# Patient Record
Sex: FEMALE | Race: WHITE | NOT HISPANIC OR LATINO | Employment: OTHER | URBAN - METROPOLITAN AREA
[De-identification: names, ages, dates, MRNs, and addresses within clinical notes are randomized per-mention and may not be internally consistent; named-entity substitution may affect disease eponyms.]

---

## 2017-01-03 ENCOUNTER — GENERIC CONVERSION - ENCOUNTER (OUTPATIENT)
Dept: OTHER | Facility: OTHER | Age: 77
End: 2017-01-03

## 2017-01-30 ENCOUNTER — ALLSCRIPTS OFFICE VISIT (OUTPATIENT)
Dept: OTHER | Facility: OTHER | Age: 77
End: 2017-01-30

## 2017-03-20 ENCOUNTER — GENERIC CONVERSION - ENCOUNTER (OUTPATIENT)
Dept: OTHER | Facility: OTHER | Age: 77
End: 2017-03-20

## 2017-03-21 ENCOUNTER — GENERIC CONVERSION - ENCOUNTER (OUTPATIENT)
Dept: OTHER | Facility: OTHER | Age: 77
End: 2017-03-21

## 2018-01-11 NOTE — RESULT NOTES
Verified Results  MAMMO DIAGNOSTIC RIGHT W CAD [id number] 12:00AM Kae Floyd     Test Name Result Flag Reference   [facility] DIAGNOSTIC RIGHT W CAD BENIGN        Summary / No summary entered :      No summary entered   Documents attached :      Ivon Chaparro [place]: [id number] - Chart Update - -      (Unassigned) (Result Document)    Plan  Encounter for screening mammogram for malignant neoplasm of breast    · MAMMO SCREENING RIGHT W CAD ; every 1 year;  Next [id number]; Status:Active

## 2018-01-12 NOTE — RESULT NOTES
Verified Results  (1) COMPREHENSIVE METABOLIC PANEL [id number] 24:95TW Truong Blind   TW Order Number: [id number]     Test Name Result Flag Reference   GLUCOSE,RANDM 104 mg/dL     If the patient is fasting, the ADA then defines impaired fasting glucose as > 100 mg/dL and diabetes as > or equal to 123 mg/dL  SODIUM 136 mmol/L  136-145   POTASSIUM 3 5 mmol/L  3 5-5 3   CHLORIDE 97 mmol/L L 100-108   CARBON DIOXIDE 32 mmol/L  21-32   ANION GAP (CALC) 7 mmol/L  4-13   BLOOD UREA NITROGEN 17 mg/dL  5-25   CREATININE 0 74 mg/dL  0 60-1 30   Standardized to IDMS reference method   CALCIUM 9 8 mg/dL  8 3-10 1   BILI, TOTAL 0 37 mg/dL  0 20-1 00   ALK PHOSPHATAS 89 U/L     ALT (SGPT) 21 U/L  12-78   AST(SGOT) 17 U/L  5-45   ALBUMIN 4 0 g/dL  3 5-5 0   TOTAL PROTEIN 8 5 g/dL H 6 4-8 2   eGFR Non-African American      >60 0 ml/min/1 73sq m   - Patient Instructions: This is a fasting blood test  Water,black tea or black  coffee only after 9:00pm the night before test Drink 2 glasses of water the morning of test - Patient Instructions: This bloodwork is non-fasting  Please drink two glasses of   water morning of bloodwork  National Kidney Disease Education Program recommendations are as follows:  GFR calculation is accurate only with a steady state creatinine  Chronic Kidney disease less than 60 ml/min/1 73 sq  meters  Kidney failure less than 15 ml/min/1 73 sq  meters  (1) CBC/PLT/DIFF [id number] 08:31AM Truong Blind   TW Order Number: [id number]     Test Name Result Flag Reference   WBC COUNT 3 96 Thousand/uL L 4 31-10 16   RBC COUNT 3 96 Million/uL  3 81-5 12   HEMOGLOBIN 12 7 g/dL  11 5-15 4   HEMATOCRIT 38 8 %  34 8-46  1   MCV 98 fL  82-98   MCH 32 1 pg  26 8-34 3   MCHC 32 7 g/dL  31 4-37 4   RDW 13 1 %  11 6-15 1   MPV 9 9 fL  8 9-12 7   PLATELET COUNT 629 Thousands/uL  149-390   nRBC AUTOMATED 0 /100 WBCs     NEUTROPHILS RELATIVE PERCENT 37 % L 43-75   LYMPHOCYTES RELATIVE PERCENT 40 %  14-44   MONOCYTES RELATIVE PERCENT 15 % H 4-12   EOSINOPHILS RELATIVE PERCENT 7 % H 0-6   BASOPHILS RELATIVE PERCENT 1 %  0-1   NEUTROPHILS ABSOLUTE COUNT 1 48 Thousands/?L L 1 85-7 62   LYMPHOCYTES ABSOLUTE COUNT 1 57 Thousands/?L  0 60-4 47   MONOCYTES ABSOLUTE COUNT 0 61 Thousand/?L  0 17-1 22   EOSINOPHILS ABSOLUTE COUNT 0 27 Thousand/?L  0 00-0 61   BASOPHILS ABSOLUTE COUNT 0 03 Thousands/?L  0 00-0 10   - Patient Instructions: This bloodwork is non-fasting  Please drink two glasses of water morning of bloodwork  - Patient Instructions: This bloodwork is non-fasting  Please drink two glasses of water morning of bloodwork  (1) LIPID PANEL, FASTING [id number] 08:31AM Juan Russ    Order Number: [id number]     Test Name Result Flag Reference   CHOLESTEROL 210 mg/dL H    HDL,DIRECT 81 mg/dL H 40-60   Specimen collection should occur prior to Metamizole administration due to the potential for falsely depressed results  LDL CHOLESTEROL CALCULATED 115 mg/dL H 0-100   - Patient Instructions: This is a fasting blood test  Water,black tea or black  coffee only after 9:00pm the night before test   Drink 2 glasses of water the morning of test     - Patient Instructions: This is a fasting blood test  Water,black tea or black  coffee only after 9:00pm the night before test Drink 2 glasses of water the morning of test - Patient Instructions: This bloodwork is non-fasting  Please drink two glasses of   water morning of bloodwork  Triglyceride:         Normal              <150 mg/dl       Borderline High    150-199 mg/dl       High               200-499 mg/dl       Very High          >499 mg/dl  Cholesterol:         Desirable        <200 mg/dl      Borderline High  200-239 mg/dl      High             >239 mg/dl  HDL Cholesterol:        High    >59 mg/dL      Low     <41 mg/dL  LDL CALCULATED:    This screening LDL is a calculated result    It does not have the accuracy of the Direct Measured LDL in the monitoring of patients with hyperlipidemia and/or statin therapy  Direct Measure LDL (EKU655) must be ordered separately in these patients  TRIGLYCERIDES 68 mg/dL  <=150   Specimen collection should occur prior to N-Acetylcysteine or Metamizole administration due to the potential for falsely depressed results  (1) TSH [id number] 08:31AM Tatyana Lara    Order Number: [id number]     Test Name Result Flag Reference   TSH 3 710 uIU/mL  0 358-3 740   - Patient Instructions: This bloodwork is non-fasting  Please drink two glasses of water morning of bloodwork  - Patient Instructions: This is a fasting blood test  Water,black tea or black  coffee only after 9:00pm the night before test Drink 2 glasses of water the morning of test - Patient Instructions: This bloodwork is non-fasting  Please drink two glasses of   water morning of bloodwork  Patients undergoing fluorescein dye angiography may retain small amounts of fluorescein in the body for 48-72 hours post procedure  Samples containing fluorescein can produce falsely depressed TSH values  If the patient had this procedure,a specimen should be resubmitted post fluorescein clearance            The recommended reference ranges for TSH during pregnancy are as follows:  First trimester 0 1 to 2 5 uIU/mL  Second trimester  0 2 to 3 0 uIU/mL  Third trimester 0 3 to 3 0 uIU/m

## 2018-01-13 VITALS
BODY MASS INDEX: 26.31 KG/M2 | RESPIRATION RATE: 16 BRPM | WEIGHT: 143 LBS | DIASTOLIC BLOOD PRESSURE: 70 MMHG | HEART RATE: 70 BPM | SYSTOLIC BLOOD PRESSURE: 150 MMHG | OXYGEN SATURATION: 94 % | HEIGHT: 62 IN | TEMPERATURE: 98 F

## 2018-03-13 ENCOUNTER — TELEPHONE (OUTPATIENT)
Dept: FAMILY MEDICINE CLINIC | Facility: CLINIC | Age: 78
End: 2018-03-13

## 2018-03-13 DIAGNOSIS — Z12.39 ENCOUNTER FOR SCREENING FOR MALIGNANT NEOPLASM OF BREAST: Primary | ICD-10-CM

## 2018-03-13 NOTE — TELEPHONE ENCOUNTER
Clovia Elder Dr Cheryle Harper is requesting order for routine mammo    Please fax to [phone] and mail to pt  home

## 2018-06-08 DIAGNOSIS — I10 ESSENTIAL HYPERTENSION: Primary | ICD-10-CM

## 2018-06-09 RX ORDER — METOPROLOL TARTRATE 50 MG/1
TABLET, FILM COATED ORAL
Qty: 180 TABLET | Refills: 3 | Status: SHIPPED | OUTPATIENT
Start: 2018-06-09 | End: 2019-05-30 | Stop reason: SDUPTHER

## 2018-06-09 RX ORDER — HYDROCHLOROTHIAZIDE 50 MG/1
TABLET ORAL
Qty: 90 TABLET | Refills: 3 | Status: SHIPPED | OUTPATIENT
Start: 2018-06-09 | End: 2019-05-30 | Stop reason: SDUPTHER

## 2018-06-09 RX ORDER — NIFEDIPINE 30 MG/1
TABLET, EXTENDED RELEASE ORAL
Qty: 90 TABLET | Refills: 3 | Status: SHIPPED | OUTPATIENT
Start: 2018-06-09 | End: 2019-05-30 | Stop reason: SDUPTHER

## 2018-06-09 RX ORDER — POTASSIUM CHLORIDE 20 MEQ/1
TABLET, EXTENDED RELEASE ORAL
Qty: 90 TABLET | Refills: 3 | Status: SHIPPED | OUTPATIENT
Start: 2018-06-09 | End: 2019-05-30 | Stop reason: SDUPTHER

## 2018-09-24 ENCOUNTER — TELEPHONE (OUTPATIENT)
Dept: FAMILY MEDICINE CLINIC | Facility: CLINIC | Age: 78
End: 2018-09-24

## 2018-09-24 NOTE — TELEPHONE ENCOUNTER
----- Message from Kody Spencer MD sent at 9/22/2018  9:27 AM EDT -----  Please inform mammogram showed stable findings--rpt in one yr

## 2018-12-13 ENCOUNTER — CLINICAL SUPPORT (OUTPATIENT)
Dept: FAMILY MEDICINE CLINIC | Facility: CLINIC | Age: 78
End: 2018-12-13
Payer: COMMERCIAL

## 2018-12-13 DIAGNOSIS — Z23 NEED FOR INFLUENZA VACCINATION: Primary | ICD-10-CM

## 2018-12-13 PROCEDURE — G0008 ADMIN INFLUENZA VIRUS VAC: HCPCS

## 2018-12-13 PROCEDURE — 90662 IIV NO PRSV INCREASED AG IM: CPT

## 2019-05-30 DIAGNOSIS — I10 ESSENTIAL HYPERTENSION: ICD-10-CM

## 2019-05-30 RX ORDER — POTASSIUM CHLORIDE 20 MEQ/1
TABLET, EXTENDED RELEASE ORAL
Qty: 30 TABLET | Refills: 0 | Status: SHIPPED | OUTPATIENT
Start: 2019-05-30 | End: 2019-09-19 | Stop reason: SDUPTHER

## 2019-05-30 RX ORDER — METOPROLOL TARTRATE 50 MG/1
TABLET, FILM COATED ORAL
Qty: 60 TABLET | Refills: 0 | Status: SHIPPED | OUTPATIENT
Start: 2019-05-30 | End: 2019-09-19 | Stop reason: SDUPTHER

## 2019-05-30 RX ORDER — NIFEDIPINE 30 MG/1
TABLET, EXTENDED RELEASE ORAL
Qty: 30 TABLET | Refills: 0 | Status: SHIPPED | OUTPATIENT
Start: 2019-05-30 | End: 2019-08-18 | Stop reason: SDUPTHER

## 2019-05-30 RX ORDER — HYDROCHLOROTHIAZIDE 50 MG/1
TABLET ORAL
Qty: 30 TABLET | Refills: 0 | Status: SHIPPED | OUTPATIENT
Start: 2019-05-30 | End: 2019-09-19 | Stop reason: SDUPTHER

## 2019-08-18 DIAGNOSIS — I10 ESSENTIAL HYPERTENSION: ICD-10-CM

## 2019-08-18 RX ORDER — NIFEDIPINE 30 MG/1
TABLET, EXTENDED RELEASE ORAL
Qty: 90 TABLET | Refills: 3 | Status: SHIPPED | OUTPATIENT
Start: 2019-08-18 | End: 2020-05-04

## 2019-08-22 DIAGNOSIS — I10 ESSENTIAL HYPERTENSION: ICD-10-CM

## 2019-08-22 RX ORDER — POTASSIUM CHLORIDE 20 MEQ/1
TABLET, EXTENDED RELEASE ORAL
Qty: 30 TABLET | Refills: 0 | OUTPATIENT
Start: 2019-08-22

## 2019-08-22 RX ORDER — HYDROCHLOROTHIAZIDE 50 MG/1
TABLET ORAL
Qty: 30 TABLET | Refills: 0 | OUTPATIENT
Start: 2019-08-22

## 2019-08-22 RX ORDER — METOPROLOL TARTRATE 50 MG/1
TABLET, FILM COATED ORAL
Qty: 60 TABLET | Refills: 0 | OUTPATIENT
Start: 2019-08-22

## 2019-09-18 DIAGNOSIS — I10 ESSENTIAL HYPERTENSION: ICD-10-CM

## 2019-09-18 RX ORDER — HYDROCHLOROTHIAZIDE 50 MG/1
TABLET ORAL
Qty: 30 TABLET | Refills: 0 | OUTPATIENT
Start: 2019-09-18

## 2019-09-18 RX ORDER — POTASSIUM CHLORIDE 20 MEQ/1
TABLET, EXTENDED RELEASE ORAL
Qty: 30 TABLET | Refills: 0 | OUTPATIENT
Start: 2019-09-18

## 2019-09-18 RX ORDER — METOPROLOL TARTRATE 50 MG/1
TABLET, FILM COATED ORAL
Qty: 60 TABLET | Refills: 0 | OUTPATIENT
Start: 2019-09-18

## 2019-09-19 DIAGNOSIS — I10 ESSENTIAL HYPERTENSION: ICD-10-CM

## 2019-09-19 NOTE — TELEPHONE ENCOUNTER
Dr Prasanna Lubin:    Patient needs a refill of potassium chloride, nifedipine, metoprolol tartrate and hydrochlorothiazide sent to [facility] in [place]  She is scheduled with you on 10/1 but will be out of her medication before that appointment

## 2019-09-20 RX ORDER — POTASSIUM CHLORIDE 20 MEQ/1
20 TABLET, EXTENDED RELEASE ORAL DAILY
Qty: 30 TABLET | Refills: 0 | Status: SHIPPED | OUTPATIENT
Start: 2019-09-20 | End: 2020-01-09

## 2019-09-20 RX ORDER — HYDROCHLOROTHIAZIDE 50 MG/1
50 TABLET ORAL DAILY
Qty: 30 TABLET | Refills: 0 | Status: SHIPPED | OUTPATIENT
Start: 2019-09-20 | End: 2019-11-16 | Stop reason: SDUPTHER

## 2019-09-20 RX ORDER — METOPROLOL TARTRATE 50 MG/1
50 TABLET, FILM COATED ORAL 2 TIMES DAILY
Qty: 60 TABLET | Refills: 0 | Status: SHIPPED | OUTPATIENT
Start: 2019-09-20 | End: 2019-11-09 | Stop reason: SDUPTHER

## 2019-10-01 ENCOUNTER — OFFICE VISIT (OUTPATIENT)
Dept: FAMILY MEDICINE CLINIC | Facility: CLINIC | Age: 79
End: 2019-10-01
Payer: COMMERCIAL

## 2019-10-01 ENCOUNTER — TELEPHONE (OUTPATIENT)
Dept: OTHER | Facility: OTHER | Age: 79
End: 2019-10-01

## 2019-10-01 VITALS
OXYGEN SATURATION: 97 % | HEART RATE: 66 BPM | HEIGHT: 60 IN | BODY MASS INDEX: 22.97 KG/M2 | RESPIRATION RATE: 16 BRPM | TEMPERATURE: 98 F | SYSTOLIC BLOOD PRESSURE: 130 MMHG | DIASTOLIC BLOOD PRESSURE: 74 MMHG | WEIGHT: 117 LBS

## 2019-10-01 DIAGNOSIS — Z23 IMMUNIZATION DUE: ICD-10-CM

## 2019-10-01 DIAGNOSIS — I10 ESSENTIAL HYPERTENSION: Primary | ICD-10-CM

## 2019-10-01 DIAGNOSIS — E78.01 FAMILIAL HYPERCHOLESTEROLEMIA: ICD-10-CM

## 2019-10-01 DIAGNOSIS — Z12.31 BREAST CANCER SCREENING BY MAMMOGRAM: ICD-10-CM

## 2019-10-01 PROCEDURE — 3075F SYST BP GE 130 - 139MM HG: CPT | Performed by: FAMILY MEDICINE

## 2019-10-01 PROCEDURE — 3078F DIAST BP <80 MM HG: CPT | Performed by: FAMILY MEDICINE

## 2019-10-01 PROCEDURE — G0008 ADMIN INFLUENZA VIRUS VAC: HCPCS | Performed by: FAMILY MEDICINE

## 2019-10-01 PROCEDURE — 1101F PT FALLS ASSESS-DOCD LE1/YR: CPT | Performed by: FAMILY MEDICINE

## 2019-10-01 PROCEDURE — 99214 OFFICE O/P EST MOD 30 MIN: CPT | Performed by: FAMILY MEDICINE

## 2019-10-01 PROCEDURE — 90662 IIV NO PRSV INCREASED AG IM: CPT | Performed by: FAMILY MEDICINE

## 2019-10-26 NOTE — PROGRESS NOTES
Chief Complaint   Patient presents with    Follow-up    Hypertension        Patient ID: Amairani García is a [de-identified] y o  female  [de-identified] yo pt in for BP check and to req flu shot and ref for mammogram  BP wnl  Overall feels well  Also due for labs  Eye care UTD  Past Medical History:   Diagnosis Date    Allergic     Hypertension        History reviewed  No pertinent surgical history  There is no problem list on file for this patient  History reviewed  No pertinent family history  Immunization History   Administered Date(s) Administered    Influenza Split High Dose Preservative Free IM 10/23/2014, 10/21/2015, 11/02/2016, 10/31/2017    Influenza TIV (IM) 10/25/2011    Influenza, high dose seasonal 0 5 mL 12/13/2018, 10/01/2019    Pneumococcal Polysaccharide PPV23 10/01/2010       No Known Allergies    Current Outpatient Medications   Medication Sig Dispense Refill    hydrochlorothiazide (HYDRODIURIL) 50 mg tablet Take 1 tablet (50 mg total) by mouth daily 30 tablet 0    metoprolol tartrate (LOPRESSOR) 50 mg tablet Take 1 tablet (50 mg total) by mouth 2 (two) times a day 60 tablet 0    NIFEdipine (PROCARDIA XL) 30 mg 24 hr tablet take 1 tablet by mouth once daily 90 tablet 3    potassium chloride (K-DUR,KLOR-CON) 20 mEq tablet Take 1 tablet (20 mEq total) by mouth daily 30 tablet 0     No current facility-administered medications for this visit  Social History     Socioeconomic History    Marital status:       Spouse name: None    Number of children: None    Years of education: None    Highest education level: None   Occupational History    None   Social Needs    Financial resource strain: None    Food insecurity:     Worry: None     Inability: None    Transportation needs:     Medical: None     Non-medical: None   Tobacco Use    Smoking status: Never Smoker    Smokeless tobacco: Never Used   Substance and Sexual Activity    Alcohol use: Never     Frequency: Never    Drug use: None    Sexual activity: None   Lifestyle    Physical activity:     Days per week: None     Minutes per session: None    Stress: None   Relationships    Social connections:     Talks on phone: None     Gets together: None     Attends Hinduism service: None     Active member of club or organization: None     Attends meetings of clubs or organizations: None     Relationship status: None    Intimate partner violence:     Fear of current or ex partner: None     Emotionally abused: None     Physically abused: None     Forced sexual activity: None   Other Topics Concern    None   Social History Narrative    None       Review of Systems   Constitutional: Positive for fatigue  Eyes: Positive for visual disturbance  Respiratory: Negative  Cardiovascular: Negative  Gastrointestinal: Negative  Musculoskeletal: Positive for arthralgias  Neurological: Negative  Psychiatric/Behavioral: Positive for sleep disturbance  The patient is nervous/anxious  Objective:    /74 (BP Location: Left arm, Patient Position: Sitting, Cuff Size: Standard)   Pulse 66   Temp 98 °F (36 7 °C) (Tympanic)   Resp 16   Ht 5' (1 524 m)   Wt 53 1 kg (117 lb)   SpO2 97%   BMI 22 85 kg/m²        Physical Exam   Constitutional: She is oriented to person, place, and time  She appears well-developed and well-nourished  No distress  Cardiovascular: Normal rate and regular rhythm  Pulmonary/Chest: Effort normal and breath sounds normal  No respiratory distress  Abdominal: Soft  Bowel sounds are normal  There is no tenderness  Musculoskeletal: Normal range of motion  Neurological: She is alert and oriented to person, place, and time  No cranial nerve deficit  Skin: Skin is warm and dry  Psychiatric: She has a normal mood and affect  Nursing note and vitals reviewed              Assessment/Plan:     Diagnoses and all orders for this visit:    Essential hypertension  Comments:  BP wnl cont current mgt  maintain yearly eye exams  Orders:  -     CBC; Future  -     Comprehensive metabolic panel; Future    Familial hypercholesterolemia  Comments:  check lipids and thyroid fxn cont low chol diet  Orders:  -     Lipid Panel with Direct LDL reflex; Future  -     TSH, 3rd generation; Future    Immunization due  Comments:  flu vacc given  Orders:  -     influenza vaccine, [id number], high-dose, PF 0 5 mL (FLUZONE HIGH-DOSE)    Breast cancer screening by mammogram  Comments:  ref for mammogram given    Orders:  -     Mammo screening bilateral w 3d & cad; Future        Deloris Valdivia MD

## 2019-11-09 DIAGNOSIS — I10 ESSENTIAL HYPERTENSION: ICD-10-CM

## 2019-11-09 RX ORDER — METOPROLOL TARTRATE 50 MG/1
TABLET, FILM COATED ORAL
Qty: 60 TABLET | Refills: 3 | Status: SHIPPED | OUTPATIENT
Start: 2019-11-09 | End: 2020-02-14

## 2019-11-16 DIAGNOSIS — I10 ESSENTIAL HYPERTENSION: ICD-10-CM

## 2019-11-16 RX ORDER — HYDROCHLOROTHIAZIDE 50 MG/1
TABLET ORAL
Qty: 30 TABLET | Refills: 0 | Status: SHIPPED | OUTPATIENT
Start: 2019-11-16 | End: 2020-01-04

## 2020-01-04 DIAGNOSIS — I10 ESSENTIAL HYPERTENSION: ICD-10-CM

## 2020-01-04 RX ORDER — HYDROCHLOROTHIAZIDE 50 MG/1
TABLET ORAL
Qty: 30 TABLET | Refills: 0 | Status: SHIPPED | OUTPATIENT
Start: 2020-01-04 | End: 2020-02-08

## 2020-01-06 RX ORDER — NIFEDIPINE 30 MG/1
TABLET, FILM COATED, EXTENDED RELEASE ORAL
Qty: 90 TABLET | Refills: 1 | Status: SHIPPED | OUTPATIENT
Start: 2020-01-06 | End: 2020-07-11

## 2020-01-09 DIAGNOSIS — I10 ESSENTIAL HYPERTENSION: ICD-10-CM

## 2020-01-09 RX ORDER — POTASSIUM CHLORIDE 20 MEQ/1
TABLET, EXTENDED RELEASE ORAL
Qty: 30 TABLET | Refills: 1 | Status: SHIPPED | OUTPATIENT
Start: 2020-01-09 | End: 2020-03-14

## 2020-01-24 DIAGNOSIS — Z12.31 BREAST CANCER SCREENING BY MAMMOGRAM: ICD-10-CM

## 2020-01-27 ENCOUNTER — TELEPHONE (OUTPATIENT)
Dept: FAMILY MEDICINE CLINIC | Facility: CLINIC | Age: 80
End: 2020-01-27

## 2020-01-27 DIAGNOSIS — E78.01 FAMILIAL HYPERCHOLESTEROLEMIA: Primary | ICD-10-CM

## 2020-01-27 DIAGNOSIS — I10 ESSENTIAL HYPERTENSION: ICD-10-CM

## 2020-01-27 DIAGNOSIS — I99.8 VASCULAR CALCIFICATION: ICD-10-CM

## 2020-01-27 NOTE — TELEPHONE ENCOUNTER
Pt daughter Justus Lang called back  And would like some answers because they told her her mammo results were fine , she would like to talk to you , please advise tc/john----- Message from Tatianna Benites MD sent at 1/25/2020 11:39 AM EST -----  Please schedule follow-up to discuss mammogram results

## 2020-01-27 NOTE — TELEPHONE ENCOUNTER
Pt refused appointment she said she was told that her mammo was fine at the place she had it done   I tried to explain that a radiologist looks at it but it is the doctor whom has the final say about the results   She still refused the appointment  Matti/john----- Message from Krystina Newman MD sent at 1/25/2020 11:39 AM EST -----  Please schedule follow-up to discuss mammogram results

## 2020-01-28 NOTE — TELEPHONE ENCOUNTER
Patient daughter called as follow up from yesterday's phone call  Wants to know if you could add a lab order that will check patient's mental status as she is aging and getting forgetful

## 2020-01-28 NOTE — TELEPHONE ENCOUNTER
There's no specific test but I ordered several tests that could indicate problems that could be contributing

## 2020-02-08 DIAGNOSIS — I10 ESSENTIAL HYPERTENSION: ICD-10-CM

## 2020-02-08 RX ORDER — HYDROCHLOROTHIAZIDE 50 MG/1
TABLET ORAL
Qty: 30 TABLET | Refills: 3 | Status: SHIPPED | OUTPATIENT
Start: 2020-02-08 | End: 2020-05-23

## 2020-02-14 DIAGNOSIS — I10 ESSENTIAL HYPERTENSION: ICD-10-CM

## 2020-02-14 RX ORDER — METOPROLOL TARTRATE 50 MG/1
TABLET, FILM COATED ORAL
Qty: 60 TABLET | Refills: 3 | Status: SHIPPED | OUTPATIENT
Start: 2020-02-14 | End: 2020-07-11

## 2020-03-13 DIAGNOSIS — I10 ESSENTIAL HYPERTENSION: ICD-10-CM

## 2020-03-14 RX ORDER — POTASSIUM CHLORIDE 20 MEQ/1
TABLET, EXTENDED RELEASE ORAL
Qty: 30 TABLET | Refills: 1 | Status: SHIPPED | OUTPATIENT
Start: 2020-03-14 | End: 2020-05-23

## 2020-05-04 ENCOUNTER — TELEMEDICINE (OUTPATIENT)
Dept: FAMILY MEDICINE CLINIC | Facility: CLINIC | Age: 80
End: 2020-05-04
Payer: COMMERCIAL

## 2020-05-04 DIAGNOSIS — I10 BENIGN ESSENTIAL HYPERTENSION: Primary | ICD-10-CM

## 2020-05-04 DIAGNOSIS — H26.9 CATARACT, UNSPECIFIED CATARACT TYPE, UNSPECIFIED LATERALITY: ICD-10-CM

## 2020-05-04 DIAGNOSIS — C50.911 MALIGNANT NEOPLASM OF RIGHT FEMALE BREAST, UNSPECIFIED ESTROGEN RECEPTOR STATUS, UNSPECIFIED SITE OF BREAST (HCC): ICD-10-CM

## 2020-05-04 PROCEDURE — 99442 PR PHYS/QHP TELEPHONE EVALUATION 11-20 MIN: CPT | Performed by: FAMILY MEDICINE

## 2020-05-23 DIAGNOSIS — I10 ESSENTIAL HYPERTENSION: ICD-10-CM

## 2020-05-23 RX ORDER — HYDROCHLOROTHIAZIDE 50 MG/1
TABLET ORAL
Qty: 30 TABLET | Refills: 3 | Status: SHIPPED | OUTPATIENT
Start: 2020-05-23 | End: 2020-10-10

## 2020-05-23 RX ORDER — POTASSIUM CHLORIDE 20 MEQ/1
TABLET, EXTENDED RELEASE ORAL
Qty: 30 TABLET | Refills: 1 | Status: SHIPPED | OUTPATIENT
Start: 2020-05-23 | End: 2020-08-13

## 2020-05-31 PROBLEM — H93.8X2 EAR FULLNESS, LEFT: Status: ACTIVE | Noted: 2017-01-30

## 2020-05-31 PROBLEM — J32.9 SINUSITIS: Status: ACTIVE | Noted: 2017-01-30

## 2020-06-02 RX ORDER — LORATADINE 10 MG/1
10 TABLET ORAL DAILY
COMMUNITY

## 2020-06-02 RX ORDER — ASCORBIC ACID 1000 MG
TABLET ORAL
COMMUNITY
End: 2022-02-02 | Stop reason: HOSPADM

## 2020-06-02 RX ORDER — UBIDECARENONE 75 MG
CAPSULE ORAL DAILY
COMMUNITY

## 2020-06-02 RX ORDER — MELATONIN
1000 DAILY
COMMUNITY

## 2020-06-04 DIAGNOSIS — Z20.822 COVID-19 RULED OUT BY LABORATORY TESTING: ICD-10-CM

## 2020-06-04 PROCEDURE — U0003 INFECTIOUS AGENT DETECTION BY NUCLEIC ACID (DNA OR RNA); SEVERE ACUTE RESPIRATORY SYNDROME CORONAVIRUS 2 (SARS-COV-2) (CORONAVIRUS DISEASE [COVID-19]), AMPLIFIED PROBE TECHNIQUE, MAKING USE OF HIGH THROUGHPUT TECHNOLOGIES AS DESCRIBED BY CMS-2020-01-R: HCPCS | Performed by: PHYSICIAN ASSISTANT

## 2020-06-07 LAB — SARS-COV-2 RNA SPEC QL NAA+PROBE: NOT DETECTED

## 2020-06-08 ENCOUNTER — HOSPITAL ENCOUNTER (OUTPATIENT)
Facility: AMBULARY SURGERY CENTER | Age: 80
Setting detail: OUTPATIENT SURGERY
Discharge: HOME | End: 2020-06-08
Attending: OPHTHALMOLOGY | Admitting: OPHTHALMOLOGY
Payer: COMMERCIAL

## 2020-06-08 ENCOUNTER — ANESTHESIA (OUTPATIENT)
Dept: PERIOP | Facility: AMBULARY SURGERY CENTER | Age: 80
End: 2020-06-08
Payer: COMMERCIAL

## 2020-06-08 ENCOUNTER — ANESTHESIA EVENT (OUTPATIENT)
Dept: PERIOP | Facility: AMBULARY SURGERY CENTER | Age: 80
End: 2020-06-08
Payer: COMMERCIAL

## 2020-06-08 VITALS
OXYGEN SATURATION: 98 % | BODY MASS INDEX: 22.97 KG/M2 | SYSTOLIC BLOOD PRESSURE: 148 MMHG | HEART RATE: 64 BPM | WEIGHT: 117 LBS | TEMPERATURE: 96.9 F | RESPIRATION RATE: 16 BRPM | HEIGHT: 60 IN | DIASTOLIC BLOOD PRESSURE: 71 MMHG

## 2020-06-08 DIAGNOSIS — Z20.822 COVID-19 RULED OUT BY LABORATORY TESTING: Primary | ICD-10-CM

## 2020-06-08 DIAGNOSIS — H25.012 CORTICAL AGE-RELATED CATARACT OF LEFT EYE: ICD-10-CM

## 2020-06-08 PROCEDURE — V2632 POST CHMBR INTRAOCULAR LENS: HCPCS | Performed by: OPHTHALMOLOGY

## 2020-06-08 DEVICE — IOL SN60WF 22.5: Type: IMPLANTABLE DEVICE | Site: EYE | Status: FUNCTIONAL

## 2020-06-08 RX ORDER — MIDAZOLAM HYDROCHLORIDE 2 MG/2ML
INJECTION, SOLUTION INTRAMUSCULAR; INTRAVENOUS AS NEEDED
Status: DISCONTINUED | OUTPATIENT
Start: 2020-06-08 | End: 2020-06-08 | Stop reason: SURG

## 2020-06-08 RX ORDER — TETRACAINE HYDROCHLORIDE 5 MG/ML
1 SOLUTION OPHTHALMIC ONCE
Status: COMPLETED | OUTPATIENT
Start: 2020-06-08 | End: 2020-06-08

## 2020-06-08 RX ORDER — LIDOCAINE HYDROCHLORIDE 20 MG/ML
1 JELLY TOPICAL
Status: ACTIVE | OUTPATIENT
Start: 2020-06-08 | End: 2020-06-08

## 2020-06-08 RX ORDER — KETOROLAC TROMETHAMINE 5 MG/ML
1 SOLUTION OPHTHALMIC
Status: ACTIVE | OUTPATIENT
Start: 2020-06-08 | End: 2020-06-08

## 2020-06-08 RX ORDER — PHENYLEPHRINE HCL 2.5 %
1 DROPS OPHTHALMIC (EYE)
Status: ACTIVE | OUTPATIENT
Start: 2020-06-08 | End: 2020-06-08

## 2020-06-08 RX ORDER — GATIFLOXACIN 5 MG/ML
1 SOLUTION/ DROPS OPHTHALMIC 2 TIMES DAILY
Qty: 3 ML | Refills: 0
Start: 2020-06-08 | End: 2022-02-02 | Stop reason: HOSPADM

## 2020-06-08 RX ORDER — CYCLOPENTOLATE HYDROCHLORIDE 10 MG/ML
1 SOLUTION/ DROPS OPHTHALMIC
Status: ACTIVE | OUTPATIENT
Start: 2020-06-08 | End: 2020-06-08

## 2020-06-08 RX ORDER — GATIFLOXACIN 5 MG/ML
SOLUTION/ DROPS OPHTHALMIC AS NEEDED
Status: DISCONTINUED | OUTPATIENT
Start: 2020-06-08 | End: 2020-06-08 | Stop reason: HOSPADM

## 2020-06-08 RX ORDER — LIDOCAINE HYDROCHLORIDE 10 MG/ML
INJECTION, SOLUTION EPIDURAL; INFILTRATION; INTRACAUDAL; PERINEURAL AS NEEDED
Status: DISCONTINUED | OUTPATIENT
Start: 2020-06-08 | End: 2020-06-08 | Stop reason: HOSPADM

## 2020-06-08 RX ORDER — TETRACAINE HYDROCHLORIDE 5 MG/ML
SOLUTION OPHTHALMIC AS NEEDED
Status: DISCONTINUED | OUTPATIENT
Start: 2020-06-08 | End: 2020-06-08 | Stop reason: HOSPADM

## 2020-06-08 RX ADMIN — KETOROLAC TROMETHAMINE 1 DROP: 5 SOLUTION OPHTHALMIC at 12:00

## 2020-06-08 RX ADMIN — CYCLOPENTOLATE HYDROCHLORIDE 1 DROP: 10 SOLUTION/ DROPS OPHTHALMIC at 12:00

## 2020-06-08 RX ADMIN — CYCLOPENTOLATE HYDROCHLORIDE 1 DROP: 10 SOLUTION/ DROPS OPHTHALMIC at 12:15

## 2020-06-08 RX ADMIN — LIDOCAINE HYDROCHLORIDE 1 APPLICATION: 20 JELLY TOPICAL at 12:14

## 2020-06-08 RX ADMIN — KETOROLAC TROMETHAMINE 1 DROP: 5 SOLUTION OPHTHALMIC at 11:44

## 2020-06-08 RX ADMIN — PHENYLEPHRINE HYDROCHLORIDE 1 DROP: 25 SOLUTION/ DROPS OPHTHALMIC at 12:00

## 2020-06-08 RX ADMIN — LIDOCAINE HYDROCHLORIDE 1 APPLICATION: 20 JELLY TOPICAL at 11:44

## 2020-06-08 RX ADMIN — KETOROLAC TROMETHAMINE 1 DROP: 5 SOLUTION OPHTHALMIC at 12:15

## 2020-06-08 RX ADMIN — PHENYLEPHRINE HYDROCHLORIDE 1 DROP: 25 SOLUTION/ DROPS OPHTHALMIC at 12:14

## 2020-06-08 RX ADMIN — CYCLOPENTOLATE HYDROCHLORIDE 1 DROP: 10 SOLUTION/ DROPS OPHTHALMIC at 11:44

## 2020-06-08 RX ADMIN — MIDAZOLAM HYDROCHLORIDE 2 MG: 1 INJECTION, SOLUTION INTRAMUSCULAR; INTRAVENOUS at 12:17

## 2020-06-08 RX ADMIN — PHENYLEPHRINE HYDROCHLORIDE 1 DROP: 25 SOLUTION/ DROPS OPHTHALMIC at 11:45

## 2020-06-08 RX ADMIN — LIDOCAINE HYDROCHLORIDE 1 APPLICATION: 20 JELLY TOPICAL at 12:00

## 2020-06-08 RX ADMIN — TETRACAINE HYDROCHLORIDE 1 DROP: 5 SOLUTION OPHTHALMIC at 11:45

## 2020-06-27 LAB
AMYLASE SERPL-CCNC: 85 U/L (ref 31–110)
BASOPHILS # BLD AUTO: 0.1 X10E3/UL (ref 0–0.2)
BASOPHILS NFR BLD AUTO: 2 %
CHOLEST SERPL-MCNC: 202 MG/DL (ref 100–199)
EOSINOPHIL # BLD AUTO: 0.2 X10E3/UL (ref 0–0.4)
EOSINOPHIL NFR BLD AUTO: 6 %
ERYTHROCYTE [DISTWIDTH] IN BLOOD BY AUTOMATED COUNT: 12.1 % (ref 11.7–15.4)
HCT VFR BLD AUTO: 37.3 % (ref 34–46.6)
HDLC SERPL-MCNC: 89 MG/DL
HGB BLD-MCNC: 12.1 G/DL (ref 11.1–15.9)
IMM GRANULOCYTES # BLD: 0 X10E3/UL (ref 0–0.1)
IMM GRANULOCYTES NFR BLD: 0 %
LDLC SERPL CALC-MCNC: 98 MG/DL (ref 0–99)
LIPASE SERPL-CCNC: 24 U/L (ref 14–85)
LYMPHOCYTES # BLD AUTO: 1.3 X10E3/UL (ref 0.7–3.1)
LYMPHOCYTES NFR BLD AUTO: 40 %
MAGNESIUM SERPL-MCNC: 2.1 MG/DL (ref 1.6–2.3)
MCH RBC QN AUTO: 32.3 PG (ref 26.6–33)
MCHC RBC AUTO-ENTMCNC: 32.4 G/DL (ref 31.5–35.7)
MCV RBC AUTO: 100 FL (ref 79–97)
MICRODELETION SYND BLD/T FISH: NORMAL
MONOCYTES # BLD AUTO: 0.5 X10E3/UL (ref 0.1–0.9)
MONOCYTES NFR BLD AUTO: 16 %
NEUTROPHILS # BLD AUTO: 1.2 X10E3/UL (ref 1.4–7)
NEUTROPHILS NFR BLD AUTO: 36 %
PLATELET # BLD AUTO: 247 X10E3/UL (ref 150–450)
RBC # BLD AUTO: 3.75 X10E6/UL (ref 3.77–5.28)
TRIGL SERPL-MCNC: 76 MG/DL (ref 0–149)
TSH SERPL DL<=0.005 MIU/L-ACNC: 2.59 UIU/ML (ref 0.45–4.5)
WBC # BLD AUTO: 3.3 X10E3/UL (ref 3.4–10.8)

## 2020-07-01 ENCOUNTER — TELEMEDICINE (OUTPATIENT)
Dept: FAMILY MEDICINE CLINIC | Facility: CLINIC | Age: 80
End: 2020-07-01
Payer: COMMERCIAL

## 2020-07-01 DIAGNOSIS — I10 BENIGN ESSENTIAL HYPERTENSION: Primary | ICD-10-CM

## 2020-07-01 DIAGNOSIS — Z85.3 HISTORY OF BREAST CANCER: ICD-10-CM

## 2020-07-01 DIAGNOSIS — E78.01 FAMILIAL HYPERCHOLESTEROLEMIA: ICD-10-CM

## 2020-07-01 DIAGNOSIS — R41.3 MEMORY CHANGE: ICD-10-CM

## 2020-07-01 PROCEDURE — 1160F RVW MEDS BY RX/DR IN RCRD: CPT | Performed by: FAMILY MEDICINE

## 2020-07-01 PROCEDURE — 99213 OFFICE O/P EST LOW 20 MIN: CPT | Performed by: FAMILY MEDICINE

## 2020-07-01 PROCEDURE — 3078F DIAST BP <80 MM HG: CPT | Performed by: FAMILY MEDICINE

## 2020-07-01 PROCEDURE — 1036F TOBACCO NON-USER: CPT | Performed by: FAMILY MEDICINE

## 2020-07-01 PROCEDURE — 3077F SYST BP >= 140 MM HG: CPT | Performed by: FAMILY MEDICINE

## 2020-07-01 PROCEDURE — 4040F PNEUMOC VAC/ADMIN/RCVD: CPT | Performed by: FAMILY MEDICINE

## 2020-07-06 NOTE — PROGRESS NOTES
Virtual Brief Visit    Assessment/Plan:    Problem List Items Addressed This Visit     Benign essential hypertension - Primary    Relevant Orders    Comprehensive metabolic panel    Hyperlipidemia    Relevant Orders    Comprehensive metabolic panel      Other Visit Diagnoses     Memory change        Relevant Orders    Ambulatory referral to Neurology    History of breast cancer                    Reason for visit is follow-up labs  Chief Complaint   Patient presents with    Results     labs    Virtual Brief Visit        Encounter provider Shana Melo MD    Provider located at [address] [place] [id number]    Recent Visits  Date Type Provider Dept   07/01/20 [address],[facility]  [place] [facility] recent visits within past 7 days and meeting all other requirements     Future Appointments  No visits were found meeting these conditions  Showing future appointments within next 150 days and meeting all other requirements        After connecting through telephone, the patient was identified by name and date of birth  Rachid Martell was informed that this is a telemedicine visit and that the visit is being conducted through telephone  My office door was closed  No one else was in the room  She acknowledged consent and understanding of privacy and security of the platform  The patient has agreed to participate and understands she can discontinue the visit at any time  dtr -Denies Ze--on call w pt    Patient is aware this is a billable service       Jorge Mulligan is a [de-identified] y o  female   HPI   dtr-Anitha-on call w pt  Recent labs wnl except for borderline high total chol, good HDL  Remainder labs wnl  Dtr c/o pt w noted memory changes---mainly short term  No change in speech or gait  Recent eye surgery w Dr Kenya Huynh in [facility]  Denies h/c, neck stiffness, cp or abd pain  Discussed neurology eval prior to consideration of possible med optiions    Past Medical History:   Diagnosis Date    Allergic     Cancer ([address] ) 2005    left breast mastectomy    Hypertension     Memory change        Past Surgical History:   Procedure Laterality Date    CATARACT EXTRACTION Left 05/2020    MASTECTOMY      ID XCAPSL CTRC RMVL INSJ IO LENS PROSTH W/O ECP Left 6/8/2020    Procedure: EXTRACTION EXTRACAPSULAR CATARACT PHACO INTRAOCULAR LENS (IOL); Surgeon: Rashmi Fonseca MD;  Location: [facility] MAIN OR;  Service: Ophthalmology       Current Outpatient Medications   Medication Sig Dispense Refill    Bromfenac Sodium (BromSite) 0 075 % SOLN Administer 1 drop into the left eye 2 (two) times a day  0    cholecalciferol (VITAMIN D3) 1,000 units tablet Take 1,000 Units by mouth daily      cyanocobalamin (VITAMIN B-12) 100 mcg tablet Take by mouth daily      gatifloxacin (ZYMAXID) 0 5 % Administer 1 drop into the left eye 2 (two) times a day 3 mL 0    Ginkgo Biloba 40 MG TABS Take by mouth      hydrochlorothiazide (HYDRODIURIL) 50 mg tablet take 1 tablet by mouth once daily 30 tablet 3    loratadine (CLARITIN) 10 mg tablet Take 10 mg by mouth daily      metoprolol tartrate (LOPRESSOR) 50 mg tablet take 1 tablet by mouth twice a day 60 tablet 3    Multiple Vitamins-Minerals (ICAPS AREDS 2 PO) Take by mouth      NIFEdipine ER (ADALAT CC) 30 MG 24 hr tablet take 1 tablet by mouth once daily 90 tablet 1    potassium chloride (K-DUR,KLOR-CON) 20 mEq tablet take 1 tablet by mouth once daily 30 tablet 1    Prenatal Vit-Fe Fumarate-FA (M-VIT PO) Take by mouth       No current facility-administered medications for this visit  Allergies   Allergen Reactions    Ibuprofen      Reaction Date: 10Aug2005; Review of Systems  Per hpi  There were no vitals filed for this visit  Exam limited to phone visit  No audible distress  As a result of this visit, I have not referred the patient for further respiratory evaluation  I spent 15 minutes directly with the patient during this visit    VIRTUAL VISIT Nikolai Bowen acknowledges that she has consented to an online visit or consultation  She understands that the online visit is based solely on information provided by her, and that, in the absence of a face-to-face physical evaluation by the physician, the diagnosis she receives is both limited and provisional in terms of accuracy and completeness  This is not intended to replace a full medical face-to-face evaluation by the physician  Lizbeth Good understands and accepts these terms

## 2020-07-11 DIAGNOSIS — I10 ESSENTIAL HYPERTENSION: ICD-10-CM

## 2020-07-11 RX ORDER — NIFEDIPINE 30 MG/1
TABLET, FILM COATED, EXTENDED RELEASE ORAL
Qty: 90 TABLET | Refills: 1 | Status: SHIPPED | OUTPATIENT
Start: 2020-07-11 | End: 2020-12-12

## 2020-07-11 RX ORDER — METOPROLOL TARTRATE 50 MG/1
TABLET, FILM COATED ORAL
Qty: 60 TABLET | Refills: 3 | Status: SHIPPED | OUTPATIENT
Start: 2020-07-11 | End: 2020-11-10

## 2020-07-24 LAB
ALBUMIN SERPL-MCNC: 4.7 G/DL (ref 3.7–4.7)
ALBUMIN/GLOB SERPL: 1.7 {RATIO} (ref 1.2–2.2)
ALP SERPL-CCNC: 78 IU/L (ref 39–117)
ALT SERPL-CCNC: 17 IU/L (ref 0–32)
AST SERPL-CCNC: 21 IU/L (ref 0–40)
BILIRUB SERPL-MCNC: 0.3 MG/DL (ref 0–1.2)
BUN SERPL-MCNC: 16 MG/DL (ref 8–27)
BUN/CREAT SERPL: 22 (ref 12–28)
CALCIUM SERPL-MCNC: 9.8 MG/DL (ref 8.7–10.3)
CHLORIDE SERPL-SCNC: 89 MMOL/L (ref 96–106)
CO2 SERPL-SCNC: 26 MMOL/L (ref 20–29)
CREAT SERPL-MCNC: 0.72 MG/DL (ref 0.57–1)
GLOBULIN SER-MCNC: 2.7 G/DL (ref 1.5–4.5)
GLUCOSE SERPL-MCNC: ABNORMAL MG/DL
POTASSIUM SERPL-SCNC: ABNORMAL MMOL/L
PROT SERPL-MCNC: 7.4 G/DL (ref 6–8.5)
SL AMB EGFR AFRICAN AMERICAN: 91 ML/MIN/1.73
SL AMB EGFR NON AFRICAN AMERICAN: 79 ML/MIN/1.73
SODIUM SERPL-SCNC: 133 MMOL/L (ref 134–144)

## 2020-08-12 DIAGNOSIS — I10 ESSENTIAL HYPERTENSION: ICD-10-CM

## 2020-08-13 RX ORDER — POTASSIUM CHLORIDE 20 MEQ/1
TABLET, EXTENDED RELEASE ORAL
Qty: 30 TABLET | Refills: 1 | Status: SHIPPED | OUTPATIENT
Start: 2020-08-13 | End: 2020-10-10

## 2020-09-09 ENCOUNTER — OFFICE VISIT (OUTPATIENT)
Dept: NEUROLOGY | Facility: CLINIC | Age: 80
End: 2020-09-09
Payer: COMMERCIAL

## 2020-09-09 VITALS
WEIGHT: 115 LBS | TEMPERATURE: 97.3 F | BODY MASS INDEX: 22.58 KG/M2 | DIASTOLIC BLOOD PRESSURE: 80 MMHG | SYSTOLIC BLOOD PRESSURE: 171 MMHG | HEART RATE: 69 BPM | HEIGHT: 60 IN

## 2020-09-09 DIAGNOSIS — G30.1 SENILE DEMENTIA OF ALZHEIMER'S TYPE (HCC): ICD-10-CM

## 2020-09-09 DIAGNOSIS — R41.3 MEMORY IMPAIRMENT: Primary | ICD-10-CM

## 2020-09-09 DIAGNOSIS — F02.80 SENILE DEMENTIA OF ALZHEIMER'S TYPE (HCC): ICD-10-CM

## 2020-09-09 DIAGNOSIS — F40.240 CLAUSTROPHOBIA: ICD-10-CM

## 2020-09-09 PROCEDURE — 3079F DIAST BP 80-89 MM HG: CPT | Performed by: PSYCHIATRY & NEUROLOGY

## 2020-09-09 PROCEDURE — 1160F RVW MEDS BY RX/DR IN RCRD: CPT | Performed by: PSYCHIATRY & NEUROLOGY

## 2020-09-09 PROCEDURE — 1036F TOBACCO NON-USER: CPT | Performed by: PSYCHIATRY & NEUROLOGY

## 2020-09-09 PROCEDURE — 99205 OFFICE O/P NEW HI 60 MIN: CPT | Performed by: PSYCHIATRY & NEUROLOGY

## 2020-09-09 PROCEDURE — 3077F SYST BP >= 140 MM HG: CPT | Performed by: PSYCHIATRY & NEUROLOGY

## 2020-09-09 NOTE — PROGRESS NOTES
Outpatient Neurology History and Physical  Chris Ramos  [id number]  89 y o   1940          Consult: Yes    Corinna Madden MD      CC: dementia         History Obtained from: daughter and patient    HPI:    Chris Ramos is an 80 yo F that is brought to us for memory disturbance  She states that since patient has turned 79, they have noticed decline with her memory  She can easily forget her way around  Asking her to do basic chores can be a struggle  She can still wake up and brush, shower, dress herself  She may get confused on how to lock door  She hasn't been driving because car wasn't working  She has never gotten lost walking home  Family denies any aggression, agitation, sun downing phenomena  Denies any associated headache, focal weakness, paresthesia  They can't recall any significant events in her life in past 2 years  She watches news and various shows on TV  She goes out for walks  She is started on supplement Prevagen by family       Past Medical History:   Diagnosis Date    Allergic     Cancer ([address] ) 2005    left breast mastectomy    Hypertension     Memory change                Current Outpatient Medications on File Prior to Visit   Medication Sig Dispense Refill    Apoaequorin (PREVAGEN EXTRA STRENGTH PO) Take by mouth daily at bedtime      cholecalciferol (VITAMIN D3) 1,000 units tablet Take 1,000 Units by mouth daily      cyanocobalamin (VITAMIN B-12) 100 mcg tablet Take by mouth daily      Fexofenadine HCl (MUCINEX ALLERGY PO) Take by mouth as needed      hydrochlorothiazide (HYDRODIURIL) 50 mg tablet take 1 tablet by mouth once daily 30 tablet 3    loratadine (CLARITIN) 10 mg tablet Take 10 mg by mouth daily      metoprolol tartrate (LOPRESSOR) 50 mg tablet take 1 tablet by mouth twice a day 60 tablet 3    Multiple Vitamins-Minerals (ICAPS AREDS 2 PO) Take by mouth      NIFEdipine ER (ADALAT CC) 30 MG 24 hr tablet take 1 tablet by mouth once daily 90 tablet 1    potassium chloride (K-DUR,KLOR-CON) 20 mEq tablet take 1 tablet by mouth once daily 30 tablet 1    Prenatal Vit-Fe Fumarate-FA (M-VIT PO) Take by mouth      Bromfenac Sodium (BromSite) 0 075 % SOLN Administer 1 drop into the left eye 2 (two) times a day (Patient not taking: Reported on 9/9/2020)  0    gatifloxacin (ZYMAXID) 0 5 % Administer 1 drop into the left eye 2 (two) times a day (Patient not taking: Reported on 9/9/2020) 3 mL 0    Ginkgo Biloba 40 MG TABS Take by mouth       No current facility-administered medications on file prior to visit  Allergies   Allergen Reactions    Ibuprofen      Reaction Date: 10Aug2005; History reviewed  No pertinent family history  Past Surgical History:   Procedure Laterality Date    CATARACT EXTRACTION Left 05/2020    MASTECTOMY      SD XCAPSL CTRC RMVL INSJ IO LENS PROSTH W/O ECP Left 6/8/2020    Procedure: EXTRACTION EXTRACAPSULAR CATARACT PHACO INTRAOCULAR LENS (IOL); Surgeon: Luana Jo MD;  Location: [facility] MAIN OR;  Service: Ophthalmology           Social History     Socioeconomic History    Marital status:       Spouse name: Not on file    Number of children: Not on file    Years of education: Not on file    Highest education level: Not on file   Occupational History    Not on file   Social Needs    Financial resource strain: Not on file    Food insecurity     Worry: Not on file     Inability: Not on file    Transportation needs     Medical: Not on file     Non-medical: Not on file   Tobacco Use    Smoking status: Never Smoker    Smokeless tobacco: Never Used   Substance and Sexual Activity    Alcohol use: Never     Frequency: Never    Drug use: Never    Sexual activity: Not on file   Lifestyle    Physical activity     Days per week: Not on file     Minutes per session: Not on file    Stress: Not on file   Relationships    Social connections     Talks on phone: Not on file     Gets together: Not on file Attends [employer] service: Not on file     Active member of club or organization: Not on file     Attends meetings of clubs or organizations: Not on file     Relationship status: Not on file    Intimate partner violence     Fear of current or ex partner: Not on file     Emotionally abused: Not on file     Physically abused: Not on file     Forced sexual activity: Not on file   Other Topics Concern    Not on file   Social History Narrative    Not on file       Review of Systems  Refer to positive review of systems in HPI  Constitutional- No fever  Eyes- No visual change  ENT- Hearing normal  CV- No chest pain  Resp- No Shortness of breath  GI- No diarrhea  - Bladder normal  MS- No Arthritis   Skin- No rash  Psych- No depression  Endo- No DM  Heme- No nodes    PHYSICAL EXAM:    Vitals:    09/09/20 0806   BP: (!) 171/80   BP Location: Right arm   Patient Position: Sitting   Cuff Size: Adult   Pulse: 69   Temp: (!) 97 3 °F (36 3 °C)   Weight: 52 2 kg (115 lb)   Height: 5' (1 524 m)         Appearance: No Acute Distress  Ophthalmoscopic: Disc Flat, Normal fundus  Carotid/Heart/Peripheral Vascular: No Bruits, RRR  Orientation: Awake, Alert, and Oriented x 3  Mental status:  Memory:  MOCA: 10/30   Attention: Normal  Knowledge: fair   Language: No aphasia  Speech: No dysarthria  Cranial Nerves:  2 No Visual Defect on Confrontation; Pupils round, equal, reactive to light  3,4,6 Extraocular Movements Intact; no nystagmus  5 Facial Sensation Intact  7 No facial asymmetry  8 Intact hearing  9,10 Palate symmetric, normal gag  11 Good shoulder shrug  12 Tongue Midline  Gait: scoliosis, stooped posture, Stable, No ataxia, can perform tandem walking  Coordination: No ataxia with finger to nose testing and heel to shin testing  Sensory: Intact, Symmetric to Pinprick, Light Touch, Vibration, and Joint Position  Muscle Tone: Normal  Muscle exam  Arm Right Left Leg Right Left   Deltoid 5/5 5/5 Iliopsoas 5/5 5/5   Biceps 5/5 5/5 Quads 5/5 5/5   Triceps 5/5 5/5 Hamstrings 5/5 5/5   Wrist Extension 5/5 5/5 Ankle Dorsi Flexion 5/5 5/5   Wrist Flexion 5/5 5/5 Ankle Plantar Flexion 5/5 5/5   Interossei 5/5 5/5 Ankle Eversion 5/5 5/5   APB 5/5 5/5 Ankle Inversion 5/5 5/5       Reflexes   RJ BJ TJ KJ AJ Plantars Coppola's   Right 2+ 2+ 2+ 2+ 2+ Downgoing Not present   Left 2+ 2+ 2+ 2+ 2+ Downgoing Not present       Personal review of         Cmp, mag, lipid panel    Assessment/Plan:     1  Memory impairment  Vitamin B12    RPR    Folate    Vitamin D 25 hydroxy    MRI brain NeuroQuant wo and w contrast    EEG Awake and asleep    Ambulatory referral to Neuropsychology   2  Senile dementia of Alzheimer's type [facility])  Ambulatory referral to Neuropsychology   3  Claustrophobia       Patient's clinical history is suggestive of most likely age associated dementia however need to rule out structural and reversible causes  Will obtain MRI brain neuroquant for further evaluation  Will get EEG to r/o epileptiform discharges  Discussed neuropsych testing however there is a good possibility that patient may not be able to complete it  She may resume prevagen at this time  With her degree of memory loss, switching to another agent is not likely to yield better results nor reverse her condition  Counseling Documentation:  The patient and/or patient's family were  counseled regarding diagnostic results  Instructions for management,risk factor reductions,prognosis of disease were discussed  Patient and family were educated regarding impressions,risks and benefits of treatment options,importance of compliance with treatment  Total time of encounter: 60 min  More than 50% of time was spent in counseling and coordination of care of patient  VERONICA Eugene    [facility]  [address]  [address]

## 2020-09-09 NOTE — LETTER
September 9, 2020     Sabrina Ghotra, [address]    Patient: Angela Eastman   YOB: 1940   Date of Visit: 9/9/2020       Dear Dr Whiting Offer: Thank you for referring Live Moreira to me for evaluation  Below are my notes for this consultation  If you have questions, please do not hesitate to call me  I look forward to following your patient along with you  Sincerely,        Mila Briggs MD        CC: No Recipients  Mila Briggs MD  9/9/2020 10:40 AM  Sign when Signing Visit  Outpatient Neurology History and Physical  Angela Eastman  [id number]  79 y o   1940          Consult: Yes    Sabrina Ghotra MD      CC: dementia         History Obtained from: daughter and patient    HPI:    Angela Eastman is an 78 yo F that is brought to us for memory disturbance  She states that since patient has turned 79, they have noticed decline with her memory  She can easily forget her way around  Asking her to do basic chores can be a struggle  She can still wake up and brush, shower, dress herself  She may get confused on how to lock door  She hasn't been driving because car wasn't working  She has never gotten lost walking home  Family denies any aggression, agitation, sun downing phenomena  Denies any associated headache, focal weakness, paresthesia  They can't recall any significant events in her life in past 2 years  She watches news and various shows on TV  She goes out for walks  She is started on supplement Prevagen by family       Past Medical History:   Diagnosis Date    Allergic     Cancer ([facility] [address] ) 2005    left breast mastectomy    Hypertension     Memory change                Current Outpatient Medications on File Prior to Visit   Medication Sig Dispense Refill    Apoaequorin (PREVAGEN EXTRA STRENGTH PO) Take by mouth daily at bedtime      cholecalciferol (VITAMIN D3) 1,000 units tablet Take 1,000 Units by mouth daily      cyanocobalamin (VITAMIN B-12) 100 mcg tablet Take by mouth daily      Fexofenadine HCl (MUCINEX ALLERGY PO) Take by mouth as needed      hydrochlorothiazide (HYDRODIURIL) 50 mg tablet take 1 tablet by mouth once daily 30 tablet 3    loratadine (CLARITIN) 10 mg tablet Take 10 mg by mouth daily      metoprolol tartrate (LOPRESSOR) 50 mg tablet take 1 tablet by mouth twice a day 60 tablet 3    Multiple Vitamins-Minerals (ICAPS AREDS 2 PO) Take by mouth      NIFEdipine ER (ADALAT CC) 30 MG 24 hr tablet take 1 tablet by mouth once daily 90 tablet 1    potassium chloride (K-DUR,KLOR-CON) 20 mEq tablet take 1 tablet by mouth once daily 30 tablet 1    Prenatal Vit-Fe Fumarate-FA (M-VIT PO) Take by mouth      Bromfenac Sodium (BromSite) 0 075 % SOLN Administer 1 drop into the left eye 2 (two) times a day (Patient not taking: Reported on 9/9/2020)  0    gatifloxacin (ZYMAXID) 0 5 % Administer 1 drop into the left eye 2 (two) times a day (Patient not taking: Reported on 9/9/2020) 3 mL 0    Ginkgo Biloba 40 MG TABS Take by mouth       No current facility-administered medications on file prior to visit  Allergies   Allergen Reactions    Ibuprofen      Reaction Date: 10Aug2005; History reviewed  No pertinent family history  Past Surgical History:   Procedure Laterality Date    CATARACT EXTRACTION Left 05/2020    MASTECTOMY      GA XCAPSL CTRC RMVL INSJ IO LENS PROSTH W/O ECP Left 6/8/2020    Procedure: EXTRACTION EXTRACAPSULAR CATARACT PHACO INTRAOCULAR LENS (IOL); Surgeon: Nohemy Kulkarni MD;  Location: [facility] MAIN OR;  Service: Ophthalmology           Social History     Socioeconomic History    Marital status:       Spouse name: Not on file    Number of children: Not on file    Years of education: Not on file    Highest education level: Not on file   Occupational History    Not on file   Social Needs    Financial resource strain: Not on file    Food insecurity     Worry: Not on file Inability: Not on file    Transportation needs     Medical: Not on file     Non-medical: Not on file   Tobacco Use    Smoking status: Never Smoker    Smokeless tobacco: Never Used   Substance and Sexual Activity    Alcohol use: Never     Frequency: Never    Drug use: Never    Sexual activity: Not on file   Lifestyle    Physical activity     Days per week: Not on file     Minutes per session: Not on file    Stress: Not on file   Relationships    Social connections     Talks on phone: Not on file     Gets together: Not on file     Attends [employer] service: Not on file     Active member of club or organization: Not on file     Attends meetings of clubs or organizations: Not on file     Relationship status: Not on file    Intimate partner violence     Fear of current or ex partner: Not on file     Emotionally abused: Not on file     Physically abused: Not on file     Forced sexual activity: Not on file   Other Topics Concern    Not on file   Social History Narrative    Not on file       Review of Systems  Refer to positive review of systems in HPI  Constitutional- No fever  Eyes- No visual change  ENT- Hearing normal  CV- No chest pain  Resp- No Shortness of breath  GI- No diarrhea  - Bladder normal  MS- No Arthritis   Skin- No rash  Psych- No depression  Endo- No DM  Heme- No nodes    PHYSICAL EXAM:    Vitals:    09/09/20 0806   BP: (!) 171/80   BP Location: Right arm   Patient Position: Sitting   Cuff Size: Adult   Pulse: 69   Temp: (!) 97 3 °F (36 3 °C)   Weight: 52 2 kg (115 lb)   Height: 5' (1 524 m)         Appearance: No Acute Distress  Ophthalmoscopic: Disc Flat, Normal fundus  Carotid/Heart/Peripheral Vascular: No Bruits, RRR  Orientation: Awake, Alert, and Oriented x 3  Mental status:  Memory:  MOCA: 10/30   Attention: Normal  Knowledge: fair   Language: No aphasia  Speech: No dysarthria  Cranial Nerves:  2 No Visual Defect on Confrontation; Pupils round, equal, reactive to light  3,4,6 Extraocular Movements Intact; no nystagmus  5 Facial Sensation Intact  7 No facial asymmetry  8 Intact hearing  9,10 Palate symmetric, normal gag  11 Good shoulder shrug  12 Tongue Midline  Gait: scoliosis, stooped posture, Stable, No ataxia, can perform tandem walking  Coordination: No ataxia with finger to nose testing and heel to shin testing  Sensory: Intact, Symmetric to Pinprick, Light Touch, Vibration, and Joint Position  Muscle Tone: Normal  Muscle exam  Arm Right Left Leg Right Left   Deltoid 5/5 5/5 Iliopsoas 5/5 5/5   Biceps 5/5 5/5 Quads 5/5 5/5   Triceps 5/5 5/5 Hamstrings 5/5 5/5   Wrist Extension 5/5 5/5 Ankle Dorsi Flexion 5/5 5/5   Wrist Flexion 5/5 5/5 Ankle Plantar Flexion 5/5 5/5   Interossei 5/5 5/5 Ankle Eversion 5/5 5/5   APB 5/5 5/5 Ankle Inversion 5/5 5/5       Reflexes   RJ BJ TJ KJ AJ Plantars Coppola's   Right 2+ 2+ 2+ 2+ 2+ Downgoing Not present   Left 2+ 2+ 2+ 2+ 2+ Downgoing Not present       Personal review of         Cmp, mag, lipid panel    Assessment/Plan:     1  Memory impairment  Vitamin B12    RPR    Folate    Vitamin D 25 hydroxy    MRI brain NeuroQuant wo and w contrast    EEG Awake and asleep    Ambulatory referral to Neuropsychology   2  Senile dementia of Alzheimer's type [facility])  Ambulatory referral to Neuropsychology   3  Claustrophobia       Patient's clinical history is suggestive of most likely age associated dementia however need to rule out structural and reversible causes  Will obtain MRI brain neuroquant for further evaluation  Will get EEG to r/o epileptiform discharges  Discussed neuropsych testing however there is a good possibility that patient may not be able to complete it  She may resume prevagen at this time  With her degree of memory loss, switching to another agent is not likely to yield better results nor reverse her condition                   Counseling Documentation:  The patient and/or patient's family were  counseled regarding diagnostic results  Instructions for management,risk factor reductions,prognosis of disease were discussed  Patient and family were educated regarding impressions,risks and benefits of treatment options,importance of compliance with treatment  Total time of encounter: 60 min  More than 50% of time was spent in counseling and coordination of care of patient  VERONICA Barron    [facility]  [address]  [address]

## 2020-09-14 ENCOUNTER — TELEPHONE (OUTPATIENT)
Dept: FAMILY MEDICINE CLINIC | Facility: CLINIC | Age: 80
End: 2020-09-14

## 2020-09-14 NOTE — TELEPHONE ENCOUNTER
Dr Mahesh Mariscal,  Pt's daughter would like to discuss the appt she had with Dr Kesha Dickerson before she proceeds forward

## 2020-09-14 NOTE — TELEPHONE ENCOUNTER
Please find out if there are any particular questions pt has--I reviewed neuro consult--appears Dr Aaron Baez is recommending further testing to help clarify cause for the memory changes-I agree the testing may help to determine best medication/tx

## 2020-09-15 DIAGNOSIS — R41.3 MEMORY CHANGE: Primary | ICD-10-CM

## 2020-09-15 NOTE — TELEPHONE ENCOUNTER
Patients daughter was not impressed with Dr Leon Morton  She had some issues with her being negative about her moms progress,no bed side manner, she  doesn't think she wants to put her thru these tests until she gets a second opinion   Could you refer another doctor? Dr Leon Morton  wants an eeg and an MRI   please advise    I told her I would call her back in the morning tc/cma

## 2020-09-15 NOTE — TELEPHONE ENCOUNTER
I put new referral in chat for [address]z give dtr information and ask her to call me back once assessment completed -thanks

## 2020-09-16 DIAGNOSIS — R41.3 MEMORY CHANGE: Primary | ICD-10-CM

## 2020-09-16 RX ORDER — DONEPEZIL HYDROCHLORIDE 5 MG/1
5 TABLET, FILM COATED ORAL
Qty: 30 TABLET | Refills: 2 | Status: SHIPPED | OUTPATIENT
Start: 2020-09-16 | End: 2020-10-26 | Stop reason: DRUGHIGH

## 2020-09-16 NOTE — TELEPHONE ENCOUNTER
Sent over rx -please schedule follow-up in 3-4 weeks to see how tolerating medication and to give flu shot-thanks

## 2020-09-16 NOTE — TELEPHONE ENCOUNTER
Shaina Birch called back and she asked if you could prescribe something for her moms memory that would be covered by insurance because the prevagin is costing her $50 each time she has to purchase it   I will mail the referral for geriatrics   Please advise tc/cma

## 2020-09-16 NOTE — TELEPHONE ENCOUNTER
I called daughter and left message about the referral asked her to call me back and let me know if she wants it mailed or picked up  tc/cma

## 2020-09-24 ENCOUNTER — TELEPHONE (OUTPATIENT)
Dept: FAMILY MEDICINE CLINIC | Facility: CLINIC | Age: 80
End: 2020-09-24

## 2020-09-24 NOTE — TELEPHONE ENCOUNTER
Please ask her to call to check if the geriatric doctors come to [place] [address] or [place] for any of their office hours-if not they may have someone they refer to-otherwise she can try Dr Sandra Dupont or Dr Mary Khan in [place]

## 2020-09-24 NOTE — TELEPHONE ENCOUNTER
Dr Vannessa Martínez,    Patient's daughter received a referal in the mail to a geriatric doctor out in [facility]  Daughter was wondering if there is another doctor that you could recommend that is closer to them, D[facility] Armenta or [facility]

## 2020-09-25 NOTE — TELEPHONE ENCOUNTER
Phone line rang and disconnected - please advise patient of this message if she should return call prior to us reaching out to her again

## 2020-10-07 ENCOUNTER — TELEPHONE (OUTPATIENT)
Dept: FAMILY MEDICINE CLINIC | Facility: CLINIC | Age: 80
End: 2020-10-07

## 2020-10-10 DIAGNOSIS — I10 ESSENTIAL HYPERTENSION: ICD-10-CM

## 2020-10-10 RX ORDER — HYDROCHLOROTHIAZIDE 50 MG/1
TABLET ORAL
Qty: 30 TABLET | Refills: 3 | Status: SHIPPED | OUTPATIENT
Start: 2020-10-10 | End: 2021-02-08

## 2020-10-10 RX ORDER — POTASSIUM CHLORIDE 20 MEQ/1
TABLET, EXTENDED RELEASE ORAL
Qty: 30 TABLET | Refills: 1 | Status: SHIPPED | OUTPATIENT
Start: 2020-10-10 | End: 2020-11-21

## 2020-10-26 ENCOUNTER — OFFICE VISIT (OUTPATIENT)
Dept: FAMILY MEDICINE CLINIC | Facility: CLINIC | Age: 80
End: 2020-10-26
Payer: COMMERCIAL

## 2020-10-26 VITALS
SYSTOLIC BLOOD PRESSURE: 130 MMHG | BODY MASS INDEX: 21.23 KG/M2 | WEIGHT: 115.4 LBS | TEMPERATURE: 98.1 F | DIASTOLIC BLOOD PRESSURE: 70 MMHG | HEIGHT: 62 IN | RESPIRATION RATE: 16 BRPM | HEART RATE: 88 BPM

## 2020-10-26 DIAGNOSIS — Z00.00 MEDICARE ANNUAL WELLNESS VISIT, SUBSEQUENT: ICD-10-CM

## 2020-10-26 DIAGNOSIS — F03.90 DEMENTIA WITHOUT BEHAVIORAL DISTURBANCE, UNSPECIFIED DEMENTIA TYPE (HCC): ICD-10-CM

## 2020-10-26 DIAGNOSIS — R26.89 BALANCE PROBLEMS: ICD-10-CM

## 2020-10-26 DIAGNOSIS — Z23 NEED FOR INFLUENZA VACCINATION: ICD-10-CM

## 2020-10-26 DIAGNOSIS — R41.3 MEMORY CHANGE: Primary | ICD-10-CM

## 2020-10-26 DIAGNOSIS — C50.911 MALIGNANT NEOPLASM OF RIGHT FEMALE BREAST, UNSPECIFIED ESTROGEN RECEPTOR STATUS, UNSPECIFIED SITE OF BREAST (HCC): ICD-10-CM

## 2020-10-26 PROCEDURE — G0008 ADMIN INFLUENZA VIRUS VAC: HCPCS | Performed by: FAMILY MEDICINE

## 2020-10-26 PROCEDURE — 1170F FXNL STATUS ASSESSED: CPT | Performed by: FAMILY MEDICINE

## 2020-10-26 PROCEDURE — 3078F DIAST BP <80 MM HG: CPT | Performed by: FAMILY MEDICINE

## 2020-10-26 PROCEDURE — 1125F AMNT PAIN NOTED PAIN PRSNT: CPT | Performed by: FAMILY MEDICINE

## 2020-10-26 PROCEDURE — 99213 OFFICE O/P EST LOW 20 MIN: CPT | Performed by: FAMILY MEDICINE

## 2020-10-26 PROCEDURE — 3725F SCREEN DEPRESSION PERFORMED: CPT | Performed by: FAMILY MEDICINE

## 2020-10-26 PROCEDURE — 90662 IIV NO PRSV INCREASED AG IM: CPT | Performed by: FAMILY MEDICINE

## 2020-10-26 PROCEDURE — 3075F SYST BP GE 130 - 139MM HG: CPT | Performed by: FAMILY MEDICINE

## 2020-10-26 PROCEDURE — G0439 PPPS, SUBSEQ VISIT: HCPCS | Performed by: FAMILY MEDICINE

## 2020-10-26 RX ORDER — DONEPEZIL HYDROCHLORIDE 10 MG/1
10 TABLET, FILM COATED ORAL
Qty: 30 TABLET | Refills: 6 | Status: SHIPPED | OUTPATIENT
Start: 2020-10-26 | End: 2021-03-15 | Stop reason: DRUGHIGH

## 2020-11-10 DIAGNOSIS — I10 ESSENTIAL HYPERTENSION: ICD-10-CM

## 2020-11-10 RX ORDER — METOPROLOL TARTRATE 50 MG/1
TABLET, FILM COATED ORAL
Qty: 60 TABLET | Refills: 3 | Status: SHIPPED | OUTPATIENT
Start: 2020-11-10 | End: 2021-02-14

## 2020-11-11 ENCOUNTER — TELEPHONE (OUTPATIENT)
Dept: FAMILY MEDICINE CLINIC | Facility: CLINIC | Age: 80
End: 2020-11-11

## 2020-11-13 ENCOUNTER — TELEMEDICINE (OUTPATIENT)
Dept: FAMILY MEDICINE CLINIC | Facility: CLINIC | Age: 80
End: 2020-11-13
Payer: COMMERCIAL

## 2020-11-13 DIAGNOSIS — R41.82 ALTERED MENTAL STATUS, UNSPECIFIED ALTERED MENTAL STATUS TYPE: Primary | ICD-10-CM

## 2020-11-13 DIAGNOSIS — F03.90 DEMENTIA WITHOUT BEHAVIORAL DISTURBANCE, UNSPECIFIED DEMENTIA TYPE (HCC): ICD-10-CM

## 2020-11-13 PROCEDURE — 99213 OFFICE O/P EST LOW 20 MIN: CPT | Performed by: FAMILY MEDICINE

## 2020-11-13 PROCEDURE — 1160F RVW MEDS BY RX/DR IN RCRD: CPT | Performed by: FAMILY MEDICINE

## 2020-11-13 PROCEDURE — 1036F TOBACCO NON-USER: CPT | Performed by: FAMILY MEDICINE

## 2020-11-17 PROBLEM — R26.89 BALANCE PROBLEMS: Status: ACTIVE | Noted: 2020-11-17

## 2020-11-17 PROBLEM — Z00.00 MEDICARE ANNUAL WELLNESS VISIT, SUBSEQUENT: Status: ACTIVE | Noted: 2020-11-17

## 2020-11-17 PROBLEM — F03.90 DEMENTIA WITHOUT BEHAVIORAL DISTURBANCE (HCC): Status: ACTIVE | Noted: 2020-11-17

## 2020-11-21 DIAGNOSIS — I10 ESSENTIAL HYPERTENSION: ICD-10-CM

## 2020-11-21 RX ORDER — POTASSIUM CHLORIDE 20 MEQ/1
TABLET, EXTENDED RELEASE ORAL
Qty: 30 TABLET | Refills: 1 | Status: SHIPPED | OUTPATIENT
Start: 2020-11-21 | End: 2020-12-12

## 2020-11-25 ENCOUNTER — TELEPHONE (OUTPATIENT)
Dept: NEUROLOGY | Facility: CLINIC | Age: 80
End: 2020-11-25

## 2020-11-29 PROBLEM — R41.82 ALTERED MENTAL STATUS: Status: ACTIVE | Noted: 2020-11-29

## 2020-12-12 DIAGNOSIS — I10 ESSENTIAL HYPERTENSION: ICD-10-CM

## 2020-12-12 RX ORDER — POTASSIUM CHLORIDE 20 MEQ/1
TABLET, EXTENDED RELEASE ORAL
Qty: 30 TABLET | Refills: 1 | Status: SHIPPED | OUTPATIENT
Start: 2020-12-12 | End: 2021-02-11

## 2020-12-12 RX ORDER — NIFEDIPINE 30 MG/1
TABLET, FILM COATED, EXTENDED RELEASE ORAL
Qty: 90 TABLET | Refills: 1 | Status: SHIPPED | OUTPATIENT
Start: 2020-12-12 | End: 2021-07-02

## 2021-02-08 DIAGNOSIS — I10 ESSENTIAL HYPERTENSION: ICD-10-CM

## 2021-02-08 RX ORDER — HYDROCHLOROTHIAZIDE 50 MG/1
TABLET ORAL
Qty: 30 TABLET | Refills: 3 | Status: SHIPPED | OUTPATIENT
Start: 2021-02-08 | End: 2021-06-16

## 2021-02-11 DIAGNOSIS — I10 ESSENTIAL HYPERTENSION: ICD-10-CM

## 2021-02-11 RX ORDER — POTASSIUM CHLORIDE 20 MEQ/1
TABLET, EXTENDED RELEASE ORAL
Qty: 30 TABLET | Refills: 1 | Status: SHIPPED | OUTPATIENT
Start: 2021-02-11 | End: 2021-05-01

## 2021-02-13 DIAGNOSIS — I10 ESSENTIAL HYPERTENSION: ICD-10-CM

## 2021-02-14 RX ORDER — METOPROLOL TARTRATE 50 MG/1
TABLET, FILM COATED ORAL
Qty: 60 TABLET | Refills: 3 | Status: SHIPPED | OUTPATIENT
Start: 2021-02-14 | End: 2021-07-14

## 2021-02-26 ENCOUNTER — IMMUNIZATIONS (OUTPATIENT)
Dept: FAMILY MEDICINE CLINIC | Facility: HOSPITAL | Age: 81
End: 2021-02-26

## 2021-02-26 DIAGNOSIS — Z23 ENCOUNTER FOR IMMUNIZATION: Primary | ICD-10-CM

## 2021-02-26 PROCEDURE — 91301 SARS-COV-2 / COVID-19 MRNA VACCINE (MODERNA) 100 MCG: CPT

## 2021-02-26 PROCEDURE — 0011A SARS-COV-2 / COVID-19 MRNA VACCINE (MODERNA) 100 MCG: CPT

## 2021-03-15 ENCOUNTER — TELEPHONE (OUTPATIENT)
Dept: FAMILY MEDICINE CLINIC | Facility: CLINIC | Age: 81
End: 2021-03-15

## 2021-03-15 DIAGNOSIS — F03.90 DEMENTIA WITHOUT BEHAVIORAL DISTURBANCE, UNSPECIFIED DEMENTIA TYPE (HCC): Primary | ICD-10-CM

## 2021-03-15 RX ORDER — DONEPEZIL HYDROCHLORIDE 5 MG/1
5 TABLET, FILM COATED ORAL
Qty: 90 TABLET | Refills: 1 | Status: SHIPPED | OUTPATIENT
Start: 2021-03-15 | End: 2021-10-04

## 2021-03-15 NOTE — TELEPHONE ENCOUNTER
Dr Diamante Rosen:    Patient needs a refill of donepezil 5mg sent to [facility] in [place]  Patient's daughter would also like to know if you could suggest something patient could take for her allergies? Please advise

## 2021-03-26 ENCOUNTER — IMMUNIZATIONS (OUTPATIENT)
Dept: FAMILY MEDICINE CLINIC | Facility: HOSPITAL | Age: 81
End: 2021-03-26

## 2021-03-26 DIAGNOSIS — Z23 ENCOUNTER FOR IMMUNIZATION: Primary | ICD-10-CM

## 2021-03-26 PROCEDURE — 91301 SARS-COV-2 / COVID-19 MRNA VACCINE (MODERNA) 100 MCG: CPT

## 2021-03-26 PROCEDURE — 0012A SARS-COV-2 / COVID-19 MRNA VACCINE (MODERNA) 100 MCG: CPT

## 2021-05-01 DIAGNOSIS — I10 ESSENTIAL HYPERTENSION: ICD-10-CM

## 2021-05-01 RX ORDER — POTASSIUM CHLORIDE 20 MEQ/1
TABLET, EXTENDED RELEASE ORAL
Qty: 30 TABLET | Refills: 1 | Status: SHIPPED | OUTPATIENT
Start: 2021-05-01 | End: 2021-05-29

## 2021-05-25 ENCOUNTER — TELEPHONE (OUTPATIENT)
Dept: FAMILY MEDICINE CLINIC | Facility: CLINIC | Age: 81
End: 2021-05-25

## 2021-05-26 ENCOUNTER — APPOINTMENT (OUTPATIENT)
Dept: RADIOLOGY | Facility: CLINIC | Age: 81
End: 2021-05-26
Payer: COMMERCIAL

## 2021-05-26 ENCOUNTER — TELEMEDICINE (OUTPATIENT)
Dept: FAMILY MEDICINE CLINIC | Facility: CLINIC | Age: 81
End: 2021-05-26
Payer: COMMERCIAL

## 2021-05-26 VITALS
BODY MASS INDEX: 21.16 KG/M2 | HEART RATE: 65 BPM | HEIGHT: 62 IN | DIASTOLIC BLOOD PRESSURE: 58 MMHG | WEIGHT: 115 LBS | SYSTOLIC BLOOD PRESSURE: 120 MMHG | OXYGEN SATURATION: 95 %

## 2021-05-26 DIAGNOSIS — R05.9 COUGH: ICD-10-CM

## 2021-05-26 DIAGNOSIS — R91.1 NODULE OF LEFT LUNG: Primary | ICD-10-CM

## 2021-05-26 DIAGNOSIS — R41.3 MEMORY CHANGE: Primary | ICD-10-CM

## 2021-05-26 DIAGNOSIS — Z85.3 HISTORY OF BREAST CANCER: ICD-10-CM

## 2021-05-26 DIAGNOSIS — I10 BENIGN ESSENTIAL HYPERTENSION: ICD-10-CM

## 2021-05-26 DIAGNOSIS — J92.9 PLEURAL THICKENING: ICD-10-CM

## 2021-05-26 LAB
SL AMB  POCT GLUCOSE, UA: NORMAL
SL AMB LEUKOCYTE ESTERASE,UA: NORMAL
SL AMB POCT BILIRUBIN,UA: NORMAL
SL AMB POCT BLOOD,UA: NORMAL
SL AMB POCT CLARITY,UA: CLEAR
SL AMB POCT COLOR,UA: NORMAL
SL AMB POCT KETONES,UA: NORMAL
SL AMB POCT NITRITE,UA: NORMAL
SL AMB POCT PH,UA: 7
SL AMB POCT SPECIFIC GRAVITY,UA: 1
SL AMB POCT URINE PROTEIN: NORMAL
SL AMB POCT UROBILINOGEN: NORMAL

## 2021-05-26 PROCEDURE — 81003 URINALYSIS AUTO W/O SCOPE: CPT | Performed by: FAMILY MEDICINE

## 2021-05-26 PROCEDURE — 71046 X-RAY EXAM CHEST 2 VIEWS: CPT

## 2021-05-26 PROCEDURE — 1036F TOBACCO NON-USER: CPT | Performed by: FAMILY MEDICINE

## 2021-05-26 PROCEDURE — 99214 OFFICE O/P EST MOD 30 MIN: CPT | Performed by: FAMILY MEDICINE

## 2021-05-26 PROCEDURE — 1160F RVW MEDS BY RX/DR IN RCRD: CPT | Performed by: FAMILY MEDICINE

## 2021-05-26 PROCEDURE — 3725F SCREEN DEPRESSION PERFORMED: CPT | Performed by: FAMILY MEDICINE

## 2021-05-26 PROCEDURE — 3078F DIAST BP <80 MM HG: CPT | Performed by: FAMILY MEDICINE

## 2021-05-26 PROCEDURE — 3074F SYST BP LT 130 MM HG: CPT | Performed by: FAMILY MEDICINE

## 2021-05-26 RX ORDER — BENZONATATE 100 MG/1
100 CAPSULE ORAL 3 TIMES DAILY PRN
Qty: 20 CAPSULE | Refills: 0 | Status: SHIPPED | OUTPATIENT
Start: 2021-05-26 | End: 2022-02-02 | Stop reason: HOSPADM

## 2021-05-26 NOTE — PROGRESS NOTES
Assessment/Plan:    1  Memory change  Assessment & Plan:  Cont Aricept--change timing to AM dosing  Noted hx breast ca--?mets--cont to decline brain imaging/neuro eval      Orders:  -     POCT urine dip auto non-scope    2  Benign essential hypertension  Assessment & Plan:  BP in acceptable range  Cont to monitor    Orders:  -     POCT urine dip auto non-scope    3  Cough  -     XR chest pa & lateral; Future; Expected date: 05/26/2021  -     benzonatate (TESSALON PERLES) 100 mg capsule; Take 1 capsule (100 mg total) by mouth 3 (three) times a day as needed for cough    4  History of breast cancer    5  Body mass index (BMI) 21 0-21 9, adult       Subjective:      Patient ID: Harjeet Davila is a 79 y o  female  Chief Complaint   Patient presents with    Follow-up     having vivid dreams and daughter thinks the medication is the reason (donezapril ) can she take that med in the morning (visualized bp reading     Cough     with phlem        HPI  [id number] in w dtr-Anitha Ze  C/o vivid dreams/memory changes  Noted hx breast ca--has declined brain imaging in past to r/o ?mets or other  Also c/o cough-nonproductive--no fever or inc shortness of breath    The following portions of the patient's history were reviewed and updated as appropriate: allergies, current medications, past family history, past medical history, past social history, past surgical history and problem list     Review of Systems   Constitutional: Positive for fatigue  Negative for fever  Respiratory: Positive for cough  Negative for shortness of breath  Cardiovascular: Negative  Musculoskeletal: Positive for arthralgias  Neurological:        Memory changes   Psychiatric/Behavioral: Positive for confusion, decreased concentration and sleep disturbance  The patient is nervous/anxious            Current Outpatient Medications   Medication Sig Dispense Refill    cholecalciferol (VITAMIN D3) 1,000 units tablet Take 1,000 Units by mouth daily      cyanocobalamin (VITAMIN B-12) 100 mcg tablet Take by mouth daily      donepezil (ARICEPT) 5 mg tablet Take 1 tablet (5 mg total) by mouth daily at bedtime 90 tablet 1    Ginkgo Biloba 40 MG TABS Take by mouth      hydrochlorothiazide (HYDRODIURIL) 50 mg tablet take 1 tablet by mouth once daily 30 tablet 3    loratadine (CLARITIN) 10 mg tablet Take 10 mg by mouth daily      metoprolol tartrate (LOPRESSOR) 50 mg tablet take 1 tablet by mouth twice a day 60 tablet 3    Multiple Vitamins-Minerals (ICAPS AREDS 2 PO) Take by mouth      Multiple Vitamins-Minerals (PRESERVISION AREDS 2 PO) Take by mouth      NIFEdipine ER (ADALAT CC) 30 MG 24 hr tablet take 1 tablet by mouth once daily 90 tablet 1    Apoaequorin (PREVAGEN EXTRA STRENGTH PO) Take by mouth daily at bedtime      benzonatate (TESSALON PERLES) 100 mg capsule Take 1 capsule (100 mg total) by mouth 3 (three) times a day as needed for cough 20 capsule 0    Bromfenac Sodium (BromSite) 0 075 % SOLN Administer 1 drop into the left eye 2 (two) times a day (Patient not taking: Reported on 5/26/2021)  0    Fexofenadine HCl (MUCINEX ALLERGY PO) Take by mouth as needed      gatifloxacin (ZYMAXID) 0 5 % Administer 1 drop into the left eye 2 (two) times a day (Patient not taking: Reported on 5/26/2021) 3 mL 0    potassium chloride (K-DUR,KLOR-CON) 20 mEq tablet take 1 tablet by mouth once daily 30 tablet 1    Prenatal Vit-Fe Fumarate-FA (M-VIT PO) Take by mouth       No current facility-administered medications for this visit  Objective:    /58 (BP Location: Left arm, Patient Position: Sitting, Cuff Size: Standard)   Pulse 65   Ht 5' 1 5" (1 562 m)   Wt 52 2 kg (115 lb)   SpO2 95%   BMI 21 38 kg/m²        Physical Exam  Vitals and nursing note reviewed  Constitutional:       General: She is not in acute distress  Appearance: Normal appearance  Eyes:      General: No scleral icterus       Conjunctiva/sclera: Conjunctivae normal    Cardiovascular:      Rate and Rhythm: Normal rate and regular rhythm  Pulmonary:      Effort: Pulmonary effort is normal  No respiratory distress  Breath sounds: Normal breath sounds  Abdominal:      General: Bowel sounds are normal       Palpations: Abdomen is soft  Tenderness: There is no abdominal tenderness  Musculoskeletal:         General: Normal range of motion  Cervical back: Neck supple  Skin:     General: Skin is warm and dry  Coloration: Skin is not jaundiced  Neurological:      Mental Status: She is alert  Cranial Nerves: No cranial nerve deficit        Comments: Oriented to self and place  No acute focal deficit   Psychiatric:      Comments: Pleasant, cooperative           Fran Jackson MD

## 2021-05-29 DIAGNOSIS — I10 ESSENTIAL HYPERTENSION: ICD-10-CM

## 2021-05-29 RX ORDER — POTASSIUM CHLORIDE 20 MEQ/1
TABLET, EXTENDED RELEASE ORAL
Qty: 30 TABLET | Refills: 1 | Status: SHIPPED | OUTPATIENT
Start: 2021-05-29 | End: 2021-08-23

## 2021-06-01 ENCOUNTER — TELEPHONE (OUTPATIENT)
Dept: FAMILY MEDICINE CLINIC | Facility: CLINIC | Age: 81
End: 2021-06-01

## 2021-06-01 NOTE — TELEPHONE ENCOUNTER
Spoke to daughter and she said when her mom was going thru breast cancer ,they found some lung nodules then , she just doesn't want her mom to be put thru any more radiation if she doesn't need it    Please advise tc/cma----- Message from Haily Cervantes MD sent at 5/30/2021 12:07 PM EDT -----  LM re: cxr results and recommendation for addtl imaging with CT chest without contrast--order in chart--please confirm message received and ask dtr-Denies and ot to follow-up w me once completed-thanks

## 2021-06-02 NOTE — TELEPHONE ENCOUNTER
Spoke to [facility] and they will fax the cat scan and pet scan tc/cma

## 2021-06-02 NOTE — TELEPHONE ENCOUNTER
Deborah guevara/[facility] called back stating that the last exam was 2014  Please c/b to discuss further [phone]

## 2021-06-04 NOTE — TELEPHONE ENCOUNTER
Spoke w dtr--do not appear to be changed but cannot sy definitively--knowing this---dtr/pt would like to hold on CT at this time--cont surveillance  Dtr will call back if any change or addtl concern

## 2021-06-14 PROBLEM — Z85.3 HISTORY OF BREAST CANCER: Status: ACTIVE | Noted: 2021-06-14

## 2021-06-14 PROBLEM — R05.9 COUGH: Status: ACTIVE | Noted: 2021-06-14

## 2021-06-14 NOTE — ASSESSMENT & PLAN NOTE
Cont Aricept--change timing to AM dosing  Noted hx breast ca--?mets--cont to decline brain imaging/neuro eval

## 2021-06-16 DIAGNOSIS — I10 ESSENTIAL HYPERTENSION: ICD-10-CM

## 2021-06-16 RX ORDER — HYDROCHLOROTHIAZIDE 50 MG/1
TABLET ORAL
Qty: 30 TABLET | Refills: 3 | Status: SHIPPED | OUTPATIENT
Start: 2021-06-16 | End: 2021-10-11

## 2021-07-02 DIAGNOSIS — I10 ESSENTIAL HYPERTENSION: ICD-10-CM

## 2021-07-02 RX ORDER — NIFEDIPINE 30 MG/1
TABLET, FILM COATED, EXTENDED RELEASE ORAL
Qty: 90 TABLET | Refills: 1 | Status: SHIPPED | OUTPATIENT
Start: 2021-07-02 | End: 2021-12-22

## 2021-07-14 DIAGNOSIS — I10 ESSENTIAL HYPERTENSION: ICD-10-CM

## 2021-07-14 RX ORDER — METOPROLOL TARTRATE 50 MG/1
TABLET, FILM COATED ORAL
Qty: 60 TABLET | Refills: 3 | Status: SHIPPED | OUTPATIENT
Start: 2021-07-14 | End: 2021-11-08

## 2021-08-22 DIAGNOSIS — I10 ESSENTIAL HYPERTENSION: ICD-10-CM

## 2021-08-23 RX ORDER — POTASSIUM CHLORIDE 20 MEQ/1
TABLET, EXTENDED RELEASE ORAL
Qty: 30 TABLET | Refills: 1 | Status: SHIPPED | OUTPATIENT
Start: 2021-08-23 | End: 2021-10-26

## 2021-10-04 DIAGNOSIS — F03.90 DEMENTIA WITHOUT BEHAVIORAL DISTURBANCE, UNSPECIFIED DEMENTIA TYPE (HCC): ICD-10-CM

## 2021-10-04 RX ORDER — DONEPEZIL HYDROCHLORIDE 5 MG/1
TABLET, FILM COATED ORAL
Qty: 90 TABLET | Refills: 1 | Status: SHIPPED | OUTPATIENT
Start: 2021-10-04 | End: 2022-04-04

## 2021-10-11 DIAGNOSIS — I10 ESSENTIAL HYPERTENSION: ICD-10-CM

## 2021-10-11 RX ORDER — HYDROCHLOROTHIAZIDE 50 MG/1
TABLET ORAL
Qty: 30 TABLET | Refills: 3 | Status: SHIPPED | OUTPATIENT
Start: 2021-10-11 | End: 2022-02-02 | Stop reason: ALTCHOICE

## 2021-10-26 DIAGNOSIS — I10 ESSENTIAL HYPERTENSION: ICD-10-CM

## 2021-10-26 RX ORDER — POTASSIUM CHLORIDE 20 MEQ/1
TABLET, EXTENDED RELEASE ORAL
Qty: 30 TABLET | Refills: 1 | Status: SHIPPED | OUTPATIENT
Start: 2021-10-26 | End: 2021-12-26

## 2021-11-07 DIAGNOSIS — I10 ESSENTIAL HYPERTENSION: ICD-10-CM

## 2021-11-08 RX ORDER — METOPROLOL TARTRATE 50 MG/1
TABLET, FILM COATED ORAL
Qty: 60 TABLET | Refills: 3 | Status: SHIPPED | OUTPATIENT
Start: 2021-11-08 | End: 2022-03-03

## 2021-12-19 DIAGNOSIS — I10 ESSENTIAL HYPERTENSION: ICD-10-CM

## 2021-12-22 RX ORDER — NIFEDIPINE 30 MG/1
TABLET, FILM COATED, EXTENDED RELEASE ORAL
Qty: 90 TABLET | Refills: 1 | Status: SHIPPED | OUTPATIENT
Start: 2021-12-22 | End: 2022-06-10

## 2021-12-26 DIAGNOSIS — I10 ESSENTIAL HYPERTENSION: ICD-10-CM

## 2021-12-26 RX ORDER — POTASSIUM CHLORIDE 20 MEQ/1
TABLET, EXTENDED RELEASE ORAL
Qty: 30 TABLET | Refills: 1 | Status: SHIPPED | OUTPATIENT
Start: 2021-12-26 | End: 2022-02-23

## 2022-01-06 ENCOUNTER — RA CDI HCC (OUTPATIENT)
Dept: OTHER | Facility: HOSPITAL | Age: 82
End: 2022-01-06

## 2022-01-06 NOTE — PROGRESS NOTES
[address]  coding opportunities          Number of diagnosis code(s) already on the problem list added to FYI fla                     Patients insurance company: [address]  (Medicare Advantage and [email or web address])     Visit status: Patient canceled the appointment        [facility]          Number of diagnosis code(s) already on the problem list added to FYI fla      Please review/recertify the BPA diagnoses for  on 22  F03 90 Dementia without behavioral disturbance  Breast cancer - history of             Patients insurance company: [address]  (Medicare Advantage and [email or web address])

## 2022-02-02 ENCOUNTER — OFFICE VISIT (OUTPATIENT)
Dept: FAMILY MEDICINE CLINIC | Facility: CLINIC | Age: 82
End: 2022-02-02
Payer: COMMERCIAL

## 2022-02-02 VITALS
WEIGHT: 118 LBS | SYSTOLIC BLOOD PRESSURE: 126 MMHG | BODY MASS INDEX: 21.71 KG/M2 | HEIGHT: 62 IN | TEMPERATURE: 98 F | DIASTOLIC BLOOD PRESSURE: 80 MMHG | RESPIRATION RATE: 16 BRPM | HEART RATE: 62 BPM

## 2022-02-02 DIAGNOSIS — E78.2 MIXED HYPERLIPIDEMIA: ICD-10-CM

## 2022-02-02 DIAGNOSIS — Z23 NEED FOR INFLUENZA VACCINATION: ICD-10-CM

## 2022-02-02 DIAGNOSIS — Z00.00 MEDICARE ANNUAL WELLNESS VISIT, SUBSEQUENT: ICD-10-CM

## 2022-02-02 DIAGNOSIS — R35.0 URINARY FREQUENCY: ICD-10-CM

## 2022-02-02 DIAGNOSIS — R41.3 MEMORY CHANGE: ICD-10-CM

## 2022-02-02 DIAGNOSIS — R53.82 CHRONIC FATIGUE: ICD-10-CM

## 2022-02-02 DIAGNOSIS — I10 ESSENTIAL HYPERTENSION: Primary | ICD-10-CM

## 2022-02-02 DIAGNOSIS — Z85.3 HISTORY OF BREAST CANCER: ICD-10-CM

## 2022-02-02 PROBLEM — F03.90 DEMENTIA WITHOUT BEHAVIORAL DISTURBANCE (HCC): Status: RESOLVED | Noted: 2020-11-17 | Resolved: 2022-02-02

## 2022-02-02 PROCEDURE — 3288F FALL RISK ASSESSMENT DOCD: CPT | Performed by: FAMILY MEDICINE

## 2022-02-02 PROCEDURE — 90662 IIV NO PRSV INCREASED AG IM: CPT | Performed by: FAMILY MEDICINE

## 2022-02-02 PROCEDURE — 3079F DIAST BP 80-89 MM HG: CPT | Performed by: FAMILY MEDICINE

## 2022-02-02 PROCEDURE — 1090F PRES/ABSN URINE INCON ASSESS: CPT | Performed by: FAMILY MEDICINE

## 2022-02-02 PROCEDURE — G0008 ADMIN INFLUENZA VIRUS VAC: HCPCS | Performed by: FAMILY MEDICINE

## 2022-02-02 PROCEDURE — 1160F RVW MEDS BY RX/DR IN RCRD: CPT | Performed by: FAMILY MEDICINE

## 2022-02-02 PROCEDURE — 1170F FXNL STATUS ASSESSED: CPT | Performed by: FAMILY MEDICINE

## 2022-02-02 PROCEDURE — 99214 OFFICE O/P EST MOD 30 MIN: CPT | Performed by: FAMILY MEDICINE

## 2022-02-02 PROCEDURE — G0439 PPPS, SUBSEQ VISIT: HCPCS | Performed by: FAMILY MEDICINE

## 2022-02-02 PROCEDURE — 3074F SYST BP LT 130 MM HG: CPT | Performed by: FAMILY MEDICINE

## 2022-02-02 PROCEDURE — 1036F TOBACCO NON-USER: CPT | Performed by: FAMILY MEDICINE

## 2022-02-02 PROCEDURE — 1125F AMNT PAIN NOTED PAIN PRSNT: CPT | Performed by: FAMILY MEDICINE

## 2022-02-02 RX ORDER — DIPHENOXYLATE HYDROCHLORIDE AND ATROPINE SULFATE 2.5; .025 MG/1; MG/1
1 TABLET ORAL DAILY
COMMUNITY

## 2022-02-02 RX ORDER — AMOXICILLIN 875 MG/1
TABLET, COATED ORAL
COMMUNITY
Start: 2022-01-31

## 2022-02-02 RX ORDER — ASCORBIC ACID 250 MG
TABLET,CHEWABLE ORAL
COMMUNITY
Start: 2020-03-01

## 2022-02-02 NOTE — PROGRESS NOTES
Assessment and Plan:     Problem List Items Addressed This Visit     None           Preventive health issues were discussed with patient, and age appropriate screening tests were ordered as noted in patient's After Visit Summary  Personalized health advice and appropriate referrals for health education or preventive services given if needed, as noted in patient's After Visit Summary  History of Present Illness:     Patient presents for Medicare Annual Wellness visit    Patient Care Team:  Saira Sawyer MD as PCP - General     Problem List:     Patient Active Problem List   Diagnosis    Benign essential hypertension    [facility] cancer ([facility] )    Ear fullness, left    Hyperlipidemia    Sinusitis    Need for influenza vaccination    Memory change    Dementia without behavioral disturbance ([facility] )    Medicare annual wellness visit, subsequent    Balance problems    Altered mental status    Cough    Body mass index (BMI) 21 0-21 9, adult    History of breast cancer      Past Medical and Surgical History:     Past Medical History:   Diagnosis Date    Allergic     [facility] 2005    left breast mastectomy    Hypertension     Memory change     Nodule of left lung     Pleural thickening      Past Surgical History:   Procedure Laterality Date    CATARACT EXTRACTION Left 05/2020    CATARACT EXTRACTION Left 06/2020    MASTECTOMY      MT XCAPSL CTRC RMVL INSJ IO LENS PROSTH W/O ECP Left 6/8/2020    Procedure: EXTRACTION EXTRACAPSULAR CATARACT PHACO INTRAOCULAR LENS (IOL); Surgeon: Kareen Santiago MD;  Location: [facility] MAIN OR;  Service: Ophthalmology      Family History:     History reviewed  No pertinent family history  Social History:     Social History     Socioeconomic History    Marital status:       Spouse name: None    Number of children: None    Years of education: None    Highest education level: None   Occupational History    None   Tobacco Use    Smoking status: Never Smoker  Smokeless tobacco: Never Used   Vaping Use    Vaping Use: Never used   Substance and Sexual Activity    Alcohol use: Never    Drug use: Never    Sexual activity: None   Other Topics Concern    None   Social History Narrative    None     Social Determinants of Health     Financial Resource Strain: Not on file   Food Insecurity: Not on file   Transportation Needs: Not on file   Physical Activity: Not on file   Stress: Not on file   Social Connections: Not on file   Intimate Partner Violence: Not on file   Housing Stability: Not on file      Medications and Allergies:     Current Outpatient Medications   Medication Sig Dispense Refill    amoxicillin (AMOXIL) 875 mg tablet take 1 tablet by mouth every 12 hours until finished      Ascorbic Acid (Vitamin C) 250 MG CHEW       cholecalciferol (VITAMIN D3) 1,000 units tablet Take 1,000 Units by mouth daily      cyanocobalamin (VITAMIN B-12) 100 mcg tablet Take by mouth daily      donepezil (ARICEPT) 5 mg tablet take 1 tablet by mouth at bedtime 90 tablet 1    Fexofenadine HCl (MUCINEX ALLERGY PO) Take by mouth as needed      hydrochlorothiazide (HYDRODIURIL) 50 mg tablet take 1 tablet by mouth once daily 30 tablet 3    loratadine (CLARITIN) 10 mg tablet Take 10 mg by mouth daily      metoprolol tartrate (LOPRESSOR) 50 mg tablet take 1 tablet by mouth twice a day 60 tablet 3    Multiple Vitamins-Minerals (ONE-A-DAY 50 PLUS PO)       Multiple Vitamins-Minerals (PRESERVISION AREDS 2 PO) Take by mouth      multivitamin (THERAGRAN) TABS Take 1 tablet by mouth daily      NIFEdipine ER (ADALAT CC) 30 MG 24 hr tablet take 1 tablet by mouth once daily 90 tablet 1    potassium chloride (K-DUR,KLOR-CON) 20 mEq tablet take 1 tablet by mouth once daily 30 tablet 1     No current facility-administered medications for this visit       Allergies   Allergen Reactions    Ibuprofen      Reaction Date: 10Aug2005;       Immunizations:     Immunization History Administered Date(s) Administered    COVID-19 MODERNA VACC 0 5 ML IM 02/26/2021, 03/26/2021    Influenza Split High Dose Preservative Free IM 10/23/2014, 10/21/2015, 11/02/2016, 10/31/2017    Influenza, high dose seasonal 0 7 mL 12/13/2018, 10/01/2019, 10/26/2020    Influenza, seasonal, injectable 10/25/2011    Pneumococcal Polysaccharide PPV23 10/01/2010      Health Maintenance:         Topic Date Due    Breast Cancer Screening: Mammogram  01/23/2021         Topic Date Due    DTaP,Tdap,and Td Vaccines (1 - Tdap) Never done    COVID-19 Vaccine (3 - Booster for Moderna series) 08/26/2021    Influenza Vaccine (1) 09/01/2021      Medicare Health Risk Assessment:     BP 92/70 (BP Location: Left arm, Patient Position: Sitting, Cuff Size: Large)   Pulse 62   Temp 98 °F (36 7 °C)   Resp 16   Ht 5' 1 5" (1 562 m)   Wt 53 5 kg (118 lb)   BMI 21 93 kg/m²      Renzo Mera is here for her Subsequent Wellness visit  Last Medicare Wellness visit information reviewed, patient interviewed and updates made to the record today  Health Risk Assessment:   Patient rates overall health as good  Patient feels that their physical health rating is same  Patient is satisfied with their life  Eyesight was rated as slightly worse  Hearing was rated as same  Patient feels that their emotional and mental health rating is same  Patients states they are sometimes angry  Patient states they are sometimes unusually tired/fatigued  Pain experienced in the last 7 days has been none  Patient states that she has experienced no weight loss or gain in last 6 months  Fall Risk Screening: In the past year, patient has experienced: no history of falling in past year      Urinary Incontinence Screening:   Patient has leaked urine accidently in the last six months  Home Safety:  Patient does not have trouble with stairs inside or outside of their home  Patient has working smoke alarms and has working carbon monoxide detector   Home safety hazards include: none  Nutrition:   Current diet is Regular and Limited junk food  Medications:   Patient is currently taking over-the-counter supplements  OTC medications include: See list inclised  Patient is not able to manage medications  Activities of Daily Living (ADLs)/Instrumental Activities of Daily Living (IADLs):   Walk and transfer into and out of bed and chair?: Yes  Dress and groom yourself?: Yes    Bathe or shower yourself?: Yes    Feed yourself? Yes  Do your laundry/housekeeping?: Yes  Manage your money, pay your bills and track your expenses?: No  Make your own meals?: Yes    Do your own shopping?: No    ADL comments: Family does grocery shopping since pandemic    Durable Medical Equipment Suppliers  N/a    Previous Hospitalizations:   Any hospitalizations or ED visits within the last 12 months?: No      Advance Care Planning:   Living will: Yes    Durable POA for healthcare:  Yes    Advanced directive: Yes    Advanced directive counseling given: Yes    Five wishes given: Yes      Cognitive Screening:   Provider or family/friend/caregiver concerned regarding cognition?: Yes    Cognition Comments: Hx dementia    PREVENTIVE SCREENINGS      Cardiovascular Screening:    General: History Lipid Disorder      Diabetes Screening:     General: Risks and Benefits Discussed      Colorectal Cancer Screening:     General: Risks and Benefits Discussed      Breast Cancer Screening:     General: History Breast Cancer      Cervical Cancer Screening:    General: Screening Not Indicated      Osteoporosis Screening:    General: Risks and Benefits Discussed      Abdominal Aortic Aneurysm (AAA) Screening:        General: Screening Not Indicated      Lung Cancer Screening:     General: Screening Not Indicated      Hepatitis C Screening:    General: Screening Not Indicated    Screening, Brief Intervention, and Referral to Treatment (SBIRT)    Screening  Typical number of drinks in a day: 0  Typical number of drinks in a week: 0  Interpretation: Low risk drinking behavior  AUDIT-C Screenin) How often did you have a drink containing alcohol in the past year? never  2) How many drinks did you have on a typical day when you were drinking in the past year? 0  3) How often did you have 6 or more drinks on one occasion in the past year? never    AUDIT-C Score: 0  Interpretation: Score 0-2 (female): Negative screen for alcohol misuse    Single Item Drug Screening:  How often have you used an illegal drug (including marijuana) or a prescription medication for non-medical reasons in the past year? never    Single Item Drug Screen Score: 0  Interpretation: Negative screen for possible drug use disorder    Other Counseling Topics:   Car/seat belt/driving safety, skin self-exam, sunscreen and regular weightbearing exercise and calcium and vitamin D intake         Sabrina Ghotra MD

## 2022-02-02 NOTE — PATIENT INSTRUCTIONS
Medicare Preventive Visit Patient Instructions  Thank you for completing your Welcome to Medicare Visit or Medicare Annual Wellness Visit today  Your next wellness visit will be due in one year (2/3/2023)  The screening/preventive services that you may require over the next 5-10 years are detailed below  Some tests may not apply to you based off risk factors and/or age  Screening tests ordered at today's visit but not completed yet may show as past due  Also, please note that scanned in results may not display below  Preventive Screenings:  Service Recommendations Previous Testing/Comments   Colorectal Cancer Screening  * Colonoscopy    * Fecal Occult Blood Test (FOBT)/Fecal Immunochemical Test (FIT)  * Fecal DNA/Cologuard Test  * Flexible Sigmoidoscopy Age: 54-65 years old   Colonoscopy: every 10 years (may be performed more frequently if at higher risk)  OR  FOBT/FIT: every 1 year  OR  Cologuard: every 3 years  OR  Sigmoidoscopy: every 5 years  Screening may be recommended earlier than age 48 if at higher risk for colorectal cancer  Also, an individualized decision between you and your healthcare provider will decide whether screening between the ages of 74-80 would be appropriate  Colonoscopy: Not on file  FOBT/FIT: Not on file  Cologuard: Not on file  Sigmoidoscopy: Not on file          Breast Cancer Screening Age: 36 years old  Frequency: every 1-2 years  Not required if history of left and right mastectomy Mammogram: 01/23/2020    History Breast Cancer   Cervical Cancer Screening Between the ages of 21-29, pap smear recommended once every 3 years  Between the ages of 33-67, can perform pap smear with HPV co-testing every 5 years     Recommendations may differ for women with a history of total hysterectomy, cervical cancer, or abnormal pap smears in past  Pap Smear: Not on file    Screening Not Indicated   Hepatitis C Screening Once for adults born between 1945 and 1965  More frequently in patients at high risk for Hepatitis C Hep C Antibody: Not on file        Diabetes Screening 1-2 times per year if you're at risk for diabetes or have pre-diabetes Fasting glucose: No results in last 5 years   A1C: No results in last 5 years        Cholesterol Screening Once every 5 years if you don't have a lipid disorder  May order more often based on risk factors  Lipid panel: 06/26/2020    Screening Not Indicated  History Lipid Disorder     Other Preventive Screenings Covered by Medicare:  1  Abdominal Aortic Aneurysm (AAA) Screening: covered once if your at risk  You're considered to be at risk if you have a family history of AAA  2  Lung Cancer Screening: covers low dose CT scan once per year if you meet all of the following conditions: (1) Age 50-69; (2) No signs or symptoms of lung cancer; (3) Current smoker or have quit smoking within the last 15 years; (4) You have a tobacco smoking history of at least 30 pack years (packs per day multiplied by number of years you smoked); (5) You get a written order from a healthcare provider  3  Glaucoma Screening: covered annually if you're considered high risk: (1) You have diabetes OR (2) Family history of glaucoma OR (3)  aged 48 and older OR (3)  American aged 72 and older  3  Osteoporosis Screening: covered every 2 years if you meet one of the following conditions: (1) You're estrogen deficient and at risk for osteoporosis based off medical history and other findings; (2) Have a vertebral abnormality; (3) On glucocorticoid therapy for more than 3 months; (4) Have primary hyperparathyroidism; (5) On osteoporosis medications and need to assess response to drug therapy  · Last bone density test (DXA Scan): Not on file  5  HIV Screening: covered annually if you're between the age of 12-76  Also covered annually if you are younger than 13 and older than 72 with risk factors for HIV infection   For pregnant patients, it is covered up to 3 times per pregnancy  Immunizations:  Immunization Recommendations   Influenza Vaccine Annual influenza vaccination during flu season is recommended for all persons aged >= 6 months who do not have contraindications   Pneumococcal Vaccine (Prevnar and Pneumovax)  * Prevnar = PCV13  * Pneumovax = PPSV23   Adults 25-60 years old: 1-3 doses may be recommended based on certain risk factors  Adults 72 years old: Prevnar (PCV13) vaccine recommended followed by Pneumovax (PPSV23) vaccine  If already received PPSV23 since turning 65, then PCV13 recommended at least one year after PPSV23 dose  Hepatitis B Vaccine 3 dose series if at intermediate or high risk (ex: diabetes, end stage renal disease, liver disease)   Tetanus (Td) Vaccine - COST NOT COVERED BY MEDICARE PART B Following completion of primary series, a booster dose should be given every 10 years to maintain immunity against tetanus  Td may also be given as tetanus wound prophylaxis  Tdap Vaccine - COST NOT COVERED BY MEDICARE PART B Recommended at least once for all adults  For pregnant patients, recommended with each pregnancy  Shingles Vaccine (Shingrix) - COST NOT COVERED BY MEDICARE PART B  2 shot series recommended in those aged 48 and above     Health Maintenance Due:      Topic Date Due    Breast Cancer Screening: Mammogram  01/23/2021     Immunizations Due:      Topic Date Due    DTaP,Tdap,and Td Vaccines (1 - Tdap) Never done    COVID-19 Vaccine (3 - Booster for Moderna series) 08/26/2021    Influenza Vaccine (1) 09/01/2021     Advance Directives   What are advance directives? Advance directives are legal documents that state your wishes and plans for medical care  These plans are made ahead of time in case you lose your ability to make decisions for yourself  Advance directives can apply to any medical decision, such as the treatments you want, and if you want to donate organs  What are the types of advance directives?   There are many types of advance directives, and each state has rules about how to use them  You may choose a combination of any of the following:  · Living will: This is a written record of the treatment you want  You can also choose which treatments you do not want, which to limit, and which to stop at a certain time  This includes surgery, medicine, IV fluid, and tube feedings  · Durable power of  for healthcare [facility]): This is a written record that states who you want to make healthcare choices for you when you are unable to make them for yourself  This person, called a proxy, is usually a family member or a friend  You may choose more than 1 proxy  · Do not resuscitate (DNR) order:  A DNR order is used in case your heart stops beating or you stop breathing  It is a request not to have certain forms of treatment, such as CPR  A DNR order may be included in other types of advance directives  · Medical directive: This covers the care that you want if you are in a coma, near death, or unable to make decisions for yourself  You can list the treatments you want for each condition  Treatment may include pain medicine, surgery, blood transfusions, dialysis, IV or tube feedings, and a ventilator (breathing machine)  · Values history: This document has questions about your views, beliefs, and how you feel and think about life  This information can help others choose the care that you would choose  Why are advance directives important? An advance directive helps you control your care  Although spoken wishes may be used, it is better to have your wishes written down  Spoken wishes can be misunderstood, or not followed  Treatments may be given even if you do not want them  An advance directive may make it easier for your family to make difficult choices about your care  Urinary Incontinence   Urinary incontinence (UI)  is when you lose control of your bladder   UI develops because your bladder cannot store or empty urine properly  The 3 most common types of UI are stress incontinence, urge incontinence, or both  Medicines:   · May be given to help strengthen your bladder control  Report any side effects of medication to your healthcare provider  Do pelvic muscle exercises often:  Your pelvic muscles help you stop urinating  Squeeze these muscles tight for 5 seconds, then relax for 5 seconds  Gradually work up to squeezing for 10 seconds  Do 3 sets of 15 repetitions a day, or as directed  This will help strengthen your pelvic muscles and improve bladder control  Train your bladder:  Go to the bathroom at set times, such as every 2 hours, even if you do not feel the urge to go  You can also try to hold your urine when you feel the urge to go  For example, hold your urine for 5 minutes when you feel the urge to go  As that becomes easier, hold your urine for 10 minutes  Self-care:   · Keep a UI record  Write down how often you leak urine and how much you leak  Make a note of what you were doing when you leaked urine  · Drink liquids as directed  You may need to limit the amount of liquid you drink to help control your urine leakage  Do not drink any liquid right before you go to bed  Limit or do not have drinks that contain caffeine or alcohol  · Prevent constipation  Eat a variety of high-fiber foods  Good examples are high-fiber cereals, beans, vegetables, and whole-grain breads  Walking is the best way to trigger your intestines to have a bowel movement  · Exercise regularly and maintain a healthy weight  Weight loss and exercise will decrease pressure on your bladder and help you control your leakage  · Use a catheter as directed  to help empty your bladder  A catheter is a tiny, plastic tube that is put into your bladder to drain your urine  · Go to behavior therapy as directed  Behavior therapy may be used to help you learn to control your urge to urinate         © Copyright [employer] 2018 Information is for End User's use only and may not be sold, redistributed or otherwise used for commercial purposes   All illustrations and images included in CareNotes® are the copyrighted property of A [employer]  or [employer]sa Angel

## 2022-02-03 LAB
APPEARANCE UR: CLEAR
BILIRUB UR QL STRIP: NEGATIVE
COLOR UR: YELLOW
GLUCOSE UR QL: NEGATIVE
HGB UR QL STRIP: NEGATIVE
KETONES UR QL STRIP: NEGATIVE
LEUKOCYTE ESTERASE UR QL STRIP: NEGATIVE
MICRO URNS: NORMAL
NITRITE UR QL STRIP: NEGATIVE
PH UR STRIP: 7 [PH] (ref 5–7.5)
PROT UR QL STRIP: NEGATIVE
SP GR UR: 1.01 (ref 1–1.03)
UROBILINOGEN UR STRIP-ACNC: 0.2 MG/DL (ref 0.2–1)

## 2022-02-06 PROBLEM — R35.0 URINARY FREQUENCY: Status: ACTIVE | Noted: 2022-02-06

## 2022-02-06 PROBLEM — R53.82 CHRONIC FATIGUE: Status: ACTIVE | Noted: 2022-02-06

## 2022-02-06 NOTE — PROGRESS NOTES
Assessment/Plan:    1  Essential hypertension  -     Lipid Panel with Direct LDL reflex; Future  -     Comprehensive metabolic panel; Future  -     UA (URINE) with reflex to Scope    2  Memory change  -     CBC; Future  -     Comprehensive metabolic panel; Future  -     TSH, 3rd generation; Future    3  Mixed hyperlipidemia  -     Lipid Panel with Direct LDL reflex; Future  -     Comprehensive metabolic panel; Future    4  Chronic fatigue  -     CBC; Future  -     TSH, 3rd generation; Future    5  Urinary frequency  -     UA (URINE) with reflex to Scope    6  History of breast cancer  -     US BREAST BILATERAL LIMITED (DIAGNOSTIC); Future; Expected date: 02/02/2022    7  Need for influenza vaccination  -     influenza vaccine, high-dose, PF 0 7 mL (FLUZONE HIGH-DOSE)    8  Medicare annual wellness visit, subsequent            Patient Instructions       Medicare Preventive Visit Patient Instructions  Thank you for completing your Welcome to Medicare Visit or Medicare Annual Wellness Visit today  Your next wellness visit will be due in one year (2/3/2023)  The screening/preventive services that you may require over the next 5-10 years are detailed below  Some tests may not apply to you based off risk factors and/or age  Screening tests ordered at today's visit but not completed yet may show as past due  Also, please note that scanned in results may not display below  Preventive Screenings:  Service Recommendations Previous Testing/Comments   Colorectal Cancer Screening  * Colonoscopy    * Fecal Occult Blood Test (FOBT)/Fecal Immunochemical Test (FIT)  * Fecal DNA/Cologuard Test  * Flexible Sigmoidoscopy Age: 54-65 years old   Colonoscopy: every 10 years (may be performed more frequently if at higher risk)  OR  FOBT/FIT: every 1 year  OR  Cologuard: every 3 years  OR  Sigmoidoscopy: every 5 years  Screening may be recommended earlier than age 48 if at higher risk for colorectal cancer   Also, an individualized decision between you and your healthcare provider will decide whether screening between the ages of 74-80 would be appropriate  Colonoscopy: Not on file  FOBT/FIT: Not on file  Cologuard: Not on file  Sigmoidoscopy: Not on file          Breast Cancer Screening Age: 36 years old  Frequency: every 1-2 years  Not required if history of left and right mastectomy Mammogram: 01/23/2020    History Breast Cancer   Cervical Cancer Screening Between the ages of 21-29, pap smear recommended once every 3 years  Between the ages of 33-67, can perform pap smear with HPV co-testing every 5 years  Recommendations may differ for women with a history of total hysterectomy, cervical cancer, or abnormal pap smears in past  Pap Smear: Not on file    Screening Not Indicated   Hepatitis C Screening Once for adults born between 1945 and 1965  More frequently in patients at high risk for Hepatitis C Hep C Antibody: Not on file        Diabetes Screening 1-2 times per year if you're at risk for diabetes or have pre-diabetes Fasting glucose: No results in last 5 years   A1C: No results in last 5 years        Cholesterol Screening Once every 5 years if you don't have a lipid disorder  May order more often based on risk factors  Lipid panel: 06/26/2020    Screening Not Indicated  History Lipid Disorder     Other Preventive Screenings Covered by Medicare:  1  Abdominal Aortic Aneurysm (AAA) Screening: covered once if your at risk  You're considered to be at risk if you have a family history of AAA  2  Lung Cancer Screening: covers low dose CT scan once per year if you meet all of the following conditions: (1) Age 50-69; (2) No signs or symptoms of lung cancer; (3) Current smoker or have quit smoking within the last 15 years; (4) You have a tobacco smoking history of at least 30 pack years (packs per day multiplied by number of years you smoked); (5) You get a written order from a healthcare provider    3  Glaucoma Screening: covered annually if you're considered high risk: (1) You have diabetes OR (2) Family history of glaucoma OR (3)  aged 48 and older OR (4)  American aged 72 and older  3  Osteoporosis Screening: covered every 2 years if you meet one of the following conditions: (1) You're estrogen deficient and at risk for osteoporosis based off medical history and other findings; (2) Have a vertebral abnormality; (3) On glucocorticoid therapy for more than 3 months; (4) Have primary hyperparathyroidism; (5) On osteoporosis medications and need to assess response to drug therapy  · Last bone density test (DXA Scan): Not on file  5  HIV Screening: covered annually if you're between the age of 12-76  Also covered annually if you are younger than 13 and older than 72 with risk factors for HIV infection  For pregnant patients, it is covered up to 3 times per pregnancy  Immunizations:  Immunization Recommendations   Influenza Vaccine Annual influenza vaccination during flu season is recommended for all persons aged >= 6 months who do not have contraindications   Pneumococcal Vaccine (Prevnar and Pneumovax)  * Prevnar = PCV13  * Pneumovax = PPSV23   Adults 25-60 years old: 1-3 doses may be recommended based on certain risk factors  Adults 72 years old: Prevnar (PCV13) vaccine recommended followed by Pneumovax (PPSV23) vaccine  If already received PPSV23 since turning 65, then PCV13 recommended at least one year after PPSV23 dose  Hepatitis B Vaccine 3 dose series if at intermediate or high risk (ex: diabetes, end stage renal disease, liver disease)   Tetanus (Td) Vaccine - COST NOT COVERED BY MEDICARE PART B Following completion of primary series, a booster dose should be given every 10 years to maintain immunity against tetanus  Td may also be given as tetanus wound prophylaxis  Tdap Vaccine - COST NOT COVERED BY MEDICARE PART B Recommended at least once for all adults   For pregnant patients, recommended with each pregnancy  Shingles Vaccine (Shingrix) - COST NOT COVERED BY MEDICARE PART B  2 shot series recommended in those aged 48 and above     Health Maintenance Due:      Topic Date Due    Breast Cancer Screening: Mammogram  01/23/2021     Immunizations Due:      Topic Date Due    DTaP,Tdap,and Td Vaccines (1 - Tdap) Never done    COVID-19 Vaccine (3 - Booster for Moderna series) 08/26/2021    Influenza Vaccine (1) 09/01/2021     Advance Directives   What are advance directives? Advance directives are legal documents that state your wishes and plans for medical care  These plans are made ahead of time in case you lose your ability to make decisions for yourself  Advance directives can apply to any medical decision, such as the treatments you want, and if you want to donate organs  What are the types of advance directives? There are many types of advance directives, and each state has rules about how to use them  You may choose a combination of any of the following:  · Living will: This is a written record of the treatment you want  You can also choose which treatments you do not want, which to limit, and which to stop at a certain time  This includes surgery, medicine, IV fluid, and tube feedings  · Durable power of  for healthcare [facility]): This is a written record that states who you want to make healthcare choices for you when you are unable to make them for yourself  This person, called a proxy, is usually a family member or a friend  You may choose more than 1 proxy  · Do not resuscitate (DNR) order:  A DNR order is used in case your heart stops beating or you stop breathing  It is a request not to have certain forms of treatment, such as CPR  A DNR order may be included in other types of advance directives  · Medical directive: This covers the care that you want if you are in a coma, near death, or unable to make decisions for yourself  You can list the treatments you want for each condition  Treatment may include pain medicine, surgery, blood transfusions, dialysis, IV or tube feedings, and a ventilator (breathing machine)  · Values history: This document has questions about your views, beliefs, and how you feel and think about life  This information can help others choose the care that you would choose  Why are advance directives important? An advance directive helps you control your care  Although spoken wishes may be used, it is better to have your wishes written down  Spoken wishes can be misunderstood, or not followed  Treatments may be given even if you do not want them  An advance directive may make it easier for your family to make difficult choices about your care  Urinary Incontinence   Urinary incontinence (UI)  is when you lose control of your bladder  UI develops because your bladder cannot store or empty urine properly  The 3 most common types of UI are stress incontinence, urge incontinence, or both  Medicines:   · May be given to help strengthen your bladder control  Report any side effects of medication to your healthcare provider  Do pelvic muscle exercises often:  Your pelvic muscles help you stop urinating  Squeeze these muscles tight for 5 seconds, then relax for 5 seconds  Gradually work up to squeezing for 10 seconds  Do 3 sets of 15 repetitions a day, or as directed  This will help strengthen your pelvic muscles and improve bladder control  Train your bladder:  Go to the bathroom at set times, such as every 2 hours, even if you do not feel the urge to go  You can also try to hold your urine when you feel the urge to go  For example, hold your urine for 5 minutes when you feel the urge to go  As that becomes easier, hold your urine for 10 minutes  Self-care:   · Keep a UI record  Write down how often you leak urine and how much you leak  Make a note of what you were doing when you leaked urine  · Drink liquids as directed   You may need to limit the amount of liquid you drink to help control your urine leakage  Do not drink any liquid right before you go to bed  Limit or do not have drinks that contain caffeine or alcohol  · Prevent constipation  Eat a variety of high-fiber foods  Good examples are high-fiber cereals, beans, vegetables, and whole-grain breads  Walking is the best way to trigger your intestines to have a bowel movement  · Exercise regularly and maintain a healthy weight  Weight loss and exercise will decrease pressure on your bladder and help you control your leakage  · Use a catheter as directed  to help empty your bladder  A catheter is a tiny, plastic tube that is put into your bladder to drain your urine  · Go to behavior therapy as directed  Behavior therapy may be used to help you learn to control your urge to urinate  © Copyright [employer] 2018 Information is for End User's use only and may not be sold, redistributed or otherwise used for commercial purposes  All illustrations and images included in CareNotes® are the copyrighted property of [employer]  or [employer]        Subjective:      Patient ID: Adam Diop is a 79 y o  female      Chief Complaint   Patient presents with    Medicare Wellness Visit    Follow-up    Hypertension    Hyperlipidemia    Memory Loss       HPI  In w dtr Maximino Avendano) for follow-up  Interval hx reviewed  Due for labs    Received Moderna primary series-due for booster-to be sched  Also needs flu vaccine--to be given today  BP wnl  No sig changes in condition    The following portions of the patient's history were reviewed and updated as appropriate: allergies, current medications, past family history, past medical history, past social history, past surgical history and problem list   Past Medical History:   Diagnosis Date    Allergic     Cancer [facility] [address] ) 2005    left breast mastectomy    Hypertension     Memory change     Nodule of left lung     Pleural thickening      Past Surgical History:   Procedure Laterality Date    CATARACT EXTRACTION Left 05/2020    CATARACT EXTRACTION Left 06/2020    MASTECTOMY      KS XCAPSL CTRC RMVL INSJ IO LENS PROSTH W/O ECP Left 6/8/2020    Procedure: EXTRACTION EXTRACAPSULAR CATARACT PHACO INTRAOCULAR LENS (IOL); Surgeon: Kristin Christensen MD;  Location: [facility] MAIN OR;  Service: Ophthalmology       Review of Systems   Constitutional: Negative  Eyes:        Wears glasses   Respiratory: Negative  Cardiovascular: Negative  Gastrointestinal: Negative  Musculoskeletal: Positive for arthralgias  Neurological: Negative for seizures and syncope  Memory change   Psychiatric/Behavioral: Positive for sleep disturbance  The patient is nervous/anxious  Current Outpatient Medications   Medication Sig Dispense Refill    amoxicillin (AMOXIL) 875 mg tablet take 1 tablet by mouth every 12 hours until finished      Ascorbic Acid (Vitamin C) 250 MG CHEW       cholecalciferol (VITAMIN D3) 1,000 units tablet Take 1,000 Units by mouth daily      cyanocobalamin (VITAMIN B-12) 100 mcg tablet Take by mouth daily      donepezil (ARICEPT) 5 mg tablet take 1 tablet by mouth at bedtime 90 tablet 1    Fexofenadine HCl (MUCINEX ALLERGY PO) Take by mouth as needed      loratadine (CLARITIN) 10 mg tablet Take 10 mg by mouth daily      metoprolol tartrate (LOPRESSOR) 50 mg tablet take 1 tablet by mouth twice a day 60 tablet 3    Multiple Vitamins-Minerals (ONE-A-DAY 50 PLUS PO)       Multiple Vitamins-Minerals (PRESERVISION AREDS 2 PO) Take by mouth      multivitamin (THERAGRAN) TABS Take 1 tablet by mouth daily      NIFEdipine ER (ADALAT CC) 30 MG 24 hr tablet take 1 tablet by mouth once daily 90 tablet 1    potassium chloride (K-DUR,KLOR-CON) 20 mEq tablet take 1 tablet by mouth once daily 30 tablet 1     No current facility-administered medications for this visit         Objective:    /80 (BP Location: Left arm, Patient Position: Sitting, Cuff Size: Large)   Pulse 62   Temp 98 °F (36 7 °C)   Resp 16   Ht 5' 1 5" (1 562 m)   Wt 53 5 kg (118 lb)   BMI 21 93 kg/m²        Physical Exam  Vitals and nursing note reviewed  Constitutional:       General: She is not in acute distress  Appearance: Normal appearance  Eyes:      General: No scleral icterus  Conjunctiva/sclera: Conjunctivae normal    Cardiovascular:      Rate and Rhythm: Normal rate and regular rhythm  Pulmonary:      Effort: Pulmonary effort is normal  No respiratory distress  Breath sounds: Normal breath sounds  Abdominal:      General: Bowel sounds are normal       Palpations: Abdomen is soft  Tenderness: There is no abdominal tenderness  Musculoskeletal:         General: Normal range of motion  Cervical back: Neck supple  Skin:     General: Skin is warm and dry  Coloration: Skin is not jaundiced  Neurological:      Mental Status: She is alert  Cranial Nerves: No cranial nerve deficit        Comments: Oriented to self and place  No acute focal deficit   Psychiatric:      Comments: Pleasant, cooperative           Saintclair Halter, MD

## 2022-02-11 LAB
ALBUMIN SERPL-MCNC: 4.4 G/DL (ref 3.6–4.6)
ALBUMIN/GLOB SERPL: 1.6 {RATIO} (ref 1.2–2.2)
ALP SERPL-CCNC: 71 IU/L (ref 44–121)
ALT SERPL-CCNC: 16 IU/L (ref 0–32)
AST SERPL-CCNC: 23 IU/L (ref 0–40)
BASOPHILS # BLD AUTO: 0.1 X10E3/UL (ref 0–0.2)
BASOPHILS NFR BLD AUTO: 2 %
BILIRUB SERPL-MCNC: 0.3 MG/DL (ref 0–1.2)
BUN SERPL-MCNC: 17 MG/DL (ref 8–27)
BUN/CREAT SERPL: 24 (ref 12–28)
CALCIUM SERPL-MCNC: 9.6 MG/DL (ref 8.7–10.3)
CHLORIDE SERPL-SCNC: 100 MMOL/L (ref 96–106)
CHOLEST SERPL-MCNC: 216 MG/DL (ref 100–199)
CO2 SERPL-SCNC: 26 MMOL/L (ref 20–29)
CREAT SERPL-MCNC: 0.7 MG/DL (ref 0.57–1)
EOSINOPHIL # BLD AUTO: 0.2 X10E3/UL (ref 0–0.4)
EOSINOPHIL NFR BLD AUTO: 6 %
ERYTHROCYTE [DISTWIDTH] IN BLOOD BY AUTOMATED COUNT: 11.9 % (ref 11.7–15.4)
GLOBULIN SER-MCNC: 2.8 G/DL (ref 1.5–4.5)
GLUCOSE SERPL-MCNC: 98 MG/DL (ref 65–99)
HCT VFR BLD AUTO: 35 % (ref 34–46.6)
HDLC SERPL-MCNC: 91 MG/DL
HGB BLD-MCNC: 11.9 G/DL (ref 11.1–15.9)
IMM GRANULOCYTES # BLD: 0 X10E3/UL (ref 0–0.1)
IMM GRANULOCYTES NFR BLD: 0 %
LDLC SERPL CALC-MCNC: 111 MG/DL (ref 0–99)
LYMPHOCYTES # BLD AUTO: 1.5 X10E3/UL (ref 0.7–3.1)
LYMPHOCYTES NFR BLD AUTO: 40 %
MCH RBC QN AUTO: 32.6 PG (ref 26.6–33)
MCHC RBC AUTO-ENTMCNC: 34 G/DL (ref 31.5–35.7)
MCV RBC AUTO: 96 FL (ref 79–97)
MICRODELETION SYND BLD/T FISH: NORMAL
MONOCYTES # BLD AUTO: 0.6 X10E3/UL (ref 0.1–0.9)
MONOCYTES NFR BLD AUTO: 15 %
NEUTROPHILS # BLD AUTO: 1.4 X10E3/UL (ref 1.4–7)
NEUTROPHILS NFR BLD AUTO: 37 %
PLATELET # BLD AUTO: 241 X10E3/UL (ref 150–450)
POTASSIUM SERPL-SCNC: 3.9 MMOL/L (ref 3.5–5.2)
PROT SERPL-MCNC: 7.2 G/DL (ref 6–8.5)
RBC # BLD AUTO: 3.65 X10E6/UL (ref 3.77–5.28)
SL AMB EGFR AFRICAN AMERICAN: 93 ML/MIN/1.73
SL AMB EGFR NON AFRICAN AMERICAN: 81 ML/MIN/1.73
SODIUM SERPL-SCNC: 140 MMOL/L (ref 134–144)
TRIGL SERPL-MCNC: 79 MG/DL (ref 0–149)
TSH SERPL DL<=0.005 MIU/L-ACNC: 2.28 UIU/ML (ref 0.45–4.5)
WBC # BLD AUTO: 3.7 X10E3/UL (ref 3.4–10.8)

## 2022-02-18 ENCOUNTER — IMMUNIZATIONS (OUTPATIENT)
Dept: FAMILY MEDICINE CLINIC | Facility: HOSPITAL | Age: 82
End: 2022-02-18

## 2022-02-18 DIAGNOSIS — Z23 ENCOUNTER FOR IMMUNIZATION: Primary | ICD-10-CM

## 2022-02-18 PROCEDURE — 91306 COVID-19 MODERNA VACC 0.25 ML BOOSTER: CPT

## 2022-02-18 PROCEDURE — 0064A COVID-19 MODERNA VACC 0.25 ML BOOSTER: CPT

## 2022-02-23 DIAGNOSIS — I10 ESSENTIAL HYPERTENSION: ICD-10-CM

## 2022-02-23 RX ORDER — POTASSIUM CHLORIDE 20 MEQ/1
TABLET, EXTENDED RELEASE ORAL
Qty: 30 TABLET | Refills: 1 | Status: SHIPPED | OUTPATIENT
Start: 2022-02-23 | End: 2022-04-27

## 2022-02-28 ENCOUNTER — TELEPHONE (OUTPATIENT)
Dept: FAMILY MEDICINE CLINIC | Facility: CLINIC | Age: 82
End: 2022-02-28

## 2022-02-28 NOTE — TELEPHONE ENCOUNTER
Rx sent in for the hctz 25mg (instead of 50mg) to see if lower dose helps BP enough without affecting urination and potassium levels as much

## 2022-02-28 NOTE — TELEPHONE ENCOUNTER
Dr Barbara Benito    Patient says hydrochlorothiazide was stopped at last visit as patient had been having issues with urine frequency  She is still having frequent urination, but her blood pressure has been elevated around 150/90 something ,so she restarted patient on hydrochlorothiazide but needs you to send rx to [facility] for refill

## 2022-03-03 DIAGNOSIS — I10 ESSENTIAL HYPERTENSION: ICD-10-CM

## 2022-03-03 RX ORDER — METOPROLOL TARTRATE 50 MG/1
TABLET, FILM COATED ORAL
Qty: 60 TABLET | Refills: 3 | Status: SHIPPED | OUTPATIENT
Start: 2022-03-03 | End: 2022-07-08

## 2022-04-03 DIAGNOSIS — F03.90 DEMENTIA WITHOUT BEHAVIORAL DISTURBANCE, UNSPECIFIED DEMENTIA TYPE (HCC): ICD-10-CM

## 2022-04-04 RX ORDER — DONEPEZIL HYDROCHLORIDE 5 MG/1
TABLET, FILM COATED ORAL
Qty: 90 TABLET | Refills: 1 | Status: SHIPPED | OUTPATIENT
Start: 2022-04-04

## 2022-04-08 ENCOUNTER — TELEPHONE (OUTPATIENT)
Dept: DERMATOLOGY | Facility: CLINIC | Age: 82
End: 2022-04-08

## 2022-04-12 NOTE — TELEPHONE ENCOUNTER
Patient left voicemail wanting a call back in regards to making an appointment  I called patient back but no answer and left voicemail with our call back number  Advised patient to call back at earliest convenience to schedule the appointment        I mentioned that there are appts available at [facility] 4/14 with Dr Ivette Landis

## 2022-04-14 ENCOUNTER — TELEPHONE (OUTPATIENT)
Dept: DERMATOLOGY | Age: 82
End: 2022-04-14

## 2022-04-14 ENCOUNTER — OFFICE VISIT (OUTPATIENT)
Dept: DERMATOLOGY | Age: 82
End: 2022-04-14
Payer: COMMERCIAL

## 2022-04-14 VITALS — TEMPERATURE: 98.1 F | WEIGHT: 119 LBS | BODY MASS INDEX: 21.9 KG/M2 | HEIGHT: 62 IN

## 2022-04-14 DIAGNOSIS — L57.0 ACTINIC KERATOSIS: Primary | ICD-10-CM

## 2022-04-14 PROCEDURE — 11900 INJECT SKIN LESIONS </W 7: CPT | Performed by: DERMATOLOGY

## 2022-04-14 PROCEDURE — 1036F TOBACCO NON-USER: CPT | Performed by: DERMATOLOGY

## 2022-04-14 PROCEDURE — 99203 OFFICE O/P NEW LOW 30 MIN: CPT | Performed by: DERMATOLOGY

## 2022-04-14 PROCEDURE — 1160F RVW MEDS BY RX/DR IN RCRD: CPT | Performed by: DERMATOLOGY

## 2022-04-14 NOTE — PATIENT INSTRUCTIONS
ACTINIC KERATOSIS    Assessment and Plan:  Based on a thorough discussion of this condition and the management approach to it (including a comprehensive discussion of the known risks, side effects and potential benefits of treatment), the patient (family) agrees to implement the following specific plan:     5 Fluorouracil injections  done in office today to thin the areas   Once the areas are thinner cryotherapy discussed    If headache occurs can take tylenol or advil    Follow up in 1 month   May need to repeat injections if there are no changes     Actinic keratoses are very common on sites repeatedly exposed to the sun, especially the backs of the hands and the face, most often affecting the ears, nose, cheeks, upper lip, vermilion of the lower lip, temples, forehead and balding scalp  In severely chronically sun-damaged individuals, they may also be found on the upper trunk, upper and lower limbs, and dorsum of feet  We discussed the theoretical premalignant (pre-cancerous) nature and etiology of these growths  We discussed the prevailing notion that actinic keratoses are a reflection of abnormal skin cell development due to DNA damage by short wavelength UVB  They are more likely to appear if the immune function is poor, due to aging, recent sun exposure, predisposing disease or certain drugs

## 2022-04-14 NOTE — PROGRESS NOTES
[facility] Note     Patient Name: Ryan Cabral  Encounter Date: 04/14/2022     Have you been cared for by a [facility]ologist in the last 3 years and, if so, which one? No    · Have you traveled outside of the [address] [place] [address] in the past 3 months or outside of the [facility] area in the last 2 weeks? No     May we call your Preferred Phone number to discuss your specific medical information? Yes     May we leave a detailed message that includes your specific medical information? Yes      Today's Chief Concerns:   Concern #1:  Growths on scalp    Concern #2:      Past Medical History:  Have you personally ever had or currently have any of the following? · Skin cancer (such as Melanoma, Basal Cell Carcinoma, Squamous Cell Carcinoma? (If Yes, please provide more detail)- No  · Eczema: No  · Psoriasis: No  · HIV/AIDS: No  · Hepatitis B or C: No  · Tuberculosis: No  · Systemic Immunosuppression such as Diabetes, Biologic or Immunotherapy, Chemotherapy, Organ Transplantation, Bone Marrow Transplantation (If YES, please provide more detail): No  · Radiation Treatment (If YES, please provide more detail): yes radiation and chemotherapy for breast cancer in 2009   · Any other major medical conditions/concerns? (If Yes, which types)- No    Social History:     What is/was your primary occupation? Retired      What are your hobbies/past-times? Not at this time     Family History:  Have any of your "first degree relatives" (parent, brother, sister, or child) had any of the following       · Skin cancer such as Melanoma or Merkel Cell Carcinoma or Pancreatic Cancer? No  · Eczema, Asthma, Hay Fever or Seasonal Allergies: No  · Psoriasis or Psoriatic Arthritis: No  · Do any other medical conditions seem to run in your family? If Yes, what condition and which relatives?   YES, daughter with basal cell     Current Medications:   (please update all dermatological medications before printing patient's AVS!)      Current Outpatient Medications:     amoxicillin (AMOXIL) 875 mg tablet, take 1 tablet by mouth every 12 hours until finished, Disp: , Rfl:     Ascorbic Acid (Vitamin C) 250 MG CHEW, , Disp: , Rfl:     cholecalciferol (VITAMIN D3) 1,000 units tablet, Take 1,000 Units by mouth daily, Disp: , Rfl:     cyanocobalamin (VITAMIN B-12) 100 mcg tablet, Take by mouth daily, Disp: , Rfl:     donepezil (ARICEPT) 5 mg tablet, take 1 tablet by mouth at bedtime, Disp: 90 tablet, Rfl: 1    Fexofenadine HCl (MUCINEX ALLERGY PO), Take by mouth as needed, Disp: , Rfl:     hydrochlorothiazide (HYDRODIURIL) 25 mg tablet, Take 1 tablet (25 mg total) by mouth daily, Disp: 90 tablet, Rfl: 1    loratadine (CLARITIN) 10 mg tablet, Take 10 mg by mouth daily, Disp: , Rfl:     metoprolol tartrate (LOPRESSOR) 50 mg tablet, take 1 tablet by mouth twice a day, Disp: 60 tablet, Rfl: 3    Multiple Vitamins-Minerals (ONE-A-DAY 50 PLUS PO), , Disp: , Rfl:     Multiple Vitamins-Minerals (PRESERVISION AREDS 2 PO), Take by mouth, Disp: , Rfl:     multivitamin (THERAGRAN) TABS, Take 1 tablet by mouth daily, Disp: , Rfl:     NIFEdipine ER (ADALAT CC) 30 MG 24 hr tablet, take 1 tablet by mouth once daily, Disp: 90 tablet, Rfl: 1    potassium chloride (K-DUR,KLOR-CON) 20 mEq tablet, take 1 tablet by mouth once daily, Disp: 30 tablet, Rfl: 1      Review of Systems:  Have you recently had or currently have any of the following? If YES, what are you doing for the problem? · Fever, chills or unintended weight loss: No  · Sudden loss or change in your vision: No  · Nausea, vomiting or blood in your stool: No  · Painful or swollen joints: No  · Wheezing or cough: No  · Changing mole or non-healing wound: No  · Nosebleeds: No  · Excessive sweating: No  · Easy or prolonged bleeding? No  · Over the last 2 weeks, how often have you been bothered by the following problems?   · Taking little interest or pleasure in doing things: 1 - Not at All  · Feeling down, depressed, or hopeless: 1 - Not at All  · Rapid heartbeat with epinephrine:  No    · FEMALES ONLY:    · Are you pregnant or planning to become pregnant? No  · Are you currently or planning to be nursing or breast feeding? No    · Any known allergies? Allergies   Allergen Reactions    Ibuprofen      Reaction Date: 10Aug2005;          Physical Exam:     Was a chaperone (Derm Clinical Assistant) present throughout the entire Physical Exam? Yes    o     CONSTITUTIONAL:   Vitals:    04/14/22 1529   Temp: 98 1 °F (36 7 °C)   TempSrc: Temporal   Weight: 54 kg (119 lb)   Height: 5' 1 5" (1 562 m)       PSYCH: Normal mood and affect  EYES: Normal conjunctiva  ENT: Normal lips and oral mucosa  CARDIOVASCULAR: No edema  RESPIRATORY: Normal respirations  HEME/LYMPH/IMMUNO:  No regional lymphadenopathy except as noted below in "ASSESSMENT AND PLAN BY DIAGNOSIS"    SKIN:  FULL ORGAN SYSTEM EXAM  Hair, Scalp, Ears, Face Normal except as noted below in Assessment   Neck, Cervical Chain Nodes Normal except as noted below in Assessment   Right Arm/Hand/Fingers Normal except as noted below in Assessment   Left Arm/Hand/Fingers Normal except as noted below in Assessment   Chest/Breasts/Axillae    Abdomen, Umbilicus    Back/Spine    Groin/Genitalia/Buttocks    Right Leg, Foot, Toes    Left Leg, Foot, Toes         Assessment and Plan by Diagnosis:    History of Present Condition:     Duration:  How long has this been an issue for you?    o  more than 1 year    Location Affected:  Where on the body is this affecting you? o  scalp    Quality:  Is there any bleeding, pain, itch, burning/irritation, or redness associated with the skin lesion? o  denies    Severity:  Describe any bleeding, pain, itch, burning/irritation, or redness on a scale of 1 to 10 (with 10 being the worst)    o  denies    Timing:  Does this condition seem to be there pretty constantly or do you notice it more at specific times throughout the day?    o  denies    Context:  Have you ever noticed that this condition seems to be associated with specific activities you do?    o  denies    Modifying Factors:    o Anything that seems to make the condition worse?    -  denies   o What have you tried to do to make the condition better?    -  denies    Associated Signs and Symptoms:  Does this skin lesion seem to be associated with any of the following:  o  SL AMB DERM SIGNS AND SYMPTOMS: Skin color changes     ACTINIC KERATOSIS    Physical Exam:   Anatomic Location Affected:  Scalp    Morphological Description: Thick keratotic plaques     Additional History of Present Condition:  Present of over 1 year    Assessment and Plan:  Based on a thorough discussion of this condition and the management approach to it (including a comprehensive discussion of the known risks, side effects and potential benefits of treatment), the patient (family) agrees to implement the following specific plan:                                       5 Fluorouracil injections 0 5 cc done in office today to thin the areas   Once the areas are thinner cryotherapy and topical chemotherapy can be tried   Follow up in 1 month   May need to repeat injections if there are no changes    If still no improvement will consider biopsy     Actinic keratoses are very common on sites repeatedly exposed to the sun, especially the backs of the hands and the face, most often affecting the ears, nose, cheeks, upper lip, vermilion of the lower lip, temples, forehead and balding scalp  In severely chronically sun-damaged individuals, they may also be found on the upper trunk, upper and lower limbs, and dorsum of feet  We discussed the theoretical premalignant (pre-cancerous) nature and etiology of these growths    We discussed the prevailing notion that actinic keratoses are a reflection of abnormal skin cell development due to DNA damage by short wavelength UVB  They are more likely to appear if the immune function is poor, due to aging, recent sun exposure, predisposing disease or certain drugs  We discussed that the main concern is that actinic keratoses may predispose to squamous cell carcinoma  It is rare for a solitary actinic keratosis to evolve to squamous cell carcinoma (SCC), but the risk of SCC occurring at some stage in a patient with more than 10 actinic keratoses is thought to be about 10 to 15%  A tender, thickened, ulcerated or enlarging actinic keratosis is suspicious of SCC  Actinic keratoses may be prevented by strict sun protection  If already present, keratoses may improve with a very high sun protection factor (50+) broad-spectrum sunscreen applied at least daily to affected areas, year-round  We recommend that UPF-rated clothing and hats and sunglasses be worn whenever possible and that a sunscreen-moisturizer combination product such as Neutrogena Daily Defense be applied at least three times a day  We performed a thorough discussion of treatment options and specific risk/benefits/alternatives including but not limited to medical field treatment with medications such as the following:     Topical field area medications such as 5-fluorouracil or Aldara (specifically, the trouble with long-term compliance, blistering and local skin reaction versus the convenience of at-home therapy and that field therapy gets what is not yet seen)   Cryotherapy (specifically, local pain, scarring, dyspigmentation, blistering, possible superinfection, and treats only what we see versus directed treatment today)   Photodynamic therapy (specifically, local pain, scarring, dyspigmentation, blistering, possible superinfection, need to schedule for a later date, and time spent in the office versus field therapy that gets what is not yet seen)    PROCEDURE:  DESTRUCTION OF PRE-MALIGNANT LESIONS  After a thorough discussion of treatment options and risk/benefits/alternatives (including but not limited to local pain, scarring, dyspigmentation, blistering, and possible superinfection), verbal and written consent were obtained and the aforementioned lesions were treated on with fluorouracil 500 mg injection  0 5 cc used      TOTAL NUMBER of cluster  pre-malignant lesions were treated today on the ANATOMIC LOCATION: scalp   The patient tolerated the procedure well, and after-care instructions were provided        Scribe Attestation    I,:  Didier Gage am acting as a scribe while in the presence of the attending physician :       I,:  Janet Roberts MD personally performed the services described in this documentation    as scribed in my presence :

## 2022-04-14 NOTE — TELEPHONE ENCOUNTER
Left voicemail on ricarda Schofield phone [phone] as requested to schedule follow up appointment in 1 month with dr rachel Aguirre office)

## 2022-04-27 DIAGNOSIS — I10 ESSENTIAL HYPERTENSION: ICD-10-CM

## 2022-04-27 RX ORDER — POTASSIUM CHLORIDE 20 MEQ/1
TABLET, EXTENDED RELEASE ORAL
Qty: 30 TABLET | Refills: 1 | Status: SHIPPED | OUTPATIENT
Start: 2022-04-27 | End: 2022-06-23

## 2022-05-10 ENCOUNTER — OFFICE VISIT (OUTPATIENT)
Dept: DERMATOLOGY | Age: 82
End: 2022-05-10
Payer: COMMERCIAL

## 2022-05-10 VITALS — TEMPERATURE: 98.6 F | WEIGHT: 121 LBS | BODY MASS INDEX: 22.26 KG/M2 | HEIGHT: 62 IN

## 2022-05-10 DIAGNOSIS — L57.0 ACTINIC KERATOSIS: Primary | ICD-10-CM

## 2022-05-10 PROCEDURE — 17003 DESTRUCT PREMALG LES 2-14: CPT | Performed by: DERMATOLOGY

## 2022-05-10 PROCEDURE — 17000 DESTRUCT PREMALG LESION: CPT | Performed by: DERMATOLOGY

## 2022-05-10 NOTE — PATIENT INSTRUCTIONS
ACTINIC KERATOSIS    Assessment and Plan:  Based on a thorough discussion of this condition and the management approach to it (including a comprehensive discussion of the known risks, side effects and potential benefits of treatment), the patient (family) agrees to implement the following specific plan:     5 Fluorouracil injections 0 5 cc done in office today to thin the areas  · Cryotherapy done in office today   · Follow up in 1-2 month   · If no improvement will consider biopsy
Detail Level: Detailed

## 2022-05-10 NOTE — PROGRESS NOTES
[facility] Follow Up Note    Patient Name: Fior Patel  Encounter Date: 05/10/2022    Today's Chief Concerns:  Marlys Snell Concern #1:  Follow up on ak        Current Medications:    Current Outpatient Medications:     Ascorbic Acid (Vitamin C) 250 MG CHEW, , Disp: , Rfl:     cholecalciferol (VITAMIN D3) 1,000 units tablet, Take 1,000 Units by mouth daily, Disp: , Rfl:     cyanocobalamin (VITAMIN B-12) 100 mcg tablet, Take by mouth daily, Disp: , Rfl:     donepezil (ARICEPT) 5 mg tablet, take 1 tablet by mouth at bedtime, Disp: 90 tablet, Rfl: 1    Fexofenadine HCl (MUCINEX ALLERGY PO), Take by mouth as needed, Disp: , Rfl:     hydrochlorothiazide (HYDRODIURIL) 25 mg tablet, Take 1 tablet (25 mg total) by mouth daily, Disp: 90 tablet, Rfl: 1    loratadine (CLARITIN) 10 mg tablet, Take 10 mg by mouth daily, Disp: , Rfl:     metoprolol tartrate (LOPRESSOR) 50 mg tablet, take 1 tablet by mouth twice a day, Disp: 60 tablet, Rfl: 3    Multiple Vitamins-Minerals (ONE-A-DAY 50 PLUS PO), , Disp: , Rfl:     Multiple Vitamins-Minerals (PRESERVISION AREDS 2 PO), Take by mouth, Disp: , Rfl:     multivitamin (THERAGRAN) TABS, Take 1 tablet by mouth daily, Disp: , Rfl:     NIFEdipine ER (ADALAT CC) 30 MG 24 hr tablet, take 1 tablet by mouth once daily, Disp: 90 tablet, Rfl: 1    potassium chloride (K-DUR,KLOR-CON) 20 mEq tablet, take 1 tablet by mouth once daily, Disp: 30 tablet, Rfl: 1    amoxicillin (AMOXIL) 875 mg tablet, take 1 tablet by mouth every 12 hours until finished (Patient not taking: Reported on 5/10/2022), Disp: , Rfl:     CONSTITUTIONAL:   Vitals:    05/10/22 1254   Temp: 98 6 °F (37 °C)   TempSrc: Temporal   Weight: 54 9 kg (121 lb)   Height: 5' 1 5" (1 562 m)             Specific Alerts:    Have you been seen by a [facility]ologist in the last 3 years? YES    Are you pregnant or planning to become pregnant? No    Are you currently or planning to be nursing or breast feeding? No    Allergies   Allergen Reactions    Ibuprofen      Reaction Date: 10Aug2005; May we call your Preferred Phone number to discuss your specific medical information? YES    May we leave a detailed message that includes your specific medical information? YES    Have you traveled outside of the [facility] in the past 3 months? No    Do you currently have a pacemaker or defibrillator? No    Do you have any artificial heart valves, joints, plates, screws, rods, stents, pins, etc? No   - If Yes, were any placed within the last 2 years? Do you require any medications prior to a surgical procedure? No  *    Are you taking any medications that cause you to bleed more easily ("blood thinners") No    Have you ever experienced a rapid heartbeat with epinephrine? No    Have you ever been treated with "gold" (gold sodium thiomalate) therapy? No    [facility] can help with wrinkles, "laugh lines," facial volume loss, "double chin," "love handles," age spots, and more  Are you interested in learning today about some of the skin enhancement procedures that we offer? (If Yes, please provide more detail) No    Review of Systems:  Have you recently had or currently have any of the following?     · Fever or chills: No  · Night Sweats: No  · Headaches: No  · Weight Gain: No  · Weight Loss: No  · Blurry Vision: No  · Nausea: No  · Vomiting: No  · Diarrhea: No  · Blood in Stool: No  · Abdominal Pain: No  · Itchy Skin: No  · Painful Joints: No  · Swollen Joints: No  · Muscle Pain: No  · Irregular Mole: No  · Sun Burn: No  · Dry Skin: No  · Skin Color Changes: No  · Scar or Keloid: No  · Cold Sores/Fever Blisters: No  · Bacterial Infections/MRSA: No  · Anxiety: No  · Depression: No  · Suicidal or Homicidal Thoughts: No      PSYCH: Normal mood and affect  EYES: Normal conjunctiva  ENT: Normal lips and oral mucosa  CARDIOVASCULAR: No edema  RESPIRATORY: Normal respirations  HEME/LYMPH/IMMUNO:  No regional lymphadenopathy except as noted below in ASSESSMENT AND PLAN BY DIAGNOSIS    FULL ORGAN SYSTEM SKIN EXAM (SKIN)   Hair, Scalp, Ears, Face Normal except as noted below in Assessment   Neck, Cervical Chain Nodes Normal except as noted below in Assessment   Right Arm/Hand/Fingers    Left Arm/Hand/Fingers    Chest/Breasts/Axillae    Abdomen, Umbilicus    Back/Spine    Groin/Genitalia/Buttocks    Right Leg, Foot, Toes    Left Leg, Foot, Toes      ACTINIC KERATOSIS    Physical Exam:   Anatomic Location Affected:  scalp   Morphological Description: Thick keratotic plaque on frontal scalp  Several scaly patches on scalp     Additional History of Present Condition:  Much improved with the fluorouracil injection done previous visit     Assessment and Plan:  Based on a thorough discussion of this condition and the management approach to it (including a comprehensive discussion of the known risks, side effects and potential benefits of treatment), the patient (family) agrees to implement the following specific plan:     5 Fluorouracil injections 0 5 cc done in office today to thin the areas  · Cryotherapy done in office today   · Follow up in 2 month   · If still no improvement will consider biopsy     Actinic keratoses are very common on sites repeatedly exposed to the sun, especially the backs of the hands and the face, most often affecting the ears, nose, cheeks, upper lip, vermilion of the lower lip, temples, forehead and balding scalp  In severely chronically sun-damaged individuals, they may also be found on the upper trunk, upper and lower limbs, and dorsum of feet  We discussed the theoretical premalignant (pre-cancerous) nature and etiology of these growths  We discussed the prevailing notion that actinic keratoses are a reflection of abnormal skin cell development due to DNA damage by short wavelength UVB    They are more likely to appear if the immune function is poor, due to aging, recent sun exposure, predisposing disease or certain drugs  We discussed that the main concern is that actinic keratoses may predispose to squamous cell carcinoma  It is rare for a solitary actinic keratosis to evolve to squamous cell carcinoma (SCC), but the risk of SCC occurring at some stage in a patient with more than 10 actinic keratoses is thought to be about 10 to 15%  A tender, thickened, ulcerated or enlarging actinic keratosis is suspicious of SCC  Actinic keratoses may be prevented by strict sun protection  If already present, keratoses may improve with a very high sun protection factor (50+) broad-spectrum sunscreen applied at least daily to affected areas, year-round  We recommend that UPF-rated clothing and hats and sunglasses be worn whenever possible and that a sunscreen-moisturizer combination product such as Neutrogena Daily Defense be applied at least three times a day  We performed a thorough discussion of treatment options and specific risk/benefits/alternatives including but not limited to medical field treatment with medications such as the following:     Topical field area medications such as 5-fluorouracil or Aldara (specifically, the trouble with long-term compliance, blistering and local skin reaction versus the convenience of at-home therapy and that field therapy gets what is not yet seen)   Cryotherapy (specifically, local pain, scarring, dyspigmentation, blistering, possible superinfection, and treats only what we see versus directed treatment today)   Photodynamic therapy (specifically, local pain, scarring, dyspigmentation, blistering, possible superinfection, need to schedule for a later date, and time spent in the office versus field therapy that gets what is not yet seen)      PROCEDURE:  DESTRUCTION OF PRE-MALIGNANT LESIONS  After a thorough discussion of treatment options and risk/benefits/alternatives (including but not limited to local pain, scarring, dyspigmentation, blistering, and possible superinfection), verbal and written consent were obtained and the aforementioned lesions were treated on with cryotherapy using liquid nitrogen x 1 cycle for 5-10 seconds   TOTAL NUMBER of 5 pre-malignant lesions were treated today on the ANATOMIC LOCATION: scalp   The patient tolerated the procedure well, and after-care instructions were provided  PROCEDURE:  INTRALESIONAL STEROID INJECTION (KENALOG INJECTION)    Purpose: Triamcinolone is a synthetic glucocorticoid corticosteroid that has marked anti-inflammatory action  It is prepared in sterile aqueous suspension suitable for injecting directly into a lesion on or immediately below the skin to treat a dermal inflammatory process  Indications: It is indicated for alopecia areata; inflammatory acne cysts; discoid lupus erythematosus; keloids and hypertrophic scars; inflammatory lesions of granuloma annulare, lichen planus, lichen simplex chronicus (neurodermatitis), psoriatic plaques, and other localized inflammatory skin conditions  Potential Side Effects: I understand that triamcinolone injection can potentially cause early and/or delayed adverse effects such as:    Pain    Impaired wound healing    Increased hair growth    Bleeding    White or brown marks    Steroid acne    Infection    Telangiectasia    Skin thinning    Cutaneous and subcutaneous lipoatrophy (most common) appearing as skin indentations or dimples around the injection sites a few weeks after treatment     PROCEDURE:  DESTRUCTION OF PRE-MALIGNANT LESIONS  After a thorough discussion of treatment options and risk/benefits/alternatives (including but not limited to local pain, scarring, dyspigmentation, blistering, and possible superinfection), verbal and written consent were obtained and the aforementioned lesions were treated on with fluorouracil 500 mg injection   0 5 cc used    TOTAL NUMBER of 1 pre-malignant lesions were treated today on the ANATOMIC LOCATION: frontal scalp   The patient tolerated the procedure well, and after-care instructions were provided        Scribe Attestation    I,:  Zac Navarro am acting as a scribe while in the presence of the attending physician :       I,:  Kaitlin Dubose MD personally performed the services described in this documentation    as scribed in my presence :

## 2022-06-10 DIAGNOSIS — I10 ESSENTIAL HYPERTENSION: ICD-10-CM

## 2022-06-10 RX ORDER — NIFEDIPINE 30 MG/1
TABLET, FILM COATED, EXTENDED RELEASE ORAL
Qty: 90 TABLET | Refills: 1 | Status: SHIPPED | OUTPATIENT
Start: 2022-06-10

## 2022-06-22 ENCOUNTER — OFFICE VISIT (OUTPATIENT)
Dept: DERMATOLOGY | Age: 82
End: 2022-06-22
Payer: COMMERCIAL

## 2022-06-22 VITALS — TEMPERATURE: 97.3 F | WEIGHT: 121 LBS | HEIGHT: 62 IN | BODY MASS INDEX: 22.26 KG/M2

## 2022-06-22 DIAGNOSIS — L57.0 ACTINIC KERATOSIS: Primary | ICD-10-CM

## 2022-06-22 PROCEDURE — 17000 DESTRUCT PREMALG LESION: CPT | Performed by: DERMATOLOGY

## 2022-06-22 PROCEDURE — 17003 DESTRUCT PREMALG LES 2-14: CPT | Performed by: DERMATOLOGY

## 2022-06-22 NOTE — PROGRESS NOTES
[facility] Follow Up Note    Patient Name: Shruthi Clark  Encounter Date: 06/22/2022    Today's Chief Concerns:  Emaline Folds Concern #1:  Follow up on ak's       Current Medications:    Current Outpatient Medications:     Ascorbic Acid (Vitamin C) 250 MG CHEW, , Disp: , Rfl:     cholecalciferol (VITAMIN D3) 1,000 units tablet, Take 1,000 Units by mouth daily, Disp: , Rfl:     cyanocobalamin (VITAMIN B-12) 100 mcg tablet, Take by mouth daily, Disp: , Rfl:     donepezil (ARICEPT) 5 mg tablet, take 1 tablet by mouth at bedtime, Disp: 90 tablet, Rfl: 1    Fexofenadine HCl (MUCINEX ALLERGY PO), Take by mouth as needed, Disp: , Rfl:     hydrochlorothiazide (HYDRODIURIL) 25 mg tablet, Take 1 tablet (25 mg total) by mouth daily, Disp: 90 tablet, Rfl: 1    loratadine (CLARITIN) 10 mg tablet, Take 10 mg by mouth daily, Disp: , Rfl:     metoprolol tartrate (LOPRESSOR) 50 mg tablet, take 1 tablet by mouth twice a day, Disp: 60 tablet, Rfl: 3    Multiple Vitamins-Minerals (ONE-A-DAY 50 PLUS PO), , Disp: , Rfl:     Multiple Vitamins-Minerals (PRESERVISION AREDS 2 PO), Take by mouth, Disp: , Rfl:     multivitamin (THERAGRAN) TABS, Take 1 tablet by mouth daily, Disp: , Rfl:     NIFEdipine ER (ADALAT CC) 30 MG 24 hr tablet, take 1 tablet by mouth once daily, Disp: 90 tablet, Rfl: 1    potassium chloride (K-DUR,KLOR-CON) 20 mEq tablet, take 1 tablet by mouth once daily, Disp: 30 tablet, Rfl: 1    amoxicillin (AMOXIL) 875 mg tablet, take 1 tablet by mouth every 12 hours until finished (Patient not taking: Reported on 5/10/2022), Disp: , Rfl:     CONSTITUTIONAL:   Vitals:    06/22/22 1506   Temp: (!) 97 3 °F (36 3 °C)   TempSrc: Temporal   Weight: 54 9 kg (121 lb)   Height: 5' 1 5" (1 562 m)             Specific Alerts:    Have you been seen by a [facility]ologist in the last 3 years? YES    Are you pregnant or planning to become pregnant?  No    Are you currently or planning to be nursing or breast feeding? No    Allergies   Allergen Reactions    Ibuprofen      Reaction Date: 10Aug2005; May we call your Preferred Phone number to discuss your specific medical information? YES    May we leave a detailed message that includes your specific medical information? YES    Have you traveled outside of the [facility] in the past 3 months? No    Do you currently have a pacemaker or defibrillator? No    Do you have any artificial heart valves, joints, plates, screws, rods, stents, pins, etc? No   - If Yes, were any placed within the last 2 years? Do you require any medications prior to a surgical procedure? No    Are you taking any medications that cause you to bleed more easily ("blood thinners") No    Have you ever experienced a rapid heartbeat with epinephrine? No    Have you ever been treated with "gold" (gold sodium thiomalate) therapy? No    [facility] can help with wrinkles, "laugh lines," facial volume loss, "double chin," "love handles," age spots, and more  Are you interested in learning today about some of the skin enhancement procedures that we offer? (If Yes, please provide more detail) No    Review of Systems:  Have you recently had or currently have any of the following?     · Fever or chills: No  · Night Sweats: No  · Headaches: No  · Weight Gain: No  · Weight Loss: No  · Blurry Vision: No  · Nausea: No  · Vomiting: No  · Diarrhea: No  · Blood in Stool: No  · Abdominal Pain: No  · Itchy Skin: No  · Painful Joints: No  · Swollen Joints: No  · Muscle Pain: No  · Irregular Mole: No  · Sun Burn: No  · Dry Skin: No  · Skin Color Changes: No  · Scar or Keloid: No  · Cold Sores/Fever Blisters: No  · Bacterial Infections/MRSA: No  · Anxiety: No  · Depression: No  · Suicidal or Homicidal Thoughts: No      PSYCH: Normal mood and affect  EYES: Normal conjunctiva  ENT: Normal lips and oral mucosa  CARDIOVASCULAR: No edema  RESPIRATORY: Normal respirations  HEME/LYMPH/IMMUNO:  No regional lymphadenopathy except as noted below in ASSESSMENT AND PLAN BY DIAGNOSIS    FULL ORGAN SYSTEM SKIN EXAM (SKIN)   Hair, Scalp, Ears, Face Normal except as noted below in Assessment   Neck, Cervical Chain Nodes Normal except as noted below in Assessment   Right Arm/Hand/Fingers Normal except as noted below in Assessment   Left Arm/Hand/Fingers Normal except as noted below in Assessment   Chest/Breasts/Axillae Viewed areas Normal except as noted below in Assessment   Abdomen, Umbilicus Normal except as noted below in Assessment   Back/Spine Normal except as noted below in Assessment   Groin/Genitalia/Buttocks Viewed areas Normal except as noted below in Assessment   Right Leg, Foot, Toes Normal except as noted below in Assessment   Left Leg, Foot, Toes Normal except as noted below in Assessment       ACTINIC KERATOSIS    Physical Exam:   Anatomic Location Affected:  Scalp    Morphological Description:  1 hyper Keratotic plaque and x 4 scaly patches     Additional History of Present Condition:  Previously injected with 5 Fluorouracil and cryotherapy  Daughter is present today with her who states the plaque on the scalp is much smaller  Would like to consider another injection prior to a biopsy     Assessment and Plan:  Based on a thorough discussion of this condition and the management approach to it (including a comprehensive discussion of the known risks, side effects and potential benefits of treatment), the patient (family) agrees to implement the following specific plan:     Cryotherapy done in office today x 5 lesions on scalp    5 Fluorouracil injection done in office today x 4 lesions  Follow up in 6 weeks     Actinic keratoses are very common on sites repeatedly exposed to the sun, especially the backs of the hands and the face, most often affecting the ears, nose, cheeks, upper lip, vermilion of the lower lip, temples, forehead and balding scalp   In severely chronically sun-damaged individuals, they may also be found on the upper trunk, upper and lower limbs, and dorsum of feet  We discussed the theoretical premalignant (pre-cancerous) nature and etiology of these growths  We discussed the prevailing notion that actinic keratoses are a reflection of abnormal skin cell development due to DNA damage by short wavelength UVB  They are more likely to appear if the immune function is poor, due to aging, recent sun exposure, predisposing disease or certain drugs  We discussed that the main concern is that actinic keratoses may predispose to squamous cell carcinoma  It is rare for a solitary actinic keratosis to evolve to squamous cell carcinoma (SCC), but the risk of SCC occurring at some stage in a patient with more than 10 actinic keratoses is thought to be about 10 to 15%  A tender, thickened, ulcerated or enlarging actinic keratosis is suspicious of SCC  Actinic keratoses may be prevented by strict sun protection  If already present, keratoses may improve with a very high sun protection factor (50+) broad-spectrum sunscreen applied at least daily to affected areas, year-round  We recommend that UPF-rated clothing and hats and sunglasses be worn whenever possible and that a sunscreen-moisturizer combination product such as Neutrogena Daily Defense be applied at least three times a day  We performed a thorough discussion of treatment options and specific risk/benefits/alternatives including but not limited to medical field treatment with medications such as the following:     Topical field area medications such as 5-fluorouracil or Aldara (specifically, the trouble with long-term compliance, blistering and local skin reaction versus the convenience of at-home therapy and that field therapy gets what is not yet seen)       Cryotherapy (specifically, local pain, scarring, dyspigmentation, blistering, possible superinfection, and treats only what we see versus directed treatment today)   Photodynamic therapy (specifically, local pain, scarring, dyspigmentation, blistering, possible superinfection, need to schedule for a later date, and time spent in the office versus field therapy that gets what is not yet seen)  PROCEDURE:  DESTRUCTION OF PRE-MALIGNANT LESIONS  After a thorough discussion of treatment options and risk/benefits/alternatives (including but not limited to local pain, scarring, dyspigmentation, blistering, and possible superinfection), verbal and written consent were obtained and the aforementioned lesions were treated on with cryotherapy using liquid nitrogen x 5 lesion x 1 cycle for 5-10 seconds and Fluorouracil 500 mg injection x 4 lesions  1 cc used today  Lot# [id number] Exp: 06/22     TOTAL NUMBER of 5 pre-malignant lesions were treated today on the ANATOMIC LOCATION: scalp  The patient tolerated the procedure well, and after-care instructions were provided        Scribe Attestation    I,:   am acting as a scribe while in the presence of the attending physician :       I,:   personally performed the services described in this documentation    as scribed in my presence :

## 2022-06-23 DIAGNOSIS — I10 ESSENTIAL HYPERTENSION: ICD-10-CM

## 2022-06-23 RX ORDER — POTASSIUM CHLORIDE 20 MEQ/1
TABLET, EXTENDED RELEASE ORAL
Qty: 30 TABLET | Refills: 1 | Status: SHIPPED | OUTPATIENT
Start: 2022-06-23

## 2022-07-08 DIAGNOSIS — I10 ESSENTIAL HYPERTENSION: ICD-10-CM

## 2022-07-08 RX ORDER — METOPROLOL TARTRATE 50 MG/1
TABLET, FILM COATED ORAL
Qty: 60 TABLET | Refills: 3 | Status: SHIPPED | OUTPATIENT
Start: 2022-07-08 | End: 2022-10-26

## 2022-08-17 ENCOUNTER — TELEPHONE (OUTPATIENT)
Dept: FAMILY MEDICINE CLINIC | Facility: CLINIC | Age: 82
End: 2022-08-17

## 2022-08-17 NOTE — TELEPHONE ENCOUNTER
Just called to follow up from talking to daughter this morning about her moms mental state , she seemed confused and is sleeping a lot , was referred to er for further testing tc/cma

## 2022-08-18 DIAGNOSIS — I10 ESSENTIAL HYPERTENSION: ICD-10-CM

## 2022-08-18 RX ORDER — HYDROCHLOROTHIAZIDE 25 MG/1
25 TABLET ORAL DAILY
Qty: 90 TABLET | Refills: 1 | OUTPATIENT
Start: 2022-08-18

## 2022-08-19 ENCOUNTER — TELEMEDICINE (OUTPATIENT)
Dept: FAMILY MEDICINE CLINIC | Facility: CLINIC | Age: 82
End: 2022-08-19
Payer: COMMERCIAL

## 2022-08-19 ENCOUNTER — APPOINTMENT (OUTPATIENT)
Dept: LAB | Facility: CLINIC | Age: 82
End: 2022-08-19
Payer: COMMERCIAL

## 2022-08-19 VITALS
HEART RATE: 62 BPM | TEMPERATURE: 97.2 F | SYSTOLIC BLOOD PRESSURE: 140 MMHG | BODY MASS INDEX: 22.49 KG/M2 | WEIGHT: 121 LBS | RESPIRATION RATE: 16 BRPM | DIASTOLIC BLOOD PRESSURE: 62 MMHG

## 2022-08-19 DIAGNOSIS — R35.0 URINARY FREQUENCY: ICD-10-CM

## 2022-08-19 DIAGNOSIS — Z78.0 POSTMENOPAUSAL: Primary | ICD-10-CM

## 2022-08-19 DIAGNOSIS — F03.90 DEMENTIA WITHOUT BEHAVIORAL DISTURBANCE, UNSPECIFIED DEMENTIA TYPE: ICD-10-CM

## 2022-08-19 DIAGNOSIS — Z12.31 ENCOUNTER FOR SCREENING MAMMOGRAM FOR MALIGNANT NEOPLASM OF BREAST: ICD-10-CM

## 2022-08-19 DIAGNOSIS — I10 ESSENTIAL HYPERTENSION: ICD-10-CM

## 2022-08-19 DIAGNOSIS — R53.82 CHRONIC FATIGUE: ICD-10-CM

## 2022-08-19 LAB
ALBUMIN SERPL BCP-MCNC: 3.8 G/DL (ref 3.5–5)
ALP SERPL-CCNC: 76 U/L (ref 46–116)
ALT SERPL W P-5'-P-CCNC: 21 U/L (ref 12–78)
ANION GAP SERPL CALCULATED.3IONS-SCNC: 5 MMOL/L (ref 4–13)
AST SERPL W P-5'-P-CCNC: 23 U/L (ref 5–45)
BILIRUB SERPL-MCNC: 0.3 MG/DL (ref 0.2–1)
BUN SERPL-MCNC: 13 MG/DL (ref 5–25)
CALCIUM SERPL-MCNC: 9.6 MG/DL (ref 8.3–10.1)
CHLORIDE SERPL-SCNC: 95 MMOL/L (ref 96–108)
CO2 SERPL-SCNC: 31 MMOL/L (ref 21–32)
CREAT SERPL-MCNC: 0.63 MG/DL (ref 0.6–1.3)
ERYTHROCYTE [DISTWIDTH] IN BLOOD BY AUTOMATED COUNT: 13.6 % (ref 11.6–15.1)
EST. AVERAGE GLUCOSE BLD GHB EST-MCNC: 120 MG/DL
GFR SERPL CREATININE-BSD FRML MDRD: 83 ML/MIN/1.73SQ M
GLUCOSE P FAST SERPL-MCNC: 104 MG/DL (ref 65–99)
HBA1C MFR BLD: 5.8 %
HCT VFR BLD AUTO: 38.1 % (ref 34.8–46.1)
HGB BLD-MCNC: 12.3 G/DL (ref 11.5–15.4)
MAGNESIUM SERPL-MCNC: 2.4 MG/DL (ref 1.6–2.6)
MCH RBC QN AUTO: 32.4 PG (ref 26.8–34.3)
MCHC RBC AUTO-ENTMCNC: 32.3 G/DL (ref 31.4–37.4)
MCV RBC AUTO: 100 FL (ref 82–98)
PLATELET # BLD AUTO: 256 THOUSANDS/UL (ref 149–390)
PMV BLD AUTO: 10.8 FL (ref 8.9–12.7)
POTASSIUM SERPL-SCNC: 4.2 MMOL/L (ref 3.5–5.3)
PROT SERPL-MCNC: 7.8 G/DL (ref 6.4–8.4)
RBC # BLD AUTO: 3.8 MILLION/UL (ref 3.81–5.12)
SL AMB  POCT GLUCOSE, UA: ABNORMAL
SL AMB LEUKOCYTE ESTERASE,UA: 25
SL AMB POCT BILIRUBIN,UA: ABNORMAL
SL AMB POCT BLOOD,UA: ABNORMAL
SL AMB POCT CLARITY,UA: CLEAR
SL AMB POCT COLOR,UA: YELLOW
SL AMB POCT KETONES,UA: ABNORMAL
SL AMB POCT NITRITE,UA: ABNORMAL
SL AMB POCT PH,UA: 7
SL AMB POCT SPECIFIC GRAVITY,UA: 1.01
SL AMB POCT URINE PROTEIN: ABNORMAL
SL AMB POCT UROBILINOGEN: ABNORMAL
SODIUM SERPL-SCNC: 131 MMOL/L (ref 135–147)
WBC # BLD AUTO: 4.52 THOUSAND/UL (ref 4.31–10.16)

## 2022-08-19 PROCEDURE — 83036 HEMOGLOBIN GLYCOSYLATED A1C: CPT

## 2022-08-19 PROCEDURE — 83735 ASSAY OF MAGNESIUM: CPT

## 2022-08-19 PROCEDURE — 99442 PR PHYS/QHP TELEPHONE EVALUATION 11-20 MIN: CPT | Performed by: FAMILY MEDICINE

## 2022-08-19 PROCEDURE — 80053 COMPREHEN METABOLIC PANEL: CPT

## 2022-08-19 PROCEDURE — 81003 URINALYSIS AUTO W/O SCOPE: CPT | Performed by: FAMILY MEDICINE

## 2022-08-19 PROCEDURE — 85027 COMPLETE CBC AUTOMATED: CPT

## 2022-08-19 PROCEDURE — 36415 COLL VENOUS BLD VENIPUNCTURE: CPT

## 2022-08-19 NOTE — PROGRESS NOTES
Assessment/Plan:    1  Postmenopausal  -     DXA bone density spine hip and pelvis; Future; Expected date: 08/19/2022    2  Encounter for screening mammogram for malignant neoplasm of breast  -     Mammo screening bilateral w 3d & cad; Future; Expected date: 08/19/2022    3  Essential hypertension  -     Comprehensive metabolic panel; Future  -     Magnesium; Future    4  Dementia without behavioral disturbance, unspecified dementia type ([facility] [address] )  -     CBC; Future  -     Comprehensive metabolic panel; Future  -     Hemoglobin A1C; Future  -     Magnesium; Future    5  Urinary frequency  -     CBC; Future  -     Comprehensive metabolic panel; Future  -     Hemoglobin A1C; Future  -     POCT urine dip auto non-scope  -     Urine culture    6  Chronic fatigue  -     CBC; Future        Depression Screening and Follow-up Plan: Patient was screened for depression during today's encounter  They screened negative with a PHQ-2 score of 0  Subjective:      Patient ID: Lizbeth Good is a 79 y o  female      Chief Complaint   Patient presents with    Medication Refill     Pt having a lot of confusion and very tired     Virtual Regular Visit       HPI    The following portions of the patient's history were reviewed and updated as appropriate: allergies, current medications, past family history, past medical history, past social history, past surgical history and problem list     Review of Systems      Current Outpatient Medications   Medication Sig Dispense Refill    Ascorbic Acid (Vitamin C) 250 MG CHEW       cholecalciferol (VITAMIN D3) 1,000 units tablet Take 1,000 Units by mouth daily      cyanocobalamin (VITAMIN B-12) 100 mcg tablet Take by mouth daily      donepezil (ARICEPT) 5 mg tablet take 1 tablet by mouth at bedtime 90 tablet 1    Fexofenadine HCl (MUCINEX ALLERGY PO) Take by mouth as needed      loratadine (CLARITIN) 10 mg tablet Take 10 mg by mouth daily      metoprolol tartrate (LOPRESSOR) 50 mg tablet take 1 tablet by mouth twice a day 60 tablet 3    Multiple Vitamins-Minerals (ONE-A-DAY 50 PLUS PO)       NIFEdipine ER (ADALAT CC) 30 MG 24 hr tablet take 1 tablet by mouth once daily 90 tablet 1    Multiple Vitamins-Minerals (PRESERVISION AREDS 2 PO) Take by mouth (Patient not taking: Reported on 8/19/2022)       No current facility-administered medications for this visit         Objective:    /62 (BP Location: Left arm, Patient Position: Sitting, Cuff Size: Standard)   Pulse 62   Temp (!) 97 2 °F (36 2 °C)   Resp 16   Wt 54 9 kg (121 lb)   BMI 22 49 kg/m²        Physical Exam           Trena Ojeda MD

## 2022-08-19 NOTE — TELEPHONE ENCOUNTER
Please advise UA from today ahd some leukos but didn't show any other sig abnl --will check cx to confirm no infection

## 2022-08-22 LAB
BACTERIA UR CULT: NORMAL
Lab: NO GROWTH

## 2022-08-24 ENCOUNTER — TELEPHONE (OUTPATIENT)
Dept: FAMILY MEDICINE CLINIC | Facility: CLINIC | Age: 82
End: 2022-08-24

## 2022-08-24 NOTE — TELEPHONE ENCOUNTER
LMTRC   Urine cx negative  Labs --low sodium--advised d/c hctz and potassium tabs  Remainder labs w/o sig abnl  If pt still not feeling well after changes above  please schedule follow-up appt to further address

## 2022-08-25 NOTE — TELEPHONE ENCOUNTER
Called and spoke with Katie Bruner and she wanted to know if the Donezapil could be cause vivid dreams and confusion   Is there another medication or should she stay on current medication

## 2022-09-09 NOTE — PROGRESS NOTES
Virtual Regular Visit    Verification of patient location:    Patient is located in the following state in which I hold an active license [place]      Assessment/Plan:    Problem List Items Addressed This Visit     Essential hypertension    Relevant Orders    Comprehensive metabolic panel (Completed)    Magnesium (Completed)    Chronic fatigue    Relevant Orders    CBC (Completed)    Urinary frequency    Relevant Orders    CBC (Completed)    Comprehensive metabolic panel (Completed)    Hemoglobin A1C (Completed)    POCT urine dip auto non-scope (Completed)    Urine culture (Completed)      Other Visit Diagnoses     Postmenopausal    -  Primary    Relevant Orders    DXA bone density spine hip and pelvis    Encounter for screening mammogram for malignant neoplasm of breast        Relevant Orders    Mammo screening bilateral w 3d & cad    Dementia without behavioral disturbance, unspecified dementia type ([facility] )        Relevant Orders    CBC (Completed)    Comprehensive metabolic panel (Completed)    Hemoglobin A1C (Completed)    Magnesium (Completed)               Reason for visit is   Chief Complaint   Patient presents with    Medication Refill     Pt having a lot of confusion and very tired     Virtual Regular Visit        Encounter provider Corinna Madden MD    Provider located at [address] [place] [id number]      Recent Visits  No visits were found meeting these conditions  Showing recent visits within past 7 days and meeting all other requirements  Future Appointments  No visits were found meeting these conditions  Showing future appointments within next 150 days and meeting all other requirements       The patient was identified by name and date of birth  Perez Richard was informed that this is a telemedicine visit and that the visit is being conducted through Telephone  My office door was closed  No one else was in the room    She acknowledged consent and understanding of privacy and security of the video platform  The patient has agreed to participate and understands they can discontinue the visit at any time  It was my intent to perform this visit via video technology but the patient was not able to do a video connection so the visit was completed via audio telephone only  Patient is aware this is a billable service  Thalia Dick is a 79 y o  female   HPI   dtr-Anitha, assisting pt on call  Reports pt w inc fatigue/confusion  No fever/rash/resp or gi sx  Concerned about possible urinary infection--will drop off urine sample   Labs done this AM  UA -+leukos-otherwise negative  Past Medical History:   Diagnosis Date    Allergic     Cancer ([address] ) 2005    left breast mastectomy    Hypertension     Memory change     Nodule of left lung     Pleural thickening        Past Surgical History:   Procedure Laterality Date    CATARACT EXTRACTION Left 05/2020    CATARACT EXTRACTION Left 06/2020    MASTECTOMY      TX XCAPSL CTRC RMVL INSJ IO LENS PROSTH W/O ECP Left 6/8/2020    Procedure: EXTRACTION EXTRACAPSULAR CATARACT PHACO INTRAOCULAR LENS (IOL);   Surgeon: Mary Ann Maldonado MD;  Location: [facility] MAIN OR;  Service: Ophthalmology       Current Outpatient Medications   Medication Sig Dispense Refill    Ascorbic Acid (Vitamin C) 250 MG CHEW       cholecalciferol (VITAMIN D3) 1,000 units tablet Take 1,000 Units by mouth daily      cyanocobalamin (VITAMIN B-12) 100 mcg tablet Take by mouth daily      donepezil (ARICEPT) 5 mg tablet take 1 tablet by mouth at bedtime 90 tablet 1    Fexofenadine HCl (MUCINEX ALLERGY PO) Take by mouth as needed      loratadine (CLARITIN) 10 mg tablet Take 10 mg by mouth daily      metoprolol tartrate (LOPRESSOR) 50 mg tablet take 1 tablet by mouth twice a day 60 tablet 3    Multiple Vitamins-Minerals (ONE-A-DAY 50 PLUS PO)       NIFEdipine ER (ADALAT CC) 30 MG 24 hr tablet take 1 tablet by mouth once daily 90 tablet 1    Multiple Vitamins-Minerals (PRESERVISION AREDS 2 PO) Take by mouth (Patient not taking: Reported on 8/19/2022)       No current facility-administered medications for this visit  Allergies   Allergen Reactions    Ibuprofen      Reaction Date: 10Aug2005; Review of Systems   Constitutional: Positive for fatigue  Negative for fever  Respiratory: Negative  Cardiovascular: Negative  Gastrointestinal: Negative  Genitourinary: Positive for dysuria  Musculoskeletal: Positive for arthralgias  Skin: Negative for rash  Neurological: Positive for weakness  Psychiatric/Behavioral: Positive for confusion and sleep disturbance  The patient is nervous/anxious  Video Exam    Vitals:    08/19/22 0948 08/19/22 1127   BP: 156/88 140/62   BP Location: Left arm Left arm   Patient Position: Sitting Sitting   Cuff Size: Standard Standard   Pulse: 58 62   Resp:  16   Temp:  (!) 97 2 °F (36 2 °C)   Weight: 54 9 kg (121 lb)        Physical Exam   Alert  Dtr answering questions  Pt w/o audible resp distress      I spent 15 minutes directly with the patient during this visit

## 2022-09-21 DIAGNOSIS — F03.90 DEMENTIA WITHOUT BEHAVIORAL DISTURBANCE, UNSPECIFIED DEMENTIA TYPE: ICD-10-CM

## 2022-09-21 RX ORDER — DONEPEZIL HYDROCHLORIDE 5 MG/1
5 TABLET, FILM COATED ORAL
Qty: 90 TABLET | Refills: 1 | Status: SHIPPED | OUTPATIENT
Start: 2022-09-21 | End: 2022-09-28 | Stop reason: ALTCHOICE

## 2022-09-28 ENCOUNTER — OFFICE VISIT (OUTPATIENT)
Dept: FAMILY MEDICINE CLINIC | Facility: CLINIC | Age: 82
End: 2022-09-28
Payer: COMMERCIAL

## 2022-09-28 VITALS
DIASTOLIC BLOOD PRESSURE: 74 MMHG | RESPIRATION RATE: 16 BRPM | WEIGHT: 119.4 LBS | HEART RATE: 60 BPM | HEIGHT: 62 IN | SYSTOLIC BLOOD PRESSURE: 168 MMHG | BODY MASS INDEX: 21.97 KG/M2 | TEMPERATURE: 98.1 F

## 2022-09-28 DIAGNOSIS — Z85.3 HISTORY OF BREAST CANCER: ICD-10-CM

## 2022-09-28 DIAGNOSIS — E87.8 ELECTROLYTE ABNORMALITY: ICD-10-CM

## 2022-09-28 DIAGNOSIS — Z23 NEED FOR PNEUMOCOCCAL VACCINATION: ICD-10-CM

## 2022-09-28 DIAGNOSIS — F03.90 DEMENTIA WITHOUT BEHAVIORAL DISTURBANCE, UNSPECIFIED DEMENTIA TYPE: Primary | ICD-10-CM

## 2022-09-28 DIAGNOSIS — H61.23 CERUMINOSIS, BILATERAL: ICD-10-CM

## 2022-09-28 PROCEDURE — G0009 ADMIN PNEUMOCOCCAL VACCINE: HCPCS | Performed by: FAMILY MEDICINE

## 2022-09-28 PROCEDURE — 90677 PCV20 VACCINE IM: CPT | Performed by: FAMILY MEDICINE

## 2022-09-28 PROCEDURE — 99214 OFFICE O/P EST MOD 30 MIN: CPT | Performed by: FAMILY MEDICINE

## 2022-09-28 RX ORDER — MEMANTINE HYDROCHLORIDE 5 MG-10 MG
KIT ORAL SEE ADMIN INSTRUCTIONS
Qty: 60 TABLET | Refills: 0 | Status: SHIPPED | OUTPATIENT
Start: 2022-09-28

## 2022-09-30 ENCOUNTER — APPOINTMENT (OUTPATIENT)
Dept: LAB | Facility: CLINIC | Age: 82
End: 2022-09-30
Payer: COMMERCIAL

## 2022-09-30 DIAGNOSIS — E87.8 ELECTROLYTE ABNORMALITY: ICD-10-CM

## 2022-09-30 LAB
ANION GAP SERPL CALCULATED.3IONS-SCNC: 4 MMOL/L (ref 4–13)
BUN SERPL-MCNC: 18 MG/DL (ref 5–25)
CALCIUM SERPL-MCNC: 9.6 MG/DL (ref 8.3–10.1)
CHLORIDE SERPL-SCNC: 100 MMOL/L (ref 96–108)
CO2 SERPL-SCNC: 33 MMOL/L (ref 21–32)
CREAT SERPL-MCNC: 0.72 MG/DL (ref 0.6–1.3)
GFR SERPL CREATININE-BSD FRML MDRD: 77 ML/MIN/1.73SQ M
GLUCOSE SERPL-MCNC: 93 MG/DL (ref 65–140)
POTASSIUM SERPL-SCNC: 3.4 MMOL/L (ref 3.5–5.3)
SODIUM SERPL-SCNC: 137 MMOL/L (ref 135–147)

## 2022-09-30 PROCEDURE — 80048 BASIC METABOLIC PNL TOTAL CA: CPT

## 2022-09-30 PROCEDURE — 36415 COLL VENOUS BLD VENIPUNCTURE: CPT

## 2022-10-02 PROBLEM — E87.8 ELECTROLYTE ABNORMALITY: Status: ACTIVE | Noted: 2022-10-02

## 2022-10-02 PROBLEM — H61.23 CERUMINOSIS, BILATERAL: Status: ACTIVE | Noted: 2022-10-02

## 2022-10-03 NOTE — PROGRESS NOTES
Assessment/Plan:  Diagnoses and all orders for this visit:    Dementia without behavioral disturbance, unspecified dementia type ([facility] [address] )  Comments:  d/c Aricept  Orders:  -     memantine (NAMENDA TITRATION PACK); Take by mouth see administration instructions Follow package directions  Electrolyte abnormality  -     Basic metabolic panel; Future    History of breast cancer    Ceruminosis, bilateral  Comments:  earwax flushed w significant improvement  Need for pneumococcal vaccination  -     Pneumococcal Conjugate Vaccine 20-valent (Pcv20)    BMI 22 0-22 9, adult          Subjective:      Patient ID: Terri Mercado is a 79 y o  female      Chief Complaint   Patient presents with    Ear Fullness     Pt said she feels like she has water in her ears ,hearing loss has been worse daughter has been using swimmers ear drops    Hypertension     The last couple of days daughter has noticed her bp has been     Follow-up     Pt is having vivid dreams and daughter thinks its donepezil , and would like pneumonia injection       HPI  In w dtr, Dariela Pennington, for follow-up  Interval hx reviewed  Sys BP above range  C/o ear fullness and recent hallucinations  dtr ?change in medication    The following portions of the patient's history were reviewed and updated as appropriate: allergies, current medications, past family history, past medical history, past social history, past surgical history and problem list     Review of Systems   Unable to perform ROS: Dementia         Current Outpatient Medications   Medication Sig Dispense Refill    Ascorbic Acid (Vitamin C) 250 MG CHEW       cholecalciferol (VITAMIN D3) 1,000 units tablet Take 1,000 Units by mouth daily      cyanocobalamin (VITAMIN B-12) 100 mcg tablet Take by mouth daily      Fexofenadine HCl (MUCINEX ALLERGY PO) Take by mouth as needed      loratadine (CLARITIN) 10 mg tablet Take 10 mg by mouth daily      memantine (NAMENDA TITRATION PACK) Take by mouth see administration instructions Follow package directions  60 tablet 0    metoprolol tartrate (LOPRESSOR) 50 mg tablet take 1 tablet by mouth twice a day 60 tablet 3    Multiple Vitamins-Minerals (ONE-A-DAY 50 PLUS PO)       Multiple Vitamins-Minerals (PRESERVISION AREDS 2 PO) Take by mouth      NIFEdipine ER (ADALAT CC) 30 MG 24 hr tablet take 1 tablet by mouth once daily 90 tablet 1     No current facility-administered medications for this visit  Objective:    /74 (BP Location: Left arm, Patient Position: Sitting, Cuff Size: Large)   Pulse 60   Temp 98 1 °F (36 7 °C)   Resp 16   Ht 5' 1 5" (1 562 m)   Wt 54 2 kg (119 lb 6 4 oz)   BMI 22 20 kg/m²        Physical Exam  Vitals and nursing note reviewed  Constitutional:       General: She is not in acute distress  Appearance: Normal appearance  HENT:      Right Ear: There is impacted cerumen  Left Ear: There is impacted cerumen  Eyes:      General: No scleral icterus  Cardiovascular:      Rate and Rhythm: Normal rate and regular rhythm  Pulmonary:      Effort: Pulmonary effort is normal  No respiratory distress  Breath sounds: Normal breath sounds  Musculoskeletal:         General: Normal range of motion  Skin:     General: Skin is warm and dry  Coloration: Skin is not jaundiced  Neurological:      Mental Status: She is alert        Comments: No new acute focal deficit noted   Psychiatric:      Comments: Cooperative w exam         Ricki Andersen

## 2022-10-05 ENCOUNTER — TELEPHONE (OUTPATIENT)
Dept: FAMILY MEDICINE CLINIC | Facility: CLINIC | Age: 82
End: 2022-10-05

## 2022-10-05 NOTE — TELEPHONE ENCOUNTER
Please inform sodium level back to normal but potassium very slightly below normal=3 4 (range starts at 3 5)  Pleas confirm pt no longer taking the water pill (hydrochlorothiazide)  If still taking will need to restart the potassium supplement

## 2022-10-05 NOTE — TELEPHONE ENCOUNTER
Dr Latrice Miranda:    Patient's daughter called asking for her results from her labwork? Please advise

## 2022-10-12 PROBLEM — R05.9 COUGH: Status: RESOLVED | Noted: 2021-06-14 | Resolved: 2022-10-12

## 2022-10-21 ENCOUNTER — CLINICAL SUPPORT (OUTPATIENT)
Dept: FAMILY MEDICINE CLINIC | Facility: CLINIC | Age: 82
End: 2022-10-21
Payer: COMMERCIAL

## 2022-10-21 VITALS — TEMPERATURE: 98.1 F

## 2022-10-21 DIAGNOSIS — Z23 NEED FOR INFLUENZA VACCINATION: Primary | ICD-10-CM

## 2022-10-21 PROCEDURE — 90662 IIV NO PRSV INCREASED AG IM: CPT

## 2022-10-21 PROCEDURE — G0008 ADMIN INFLUENZA VIRUS VAC: HCPCS

## 2022-10-26 DIAGNOSIS — I10 ESSENTIAL HYPERTENSION: ICD-10-CM

## 2022-10-26 RX ORDER — METOPROLOL TARTRATE 50 MG/1
TABLET, FILM COATED ORAL
Qty: 60 TABLET | Refills: 3 | Status: SHIPPED | OUTPATIENT
Start: 2022-10-26

## 2022-10-27 ENCOUNTER — TELEPHONE (OUTPATIENT)
Dept: DERMATOLOGY | Age: 82
End: 2022-10-27

## 2022-10-27 ENCOUNTER — TELEPHONE (OUTPATIENT)
Dept: DERMATOLOGY | Facility: CLINIC | Age: 82
End: 2022-10-27

## 2022-10-27 NOTE — TELEPHONE ENCOUNTER
Daughter left a message in regards to duarte appointment she did have one scheduled today that she missed left message to give us a call back to reschedule

## 2022-10-31 ENCOUNTER — OFFICE VISIT (OUTPATIENT)
Dept: DERMATOLOGY | Age: 82
End: 2022-10-31

## 2022-10-31 VITALS — WEIGHT: 115.4 LBS | BODY MASS INDEX: 21.23 KG/M2 | TEMPERATURE: 97.6 F | HEIGHT: 62 IN

## 2022-10-31 DIAGNOSIS — L57.0 ACTINIC KERATOSIS: Primary | ICD-10-CM

## 2022-10-31 RX ORDER — FLUOROURACIL 50 MG/ML
INJECTION, SOLUTION INTRAVENOUS ONCE
Qty: 1 ML | Refills: 0 | Status: CANCELLED | OUTPATIENT
Start: 2022-10-31 | End: 2022-10-31

## 2022-10-31 NOTE — PROGRESS NOTES
[facility] Follow Up Note    Patient Name: Sophie Frye  Encounter Date: [id number]    Today's Chief Concerns:  • Concern #1:  Actinic keratosis follow up    Current Medications:    Current Outpatient Medications:   •  Ascorbic Acid (Vitamin C) 250 MG CHEW, , Disp: , Rfl:   •  cholecalciferol (VITAMIN D3) 1,000 units tablet, Take 1,000 Units by mouth daily, Disp: , Rfl:   •  cyanocobalamin (VITAMIN B-12) 100 mcg tablet, Take by mouth daily, Disp: , Rfl:   •  Fexofenadine HCl (MUCINEX ALLERGY PO), Take by mouth as needed, Disp: , Rfl:   •  loratadine (CLARITIN) 10 mg tablet, Take 10 mg by mouth daily, Disp: , Rfl:   •  metoprolol tartrate (LOPRESSOR) 50 mg tablet, take 1 tablet by mouth twice a day, Disp: 60 tablet, Rfl: 3  •  Multiple Vitamins-Minerals (ONE-A-DAY 50 PLUS PO), , Disp: , Rfl:   •  Multiple Vitamins-Minerals (PRESERVISION AREDS 2 PO), Take by mouth, Disp: , Rfl:   •  NIFEdipine ER (ADALAT CC) 30 MG 24 hr tablet, take 1 tablet by mouth once daily, Disp: 90 tablet, Rfl: 1  •  memantine (NAMENDA TITRATION PACK), Take by mouth see administration instructions Follow package directions  , Disp: 60 tablet, Rfl: 0    CONSTITUTIONAL:   There were no vitals filed for this visit  Specific Alerts:    Have you been seen by a [facility]atologist in the last 3 years? YES    Are you pregnant or planning to become pregnant? N/A    Are you currently or planning to be nursing or breast feeding? N/A    Allergies   Allergen Reactions   • Ibuprofen      Reaction Date: 10Aug2005; May we call your Preferred Phone number to discuss your specific medical information? YES    May we leave a detailed message that includes your specific medical information? YES    Have you traveled outside of the [facility] in the past 3 months? No    Do you currently have a pacemaker or defibrillator?  No    Do you have any artificial heart valves, joints, plates, screws, rods, stents, pins, etc? No   - If Yes, were any placed within the last 2 years? Do you require any medications prior to a surgical procedure? No   - If Yes, for which procedure? - If Yes, what medications to you require? Are you taking any medications that cause you to bleed more easily ("blood thinners") No    Have you ever experienced a rapid heartbeat with epinephrine? No    Have you ever been treated with "gold" (gold sodium thiomalate) therapy? No    [facility] can help with wrinkles, "laugh lines," facial volume loss, "double chin," "love handles," age spots, and more  Are you interested in learning today about some of the skin enhancement procedures that we offer? (If Yes, please provide more detail) No    Review of Systems:  Have you recently had or currently have any of the following? · Fever or chills: No  · Night Sweats: No  · Headaches: No  · Weight Gain: No  · Weight Loss: No  · Blurry Vision: No  · Nausea: No  · Vomiting: No  · Diarrhea: No  · Blood in Stool: No  · Abdominal Pain: No  · Itchy Skin: No  · Painful Joints: No  · Swollen Joints: No  · Muscle Pain: No  · Irregular Mole: No  · Sun Burn: No  · Dry Skin: No  · Skin Color Changes: No  · Scar or Keloid: No  · Cold Sores/Fever Blisters: No  · Bacterial Infections/MRSA: No  · Anxiety: No  · Depression: No  · Suicidal or Homicidal Thoughts: No      PSYCH: Normal mood and affect  EYES: Normal conjunctiva  ENT: Normal lips and oral mucosa  CARDIOVASCULAR: No edema  RESPIRATORY: Normal respirations  HEME/LYMPH/IMMUNO:  No regional lymphadenopathy except as noted below in ASSESSMENT AND PLAN BY DIAGNOSIS    FULL ORGAN SYSTEM SKIN EXAM (SKIN)   Scalp Normal except as noted below in Assessment     ACTINIC KERATOSIS     Physical Exam:  · Anatomic Location Affected:  Scalp   Morphological Description:   hyperkeratotic red plaques   Additional History of Present Condition:  Previously injected with 5 Fluorouracil and cryotherapy   Patient states 1 large spot went away and no new spots      Assessment and Plan:  Based on a thorough discussion of this condition and the management approach to it (including a comprehensive discussion of the known risks, side effects and potential benefits of treatment), the patient (family) agrees to implement the following specific plan:     · Cryotherapy done in office today x 6 lesions on scalp   · 5 Fluorouracil injection done in office today x 4 lesions             Follow up in 2 months      Actinic keratoses are very common on sites repeatedly exposed to the sun, especially the backs of the hands and the face, most often affecting the ears, nose, cheeks, upper lip, vermilion of the lower lip, temples, forehead and balding scalp  In severely chronically sun-damaged individuals, they may also be found on the upper trunk, upper and lower limbs, and dorsum of feet      We discussed the theoretical premalignant (“pre-cancerous”) nature and etiology of these growths  We discussed the prevailing notion that actinic keratoses are a reflection of abnormal skin cell development due to DNA damage by short wavelength UVB  They are more likely to appear if the immune function is poor, due to aging, recent sun exposure, predisposing disease or certain drugs      We discussed that the main concern is that actinic keratoses may predispose to squamous cell carcinoma  It is rare for a solitary actinic keratosis to evolve to squamous cell carcinoma (SCC), but the risk of SCC occurring at some stage in a patient with more than 10 actinic keratoses is thought to be about 10 to 15%  A tender, thickened, ulcerated or enlarging actinic keratosis is suspicious of SCC      Actinic keratoses may be prevented by strict sun protection  If already present, keratoses may improve with a very high sun protection factor (50+) broad-spectrum sunscreen applied at least daily to affected areas, year-round    We recommend that UPF-rated clothing and hats and sunglasses be worn whenever possible and that a sunscreen-moisturizer combination product such as Neutrogena Daily Defense be applied at least three times a day      We performed a thorough discussion of treatment options and specific risk/benefits/alternatives including but not limited to medical “field” treatment with medications such as the following:     · Topical “field area” medications such as 5-fluorouracil or Aldara (specifically, the trouble with long-term compliance, blistering and local skin reaction versus the convenience of at-home therapy and that field therapy “gets what is not yet seen”)     · Cryotherapy (specifically, local pain, scarring, dyspigmentation, blistering, possible superinfection, and treats “only what we see” versus directed treatment today)      · Photodynamic therapy (specifically, local pain, scarring, dyspigmentation, blistering, possible superinfection, need to schedule for a later date, and time spent in the office versus field therapy that “gets what is not yet seen”)      PROCEDURE:  DESTRUCTION OF PRE-MALIGNANT LESIONS  After a thorough discussion of treatment options and risk/benefits/alternatives (including but not limited to local pain, scarring, dyspigmentation, blistering, and possible superinfection), verbal and written consent were obtained and the aforementioned lesions were treated on with cryotherapy using liquid nitrogen x 6 lesion x 1 cycle for 5-10 seconds and Fluorouracil 500 mg injection x 4 lesions  2 cc used today  Lot#  [id number]: 09/23     · TOTAL NUMBER of 6 pre-malignant lesions were treated today on the ANATOMIC LOCATION: scalp    · Injection of 4 pre-malignant lesions in scalp with 5 fluorouracil         Scribe Attestation    I,:  Eugenia Sanon am acting as a scribe while in the presence of the attending physician :       I,:  Jeremiah Chapman MD personally performed the services described in this documentation    as scribed in my presence :

## 2022-10-31 NOTE — PATIENT INSTRUCTIONS
ACTINIC KERATOSIS    Assessment and Plan:  Based on a thorough discussion of this condition and the management approach to it (including a comprehensive discussion of the known risks, side effects and potential benefits of treatment), the patient (family) agrees to implement the following specific plan:     Cryotherapy done in office today x 6 lesions on scalp   5 Fluorouracil injection done in office today x 4 lesions               Follow up in 2 months      Actinic keratoses are very common on sites repeatedly exposed to the sun, especially the backs of the hands and the face, most often affecting the ears, nose, cheeks, upper lip, vermilion of the lower lip, temples, forehead and balding scalp  In severely chronically sun-damaged individuals, they may also be found on the upper trunk, upper and lower limbs, and dorsum of feet  We discussed the theoretical premalignant (“pre-cancerous”) nature and etiology of these growths  We discussed the prevailing notion that actinic keratoses are a reflection of abnormal skin cell development due to DNA damage by short wavelength UVB  They are more likely to appear if the immune function is poor, due to aging, recent sun exposure, predisposing disease or certain drugs  We discussed that the main concern is that actinic keratoses may predispose to squamous cell carcinoma  It is rare for a solitary actinic keratosis to evolve to squamous cell carcinoma (SCC), but the risk of SCC occurring at some stage in a patient with more than 10 actinic keratoses is thought to be about 10 to 15%  A tender, thickened, ulcerated or enlarging actinic keratosis is suspicious of SCC  Actinic keratoses may be prevented by strict sun protection  If already present, keratoses may improve with a very high sun protection factor (50+) broad-spectrum sunscreen applied at least daily to affected areas, year-round    We recommend that UPF-rated clothing and hats and sunglasses be worn whenever possible and that a sunscreen-moisturizer combination product such as Neutrogena Daily Defense be applied at least three times a day  We performed a thorough discussion of treatment options and specific risk/benefits/alternatives including but not limited to medical “field” treatment with medications such as the following:     Topical “field area” medications such as 5-fluorouracil or Aldara (specifically, the trouble with long-term compliance, blistering and local skin reaction versus the convenience of at-home therapy and that field therapy “gets what is not yet seen”)  Cryotherapy (specifically, local pain, scarring, dyspigmentation, blistering, possible superinfection, and treats “only what we see” versus directed treatment today)  Photodynamic therapy (specifically, local pain, scarring, dyspigmentation, blistering, possible superinfection, need to schedule for a later date, and time spent in the office versus field therapy that “gets what is not yet seen”)

## 2022-11-22 DIAGNOSIS — I10 ESSENTIAL HYPERTENSION: ICD-10-CM

## 2022-11-22 RX ORDER — NIFEDIPINE 30 MG/1
TABLET, FILM COATED, EXTENDED RELEASE ORAL
Qty: 90 TABLET | Refills: 1 | Status: SHIPPED | OUTPATIENT
Start: 2022-11-22

## 2022-12-01 PROBLEM — H61.23 CERUMINOSIS, BILATERAL: Status: RESOLVED | Noted: 2022-10-02 | Resolved: 2022-12-01

## 2023-01-05 ENCOUNTER — OFFICE VISIT (OUTPATIENT)
Dept: OBGYN CLINIC | Facility: CLINIC | Age: 83
End: 2023-01-05

## 2023-01-05 ENCOUNTER — OFFICE VISIT (OUTPATIENT)
Dept: FAMILY MEDICINE CLINIC | Facility: CLINIC | Age: 83
End: 2023-01-05

## 2023-01-05 ENCOUNTER — APPOINTMENT (OUTPATIENT)
Dept: RADIOLOGY | Facility: CLINIC | Age: 83
End: 2023-01-05

## 2023-01-05 ENCOUNTER — TELEPHONE (OUTPATIENT)
Dept: OTHER | Facility: OTHER | Age: 83
End: 2023-01-05

## 2023-01-05 VITALS
WEIGHT: 122 LBS | SYSTOLIC BLOOD PRESSURE: 188 MMHG | DIASTOLIC BLOOD PRESSURE: 78 MMHG | TEMPERATURE: 98.2 F | HEIGHT: 62 IN | RESPIRATION RATE: 19 BRPM | BODY MASS INDEX: 22.45 KG/M2 | HEART RATE: 78 BPM

## 2023-01-05 VITALS
WEIGHT: 122.6 LBS | RESPIRATION RATE: 18 BRPM | TEMPERATURE: 97.5 F | SYSTOLIC BLOOD PRESSURE: 152 MMHG | HEIGHT: 62 IN | BODY MASS INDEX: 22.56 KG/M2 | DIASTOLIC BLOOD PRESSURE: 90 MMHG | HEART RATE: 66 BPM

## 2023-01-05 DIAGNOSIS — M89.8X1 CLAVICLE PAIN: ICD-10-CM

## 2023-01-05 DIAGNOSIS — S42.018A CLOSED NONDISPLACED FRACTURE OF STERNAL END OF LEFT CLAVICLE, INITIAL ENCOUNTER: Primary | ICD-10-CM

## 2023-01-05 DIAGNOSIS — M89.8X1 CLAVICLE PAIN: Primary | ICD-10-CM

## 2023-01-05 DIAGNOSIS — M89.8X1 COLLAR BONE PAIN: ICD-10-CM

## 2023-01-05 NOTE — PROGRESS NOTES
Assessment/Plan:  1  Closed nondisplaced fracture of sternal end of left clavicle, initial encounter  Sling      2  Clavicle pain  Ambulatory Referral to Orthopedic Surgery          Wai Adame appears to have a nondisplaced small clavicle fracture  This should remain stable and heal with conservative measures  I recommended use of a sling and follow-up in the office in 3 weeks for repeat x-ray and evaluation to ensure there is no further displacement  She will likely heal in the next 4 to 6 weeks and not require any extensive treatment  Subjective:   Simón Sharp is a 79 y o  female who presents to the office for evaluation for left shoulder injury  She had a fall this morning that was unwitnessed  Her daughter went to check on her who lives next door and she states she fell to her left side  She was in her family doctor's office earlier today and there was concern for possible injury due to ecchymosis over the front of her chest   She had an x-ray which showed a clavicle fracture and she was referred to see us today  She has minimal discomfort and no pain at rest   She only has some pain if her clavicle is pressed  She has some slight discomfort in the shoulder with elevation of her arm  She denies any numbness or tingling rating pain down her left arm  She denies any chest pain or any shortness of breath or pain with deep inspiration  Review of Systems   Constitutional: Negative for chills, fever and unexpected weight change  HENT: Negative for hearing loss, nosebleeds and sore throat  Eyes: Negative for pain, redness and visual disturbance  Respiratory: Negative for cough, shortness of breath and wheezing  Cardiovascular: Negative for chest pain, palpitations and leg swelling  Gastrointestinal: Negative for abdominal pain, nausea and vomiting  Endocrine: Negative for polydipsia and polyuria  Genitourinary: Negative for dysuria and hematuria     Musculoskeletal:        See HPI Skin: Negative for rash and wound  Neurological: Negative for dizziness, numbness and headaches  Psychiatric/Behavioral: Negative for decreased concentration and suicidal ideas  The patient is not nervous/anxious  Past Medical History:   Diagnosis Date   • Allergic    • [facility]) 2005    left breast mastectomy   • Hypertension    • Memory change    • Nodule of left lung    • Pleural thickening        Past Surgical History:   Procedure Laterality Date   • CATARACT EXTRACTION Left 05/2020   • CATARACT EXTRACTION Left 06/2020   • MASTECTOMY     • CA XCAPSL CTRC RMVL INSJ IO LENS PROSTH W/O ECP Left 6/8/2020    Procedure: EXTRACTION EXTRACAPSULAR CATARACT PHACO INTRAOCULAR LENS (IOL); Surgeon: Radha Land MD;  Location: [facility] MAIN OR;  Service: Ophthalmology       No family history on file      Social History     Occupational History   • Not on file   Tobacco Use   • Smoking status: Never   • Smokeless tobacco: Never   Vaping Use   • Vaping Use: Never used   Substance and Sexual Activity   • Alcohol use: Never   • Drug use: Never   • Sexual activity: Not Currently         Current Outpatient Medications:   •  Ascorbic Acid (Vitamin C) 250 MG CHEW, , Disp: , Rfl:   •  cholecalciferol (VITAMIN D3) 1,000 units tablet, Take 1,000 Units by mouth daily, Disp: , Rfl:   •  cyanocobalamin (VITAMIN B-12) 100 mcg tablet, Take by mouth daily, Disp: , Rfl:   •  Donepezil HCl 10 MG/DAY PTWK, , Disp: , Rfl:   •  Fexofenadine HCl (MUCINEX ALLERGY PO), Take by mouth as needed, Disp: , Rfl:   •  loratadine (CLARITIN) 10 mg tablet, Take 10 mg by mouth daily, Disp: , Rfl:   •  metoprolol tartrate (LOPRESSOR) 50 mg tablet, take 1 tablet by mouth twice a day, Disp: 60 tablet, Rfl: 3  •  Multiple Vitamins-Minerals (ONE-A-DAY 50 PLUS PO), , Disp: , Rfl:   •  Multiple Vitamins-Minerals (PRESERVISION AREDS 2 PO), Take by mouth, Disp: , Rfl:   •  NIFEdipine ER (ADALAT CC) 30 MG 24 hr tablet, take 1 tablet by mouth once daily, Disp: 90 tablet, Rfl: 1    Allergies   Allergen Reactions   • Ibuprofen      Reaction Date: 10Aug2005; Objective:  Vitals:    01/05/23 1427   BP: (!) 188/78   Pulse: 78   Resp: 19   Temp: 98 2 °F (36 8 °C)       Left Shoulder Exam     Tenderness   Left shoulder tenderness location: Tenderness palpation over proximal left clavicle  Range of Motion   Active abduction:  90 abnormal   Forward flexion:  90 abnormal     Other   Erythema: absent  Sensation: normal  Pulse: present     Comments:  Mild ecchymosis over anterior left chest just underneath clavicle swelling            Physical Exam  Vitals and nursing note reviewed  Constitutional:       Appearance: She is well-developed  HENT:      Head: Normocephalic and atraumatic  Eyes:      General: No scleral icterus  Extraocular Movements: Extraocular movements intact  Conjunctiva/sclera: Conjunctivae normal    Cardiovascular:      Rate and Rhythm: Normal rate  Pulmonary:      Effort: Pulmonary effort is normal  No respiratory distress  Musculoskeletal:      Cervical back: Normal range of motion and neck supple  Comments: As noted in HPI   Skin:     General: Skin is warm and dry  Neurological:      Mental Status: She is alert and oriented to person, place, and time  Psychiatric:         Behavior: Behavior normal          I have personally reviewed pertinent films in PACS and my interpretation is as follows:  X-rays of the left clavicle demonstrate a vertical lucency in the proximal clavicle consistent with nondisplaced fracture      This document was created using speech voice recognition software  Grammatical errors, random word insertions, pronoun errors, and incomplete sentences are an occasional consequence of this system due to software limitations, ambient noise, and hardware issues     Any formal questions or concerns about content, text, or information contained within the body of this dictation should be directly addressed to the provider for clarification

## 2023-01-05 NOTE — PROGRESS NOTES
Chief Complaint   Patient presents with   • collar bone     Pt fell during the night and the left side of her collar bone is swollen and some pain         Patient ID: Ivonne Diaz is a 79 y o  female  HPI  Pt was brought in by her daughter for L collar bone deformity after the fall last night  -  No SOB  -  No CP -  No therapy tried     The following portions of the patient's history were reviewed and updated as appropriate: allergies, current medications, past family history, past medical history, past social history, past surgical history and problem list     Review of Systems   Constitutional: Negative  HENT: Negative  Respiratory: Negative  Cardiovascular: Negative  Gastrointestinal: Negative  Current Outpatient Medications   Medication Sig Dispense Refill   • Ascorbic Acid (Vitamin C) 250 MG CHEW      • cholecalciferol (VITAMIN D3) 1,000 units tablet Take 1,000 Units by mouth daily     • cyanocobalamin (VITAMIN B-12) 100 mcg tablet Take by mouth daily     • Donepezil HCl 10 MG/DAY PTWK 5 mg     • Fexofenadine HCl (MUCINEX ALLERGY PO) Take by mouth as needed     • loratadine (CLARITIN) 10 mg tablet Take 10 mg by mouth daily     • metoprolol tartrate (LOPRESSOR) 50 mg tablet take 1 tablet by mouth twice a day 60 tablet 3   • Multiple Vitamins-Minerals (ONE-A-DAY 50 PLUS PO)      • NIFEdipine ER (ADALAT CC) 30 MG 24 hr tablet take 1 tablet by mouth once daily 90 tablet 1     No current facility-administered medications for this visit  Objective:    /90   Pulse 66   Temp 97 5 °F (36 4 °C)   Resp 18   Ht 5' 1 5" (1 562 m)   Wt 55 6 kg (122 lb 9 6 oz)   BMI 22 79 kg/m²        Physical Exam  Constitutional:       Appearance: She is not ill-appearing  Pulmonary:      Effort: Pulmonary effort is normal  No respiratory distress  Breath sounds: No wheezing or rales  Chest:      Chest wall: Deformity present  Neurological:      General: No focal deficit present  Mental Status: She is alert  Psychiatric:         Mood and Affect: Mood normal                  Assessment/Plan:         Diagnoses and all orders for this visit:    Clavicle pain  -     XR clavicle left; Future  -     Ambulatory Referral to Orthopedic Surgery;  Future    Collar bone pain    Other orders  -     Discontinue: Donepezil HCl 10 MG/DAY PTWK; 5 mg          rto prn                 Kyler Simmons MD

## 2023-01-05 NOTE — TELEPHONE ENCOUNTER
Patient fell in her house this morning and injured her shoulder and collar bone  Daughter would like to either set up a appointment or maybe get orders to have it x-rayed

## 2023-01-06 ENCOUNTER — TELEPHONE (OUTPATIENT)
Dept: FAMILY MEDICINE CLINIC | Facility: CLINIC | Age: 83
End: 2023-01-06

## 2023-01-06 NOTE — TELEPHONE ENCOUNTER
Dr Kaley Lee    When patient was seen by Dr Magy Madison and orthopedist yesterday, her bp was elevated at both visits- 185/90 range  Daughter wants to know if patient should resume hydrochlorothiazide med, she still has a rx for this med    Please advise  I advised that Dr Kaley Lee is out of the office at this time

## 2023-01-09 ENCOUNTER — OFFICE VISIT (OUTPATIENT)
Dept: FAMILY MEDICINE CLINIC | Facility: CLINIC | Age: 83
End: 2023-01-09

## 2023-01-09 ENCOUNTER — OFFICE VISIT (OUTPATIENT)
Dept: DERMATOLOGY | Age: 83
End: 2023-01-09

## 2023-01-09 VITALS — TEMPERATURE: 98.3 F | BODY MASS INDEX: 22.92 KG/M2 | HEIGHT: 61 IN | WEIGHT: 121.4 LBS

## 2023-01-09 VITALS
DIASTOLIC BLOOD PRESSURE: 78 MMHG | BODY MASS INDEX: 22.63 KG/M2 | WEIGHT: 123 LBS | RESPIRATION RATE: 16 BRPM | SYSTOLIC BLOOD PRESSURE: 138 MMHG | TEMPERATURE: 97.9 F | HEART RATE: 62 BPM | HEIGHT: 62 IN

## 2023-01-09 DIAGNOSIS — R82.90 BAD ODOR OF URINE: ICD-10-CM

## 2023-01-09 DIAGNOSIS — F03.918 DEMENTIA WITH BEHAVIORAL DISTURBANCE: Primary | ICD-10-CM

## 2023-01-09 DIAGNOSIS — C50.911 MALIGNANT NEOPLASM OF RIGHT FEMALE BREAST, UNSPECIFIED ESTROGEN RECEPTOR STATUS, UNSPECIFIED SITE OF BREAST (HCC): ICD-10-CM

## 2023-01-09 DIAGNOSIS — R73.03 PREDIABETES: ICD-10-CM

## 2023-01-09 DIAGNOSIS — L57.0 ACTINIC KERATOSIS: Primary | ICD-10-CM

## 2023-01-09 DIAGNOSIS — I10 BENIGN ESSENTIAL HYPERTENSION: ICD-10-CM

## 2023-01-09 DIAGNOSIS — Z78.0 POSTMENOPAUSAL: ICD-10-CM

## 2023-01-09 DIAGNOSIS — K21.00 GASTROESOPHAGEAL REFLUX DISEASE WITH ESOPHAGITIS WITHOUT HEMORRHAGE: ICD-10-CM

## 2023-01-09 PROBLEM — Z23 NEED FOR PNEUMOCOCCAL VACCINATION: Status: RESOLVED | Noted: 2020-10-26 | Resolved: 2023-01-09

## 2023-01-09 LAB
SL AMB  POCT GLUCOSE, UA: NORMAL
SL AMB LEUKOCYTE ESTERASE,UA: NORMAL
SL AMB POCT BILIRUBIN,UA: NORMAL
SL AMB POCT BLOOD,UA: NORMAL
SL AMB POCT CLARITY,UA: CLEAR
SL AMB POCT COLOR,UA: YELLOW
SL AMB POCT KETONES,UA: NORMAL
SL AMB POCT NITRITE,UA: NORMAL
SL AMB POCT PH,UA: 7
SL AMB POCT SPECIFIC GRAVITY,UA: 1.01
SL AMB POCT URINE PROTEIN: NORMAL
SL AMB POCT UROBILINOGEN: NORMAL

## 2023-01-09 RX ORDER — RISPERIDONE 0.5 MG/1
0.5 TABLET ORAL 2 TIMES DAILY
Qty: 60 TABLET | Refills: 1 | Status: SHIPPED | OUTPATIENT
Start: 2023-01-09 | End: 2023-01-26

## 2023-01-09 RX ORDER — FAMOTIDINE 40 MG/1
40 TABLET, FILM COATED ORAL
Qty: 30 TABLET | Refills: 1 | Status: SHIPPED | OUTPATIENT
Start: 2023-01-09

## 2023-01-09 RX ORDER — DONEPEZIL HYDROCHLORIDE 10 MG/1
10 TABLET, FILM COATED ORAL
Qty: 30 TABLET | Refills: 1 | Status: SHIPPED | OUTPATIENT
Start: 2023-01-09

## 2023-01-09 NOTE — PROGRESS NOTES
[facility] [address] [facility] Note     Patient Name: Ludwin Ledezma  Encounter Date: 1/9/23     Have you been cared for by a [place] [facility] [address] D[facility]atologist in the last 3 years and, if so, which description applies to you? Yes  I have been here within the last 3 years, and my medical history has NOT changed since that time  I am FEMALE/of child-bearing potential     REVIEW OF SYSTEMS:  Have you recently had or currently have any of the following? · No changes in my recent health  PAST MEDICAL HISTORY:  Have you personally ever had or currently have any of the following? If "YES," then please provide more detail  · No changes in my medical history  FAMILY HISTORY:  Any "first degree relatives" (parent, brother, sister, or child) with the following? • No changes in my family's known health  PATIENT EXPERIENCE:    • Do you want the Dermatologist to perform a COMPLETE skin exam today including a clinical examination under the "bra and underwear" areas? NO  • If necessary, do we have your permission to call and leave a detailed message on your Preferred Phone number that includes your specific medical information?   Yes      Allergies   Allergen Reactions   • Ibuprofen      Reaction Date: 10Aug2005;       Current Outpatient Medications:   •  Ascorbic Acid (Vitamin C) 250 MG CHEW, , Disp: , Rfl:   •  cholecalciferol (VITAMIN D3) 1,000 units tablet, Take 1,000 Units by mouth daily, Disp: , Rfl:   •  cyanocobalamin (VITAMIN B-12) 100 mcg tablet, Take by mouth daily, Disp: , Rfl:   •  donepezil (ARICEPT) 10 mg tablet, Take 1 tablet (10 mg total) by mouth daily at bedtime, Disp: 30 tablet, Rfl: 1  •  famotidine (PEPCID) 40 MG tablet, Take 1 tablet (40 mg total) by mouth daily at bedtime as needed for indigestion or heartburn, Disp: 30 tablet, Rfl: 1  •  Fexofenadine HCl (MUCINEX ALLERGY PO), Take by mouth as needed, Disp: , Rfl:   •  loratadine (CLARITIN) 10 mg tablet, Take 10 mg by mouth daily, Disp: , Rfl:   • metoprolol tartrate (LOPRESSOR) 50 mg tablet, take 1 tablet by mouth twice a day, Disp: 60 tablet, Rfl: 3  •  NIFEdipine ER (ADALAT CC) 30 MG 24 hr tablet, take 1 tablet by mouth once daily, Disp: 90 tablet, Rfl: 1  •  risperiDONE (RisperDAL) 0 5 mg tablet, Take 1 tablet (0 5 mg total) by mouth 2 (two) times a day, Disp: 60 tablet, Rfl: 1  •  Multiple Vitamins-Minerals (ONE-A-DAY 50 PLUS PO), , Disp: , Rfl:           • Whom besides the patient is providing clinical information about today's encounter?   o NO ADDITIONAL HISTORIAN (patient alone provided history)   o Patient here for Actinic Keratosis follow up treated with cryotherapy in the past     Physical Exam and Assessment/Plan by Diagnosis:    ACTINIC KERATOSIS FOLLOW UP    Physical Exam:  • Anatomic Location Affected:  scalp  • Morphological Description:  Scaly pink macules, hyperkeratotic plaques    Additional History of Present Condition:  Patient here for Actinic Keratosis follow up treated with cryotherapy in the past     Assessment and Plan:lesions are isolating into individual more manageable spots  thinner ones on R frontal scalp and vertex frozen today; return in 1 month to inject thicker ones on L with 5fluorouracil  Based on a thorough discussion of this condition and the management approach to it (including a comprehensive discussion of the known risks, side effects and potential benefits of treatment), the patient (family) agrees to implement the following specific plan:    • A total of 3 lesions Treated of liquid nitrogen applied to the frontal scalp  • Follow up in 1 month to treat the 4 lesions on the scalp with the 5-FU injection  Actinic keratoses are very common on sites repeatedly exposed to the sun, especially the backs of the hands and the face, most often affecting the ears, nose, cheeks, upper lip, vermilion of the lower lip, temples, forehead and balding scalp   In severely chronically sun-damaged individuals, they may also be found on the upper trunk, upper and lower limbs, and dorsum of feet  We discussed the theoretical premalignant (“pre-cancerous”) nature and etiology of these growths  We discussed the prevailing notion that actinic keratoses are a reflection of abnormal skin cell development due to DNA damage by short wavelength UVB  They are more likely to appear if the immune function is poor, due to aging, recent sun exposure, predisposing disease or certain drugs  We discussed that the main concern is that actinic keratoses may predispose to squamous cell carcinoma  It is rare for a solitary actinic keratosis to evolve to squamous cell carcinoma (SCC), but the risk of SCC occurring at some stage in a patient with more than 10 actinic keratoses is thought to be about 10 to 15%  A tender, thickened, ulcerated or enlarging actinic keratosis is suspicious of SCC  Actinic keratoses may be prevented by strict sun protection  If already present, keratoses may improve with a very high sun protection factor (50+) broad-spectrum sunscreen applied at least daily to affected areas, year-round  We recommend that UPF-rated clothing and hats and sunglasses be worn whenever possible and that a sunscreen-moisturizer combination product such as Neutrogena Daily Defense be applied at least three times a day  We performed a thorough discussion of treatment options and specific risk/benefits/alternatives including but not limited to medical “field” treatment with medications such as the following:    • Topical “field area” medications such as 5-fluorouracil or Aldara (specifically, the trouble with long-term compliance, blistering and local skin reaction versus the convenience of at-home therapy and that field therapy “gets what is not yet seen”)  • Cryotherapy (specifically, local pain, scarring, dyspigmentation, blistering, possible superinfection, and treats “only what we see” versus directed treatment today)      • Photodynamic therapy (specifically, local pain, scarring, dyspigmentation, blistering, possible superinfection, need to schedule for a later date, and time spent in the office versus field therapy that “gets what is not yet seen”)  PROCEDURE:  DESTRUCTION OF PRE-MALIGNANT LESIONS  After a thorough discussion of treatment options and risk/benefits/alternatives (including but not limited to local pain, scarring, dyspigmentation, blistering, and possible superinfection), verbal and written consent were obtained and the aforementioned lesions were treated on with cryotherapy using liquid nitrogen x 1 cycle for 5-10 seconds  • TOTAL NUMBER of 5 pre-malignant lesions were treated today on the ANATOMIC LOCATION: frontal scalp and vertex        The patient tolerated the procedure well, and after-care instructions were provided      Scribe Attestation    I,:  Pieter Burch am acting as a scribe while in the presence of the attending physician :       I,:  Nish Chahal MD personally performed the services described in this documentation    as scribed in my presence :

## 2023-01-09 NOTE — PATIENT INSTRUCTIONS
ACTINIC KERATOSIS FOLLOW UP      Assessment and Plan:  Based on a thorough discussion of this condition and the management approach to it (including a comprehensive discussion of the known risks, side effects and potential benefits of treatment), the patient (family) agrees to implement the following specific plan:    A total of 3 lesions Treated of liquid nitrogen applied to the frontal scalp  Follow up in 1 month to treat the 4 lesions on the scalp with the 5-FU injection  Actinic keratoses are very common on sites repeatedly exposed to the sun, especially the backs of the hands and the face, most often affecting the ears, nose, cheeks, upper lip, vermilion of the lower lip, temples, forehead and balding scalp  In severely chronically sun-damaged individuals, they may also be found on the upper trunk, upper and lower limbs, and dorsum of feet  We discussed the theoretical premalignant (“pre-cancerous”) nature and etiology of these growths  We discussed the prevailing notion that actinic keratoses are a reflection of abnormal skin cell development due to DNA damage by short wavelength UVB  They are more likely to appear if the immune function is poor, due to aging, recent sun exposure, predisposing disease or certain drugs  We discussed that the main concern is that actinic keratoses may predispose to squamous cell carcinoma  It is rare for a solitary actinic keratosis to evolve to squamous cell carcinoma (SCC), but the risk of SCC occurring at some stage in a patient with more than 10 actinic keratoses is thought to be about 10 to 15%  A tender, thickened, ulcerated or enlarging actinic keratosis is suspicious of SCC  Actinic keratoses may be prevented by strict sun protection  If already present, keratoses may improve with a very high sun protection factor (50+) broad-spectrum sunscreen applied at least daily to affected areas, year-round    We recommend that UPF-rated clothing and hats and sunglasses be worn whenever possible and that a sunscreen-moisturizer combination product such as Neutrogena Daily Defense be applied at least three times a day  We performed a thorough discussion of treatment options and specific risk/benefits/alternatives including but not limited to medical “field” treatment with medications such as the following:    Topical “field area” medications such as 5-fluorouracil or Aldara (specifically, the trouble with long-term compliance, blistering and local skin reaction versus the convenience of at-home therapy and that field therapy “gets what is not yet seen”)  Cryotherapy (specifically, local pain, scarring, dyspigmentation, blistering, possible superinfection, and treats “only what we see” versus directed treatment today)  Photodynamic therapy (specifically, local pain, scarring, dyspigmentation, blistering, possible superinfection, need to schedule for a later date, and time spent in the office versus field therapy that “gets what is not yet seen”)

## 2023-01-11 ENCOUNTER — TELEPHONE (OUTPATIENT)
Dept: FAMILY MEDICINE CLINIC | Facility: CLINIC | Age: 83
End: 2023-01-11

## 2023-01-11 ENCOUNTER — APPOINTMENT (EMERGENCY)
Dept: RADIOLOGY | Facility: HOSPITAL | Age: 83
End: 2023-01-11

## 2023-01-11 ENCOUNTER — HOSPITAL ENCOUNTER (EMERGENCY)
Facility: HOSPITAL | Age: 83
Discharge: HOME | End: 2023-01-12
Attending: EMERGENCY MEDICINE

## 2023-01-11 DIAGNOSIS — Z91.81 HISTORY OF FALL: Primary | ICD-10-CM

## 2023-01-11 DIAGNOSIS — R41.0 CONFUSION: ICD-10-CM

## 2023-01-11 DIAGNOSIS — D32.9 MENINGIOMA (HCC): ICD-10-CM

## 2023-01-11 LAB
ALBUMIN SERPL BCP-MCNC: 3.4 G/DL (ref 3.5–5)
ALP SERPL-CCNC: 90 U/L (ref 46–116)
ALT SERPL W P-5'-P-CCNC: 21 U/L (ref 12–78)
ANION GAP SERPL CALCULATED.3IONS-SCNC: 7 MMOL/L (ref 4–13)
AST SERPL W P-5'-P-CCNC: 21 U/L (ref 5–45)
BACTERIA UR QL AUTO: ABNORMAL /HPF
BASOPHILS # BLD AUTO: 0.07 THOUSANDS/ÂΜL (ref 0–0.1)
BASOPHILS NFR BLD AUTO: 1 % (ref 0–1)
BILIRUB SERPL-MCNC: 0.22 MG/DL (ref 0.2–1)
BILIRUB UR QL STRIP: NEGATIVE
BUN SERPL-MCNC: 27 MG/DL (ref 5–25)
CALCIUM ALBUM COR SERPL-MCNC: 9.7 MG/DL (ref 8.3–10.1)
CALCIUM SERPL-MCNC: 9.2 MG/DL (ref 8.3–10.1)
CHLORIDE SERPL-SCNC: 101 MMOL/L (ref 96–108)
CLARITY UR: CLEAR
CO2 SERPL-SCNC: 34 MMOL/L (ref 21–32)
COLOR UR: ABNORMAL
CREAT SERPL-MCNC: 0.73 MG/DL (ref 0.6–1.3)
EOSINOPHIL # BLD AUTO: 0.23 THOUSAND/ÂΜL (ref 0–0.61)
EOSINOPHIL NFR BLD AUTO: 4 % (ref 0–6)
ERYTHROCYTE [DISTWIDTH] IN BLOOD BY AUTOMATED COUNT: 12.9 % (ref 11.6–15.1)
FLUAV RNA RESP QL NAA+PROBE: NEGATIVE
FLUBV RNA RESP QL NAA+PROBE: NEGATIVE
GFR SERPL CREATININE-BSD FRML MDRD: 76 ML/MIN/1.73SQ M
GLUCOSE SERPL-MCNC: 90 MG/DL (ref 65–140)
GLUCOSE UR STRIP-MCNC: NEGATIVE MG/DL
HCT VFR BLD AUTO: 32.6 % (ref 34.8–46.1)
HGB BLD-MCNC: 10.6 G/DL (ref 11.5–15.4)
HGB UR QL STRIP.AUTO: NEGATIVE
IMM GRANULOCYTES # BLD AUTO: 0.01 THOUSAND/UL (ref 0–0.2)
IMM GRANULOCYTES NFR BLD AUTO: 0 % (ref 0–2)
KETONES UR STRIP-MCNC: NEGATIVE MG/DL
LEUKOCYTE ESTERASE UR QL STRIP: ABNORMAL
LYMPHOCYTES # BLD AUTO: 1.77 THOUSANDS/ÂΜL (ref 0.6–4.47)
LYMPHOCYTES NFR BLD AUTO: 34 % (ref 14–44)
MAGNESIUM SERPL-MCNC: 1.9 MG/DL (ref 1.6–2.6)
MCH RBC QN AUTO: 32.8 PG (ref 26.8–34.3)
MCHC RBC AUTO-ENTMCNC: 32.5 G/DL (ref 31.4–37.4)
MCV RBC AUTO: 101 FL (ref 82–98)
MONOCYTES # BLD AUTO: 0.82 THOUSAND/ÂΜL (ref 0.17–1.22)
MONOCYTES NFR BLD AUTO: 16 % (ref 4–12)
NEUTROPHILS # BLD AUTO: 2.29 THOUSANDS/ÂΜL (ref 1.85–7.62)
NEUTS SEG NFR BLD AUTO: 45 % (ref 43–75)
NITRITE UR QL STRIP: NEGATIVE
NON-SQ EPI CELLS URNS QL MICRO: ABNORMAL /HPF
NRBC BLD AUTO-RTO: 0 /100 WBCS
PH UR STRIP.AUTO: 7 [PH]
PLATELET # BLD AUTO: 237 THOUSANDS/UL (ref 149–390)
PMV BLD AUTO: 9.8 FL (ref 8.9–12.7)
POTASSIUM SERPL-SCNC: 3.7 MMOL/L (ref 3.5–5.3)
PROT SERPL-MCNC: 7.1 G/DL (ref 6.4–8.4)
PROT UR STRIP-MCNC: NEGATIVE MG/DL
RBC # BLD AUTO: 3.23 MILLION/UL (ref 3.81–5.12)
RBC #/AREA URNS AUTO: ABNORMAL /HPF
RSV RNA RESP QL NAA+PROBE: NEGATIVE
SARS-COV-2 RNA RESP QL NAA+PROBE: NEGATIVE
SODIUM SERPL-SCNC: 142 MMOL/L (ref 135–147)
SP GR UR STRIP.AUTO: 1.01 (ref 1–1.03)
TSH SERPL DL<=0.05 MIU/L-ACNC: 3.24 UIU/ML (ref 0.45–4.5)
UROBILINOGEN UR QL STRIP.AUTO: 0.2 E.U./DL
WBC # BLD AUTO: 5.19 THOUSAND/UL (ref 4.31–10.16)
WBC #/AREA URNS AUTO: ABNORMAL /HPF

## 2023-01-11 NOTE — TELEPHONE ENCOUNTER
DR Christopher Doe    Patient started mood medication 24 hours ago   Daughter would like you to call her to discuss patient's behavior, things she is saying, waking bent over etc

## 2023-01-12 VITALS
DIASTOLIC BLOOD PRESSURE: 96 MMHG | OXYGEN SATURATION: 95 % | SYSTOLIC BLOOD PRESSURE: 180 MMHG | TEMPERATURE: 97.4 F | HEART RATE: 66 BPM | RESPIRATION RATE: 16 BRPM

## 2023-01-12 LAB
ATRIAL RATE: 68 BPM
P AXIS: 72 DEGREES
PR INTERVAL: 168 MS
QRS AXIS: -31 DEGREES
QRSD INTERVAL: 100 MS
QT INTERVAL: 462 MS
QTC INTERVAL: 491 MS
T WAVE AXIS: 65 DEGREES
VENTRICULAR RATE: 68 BPM

## 2023-01-12 NOTE — TELEPHONE ENCOUNTER
Patient seen in ER last night  Wants to know if you saw test results   Please advise  I advised that Dr Kody Adams is out of office today

## 2023-01-12 NOTE — TELEPHONE ENCOUNTER
Spoke w dtr/family at length--?progression of dementia vs other cause w recent fall for change in mental status  Advised ED for eval--labs , ct head imaging to r/o other potential contributing factor(s)  Dtr verbalized understanding

## 2023-01-12 NOTE — ED PROVIDER NOTES
History  Chief Complaint   Patient presents with   • Elena Orta a week ago, she called family after she got up  Seen at Dr Codey Ayers office  Xray done fx clavicle  Family states decline in mental awareness since then no thinners     Patient is an 18-year-old female seen in the emergency department with daughter with concern for some confusion after a fall approximately 1 week ago  Patient and daughter explained that the patient had a fall at home approximately 1 week ago, during which time she fractured her left clavicle  Patient's daughter explains that the patient was evaluated by orthopedics, and has been using a left shoulder sling at home  Patient notes no current pain, but states that she is not sure exactly what caused her to fall at home  Patient's daughter states that the patient has been more confused over the past several days, and has also been talking about a dog that the patient's daughter had approximately 30 years ago  Patient notes no fever, chest pain, shortness of breath, abdominal pain, nausea, vomiting, weakness, numbness, tingling  Patient's daughter explains that the patient does have history of dementia, but notes worsening confusion over the past several days  Prior to Admission Medications   Prescriptions Last Dose Informant Patient Reported? Taking?    Ascorbic Acid (Vitamin C) 250 MG CHEW   Yes No   Fexofenadine HCl (MUCINEX ALLERGY PO)   Yes No   Sig: Take by mouth as needed   Multiple Vitamins-Minerals (ONE-A-DAY 50 PLUS PO)   Yes No   NIFEdipine ER (ADALAT CC) 30 MG 24 hr tablet   No No   Sig: take 1 tablet by mouth once daily   cholecalciferol (VITAMIN D3) 1,000 units tablet   Yes No   Sig: Take 1,000 Units by mouth daily   cyanocobalamin (VITAMIN B-12) 100 mcg tablet   Yes No   Sig: Take by mouth daily   donepezil (ARICEPT) 10 mg tablet   No No   Sig: Take 1 tablet (10 mg total) by mouth daily at bedtime   famotidine (PEPCID) 40 MG tablet   No No   Sig: Take 1 tablet (40 mg total) by mouth daily at bedtime as needed for indigestion or heartburn   loratadine (CLARITIN) 10 mg tablet   Yes No   Sig: Take 10 mg by mouth daily   metoprolol tartrate (LOPRESSOR) 50 mg tablet   No No   Sig: take 1 tablet by mouth twice a day   risperiDONE (RisperDAL) 0 5 mg tablet   No No   Sig: Take 1 tablet (0 5 mg total) by mouth 2 (two) times a day      Facility-Administered Medications: None       Past Medical History:   Diagnosis Date   • Allergic    • Cancer ([address] ) 2005    left breast mastectomy   • Hypertension    • Memory change    • Nodule of left lung    • Pleural thickening        Past Surgical History:   Procedure Laterality Date   • CATARACT EXTRACTION Left 05/2020   • CATARACT EXTRACTION Left 06/2020   • MASTECTOMY     • ME XCAPSL CTRC RMVL INSJ IO LENS PROSTH W/O ECP Left 6/8/2020    Procedure: EXTRACTION EXTRACAPSULAR CATARACT PHACO INTRAOCULAR LENS (IOL); Surgeon: Mariusz Hamm MD;  Location: [facility] MAIN OR;  Service: Ophthalmology       History reviewed  No pertinent family history  I have reviewed and agree with the history as documented  E-Cigarette/Vaping   • E-Cigarette Use Never User      E-Cigarette/Vaping Substances   • Nicotine No    • THC No    • CBD No      Social History     Tobacco Use   • Smoking status: Never   • Smokeless tobacco: Never   Vaping Use   • Vaping Use: Never used   Substance Use Topics   • Alcohol use: Never   • Drug use: Never       Review of Systems   Constitutional: Negative for chills and fever  History of fall   HENT: Negative for ear pain and sore throat  Eyes: Negative for pain and visual disturbance  Respiratory: Negative for cough and shortness of breath  Cardiovascular: Negative for chest pain and palpitations  Gastrointestinal: Negative for abdominal pain and vomiting  Genitourinary: Negative for decreased urine volume and difficulty urinating  Musculoskeletal: Negative for joint swelling and neck stiffness     Skin: Negative for color change and rash  Neurological: Negative for seizures and syncope  Psychiatric/Behavioral: Positive for confusion  Negative for agitation  All other systems reviewed and are negative  Physical Exam  Physical Exam  Vitals and nursing note reviewed  Constitutional:       General: She is not in acute distress  Appearance: She is well-developed  HENT:      Head: Normocephalic and atraumatic  Right Ear: External ear normal       Left Ear: External ear normal       Nose: Nose normal       Mouth/Throat:      Pharynx: Oropharynx is clear  Eyes:      General: No scleral icterus  Conjunctiva/sclera: Conjunctivae normal    Neck:      Comments: No midline C-spine tenderness or step-offs noted  Cardiovascular:      Rate and Rhythm: Normal rate and regular rhythm  Heart sounds: No murmur heard  Pulmonary:      Effort: Pulmonary effort is normal  No respiratory distress  Breath sounds: Normal breath sounds  Abdominal:      General: There is no distension  Palpations: Abdomen is soft  Tenderness: There is no abdominal tenderness  Musculoskeletal:         General: Tenderness present  No swelling  Cervical back: Neck supple  No rigidity or tenderness  Right lower leg: No edema  Left lower leg: No edema  Comments: very mild tenderness to palpation over left mid clavicle   Skin:     General: Skin is warm and dry  Capillary Refill: Capillary refill takes less than 2 seconds  Neurological:      Mental Status: She is alert  Cranial Nerves: No cranial nerve deficit  Sensory: No sensory deficit  Comments: oriented to person and place, not to time   Psychiatric:         Mood and Affect: Mood normal          Thought Content:  Thought content normal          Vital Signs  ED Triage Vitals [01/11/23 2024]   Temperature Pulse Respirations Blood Pressure SpO2   (!) 97 4 °F (36 3 °C) 70 16 138/75 94 %      Temp Source Heart Rate Source Patient Position - Orthostatic VS BP Location FiO2 (%)   Tympanic Monitor Sitting Right arm --      Pain Score       No Pain           Vitals:    01/11/23 2024 01/11/23 2207 01/11/23 2329   BP: 138/75 (!) 179/86 168/86   Pulse: 70 64 62   Patient Position - Orthostatic VS: Sitting           Visual Acuity  Visual Acuity    Flowsheet Row Most Recent Value   L Pupil Size (mm) 3   R Pupil Size (mm) 3          ED Medications  Medications - No data to display    Diagnostic Studies  Results Reviewed     Procedure Component Value Units Date/Time    Urine culture [[id number]] Collected: 01/11/23 2234    Lab Status: In process Specimen: Urine, Clean Catch Updated: 01/11/23 2328    COVID/FLU/RSV [[id number]]  (Normal) Collected: 01/11/23 2214    Lab Status: Final result Specimen: Nares from Nose Updated: 01/11/23 2302     SARS-CoV-2 Negative     INFLUENZA A PCR Negative     INFLUENZA B PCR Negative     RSV PCR Negative    Narrative:      FOR PEDIATRIC PATIENTS - copy/paste COVID Guidelines URL to browser: [email or web address]  UUSEEx    SARS-CoV-2 assay is a Nucleic Acid Amplification assay intended for the  qualitative detection of nucleic acid from SARS-CoV-2 in nasopharyngeal  swabs  Results are for the presumptive identification of SARS-CoV-2 RNA  Positive results are indicative of infection with SARS-CoV-2, the virus  causing COVID-19, but do not rule out bacterial infection or co-infection  with other viruses  Laboratories within the [place] and its  territories are required to report all positive results to the appropriate  public health authorities  Negative results do not preclude SARS-CoV-2  infection and should not be used as the sole basis for treatment or other  patient management decisions  Negative results must be combined with  clinical observations, patient history, and epidemiological information  This test has not been FDA cleared or approved      This test has been authorized by FDA under an Emergency Use Authorization  (EUA)  This test is only authorized for the duration of time the  declaration that circumstances exist justifying the authorization of the  emergency use of an in vitro diagnostic tests for detection of SARS-CoV-2  virus and/or diagnosis of COVID-19 infection under section 564(b)(1) of  the Act, 21 U  S C  536MPJ-4(J)(2), unless the authorization is terminated  or revoked sooner  The test has been validated but independent review by FDA  and CLIA is pending  Test performed using [employer] GeneXpert: This RT-PCR assay targets N2,  a region unique to SARS-CoV-2  A conserved region in the E-gene was chosen  for pan-Sarbecovirus detection which includes SARS-CoV-2  According to CMS-2020-01-R, this platform meets the definition of high-throughput technology  Magnesium [[id number]]  (Normal) Collected: 01/11/23 2214    Lab Status: Final result Specimen: Blood from Arm, Right Updated: 01/11/23 2259     Magnesium 1 9 mg/dL     TSH [[id number]]  (Normal) Collected: 01/11/23 2214    Lab Status: Final result Specimen: Blood from Arm, Right Updated: 01/11/23 2259     TSH 3RD GENERATON 3 237 uIU/mL     Narrative:      Patients undergoing fluorescein dye angiography may retain small amounts of fluorescein in the body for 48-72 hours post procedure  Samples containing fluorescein can produce falsely depressed TSH values  If the patient had this procedure,a specimen should be resubmitted post fluorescein clearance        Comprehensive metabolic panel [[id number]]  (Abnormal) Collected: 01/11/23 2214    Lab Status: Final result Specimen: Blood from Arm, Right Updated: 01/11/23 2251     Sodium 142 mmol/L      Potassium 3 7 mmol/L      Chloride 101 mmol/L      CO2 34 mmol/L      ANION GAP 7 mmol/L      BUN 27 mg/dL      Creatinine 0 73 mg/dL      Glucose 90 mg/dL      Calcium 9 2 mg/dL      Corrected Calcium 9 7 mg/dL      AST 21 U/L      ALT 21 U/L      Alkaline Phosphatase 90 U/L      Total Protein 7 1 g/dL      Albumin 3 4 g/dL      Total Bilirubin 0 22 mg/dL      eGFR 76 ml/min/1 73sq m     Narrative:      [place] guidelines for Chronic Kidney Disease (CKD):   •  Stage 1 with normal or high GFR (GFR > 90 mL/min/1 73 square meters)  •  Stage 2 Mild CKD (GFR = 60-89 mL/min/1 73 square meters)  •  Stage 3A Moderate CKD (GFR = 45-59 mL/min/1 73 square meters)  •  Stage 3B Moderate CKD (GFR = 30-44 mL/min/1 73 square meters)  •  Stage 4 Severe CKD (GFR = 15-29 mL/min/1 73 square meters)  •  Stage 5 End Stage CKD (GFR <15 mL/min/1 73 square meters)  Note: GFR calculation is accurate only with a steady state creatinine    Urine Microscopic [[id number]]  (Abnormal) Collected: 01/11/23 2234    Lab Status: Final result Specimen: Urine, Clean Catch Updated: 01/11/23 2249     RBC, UA None Seen /hpf      WBC, UA 4-10 /hpf      Epithelial Cells None Seen /hpf      Bacteria, UA Occasional /hpf     UA w Reflex to Microscopic w Reflex to Culture [[id number]]  (Abnormal) Collected: 01/11/23 2234    Lab Status: Final result Specimen: Urine, Clean Catch Updated: 01/11/23 2241     Color, UA Light Yellow     Clarity, UA Clear     Specific Gravity, UA 1 015     pH, UA 7 0     Leukocytes, UA Small     Nitrite, UA Negative     Protein, UA Negative mg/dl      Glucose, UA Negative mg/dl      Ketones, UA Negative mg/dl      Urobilinogen, UA 0 2 E U /dl      Bilirubin, UA Negative     Occult Blood, UA Negative    CBC and differential [[id number]]  (Abnormal) Collected: 01/11/23 2214    Lab Status: Final result Specimen: Blood from Arm, Right Updated: 01/11/23 2223     WBC 5 19 Thousand/uL      RBC 3 23 Million/uL      Hemoglobin 10 6 g/dL      Hematocrit 32 6 %       fL      MCH 32 8 pg      MCHC 32 5 g/dL      RDW 12 9 %      MPV 9 8 fL      Platelets 393 Thousands/uL      nRBC 0 /100 WBCs      Neutrophils Relative 45 %      Immat GRANS % 0 %      Lymphocytes Relative 34 %      Monocytes Relative 16 %      Eosinophils Relative 4 %      Basophils Relative 1 %      Neutrophils Absolute 2 29 Thousands/µL      Immature Grans Absolute 0 01 Thousand/uL      Lymphocytes Absolute 1 77 Thousands/µL      Monocytes Absolute 0 82 Thousand/µL      Eosinophils Absolute 0 23 Thousand/µL      Basophils Absolute 0 07 Thousands/µL                  XR chest 1 view   ED Interpretation by Rashida Diaz MD (01/11 1145)   No infiltrate or pneumothorax      CT head without contrast    (Results Pending)   CT spine cervical without contrast    (Results Pending)              Procedures  ECG 12 Lead Documentation Only    Date/Time: 1/11/2023 10:29 PM  Performed by: Rashida Diaz MD  Authorized by: Rashida Diaz MD     Indications / Diagnosis:  Fall  ECG reviewed by me, the ED Provider: yes    Patient location:  ED  Rate:     ECG rate:  68    ECG rate assessment: normal    Rhythm:     Rhythm: sinus rhythm    QRS:     QRS axis:  Left  ST segments:     ST segments:  Non-specific  T waves:     T waves: non-specific    Comments:      Normal sinus rhythm at 68, left axis deviation, , , QTc 491, nonspecific ST-T wave abnormality, no definite evidence of acute ischemia             ED Course                                             Medical Decision Making  Patient is an 58-year-old female seen in the emergency department with concern for increasing confusion following a fall approximately 1 week ago  EKG was obtained and noted  Chest x-ray showed no infiltrate or pneumothorax  CT head and cervical spine showed no acute intracranial abnormality or fracture, but showed an 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  COVID-19 test was ordered in the emergency department   Laboratory evaluation remarkable for elevated CO2 of 34, elevated BUN of 27, low albumin of 3 4, urinalysis positive for small leukocytes, 4-10 white blood cells, occasional bacteria, low red blood cell count of 3 23, low hemoglobin of 10 6, low hematocrit of 32 6, elevated MCV of 101  Patient denies urinary symptoms  There is no evidence of acute intracranial hemorrhage, acute electrolyte abnormality, acute infection, or other definite etiology for the patient's increasing confusion  Patient's symptoms might be due to worsening dementia  Patient's daughter states that she has been staying with the patient at home, and is comfortable with being discharged home for outpatient follow-up  Plan to have patient follow up with PCP/outpatient providers  Patient stable for discharge home  Discharge instructions were reviewed with patient/family  Confusion: acute illness or injury  History of fall: acute illness or injury  Meningioma [facility]): acute illness or injury  Amount and/or Complexity of Data Reviewed  Labs: ordered  Radiology: ordered and independent interpretation performed  ECG/medicine tests: ordered and independent interpretation performed  Disposition  Final diagnoses:   History of fall   Confusion   Meningioma ([facility] [address] )     Time reflects when diagnosis was documented in both MDM as applicable and the Disposition within this note     Time User Action Codes Description Comment    1/11/2023 10:11 PM Kashif Bolton Add [Z91 81] History of fall     1/11/2023 10:11 PM Kashif Bolton Add [R41 0] Confusion     1/12/2023  1:01 AM Kashif Bolton Add [D32 9] Meningioma [facility])       ED Disposition     ED Disposition   Discharge    Condition   Stable    Date/Time   Thu Jan 12, 2023  1:01 AM    Comment   Chris Ramos discharge to home/self care                 Follow-up Information     Follow up With Specialties Details Why Contact Info Additional Information    Corinna Madden MD Family Medicine Call in 1 day  [address] [facility] [address]  [address] Neuro[facility] Call in 1 day  [address] [address]  [address] [place] [address] [facility] Alanis Mack [address] [place] [address], [id number] [phone]          Patient's Medications   Discharge Prescriptions    No medications on file           PDMP Review     None          ED Provider  Electronically Signed by           Ed Blair MD  01/12/23 0113

## 2023-01-12 NOTE — DISCHARGE INSTRUCTIONS
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

## 2023-01-13 ENCOUNTER — TELEMEDICINE (OUTPATIENT)
Dept: FAMILY MEDICINE CLINIC | Facility: CLINIC | Age: 83
End: 2023-01-13

## 2023-01-13 VITALS — HEART RATE: 68 BPM | TEMPERATURE: 96.8 F | DIASTOLIC BLOOD PRESSURE: 80 MMHG | SYSTOLIC BLOOD PRESSURE: 138 MMHG

## 2023-01-13 DIAGNOSIS — Z87.81 H/O CLAVICLE FRACTURE: ICD-10-CM

## 2023-01-13 DIAGNOSIS — F03.918 DEMENTIA WITH BEHAVIORAL DISTURBANCE: Primary | ICD-10-CM

## 2023-01-13 DIAGNOSIS — R91.1 PULMONARY NODULE: ICD-10-CM

## 2023-01-13 DIAGNOSIS — D32.9 MENINGIOMA (HCC): ICD-10-CM

## 2023-01-13 DIAGNOSIS — Z85.3 HISTORY OF BREAST CANCER: ICD-10-CM

## 2023-01-14 LAB — BACTERIA UR CULT: NORMAL

## 2023-01-24 DIAGNOSIS — I10 BENIGN ESSENTIAL HYPERTENSION: Primary | ICD-10-CM

## 2023-01-24 RX ORDER — HYDROCHLOROTHIAZIDE 25 MG/1
TABLET ORAL
Qty: 30 TABLET | Refills: 1 | Status: SHIPPED | OUTPATIENT
Start: 2023-01-24 | End: 2023-05-09

## 2023-01-25 ENCOUNTER — TELEPHONE (OUTPATIENT)
Dept: FAMILY MEDICINE CLINIC | Facility: CLINIC | Age: 83
End: 2023-01-25

## 2023-01-25 NOTE — TELEPHONE ENCOUNTER
Dr Roxana Sousa    Patient's daughter says patient was very  agitated and confused last night barely slept  Temperature was 99 earlier, now 98 7  Please advise

## 2023-01-26 DIAGNOSIS — F41.9 ANXIETY: ICD-10-CM

## 2023-01-26 DIAGNOSIS — F03.918 DEMENTIA WITH BEHAVIORAL DISTURBANCE (HCC): Primary | ICD-10-CM

## 2023-01-26 DIAGNOSIS — G47.00 INSOMNIA, UNSPECIFIED TYPE: ICD-10-CM

## 2023-01-26 RX ORDER — LORAZEPAM 1 MG/1
0.5 TABLET ORAL 2 TIMES DAILY PRN
Qty: 30 TABLET | Refills: 0 | Status: SHIPPED | OUTPATIENT
Start: 2023-01-26 | End: 2023-02-24 | Stop reason: SDUPTHER

## 2023-01-27 ENCOUNTER — OFFICE VISIT (OUTPATIENT)
Dept: OBGYN CLINIC | Facility: CLINIC | Age: 83
End: 2023-01-27

## 2023-01-27 ENCOUNTER — APPOINTMENT (OUTPATIENT)
Dept: RADIOLOGY | Facility: CLINIC | Age: 83
End: 2023-01-27

## 2023-01-27 VITALS
BODY MASS INDEX: 22.84 KG/M2 | DIASTOLIC BLOOD PRESSURE: 73 MMHG | HEART RATE: 86 BPM | HEIGHT: 61 IN | WEIGHT: 121 LBS | SYSTOLIC BLOOD PRESSURE: 138 MMHG

## 2023-01-27 DIAGNOSIS — S42.018A CLOSED NONDISPLACED FRACTURE OF STERNAL END OF LEFT CLAVICLE, INITIAL ENCOUNTER: ICD-10-CM

## 2023-01-27 DIAGNOSIS — S42.018D CLOSED NONDISPLACED FRACTURE OF STERNAL END OF LEFT CLAVICLE WITH ROUTINE HEALING, SUBSEQUENT ENCOUNTER: Primary | ICD-10-CM

## 2023-01-27 NOTE — PROGRESS NOTES
Assessment/Plan:  1  Closed nondisplaced fracture of sternal end of left clavicle with routine healing, subsequent encounter  XR clavicle left        Mignon Garland is doing very well today and has no significant pain on examination today  X-rays appear stable with no sign of movement of the fracture site  She has less pain over the fracture site as well  I have allowed her to come out of the sling and begin gentle range of motion on her own  She will follow-up with me in 1 month for repeat x-ray and evaluation  Subjective:   Keila Villarreal is a 79 y o  female who presents to the office for follow-up for left s medial clavicle fracture  She fell 3 weeks ago and had pain and discomfort and bruising over the left clavicle region  X-ray showed a subtle lucency concerning for clavicle fracture  She was placed in a sling for the last 3 weeks  She reports significant improvement in her pain today and no numbness or tingling down her arm  She would like to stop wearing the sling as she states she feels much better  She had some bruising that was extensive but that has all resolved at this point  Review of Systems   Constitutional: Negative for chills, fever and unexpected weight change  HENT: Negative for hearing loss, nosebleeds and sore throat  Eyes: Negative for pain, redness and visual disturbance  Respiratory: Negative for cough, shortness of breath and wheezing  Cardiovascular: Negative for chest pain, palpitations and leg swelling  Gastrointestinal: Negative for abdominal pain, nausea and vomiting  Endocrine: Negative for polydipsia and polyuria  Genitourinary: Negative for dysuria and hematuria  Musculoskeletal:        See HPI   Skin: Negative for rash and wound  Neurological: Negative for dizziness, numbness and headaches  Psychiatric/Behavioral: Negative for decreased concentration and suicidal ideas  The patient is not nervous/anxious            Past Medical History:   Diagnosis Date   • Allergic    • Cancer [facility]) 2005    left breast mastectomy   • Hypertension    • Memory change    • Nodule of left lung    • Pleural thickening        Past Surgical History:   Procedure Laterality Date   • CATARACT EXTRACTION Left 05/2020   • CATARACT EXTRACTION Left 06/2020   • MASTECTOMY     • [facility] XCAPSL CTRC RMVL INSJ IO LENS PROSTH W/O ECP Left 6/8/2020    Procedure: EXTRACTION EXTRACAPSULAR CATARACT PHACO INTRAOCULAR LENS (IOL); Surgeon: Elfego Ascencio MD;  Location: [facility] MAIN OR;  Service: Ophthalmology       History reviewed  No pertinent family history      Social History     Occupational History   • Not on file   Tobacco Use   • Smoking status: Never   • Smokeless tobacco: Never   Vaping Use   • Vaping Use: Never used   Substance and Sexual Activity   • Alcohol use: Never   • Drug use: Never   • Sexual activity: Not Currently         Current Outpatient Medications:   •  Ascorbic Acid (Vitamin C) 250 MG CHEW, , Disp: , Rfl:   •  cholecalciferol (VITAMIN D3) 1,000 units tablet, Take 1,000 Units by mouth daily, Disp: , Rfl:   •  cyanocobalamin (VITAMIN B-12) 100 mcg tablet, Take by mouth daily, Disp: , Rfl:   •  donepezil (ARICEPT) 10 mg tablet, Take 1 tablet (10 mg total) by mouth daily at bedtime, Disp: 30 tablet, Rfl: 1  •  famotidine (PEPCID) 40 MG tablet, Take 1 tablet (40 mg total) by mouth daily at bedtime as needed for indigestion or heartburn, Disp: 30 tablet, Rfl: 1  •  Fexofenadine HCl (MUCINEX ALLERGY PO), Take by mouth as needed, Disp: , Rfl:   •  hydrochlorothiazide (HYDRODIURIL) 25 mg tablet, One tab every other day, Disp: 30 tablet, Rfl: 1  •  loratadine (CLARITIN) 10 mg tablet, Take 10 mg by mouth daily, Disp: , Rfl:   •  LORazepam (ATIVAN) 1 mg tablet, Take 0 5 tablets (0 5 mg total) by mouth 2 (two) times a day as needed for anxiety or sleep, Disp: 30 tablet, Rfl: 0  •  metoprolol tartrate (LOPRESSOR) 50 mg tablet, take 1 tablet by mouth twice a day, Disp: 60 tablet, Rfl: 3  • Multiple Vitamins-Minerals (ONE-A-DAY 50 PLUS PO), , Disp: , Rfl:   •  NIFEdipine ER (ADALAT CC) 30 MG 24 hr tablet, take 1 tablet by mouth once daily, Disp: 90 tablet, Rfl: 1    Allergies   Allergen Reactions   • Ibuprofen      Reaction Date: 10Aug2005; Objective:  Vitals:    01/27/23 1028   BP: 138/73   Pulse: 86       Left Shoulder Exam     Tenderness   Left shoulder tenderness location: Minimal tenderness palpation over mid clavicle  Range of Motion   Active abduction:  110 abnormal   Passive abduction:  140 normal   Forward flexion:  110 abnormal     Other   Erythema: absent  Sensation: normal  Pulse: present             Physical Exam  Vitals and nursing note reviewed  Constitutional:       Appearance: She is well-developed  HENT:      Head: Normocephalic and atraumatic  Eyes:      General: No scleral icterus  Extraocular Movements: Extraocular movements intact  Conjunctiva/sclera: Conjunctivae normal    Cardiovascular:      Rate and Rhythm: Normal rate  Pulmonary:      Effort: Pulmonary effort is normal  No respiratory distress  Musculoskeletal:      Cervical back: Normal range of motion and neck supple  Comments: See Ortho exam   Skin:     General: Skin is warm and dry  Neurological:      Mental Status: She is alert and oriented to person, place, and time  Psychiatric:         Behavior: Behavior normal          I have personally reviewed pertinent films in PACS and my interpretation is as follows:  X-rays of the left clavicle today demonstrate stable alignment with less obvious vertically oriented fracture line  This document was created using speech voice recognition software  Grammatical errors, random word insertions, pronoun errors, and incomplete sentences are an occasional consequence of this system due to software limitations, ambient noise, and hardware issues     Any formal questions or concerns about content, text, or information contained within the body of this dictation should be directly addressed to the provider for clarification

## 2023-01-30 NOTE — PROGRESS NOTES
Assessment/Plan:    1  Dementia with behavioral disturbance  -     donepezil (ARICEPT) 10 mg tablet; Take 1 tablet (10 mg total) by mouth daily at bedtime  -     Comprehensive metabolic panel; Future  -     TSH, 3rd generation; Future  -     CBC and Platelet; Future  -     Lipid panel; Future  -     Lipase; Future  -     Hemoglobin A1C; Future    2  Benign essential hypertension  -     Comprehensive metabolic panel; Future  -     TSH, 3rd generation; Future  -     Lipid panel; Future    3  Bad odor of urine  -     POCT urine dip auto non-scope    4  Postmenopausal  -     DXA bone density spine hip and pelvis; Future; Expected date: 01/09/2023    5  Prediabetes  -     Comprehensive metabolic panel; Future  -     Lipid panel; Future  -     Lipase; Future  -     Hemoglobin A1C; Future    6  Malignant neoplasm of right female breast, unspecified estrogen receptor status, unspecified site of breast ([facility] [address] )    7  Gastroesophageal reflux disease with esophagitis without hemorrhage  -     famotidine (PEPCID) 40 MG tablet; Take 1 tablet (40 mg total) by mouth daily at bedtime as needed for indigestion or heartburn    8  BMI 22 0-22 9, adult          Subjective:      Patient ID: Flakita Fermin is a 79 y o  female  Chief Complaint   Patient presents with   • Follow-up     Confusion seeing people that have passed then she gets a clear mind , sometimes she doesn't remember her daughter , urine smells stronger   Patient fell last Thursday and broke collar bone ,bruising also complains of tail bone but she says it feels ok today   Daughter is asking about overnight help    Pt seems to be belching a lot        HPI  In w dtr, Ritu Delgado, for follow-up  Interval hx reviewed  dtr reports pt w increased confusion  Urine malodorous, no fever  UA-+leukos, otherwise negative  addtl labs ordered to further evaluate    The following portions of the patient's history were reviewed and updated as appropriate: allergies, current medications, past family history, past medical history, past social history, past surgical history and problem list     Review of Systems   Constitutional: Positive for activity change, appetite change, fatigue and fever  Respiratory: Negative  Cardiovascular: Negative  Musculoskeletal: Positive for arthralgias  Psychiatric/Behavioral: Positive for confusion, hallucinations and sleep disturbance  Current Outpatient Medications   Medication Sig Dispense Refill   • Ascorbic Acid (Vitamin C) 250 MG CHEW      • cholecalciferol (VITAMIN D3) 1,000 units tablet Take 1,000 Units by mouth daily     • cyanocobalamin (VITAMIN B-12) 100 mcg tablet Take by mouth daily     • donepezil (ARICEPT) 10 mg tablet Take 1 tablet (10 mg total) by mouth daily at bedtime 30 tablet 1   • famotidine (PEPCID) 40 MG tablet Take 1 tablet (40 mg total) by mouth daily at bedtime as needed for indigestion or heartburn 30 tablet 1   • Fexofenadine HCl (MUCINEX ALLERGY PO) Take by mouth as needed     • loratadine (CLARITIN) 10 mg tablet Take 10 mg by mouth daily     • metoprolol tartrate (LOPRESSOR) 50 mg tablet take 1 tablet by mouth twice a day 60 tablet 3   • Multiple Vitamins-Minerals (ONE-A-DAY 50 PLUS PO)      • NIFEdipine ER (ADALAT CC) 30 MG 24 hr tablet take 1 tablet by mouth once daily 90 tablet 1   • hydrochlorothiazide (HYDRODIURIL) 25 mg tablet One tab every other day 30 tablet 1   • LORazepam (ATIVAN) 1 mg tablet Take 0 5 tablets (0 5 mg total) by mouth 2 (two) times a day as needed for anxiety or sleep 30 tablet 0     No current facility-administered medications for this visit  Objective:    /78   Pulse 62   Temp 97 9 °F (36 6 °C)   Resp 16   Ht 5' 1 5" (1 562 m)   Wt 55 8 kg (123 lb)   BMI 22 86 kg/m²        Physical Exam  Vitals and nursing note reviewed  Constitutional:       General: She is not in acute distress  Appearance: Normal appearance  Eyes:      General: No scleral icterus    Cardiovascular: Rate and Rhythm: Normal rate and regular rhythm  Pulmonary:      Effort: Pulmonary effort is normal  No respiratory distress  Breath sounds: Normal breath sounds  Musculoskeletal:         General: Normal range of motion  Skin:     General: Skin is warm and dry  Coloration: Skin is not jaundiced  Neurological:      Mental Status: She is alert        Comments: No new acute focal deficit noted   Psychiatric:      Comments: Cooperative w exam           Chel Galan MD

## 2023-02-05 PROBLEM — Z87.81 H/O CLAVICLE FRACTURE: Status: ACTIVE | Noted: 2023-02-05

## 2023-02-05 PROBLEM — D32.9 MENINGIOMA (HCC): Status: ACTIVE | Noted: 2023-02-05

## 2023-02-05 PROBLEM — R91.1 PULMONARY NODULE: Status: ACTIVE | Noted: 2023-02-05

## 2023-02-06 NOTE — PROGRESS NOTES
Virtual Regular Visit    Verification of patient location:    Patient is located in the following state in which I hold an active license [place]    PT SEEN BY VIRTUAL VISIT NOT IN-OFFICE  ERRONEOUS NOTATION THAT PT LEFT W/O BEING SEEN  Assessment/Plan:    Problem List Items Addressed This Visit     Dementia with behavioral disturbance - Primary     Cont Aricept  D/c Risperdal   Ref for further geriatric/neuro assessment  H/O clavicle fracture     Cont  Ortho follow-up         History of breast cancer    Meningioma (HCC)     10mm left lateral temporal  Ref for neurosurg eval         Pulmonary nodule     Cont  surveillance-Rpt imaging 3-6 mths                 Reason for visit is   Chief Complaint   Patient presents with   • Results   • Virtual Regular Visit        Encounter provider Evan Arnold MD    Provider located at [address] [place] [id number]      Recent Visits  No visits were found meeting these conditions  Showing recent visits within past 7 days and meeting all other requirements  Future Appointments  No visits were found meeting these conditions  Showing future appointments within next 150 days and meeting all other requirements       The patient was identified by name and date of birth  Daneen Galeazzi was informed that this is a telemedicine visit and that the visit is being conducted through the telephone  Daughter w pt--agrees to proceed     My office door was closed  No one else was in the room  She acknowledged consent and understanding of privacy and security of the video platform  The patient has agreed to participate and understands they can discontinue the visit at any time  It was my intent to perform this visit via video technology but the patient was not able to do a video connection so the visit was completed via audio telephone only  Patient is aware this is a billable service       Subjective  Rambo Pollock Michael Stark is a 79 y o  female    HPI   dtr-Anitha Ze -on call w pt  Pt w hx dementia w recent increase in behavorial disturbances  Radha Charbel about a week ago--fractured left clavicle-following w ortho  dtr noting increase confusion since then-spoke on 1/11 and advised ED eval   Sent to r/o SDH, electrolyte imbalance, infection, and so on as possible contributing factors   Pt was seen and eval--records reviewed  CT head-neg for acute intracranial abnl, + chronic microangiopathic changes &incidental finding 10mm left lateral temporal meningioma  CT spine-no fx-+RUL mm lung nodule and left apical opacity  Labs--mil anemia, urien cx-mixed contaminants    Past Medical History:   Diagnosis Date   • Allergic    • Cancer ([address] ) 2005    left breast mastectomy   • Hypertension    • Memory change    • Nodule of left lung    • Pleural thickening        Past Surgical History:   Procedure Laterality Date   • CATARACT EXTRACTION Left 05/2020   • CATARACT EXTRACTION Left 06/2020   • MASTECTOMY     • NY XCAPSL CTRC RMVL INSJ IO LENS PROSTH W/O ECP Left 6/8/2020    Procedure: EXTRACTION EXTRACAPSULAR CATARACT PHACO INTRAOCULAR LENS (IOL);   Surgeon: Jason Gray MD;  [facility] MAIN OR;  Service: Ophthalmology       Current Outpatient Medications   Medication Sig Dispense Refill   • Ascorbic Acid (Vitamin C) 250 MG CHEW      • cholecalciferol (VITAMIN D3) 1,000 units tablet Take 1,000 Units by mouth daily     • cyanocobalamin (VITAMIN B-12) 100 mcg tablet Take by mouth daily     • donepezil (ARICEPT) 10 mg tablet Take 1 tablet (10 mg total) by mouth daily at bedtime 30 tablet 1   • famotidine (PEPCID) 40 MG tablet Take 1 tablet (40 mg total) by mouth daily at bedtime as needed for indigestion or heartburn 30 tablet 1   • Fexofenadine HCl (MUCINEX ALLERGY PO) Take by mouth as needed     • hydrochlorothiazide (HYDRODIURIL) 25 mg tablet One tab every other day 30 tablet 1   • loratadine (CLARITIN) 10 mg tablet Take 10 mg by mouth daily     • LORazepam (ATIVAN) 1 mg tablet Take 0 5 tablets (0 5 mg total) by mouth 2 (two) times a day as needed for anxiety or sleep 30 tablet 0   • metoprolol tartrate (LOPRESSOR) 50 mg tablet take 1 tablet by mouth twice a day 60 tablet 3   • Multiple Vitamins-Minerals (ONE-A-DAY 50 PLUS PO)      • NIFEdipine ER (ADALAT CC) 30 MG 24 hr tablet take 1 tablet by mouth once daily 90 tablet 1     No current facility-administered medications for this visit  Allergies   Allergen Reactions   • Ibuprofen      Reaction Date: 10Aug2005; Review of Systems  per hpi  Video Exam    Vitals:    01/13/23 1529   BP: 138/80   BP Location: Left arm   Patient Position: Sitting   Cuff Size: Standard   Pulse: 68   Temp: (!) 96 8 °F (36 °C)       Physical Exam   Pt resting  Joya MANN  Dtr answering questions      I spent 15 minutes directly with the patient during this visit

## 2023-02-09 ENCOUNTER — OFFICE VISIT (OUTPATIENT)
Dept: DERMATOLOGY | Age: 83
End: 2023-02-09

## 2023-02-09 VITALS — WEIGHT: 121 LBS | HEIGHT: 61 IN | BODY MASS INDEX: 22.84 KG/M2

## 2023-02-09 DIAGNOSIS — L57.0 ACTINIC KERATOSIS: Primary | ICD-10-CM

## 2023-02-09 NOTE — PATIENT INSTRUCTIONS
ACTINIC KERATOSIS FOLLOW UP     Assessment and Plan:  Based on a thorough discussion of this condition and the management approach to it (including a comprehensive discussion of the known risks, side effects and potential benefits of treatment), the patient (family) agrees to implement the following specific plan:    Cryotherapy done in office today   Fluorouracil injections done in office today   Follow up in 6 weeks     Actinic keratoses are very common on sites repeatedly exposed to the sun, especially the backs of the hands and the face, most often affecting the ears, nose, cheeks, upper lip, vermilion of the lower lip, temples, forehead and balding scalp  In severely chronically sun-damaged individuals, they may also be found on the upper trunk, upper and lower limbs, and dorsum of feet  We discussed the theoretical premalignant (“pre-cancerous”) nature and etiology of these growths  We discussed the prevailing notion that actinic keratoses are a reflection of abnormal skin cell development due to DNA damage by short wavelength UVB  They are more likely to appear if the immune function is poor, due to aging, recent sun exposure, predisposing disease or certain drugs

## 2023-02-09 NOTE — PROGRESS NOTES
[facility] Note     Patient Name: Franklyn Keene  Encounter Date: 02/09/2023     Have you been cared for by a [place] [address] Dermatologist in the last 3 years and, if so, which description applies to you? Yes  I have been here within the last 3 years, and my medical history has NOT changed since that time  I am FEMALE/of child-bearing potential     REVIEW OF SYSTEMS:  Have you recently had or currently have any of the following? · No changes in my recent health  PAST MEDICAL HISTORY:  Have you personally ever had or currently have any of the following? If "YES," then please provide more detail  · No changes in my medical history  FAMILY HISTORY:  Any "first degree relatives" (parent, brother, sister, or child) with the following? • No changes in my family's known health  PATIENT EXPERIENCE:    • Do you want the Dermatologist to perform a COMPLETE skin exam today including a clinical examination under the "bra and underwear" areas? NO  • If necessary, do we have your permission to call and leave a detailed message on your Preferred Phone number that includes your specific medical information?   Yes      Allergies   Allergen Reactions   • Ibuprofen      Reaction Date: 10Aug2005;       Current Outpatient Medications:   •  Ascorbic Acid (Vitamin C) 250 MG CHEW, , Disp: , Rfl:   •  cholecalciferol (VITAMIN D3) 1,000 units tablet, Take 1,000 Units by mouth daily, Disp: , Rfl:   •  cyanocobalamin (VITAMIN B-12) 100 mcg tablet, Take by mouth daily, Disp: , Rfl:   •  donepezil (ARICEPT) 10 mg tablet, Take 1 tablet (10 mg total) by mouth daily at bedtime, Disp: 30 tablet, Rfl: 1  •  famotidine (PEPCID) 40 MG tablet, Take 1 tablet (40 mg total) by mouth daily at bedtime as needed for indigestion or heartburn, Disp: 30 tablet, Rfl: 1  •  Fexofenadine HCl (MUCINEX ALLERGY PO), Take by mouth as needed, Disp: , Rfl:   •  hydrochlorothiazide (HYDRODIURIL) 25 mg tablet, One tab every other day, Disp: 30 tablet, Rfl: 1  •  loratadine (CLARITIN) 10 mg tablet, Take 10 mg by mouth daily, Disp: , Rfl:   •  LORazepam (ATIVAN) 1 mg tablet, Take 0 5 tablets (0 5 mg total) by mouth 2 (two) times a day as needed for anxiety or sleep, Disp: 30 tablet, Rfl: 0  •  metoprolol tartrate (LOPRESSOR) 50 mg tablet, take 1 tablet by mouth twice a day, Disp: 60 tablet, Rfl: 3  •  Multiple Vitamins-Minerals (ONE-A-DAY 50 PLUS PO), , Disp: , Rfl:   •  NIFEdipine ER (ADALAT CC) 30 MG 24 hr tablet, take 1 tablet by mouth once daily, Disp: 90 tablet, Rfl: 1          • Whom besides the patient is providing clinical information about today's encounter? o Other:  son in law     Physical Exam and Assessment/Plan by Diagnosis:    ACTINIC KERATOSIS FOLLOW UP     Physical Exam:  • Anatomic Location Affected:  Scalp   • Morphological Description:  Hyperkeratotic plaques     Additional History of Present Condition:  Previously treated with cryotherapy followed by 5-FU injections  She is present today with son in law  Both states they have gotten smaller     Assessment and Plan:  Based on a thorough discussion of this condition and the management approach to it (including a comprehensive discussion of the known risks, side effects and potential benefits of treatment), the patient (family) agrees to implement the following specific plan:    • Cryotherapy done in office today   • Fluorouracil injections done in office today   • Follow up in 6 weeks     Actinic keratoses are very common on sites repeatedly exposed to the sun, especially the backs of the hands and the face, most often affecting the ears, nose, cheeks, upper lip, vermilion of the lower lip, temples, forehead and balding scalp  In severely chronically sun-damaged individuals, they may also be found on the upper trunk, upper and lower limbs, and dorsum of feet  We discussed the theoretical premalignant (“pre-cancerous”) nature and etiology of these growths    We discussed the prevailing notion that actinic keratoses are a reflection of abnormal skin cell development due to DNA damage by short wavelength UVB  They are more likely to appear if the immune function is poor, due to aging, recent sun exposure, predisposing disease or certain drugs  We discussed that the main concern is that actinic keratoses may predispose to squamous cell carcinoma  It is rare for a solitary actinic keratosis to evolve to squamous cell carcinoma (SCC), but the risk of SCC occurring at some stage in a patient with more than 10 actinic keratoses is thought to be about 10 to 15%  A tender, thickened, ulcerated or enlarging actinic keratosis is suspicious of SCC  Actinic keratoses may be prevented by strict sun protection  If already present, keratoses may improve with a very high sun protection factor (50+) broad-spectrum sunscreen applied at least daily to affected areas, year-round  We recommend that UPF-rated clothing and hats and sunglasses be worn whenever possible and that a sunscreen-moisturizer combination product such as Neutrogena Daily Defense be applied at least three times a day  We performed a thorough discussion of treatment options and specific risk/benefits/alternatives including but not limited to medical “field” treatment with medications such as the following:    • Topical “field area” medications such as 5-fluorouracil or Aldara (specifically, the trouble with long-term compliance, blistering and local skin reaction versus the convenience of at-home therapy and that field therapy “gets what is not yet seen”)  • Cryotherapy (specifically, local pain, scarring, dyspigmentation, blistering, possible superinfection, and treats “only what we see” versus directed treatment today)      • Photodynamic therapy (specifically, local pain, scarring, dyspigmentation, blistering, possible superinfection, need to schedule for a later date, and time spent in the office versus field therapy that “gets what is not yet seen”)  PROCEDURE:  DESTRUCTION OF PRE-MALIGNANT LESIONS  After a thorough discussion of treatment options and risk/benefits/alternatives (including but not limited to local pain, scarring, dyspigmentation, blistering, and possible superinfection), verbal and written consent were obtained and the aforementioned lesions were treated on with cryotherapy using liquid nitrogen x 1 cycle for 5-10 seconds  • TOTAL NUMBER of 6 pre-malignant lesions were treated today on the ANATOMIC LOCATION: scalp   The patient tolerated the procedure well, and after-care instructions were provided  Fluorouracil 500 mg injection x 4 lesions   1 cc used today  Lot# [id number], Exp: 09/23, NDC: 06272-279-62    Scribe Attestation    I,:  Ewelina Mackay am acting as a scribe while in the presence of the attending physician :       I,:  Daryl Sexton MD personally performed the services described in this documentation    as scribed in my presence :

## 2023-02-17 DIAGNOSIS — I10 ESSENTIAL HYPERTENSION: ICD-10-CM

## 2023-02-17 RX ORDER — METOPROLOL TARTRATE 50 MG/1
TABLET, FILM COATED ORAL
Qty: 60 TABLET | Refills: 3 | Status: SHIPPED | OUTPATIENT
Start: 2023-02-17

## 2023-02-24 ENCOUNTER — TELEPHONE (OUTPATIENT)
Age: 83
End: 2023-02-24

## 2023-02-24 ENCOUNTER — OFFICE VISIT (OUTPATIENT)
Dept: FAMILY MEDICINE CLINIC | Facility: CLINIC | Age: 83
End: 2023-02-24

## 2023-02-24 ENCOUNTER — APPOINTMENT (OUTPATIENT)
Dept: LAB | Facility: CLINIC | Age: 83
End: 2023-02-24

## 2023-02-24 VITALS
BODY MASS INDEX: 23.75 KG/M2 | DIASTOLIC BLOOD PRESSURE: 64 MMHG | HEIGHT: 61 IN | TEMPERATURE: 97.6 F | WEIGHT: 125.8 LBS | SYSTOLIC BLOOD PRESSURE: 132 MMHG | RESPIRATION RATE: 16 BRPM | HEART RATE: 66 BPM

## 2023-02-24 DIAGNOSIS — F41.9 ANXIETY: ICD-10-CM

## 2023-02-24 DIAGNOSIS — F03.918 DEMENTIA WITH BEHAVIORAL DISTURBANCE: ICD-10-CM

## 2023-02-24 DIAGNOSIS — R73.03 PREDIABETES: ICD-10-CM

## 2023-02-24 DIAGNOSIS — E78.2 MIXED HYPERLIPIDEMIA: ICD-10-CM

## 2023-02-24 DIAGNOSIS — G47.00 INSOMNIA, UNSPECIFIED TYPE: ICD-10-CM

## 2023-02-24 DIAGNOSIS — F03.918 DEMENTIA WITH BEHAVIORAL DISTURBANCE: Primary | ICD-10-CM

## 2023-02-24 DIAGNOSIS — D50.8 OTHER IRON DEFICIENCY ANEMIA: ICD-10-CM

## 2023-02-24 DIAGNOSIS — I10 BENIGN ESSENTIAL HYPERTENSION: ICD-10-CM

## 2023-02-24 DIAGNOSIS — Z00.00 MEDICARE ANNUAL WELLNESS VISIT, SUBSEQUENT: ICD-10-CM

## 2023-02-24 PROBLEM — D64.9 ABSOLUTE ANEMIA: Status: ACTIVE | Noted: 2023-02-24

## 2023-02-24 LAB
ALBUMIN SERPL BCP-MCNC: 3.9 G/DL (ref 3.5–5)
ALP SERPL-CCNC: 89 U/L (ref 46–116)
ALT SERPL W P-5'-P-CCNC: 21 U/L (ref 12–78)
ANION GAP SERPL CALCULATED.3IONS-SCNC: 6 MMOL/L (ref 4–13)
AST SERPL W P-5'-P-CCNC: 17 U/L (ref 5–45)
BILIRUB SERPL-MCNC: 0.32 MG/DL (ref 0.2–1)
BUN SERPL-MCNC: 16 MG/DL (ref 5–25)
CALCIUM SERPL-MCNC: 10.1 MG/DL (ref 8.3–10.1)
CHLORIDE SERPL-SCNC: 99 MMOL/L (ref 96–108)
CHOLEST SERPL-MCNC: 214 MG/DL
CO2 SERPL-SCNC: 30 MMOL/L (ref 21–32)
CREAT SERPL-MCNC: 0.65 MG/DL (ref 0.6–1.3)
ERYTHROCYTE [DISTWIDTH] IN BLOOD BY AUTOMATED COUNT: 13.3 % (ref 11.6–15.1)
GFR SERPL CREATININE-BSD FRML MDRD: 82 ML/MIN/1.73SQ M
GLUCOSE SERPL-MCNC: 99 MG/DL (ref 65–140)
HCT VFR BLD AUTO: 40.7 % (ref 34.8–46.1)
HDLC SERPL-MCNC: 100 MG/DL
HGB BLD-MCNC: 13 G/DL (ref 11.5–15.4)
LDLC SERPL CALC-MCNC: 103 MG/DL (ref 0–100)
LIPASE SERPL-CCNC: 99 U/L (ref 73–393)
MCH RBC QN AUTO: 32.6 PG (ref 26.8–34.3)
MCHC RBC AUTO-ENTMCNC: 31.9 G/DL (ref 31.4–37.4)
MCV RBC AUTO: 102 FL (ref 82–98)
PLATELET # BLD AUTO: 264 THOUSANDS/UL (ref 149–390)
PMV BLD AUTO: 10.7 FL (ref 8.9–12.7)
POTASSIUM SERPL-SCNC: 3.7 MMOL/L (ref 3.5–5.3)
PROT SERPL-MCNC: 7.8 G/DL (ref 6.4–8.4)
RBC # BLD AUTO: 3.99 MILLION/UL (ref 3.81–5.12)
SL AMB  POCT GLUCOSE, UA: ABNORMAL
SL AMB LEUKOCYTE ESTERASE,UA: 75
SL AMB POCT BILIRUBIN,UA: ABNORMAL
SL AMB POCT BLOOD,UA: ABNORMAL
SL AMB POCT CLARITY,UA: CLEAR
SL AMB POCT COLOR,UA: YELLOW
SL AMB POCT HEMOGLOBIN AIC: 5.5 (ref ?–6.5)
SL AMB POCT KETONES,UA: ABNORMAL
SL AMB POCT NITRITE,UA: ABNORMAL
SL AMB POCT PH,UA: 7
SL AMB POCT SPECIFIC GRAVITY,UA: 1.01
SL AMB POCT URINE PROTEIN: ABNORMAL
SL AMB POCT UROBILINOGEN: ABNORMAL
SODIUM SERPL-SCNC: 135 MMOL/L (ref 135–147)
TRIGL SERPL-MCNC: 55 MG/DL
TSH SERPL DL<=0.05 MIU/L-ACNC: 2.01 UIU/ML (ref 0.45–4.5)
WBC # BLD AUTO: 4.96 THOUSAND/UL (ref 4.31–10.16)

## 2023-02-24 RX ORDER — PHENOL 1.4 %
5 AEROSOL, SPRAY (ML) MUCOUS MEMBRANE
COMMUNITY
End: 2023-02-24 | Stop reason: SDUPTHER

## 2023-02-24 RX ORDER — GUAIFENESIN 600 MG/1
1200 TABLET, EXTENDED RELEASE ORAL EVERY 12 HOURS SCHEDULED
Qty: 30 TABLET | Refills: 1 | Status: SHIPPED | OUTPATIENT
Start: 2023-02-24

## 2023-02-24 RX ORDER — PHENOL 1.4 %
10 AEROSOL, SPRAY (ML) MUCOUS MEMBRANE
Qty: 90 TABLET | Refills: 3 | Status: SHIPPED | OUTPATIENT
Start: 2023-02-24

## 2023-02-24 RX ORDER — DONEPEZIL HYDROCHLORIDE 10 MG/1
10 TABLET, FILM COATED ORAL
Qty: 90 TABLET | Refills: 3 | Status: SHIPPED | OUTPATIENT
Start: 2023-02-24

## 2023-02-24 RX ORDER — LORAZEPAM 1 MG/1
TABLET ORAL
Qty: 45 TABLET | Refills: 1 | Status: SHIPPED | OUTPATIENT
Start: 2023-02-24

## 2023-02-24 NOTE — TELEPHONE ENCOUNTER
Alyson Sol [facility]  [address] [facility], [address] [place] [address], [address] [place] [address]    [phone]    Telephone Intake: Geriatric Assessment     Referral source: Shaina Francis MD    Caller who is scheduling/relationship to pt: Oralia Wilhelm phone number: [phone]    Reason for referral: Patient concerns , Family member concerns and Provider concerns regarding memory concerns, behavior changes/concerns and home safety concerns  If there are behavioral concerns, is the pt prescribed medications to manage these? no   If so, how many? none   Has the patient ever had an inpatient psychiatric hospitalization? No,   What is the goal of the visit? initial assessment and diagnosis; Has the patient been seen by a Neurologist or Geriatrician? No, but meeting with Neurosurgery consult 3/10 due to findings on CT Scan  If yes, is this appointment for a second opinion? No  Has the patient ever been diagnosed with dementia? No      Preferred language? English  Highest education level? High School Graduate  Does the patient wear glasses? Yes   Does the patient use hearing aids? No     Is there a living will/healthcare POA in place/If so, who? Yes , Proxy, daughter's Sabine Amado and Jigna Minor    Does the pt/caregiver have access for a virtual visit (computer/smart phone with audio/video)? Yes     Caller was informed:   Please make sure the pt is accompanied by someone who knows them well / caregiver / family member to participate in this appointment  Who will accompany the pt (name and relationship)? Donavonyousif Amado, Daughter  Phone number of person accompanying pt: [phone]  Please make sure the pt attends all appointments, including the assessment, care conference, follow-up, whether in-person or virtual     Office packet mailed out to: [address] [place] [address] [id number] [facility], [address] [facility]  Added to wait list for sooner appointments?  Yes     NOTE FOR : Please route to provider for chart review prior to scheduling and let the caller know that this phone intake will be reviewed IF -  • Pt was recently hospitalized  • Pt is prescribed medications for behavior management or has a history of psychiatric hospitalization  • Pt plans to attend alone

## 2023-02-24 NOTE — PATIENT INSTRUCTIONS
Medicare Preventive Visit Patient Instructions  Thank you for completing your Welcome to Medicare Visit or Medicare Annual Wellness Visit today  Your next wellness visit will be due in one year (2/25/2024)  The screening/preventive services that you may require over the next 5-10 years are detailed below  Some tests may not apply to you based off risk factors and/or age  Screening tests ordered at today's visit but not completed yet may show as past due  Also, please note that scanned in results may not display below  Preventive Screenings:  Service Recommendations Previous Testing/Comments   Colorectal Cancer Screening  * Colonoscopy    * Fecal Occult Blood Test (FOBT)/Fecal Immunochemical Test (FIT)  * Fecal DNA/Cologuard Test  * Flexible Sigmoidoscopy Age: 39-70 years old   Colonoscopy: every 10 years (may be performed more frequently if at higher risk)  OR  FOBT/FIT: every 1 year  OR  Cologuard: every 3 years  OR  Sigmoidoscopy: every 5 years  Screening may be recommended earlier than age 39 if at higher risk for colorectal cancer  Also, an individualized decision between you and your healthcare provider will decide whether screening between the ages of 74-80 would be appropriate  Colonoscopy: Not on file  FOBT/FIT: Not on file  Cologuard: Not on file  Sigmoidoscopy: Not on file          Breast Cancer Screening Age: 36 years old  Frequency: every 1-2 years  Not required if history of left and right mastectomy Mammogram: 01/23/2020    History Breast Cancer   Cervical Cancer Screening Between the ages of 21-29, pap smear recommended once every 3 years  Between the ages of 33-67, can perform pap smear with HPV co-testing every 5 years     Recommendations may differ for women with a history of total hysterectomy, cervical cancer, or abnormal pap smears in past  Pap Smear: Not on file    Screening Not Indicated   Hepatitis C Screening Once for adults born between 1945 and 1965  More frequently in patients at high risk for Hepatitis C Hep C Antibody: Not on file        Diabetes Screening 1-2 times per year if you're at risk for diabetes or have pre-diabetes Fasting glucose: 104 mg/dL (8/19/2022)  A1C: 5 8 % (8/19/2022)  Screening Current   Cholesterol Screening Once every 5 years if you don't have a lipid disorder  May order more often based on risk factors  Lipid panel: 02/11/2022    Screening Not Indicated  History Lipid Disorder     Other Preventive Screenings Covered by Medicare:  1  Abdominal Aortic Aneurysm (AAA) Screening: covered once if your at risk  You're considered to be at risk if you have a family history of AAA  2  Lung Cancer Screening: covers low dose CT scan once per year if you meet all of the following conditions: (1) Age 50-69; (2) No signs or symptoms of lung cancer; (3) Current smoker or have quit smoking within the last 15 years; (4) You have a tobacco smoking history of at least 20 pack years (packs per day multiplied by number of years you smoked); (5) You get a written order from a healthcare provider  3  Glaucoma Screening: covered annually if you're considered high risk: (1) You have diabetes OR (2) Family history of glaucoma OR (3)  aged 48 and older OR (3)  American aged 72 and older  3  Osteoporosis Screening: covered every 2 years if you meet one of the following conditions: (1) You're estrogen deficient and at risk for osteoporosis based off medical history and other findings; (2) Have a vertebral abnormality; (3) On glucocorticoid therapy for more than 3 months; (4) Have primary hyperparathyroidism; (5) On osteoporosis medications and need to assess response to drug therapy  · Last bone density test (DXA Scan): Not on file  5  HIV Screening: covered annually if you're between the age of 12-76  Also covered annually if you are younger than 13 and older than 72 with risk factors for HIV infection   For pregnant patients, it is covered up to 3 times per pregnancy  Immunizations:  Immunization Recommendations   Influenza Vaccine Annual influenza vaccination during flu season is recommended for all persons aged >= 6 months who do not have contraindications   Pneumococcal Vaccine   * Pneumococcal conjugate vaccine = PCV13 (Prevnar 13), PCV15 (Vaxneuvance), PCV20 (Prevnar 20)  * Pneumococcal polysaccharide vaccine = PPSV23 (Pneumovax) Adults 25-60 years old: 1-3 doses may be recommended based on certain risk factors  Adults 72 years old: 1-2 doses may be recommended based off what pneumonia vaccine you previously received   Hepatitis B Vaccine 3 dose series if at intermediate or high risk (ex: diabetes, end stage renal disease, liver disease)   Tetanus (Td) Vaccine - COST NOT COVERED BY MEDICARE PART B Following completion of primary series, a booster dose should be given every 10 years to maintain immunity against tetanus  Td may also be given as tetanus wound prophylaxis  Tdap Vaccine - COST NOT COVERED BY MEDICARE PART B Recommended at least once for all adults  For pregnant patients, recommended with each pregnancy  Shingles Vaccine (Shingrix) - COST NOT COVERED BY MEDICARE PART B  2 shot series recommended in those aged 48 and above     Health Maintenance Due:      Topic Date Due   • Breast Cancer Screening: Mammogram  01/23/2021     Immunizations Due:      Topic Date Due   • COVID-19 Vaccine (4 - Booster for Rolf Newton series) 04/15/2022     Advance Directives   What are advance directives? Advance directives are legal documents that state your wishes and plans for medical care  These plans are made ahead of time in case you lose your ability to make decisions for yourself  Advance directives can apply to any medical decision, such as the treatments you want, and if you want to donate organs  What are the types of advance directives? There are many types of advance directives, and each state has rules about how to use them   You may choose a combination of any of the following:  · Living will: This is a written record of the treatment you want  You can also choose which treatments you do not want, which to limit, and which to stop at a certain time  This includes surgery, medicine, IV fluid, and tube feedings  · Durable power of  for healthcare [facility]): This is a written record that states who you want to make healthcare choices for you when you are unable to make them for yourself  This person, called a proxy, is usually a family member or a friend  You may choose more than 1 proxy  · Do not resuscitate (DNR) order:  A DNR order is used in case your heart stops beating or you stop breathing  It is a request not to have certain forms of treatment, such as CPR  A DNR order may be included in other types of advance directives  · Medical directive: This covers the care that you want if you are in a coma, near death, or unable to make decisions for yourself  You can list the treatments you want for each condition  Treatment may include pain medicine, surgery, blood transfusions, dialysis, IV or tube feedings, and a ventilator (breathing machine)  · Values history: This document has questions about your views, beliefs, and how you feel and think about life  This information can help others choose the care that you would choose  Why are advance directives important? An advance directive helps you control your care  Although spoken wishes may be used, it is better to have your wishes written down  Spoken wishes can be misunderstood, or not followed  Treatments may be given even if you do not want them  An advance directive may make it easier for your family to make difficult choices about your care  Fall Prevention    Fall prevention  includes ways to make your home and other areas safer  It also includes ways you can move more carefully to prevent a fall   Health conditions that cause changes in your blood pressure, vision, or muscle strength and coordination may increase your risk for falls  Medicines may also increase your risk for falls if they make you dizzy, weak, or sleepy  Fall prevention tips:   · Stand or sit up slowly  · Use assistive devices as directed  · Wear shoes that fit well and have soles that   · Wear a personal alarm  · Stay active  · Manage your medical conditions  Home Safety Tips:  · Add items to prevent falls in the bathroom  · Keep paths clear  · Install bright lights in your home  · Keep items you use often on shelves within reach  · Paint or place reflective tape on the edges of your stairs  Urinary Incontinence   Urinary incontinence (UI)  is when you lose control of your bladder  UI develops because your bladder cannot store or empty urine properly  The 3 most common types of UI are stress incontinence, urge incontinence, or both  Medicines:   · May be given to help strengthen your bladder control  Report any side effects of medication to your healthcare provider  Do pelvic muscle exercises often:  Your pelvic muscles help you stop urinating  Squeeze these muscles tight for 5 seconds, then relax for 5 seconds  Gradually work up to squeezing for 10 seconds  Do 3 sets of 15 repetitions a day, or as directed  This will help strengthen your pelvic muscles and improve bladder control  Train your bladder:  Go to the bathroom at set times, such as every 2 hours, even if you do not feel the urge to go  You can also try to hold your urine when you feel the urge to go  For example, hold your urine for 5 minutes when you feel the urge to go  As that becomes easier, hold your urine for 10 minutes  Self-care:   · Keep a UI record  Write down how often you leak urine and how much you leak  Make a note of what you were doing when you leaked urine  · Drink liquids as directed  You may need to limit the amount of liquid you drink to help control your urine leakage   Do not drink any liquid right before you go to bed  Limit or do not have drinks that contain caffeine or alcohol  · Prevent constipation  Eat a variety of high-fiber foods  Good examples are high-fiber cereals, beans, vegetables, and whole-grain breads  Walking is the best way to trigger your intestines to have a bowel movement  · Exercise regularly and maintain a healthy weight  Weight loss and exercise will decrease pressure on your bladder and help you control your leakage  · Use a catheter as directed  to help empty your bladder  A catheter is a tiny, plastic tube that is put into your bladder to drain your urine  · Go to behavior therapy as directed  Behavior therapy may be used to help you learn to control your urge to urinate  © Copyright [employer] 2018 Information is for End User's use only and may not be sold, redistributed or otherwise used for commercial purposes   All illustrations and images included in CareNotes® are the copyrighted property of A D A M , [employer]  or [address] [place] [address] [employer]

## 2023-02-24 NOTE — PROGRESS NOTES
Assessment and Plan:     Problem List Items Addressed This Visit     Absolute anemia    Relevant Orders    CBC    Ambulatory Referral to [facility]    Anxiety    Relevant Medications    LORazepam (ATIVAN) 1 mg tablet    Other Relevant Orders    Ambulatory Referral to [facility]    Benign essential hypertension    Relevant Orders    POCT urine dip auto non-scope (Completed)    Comprehensive metabolic panel    Ambulatory Referral to [facility]    Dementia without behavioral disturbance (HCC) - Primary    Relevant Medications    donepezil (ARICEPT) 10 mg tablet    LORazepam (ATIVAN) 1 mg tablet    Hyperlipidemia    Relevant Orders    Comprehensive metabolic panel    Lipid Panel with Direct LDL reflex (Completed)    Lipase    Ambulatory Referral to [facility]    Insomnia    Relevant Medications    LORazepam (ATIVAN) 1 mg tablet    Melatonin 10 MG TABS    guaiFENesin (MUCINEX) 600 mg 12 hr tablet    Other Relevant Orders    Ambulatory Referral to [address] Medicare annual wellness visit, subsequent    Prediabetes    Relevant Orders    POCT hemoglobin A1c (Completed)    Comprehensive metabolic panel    Lipid Panel with Direct LDL reflex (Completed)    Lipase    Ambulatory Referral to [facility]       Depression Screening and Follow-up Plan: Patient was screened for depression during today's encounter  They screened negative with a PHQ-2 score of 2  Preventive health issues were discussed with patient, and age appropriate screening tests were ordered as noted in patient's After Visit Summary  Personalized health advice and appropriate referrals for health education or preventive services given if needed, as noted in patient's After Visit Summary       History of Present Illness:     Patient presents for a Medicare Wellness Visit    HPI   Patient Care Team:  Chel Galan MD as PCP - General    In w cristina-Anitha Kunz, for follow-up  BP good  Some increased sxs dementia following recent fall/clavicular fx  More confused when not in own home, inc sleep disturbances (see MyChart messages)  Dtr  req forms for FMLA to be completed to allow for time needed for care of her mother     Review of Systems:     Review of Systems   Constitutional: Positive for fatigue  Negative for fever  Eyes:        Wears glasses   Respiratory: Negative  Cardiovascular: Negative  Gastrointestinal: Negative  Musculoskeletal: Positive for arthralgias  Psychiatric/Behavioral: Positive for confusion, decreased concentration and sleep disturbance  The patient is nervous/anxious  Problem List:     Patient Active Problem List   Diagnosis   • Benign essential hypertension   • [facility]   • Ear fullness, left   • Hyperlipidemia   • Sinusitis   • Memory change   • Dementia without behavioral disturbance [facility] )   • Medicare annual wellness visit, subsequent   • Balance problems   • Altered mental status   • BMI 22 0-22 9, adult   • History of breast cancer   • Chronic fatigue   • Urinary frequency   • Electrolyte abnormality   • Bad odor of urine   • Postmenopausal   • Prediabetes   • Gastroesophageal reflux disease with esophagitis without hemorrhage   • H/O clavicle fracture   • Meningioma (HCC)   • Pulmonary nodule   • Insomnia   • Anxiety   • Absolute anemia      Past Medical and Surgical History:     Past Medical History:   Diagnosis Date   • Allergic    • Cancer ([facility] ) 2005    left breast mastectomy   • Hypertension    • Memory change    • Nodule of left lung    • Pleural thickening      Past Surgical History:   Procedure Laterality Date   • CATARACT EXTRACTION Left 05/2020   • CATARACT EXTRACTION Left 06/2020   • MASTECTOMY     • MD XCAPSL CTRC RMVL INSJ IO LENS PROSTH W/O ECP Left 6/8/2020    Procedure: EXTRACTION EXTRACAPSULAR CATARACT PHACO INTRAOCULAR LENS (IOL); Surgeon: Claudio Park MD;  [facility] MA[facility] OR;  Service: Ophthalmology      Family History:     History reviewed   No pertinent family history  Social History:     Social History     Socioeconomic History   • Marital status:      Spouse name: None   • Number of children: None   • Years of education: None   • Highest education level: None   Occupational History   • None   Tobacco Use   • Smoking status: Never   • Smokeless tobacco: Never   Vaping Use   • Vaping Use: Never used   Substance and Sexual Activity   • Alcohol use: Never   • Drug use: Never   • Sexual activity: Not Currently   Other Topics Concern   • None   Social History Narrative   • None     Social Determinants of Health     Financial Resource Strain: Low Risk    • Difficulty of Paying Living Expenses: Not hard at all   Food Insecurity: Not on file   Transportation Needs: No Transportation Needs   • Lack of Transportation (Medical): No   • Lack of Transportation (Non-Medical):  No   Physical Activity: Not on file   Stress: Not on file   Social Connections: Not on file   Intimate Partner Violence: Not on file   Housing Stability: Not on file      Medications and Allergies:     Current Outpatient Medications   Medication Sig Dispense Refill   • Ascorbic Acid (Vitamin C) 250 MG CHEW      • cholecalciferol (VITAMIN D3) 1,000 units tablet Take 1,000 Units by mouth daily     • cyanocobalamin (VITAMIN B-12) 100 mcg tablet Take by mouth daily     • donepezil (ARICEPT) 10 mg tablet Take 1 tablet (10 mg total) by mouth daily at bedtime 90 tablet 3   • famotidine (PEPCID) 40 MG tablet Take 1 tablet (40 mg total) by mouth daily at bedtime as needed for indigestion or heartburn 30 tablet 1   • guaiFENesin (MUCINEX) 600 mg 12 hr tablet Take 2 tablets (1,200 mg total) by mouth every 12 (twelve) hours 30 tablet 1   • hydrochlorothiazide (HYDRODIURIL) 25 mg tablet One tab every other day 30 tablet 1   • loratadine (CLARITIN) 10 mg tablet Take 10 mg by mouth daily     • LORazepam (ATIVAN) 1 mg tablet O 5 mg in AM and one tab po PM prn 45 tablet 1   • Melatonin 10 MG TABS Take 1 tablet (10 mg total) by mouth daily at bedtime 90 tablet 3   • metoprolol tartrate (LOPRESSOR) 50 mg tablet take 1 tablet by mouth twice a day 60 tablet 3   • Multiple Vitamins-Minerals (ONE-A-DAY 50 PLUS PO)      • NIFEdipine ER (ADALAT CC) 30 MG 24 hr tablet take 1 tablet by mouth once daily 90 tablet 1     No current facility-administered medications for this visit  Allergies   Allergen Reactions   • Ibuprofen      Reaction Date: 10Aug2005;       Immunizations:     Immunization History   Administered Date(s) Administered   • COVID-19 MODERNA VACC 0 25 ML IM BOOSTER 02/18/2022   • COVID-19 MODERNA VACC 0 5 ML IM 02/26/2021, 03/26/2021, 02/18/2022, 02/18/2022   • Influenza Split High Dose Preservative Free IM 10/23/2014, 10/21/2015, 11/02/2016, 10/31/2017   • Influenza, high dose seasonal 0 7 mL 12/13/2018, 10/01/2019, 10/26/2020, 02/02/2022, 10/21/2022   • Influenza, seasonal, injectable 10/25/2011   • Pneumococcal Conjugate Vaccine 20-valent (Pcv20), Polysace 09/28/2022   • Pneumococcal Polysaccharide PPV23 10/01/2010      Health Maintenance:         Topic Date Due   • Breast Cancer Screening: Mammogram  01/23/2021         Topic Date Due   • COVID-19 Vaccine (4 - Booster for Moderna series) 04/15/2022      Medicare Screening Tests and Risk Assessments:     Cedrick Méndez is here for her Subsequent Wellness visit  Last Medicare Wellness visit information reviewed, patient interviewed and updates made to the record today  Health Risk Assessment:   Patient rates overall health as fair  Patient feels that their physical health rating is slightly worse  Patient is satisfied with their life  Eyesight was rated as much worse  Hearing was rated as same  Patient feels that their emotional and mental health rating is slightly worse  Patients states they are sometimes angry  Patient states they are often unusually tired/fatigued  Pain experienced in the last 7 days has been some  Patient's pain rating has been 3/10   Patient states that she has experienced no weight loss or gain in last 6 months  Depression Screening:   PHQ-2 Score: 2      Fall Risk Screening: In the past year, patient has experienced: history of falling in past year      Urinary Incontinence Screening:   Patient has leaked urine accidently in the last six months  Home Safety:  Patient does not have trouble with stairs inside or outside of their home  Patient has working smoke alarms and has working carbon monoxide detector  Home safety hazards include: uneven floors  Nutrition:   Current diet is Regular and Limited junk food  Medications:   Patient is currently taking over-the-counter supplements  OTC medications include: See chart  Patient is not able to manage medications  Activities of Daily Living (ADLs)/Instrumental Activities of Daily Living (IADLs):   Walk and transfer into and out of bed and chair?: Yes  Dress and groom yourself?: Yes    Bathe or shower yourself?: Yes    Feed yourself? Yes  Do your laundry/housekeeping?: No  Manage your money, pay your bills and track your expenses?: No  Make your own meals?: No    Do your own shopping?: No    ADL comments: Does not drive due to eyesight, needs assistance  in kitchen needs hekp sterting washing machine to do laundry    Previous Hospitalizations:   Any hospitalizations or ED visits within the last 12 months?: Yes    How many hospitalizations have you had in the last year?: 1-2    Hospitalization Comments: None visited er for tests    Advance Care Planning:   Living will: Yes    Durable POA for healthcare:  Yes    Advanced directive: Yes    Advanced directive counseling given: Yes    Five wishes given: Yes      Cognitive Screening:   Provider or family/friend/caregiver concerned regarding cognition?: Yes    Cognition Comments: Dx dementia--recent behavorial disturbances following fall/clavicular fracture    PREVENTIVE SCREENINGS      Cardiovascular Screening:    General: History Lipid Disorder and Screening Current      Diabetes Screening:     General: Screening Current      Colorectal Cancer Screening:     General: Screening Not Indicated      Breast Cancer Screening:     General: History Breast Cancer      Cervical Cancer Screening:    General: Screening Not Indicated      Osteoporosis Screening:    General: Risks and Benefits Discussed      Abdominal Aortic Aneurysm (AAA) Screening:        General: Screening Not Indicated      Lung Cancer Screening:     General: Screening Not Indicated      Hepatitis C Screening:    General: Screening Not Indicated    Screening, Brief Intervention, and Referral to Treatment (SBIRT)    Screening  Typical number of drinks in a day: 0  Typical number of drinks in a week: 0  Interpretation: Low risk drinking behavior  AUDIT-C Screenin) How often did you have a drink containing alcohol in the past year? never  2) How many drinks did you have on a typical day when you were drinking in the past year? 0  3) How often did you have 6 or more drinks on one occasion in the past year? never    AUDIT-C Score: 0  Interpretation: Score 0-2 (female): Negative screen for alcohol misuse    Single Item Drug Screening:  How often have you used an illegal drug (including marijuana) or a prescription medication for non-medical reasons in the past year? never    Single Item Drug Screen Score: 0  Interpretation: Negative screen for possible drug use disorder    Brief Intervention  Alcohol & drug use screenings were reviewed  No concerns regarding substance use disorder identified  Other Counseling Topics:   Regular weightbearing exercise and calcium and vitamin D intake  No results found  Physical Exam:     /64 (BP Location: Left arm, Patient Position: Sitting, Cuff Size: Large)   Pulse 66   Temp 97 6 °F (36 4 °C)   Resp 16   Ht 5' 1" (1 549 m)   Wt 57 1 kg (125 lb 12 8 oz)   BMI 23 77 kg/m²     Physical Exam  Vitals and nursing note reviewed     Constitutional: General: She is not in acute distress  Cardiovascular:      Rate and Rhythm: Normal rate and regular rhythm  Pulmonary:      Effort: Pulmonary effort is normal  No respiratory distress  Breath sounds: Normal breath sounds  Abdominal:      General: Bowel sounds are normal       Palpations: Abdomen is soft  Tenderness: There is no abdominal tenderness  Musculoskeletal:      Cervical back: Neck supple  Skin:     General: Skin is warm and dry  Coloration: Skin is not jaundiced  Neurological:      Mental Status: She is alert        Comments: Oriented to self and place          Cierra Faye MD

## 2023-02-25 LAB
EST. AVERAGE GLUCOSE BLD GHB EST-MCNC: 111 MG/DL
HBA1C MFR BLD: 5.5 %

## 2023-02-28 DIAGNOSIS — B36.9 FUNGAL DERMATITIS: Primary | ICD-10-CM

## 2023-02-28 RX ORDER — NYSTATIN 100000 [USP'U]/G
POWDER TOPICAL 2 TIMES DAILY
Qty: 60 G | Refills: 3 | Status: SHIPPED | OUTPATIENT
Start: 2023-02-28 | End: 2023-06-14

## 2023-03-10 ENCOUNTER — CONSULT (OUTPATIENT)
Dept: NEUROSURGERY | Facility: CLINIC | Age: 83
End: 2023-03-10

## 2023-03-10 VITALS
TEMPERATURE: 98.1 F | DIASTOLIC BLOOD PRESSURE: 78 MMHG | HEIGHT: 61 IN | BODY MASS INDEX: 23.6 KG/M2 | WEIGHT: 125 LBS | RESPIRATION RATE: 14 BRPM | SYSTOLIC BLOOD PRESSURE: 122 MMHG | HEART RATE: 61 BPM

## 2023-03-10 DIAGNOSIS — D32.9 MENINGIOMA (HCC): ICD-10-CM

## 2023-03-10 NOTE — PROGRESS NOTES
Office Note - Neurosurgery   Saige 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  Patient is here for Brain meningioma eval and follow up  She has baseline cognitive dysfunction and gait instability, but denies any headache, vision seizures, , or weakness in the extremities  Exam- Alert but Confused and disoriented to X4, Communicates well  PERRL, EOMI 2 mm conj bilaterally  Finger to nose dysmetria bilaterally, other wise no drift in both arms  Strength 5/5 bilaterally and sensation to LT intact throughout  Unstable gait  Hx, PEx and images reviewed with the patient  Mx plan discussed  Images shows 1cm extradural lesion in the left lateral temporal meningioma with out vasogenic edema or remarkable mass effect  Patient without symptoms related to the tumor  Therefore, no surgical intervention is indicated  I would recommend surveillance MRI brain w/wo In 3 months  Order placed  Advised family to call office or go to ER with development of new neurological symptoms  All questions and concerns were answered to patient satisfaction  Patient and family expressed their understandings and agreed with the plan  Plan:  1  MRI of brain with and without contrast in 3 months  2  BUN and creatinine  3  Advised to call office or go to emergency room with development of new neurological symptoms  4  Call with question or concern  5  Follow-up in 3 months      Subjective/Objective     C/C" referred for evaluation of brain lesion"          HPI  Patient is 79years old presents woman with past medical history of dementia, hypertension, breast cancer, hyperlipidemia, anxiety,  Insomnia, recent fall with left clavicle # had surveillance CT head on 1/11/2023  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  Patient is here for Brain meningioma eval and follow up  She has baseline cognitive dysfunction and gait instability, but denies any headache, vision seizures, , or weakness in the extremities  Patient denies history of fever, chills, rigors, cough or chest pain  She denies history of  diabetes mellitus, congestive heart failure, stroke, seizures, bleeding disorder or pain anticoagulant medications  No history of smoking or drinking alcohol  Image findings as described in section above  ROS  Review of system personally reviewed and updated  Review of Systems   HENT:        H/o Allergies     Eyes: Positive for visual disturbance (blurry- pt has cataracts-H/o left eye cataract sx)  Genitourinary: Positive for enuresis, frequency and urgency  Musculoskeletal: Positive for gait problem (wobbling- pt broke Collar bone 1/2023)  Neurological: Positive for speech difficulty (when tired)  Np solo ap CT head 1/11/2023    no previous sx     cc slow (Gait)-wobbling- pt broke Collar bone 1/2023 blurry vision ( Pt has Cataracts) also c/o Difficult speech ( when tired)  and urinary incontinence @ night     [facility]ed @ [facility] 1/11/23    h/o Breast cancer and radiation/Chemo x years ago    Psychiatric/Behavioral: Positive for confusion (noted by family, Memory loss ) and decreased concentration  All other systems reviewed and are negative  Family History    No family history on file  Social History    Social History     Socioeconomic History   • Marital status:       Spouse name: Not on file   • Number of children: Not on file   • Years of education: Not on file   • Highest education level: Not on file   Occupational History   • Not on file   Tobacco Use   • Smoking status: Never   • Smokeless tobacco: Never   Vaping Use   • Vaping Use: Never used   Substance and Sexual Activity   • Alcohol use: Never   • Drug use: Never   • Sexual activity: Not Currently   Other Topics Concern   • Not on file   Social History Narrative   • Not on file     Social Determinants of Health     Financial Resource Strain: Low Risk    • Difficulty of Paying Living Expenses: Not hard at all   Food Insecurity: Not on file   Transportation Needs: No Transportation Needs   • Lack of Transportation (Medical): No   • Lack of Transportation (Non-Medical): No   Physical Activity: Not on file   Stress: Not on file   Social Connections: Not on file   Intimate Partner Violence: Not on file   Housing Stability: Not on file       Past Medical History    Past Medical History:   Diagnosis Date   • Allergic    • Cancer [facility]) 2005    left breast mastectomy   • Hypertension    • Memory change    • Nodule of left lung    • Pleural thickening        Surgical History    Past Surgical History:   Procedure Laterality Date   • CATARACT EXTRACTION Left 05/2020   • CATARACT EXTRACTION Left 06/2020   • MASTECTOMY     • [place] XCAPSL CTRC RMVL INSJ IO LENS PROSTH W/O ECP Left 6/8/2020    Procedure: EXTRACTION EXTRACAPSULAR CATARACT PHACO INTRAOCULAR LENS (IOL);   Surgeon: Elfego Ascencio MD;  Location: [facility] MAIN OR;  Service: Ophthalmology       Medications      Current Outpatient Medications:   •  Ascorbic Acid (Vitamin C) 250 MG CHEW, , Disp: , Rfl:   •  cholecalciferol (VITAMIN D3) 1,000 units tablet, Take 1,000 Units by mouth daily, Disp: , Rfl:   •  cyanocobalamin (VITAMIN B-12) 100 mcg tablet, Take by mouth daily, Disp: , Rfl:   •  donepezil (ARICEPT) 10 mg tablet, Take 1 tablet (10 mg total) by mouth daily at bedtime, Disp: 90 tablet, Rfl: 3  •  famotidine (PEPCID) 40 MG tablet, Take 1 tablet (40 mg total) by mouth daily at bedtime as needed for indigestion or heartburn, Disp: 30 tablet, Rfl: 1  •  guaiFENesin (MUCINEX) 600 mg 12 hr tablet, Take 2 tablets (1,200 mg total) by mouth every 12 (twelve) hours, Disp: 30 tablet, Rfl: 1  •  hydrochlorothiazide (HYDRODIURIL) 25 mg tablet, One tab every other day, Disp: 30 tablet, Rfl: 1  •  loratadine (CLARITIN) 10 mg tablet, Take 10 mg by mouth daily, Disp: , Rfl:   •  LORazepam (ATIVAN) 1 mg tablet, O 5 mg in AM and one tab po PM prn, Disp: 45 tablet, Rfl: 1  •  Melatonin 10 MG TABS, Take 1 tablet (10 mg total) by mouth daily at bedtime, Disp: 90 tablet, Rfl: 3  •  metoprolol tartrate (LOPRESSOR) 50 mg tablet, take 1 tablet by mouth twice a day, Disp: 60 tablet, Rfl: 3  •  Multiple Vitamins-Minerals (ONE-A-DAY 50 PLUS PO), , Disp: , Rfl:   •  NIFEdipine ER (ADALAT CC) 30 MG 24 hr tablet, take 1 tablet by mouth once daily, Disp: 90 tablet, Rfl: 1  •  nystatin (MYCOSTATIN) powder, Apply topically 2 (two) times a day, Disp: 60 g, Rfl: 3    Allergies    Allergies   Allergen Reactions   • Ibuprofen      Reaction Date: 10Aug2005; The following portions of the patient's history were reviewed and updated as appropriate: allergies, current medications, past family history, past medical history, past social history, past surgical history and problem list     Investigations    I personally reviewed the CT results with the patient:        Physical Exam    There were no vitals filed for this visit  Physical Exam  Constitutional:       Comments: disorientedx4   HENT:      Head: Normocephalic and atraumatic  Eyes:      Extraocular Movements: Extraocular movements intact  Pupils: Pupils are equal, round, and reactive to light  Cardiovascular:      Rate and Rhythm: Normal rate  Pulses: Normal pulses  Pulmonary:      Effort: Pulmonary effort is normal    Musculoskeletal:         General: Normal range of motion  Cervical back: Normal range of motion  Neurological:      General: No focal deficit present  Mental Status: She is alert  She is disoriented  GCS: GCS eye subscore is 4  GCS verbal subscore is 5  GCS motor subscore is 6  Cranial Nerves: Cranial nerves 2-12 are intact        Sensory: Sensation is intact  Motor: Motor function is intact  Coordination: Finger-Nose-Finger Test normal       Deep Tendon Reflexes: Reflexes are normal and symmetric  Reflex Scores:       Tricep reflexes are 2+ on the right side and 2+ on the left side  Bicep reflexes are 2+ on the right side and 2+ on the left side  Brachioradialis reflexes are 2+ on the right side and 2+ on the left side  Patellar reflexes are 2+ on the right side and 2+ on the left side  Achilles reflexes are 2+ on the right side and 2+ on the left side  Psychiatric:         Speech: Speech normal        Neurologic Exam     Mental Status   Speech: speech is normal   Level of consciousness: alert    Cranial Nerves   Cranial nerves II through XII intact  CN III, IV, VI   Pupils are equal, round, and reactive to light  Right pupil: Size: 2 mm  Shape: regular  Reactivity: brisk  Left pupil: Size: 2 mm  Shape: regular  Reactivity: brisk     Nystagmus: none     CN XI   CN XI normal      Motor Exam   Muscle bulk: normal  Overall muscle tone: normal  Right arm tone: normal  Left arm tone: normal  Right arm pronator drift: absent  Left arm pronator drift: absent  Right leg tone: normal  Left leg tone: normal    Sensory Exam   Light touch normal      Gait, Coordination, and Reflexes     Coordination   Finger to nose coordination: normal    Reflexes   Right brachioradialis: 2+  Left brachioradialis: 2+  Right biceps: 2+  Left biceps: 2+  Right triceps: 2+  Left triceps: 2+  Right patellar: 2+  Left patellar: 2+  Right achilles: 2+  Left achilles: 2+  Right : 2+  Left : 2+  Right Coppola: absent  Left Coppola: absent  Right ankle clonus: absent  Left pendular knee jerk: absent

## 2023-03-16 ENCOUNTER — APPOINTMENT (OUTPATIENT)
Dept: RADIOLOGY | Facility: CLINIC | Age: 83
End: 2023-03-16

## 2023-03-16 ENCOUNTER — OFFICE VISIT (OUTPATIENT)
Dept: OBGYN CLINIC | Facility: CLINIC | Age: 83
End: 2023-03-16

## 2023-03-16 VITALS
DIASTOLIC BLOOD PRESSURE: 75 MMHG | BODY MASS INDEX: 23.6 KG/M2 | SYSTOLIC BLOOD PRESSURE: 179 MMHG | WEIGHT: 125 LBS | HEIGHT: 61 IN | HEART RATE: 58 BPM

## 2023-03-16 DIAGNOSIS — S42.018D CLOSED NONDISPLACED FRACTURE OF STERNAL END OF LEFT CLAVICLE WITH ROUTINE HEALING, SUBSEQUENT ENCOUNTER: ICD-10-CM

## 2023-03-16 DIAGNOSIS — S42.018D CLOSED NONDISPLACED FRACTURE OF STERNAL END OF LEFT CLAVICLE WITH ROUTINE HEALING, SUBSEQUENT ENCOUNTER: Primary | ICD-10-CM

## 2023-03-16 NOTE — PROGRESS NOTES
Assessment/Plan:  1  Closed nondisplaced fracture of sternal end of left clavicle with routine healing, subsequent encounter  XR clavicle left          Osiris Booker has no pain in her clavicle and stable alignment and healing on x-ray  I recommended continuing all activity as tolerated and follow-up only if needed in the future  She verbalized understanding of this plan  Subjective:   Shelley Britt is a 79 y o  female who presents to the office for follow-up for a nondisplaced left clavicle fracture  This occurred almost 2 months ago and she was initially in a sling followed by significant improvement and stable alignment on x-ray at 6 weeks ago  She returns today stating she has not had any pain since last visit and can undergo all activities without any discomfort  She denies any new injury  Review of Systems   Constitutional: Negative for chills, fever and unexpected weight change  HENT: Negative for hearing loss, nosebleeds and sore throat  Eyes: Negative for pain, redness and visual disturbance  Respiratory: Negative for cough, shortness of breath and wheezing  Cardiovascular: Negative for chest pain, palpitations and leg swelling  Gastrointestinal: Negative for abdominal pain, nausea and vomiting  Endocrine: Negative for polydipsia and polyuria  Genitourinary: Negative for dysuria and hematuria  Musculoskeletal:        See HPI   Skin: Negative for rash and wound  Neurological: Negative for dizziness, numbness and headaches  Psychiatric/Behavioral: Negative for decreased concentration and suicidal ideas  The patient is not nervous/anxious            Past Medical History:   Diagnosis Date   • Allergic    • Cancer [facility]) 2005    left breast mastectomy   • Hypertension    • Memory change    • Nodule of left lung    • Pleural thickening        Past Surgical History:   Procedure Laterality Date   • CATARACT EXTRACTION Left 05/2020   • CATARACT EXTRACTION Left 06/2020   • MASTECTOMY     • [place] XCAPSL CTRC RMVL INSJ IO LENS PROSTH W/O ECP Left 6/8/2020    Procedure: EXTRACTION EXTRACAPSULAR CATARACT PHACO INTRAOCULAR LENS (IOL); Surgeon: Lora Pop MD;  Location: [facility] MAIN OR;  Service: Ophthalmology       History reviewed  No pertinent family history      Social History     Occupational History   • Not on file   Tobacco Use   • Smoking status: Never   • Smokeless tobacco: Never   Vaping Use   • Vaping Use: Never used   Substance and Sexual Activity   • Alcohol use: Never   • Drug use: Never   • Sexual activity: Not Currently         Current Outpatient Medications:   •  Ascorbic Acid (Vitamin C) 250 MG CHEW, , Disp: , Rfl:   •  cholecalciferol (VITAMIN D3) 1,000 units tablet, Take 1,000 Units by mouth daily, Disp: , Rfl:   •  cyanocobalamin (VITAMIN B-12) 100 mcg tablet, Take by mouth daily, Disp: , Rfl:   •  donepezil (ARICEPT) 10 mg tablet, Take 1 tablet (10 mg total) by mouth daily at bedtime, Disp: 90 tablet, Rfl: 3  •  famotidine (PEPCID) 40 MG tablet, Take 1 tablet (40 mg total) by mouth daily at bedtime as needed for indigestion or heartburn, Disp: 30 tablet, Rfl: 1  •  guaiFENesin (MUCINEX) 600 mg 12 hr tablet, Take 2 tablets (1,200 mg total) by mouth every 12 (twelve) hours (Patient taking differently: Take 1,200 mg by mouth every 12 (twelve) hours Prn), Disp: 30 tablet, Rfl: 1  •  hydrochlorothiazide (HYDRODIURIL) 25 mg tablet, One tab every other day, Disp: 30 tablet, Rfl: 1  •  loratadine (CLARITIN) 10 mg tablet, Take 10 mg by mouth daily, Disp: , Rfl:   •  LORazepam (ATIVAN) 1 mg tablet, O 5 mg in AM and one tab po PM prn (Patient taking differently: O 5 mg in AM and one tab po PM), Disp: 45 tablet, Rfl: 1  •  Melatonin 10 MG TABS, Take 1 tablet (10 mg total) by mouth daily at bedtime, Disp: 90 tablet, Rfl: 3  •  metoprolol tartrate (LOPRESSOR) 50 mg tablet, take 1 tablet by mouth twice a day, Disp: 60 tablet, Rfl: 3  •  Multiple Vitamins-Minerals (ONE-A-DAY 50 PLUS PO), , Disp: , Rfl:   •  NIFEdipine ER (ADALAT CC) 30 MG 24 hr tablet, take 1 tablet by mouth once daily, Disp: 90 tablet, Rfl: 1  •  nystatin (MYCOSTATIN) powder, Apply topically 2 (two) times a day (Patient taking differently: Apply topically 2 (two) times a day Prn), Disp: 60 g, Rfl: 3    Allergies   Allergen Reactions   • Ibuprofen      Reaction Date: 10Aug2005; Objective:  Vitals:    03/16/23 1507   BP: (!) 179/75   Pulse: 58       Left Shoulder Exam     Tenderness   Left shoulder tenderness location: No tenderness over proximal clavicle at site of fracture  Range of Motion   Active abduction: normal   Passive abduction: normal   Extension: normal   External rotation: normal   Forward flexion: normal     Other   Sensation: normal             Physical Exam  Vitals and nursing note reviewed  Constitutional:       Appearance: Normal appearance  She is well-developed  HENT:      Head: Normocephalic and atraumatic  Right Ear: External ear normal       Left Ear: External ear normal    Eyes:      General: No scleral icterus  Extraocular Movements: Extraocular movements intact  Conjunctiva/sclera: Conjunctivae normal    Cardiovascular:      Rate and Rhythm: Normal rate  Pulmonary:      Effort: Pulmonary effort is normal  No respiratory distress  Musculoskeletal:      Cervical back: Normal range of motion and neck supple  Comments: See Ortho exam   Skin:     General: Skin is warm and dry  Neurological:      Mental Status: She is alert and oriented to person, place, and time  Psychiatric:         Behavior: Behavior normal          I have personally reviewed pertinent films in PACS and my interpretation is as follows:  X-rays of the left clavicle demonstrate a stable, healing proximal clavicle fracture  This document was created using speech voice recognition software     Grammatical errors, random word insertions, pronoun errors, and incomplete sentences are an occasional consequence of this system due to software limitations, ambient noise, and hardware issues  Any formal questions or concerns about content, text, or information contained within the body of this dictation should be directly addressed to the provider for clarification

## 2023-03-30 ENCOUNTER — NURSE TRIAGE (OUTPATIENT)
Dept: OTHER | Facility: OTHER | Age: 83
End: 2023-03-30

## 2023-03-30 NOTE — TELEPHONE ENCOUNTER
"Patient had 5 Cryotherapy and Fluorouracil injections on scalp today and patient's daughter wanted to now if it was alright to wash patient's hair tonight  Reached out to on call provider who advised to do gentle washing due to injections  Patient's son-in law answered phone and verbalized understanding  He had taken patient in for OV  Reason for Disposition  • Caller has NON-URGENT question and triager unable to answer question    Answer Assessment - Initial Assessment Questions  1  SYMPTOM: \"What's the main symptom you're concerned about? \" (e g , pain, fever, vomiting)     Patient had   2  ONSET: \"When did symptoms start? \"      No symptoms  3  SURGERY: \"What surgery was performed? \"       Cryotherapy done in office today    Fluorouracil injections done in office today   4  DATE of SURGERY: \"When was surgery performed? \"       3/30/31  5  ANESTHESIA: \" What type of anesthesia did you have? \" (e g , general, spinal, epidural, local)      None  6  PAIN: \"Is there any pain? \" If Yes, ask: \"How bad is it? \"  (Scale 1-10; or mild, moderate, severe)      Denies  7  FEVER: \"Do you have a fever? \" If Yes, ask: \"What is your temperature, how was it measured, and when did it start? \"     Denies  8  VOMITING: \"Is there any vomiting? \" If yes, ask: \"How many times? \"      Denies  9  BLEEDING: \"Is there any bleeding? \" If Yes, ask: \"How much? \" and \"Where? \"      Denies  10  OTHER SYMPTOMS: \"Do you have any other symptoms? \" (e g , drainage from wound, painful urination, constipation)       Denies    Protocols used: POST-OP SYMPTOMS AND QUESTIONS-ADULT-[place]    "

## 2023-03-30 NOTE — TELEPHONE ENCOUNTER
"Regarding: received treatment today/ wants to wash hair  ----- Message from Alina Chandler sent at 3/30/2023  5:03 PM EDT -----  \" My mom was seen in office and received a treatment and I just want to know if it's okay for her to wash her hair? \"    "

## 2023-03-31 NOTE — TELEPHONE ENCOUNTER
Spoke with patient daughter   She did state Mauri Cason reached out and advised her it was okay to wash her hair and to just be gentle

## 2023-05-09 DIAGNOSIS — I10 BENIGN ESSENTIAL HYPERTENSION: ICD-10-CM

## 2023-05-09 RX ORDER — HYDROCHLOROTHIAZIDE 25 MG/1
TABLET ORAL
Qty: 30 TABLET | Refills: 1 | Status: SHIPPED | OUTPATIENT
Start: 2023-05-09

## 2023-05-14 DIAGNOSIS — I10 ESSENTIAL HYPERTENSION: ICD-10-CM

## 2023-05-15 RX ORDER — NIFEDIPINE 30 MG/1
TABLET, FILM COATED, EXTENDED RELEASE ORAL
Qty: 90 TABLET | Refills: 1 | Status: SHIPPED | OUTPATIENT
Start: 2023-05-15

## 2023-05-17 ENCOUNTER — TELEPHONE (OUTPATIENT)
Dept: FAMILY MEDICINE CLINIC | Facility: CLINIC | Age: 83
End: 2023-05-17

## 2023-05-17 DIAGNOSIS — Z13.820 SCREENING FOR OSTEOPOROSIS: Primary | ICD-10-CM

## 2023-05-24 DIAGNOSIS — F03.918 DEMENTIA WITH BEHAVIORAL DISTURBANCE (HCC): Primary | ICD-10-CM

## 2023-05-24 RX ORDER — MEMANTINE HYDROCHLORIDE 5 MG-10 MG
KIT ORAL
Qty: 49 TABLET | Refills: 0 | Status: SHIPPED | OUTPATIENT
Start: 2023-05-24 | End: 2023-09-11

## 2023-05-27 ENCOUNTER — TELEMEDICINE (OUTPATIENT)
Dept: FAMILY MEDICINE CLINIC | Facility: CLINIC | Age: 83
End: 2023-05-27

## 2023-05-27 DIAGNOSIS — R19.7 DIARRHEA, UNSPECIFIED TYPE: Primary | ICD-10-CM

## 2023-05-27 DIAGNOSIS — I10 BENIGN ESSENTIAL HYPERTENSION: ICD-10-CM

## 2023-05-27 DIAGNOSIS — F03.90 DEMENTIA WITHOUT BEHAVIORAL DISTURBANCE (HCC): ICD-10-CM

## 2023-05-27 NOTE — PROGRESS NOTES
Virtual Brief Visit    This Visit is being completed by telephone  The Patient is located at Home and in the following state in which I hold an active license [place]    The patient was identified by name and date of birth  Herson Alejandra was informed that this is a telemedicine visit and that the visit is being conducted through the [employer]  She agrees to proceed     My office door was closed  No one else was in the room  She acknowledged consent and understanding of privacy and security of the video platform  The patient has agreed to participate and understands they can discontinue the visit at any time  Patient is aware this is a billable service  Assessment/Plan:    Problem List Items Addressed This Visit        Cardiovascular and Mediastinum    Benign essential hypertension       Nervous and Auditory    Dementia without behavioral disturbance ([facility] [address] )   Other Visit Diagnoses     Diarrhea, unspecified type    -  Primary        Patient is a pleasant 79-year-old female accompanied by daughter over the phone with 3 days of isolated diarrhea, no other symptoms at this time  Etiology most likely gastroenteritis  Recommend at this time conservative therapy with Pepto-Bismol, increased oral hydration, BRAT diet  If symptoms do worsen or not responding recommend reevaluation  Patient/family in agreement of plan  Recent Visits  No visits were found meeting these conditions  Showing recent visits within past 7 days and meeting all other requirements  Today's Visits  Date Type Provider Dept   05/27/23 Telemedicine 215 Located within [facility] today's visits and meeting all other requirements  Future Appointments  No visits were found meeting these conditions    Showing future appointments within next 150 days and meeting all other requirements         Visit Time  Total Visit Duration: 12 minutes

## 2023-05-31 ENCOUNTER — TELEPHONE (OUTPATIENT)
Dept: FAMILY MEDICINE CLINIC | Facility: CLINIC | Age: 83
End: 2023-05-31

## 2023-05-31 NOTE — TELEPHONE ENCOUNTER
I spoke with patients daughter and the diarrhea stopped then started again , lemuel gave her pepto,I told her if it did  not stop by tomorrow to call our office for an appt- no other sxs  Hi, good morning  This is Nicholas Sanchez, the daughter of Pop Britt, patient of Doctor Carolyne Monets De Oca  Her YOB: 1940  I spoke to Doctor Gus Salguero over the weekend, I believe it was on Friday and he gave mom instructions because she's had diarrhea to take some Pepto Bismol and it was viral and things should clear up if not to give them a call back or to notify Doctor Carolyne Montes De Oca  So it seems like the diarrhea subsided yesterday, but now she's got it again  So we gave her two more Pepto, and I know he mentioned something we should check on a couple of things  I don't know if he mentioned blood work or not  So if someone could call me back, it's Wednesday, May 31st  It's approximately  Actually, it's 11:00 AM You can reach me at [phone]  Again, this is Elena Butler's daughter  Thank you    You received a voice mail from [facility]

## 2023-06-02 ENCOUNTER — APPOINTMENT (OUTPATIENT)
Dept: LAB | Facility: CLINIC | Age: 83
End: 2023-06-02
Payer: COMMERCIAL

## 2023-06-02 DIAGNOSIS — D32.9 MENINGIOMA (HCC): ICD-10-CM

## 2023-06-02 LAB
BUN SERPL-MCNC: 16 MG/DL (ref 5–25)
CREAT SERPL-MCNC: 0.72 MG/DL (ref 0.6–1.3)
GFR SERPL CREATININE-BSD FRML MDRD: 77 ML/MIN/1.73SQ M

## 2023-06-02 PROCEDURE — 36415 COLL VENOUS BLD VENIPUNCTURE: CPT

## 2023-06-02 PROCEDURE — 84520 ASSAY OF UREA NITROGEN: CPT

## 2023-06-02 PROCEDURE — 82565 ASSAY OF CREATININE: CPT

## 2023-06-03 ENCOUNTER — APPOINTMENT (OUTPATIENT)
Dept: RADIOLOGY | Facility: CLINIC | Age: 83
End: 2023-06-03
Payer: COMMERCIAL

## 2023-06-03 ENCOUNTER — OFFICE VISIT (OUTPATIENT)
Dept: URGENT CARE | Facility: CLINIC | Age: 83
End: 2023-06-03
Payer: COMMERCIAL

## 2023-06-03 VITALS
HEART RATE: 67 BPM | BODY MASS INDEX: 23.98 KG/M2 | TEMPERATURE: 97 F | RESPIRATION RATE: 18 BRPM | OXYGEN SATURATION: 95 % | WEIGHT: 127 LBS | HEIGHT: 61 IN

## 2023-06-03 DIAGNOSIS — S69.92XA INJURY OF LEFT WRIST, INITIAL ENCOUNTER: ICD-10-CM

## 2023-06-03 DIAGNOSIS — S69.92XA INJURY OF LEFT WRIST, INITIAL ENCOUNTER: Primary | ICD-10-CM

## 2023-06-03 DIAGNOSIS — S62.115A NONDISPLACED FRACTURE OF TRIQUETRUM (CUNEIFORM) BONE, LEFT WRIST, INITIAL ENCOUNTER FOR CLOSED FRACTURE: ICD-10-CM

## 2023-06-03 DIAGNOSIS — S00.212A ABRASION OF LEFT EYEBROW, INITIAL ENCOUNTER: ICD-10-CM

## 2023-06-03 PROCEDURE — 73110 X-RAY EXAM OF WRIST: CPT

## 2023-06-03 PROCEDURE — 99213 OFFICE O/P EST LOW 20 MIN: CPT | Performed by: PHYSICIAN ASSISTANT

## 2023-06-03 PROCEDURE — 73130 X-RAY EXAM OF HAND: CPT

## 2023-06-03 NOTE — PATIENT INSTRUCTIONS
Left hand sprain/contusion:   -There is possible fracture seen over the triquetrum as per Radiologist report  -Will place in a splint today   -Ice the area for 20 minutes 3-4 times a day  -Elevate the area and rest   -You can take Advil or Tylenol for the pain  -Avoid strenuous activity/sports or gym until your symptoms improve  -Follow up with Dr Miguel For further evaluation and management  within 72 hours  Left eyebrow abrasion:   -Red flag signs discussed and ED precautions discussed in depth with the patient and her daughter  No sign of head injury or focal neurological deficits today  Wound care discussed for abrasion  Monitor closely with follow up in 2-3 days

## 2023-06-03 NOTE — PROGRESS NOTES
[facility]        NAME: Haylie Martin is a 79 y o  female  : 1940    MRN: [id number]  DATE: Julieta 3, 2023  TIME: 10:04AM    Assessment and Plan   Injury of left wrist, initial encounter [S69 [id number]]  1  Injury of left wrist, initial encounter  XR wrist 3+ vw left      2  Abrasion of left eyebrow, initial encounter        3  Nondisplaced fracture of triquetrum (cuneiform) bone, left wrist, initial encounter for closed fracture              Patient Instructions   Left hand sprain/contusion:   -There is possible fracture seen over the triquetrum as per Radiologist report  -Will place in a splint today   -Ice the area for 20 minutes 3-4 times a day  -Elevate the area and rest   -You can take Advil or Tylenol for the pain  -Avoid strenuous activity/sports or gym until your symptoms improve  -Follow up with Dr Miguel For further evaluation and management  within 72 hours  Left eyebrow abrasion:   -Red flag signs discussed and ED precautions discussed in depth with the patient and her daughter  No sign of head injury or focal neurological deficits today  Wound care discussed for abrasion  Monitor closely with follow up in 2-3 days  Follow up with PCP in 3-5 days  Proceed to  ER if symptoms worsen  Chief Complaint     Chief Complaint   Patient presents with   • Wrist Injury     Wrist injury left wrist swelling and pain  Fall injured lt eye bruised   Having MRI for Meningioma Friday  History of Present Illness       The patient is an 26-year-old female who presents today for L wrist injury and pain x 1 day  The patient states that yesterday she was walking outside and stumbled and fell onto concrete  There was no LOC  No headache, dizziness, visual changes, nausea, vomiting  She did impact her head as she has a small abrasion over the L eyebrow  No facial drooping or change in mentation or gait   Her chief complaint today is L wrist pain and hand pain over the dorsal aspect of the hand, specifically over the first and second digits and thenar eminence  No snuffbox tenderness  No weakness, numbness or tingling of the upper extremities  She has full ROM of the wrist and hand  She has an MRI scheduled for Friday for a meningeoma  No OTC measures  She denies use of blood thinners  Review of Systems   Review of Systems   Constitutional: Negative for activity change, appetite change, chills, fatigue and fever  Eyes: Negative for visual disturbance  Respiratory: Negative for shortness of breath  Cardiovascular: Negative for chest pain  Musculoskeletal: Positive for arthralgias and joint swelling  Negative for back pain, gait problem, myalgias, neck pain and neck stiffness  Skin: Positive for wound  Negative for color change, pallor and rash  Allergic/Immunologic: Negative for immunocompromised state  Neurological: Negative for dizziness, tremors, syncope, facial asymmetry, weakness, light-headedness, numbness and headaches  Hematological: Does not bruise/bleed easily  Psychiatric/Behavioral: Negative for agitation, confusion and decreased concentration           Current Medications       Current Outpatient Medications:   •  Ascorbic Acid (Vitamin C) 250 MG CHEW, , Disp: , Rfl:   •  cholecalciferol (VITAMIN D3) 1,000 units tablet, Take 1,000 Units by mouth daily, Disp: , Rfl:   •  cyanocobalamin (VITAMIN B-12) 100 mcg tablet, Take by mouth daily, Disp: , Rfl:   •  donepezil (ARICEPT) 10 mg tablet, Take 1 tablet (10 mg total) by mouth daily at bedtime, Disp: 90 tablet, Rfl: 3  •  hydrochlorothiazide (HYDRODIURIL) 25 mg tablet, take 1 tablet by mouth every other day, Disp: 30 tablet, Rfl: 1  •  loratadine (CLARITIN) 10 mg tablet, Take 10 mg by mouth daily, Disp: , Rfl:   •  LORazepam (ATIVAN) 1 mg tablet, O 5 mg in AM and one tab po PM prn (Patient taking differently: O 5 mg in AM and one tab po PM), Disp: 45 tablet, Rfl: 1  •  Melatonin 10 MG TABS, Take 1 tablet (10 mg total) by mouth daily at bedtime, Disp: 90 tablet, Rfl: 3  •  memantine (NAMENDA TITRATION PACK), Follow package directions  , Disp: 49 tablet, Rfl: 0  •  metoprolol tartrate (LOPRESSOR) 50 mg tablet, take 1 tablet by mouth twice a day, Disp: 60 tablet, Rfl: 3  •  Multiple Vitamins-Minerals (ONE-A-DAY 50 PLUS PO), , Disp: , Rfl:   •  NIFEdipine ER (ADALAT CC) 30 MG 24 hr tablet, take 1 tablet by mouth once daily, Disp: 90 tablet, Rfl: 1  •  famotidine (PEPCID) 40 MG tablet, Take 1 tablet (40 mg total) by mouth daily at bedtime as needed for indigestion or heartburn (Patient not taking: Reported on 6/3/2023), Disp: 30 tablet, Rfl: 1  •  guaiFENesin (MUCINEX) 600 mg 12 hr tablet, Take 2 tablets (1,200 mg total) by mouth every 12 (twelve) hours (Patient not taking: Reported on 6/3/2023), Disp: 30 tablet, Rfl: 1  •  nystatin (MYCOSTATIN) powder, Apply topically 2 (two) times a day (Patient not taking: Reported on 6/3/2023), Disp: 60 g, Rfl: 3    Current Allergies     Allergies as of 06/03/2023 - Reviewed 05/27/2023   Allergen Reaction Noted   • Ibuprofen  08/10/2005            The following portions of the patient's history were reviewed and updated as appropriate: allergies, current medications, past family history, past medical history, past social history, past surgical history and problem list      Past Medical History:   Diagnosis Date   • Allergic    • Cancer ([facility] [address] ) 2005    left breast mastectomy   • Hypertension    • Memory change    • Nodule of left lung    • Pleural thickening        Past Surgical History:   Procedure Laterality Date   • CATARACT EXTRACTION Left 05/2020   • CATARACT EXTRACTION Left 06/2020   • MASTECTOMY     • CO XCAPSL CTRC RMVL INSJ IO LENS PROSTH W/O ECP Left 6/8/2020    Procedure: EXTRACTION EXTRACAPSULAR CATARACT PHACO INTRAOCULAR LENS (IOL); Surgeon: Hal Carlton MD;  Location: [facility] MAIN OR;  Service: Ophthalmology       No family history on file        Medications have been "verified  Objective   Pulse 67   Temp (!) 97 °F (36 1 °C)   Resp 18   Ht 5' 1\" (1 549 m)   Wt 57 6 kg (127 lb)   SpO2 95%   BMI 24 00 kg/m²   No LMP recorded  Patient is postmenopausal        Physical Exam     Physical Exam  Vitals and nursing note reviewed  Constitutional:       General: She is not in acute distress  Appearance: She is well-developed  She is not ill-appearing, toxic-appearing or diaphoretic  Cardiovascular:      Rate and Rhythm: Normal rate and regular rhythm  Heart sounds: Normal heart sounds  Pulmonary:      Effort: Pulmonary effort is normal       Breath sounds: Normal breath sounds  Musculoskeletal:      Right wrist: Normal       Left wrist: Swelling and tenderness present  No deformity, effusion, lacerations, bony tenderness, snuff box tenderness or crepitus  Normal range of motion  Normal pulse  Right hand: Normal       Left hand: Swelling and tenderness present  No deformity, lacerations or bony tenderness  Normal range of motion  Normal strength  Normal sensation  There is no disruption of two-point discrimination  Normal capillary refill  Normal pulse  Comments: LUE: There is edema over the dorsal aspect of the distal radius and 1st and 2nd digits with mild tenderness  She has Full ROM of the digits and wrist  Sensation and strength is intact  No ecchymosis  Capillary refill is <2s  Skin:     General: Skin is warm and dry  Capillary Refill: Capillary refill takes less than 2 seconds  Findings: Bruising and ecchymosis present  No erythema  Comments: Abrasion ~0 5cm over the L eyebrow  No oozing or drainage  There is mild ecchymosis that is dependent around the berny-orbital area  No edema or tenderness  No crepitus  Neurological:      General: No focal deficit present  Mental Status: She is alert  Mental status is at baseline  Cranial Nerves: Cranial nerves 2-12 are intact   No cranial nerve deficit, dysarthria or facial " asymmetry  Sensory: Sensation is intact  No sensory deficit  Motor: Motor function is intact  No weakness, abnormal muscle tone or pronator drift  Coordination: Coordination is intact  Romberg sign negative  Gait: Gait is intact  Gait normal       Deep Tendon Reflexes: Reflexes are normal and symmetric     Psychiatric:         Behavior: Behavior normal

## 2023-06-04 PROBLEM — S62.115A NONDISPLACED FRACTURE OF TRIQUETRUM (CUNEIFORM) BONE, LEFT WRIST, INITIAL ENCOUNTER FOR CLOSED FRACTURE: Status: ACTIVE | Noted: 2023-06-04

## 2023-06-06 DIAGNOSIS — I10 ESSENTIAL HYPERTENSION: ICD-10-CM

## 2023-06-06 RX ORDER — METOPROLOL TARTRATE 50 MG/1
TABLET, FILM COATED ORAL
Qty: 60 TABLET | Refills: 3 | Status: SHIPPED | OUTPATIENT
Start: 2023-06-06

## 2023-06-09 ENCOUNTER — HOSPITAL ENCOUNTER (OUTPATIENT)
Dept: RADIOLOGY | Facility: HOSPITAL | Age: 83
Discharge: HOME | End: 2023-06-09
Payer: COMMERCIAL

## 2023-06-09 DIAGNOSIS — D32.9 MENINGIOMA (HCC): ICD-10-CM

## 2023-06-09 PROCEDURE — A9585 GADOBUTROL INJECTION: HCPCS | Performed by: PHYSICIAN ASSISTANT

## 2023-06-09 PROCEDURE — 70553 MRI BRAIN STEM W/O & W/DYE: CPT

## 2023-06-09 PROCEDURE — G1004 CDSM NDSC: HCPCS

## 2023-06-09 RX ADMIN — GADOBUTROL 6 ML: 604.72 INJECTION INTRAVENOUS at 11:50

## 2023-06-12 ENCOUNTER — TELEPHONE (OUTPATIENT)
Dept: FAMILY MEDICINE CLINIC | Facility: CLINIC | Age: 83
End: 2023-06-12

## 2023-06-12 NOTE — TELEPHONE ENCOUNTER
Patient's daughter says patient has been taking titration pill to help with confusion, for 3 weeks, but patient seems more confused  Patient did not recognize daughter this morning  Please give daughter a call to discuss

## 2023-06-13 ENCOUNTER — TELEMEDICINE (OUTPATIENT)
Dept: FAMILY MEDICINE CLINIC | Facility: CLINIC | Age: 83
End: 2023-06-13
Payer: COMMERCIAL

## 2023-06-13 VITALS — TEMPERATURE: 98.2 F | HEART RATE: 66 BPM | DIASTOLIC BLOOD PRESSURE: 94 MMHG | SYSTOLIC BLOOD PRESSURE: 140 MMHG

## 2023-06-13 DIAGNOSIS — R82.90 MALODOROUS URINE: Primary | ICD-10-CM

## 2023-06-13 DIAGNOSIS — F03.918 DEMENTIA, SENILE, WITH BEHAVIORAL DISTURBANCE (HCC): ICD-10-CM

## 2023-06-13 LAB
SL AMB  POCT GLUCOSE, UA: ABNORMAL
SL AMB LEUKOCYTE ESTERASE,UA: 75
SL AMB POCT BILIRUBIN,UA: ABNORMAL
SL AMB POCT BLOOD,UA: ABNORMAL
SL AMB POCT CLARITY,UA: CLEAR
SL AMB POCT COLOR,UA: YELLOW
SL AMB POCT KETONES,UA: ABNORMAL
SL AMB POCT NITRITE,UA: ABNORMAL
SL AMB POCT PH,UA: 6.5
SL AMB POCT SPECIFIC GRAVITY,UA: 1.01
SL AMB POCT URINE PROTEIN: ABNORMAL
SL AMB POCT UROBILINOGEN: ABNORMAL

## 2023-06-13 PROCEDURE — NC001 PR NO CHARGE: Performed by: FAMILY MEDICINE

## 2023-06-13 PROCEDURE — 99442 PR PHYS/QHP TELEPHONE EVALUATION 11-20 MIN: CPT | Performed by: FAMILY MEDICINE

## 2023-06-15 LAB
BACTERIA UR CULT: ABNORMAL
Lab: ABNORMAL

## 2023-06-16 ENCOUNTER — OFFICE VISIT (OUTPATIENT)
Dept: NEUROSURGERY | Facility: CLINIC | Age: 83
End: 2023-06-16
Payer: COMMERCIAL

## 2023-06-16 VITALS
HEIGHT: 61 IN | BODY MASS INDEX: 23.98 KG/M2 | TEMPERATURE: 97.3 F | SYSTOLIC BLOOD PRESSURE: 100 MMHG | HEART RATE: 96 BPM | OXYGEN SATURATION: 99 % | DIASTOLIC BLOOD PRESSURE: 64 MMHG | WEIGHT: 127 LBS

## 2023-06-16 DIAGNOSIS — D32.9 MENINGIOMA (HCC): Primary | ICD-10-CM

## 2023-06-16 PROCEDURE — 99213 OFFICE O/P EST LOW 20 MIN: CPT | Performed by: PHYSICIAN ASSISTANT

## 2023-06-16 RX ORDER — GUAIFENESIN 600 MG/1
1200 TABLET, EXTENDED RELEASE ORAL EVERY 12 HOURS SCHEDULED
COMMUNITY

## 2023-06-16 NOTE — PROGRESS NOTES
"Office Note - Neurosurgery   Lyndsay Mireles 79 y o  female MRN: [id number]      Assessment:    Patient is 79years old pleasant woman  with past medical history of dementia, hypertension, breast cancer, hyperlipidemia, anxiety,  Insomnia, and left lateral temporal meningioma  Patient is here today with her family for 3 months follow-up with MRI of brain  Image shows stable 1 4 x 1 0 x 1 2 cm in the left lateral temporal region without surrounding edema  She has baseline cognitive dysfunction and gait instability, but denies any headache,vision seizures,  weakness in the extremities  No nausea, vomiting, speech or swallowing issues      Exam- Alert but Confused and disoriented to X4, Communicates well  PERRL, EOMI 2 mm conj bilaterally  Finger to nose dysmetria bilaterally, other wise no drift in both arms  Strength 5/5 bilaterally and sensation to LT intact throughout  Gait unstable  Hx, PEx  & images reviewed with the patient  Mx plan discussed  Patient stable and without new neurological development, but progressed cognitive decline per family  Left temporal lobe meningioma appears stable  I would recommend follow up surveillance MRI of brain w/wo contrast in 6-9 months  Advised family to call office or go to ER with development of new neurological symptoms  All questions and concerns were answered  Family understands the instructions and agreed with the plan  Plan:  1  MRI of brain with and without contrast/6-9 months  2  BUN and creatinine  3  Advised to call office or go to emergency room with development of new neurological symptoms  4  Follow-up in 6 months  5  Call with question or concern      Subjective/Objective     C/C: \" Meningioma follow up-3 months\"        HPI  All patient's medical histories were reviewed and updated as appropriate:     Allergies, current medication disease, past medical history, past surgical history, family history, social history, and current medical lists    Patient " is a 79years old pleasant woman here today for 3 months follow-up of left temporal meningioma  Follow-up MRI of brain with and without contrast which demonstrates stable lesion in the left lateral temporal region  Patient with cognitive issues in the setting of dementia, denies any headache, nausea, vomiting, new vision issues, weakness in the extremities, seizures, or swallowing or speech issues  Baseline gait instability  Has some insomnia, taking melatonin  Patient also taking memantine, reports diarrhea after she starts the medications  Her daughter reports this patient has chronic postnasal drip, with coughing  Otherwise denies any fever, chills, rigors, or chest pain  ROS  Review of system personally reviewed and updated  Review of Systems   HENT:        H/o Allergies     Eyes: Negative for visual disturbance (blurry- pt has cataracts-H/o left eye cataract sx)  Genitourinary: Positive for enuresis, frequency and urgency  Musculoskeletal: Positive for gait problem (wobbling- pt broke Collar bone 1/2023)  Neurological: Positive for speech difficulty (when tired)  Np solo ap CT head 1/11/2023    no previous sx     cc slow (Gait)-wobbling- pt broke Collar bone 1/2023 blurry vision ( Pt has Cataracts) also c/o Difficult speech ( when tired)  and urinary incontinence @ night     Presented @ [facility] ED 1/11/23    h/o Breast cancer and radiation/Chemo x years ago    Psychiatric/Behavioral: Positive for confusion (noted by family, Memory loss , per family a little worse than before) and decreased concentration  Family History    No family history on file  Social History    Social History     Socioeconomic History   • Marital status:       Spouse name: Not on file   • Number of children: Not on file   • Years of education: Not on file   • Highest education level: Not on file   Occupational History   • Not on file   Tobacco Use   • Smoking status: Never   • Smokeless tobacco: Never Vaping Use   • Vaping Use: Never used   Substance and Sexual Activity   • Alcohol use: Never   • Drug use: Never   • Sexual activity: Not Currently   Other Topics Concern   • Not on file   Social History Narrative   • Not on file     Social Determinants of Health     Financial Resource Strain: Low Risk  (2/17/2023)    Overall Financial Resource Strain (CARDIA)    • Difficulty of Paying Living Expenses: Not hard at all   Food Insecurity: Not on file   Transportation Needs: No Transportation Needs (2/17/2023)    PRAPARE - Transportation    • Lack of Transportation (Medical): No    • Lack of Transportation (Non-Medical): No   Physical Activity: Not on file   Stress: Not on file   Social Connections: Not on file   Intimate Partner Violence: Not on file   Housing Stability: Not on file       Past Medical History    Past Medical History:   Diagnosis Date   • Allergic    • Cancer [facility]) 2005    left breast mastectomy   • Hypertension    • Memory change    • Nodule of left lung    • Pleural thickening        Surgical History    Past Surgical History:   Procedure Laterality Date   • CATARACT EXTRACTION Left 05/2020   • CATARACT EXTRACTION Left 06/2020   • MASTECTOMY     • VA XCAPSL CTRC RMVL INSJ IO LENS PROSTH W/O ECP Left 6/8/2020    Procedure: EXTRACTION EXTRACAPSULAR CATARACT PHACO INTRAOCULAR LENS (IOL);   Surgeon: Lynnette Banerjee MD;  Location: [facility] MAIN OR;  Service: Ophthalmology       Medications      Current Outpatient Medications:   •  Ascorbic Acid (Vitamin C) 250 MG CHEW, , Disp: , Rfl:   •  cholecalciferol (VITAMIN D3) 1,000 units tablet, Take 1,000 Units by mouth daily, Disp: , Rfl:   •  cyanocobalamin (VITAMIN B-12) 100 mcg tablet, Take by mouth daily, Disp: , Rfl:   •  donepezil (ARICEPT) 10 mg tablet, Take 1 tablet (10 mg total) by mouth daily at bedtime, Disp: 90 tablet, Rfl: 3  •  guaiFENesin (MUCINEX) 600 mg 12 hr tablet, Take 1,200 mg by mouth every 12 (twelve) hours prn, Disp: , Rfl:   • hydrochlorothiazide (HYDRODIURIL) 25 mg tablet, take 1 tablet by mouth every other day, Disp: 30 tablet, Rfl: 1  •  loratadine (CLARITIN) 10 mg tablet, Take 10 mg by mouth daily, Disp: , Rfl:   •  LORazepam (ATIVAN) 1 mg tablet, O 5 mg in AM and one tab po PM prn (Patient taking differently: O 5 mg in AM and one tab po PM), Disp: 45 tablet, Rfl: 1  •  Melatonin 10 MG TABS, Take 1 tablet (10 mg total) by mouth daily at bedtime, Disp: 90 tablet, Rfl: 3  •  memantine (NAMENDA TITRATION PACK), Follow package directions  , Disp: 49 tablet, Rfl: 0  •  metoprolol tartrate (LOPRESSOR) 50 mg tablet, take 1 tablet by mouth twice a day, Disp: 60 tablet, Rfl: 3  •  Multiple Vitamins-Minerals (ONE-A-DAY 50 PLUS PO), , Disp: , Rfl:   •  NIFEdipine ER (ADALAT CC) 30 MG 24 hr tablet, take 1 tablet by mouth once daily, Disp: 90 tablet, Rfl: 1    Allergies    Allergies   Allergen Reactions   • Ibuprofen      Reaction Date: 10Aug2005; The following portions of the patient's history were reviewed and updated as appropriate: allergies, current medications, past family history, past medical history, past social history, past surgical history and problem list     Investigations    I personally reviewed the MRI results with the patient:        Physical Exam    Vitals:    06/16/23 1328   BP: 100/64   Pulse: 96   Temp: (!) 97 3 °F (36 3 °C)   SpO2: 99%       Physical Exam  Constitutional:       Comments: Patient confused and disoriented but communicates well and follows commands   HENT:      Head: Normocephalic and atraumatic  Eyes:      Extraocular Movements: Extraocular movements intact  Pupils: Pupils are equal, round, and reactive to light  Cardiovascular:      Pulses: Normal pulses  Pulmonary:      Effort: Pulmonary effort is normal    Musculoskeletal:      Cervical back: Normal range of motion  Neurological:      General: No focal deficit present  Mental Status: She is alert  She is disoriented        GCS: GCS eye subscore is 4  GCS verbal subscore is 4  GCS motor subscore is 6  Cranial Nerves: Cranial nerves 2-12 are intact  Sensory: Sensation is intact  Motor: Motor function is intact  Deep Tendon Reflexes: Reflexes are normal and symmetric  Reflex Scores:       Tricep reflexes are 2+ on the right side and 2+ on the left side  Bicep reflexes are 2+ on the right side and 2+ on the left side  Brachioradialis reflexes are 2+ on the right side and 2+ on the left side  Patellar reflexes are 2+ on the right side and 2+ on the left side  Achilles reflexes are 2+ on the right side and 2+ on the left side  Psychiatric:         Speech: Speech normal        Neurologic Exam     Mental Status   Speech: speech is normal   Level of consciousness: alert    Cranial Nerves   Cranial nerves II through XII intact  CN III, IV, VI   Pupils are equal, round, and reactive to light  Right pupil: Size: 2 mm  Shape: regular  Left pupil: Size: 2 mm  Shape: regular     Nystagmus: none     CN XI   CN XI normal      Motor Exam   Muscle bulk: normal  Overall muscle tone: normal  Right arm tone: normal  Left arm tone: normal  Right leg tone: normal  Left leg tone: normal    Sensory Exam   Light touch normal      Gait, Coordination, and Reflexes     Reflexes   Right brachioradialis: 2+  Left brachioradialis: 2+  Right biceps: 2+  Left biceps: 2+  Right triceps: 2+  Left triceps: 2+  Right patellar: 2+  Left patellar: 2+  Right achilles: 2+  Left achilles: 2+  Right : 2+  Left : 2+  Right Coppola: absent  Left Coppola: absent  Right ankle clonus: absent  Left pendular knee jerk: absent

## 2023-06-17 ENCOUNTER — TELEPHONE (OUTPATIENT)
Dept: FAMILY MEDICINE CLINIC | Facility: CLINIC | Age: 83
End: 2023-06-17

## 2023-06-17 NOTE — TELEPHONE ENCOUNTER
Hi, today is Saturday and it's June. I'm sorry, I'm not even your calendar. It's Saturday, June 17th. This is for Bhupinder Werner. Date of birth August 23rd, 1940. I'm trying to get a message to a doctor that can just assist us in how to wean my mother off of the newly prescribed medication Memantine. It's a titration packet. She has been experiencing loose stools and diarrhea for the last couple of weeks on and off. And that is one of the side effects. So I'm trying to understand how to best wean her off so we can try to rectify the diarrhea. If you can call me back. My name is Tatiana Santiago, her daughter, [phone]. Thank you. You received a voice mail from [employer].

## 2023-06-17 NOTE — TELEPHONE ENCOUNTER
Spoke w dtr--pt to go to 5mg for remainder of pack and then off. Also advised to schedule neurologist follow-up for addtl eval/tx recommendations. Dtr verbalized understanding and will call to schedule next week.

## 2023-06-22 ENCOUNTER — OFFICE VISIT (OUTPATIENT)
Age: 83
End: 2023-06-22

## 2023-06-22 VITALS — HEIGHT: 61 IN | TEMPERATURE: 96.5 F | WEIGHT: 127 LBS | BODY MASS INDEX: 23.98 KG/M2

## 2023-06-22 DIAGNOSIS — L57.0 ACTINIC KERATOSIS: Primary | ICD-10-CM

## 2023-06-22 NOTE — PROGRESS NOTES
"[facility]     Patient Name: Cathlyn Kehr  Encounter Date: 6/22/23     Have you been cared for by a [place] [address] Dermatologist in the last 3 years and, if so, which description applies to you? Yes  I have been here within the last 3 years, and my medical history has NOT changed since that time  I am FEMALE/of child-bearing potential     REVIEW OF SYSTEMS:  Have you recently had or currently have any of the following? · No changes in my recent health  PAST MEDICAL HISTORY:  Have you personally ever had or currently have any of the following? If \"YES,\" then please provide more detail  · No changes in my medical history  FAMILY HISTORY:  Any \"first degree relatives\" (parent, brother, sister, or child) with the following? • No changes in my family's known health  PATIENT EXPERIENCE:    • Do you want the Dermatologist to perform a COMPLETE skin exam today including a clinical examination under the \"bra and underwear\" areas? NO  • If necessary, do we have your permission to call and leave a detailed message on your Preferred Phone number that includes your specific medical information?   Yes      Allergies   Allergen Reactions   • Ibuprofen      Reaction Date: 10Aug2005;       Current Outpatient Medications:   •  Ascorbic Acid (Vitamin C) 250 MG CHEW, , Disp: , Rfl:   •  cholecalciferol (VITAMIN D3) 1,000 units tablet, Take 1,000 Units by mouth daily, Disp: , Rfl:   •  cyanocobalamin (VITAMIN B-12) 100 mcg tablet, Take by mouth daily, Disp: , Rfl:   •  donepezil (ARICEPT) 10 mg tablet, Take 1 tablet (10 mg total) by mouth daily at bedtime, Disp: 90 tablet, Rfl: 3  •  guaiFENesin (MUCINEX) 600 mg 12 hr tablet, Take 1,200 mg by mouth every 12 (twelve) hours prn, Disp: , Rfl:   •  hydrochlorothiazide (HYDRODIURIL) 25 mg tablet, take 1 tablet by mouth every other day, Disp: 30 tablet, Rfl: 1  •  loratadine (CLARITIN) 10 mg tablet, Take 10 mg by mouth daily, Disp: , Rfl:   •  LORazepam " (ATIVAN) 1 mg tablet, O 5 mg in AM and one tab po PM prn (Patient taking differently: O 5 mg in AM and one tab po PM), Disp: 45 tablet, Rfl: 1  •  Melatonin 10 MG TABS, Take 1 tablet (10 mg total) by mouth daily at bedtime, Disp: 90 tablet, Rfl: 3  •  memantine (NAMENDA TITRATION PACK), Follow package directions  , Disp: 49 tablet, Rfl: 0  •  metoprolol tartrate (LOPRESSOR) 50 mg tablet, take 1 tablet by mouth twice a day, Disp: 60 tablet, Rfl: 3  •  Multiple Vitamins-Minerals (ONE-A-DAY 50 PLUS PO), , Disp: , Rfl:   •  NIFEdipine ER (ADALAT CC) 30 MG 24 hr tablet, take 1 tablet by mouth once daily, Disp: 90 tablet, Rfl: 1          • Whom besides the patient is providing clinical information about today's encounter?   o NO ADDITIONAL HISTORIAN (patient alone provided history)    Physical Exam and Assessment/Plan by Diagnosis:    ACTINIC KERATOSIS FOLLOW UP      Physical Exam:  • Anatomic Location Affected:  vertex and left frontal scalp  • Morphological Description:  Hyperkeratotic plaques      Additional History of Present Condition:  Previously treated with cryotherapy and Fluorouracil injections, patient returns for follow up       Assessment and Plan:  Based on a thorough discussion of this condition and the management approach to it (including a comprehensive discussion of the known risks, side effects and potential benefits of treatment), the patient (family) agrees to implement the following specific plan:     • Cryotherapy done in office today   • Fluorouracil injections done in office today   • Follow up in 8 weeks      Actinic keratoses are very common on sites repeatedly exposed to the sun, especially the backs of the hands and the face, most often affecting the ears, nose, cheeks, upper lip, vermilion of the lower lip, temples, forehead and balding scalp   In severely chronically sun-damaged individuals, they may also be found on the upper trunk, upper and lower limbs, and dorsum of feet      We discussed the theoretical premalignant (“pre-cancerous”) nature and etiology of these growths   We discussed the prevailing notion that actinic keratoses are a reflection of abnormal skin cell development due to DNA damage by short wavelength UVB   They are more likely to appear if the immune function is poor, due to aging, recent sun exposure, predisposing disease or certain drugs      We discussed that the main concern is that actinic keratoses may predispose to squamous cell carcinoma  It is rare for a solitary actinic keratosis to evolve to squamous cell carcinoma (SCC), but the risk of SCC occurring at some stage in a patient with more than 10 actinic keratoses is thought to be about 10 to 15%  A tender, thickened, ulcerated or enlarging actinic keratosis is suspicious of SCC      Actinic keratoses may be prevented by strict sun protection  If already present, keratoses may improve with a very high sun protection factor (50+) broad-spectrum sunscreen applied at least daily to affected areas, year-round   We recommend that UPF-rated clothing and hats and sunglasses be worn whenever possible and that a sunscreen-moisturizer combination product such as Neutrogena Daily Defense be applied at least three times a day      We performed a thorough discussion of treatment options and specific risk/benefits/alternatives including but not limited to medical “field” treatment with medications such as the following:     • Topical “field area” medications such as 5-fluorouracil or Aldara (specifically, the trouble with long-term compliance, blistering and local skin reaction versus the convenience of at-home therapy and that field therapy “gets what is not yet seen”)       • Cryotherapy (specifically, local pain, scarring, dyspigmentation, blistering, possible superinfection, and treats “only what we see” versus directed treatment today)      • Photodynamic therapy (specifically, local pain, scarring, dyspigmentation, blistering, possible superinfection, need to schedule for a later date, and time spent in the office versus field therapy that “gets what is not yet seen”)      PROCEDURE:  DESTRUCTION OF PRE-MALIGNANT LESIONS  After a thorough discussion of treatment options and risk/benefits/alternatives (including but not limited to local pain, scarring, dyspigmentation, blistering, and possible superinfection), verbal and written consent were obtained and the aforementioned lesions were treated on with cryotherapy using liquid nitrogen x 1 cycle for 5-10 seconds      • TOTAL NUMBER of 6 pre-malignant lesions were treated today on the ANATOMIC LOCATION: scalp            The patient tolerated the procedure well, and after-care instructions were provided      Fluorouracil 500 mg injection x 2 lesions   1 5 cc used today  Lot# [id number], DLY: 09/23, NDC: 16563-461-08    Scribe Attestation    I,:  Rahat Rolle am acting as a scribe while in the presence of the attending physician :       I,:  Kt Ricketts MD personally performed the services described in this documentation    as scribed in my presence :

## 2023-06-22 NOTE — PATIENT INSTRUCTIONS
ACTINIC KERATOSIS FOLLOW UP         Assessment and Plan:  Based on a thorough discussion of this condition and the management approach to it (including a comprehensive discussion of the known risks, side effects and potential benefits of treatment), the patient (family) agrees to implement the following specific plan:     Cryotherapy done in office today   Fluorouracil injections done in office today   Follow up in 8 weeks      Actinic keratoses are very common on sites repeatedly exposed to the sun, especially the backs of the hands and the face, most often affecting the ears, nose, cheeks, upper lip, vermilion of the lower lip, temples, forehead and balding scalp  In severely chronically sun-damaged individuals, they may also be found on the upper trunk, upper and lower limbs, and dorsum of feet  We discussed the theoretical premalignant (“pre-cancerous”) nature and etiology of these growths  We discussed the prevailing notion that actinic keratoses are a reflection of abnormal skin cell development due to DNA damage by short wavelength UVB  They are more likely to appear if the immune function is poor, due to aging, recent sun exposure, predisposing disease or certain drugs  We discussed that the main concern is that actinic keratoses may predispose to squamous cell carcinoma  It is rare for a solitary actinic keratosis to evolve to squamous cell carcinoma (SCC), but the risk of SCC occurring at some stage in a patient with more than 10 actinic keratoses is thought to be about 10 to 15%  A tender, thickened, ulcerated or enlarging actinic keratosis is suspicious of SCC  Actinic keratoses may be prevented by strict sun protection  If already present, keratoses may improve with a very high sun protection factor (50+) broad-spectrum sunscreen applied at least daily to affected areas, year-round    We recommend that UPF-rated clothing and hats and sunglasses be worn whenever possible and that a sunscreen-moisturizer combination product such as Neutrogena Daily Defense be applied at least three times a day  We performed a thorough discussion of treatment options and specific risk/benefits/alternatives including but not limited to medical “field” treatment with medications such as the following:     Topical “field area” medications such as 5-fluorouracil or Aldara (specifically, the trouble with long-term compliance, blistering and local skin reaction versus the convenience of at-home therapy and that field therapy “gets what is not yet seen”)  Cryotherapy (specifically, local pain, scarring, dyspigmentation, blistering, possible superinfection, and treats “only what we see” versus directed treatment today)  Photodynamic therapy (specifically, local pain, scarring, dyspigmentation, blistering, possible superinfection, need to schedule for a later date, and time spent in the office versus field therapy that “gets what is not yet seen”)

## 2023-08-04 DIAGNOSIS — I10 BENIGN ESSENTIAL HYPERTENSION: ICD-10-CM

## 2023-08-04 RX ORDER — HYDROCHLOROTHIAZIDE 25 MG/1
TABLET ORAL
Qty: 30 TABLET | Refills: 1 | Status: SHIPPED | OUTPATIENT
Start: 2023-08-04

## 2023-08-31 ENCOUNTER — APPOINTMENT (OUTPATIENT)
Dept: LAB | Facility: CLINIC | Age: 83
End: 2023-08-31
Payer: COMMERCIAL

## 2023-08-31 DIAGNOSIS — R41.3 MEMORY LOSS: ICD-10-CM

## 2023-08-31 LAB
B BURGDOR IGG+IGM SER QL IA: NEGATIVE
FOLATE SERPL-MCNC: >22.3 NG/ML
TREPONEMA PALLIDUM IGG+IGM AB [PRESENCE] IN SERUM OR PLASMA BY IMMUNOASSAY: NORMAL
VIT B12 SERPL-MCNC: 1046 PG/ML (ref 180–914)

## 2023-08-31 PROCEDURE — 86780 TREPONEMA PALLIDUM: CPT

## 2023-08-31 PROCEDURE — 82607 VITAMIN B-12: CPT

## 2023-08-31 PROCEDURE — 36415 COLL VENOUS BLD VENIPUNCTURE: CPT

## 2023-08-31 PROCEDURE — 82746 ASSAY OF FOLIC ACID SERUM: CPT

## 2023-08-31 PROCEDURE — 86618 LYME DISEASE ANTIBODY: CPT

## 2023-09-07 ENCOUNTER — OFFICE VISIT (OUTPATIENT)
Age: 83
End: 2023-09-07
Payer: COMMERCIAL

## 2023-09-07 VITALS — WEIGHT: 127 LBS | BODY MASS INDEX: 23.98 KG/M2 | HEIGHT: 61 IN | TEMPERATURE: 98.6 F

## 2023-09-07 DIAGNOSIS — L57.0 ACTINIC KERATOSIS: Primary | ICD-10-CM

## 2023-09-07 PROCEDURE — 17003 DESTRUCT PREMALG LES 2-14: CPT | Performed by: DERMATOLOGY

## 2023-09-07 PROCEDURE — 17000 DESTRUCT PREMALG LESION: CPT | Performed by: DERMATOLOGY

## 2023-09-07 RX ORDER — QUETIAPINE FUMARATE 25 MG/1
TABLET, FILM COATED ORAL
COMMUNITY
Start: 2023-08-21

## 2023-09-07 NOTE — PATIENT INSTRUCTIONS
Actinic Keratosis Follow up      Assessment and Plan:  Based on a thorough discussion of this condition and the management approach to it (including a comprehensive discussion of the known risks, side effects and potential benefits of treatment), the patient (family) agrees to implement the following specific plan:  Cryotherapy done in the office  Fluorouracil done in the office today  3 month follow up      PROCEDURE:  INTRALESIONAL STEROID (KENALOG INJECTION) AND EFUDEX INJECTION     Purpose: Triamcinolone is a synthetic glucocorticoid corticosteroid that has marked anti-inflammatory action. It is prepared in sterile aqueous suspension suitable for injecting directly into a lesion on or immediately below the skin to treat a dermal inflammatory process. Indications: It is indicated for alopecia areata; inflammatory acne cysts; discoid lupus erythematosus; keloids and hypertrophic scars; inflammatory lesions of granuloma annulare, lichen planus, lichen simplex chronicus (neurodermatitis), psoriatic plaques, and other localized inflammatory skin conditions. Potential Side Effects: I understand that triamcinolone injection can potentially cause early and/or delayed adverse effects such as:    Pain    Impaired wound healing    Increased hair growth    Bleeding    White or brown marks    Steroid acne    Infection    Telangiectasia    Skin thinning    Cutaneous and subcutaneous lipoatrophy (most common) appearing as skin indentations or dimples around the injection sites a few weeks after treatment      PROCEDURE NOTE:  After verbal and written consent were obtained, the to-be-treated area was wiped and cleaned with rubbing alcohol 70%. There was less than 1 mL of blood loss and little to no discomfort. The area was bandaged with a Band-aid. The patient tolerated the procedure well and remained in the office for observation.   With no signs of an adverse reaction, the patient was eventually discharged from clinic. PROCEDURE:  DESTRUCTION OF PRE-MALIGNANT LESIONS  After a thorough discussion of treatment options and risk/benefits/alternatives (including but not limited to local pain, scarring, dyspigmentation, blistering, and possible superinfection), verbal and written consent were obtained and the aforementioned lesions were treated on with cryotherapy using liquid nitrogen x 1 cycle for 5-10 seconds. TOTAL NUMBER of 3 pre-malignant lesions were treated today on the ANATOMIC LOCATION: Scalp. The patient tolerated the procedure well, and after-care instructions were provided.

## 2023-09-07 NOTE — PROGRESS NOTES
[facility]     Patient Name: Jose Gillette  Encounter Date: 9/7/23     Have you been cared for by a [facility] in the last 3 years and, if so, which description applies to you? Yes. I have been here within the last 3 years, and my medical history has NOT changed since that time. I am FEMALE/of child-bearing potential.    REVIEW OF SYSTEMS:  Have you recently had or currently have any of the following? No changes in my recent health. PAST MEDICAL HISTORY:  Have you personally ever had or currently have any of the following? If "YES," then please provide more detail. No changes in my medical history. FAMILY HISTORY:  Any "first degree relatives" (parent, brother, sister, or child) with the following? No changes in my family's known health. PATIENT EXPERIENCE:    Do you want the Dermatologist to perform a COMPLETE skin exam today including a clinical examination under the "bra and underwear" areas? NO  If necessary, do we have your permission to call and leave a detailed message on your Preferred Phone number that includes your specific medical information?   Yes      Allergies   Allergen Reactions   • Ibuprofen      Reaction Date: 10Aug2005;       Current Outpatient Medications:   •  Ascorbic Acid (Vitamin C) 250 MG CHEW, , Disp: , Rfl:   •  cholecalciferol (VITAMIN D3) 1,000 units tablet, Take 1,000 Units by mouth daily, Disp: , Rfl:   •  cyanocobalamin (VITAMIN B-12) 100 mcg tablet, Take by mouth daily, Disp: , Rfl:   •  donepezil (ARICEPT) 10 mg tablet, Take 1 tablet (10 mg total) by mouth daily at bedtime, Disp: 90 tablet, Rfl: 3  •  guaiFENesin (MUCINEX) 600 mg 12 hr tablet, Take 1,200 mg by mouth every 12 (twelve) hours prn, Disp: , Rfl:   •  hydrochlorothiazide (HYDRODIURIL) 25 mg tablet, take 1 tablet by mouth every other day, Disp: 30 tablet, Rfl: 1  •  loratadine (CLARITIN) 10 mg tablet, Take 10 mg by mouth daily, Disp: , Rfl:   •  LORazepam (ATIVAN) 1 mg tablet, take 1/2 tablet by mouth IN THE MORNING and 1 tablet IN THE EVENING if needed, Disp: 45 tablet, Rfl: 1  •  Melatonin 10 MG TABS, Take 1 tablet (10 mg total) by mouth daily at bedtime, Disp: 90 tablet, Rfl: 3  •  metoprolol tartrate (LOPRESSOR) 50 mg tablet, take 1 tablet by mouth twice a day, Disp: 60 tablet, Rfl: 3  •  Multiple Vitamins-Minerals (ONE-A-DAY 50 PLUS PO), , Disp: , Rfl:   •  NIFEdipine ER (ADALAT CC) 30 MG 24 hr tablet, take 1 tablet by mouth once daily, Disp: 90 tablet, Rfl: 1  •  QUEtiapine (SEROquel) 25 mg tablet, , Disp: , Rfl:   •  memantine (NAMENDA TITRATION PACK), Follow package directions. , Disp: 49 tablet, Rfl: 0          Whom besides the patient is providing clinical information about today's encounter? NO ADDITIONAL HISTORIAN (patient alone provided history)    Physical Exam and Assessment/Plan by Diagnosis:      Actinic Keratosis Follow up  Physical Exam:  Anatomic Location Affected:  scalp  Morphological Description:  Hyperkeratotic Plaques  Pertinent Positives:  Pertinent Negatives: Additional History of Present Condition:  Previously treated with cryotherapy and Fluorouracil injections. Presents with daughter who states some lesions are still there. Assessment and Plan:  Based on a thorough discussion of this condition and the management approach to it (including a comprehensive discussion of the known risks, side effects and potential benefits of treatment), the patient (family) agrees to implement the following specific plan:  Cryotherapy done in the office  Fluorouracil done in the office today  3 month follow up      PROCEDURE:  INTRALESIONAL STEROID (KENALOG INJECTION) AND EFUDEX INJECTION     Purpose: Triamcinolone is a synthetic glucocorticoid corticosteroid that has marked anti-inflammatory action. It is prepared in sterile aqueous suspension suitable for injecting directly into a lesion on or immediately below the skin to treat a dermal inflammatory process. Indications: It is indicated for alopecia areata; inflammatory acne cysts; discoid lupus erythematosus; keloids and hypertrophic scars; inflammatory lesions of granuloma annulare, lichen planus, lichen simplex chronicus (neurodermatitis), psoriatic plaques, and other localized inflammatory skin conditions. Potential Side Effects: I understand that triamcinolone injection can potentially cause early and/or delayed adverse effects such as:   • Pain   • Impaired wound healing   • Increased hair growth   • Bleeding   • White or brown marks   • Steroid acne   • Infection   • Telangiectasia   • Skin thinning   • Cutaneous and subcutaneous lipoatrophy (most common) appearing as skin indentations or dimples around the injection sites a few weeks after treatment      PROCEDURE NOTE:  After verbal and written consent were obtained, the to-be-treated area was wiped and cleaned with rubbing alcohol 70%. Then, a total of  Fluorouracil : 0.2 mg/mL   (Lot# ; Expiration 9/23, NDC#: 84913-332-77)  was injected intralesionally into a total of 2 lesion/s on the following anatomic areas:  scalp using a 1-mL syringe and a 30-gauge needle. There was less than 1 mL of blood loss and little to no discomfort. The area was bandaged with a Band-aid. The patient tolerated the procedure well and remained in the office for observation. With no signs of an adverse reaction, the patient was eventually discharged from clinic. PROCEDURE:  DESTRUCTION OF PRE-MALIGNANT LESIONS  After a thorough discussion of treatment options and risk/benefits/alternatives (including but not limited to local pain, scarring, dyspigmentation, blistering, and possible superinfection), verbal and written consent were obtained and the aforementioned lesions were treated on with cryotherapy using liquid nitrogen x 1 cycle for 5-10 seconds. TOTAL NUMBER of 5 pre-malignant lesions were treated today on the ANATOMIC LOCATION: Scalp.      The patient tolerated the procedure well, and after-care instructions were provided.     Scribe Attestation    I,:  Belcher Castleton, MA am acting as a scribe while in the presence of the attending physician.:       I,:  Roosevelt Leal MD personally performed the services described in this documentation    as scribed in my presence.:

## 2023-09-11 DIAGNOSIS — H57.89 EYE DISCHARGE: ICD-10-CM

## 2023-09-11 DIAGNOSIS — H01.00B BLEPHARITIS OF UPPER AND LOWER EYELIDS OF BOTH EYES, UNSPECIFIED TYPE: Primary | ICD-10-CM

## 2023-09-11 DIAGNOSIS — H01.00A BLEPHARITIS OF UPPER AND LOWER EYELIDS OF BOTH EYES, UNSPECIFIED TYPE: Primary | ICD-10-CM

## 2023-09-11 DIAGNOSIS — G47.00 INSOMNIA, UNSPECIFIED TYPE: ICD-10-CM

## 2023-09-11 DIAGNOSIS — F03.918 DEMENTIA WITH BEHAVIORAL DISTURBANCE (HCC): ICD-10-CM

## 2023-09-11 DIAGNOSIS — F41.9 ANXIETY: ICD-10-CM

## 2023-09-11 RX ORDER — TOBRAMYCIN AND DEXAMETHASONE 3; 1 MG/ML; MG/ML
1 SUSPENSION/ DROPS OPHTHALMIC
Qty: 5 ML | Refills: 0 | Status: SHIPPED | OUTPATIENT
Start: 2023-09-11

## 2023-09-11 RX ORDER — LORAZEPAM 1 MG/1
TABLET ORAL
Qty: 45 TABLET | Refills: 0 | Status: SHIPPED | OUTPATIENT
Start: 2023-09-11

## 2023-09-11 NOTE — TELEPHONE ENCOUNTER
Patient's daughter says patient's eyes are  Red and swollen, feel gritty. Not sure if she is having allergic reaction. I scheduled virtual appointment tomorrow morning with Dr Lizbeth Demarco. Daughter wants to know if she should give benadryl in meantime. Please advise.

## 2023-09-12 ENCOUNTER — TELEMEDICINE (OUTPATIENT)
Dept: FAMILY MEDICINE CLINIC | Facility: CLINIC | Age: 83
End: 2023-09-12
Payer: COMMERCIAL

## 2023-09-12 DIAGNOSIS — F03.90 DEMENTIA WITHOUT BEHAVIORAL DISTURBANCE (HCC): ICD-10-CM

## 2023-09-12 DIAGNOSIS — H10.33 ACUTE CONJUNCTIVITIS OF BOTH EYES, UNSPECIFIED ACUTE CONJUNCTIVITIS TYPE: Primary | ICD-10-CM

## 2023-09-12 PROCEDURE — 99213 OFFICE O/P EST LOW 20 MIN: CPT | Performed by: STUDENT IN AN ORGANIZED HEALTH CARE EDUCATION/TRAINING PROGRAM

## 2023-09-12 NOTE — PROGRESS NOTES
Virtual Regular Visit    Verification of patient location:    Patient is located at Home in the following state in which I hold an active license [place]      Assessment/Plan:    Problem List Items Addressed This Visit        Nervous and Auditory    Dementia without behavioral disturbance ([address])   Other Visit Diagnoses     Acute conjunctivitis of both eyes, unspecified acute conjunctivitis type    -  Primary        Patient is a pleasant 54-year-old female accompanied by family member for virtual visit secondary to discharge bilateral eyes that is been going on for few days now. Appears to be allergy related with heavy bags below the eyes, no signs of orbital cellulitis, no pain, no vision changes. Recommend continuing Tobradex with OTC Claritin, if symptoms worsen or not responding to medical therapy reevaluation in office recommended. Reason for visit is   Chief Complaint   Patient presents with   • Virtual Regular Visit        Encounter provider Neo Gaines DO    Provider located at [address] [place] [id number]      Recent Visits  No visits were found meeting these conditions. Showing recent visits within past 7 days and meeting all other requirements  Today's Visits  Date Type Provider Dept   09/12/23 Telemedicine [id number] [facility] today's visits and meeting all other requirements  Future Appointments  No visits were found meeting these conditions. Showing future appointments within next 150 days and meeting all other requirements       The patient was identified by name and date of birth. Lalo Sears was informed that this is a telemedicine visit and that the visit is being conducted through the [employer]. She agrees to proceed. .  My office door was closed. No one else was in the room. She acknowledged consent and understanding of privacy and security of the video platform. The patient has agreed to participate and understands they can discontinue the visit at any time. Patient is aware this is a billable service. Catrina Vázquez is a 79 y.o. female same-day virtual visit secondary to allergy swelling below the eyes, currently on Claritin, was given tobramycin with dexamethasone eyedrops yesterday. Past Medical History:   Diagnosis Date   • Allergic    • Cancer [facility]) 2005    left breast mastectomy   • Hypertension    • Memory change    • Nodule of left lung    • Pleural thickening        Past Surgical History:   Procedure Laterality Date   • CATARACT EXTRACTION Left 05/2020   • CATARACT EXTRACTION Left 06/2020   • MASTECTOMY     • OH XCAPSL CTRC RMVL INSJ IO LENS PROSTH W/O ECP Left 6/8/2020    Procedure: EXTRACTION EXTRACAPSULAR CATARACT PHACO INTRAOCULAR LENS (IOL);   Surgeon: Karlene Simmons MD;  Location: [facility] MAIN OR;  Service: Ophthalmology       Current Outpatient Medications   Medication Sig Dispense Refill   • Ascorbic Acid (Vitamin C) 250 MG CHEW      • cholecalciferol (VITAMIN D3) 1,000 units tablet Take 1,000 Units by mouth daily     • cyanocobalamin (VITAMIN B-12) 100 mcg tablet Take by mouth daily     • donepezil (ARICEPT) 10 mg tablet Take 1 tablet (10 mg total) by mouth daily at bedtime 90 tablet 3   • guaiFENesin (MUCINEX) 600 mg 12 hr tablet Take 1,200 mg by mouth every 12 (twelve) hours prn     • hydrochlorothiazide (HYDRODIURIL) 25 mg tablet take 1 tablet by mouth every other day 30 tablet 1   • loratadine (CLARITIN) 10 mg tablet Take 10 mg by mouth daily     • LORazepam (ATIVAN) 1 mg tablet take 1/2 tablet by mouth IN THE MORNING and 1 tablet IN THE EVENING if needed 45 tablet 0   • Melatonin 10 MG TABS Take 1 tablet (10 mg total) by mouth daily at bedtime 90 tablet 3   • metoprolol tartrate (LOPRESSOR) 50 mg tablet take 1 tablet by mouth twice a day 60 tablet 3   • Multiple Vitamins-Minerals (ONE-A-DAY 50 PLUS PO)      • NIFEdipine ER (ADALAT CC) 30 MG 24 hr tablet take 1 tablet by mouth once daily 90 tablet 1   • QUEtiapine (SEROquel) 25 mg tablet      • tobramycin-dexamethasone (TOBRADEX) ophthalmic suspension Administer 1 drop to both eyes every 4 (four) hours while awake May also apply addtl drop to lid area q 4 hrs while awake--both to eye and lid  for 3-5 days 5 mL 0     No current facility-administered medications for this visit. Allergies   Allergen Reactions   • Ibuprofen      Reaction Date: 10Aug2005; Review of Systems   Constitutional: Negative for chills, fatigue and fever. Eyes: Positive for discharge, redness and itching. Negative for photophobia, pain and visual disturbance. Respiratory: Negative for cough, shortness of breath and wheezing. Cardiovascular: Negative for chest pain, palpitations and leg swelling. Neurological: Negative for dizziness and headaches. Video Exam    There were no vitals filed for this visit. Physical Exam  Constitutional:       General: She is not in acute distress. HENT:      Head: Normocephalic and atraumatic. Eyes:      General: No scleral icterus. Right eye: Discharge present. Left eye: Discharge present. Extraocular Movements: Extraocular movements intact. Pupils: Pupils are equal, round, and reactive to light. Comments: No signs of periorbital cellulitis, no pain   Pulmonary:      Effort: Pulmonary effort is normal. No respiratory distress. Neurological:      General: No focal deficit present. Mental Status: She is alert. Mental status is at baseline.           Visit Time  Total Visit Duration: 12 minutes

## 2023-09-25 DIAGNOSIS — I10 ESSENTIAL HYPERTENSION: ICD-10-CM

## 2023-09-25 RX ORDER — METOPROLOL TARTRATE 50 MG/1
TABLET, FILM COATED ORAL
Qty: 60 TABLET | Refills: 3 | Status: SHIPPED | OUTPATIENT
Start: 2023-09-25

## 2023-10-02 ENCOUNTER — HOSPITAL ENCOUNTER (EMERGENCY)
Facility: HOSPITAL | Age: 83
Discharge: HOME | End: 2023-10-02
Attending: EMERGENCY MEDICINE
Payer: COMMERCIAL

## 2023-10-02 ENCOUNTER — APPOINTMENT (EMERGENCY)
Dept: RADIOLOGY | Facility: HOSPITAL | Age: 83
End: 2023-10-02
Payer: COMMERCIAL

## 2023-10-02 ENCOUNTER — TELEPHONE (OUTPATIENT)
Age: 83
End: 2023-10-02

## 2023-10-02 VITALS
TEMPERATURE: 97.9 F | RESPIRATION RATE: 18 BRPM | HEART RATE: 50 BPM | SYSTOLIC BLOOD PRESSURE: 185 MMHG | DIASTOLIC BLOOD PRESSURE: 90 MMHG | OXYGEN SATURATION: 93 %

## 2023-10-02 DIAGNOSIS — F03.90 DEMENTIA (HCC): Primary | ICD-10-CM

## 2023-10-02 DIAGNOSIS — F05 SUNDOWNING: ICD-10-CM

## 2023-10-02 DIAGNOSIS — N39.0 UTI (URINARY TRACT INFECTION): ICD-10-CM

## 2023-10-02 LAB
ANION GAP SERPL CALCULATED.3IONS-SCNC: 7 MMOL/L
BACTERIA UR QL AUTO: ABNORMAL /HPF
BASOPHILS # BLD AUTO: 0.07 THOUSANDS/ÂΜL (ref 0–0.1)
BASOPHILS NFR BLD AUTO: 1 % (ref 0–1)
BILIRUB UR QL STRIP: NEGATIVE
BUN SERPL-MCNC: 15 MG/DL (ref 5–25)
CALCIUM SERPL-MCNC: 9.5 MG/DL (ref 8.4–10.2)
CHLORIDE SERPL-SCNC: 97 MMOL/L (ref 96–108)
CLARITY UR: CLEAR
CO2 SERPL-SCNC: 32 MMOL/L (ref 21–32)
COLOR UR: YELLOW
CREAT SERPL-MCNC: 0.67 MG/DL (ref 0.6–1.3)
EOSINOPHIL # BLD AUTO: 0.29 THOUSAND/ÂΜL (ref 0–0.61)
EOSINOPHIL NFR BLD AUTO: 5 % (ref 0–6)
ERYTHROCYTE [DISTWIDTH] IN BLOOD BY AUTOMATED COUNT: 13.2 % (ref 11.6–15.1)
GFR SERPL CREATININE-BSD FRML MDRD: 80 ML/MIN/1.73SQ M
GLUCOSE SERPL-MCNC: 93 MG/DL (ref 65–140)
GLUCOSE UR STRIP-MCNC: NEGATIVE MG/DL
HCT VFR BLD AUTO: 36.2 % (ref 34.8–46.1)
HGB BLD-MCNC: 11.9 G/DL (ref 11.5–15.4)
HGB UR QL STRIP.AUTO: NEGATIVE
IMM GRANULOCYTES # BLD AUTO: 0.03 THOUSAND/UL (ref 0–0.2)
IMM GRANULOCYTES NFR BLD AUTO: 1 % (ref 0–2)
KETONES UR STRIP-MCNC: NEGATIVE MG/DL
LEUKOCYTE ESTERASE UR QL STRIP: ABNORMAL
LYMPHOCYTES # BLD AUTO: 2.14 THOUSANDS/ÂΜL (ref 0.6–4.47)
LYMPHOCYTES NFR BLD AUTO: 39 % (ref 14–44)
MCH RBC QN AUTO: 33.1 PG (ref 26.8–34.3)
MCHC RBC AUTO-ENTMCNC: 32.9 G/DL (ref 31.4–37.4)
MCV RBC AUTO: 101 FL (ref 82–98)
MONOCYTES # BLD AUTO: 0.79 THOUSAND/ÂΜL (ref 0.17–1.22)
MONOCYTES NFR BLD AUTO: 14 % (ref 4–12)
NEUTROPHILS # BLD AUTO: 2.2 THOUSANDS/ÂΜL (ref 1.85–7.62)
NEUTS SEG NFR BLD AUTO: 40 % (ref 43–75)
NITRITE UR QL STRIP: NEGATIVE
NON-SQ EPI CELLS URNS QL MICRO: ABNORMAL /HPF
NRBC BLD AUTO-RTO: 0 /100 WBCS
PH UR STRIP.AUTO: 7 [PH]
PLATELET # BLD AUTO: 240 THOUSANDS/UL (ref 149–390)
PMV BLD AUTO: 9.4 FL (ref 8.9–12.7)
POTASSIUM SERPL-SCNC: 3.8 MMOL/L (ref 3.5–5.3)
PROT UR STRIP-MCNC: NEGATIVE MG/DL
RBC # BLD AUTO: 3.6 MILLION/UL (ref 3.81–5.12)
RBC #/AREA URNS AUTO: ABNORMAL /HPF
SODIUM SERPL-SCNC: 136 MMOL/L (ref 135–147)
SP GR UR STRIP.AUTO: 1.01 (ref 1–1.03)
UROBILINOGEN UR QL STRIP.AUTO: 0.2 E.U./DL
WBC # BLD AUTO: 5.52 THOUSAND/UL (ref 4.31–10.16)
WBC #/AREA URNS AUTO: ABNORMAL /HPF

## 2023-10-02 PROCEDURE — 99284 EMERGENCY DEPT VISIT MOD MDM: CPT | Performed by: EMERGENCY MEDICINE

## 2023-10-02 PROCEDURE — G1004 CDSM NDSC: HCPCS

## 2023-10-02 PROCEDURE — 81001 URINALYSIS AUTO W/SCOPE: CPT | Performed by: EMERGENCY MEDICINE

## 2023-10-02 PROCEDURE — 80048 BASIC METABOLIC PNL TOTAL CA: CPT | Performed by: EMERGENCY MEDICINE

## 2023-10-02 PROCEDURE — 99285 EMERGENCY DEPT VISIT HI MDM: CPT

## 2023-10-02 PROCEDURE — 87086 URINE CULTURE/COLONY COUNT: CPT | Performed by: EMERGENCY MEDICINE

## 2023-10-02 PROCEDURE — 70450 CT HEAD/BRAIN W/O DYE: CPT

## 2023-10-02 PROCEDURE — 36415 COLL VENOUS BLD VENIPUNCTURE: CPT | Performed by: EMERGENCY MEDICINE

## 2023-10-02 PROCEDURE — 85025 COMPLETE CBC W/AUTO DIFF WBC: CPT | Performed by: EMERGENCY MEDICINE

## 2023-10-02 RX ORDER — OLANZAPINE 5 MG/1
5 TABLET, ORALLY DISINTEGRATING ORAL ONCE
Status: COMPLETED | OUTPATIENT
Start: 2023-10-02 | End: 2023-10-02

## 2023-10-02 RX ORDER — CEPHALEXIN 500 MG/1
500 CAPSULE ORAL EVERY 8 HOURS SCHEDULED
Qty: 15 CAPSULE | Refills: 0 | Status: SHIPPED | OUTPATIENT
Start: 2023-10-02 | End: 2023-10-04

## 2023-10-02 RX ORDER — OLANZAPINE 5 MG/1
5 TABLET, ORALLY DISINTEGRATING ORAL
Qty: 5 TABLET | Refills: 0 | Status: SHIPPED | OUTPATIENT
Start: 2023-10-02 | End: 2023-10-06 | Stop reason: SDUPTHER

## 2023-10-02 RX ORDER — CEPHALEXIN 500 MG/1
500 CAPSULE ORAL ONCE
Status: COMPLETED | OUTPATIENT
Start: 2023-10-02 | End: 2023-10-02

## 2023-10-02 RX ADMIN — OLANZAPINE 5 MG: 5 TABLET, ORALLY DISINTEGRATING ORAL at 01:51

## 2023-10-02 RX ADMIN — CEPHALEXIN 500 MG: 250 CAPSULE ORAL at 02:30

## 2023-10-02 NOTE — TELEPHONE ENCOUNTER
Granddaughter called, had to take patient to ER last night with some sxs of confusion. ER doctor made some suggestions in regards to her medications and gave her a temporary supply of zyrexa and suggested trying to wean her off ativan. Patient granddaughter wanted to do either a virtual appt or phone call in regards to medication changes. I made her an appt in office 10/04/23. If this can be changed, please reach out to her. ER doctor only gave her 5 days worth of medications. Agusto Copeland

## 2023-10-02 NOTE — ED PROVIDER NOTES
Final Diagnosis:  1. Dementia ([address] )    2. Sundowning    3. UTI (urinary tract infection)        Chief Complaint   Patient presents with   • Dementia     Pt's granddaughter states long hx of dementia. Tonight pt was more aggressive than usual with family members. Pt only oriented to self upon triage. HPI  Refusing to go to bed, woke up aggressive and violent. Shanice Schmidt was going to walk out into street. Has been escalating this behavior in the nighttimes. No other complaints. She cannot give entirely coherent answers. Presents w/ granddaughter. Patient denies urinary symptoms  No recent falls  Has a hx of meningioma w/ stable appearance. EMS reports if applicable: N/A     - Previous charting underwent limited review with attention to last ED visits, labs, ekgs, and prior imaging. Chart review reveals :     Appointment on 08/31/2023   Component Date Value Ref Range Status   • Vitamin B-12 08/31/2023 1,046 (H)  180 - 914 pg/mL Final   • Folate 08/31/2023 >22.3  >5.9 ng/mL Final    The World Health Organization has determined deficient folate concentrations are considered to be <4.0 ng/mL. • Syphilis Total Antibody 08/31/2023 Non-reactive  Non-Reactive Final    No serological evidence of infection with T. pallidum. Early or incubating syphilis infection cannot be excluded. Consider repeat testing based on clinical suspicion. • Lyme Total Antibodies 08/31/2023 Negative  Negative Final       - No language barrier.   - History obtained from patient's granddaughter .   - There are limitations to the history obtained. - Discuss patient's care, with patient permission or by chart review, with granddaughter and daughter     PMH:   has a past medical history of Allergic, Cancer ([address] ) (2005), Hypertension, Memory change, Nodule of left lung, and Pleural thickening. PSH:   has a past surgical history that includes Mastectomy; pr xcapsl ctrc rmvl insj io lens prosth w/o ecp (Left, 6/8/2020);  Cataract extraction (Left, 05/2020); and Cataract extraction (Left, 06/2020). Social History:  Tobacco Use: Low Risk  (10/2/2023)    Patient History    • Smoking Tobacco Use: Never    • Smokeless Tobacco Use: Never    • Passive Exposure: Not on file     Alcohol Use: Not At Risk (5/26/2021)    AUDIT-C    • Frequency of Alcohol Consumption: Never    • Average Number of Drinks: Not on file    • Frequency of Binge Drinking: Not on file     No illicit use       ROS:  Pertinent positives/negatives: Hilda Chu Some ROS may be present in the HPI and would take precedent over these standard questions asked below. Review of Systems   Unable to perform ROS: Dementia      PE:     Physical exam highlights:   Physical Exam       Vitals:    10/02/23 0113 10/02/23 0155 10/02/23 0310   BP: (!) 185/88 (!) 171/82 (!) 185/90   BP Location: Left arm Left arm Left arm   Pulse: 60 (!) 53 (!) 50   Resp: 20 18 18   Temp: 97.9 °F (36.6 °C)     TempSrc: Oral     SpO2: 92% 95% 93%     Vitals reviewed by me. Nursing note reviewed  Chaperone present for all sensitive exam.  Const: No acute distress. Alert. Nontoxic. Not diaphoretic. HEENT: External ears normal. No protrusion drainage swelling. Nose normal. No drainage/traumatic deformity. MMM. Mouth with baseline/symmetric movement. No trismus. Eyes: No squinting. No icterus. No tearing/swelling/drainage. Tracks through the room with normal EOM. Neck: ROM normal. No rigidity. No meningismus. Cards: Rate as per vitals Compared to monitor sinus unless documented. Regular Well perfused. Pulm: able to verbalize without additional effort. Effort and excursion normal. No distress. No audible wheezing/ stridor. Normal resp rate without retraction or change in work of breathing. Abd: No distension beyond baseline. No fluctuant wave. Patient without peritoneal pain with shifting/bumping the bed. MSK: ROM normal baseline. No deformity. No contractures from baseline. Skin: No new rashes visible. Well perfused. No wounds visualized on exposed skin  Neuro: Nonfocal. Baseline. CN grossly intact. Moving all four with coordination. Psych: Normal behavior and affect. A:  - Nursing note reviewed. Ddx and MDM  Considered diagnoses  Dementia  Likely sundowning  Would benefit from > dosing of antipsychotic  Trial 5 zyprexa here  Calm comfy and appearing to want to sleep    R/o electorlyte derangemnet  R/o change in meningioma, other ICH or structural cause  R/o UTI - bacteriuria    delerium precautions, sleep night awake day, come off benzos slowly. Replace w/ SSRI per primary          My conversation with consultant reveals: NA       Decision rules:                           My read of the XR/CT scan reveals:  NA   CT head without contrast   Final Result      No evidence of acute intracranial hemorrhage or mass effect. Extensive small vessel ischemic disease. Left temporal region meningioma without significant mass effect or change.                   Workstation performed: [id number]             Orders Placed This Encounter   Procedures   • Urine culture   • CT head without contrast   • CBC and differential   • Basic metabolic panel   • UA (URINE) with reflex to Scope   • Urine Microscopic     Labs Reviewed   CBC AND DIFFERENTIAL - Abnormal       Result Value Ref Range Status    WBC 5.52  4.31 - 10.16 Thousand/uL Final    RBC 3.60 (*) 3.81 - 5.12 Million/uL Final    Hemoglobin 11.9  11.5 - 15.4 g/dL Final    Hematocrit 36.2  34.8 - 46.1 % Final     (*) 82 - 98 fL Final    MCH 33.1  26.8 - 34.3 pg Final    MCHC 32.9  31.4 - 37.4 g/dL Final    RDW 13.2  11.6 - 15.1 % Final    MPV 9.4  8.9 - 12.7 fL Final    Platelets 610  091 - 390 Thousands/uL Final    nRBC 0  /100 WBCs Final    Neutrophils Relative 40 (*) 43 - 75 % Final    Immat GRANS % 1  0 - 2 % Final    Lymphocytes Relative 39  14 - 44 % Final    Monocytes Relative 14 (*) 4 - 12 % Final    Eosinophils Relative 5  0 - 6 % Final    Basophils Relative 1  0 - 1 % Final    Neutrophils Absolute 2.20  1.85 - 7.62 Thousands/µL Final    Immature Grans Absolute 0.03  0.00 - 0.20 Thousand/uL Final    Lymphocytes Absolute 2.14  0.60 - 4.47 Thousands/µL Final    Monocytes Absolute 0.79  0.17 - 1.22 Thousand/µL Final    Eosinophils Absolute 0.29  0.00 - 0.61 Thousand/µL Final    Basophils Absolute 0.07  0.00 - 0.10 Thousands/µL Final   URINALYSIS WITH REFLEX TO SCOPE - Abnormal    Color, UA Yellow   Final    Clarity, UA Clear   Final    Specific Gravity, UA 1.010  1.000 - 1.030 Final    pH, UA 7.0  5.0, 5.5, 6.0, 6.5, 7.0, 7.5, 8.0, 8.5, 9.0 Final    Leukocytes, UA Moderate (*) Negative Final    Nitrite, UA Negative  Negative Final    Protein, UA Negative  Negative mg/dl Final    Glucose, UA Negative  Negative mg/dl Final    Ketones, UA Negative  Negative mg/dl Final    Urobilinogen, UA 0.2  0.2, 1.0 E.U./dl E.U./dl Final    Bilirubin, UA Negative  Negative Final    Occult Blood, UA Negative  Negative Final   URINE MICROSCOPIC - Abnormal    RBC, UA 0-1  None Seen, 0-1, 1-2, 2-4, 0-5 /hpf Final    WBC, UA 4-10 (*) None Seen, 0-1, 1-2, 0-5, 2-4 /hpf Final    Epithelial Cells Occasional  None Seen, Occasional /hpf Final    Bacteria, UA Occasional  None Seen, Occasional /hpf Final   URINE CULTURE   BASIC METABOLIC PANEL    Sodium 247  135 - 147 mmol/L Final    Potassium 3.8  3.5 - 5.3 mmol/L Final    Chloride 97  96 - 108 mmol/L Final    CO2 32  21 - 32 mmol/L Final    ANION GAP 7  mmol/L Final    BUN 15  5 - 25 mg/dL Final    Creatinine 0.67  0.60 - 1.30 mg/dL Final    Comment: Standardized to IDMS reference method    Glucose 93  65 - 140 mg/dL Final    Comment: If the patient is fasting, the ADA then defines impaired fasting glucose as > 100 mg/dL and diabetes as > or equal to 123 mg/dL.     Calcium 9.5  8.4 - 10.2 mg/dL Final    eGFR 80  ml/min/1.73sq m Final    Narrative:     [place] guidelines for Chronic Kidney Disease (CKD):   •  Stage 1 with normal or high GFR (GFR > 90 mL/min/1.73 square meters)  •  Stage 2 Mild CKD (GFR = 60-89 mL/min/1.73 square meters)  •  Stage 3A Moderate CKD (GFR = 45-59 mL/min/1.73 square meters)  •  Stage 3B Moderate CKD (GFR = 30-44 mL/min/1.73 square meters)  •  Stage 4 Severe CKD (GFR = 15-29 mL/min/1.73 square meters)  •  Stage 5 End Stage CKD (GFR <15 mL/min/1.73 square meters)  Note: GFR calculation is accurate only with a steady state creatinine       *Each of these labs was reviewed. Particular standout labs will be noted in the ED Course above     Final Diagnosis:  1. Dementia ([address])    2. Sundowning    3. UTI (urinary tract infection)          P:  - hospital tx includes   Medications   OLANZapine (ZyPREXA ZYDIS) dispersible tablet 5 mg (5 mg Oral Given 10/2/23 0151)   cephalexin (KEFLEX) capsule 500 mg (500 mg Oral Given 10/2/23 0230)         - disposition  Time reflects when diagnosis was documented in both MDM as applicable and the Disposition within this note     Time User Action Codes Description Comment    10/2/2023  2:54 AM Genora Pop Add [F03.90] Dementia ([address])     10/2/2023  2:55 AM Genora Pop Add [F05] Sundowning     10/2/2023  2:55 AM Genora Pop Add [N39.0] UTI (urinary tract infection)       ED Disposition     ED Disposition   Discharge    Condition   Stable    Date/Time   Mon Oct 2, 2023  2:54 AM    Comment   Haider Cifuentes discharge to home/self care. Follow-up Information    None         - patient will call their PCP to let them know they were in the emergency department. We discuss return precautions and patient is agreeable with plan and aformentioned disposition.        - additional treatment intended, if consistent with primary provider:  - patient to follow with :      Discharge Medication List as of 10/2/2023  3:04 AM      START taking these medications    Details   cephalexin (KEFLEX) 500 mg capsule Take 1 capsule (500 mg total) by mouth every 8 (eight) hours for 5 days, Starting Mon 10/2/2023, Until Sat 10/7/2023, Normal      OLANZapine (ZyPREXA ZYDIS) 5 mg dispersible tablet Take 1 tablet (5 mg total) by mouth daily at bedtime as needed (if anxious agitated), Starting Mon 10/2/2023, Normal         CONTINUE these medications which have NOT CHANGED    Details   Ascorbic Acid (Vitamin C) 250 MG CHEW Starting Sun 3/1/2020, Historical Med      cholecalciferol (VITAMIN D3) 1,000 units tablet Take 1,000 Units by mouth daily, Historical Med      cyanocobalamin (VITAMIN B-12) 100 mcg tablet Take by mouth daily, Historical Med      donepezil (ARICEPT) 10 mg tablet Take 1 tablet (10 mg total) by mouth daily at bedtime, Starting Fri 2/24/2023, Normal      guaiFENesin (MUCINEX) 600 mg 12 hr tablet Take 1,200 mg by mouth every 12 (twelve) hours prn, Historical Med      hydrochlorothiazide (HYDRODIURIL) 25 mg tablet take 1 tablet by mouth every other day, Normal      loratadine (CLARITIN) 10 mg tablet Take 10 mg by mouth daily, Historical Med      LORazepam (ATIVAN) 1 mg tablet take 1/2 tablet by mouth IN THE MORNING and 1 tablet IN THE EVENING if needed, Normal      Melatonin 10 MG TABS Take 1 tablet (10 mg total) by mouth daily at bedtime, Starting Fri 2/24/2023, Normal      metoprolol tartrate (LOPRESSOR) 50 mg tablet take 1 tablet by mouth twice a day, Normal      Multiple Vitamins-Minerals (ONE-A-DAY 50 PLUS PO) Starting Sat 2/1/2020, Historical Med      NIFEdipine ER (ADALAT CC) 30 MG 24 hr tablet take 1 tablet by mouth once daily, Normal      QUEtiapine (SEROquel) 25 mg tablet Starting Mon 8/21/2023, Historical Med      tobramycin-dexamethasone (TOBRADEX) ophthalmic suspension Administer 1 drop to both eyes every 4 (four) hours while awake May also apply addtl drop to lid area q 4 hrs while awake--both to eye and lid  for 3-5 days, Starting Mon 9/11/2023, Normal           No discharge procedures on file.   Prior to Admission Medications   Prescriptions Last Dose Informant Patient Reported? Taking? Ascorbic Acid (Vitamin C) 250 MG CHEW   Yes No   LORazepam (ATIVAN) 1 mg tablet   No No   Sig: take 1/2 tablet by mouth IN THE MORNING and 1 tablet IN THE EVENING if needed   Melatonin 10 MG TABS   No No   Sig: Take 1 tablet (10 mg total) by mouth daily at bedtime   Multiple Vitamins-Minerals (ONE-A-DAY 50 PLUS PO)   Yes No   NIFEdipine ER (ADALAT CC) 30 MG 24 hr tablet   No No   Sig: take 1 tablet by mouth once daily   QUEtiapine (SEROquel) 25 mg tablet   Yes No   cholecalciferol (VITAMIN D3) 1,000 units tablet   Yes No   Sig: Take 1,000 Units by mouth daily   cyanocobalamin (VITAMIN B-12) 100 mcg tablet   Yes No   Sig: Take by mouth daily   donepezil (ARICEPT) 10 mg tablet   No No   Sig: Take 1 tablet (10 mg total) by mouth daily at bedtime   guaiFENesin (MUCINEX) 600 mg 12 hr tablet   Yes No   Sig: Take 1,200 mg by mouth every 12 (twelve) hours prn   hydrochlorothiazide (HYDRODIURIL) 25 mg tablet   No No   Sig: take 1 tablet by mouth every other day   loratadine (CLARITIN) 10 mg tablet   Yes No   Sig: Take 10 mg by mouth daily   metoprolol tartrate (LOPRESSOR) 50 mg tablet   No No   Sig: take 1 tablet by mouth twice a day   tobramycin-dexamethasone (TOBRADEX) ophthalmic suspension   No No   Sig: Administer 1 drop to both eyes every 4 (four) hours while awake May also apply addtl drop to lid area q 4 hrs while awake--both to eye and lid  for 3-5 days      Facility-Administered Medications: None       Portions of the record may have been created with voice recognition software. Occasional wrong word or "sound a like" substitutions may have occurred due to the inherent limitations of voice recognition software. Read the chart carefully and recognize, using context, where substitutions have occurred.     Electronically signed by:  MD Rayna Beth MD  10/02/23 0520

## 2023-10-03 ENCOUNTER — TELEPHONE (OUTPATIENT)
Age: 83
End: 2023-10-03

## 2023-10-03 LAB — BACTERIA UR CULT: NORMAL

## 2023-10-04 ENCOUNTER — TELEMEDICINE (OUTPATIENT)
Dept: FAMILY MEDICINE CLINIC | Facility: CLINIC | Age: 83
End: 2023-10-04

## 2023-10-04 DIAGNOSIS — D32.9 MENINGIOMA (HCC): ICD-10-CM

## 2023-10-04 DIAGNOSIS — Z85.3 HISTORY OF BREAST CANCER: ICD-10-CM

## 2023-10-04 DIAGNOSIS — I10 ESSENTIAL HYPERTENSION: Primary | ICD-10-CM

## 2023-10-04 DIAGNOSIS — F03.90 DEMENTIA WITHOUT BEHAVIORAL DISTURBANCE (HCC): ICD-10-CM

## 2023-10-04 PROCEDURE — NA001 NO CHARGE AUDIO ONLY: Performed by: FAMILY MEDICINE

## 2023-10-04 RX ORDER — LOSARTAN POTASSIUM 50 MG/1
50 TABLET ORAL DAILY
Qty: 30 TABLET | Refills: 1 | Status: SHIPPED | OUTPATIENT
Start: 2023-10-04

## 2023-10-05 DIAGNOSIS — B36.9 FUNGAL DERMATITIS: Primary | ICD-10-CM

## 2023-10-05 RX ORDER — NYSTATIN 100000 [USP'U]/G
POWDER TOPICAL 2 TIMES DAILY PRN
Qty: 60 G | Refills: 3 | Status: ON HOLD | OUTPATIENT
Start: 2023-10-05

## 2023-10-06 DIAGNOSIS — F03.90 DEMENTIA (HCC): ICD-10-CM

## 2023-10-06 RX ORDER — OLANZAPINE 5 MG/1
5 TABLET, ORALLY DISINTEGRATING ORAL
Qty: 30 TABLET | Refills: 0 | Status: SHIPPED | OUTPATIENT
Start: 2023-10-06 | End: 2023-12-06 | Stop reason: SDUPTHER

## 2023-10-10 RX ORDER — GUAIFENESIN 600 MG/1
1200 TABLET, EXTENDED RELEASE ORAL EVERY 12 HOURS SCHEDULED
Refills: 0 | Status: CANCELLED | OUTPATIENT
Start: 2023-10-10

## 2023-10-22 DIAGNOSIS — I10 ESSENTIAL HYPERTENSION: ICD-10-CM

## 2023-10-23 RX ORDER — METOPROLOL TARTRATE 50 MG/1
TABLET, FILM COATED ORAL
Qty: 180 TABLET | Refills: 1 | Status: SHIPPED | OUTPATIENT
Start: 2023-10-23

## 2023-10-30 NOTE — PROGRESS NOTES
Virtual Regular Visit    Verification of patient location:    Patient is located at Home in the following state in which I hold an active license [place]      Assessment/Plan:    Problem List Items Addressed This Visit     Dementia without behavioral disturbance ([address])     Med adjustments as per med list  Follow w neurologist re: further recommendations         Essential hypertension - Primary    Relevant Medications    losartan (COZAAR) 50 mg tablet    History of breast cancer    Meningioma [facility])            Reason for visit is   Chief Complaint   Patient presents with   • Virtual Regular Visit          Encounter provider Nick Mckeon MD    Provider located at [address] [place] [phone]      Recent Visits  No visits were found meeting these conditions. Showing recent visits within past 7 days and meeting all other requirements  Future Appointments  No visits were found meeting these conditions. Showing future appointments within next 150 days and meeting all other requirements       The patient was identified by name and date of birth. Maninder Mancini was informed that this is a telemedicine visit and that the visit is being conducted through Telephone. My office door was closed. No one else was in the room. She acknowledged consent and understanding of privacy and security of the video platform. The patient has agreed to participate and understands they can discontinue the visit at any time. Patient is aware this is a billable service. Duane Benedict is a 79 y.o. female  .       HPI     Family concerned w inc sxs dementia  Past labs/imaging reviewed  No recent stress triggers outside norm  No fever, rash  Urine cx w/o sig growth    Past Medical History:   Diagnosis Date   • Allergic    • Cancer ([address]) 2005    left breast mastectomy   • Hypertension    • Memory change    • Nodule of left lung    • Pleural thickening        Past Surgical History:   Procedure Laterality Date   • CATARACT EXTRACTION Left 05/2020   • CATARACT EXTRACTION Left 06/2020   • MASTECTOMY     • [place] XCAPSL CTRC RMVL INSJ IO LENS PROSTH W/O ECP Left 6/8/2020    Procedure: EXTRACTION EXTRACAPSULAR CATARACT PHACO INTRAOCULAR LENS (IOL);   Surgeon: Rebel Winslow MD;  Location: [facility] MAIN OR;  Service: Ophthalmology       Current Outpatient Medications   Medication Sig Dispense Refill   • losartan (COZAAR) 50 mg tablet Take 1 tablet (50 mg total) by mouth daily 30 tablet 1   • OLANZapine (ZyPREXA ZYDIS) 5 mg dispersible tablet Take 1 tablet (5 mg total) by mouth daily at bedtime as needed (if anxious agitated) 30 tablet 0   • Ascorbic Acid (Vitamin C) 250 MG CHEW      • cholecalciferol (VITAMIN D3) 1,000 units tablet Take 1,000 Units by mouth daily     • cyanocobalamin (VITAMIN B-12) 100 mcg tablet Take by mouth daily     • donepezil (ARICEPT) 10 mg tablet Take 1 tablet (10 mg total) by mouth daily at bedtime 90 tablet 3   • guaiFENesin (MUCINEX) 600 mg 12 hr tablet Take 1,200 mg by mouth every 12 (twelve) hours prn     • loratadine (CLARITIN) 10 mg tablet Take 10 mg by mouth daily     • LORazepam (ATIVAN) 1 mg tablet take 1/2 tablet by mouth IN THE MORNING and 1 tablet IN THE EVENING if needed 45 tablet 0   • Melatonin 10 MG TABS Take 1 tablet (10 mg total) by mouth daily at bedtime 90 tablet 3   • metoprolol tartrate (LOPRESSOR) 50 mg tablet take 1 tablet by mouth twice a day 180 tablet 1   • Multiple Vitamins-Minerals (ONE-A-DAY 50 PLUS PO)      • nystatin (MYCOSTATIN) powder Apply topically 2 (two) times a day as needed (rash/skin moisture) 60 g 3   • QUEtiapine (SEROquel) 25 mg tablet      • tobramycin-dexamethasone (TOBRADEX) ophthalmic suspension Administer 1 drop to both eyes every 4 (four) hours while awake May also apply addtl drop to lid area q 4 hrs while awake--both to eye and lid  for 3-5 days 5 mL 0     No current facility-administered medications for this visit. Allergies   Allergen Reactions   • Ibuprofen      Reaction Date: 10Aug2005;         Review of Systems  Per hpi      Physical Exam  Unable to perform    Visit Time  Total Visit Duration: 15 min

## 2023-11-19 ENCOUNTER — PATIENT MESSAGE (OUTPATIENT)
Dept: FAMILY MEDICINE CLINIC | Facility: CLINIC | Age: 83
End: 2023-11-19

## 2023-11-21 ENCOUNTER — PATIENT MESSAGE (OUTPATIENT)
Dept: FAMILY MEDICINE CLINIC | Facility: CLINIC | Age: 83
End: 2023-11-21

## 2023-11-21 ENCOUNTER — TELEPHONE (OUTPATIENT)
Age: 83
End: 2023-11-21

## 2023-11-21 DIAGNOSIS — I10 ESSENTIAL HYPERTENSION: Primary | ICD-10-CM

## 2023-11-21 DIAGNOSIS — I10 ESSENTIAL HYPERTENSION: ICD-10-CM

## 2023-11-21 RX ORDER — LOSARTAN POTASSIUM 50 MG/1
50 TABLET ORAL DAILY
Qty: 30 TABLET | Refills: 5 | Status: SHIPPED | OUTPATIENT
Start: 2023-11-21 | End: 2023-11-21 | Stop reason: DRUGHIGH

## 2023-11-21 RX ORDER — LOSARTAN POTASSIUM 100 MG/1
100 TABLET ORAL DAILY
Qty: 90 TABLET | Refills: 0 | Status: SHIPPED | OUTPATIENT
Start: 2023-11-21

## 2023-11-21 NOTE — PATIENT COMMUNICATION
Patient daughter called again to follow up regards to MyChart message regarding refilling BP medication as the dosage has been increased and to also request for anxiety medication.

## 2023-11-21 NOTE — TELEPHONE ENCOUNTER
Pt's daughter called in stating she sent in a [employer] message regarding the pt's blood pressure and her mental state. She's quite upset about her mom's condition and she's requesting a call back at [phone].

## 2023-12-04 ENCOUNTER — HOSPITAL ENCOUNTER (EMERGENCY)
Facility: HOSPITAL | Age: 83
Discharge: HOME | End: 2023-12-04
Attending: EMERGENCY MEDICINE
Payer: COMMERCIAL

## 2023-12-04 ENCOUNTER — APPOINTMENT (EMERGENCY)
Dept: RADIOLOGY | Facility: HOSPITAL | Age: 83
End: 2023-12-04
Payer: COMMERCIAL

## 2023-12-04 ENCOUNTER — NURSE TRIAGE (OUTPATIENT)
Age: 83
End: 2023-12-04

## 2023-12-04 VITALS
OXYGEN SATURATION: 95 % | BODY MASS INDEX: 22.3 KG/M2 | HEART RATE: 56 BPM | WEIGHT: 118 LBS | DIASTOLIC BLOOD PRESSURE: 97 MMHG | SYSTOLIC BLOOD PRESSURE: 205 MMHG | RESPIRATION RATE: 19 BRPM | TEMPERATURE: 98.7 F

## 2023-12-04 DIAGNOSIS — R41.82 ALTERED MENTAL STATUS: Primary | ICD-10-CM

## 2023-12-04 DIAGNOSIS — F03.90 DEMENTIA (HCC): ICD-10-CM

## 2023-12-04 LAB
2HR DELTA HS TROPONIN: 0 NG/L
ALBUMIN SERPL BCP-MCNC: 3.9 G/DL (ref 3.5–5)
ALP SERPL-CCNC: 67 U/L (ref 34–104)
ALT SERPL W P-5'-P-CCNC: 13 U/L (ref 7–52)
AMORPH URATE CRY URNS QL MICRO: ABNORMAL /HPF
ANION GAP SERPL CALCULATED.3IONS-SCNC: 7 MMOL/L
APTT PPP: 29 SECONDS (ref 23–37)
AST SERPL W P-5'-P-CCNC: 17 U/L (ref 13–39)
BACTERIA UR QL AUTO: ABNORMAL /HPF
BASOPHILS # BLD AUTO: 0.08 THOUSANDS/ÂΜL (ref 0–0.1)
BASOPHILS NFR BLD AUTO: 1 % (ref 0–1)
BILIRUB SERPL-MCNC: 0.36 MG/DL (ref 0.2–1)
BILIRUB UR QL STRIP: NEGATIVE
BUN SERPL-MCNC: 17 MG/DL (ref 5–25)
CALCIUM SERPL-MCNC: 9.3 MG/DL (ref 8.4–10.2)
CARDIAC TROPONIN I PNL SERPL HS: 7 NG/L
CARDIAC TROPONIN I PNL SERPL HS: 7 NG/L
CHLORIDE SERPL-SCNC: 101 MMOL/L (ref 96–108)
CLARITY UR: ABNORMAL
CO2 SERPL-SCNC: 33 MMOL/L (ref 21–32)
COLOR UR: YELLOW
CREAT SERPL-MCNC: 0.75 MG/DL (ref 0.6–1.3)
EOSINOPHIL # BLD AUTO: 0.29 THOUSAND/ÂΜL (ref 0–0.61)
EOSINOPHIL NFR BLD AUTO: 5 % (ref 0–6)
ERYTHROCYTE [DISTWIDTH] IN BLOOD BY AUTOMATED COUNT: 12.9 % (ref 11.6–15.1)
GFR SERPL CREATININE-BSD FRML MDRD: 73 ML/MIN/1.73SQ M
GLUCOSE SERPL-MCNC: 110 MG/DL (ref 65–140)
GLUCOSE UR STRIP-MCNC: NEGATIVE MG/DL
HCT VFR BLD AUTO: 38.5 % (ref 34.8–46.1)
HGB BLD-MCNC: 12.9 G/DL (ref 11.5–15.4)
HGB UR QL STRIP.AUTO: NEGATIVE
IMM GRANULOCYTES # BLD AUTO: 0.02 THOUSAND/UL (ref 0–0.2)
IMM GRANULOCYTES NFR BLD AUTO: 0 % (ref 0–2)
INR PPP: 1 (ref 0.84–1.19)
KETONES UR STRIP-MCNC: NEGATIVE MG/DL
LEUKOCYTE ESTERASE UR QL STRIP: ABNORMAL
LYMPHOCYTES # BLD AUTO: 2.37 THOUSANDS/ÂΜL (ref 0.6–4.47)
LYMPHOCYTES NFR BLD AUTO: 40 % (ref 14–44)
MCH RBC QN AUTO: 33.9 PG (ref 26.8–34.3)
MCHC RBC AUTO-ENTMCNC: 33.5 G/DL (ref 31.4–37.4)
MCV RBC AUTO: 101 FL (ref 82–98)
MONOCYTES # BLD AUTO: 0.63 THOUSAND/ÂΜL (ref 0.17–1.22)
MONOCYTES NFR BLD AUTO: 11 % (ref 4–12)
NEUTROPHILS # BLD AUTO: 2.49 THOUSANDS/ÂΜL (ref 1.85–7.62)
NEUTS SEG NFR BLD AUTO: 43 % (ref 43–75)
NITRITE UR QL STRIP: NEGATIVE
NON-SQ EPI CELLS URNS QL MICRO: ABNORMAL /HPF
NRBC BLD AUTO-RTO: 0 /100 WBCS
PH UR STRIP.AUTO: 8 [PH]
PLATELET # BLD AUTO: 248 THOUSANDS/UL (ref 149–390)
PMV BLD AUTO: 10.2 FL (ref 8.9–12.7)
POTASSIUM SERPL-SCNC: 3.5 MMOL/L (ref 3.5–5.3)
PROCALCITONIN SERPL-MCNC: 0.12 NG/ML
PROT SERPL-MCNC: 7 G/DL (ref 6.4–8.4)
PROT UR STRIP-MCNC: ABNORMAL MG/DL
PROTHROMBIN TIME: 13.4 SECONDS (ref 11.6–14.5)
RBC # BLD AUTO: 3.81 MILLION/UL (ref 3.81–5.12)
RBC #/AREA URNS AUTO: ABNORMAL /HPF
SODIUM SERPL-SCNC: 141 MMOL/L (ref 135–147)
SP GR UR STRIP.AUTO: 1.01 (ref 1–1.03)
T4 FREE SERPL-MCNC: 1.03 NG/DL (ref 0.61–1.12)
TSH SERPL DL<=0.05 MIU/L-ACNC: 4.81 UIU/ML (ref 0.45–4.5)
UROBILINOGEN UR QL STRIP.AUTO: 0.2 E.U./DL
WBC # BLD AUTO: 5.88 THOUSAND/UL (ref 4.31–10.16)
WBC #/AREA URNS AUTO: ABNORMAL /HPF

## 2023-12-04 PROCEDURE — 85025 COMPLETE CBC W/AUTO DIFF WBC: CPT | Performed by: EMERGENCY MEDICINE

## 2023-12-04 PROCEDURE — 85610 PROTHROMBIN TIME: CPT | Performed by: EMERGENCY MEDICINE

## 2023-12-04 PROCEDURE — 99285 EMERGENCY DEPT VISIT HI MDM: CPT | Performed by: EMERGENCY MEDICINE

## 2023-12-04 PROCEDURE — 80053 COMPREHEN METABOLIC PANEL: CPT | Performed by: EMERGENCY MEDICINE

## 2023-12-04 PROCEDURE — 84439 ASSAY OF FREE THYROXINE: CPT | Performed by: EMERGENCY MEDICINE

## 2023-12-04 PROCEDURE — 84484 ASSAY OF TROPONIN QUANT: CPT | Performed by: EMERGENCY MEDICINE

## 2023-12-04 PROCEDURE — G1004 CDSM NDSC: HCPCS

## 2023-12-04 PROCEDURE — 99285 EMERGENCY DEPT VISIT HI MDM: CPT

## 2023-12-04 PROCEDURE — 96360 HYDRATION IV INFUSION INIT: CPT

## 2023-12-04 PROCEDURE — 93005 ELECTROCARDIOGRAM TRACING: CPT

## 2023-12-04 PROCEDURE — 84443 ASSAY THYROID STIM HORMONE: CPT | Performed by: EMERGENCY MEDICINE

## 2023-12-04 PROCEDURE — 84145 PROCALCITONIN (PCT): CPT | Performed by: EMERGENCY MEDICINE

## 2023-12-04 PROCEDURE — 81001 URINALYSIS AUTO W/SCOPE: CPT | Performed by: EMERGENCY MEDICINE

## 2023-12-04 PROCEDURE — 85730 THROMBOPLASTIN TIME PARTIAL: CPT | Performed by: EMERGENCY MEDICINE

## 2023-12-04 PROCEDURE — 70450 CT HEAD/BRAIN W/O DYE: CPT

## 2023-12-04 PROCEDURE — 36415 COLL VENOUS BLD VENIPUNCTURE: CPT | Performed by: EMERGENCY MEDICINE

## 2023-12-04 PROCEDURE — 71045 X-RAY EXAM CHEST 1 VIEW: CPT

## 2023-12-04 RX ADMIN — SODIUM CHLORIDE 1000 ML: 0.9 INJECTION, SOLUTION INTRAVENOUS at 01:17

## 2023-12-04 NOTE — DISCHARGE INSTRUCTIONS
Continue Seroquel dosage. Resume prior Aricept dosage.   May use Zyprexa 5 or 10 mg for acute agitation as needed

## 2023-12-04 NOTE — TELEPHONE ENCOUNTER
Patient daughter called in, communication consent verified. Daughter concerned patient BP remains high. She was DC'd from ER last night with BP of 205/97, 210/101 per daughter, increased agitation and chest pains . Since they have been home the agitation continues, BP remains high at 170/100. I encouraged daughter to bring the patient back to the ER for the BP  and chest pains. Daughter states patient BP has been over 200 all weekend , increased weakness. Again I reiterated to revisit the ER. No appointments available in office and pts situation calls for immediate evaluation. Daughter agreed. She asked to make Dr Josef Huffman aware of th e ongoing situation with her mother. Reason for Disposition  • Systolic BP >= 769 OR Diastolic >= 954, and any cardiac or neurologic symptoms (e.g., chest pain, difficulty breathing, unsteady gait, blurred vision)    Answer Assessment - Initial Assessment Questions  1. BLOOD PRESSURE: "What is the blood pressure?" "Did you take at least two measurements 5 minutes apart?"      170 /100    210/ 101      2. ONSET: "When did you take your blood pressure?"          Over the weekend      3. HOW: "How did you obtain the blood pressure?" (e.g., visiting nurse, automatic home BP monitor)          Automatic cuff    4. HISTORY: "Do you have a history of high blood pressure?"        Yes     5. MEDICATIONS: "Are you taking any medications for blood pressure?" "Have you missed any doses recently?"          Losartan metatoprol       6.  OTHER SYMPTOMS: "Do you have any symptoms?" (e.g., headache, chest pain, blurred vision, difficulty breathing, weakness)          Chest pain increased agitation    Protocols used: High Blood Pressure-ADULT-[place]
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
## 2023-12-04 NOTE — TELEPHONE ENCOUNTER
Please advise patient that Dr. Safia West isn't in the office. She should be evaluated. Did they go back to the ED? Does she have a cardiologist

## 2023-12-05 LAB
ATRIAL RATE: 60 BPM
P AXIS: 69 DEGREES
PR INTERVAL: 176 MS
QRS AXIS: 12 DEGREES
QRSD INTERVAL: 92 MS
QT INTERVAL: 488 MS
QTC INTERVAL: 488 MS
T WAVE AXIS: 86 DEGREES
VENTRICULAR RATE: 60 BPM

## 2023-12-06 DIAGNOSIS — I10 ESSENTIAL HYPERTENSION: ICD-10-CM

## 2023-12-06 DIAGNOSIS — F03.90 DEMENTIA (HCC): ICD-10-CM

## 2023-12-06 RX ORDER — LOSARTAN POTASSIUM 100 MG/1
100 TABLET ORAL DAILY
Qty: 90 TABLET | Refills: 0 | Status: ON HOLD | OUTPATIENT
Start: 2023-12-06

## 2023-12-07 ENCOUNTER — HOSPITAL ENCOUNTER (EMERGENCY)
Facility: HOSPITAL | Age: 83
Discharge: HOME | DRG: 884 | End: 2023-12-07
Attending: STUDENT IN AN ORGANIZED HEALTH CARE EDUCATION/TRAINING PROGRAM
Payer: COMMERCIAL

## 2023-12-07 VITALS
DIASTOLIC BLOOD PRESSURE: 84 MMHG | SYSTOLIC BLOOD PRESSURE: 158 MMHG | BODY MASS INDEX: 22.3 KG/M2 | TEMPERATURE: 96.6 F | HEART RATE: 64 BPM | WEIGHT: 118 LBS | OXYGEN SATURATION: 96 % | RESPIRATION RATE: 18 BRPM

## 2023-12-07 DIAGNOSIS — I10 HYPERTENSION: Primary | ICD-10-CM

## 2023-12-07 PROCEDURE — 99284 EMERGENCY DEPT VISIT MOD MDM: CPT

## 2023-12-07 PROCEDURE — 93005 ELECTROCARDIOGRAM TRACING: CPT

## 2023-12-07 PROCEDURE — 99284 EMERGENCY DEPT VISIT MOD MDM: CPT | Performed by: STUDENT IN AN ORGANIZED HEALTH CARE EDUCATION/TRAINING PROGRAM

## 2023-12-07 RX ORDER — OLANZAPINE 5 MG/1
5 TABLET, ORALLY DISINTEGRATING ORAL
Qty: 30 TABLET | Refills: 0 | Status: ON HOLD | OUTPATIENT
Start: 2023-12-07

## 2023-12-07 NOTE — TELEPHONE ENCOUNTER
Per Dr Rolo Carvajal, called patient's daughter and advised her to take patient to ER now. She will let us know in morning if need to cancel tomorrow's appointment.

## 2023-12-07 NOTE — TELEPHONE ENCOUNTER
Pt's daughter called in stating her mom has has an appt tomorrow with Dr. Rose Mendoza for her ER f/u visit. She says they have certified caregivers with her during the week and they've been monitoring her BP which is steady, but they don't want her to wait until tomorrow to be seen. They would like to know if Dr. Rose Mendoza can contact the hospital for a direct admit.

## 2023-12-08 ENCOUNTER — OFFICE VISIT (OUTPATIENT)
Dept: FAMILY MEDICINE CLINIC | Facility: CLINIC | Age: 83
End: 2023-12-08
Payer: COMMERCIAL

## 2023-12-08 VITALS
TEMPERATURE: 98 F | OXYGEN SATURATION: 96 % | RESPIRATION RATE: 18 BRPM | SYSTOLIC BLOOD PRESSURE: 150 MMHG | HEART RATE: 60 BPM | DIASTOLIC BLOOD PRESSURE: 90 MMHG

## 2023-12-08 DIAGNOSIS — F03.918 DEMENTIA WITH BEHAVIORAL DISTURBANCE (HCC): ICD-10-CM

## 2023-12-08 DIAGNOSIS — R09.81 SINUS CONGESTION: Primary | ICD-10-CM

## 2023-12-08 LAB
ATRIAL RATE: 59 BPM
P AXIS: 29 DEGREES
PR INTERVAL: 184 MS
QRS AXIS: 5 DEGREES
QRSD INTERVAL: 88 MS
QT INTERVAL: 462 MS
QTC INTERVAL: 457 MS
T WAVE AXIS: 90 DEGREES
VENTRICULAR RATE: 59 BPM

## 2023-12-08 PROCEDURE — 99214 OFFICE O/P EST MOD 30 MIN: CPT | Performed by: FAMILY MEDICINE

## 2023-12-08 RX ORDER — DONEPEZIL HYDROCHLORIDE 10 MG/1
10 TABLET, FILM COATED ORAL
Qty: 90 TABLET | Refills: 3
Start: 2023-12-08

## 2023-12-08 RX ORDER — AMOXICILLIN 875 MG/1
875 TABLET, COATED ORAL 2 TIMES DAILY
Qty: 14 TABLET | Refills: 0 | Status: SHIPPED | OUTPATIENT
Start: 2023-12-08 | End: 2023-12-14

## 2023-12-08 RX ORDER — QUETIAPINE FUMARATE 25 MG/1
50 TABLET, FILM COATED ORAL
Start: 2023-12-08 | End: 2023-12-14

## 2023-12-08 NOTE — ED PROVIDER NOTES
History  Chief Complaint   Patient presents with    Hypertension     Patient has dementia and angers easily. Brought by family. They have been taking her BP three times a day and were concerned about some readings. Losartan increased 2 weeks ago      Patient is an 65-year-old female, past medical history including hypertension, and dementia, who presents to the emergency department for elevated blood pressure. Per family, who is at bedside, the home health aide has been checking her BP throughout the day and it has been consistently high, sometimes reaching 049 systolic. They discussed with patient's PCP who recommended pt be brought to the ED for further evaluation. Pt has no complaints. Denies any chest pain, SOB, back pain, abdominal pain, or any other new or concerning symptoms. Prior to Admission Medications   Prescriptions Last Dose Informant Patient Reported? Taking?    Ascorbic Acid (Vitamin C) 250 MG CHEW   Yes No   LORazepam (ATIVAN) 1 mg tablet   No No   Sig: take 1/2 tablet by mouth IN THE MORNING and 1 tablet IN THE EVENING if needed   Melatonin 10 MG TABS   No No   Sig: Take 1 tablet (10 mg total) by mouth daily at bedtime   Multiple Vitamins-Minerals (ONE-A-DAY 50 PLUS PO)   Yes No   OLANZapine (ZyPREXA ZYDIS) 5 mg dispersible tablet   No No   Sig: Take 1 tablet (5 mg total) by mouth daily at bedtime as needed (if anxious agitated)   QUEtiapine (SEROquel) 25 mg tablet   Yes No   cholecalciferol (VITAMIN D3) 1,000 units tablet   Yes No   Sig: Take 1,000 Units by mouth daily   cyanocobalamin (VITAMIN B-12) 100 mcg tablet   Yes No   Sig: Take by mouth daily   donepezil (ARICEPT) 10 mg tablet   No No   Sig: Take 1 tablet (10 mg total) by mouth daily at bedtime   guaiFENesin (MUCINEX) 600 mg 12 hr tablet   Yes No   Sig: Take 1,200 mg by mouth every 12 (twelve) hours prn   loratadine (CLARITIN) 10 mg tablet   Yes No   Sig: Take 10 mg by mouth daily   losartan (Cozaar) 100 MG tablet   No No   Sig: Take 1 tablet (100 mg total) by mouth daily   metoprolol tartrate (LOPRESSOR) 50 mg tablet   No No   Sig: take 1 tablet by mouth twice a day   nystatin (MYCOSTATIN) powder   No No   Sig: Apply topically 2 (two) times a day as needed (rash/skin moisture)   tobramycin-dexamethasone (TOBRADEX) ophthalmic suspension   No No   Sig: Administer 1 drop to both eyes every 4 (four) hours while awake May also apply addtl drop to lid area q 4 hrs while awake--both to eye and lid  for 3-5 days      Facility-Administered Medications: None       Past Medical History:   Diagnosis Date    Allergic     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 No pertinent family history. I have reviewed and agree with the history as documented. E-Cigarette/Vaping    E-Cigarette Use Never User      E-Cigarette/Vaping Substances    Nicotine No     THC No     CBD No      Social History     Tobacco Use    Smoking status: Never    Smokeless tobacco: Never   Vaping Use    Vaping Use: Never used   Substance Use Topics    Alcohol use: Never    Drug use: Never       Review of Systems   Eyes:  Negative for visual disturbance. Respiratory:  Negative for shortness of breath. Cardiovascular:  Negative for chest pain. Gastrointestinal:  Negative for abdominal pain. Musculoskeletal:  Negative for back pain. Neurological:  Negative for dizziness and headaches. All other systems reviewed and are negative. Physical Exam  Physical Exam  Vitals and nursing note reviewed. Constitutional:       General: She is not in acute distress. Appearance: She is well-developed. She is not ill-appearing, toxic-appearing or diaphoretic. HENT:      Head: Normocephalic and atraumatic. Right Ear: External ear normal.      Left Ear: External ear normal.      Nose: Nose normal.   Eyes:      General: Lids are normal. No scleral icterus. Cardiovascular:      Rate and Rhythm: Normal rate and regular rhythm. Heart sounds: Normal heart sounds. No murmur heard. No friction rub. No gallop. Pulmonary:      Effort: Pulmonary effort is normal. No respiratory distress. Breath sounds: Normal breath sounds. No wheezing or rales. Musculoskeletal:         General: No deformity. Normal range of motion. Cervical back: Normal range of motion and neck supple. Skin:     General: Skin is warm and dry. Neurological:      General: No focal deficit present. Mental Status: She is alert.    Psychiatric:         Mood and Affect: Mood normal.         Behavior: Behavior normal.         Vital Signs  ED Triage Vitals [12/07/23 1916]   Temperature Pulse Respirations Blood Pressure SpO2   (!) 96.6 °F (35.9 °C) 64 18 158/84 96 %      Temp Source Heart Rate Source Patient Position - Orthostatic VS BP Location FiO2 (%)   Tympanic Monitor Sitting Right arm --      Pain Score       --           Vitals:    12/07/23 1916   BP: 158/84   Pulse: 64   Patient Position - Orthostatic VS: Sitting         Visual Acuity      ED Medications  Medications - No data to display    Diagnostic Studies  Results Reviewed       None                   No orders to display              Procedures  ECG 12 Lead Documentation Only    Date/Time: 12/9/2023 8:33 AM    Performed by: Toño Gambino DO  Authorized by: Toño Gambino DO    ECG reviewed by me, the ED Provider: yes    Patient location:  ED  Interpretation:     Interpretation: abnormal    Rate:     ECG rate:  59    ECG rate assessment: normal    Rhythm: Rhythm: sinus rhythm    Ectopy:     Ectopy: none    QRS:     QRS axis:  Normal  Conduction:     Conduction: normal    ST segments:     ST segments:  Normal  T waves:     T waves: normal    Other findings:     Other findings: LVH             ED Course                               SBIRT 20yo+      Flowsheet Row Most Recent Value   Initial Alcohol Screen: US AUDIT-C     1. How often do you have a drink containing alcohol? 0 Filed at: 12/07/2023 2031   2. How many drinks containing alcohol do you have on a typical day you are drinking? 0 Filed at: 12/07/2023 2031   3a. Male UNDER 65: How often do you have five or more drinks on one occasion? 0 Filed at: 12/07/2023 2031   3b. FEMALE Any Age, or MALE 65+: How often do you have 4 or more drinks on one occassion? 0 Filed at: 12/07/2023 2031   Audit-C Score 0 Filed at: 12/07/2023 2031   RUPERTO: How many times in the past year have you. .. Used an illegal drug or used a prescription medication for non-medical reasons? Never Filed at: 12/07/2023 2031                      Medical Decision Making  Patient is a 79 y.o. female who presents to the ED for HTN. No symptoms. Pt is non-toxic, well appearing. She is hypertensive but otherwise vitals are stable. Physical exam is unremarkable. .    Differential includes but is not limited to: asymptomatic HTN. Presentation not consistent with end organ damage. The patient has a PCP and appropriate follow-up. As patient has asymptomatic hypertension, we will discharge this patient home for close follow-up with their PCP. This is supported by [address] [place] [address] 9424: Suyapa Tirado MD, Minerva GARCÍA, . Clinical policy: critical issues in the evaluation and management of adult patients in the emergency department with asymptomatic elevated blood pressure. Annals of emergency medicine. 2013; 62(1):59-68.  [pubmed]    As well as ACC/AHA Guidelines for HTN in adults 2017:    Tito PK, Meryle Greathouse, Nahun WS, et al. 2017 ACC/AHA/AAPA/ABC/ACPM/AGS/APhA/WINTER/ASPC/NMA/PCNA Guideline for the Prevention, Detection, Evaluation, and Management of High Blood Pressure in Adults: A Report of the [employer] of Cardiology/American Heart Association Task Force on Clinical Practice Guidelines. Circulation. 2018; 138(17):m922-d355. Plan: Reassurance, d/c with PCP f/u                 Portions of the record may have been created with voice recognition software. Occasional wrong word or "sound a like" substitutions may have occurred due to the inherent limitations of voice recognition software. Read the chart carefully and recognize, using context, where substitutions have occurred. Problems Addressed:  Hypertension: acute illness or injury    Amount and/or Complexity of Data Reviewed  Independent Historian:      Details: Majority of hx provided by family at bedside  ECG/medicine tests: ordered and independent interpretation performed. Details: LVH    Risk  OTC drugs. Prescription drug management. Disposition  Final diagnoses:   Hypertension     Time reflects when diagnosis was documented in both MDM as applicable and the Disposition within this note       Time User Action Codes Description Comment    12/7/2023  8:32 PM Lavinia Avilez Hypertension           ED Disposition       ED Disposition   Discharge    Condition   Stable    Date/Time   Thu Dec 7, 2023 2021    [facility] discharge to home/self care.                    Follow-up Information       Follow up With Specialties Details Why Contact Info Additional Information    Niru Way MD Family Medicine   [address] [address]  [address] [facility] Emergency Medicine   [address] [address]  [address] [facility] Emergency Department, [address] [place] [address] Route , Melody Prakash [facility], [id number]            Discharge Medication List as of 12/7/2023  8:32 PM        START taking these medications    Details   OLANZapine (ZyPREXA ZYDIS) 5 mg dispersible tablet Take 1 tablet (5 mg total) by mouth daily at bedtime as needed (if anxious agitated), Starting Thu 12/7/2023, Normal           CONTINUE these medications which have NOT CHANGED    Details   Ascorbic Acid (Vitamin C) 250 MG CHEW Starting Sun 3/1/2020, Historical Med      cholecalciferol (VITAMIN D3) 1,000 units tablet Take 1,000 Units by mouth daily, Historical Med      cyanocobalamin (VITAMIN B-12) 100 mcg tablet Take by mouth daily, Historical Med      donepezil (ARICEPT) 10 mg tablet Take 1 tablet (10 mg total) by mouth daily at bedtime, Starting Fri 2/24/2023, Normal      guaiFENesin (MUCINEX) 600 mg 12 hr tablet Take 1,200 mg by mouth every 12 (twelve) hours prn, Historical Med      loratadine (CLARITIN) 10 mg tablet Take 10 mg by mouth daily, Historical Med      LORazepam (ATIVAN) 1 mg tablet take 1/2 tablet by mouth IN THE MORNING and 1 tablet IN THE EVENING if needed, Normal      losartan (Cozaar) 100 MG tablet Take 1 tablet (100 mg total) by mouth daily, Starting Wed 12/6/2023, Normal      Melatonin 10 MG TABS Take 1 tablet (10 mg total) by mouth daily at bedtime, Starting Fri 2/24/2023, Normal      metoprolol tartrate (LOPRESSOR) 50 mg tablet take 1 tablet by mouth twice a day, Normal      Multiple Vitamins-Minerals (ONE-A-DAY 50 PLUS PO) Starting Sat 2/1/2020, Historical Med      nystatin (MYCOSTATIN) powder Apply topically 2 (two) times a day as needed (rash/skin moisture), Starting Thu 10/5/2023, Normal      QUEtiapine (SEROquel) 25 mg tablet Starting Mon 8/21/2023, Historical Med      tobramycin-dexamethasone (TOBRADEX) ophthalmic suspension Administer 1 drop to both eyes every 4 (four) hours while awake May also apply addtl drop to lid area q 4 hrs while awake--both to eye and lid  for 3-5 days, Starting Mon 9/11/2023, Normal             No discharge procedures on file.     PDMP Review       None            ED Provider  Electronically Signed by             Noa Jimenez DO  12/09/23 9904

## 2023-12-08 NOTE — DISCHARGE INSTRUCTIONS
You were seen in the emergency department for hypertension. As discussed please follow-up with your family doctor. Return to the emergency room for any new or concerning symptoms.

## 2023-12-08 NOTE — PROGRESS NOTES
Leslie Barahona 1940 female MRN: [id number]    [facility] PRACTICE OFFICE VISIT  [facility] - [id number] [facility]      ASSESSMENT/PLAN  Leslie Barahona is a 79 y.o. female presents to the office for  Diagnoses and all orders for this visit:    Sinus congestion  -     amoxicillin (AMOXIL) 875 mg tablet; Take 1 tablet (875 mg total) by mouth 2 (two) times a day for 7 days    Dementia with behavioral disturbance (HCC)  -     QUEtiapine (SEROquel) 25 mg tablet; Take 2 tablets (50 mg total) by mouth daily at bedtime  -     donepezil (ARICEPT) 10 mg tablet; Take 1 tablet (10 mg total) by mouth daily at bedtime      Diagnoses and all orders for this visit:    Sinus congestion  -     amoxicillin (AMOXIL) 875 mg tablet; Take 1 tablet (875 mg total) by mouth 2 (two) times a day for 7 days    Dementia with behavioral disturbance (HCC)  -     QUEtiapine (SEROquel) 25 mg tablet; Take 2 tablets (50 mg total) by mouth daily at bedtime  -     donepezil (ARICEPT) 10 mg tablet; Take 1 tablet (10 mg total) by mouth daily at bedtime       HOLD Donezpil at this time x 2 weeks  Increase Serqouel 50mgs at bedtime  Ativan 1/2 tablet at bedtime. Patient with sinus congestion slumped over in her chair today. Concerning for new onset of an acute illness. Will call the family tomorrow to see how she is doing         Future Appointments   Date Time Provider [address] [place] [address]   12/14/2023  1:20 PM Juliet Johnson MD [address] Dermatology   2/2/2024 11:15 AM [place] [address] [address] [address]   2/9/2024  1:00 PM Anibal Godwin PA-C [facility]          SUBJECTIVE  CC: Follow-up (Er follow up for hypertension . Daughter says anxiety  is high patient is not sleeping much ) and Cough (Productive cough started in middle of night )      HPI:  Leslie Barahona is a 79 y.o. female who presents for an acute appointment. Patient has been to the emergency room multiple times for blood pressure condition.   Patient has dementia but has been having sundowning concerns and not sleeping. Patient just started with productive cough in the middle the night as well as sinus congestion has been being more weak than normal.  Family concerned  Review of Systems   HENT:  Positive for congestion. Respiratory:  Positive for cough. Psychiatric/Behavioral:  Positive for agitation. The patient is nervous/anxious.         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    Hypertension     Memory change     Nodule of left lung     Pleural thickening          Medications:     Current Outpatient Medications:     Ascorbic Acid (Vitamin C) 250 MG CHEW, , Disp: , Rfl:     cholecalciferol (VITAMIN D3) 1,000 units tablet, Take 1,000 Units by mouth daily, Disp: , Rfl:     cyanocobalamin (VITAMIN B-12) 100 mcg tablet, Take by mouth daily, Disp: , Rfl:     loratadine (CLARITIN) 10 mg tablet, Take 10 mg by mouth daily, Disp: , Rfl:     LORazepam (ATIVAN) 1 mg tablet, take 1/2 tablet by mouth IN THE MORNING and 1 tablet IN THE EVENING if needed, Disp: 45 tablet, Rfl: 0    losartan (Cozaar) 100 MG tablet, Take 1 tablet (100 mg total) by mouth daily, Disp: 90 tablet, Rfl: 0    Melatonin 10 MG TABS, Take 1 tablet (10 mg total) by mouth daily at bedtime, Disp: 90 tablet, Rfl: 3    metoprolol tartrate (LOPRESSOR) 50 mg tablet, take 1 tablet by mouth twice a day, Disp: 180 tablet, Rfl: 1    Multiple Vitamins-Minerals (ONE-A-DAY 50 PLUS PO), , Disp: , Rfl:     nystatin (MYCOSTATIN) powder, Apply topically 2 (two) times a day as needed (rash/skin moisture), Disp: 60 g, Rfl: 3    OLANZapine (ZyPREXA ZYDIS) 5 mg dispersible tablet, Take 1 tablet (5 mg total) by mouth daily at bedtime as needed (if anxious agitated), Disp: 30 tablet, Rfl: 0    QUEtiapine (SEROquel) 25 mg tablet, , Disp: , Rfl:     donepezil (ARICEPT) 10 mg tablet, Take 1 tablet (10 mg total) by mouth daily at bedtime (Patient not taking: Reported on 12/8/2023), Disp: 90 tablet, Rfl: 3    guaiFENesin (MUCINEX) 600 mg 12 hr tablet, Take 1,200 mg by mouth every 12 (twelve) hours prn (Patient not taking: Reported on 12/8/2023), Disp: , Rfl:     tobramycin-dexamethasone (TOBRADEX) ophthalmic suspension, Administer 1 drop to both eyes every 4 (four) hours while awake May also apply addtl drop to lid area q 4 hrs while awake--both to eye and lid  for 3-5 days (Patient not taking: Reported on 12/8/2023), Disp: 5 mL, Rfl: 0    Allergies   Allergen Reactions    Ibuprofen      Reaction Date: 10Aug2005;        OBJECTIVE  Vitals:   Vitals:    12/08/23 1536   BP: 150/90   BP Location: Left arm   Patient Position: Sitting   Cuff Size: Standard   Pulse: 60   Resp: 18   Temp: 98 °F (36.7 °C)   SpO2: 96%         Physical Exam  Vitals reviewed. Constitutional:       Appearance: She is well-developed. HENT:      Head: Normocephalic and atraumatic. Right Ear: Tympanic membrane and ear canal normal.      Left Ear: Tympanic membrane and ear canal normal.      Nose: Congestion present. Eyes:      Conjunctiva/sclera: Conjunctivae normal.      Pupils: Pupils are equal, round, and reactive to light. Cardiovascular:      Rate and Rhythm: Normal rate and regular rhythm. Heart sounds: Normal heart sounds. Pulmonary:      Effort: Pulmonary effort is normal. No respiratory distress. Breath sounds: Normal breath sounds. Musculoskeletal:      Cervical back: Normal range of motion and neck supple. Comments: Not able to sit up straight given weakness   Skin:     General: Skin is warm. Capillary Refill: Capillary refill takes less than 2 seconds. Neurological:      Mental Status: She is alert and oriented to person, place, and time.                     Denis Figueroa MD,   [facility]  12/8/2023

## 2023-12-08 NOTE — PATIENT INSTRUCTIONS
HOLD Donezpil at this time x 2 weeks  Increase Serouel 50mgs at bedtime  Starting tomorrow after we talk  Ativan 1/2 tablet at bedtime

## 2023-12-09 ENCOUNTER — APPOINTMENT (EMERGENCY)
Dept: RADIOLOGY | Facility: HOSPITAL | Age: 83
DRG: 884 | End: 2023-12-09
Attending: EMERGENCY MEDICINE
Payer: COMMERCIAL

## 2023-12-09 ENCOUNTER — HOSPITAL ENCOUNTER (INPATIENT)
Facility: HOSPITAL | Age: 83
LOS: 5 days | Discharge: NURSING, REHAB OR LONG-TERM CARE | DRG: 884 | End: 2023-12-14
Attending: EMERGENCY MEDICINE | Admitting: INTERNAL MEDICINE
Payer: COMMERCIAL

## 2023-12-09 ENCOUNTER — APPOINTMENT (EMERGENCY)
Dept: RADIOLOGY | Facility: HOSPITAL | Age: 83
DRG: 884 | End: 2023-12-09
Payer: COMMERCIAL

## 2023-12-09 DIAGNOSIS — R41.3 MEMORY CHANGE: ICD-10-CM

## 2023-12-09 DIAGNOSIS — J21.9 ACUTE BRONCHIOLITIS DUE TO UNSPECIFIED ORGANISM: ICD-10-CM

## 2023-12-09 DIAGNOSIS — R53.1 WEAKNESS: Primary | ICD-10-CM

## 2023-12-09 DIAGNOSIS — K59.00 CONSTIPATION, UNSPECIFIED CONSTIPATION TYPE: ICD-10-CM

## 2023-12-09 DIAGNOSIS — R41.82 AMS (ALTERED MENTAL STATUS): ICD-10-CM

## 2023-12-09 DIAGNOSIS — F03.90 DEMENTIA (HCC): ICD-10-CM

## 2023-12-09 LAB
2HR DELTA HS TROPONIN: 1 NG/L
ALBUMIN SERPL BCP-MCNC: 4.1 G/DL (ref 3.5–5)
ALP SERPL-CCNC: 84 U/L (ref 34–104)
ALT SERPL W P-5'-P-CCNC: 15 U/L (ref 7–52)
ANION GAP SERPL CALCULATED.3IONS-SCNC: 7 MMOL/L
APTT PPP: 29 SECONDS (ref 23–37)
AST SERPL W P-5'-P-CCNC: 21 U/L (ref 13–39)
ATRIAL RATE: 76 BPM
BACTERIA UR QL AUTO: ABNORMAL /HPF
BASOPHILS # BLD AUTO: 0.04 THOUSANDS/ÂΜL (ref 0–0.1)
BASOPHILS NFR BLD AUTO: 1 % (ref 0–1)
BILIRUB SERPL-MCNC: 0.6 MG/DL (ref 0.2–1)
BILIRUB UR QL STRIP: NEGATIVE
BUN SERPL-MCNC: 19 MG/DL (ref 5–25)
CALCIUM SERPL-MCNC: 9.5 MG/DL (ref 8.4–10.2)
CARDIAC TROPONIN I PNL SERPL HS: 11 NG/L
CARDIAC TROPONIN I PNL SERPL HS: 12 NG/L
CHLORIDE SERPL-SCNC: 100 MMOL/L (ref 96–108)
CLARITY UR: ABNORMAL
CO2 SERPL-SCNC: 33 MMOL/L (ref 21–32)
COLOR UR: YELLOW
CREAT SERPL-MCNC: 0.81 MG/DL (ref 0.6–1.3)
EOSINOPHIL # BLD AUTO: 0.02 THOUSAND/ÂΜL (ref 0–0.61)
EOSINOPHIL NFR BLD AUTO: 0 % (ref 0–6)
ERYTHROCYTE [DISTWIDTH] IN BLOOD BY AUTOMATED COUNT: 12.7 % (ref 11.6–15.1)
FLUAV RNA RESP QL NAA+PROBE: NEGATIVE
FLUBV RNA RESP QL NAA+PROBE: NEGATIVE
GFR SERPL CREATININE-BSD FRML MDRD: 66 ML/MIN/1.73SQ M
GLUCOSE SERPL-MCNC: 124 MG/DL (ref 65–140)
GLUCOSE UR STRIP-MCNC: NEGATIVE MG/DL
HCT VFR BLD AUTO: 40 % (ref 34.8–46.1)
HGB BLD-MCNC: 13.3 G/DL (ref 11.5–15.4)
HGB UR QL STRIP.AUTO: NEGATIVE
IMM GRANULOCYTES # BLD AUTO: 0.03 THOUSAND/UL (ref 0–0.2)
IMM GRANULOCYTES NFR BLD AUTO: 0 % (ref 0–2)
INR PPP: 0.99 (ref 0.84–1.19)
KETONES UR STRIP-MCNC: NEGATIVE MG/DL
LEUKOCYTE ESTERASE UR QL STRIP: ABNORMAL
LYMPHOCYTES # BLD AUTO: 0.55 THOUSANDS/ÂΜL (ref 0.6–4.47)
LYMPHOCYTES NFR BLD AUTO: 7 % (ref 14–44)
MAGNESIUM SERPL-MCNC: 2 MG/DL (ref 1.9–2.7)
MCH RBC QN AUTO: 33.4 PG (ref 26.8–34.3)
MCHC RBC AUTO-ENTMCNC: 33.3 G/DL (ref 31.4–37.4)
MCV RBC AUTO: 101 FL (ref 82–98)
MONOCYTES # BLD AUTO: 0.55 THOUSAND/ÂΜL (ref 0.17–1.22)
MONOCYTES NFR BLD AUTO: 7 % (ref 4–12)
NEUTROPHILS # BLD AUTO: 6.49 THOUSANDS/ÂΜL (ref 1.85–7.62)
NEUTS SEG NFR BLD AUTO: 85 % (ref 43–75)
NITRITE UR QL STRIP: NEGATIVE
NON-SQ EPI CELLS URNS QL MICRO: ABNORMAL /HPF
NRBC BLD AUTO-RTO: 0 /100 WBCS
P AXIS: -16 DEGREES
PH UR STRIP.AUTO: 7 [PH]
PLATELET # BLD AUTO: 225 THOUSANDS/UL (ref 149–390)
PMV BLD AUTO: 9.7 FL (ref 8.9–12.7)
POTASSIUM SERPL-SCNC: 3.3 MMOL/L (ref 3.5–5.3)
PR INTERVAL: 158 MS
PROT SERPL-MCNC: 7.4 G/DL (ref 6.4–8.4)
PROT UR STRIP-MCNC: ABNORMAL MG/DL
PROTHROMBIN TIME: 13.3 SECONDS (ref 11.6–14.5)
QRS AXIS: 17 DEGREES
QRSD INTERVAL: 90 MS
QT INTERVAL: 416 MS
QTC INTERVAL: 468 MS
RBC # BLD AUTO: 3.98 MILLION/UL (ref 3.81–5.12)
RBC #/AREA URNS AUTO: ABNORMAL /HPF
RSV RNA RESP QL NAA+PROBE: NEGATIVE
SARS-COV-2 RNA RESP QL NAA+PROBE: NEGATIVE
SODIUM SERPL-SCNC: 140 MMOL/L (ref 135–147)
SP GR UR STRIP.AUTO: 1.01 (ref 1–1.03)
T WAVE AXIS: 110 DEGREES
TSH SERPL DL<=0.05 MIU/L-ACNC: 1.7 UIU/ML (ref 0.45–4.5)
UROBILINOGEN UR QL STRIP.AUTO: 0.2 E.U./DL
VENTRICULAR RATE: 76 BPM
WBC # BLD AUTO: 7.68 THOUSAND/UL (ref 4.31–10.16)
WBC #/AREA URNS AUTO: ABNORMAL /HPF

## 2023-12-09 PROCEDURE — 85610 PROTHROMBIN TIME: CPT | Performed by: EMERGENCY MEDICINE

## 2023-12-09 PROCEDURE — 99285 EMERGENCY DEPT VISIT HI MDM: CPT | Performed by: EMERGENCY MEDICINE

## 2023-12-09 PROCEDURE — 71045 X-RAY EXAM CHEST 1 VIEW: CPT

## 2023-12-09 PROCEDURE — 0241U HB NFCT DS VIR RESP RNA 4 TRGT: CPT | Performed by: EMERGENCY MEDICINE

## 2023-12-09 PROCEDURE — 85025 COMPLETE CBC W/AUTO DIFF WBC: CPT | Performed by: EMERGENCY MEDICINE

## 2023-12-09 PROCEDURE — 87086 URINE CULTURE/COLONY COUNT: CPT | Performed by: EMERGENCY MEDICINE

## 2023-12-09 PROCEDURE — 81001 URINALYSIS AUTO W/SCOPE: CPT | Performed by: EMERGENCY MEDICINE

## 2023-12-09 PROCEDURE — 99285 EMERGENCY DEPT VISIT HI MDM: CPT

## 2023-12-09 PROCEDURE — 80053 COMPREHEN METABOLIC PANEL: CPT | Performed by: EMERGENCY MEDICINE

## 2023-12-09 PROCEDURE — 93005 ELECTROCARDIOGRAM TRACING: CPT

## 2023-12-09 PROCEDURE — 70496 CT ANGIOGRAPHY HEAD: CPT

## 2023-12-09 PROCEDURE — 36415 COLL VENOUS BLD VENIPUNCTURE: CPT | Performed by: EMERGENCY MEDICINE

## 2023-12-09 PROCEDURE — 96374 THER/PROPH/DIAG INJ IV PUSH: CPT

## 2023-12-09 PROCEDURE — 96361 HYDRATE IV INFUSION ADD-ON: CPT

## 2023-12-09 PROCEDURE — 99222 1ST HOSP IP/OBS MODERATE 55: CPT

## 2023-12-09 PROCEDURE — 84443 ASSAY THYROID STIM HORMONE: CPT | Performed by: EMERGENCY MEDICINE

## 2023-12-09 PROCEDURE — 70498 CT ANGIOGRAPHY NECK: CPT

## 2023-12-09 PROCEDURE — 85730 THROMBOPLASTIN TIME PARTIAL: CPT | Performed by: EMERGENCY MEDICINE

## 2023-12-09 PROCEDURE — 84484 ASSAY OF TROPONIN QUANT: CPT | Performed by: EMERGENCY MEDICINE

## 2023-12-09 PROCEDURE — 83735 ASSAY OF MAGNESIUM: CPT | Performed by: EMERGENCY MEDICINE

## 2023-12-09 PROCEDURE — G1004 CDSM NDSC: HCPCS

## 2023-12-09 RX ORDER — LANOLIN ALCOHOL/MO/W.PET/CERES
9 CREAM (GRAM) TOPICAL
Status: DISCONTINUED | OUTPATIENT
Start: 2023-12-09 | End: 2023-12-14 | Stop reason: HOSPADM

## 2023-12-09 RX ORDER — LOSARTAN POTASSIUM 50 MG/1
100 TABLET ORAL DAILY
Status: DISCONTINUED | OUTPATIENT
Start: 2023-12-10 | End: 2023-12-14 | Stop reason: HOSPADM

## 2023-12-09 RX ORDER — OLANZAPINE 5 MG/1
5 TABLET, ORALLY DISINTEGRATING ORAL
Status: DISCONTINUED | OUTPATIENT
Start: 2023-12-09 | End: 2023-12-11

## 2023-12-09 RX ORDER — ENOXAPARIN SODIUM 100 MG/ML
40 INJECTION SUBCUTANEOUS DAILY
Status: DISCONTINUED | OUTPATIENT
Start: 2023-12-10 | End: 2023-12-14 | Stop reason: HOSPADM

## 2023-12-09 RX ORDER — QUETIAPINE FUMARATE 25 MG/1
50 TABLET, FILM COATED ORAL
Status: DISCONTINUED | OUTPATIENT
Start: 2023-12-09 | End: 2023-12-11

## 2023-12-09 RX ORDER — METOPROLOL TARTRATE 50 MG/1
50 TABLET, FILM COATED ORAL 2 TIMES DAILY
Status: DISCONTINUED | OUTPATIENT
Start: 2023-12-09 | End: 2023-12-14 | Stop reason: HOSPADM

## 2023-12-09 RX ORDER — POTASSIUM CHLORIDE 14.9 MG/ML
20 INJECTION INTRAVENOUS ONCE
Status: COMPLETED | OUTPATIENT
Start: 2023-12-09 | End: 2023-12-10

## 2023-12-09 RX ORDER — LORAZEPAM 0.5 MG/1
0.5 TABLET ORAL 2 TIMES DAILY
Status: DISCONTINUED | OUTPATIENT
Start: 2023-12-09 | End: 2023-12-11

## 2023-12-09 RX ORDER — POLYETHYLENE GLYCOL 3350 17 G/17G
17 POWDER, FOR SOLUTION ORAL DAILY
Status: DISCONTINUED | OUTPATIENT
Start: 2023-12-10 | End: 2023-12-14 | Stop reason: HOSPADM

## 2023-12-09 RX ORDER — MELATONIN
1000 DAILY
Status: DISCONTINUED | OUTPATIENT
Start: 2023-12-10 | End: 2023-12-14 | Stop reason: HOSPADM

## 2023-12-09 RX ORDER — BENZONATATE 100 MG/1
100 CAPSULE ORAL 3 TIMES DAILY
Status: DISCONTINUED | OUTPATIENT
Start: 2023-12-09 | End: 2023-12-14 | Stop reason: HOSPADM

## 2023-12-09 RX ORDER — LABETALOL HYDROCHLORIDE 5 MG/ML
10 INJECTION, SOLUTION INTRAVENOUS ONCE
Status: COMPLETED | OUTPATIENT
Start: 2023-12-09 | End: 2023-12-09

## 2023-12-09 RX ORDER — ACETAMINOPHEN 325 MG/1
650 TABLET ORAL EVERY 6 HOURS PRN
Status: DISCONTINUED | OUTPATIENT
Start: 2023-12-09 | End: 2023-12-14 | Stop reason: HOSPADM

## 2023-12-09 RX ORDER — IPRATROPIUM BROMIDE AND ALBUTEROL SULFATE 2.5; .5 MG/3ML; MG/3ML
3 SOLUTION RESPIRATORY (INHALATION) ONCE
Status: COMPLETED | OUTPATIENT
Start: 2023-12-09 | End: 2023-12-09

## 2023-12-09 RX ORDER — ONDANSETRON 2 MG/ML
4 INJECTION INTRAMUSCULAR; INTRAVENOUS EVERY 6 HOURS PRN
Status: DISCONTINUED | OUTPATIENT
Start: 2023-12-09 | End: 2023-12-14 | Stop reason: HOSPADM

## 2023-12-09 RX ORDER — POTASSIUM CHLORIDE 20 MEQ/1
40 TABLET, EXTENDED RELEASE ORAL ONCE
Status: DISCONTINUED | OUTPATIENT
Start: 2023-12-09 | End: 2023-12-09

## 2023-12-09 RX ORDER — DONEPEZIL HYDROCHLORIDE 5 MG/1
10 TABLET, FILM COATED ORAL
Status: DISCONTINUED | OUTPATIENT
Start: 2023-12-09 | End: 2023-12-14 | Stop reason: HOSPADM

## 2023-12-09 RX ORDER — LORATADINE 10 MG/1
10 TABLET ORAL DAILY
Status: DISCONTINUED | OUTPATIENT
Start: 2023-12-10 | End: 2023-12-14 | Stop reason: HOSPADM

## 2023-12-09 RX ORDER — CEFTRIAXONE 1 G/50ML
1000 INJECTION, SOLUTION INTRAVENOUS EVERY 24 HOURS
Status: DISCONTINUED | OUTPATIENT
Start: 2023-12-09 | End: 2023-12-11

## 2023-12-09 RX ORDER — BENZONATATE 100 MG/1
100 CAPSULE ORAL ONCE
Status: COMPLETED | OUTPATIENT
Start: 2023-12-09 | End: 2023-12-09

## 2023-12-09 RX ORDER — DOCUSATE SODIUM 100 MG/1
100 CAPSULE, LIQUID FILLED ORAL 2 TIMES DAILY
Status: DISCONTINUED | OUTPATIENT
Start: 2023-12-09 | End: 2023-12-14 | Stop reason: HOSPADM

## 2023-12-09 RX ADMIN — POTASSIUM CHLORIDE 20 MEQ: 14.9 INJECTION, SOLUTION INTRAVENOUS at 22:50

## 2023-12-09 RX ADMIN — DONEPEZIL HYDROCHLORIDE 10 MG: 5 TABLET ORAL at 22:41

## 2023-12-09 RX ADMIN — BENZONATATE 100 MG: 100 CAPSULE ORAL at 18:57

## 2023-12-09 RX ADMIN — Medication 9 MG: at 22:41

## 2023-12-09 RX ADMIN — AZITHROMYCIN MONOHYDRATE 500 MG: 500 INJECTION, POWDER, LYOPHILIZED, FOR SOLUTION INTRAVENOUS at 18:34

## 2023-12-09 RX ADMIN — LORAZEPAM 0.5 MG: 0.5 TABLET ORAL at 20:43

## 2023-12-09 RX ADMIN — SODIUM CHLORIDE 500 ML: 0.9 INJECTION, SOLUTION INTRAVENOUS at 10:40

## 2023-12-09 RX ADMIN — BENZONATATE 100 MG: 100 CAPSULE ORAL at 13:35

## 2023-12-09 RX ADMIN — LABETALOL HYDROCHLORIDE 10 MG: 5 INJECTION, SOLUTION INTRAVENOUS at 12:19

## 2023-12-09 RX ADMIN — DOCUSATE SODIUM 100 MG: 100 CAPSULE, LIQUID FILLED ORAL at 18:57

## 2023-12-09 RX ADMIN — IPRATROPIUM BROMIDE AND ALBUTEROL SULFATE 3 ML: .5; 3 SOLUTION RESPIRATORY (INHALATION) at 13:02

## 2023-12-09 RX ADMIN — CEFTRIAXONE 1000 MG: 1 INJECTION, SOLUTION INTRAVENOUS at 18:29

## 2023-12-09 RX ADMIN — QUETIAPINE FUMARATE 50 MG: 25 TABLET ORAL at 22:41

## 2023-12-09 RX ADMIN — IOHEXOL 75 ML: 350 INJECTION, SOLUTION INTRAVENOUS at 12:16

## 2023-12-09 RX ADMIN — METOPROLOL TARTRATE 50 MG: 50 TABLET, FILM COATED ORAL at 18:57

## 2023-12-09 NOTE — ASSESSMENT & PLAN NOTE
SBP in the range of 165-194  Received labetalol 10 mg IV in ED  Continue losartan and metoprolol  Monitor BP

## 2023-12-09 NOTE — ASSESSMENT & PLAN NOTE
Presented to ED with complaint of generalized weakness, nonproductive cough and ambulatory difficulty.  Patient was at PCP yesterday and was prescribed Augmentin twice daily.  With no improvement in cough overnight, daughter brought patient to ED for further evaluation.  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  (Other findings as noted in radiology report.)  Received benzonatate 100 mg x 1 dose, DuoNeb nebulization and 500 cc normal saline bolus.  UA with moderate leukocytosis and bacteria  Urine culture pending  Azithromycin IV  x 1 dose  Start ceftriaxone IV  Fall precautions  PT/OT eval and treat  Fall precautions  Consult CM for dispo planning

## 2023-12-09 NOTE — ASSESSMENT & PLAN NOTE
Patient with history of dementia with behavioral disturbance and daughter reporting that agitation  has become increasingly frequent.  Seroquel was just increased from 25 mg to 50 mg yesterday by PCP  Will continue Aricept and Seroquel at this time  Continue Aricept  Psych consult

## 2023-12-09 NOTE — H&P
[facility]  H&P  Name: Monika Butler 83 y.o. female I MRN: [id number]  Unit/Bed#: 2 [address] Date of Admission: 12/9/2023   Date of Service: 12/9/2023 I Hospital Day: 0      Assessment/Plan   * Generalized weakness  Assessment & Plan  Presented to ED with complaint of generalized weakness, nonproductive cough and ambulatory difficulty.  Patient was at PCP yesterday and was prescribed Augmentin twice daily.  With no improvement in cough overnight, daughter brought patient to ED for further evaluation.  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  (Other findings as noted in radiology report.)  Received benzonatate 100 mg x 1 dose, DuoNeb nebulization and 500 cc normal saline bolus.  UA with moderate leukocytosis and bacteria  Urine culture pending  Azithromycin IV  x 1 dose  Start ceftriaxone IV  Fall precautions  PT/OT eval and treat  Fall precautions  Consult CM for dispo planning        Dementia with behavioral disturbance (HCC)  Assessment & Plan  Patient with history of dementia with behavioral disturbance and daughter reporting that agitation  has become increasingly frequent.  Seroquel was just increased from 25 mg to 50 mg yesterday by PCP  Will continue Aricept and Seroquel at this time  Continue Aricept  Psych consult    Essential hypertension  Assessment & Plan  SBP in the range of 165-194  Received labetalol 10 mg IV in ED  Continue losartan and metoprolol  Monitor BP    Anxiety  Assessment & Plan  Continue Ativan and Seroquel    Electrolyte abnormality  Assessment & Plan  Potassium 3.3  Repleted  Check in a.m.           VTE Pharmacologic Prophylaxis:   High Risk (Score >/= 5) - Pharmacological DVT Prophylaxis Ordered: enoxaparin (Lovenox). Sequential Compression Devices Ordered.  Code Status: Level 1 - Full Code   Discussion with family: Updated contact  person (daughter and son in law) at bedside.    Anticipated Length of Stay: Patient will be admitted on an inpatient basis with an anticipated length of stay of greater than 2 midnights secondary to ambulatory dysfunction and dementia with behavioral disturbance 40.    Total Time Spent on Date of Encounter in care of patient: 40 mins. This time was spent on one or more of the following: performing physical exam; counseling and coordination of care; obtaining or reviewing history; documenting in the medical record; reviewing/ordering tests, medications or procedures; communicating with other healthcare professionals and discussing with patient's family/caregivers.    Chief Complaint: Upper respiratory infection, ambulatory dysfunction, dementia with behavioral disturbance    History of Present Illness:  Monika Butler is a 83 y.o. female with a PMH of hypertension, dementia with behavioral disturbance who presents with complaints of generalized weakness, nonproductive cough and ambulatory difficulty.  Patient was at PCP yesterday and was prescribed Augmentin twice daily for upper respiratory infection and Seroquel was increased from 25 mg to 50 mg for increase in agitation.  Patient presented to ED for no improvement in cough and increased agitation.  Per daughter, patient has been more agitated with increasing behavioral disturbances.  Chest x-ray showed no acute cardiopulmonary disease and CTA head/neck demonstrated advanced chronic microvascular angiopathic changes.  Per daughter and son-in-law at bedside, patient is getting more difficult to be cared for at home hence, looking for dementia placement.  Patient seen at bedside in room is not cooperative with examination.            Review of Systems:  Review of Systems   Unable to perform ROS: Dementia       Past Medical and Surgical History:   Past Medical History:   Diagnosis Date    Allergic     Cancer (HCC) 2005    left breast mastectomy    Hypertension      Memory change     Nodule of left lung     Pleural thickening        Past Surgical History:   Procedure Laterality Date    CATARACT EXTRACTION Left 05/2020    CATARACT EXTRACTION Left 06/2020    MASTECTOMY      SD XCAPSL CTRC RMVL INSJ IO LENS PROSTH W/O ECP Left 6/8/2020    Procedure: EXTRACTION EXTRACAPSULAR CATARACT PHACO INTRAOCULAR LENS (IOL);  Surgeon: Cortes Harrison MD;  Location: [facility] MAIN OR;  Service: Ophthalmology       Meds/Allergies:  Prior to Admission medications    Medication Sig Start Date End Date Taking? Authorizing Provider   amoxicillin (AMOXIL) 875 mg tablet Take 1 tablet (875 mg total) by mouth 2 (two) times a day for 7 days 12/8/23 12/15/23  Tish Bradford MD   Ascorbic Acid (Vitamin C) 250 MG CHEW  3/1/20   Historical Provider, MD   cholecalciferol (VITAMIN D3) 1,000 units tablet Take 1,000 Units by mouth daily    Historical Provider, MD   cyanocobalamin (VITAMIN B-12) 100 mcg tablet Take by mouth daily    Historical Provider, MD   donepezil (ARICEPT) 10 mg tablet Take 1 tablet (10 mg total) by mouth daily at bedtime 12/8/23   Tish Brdaford MD   guaiFENesin (MUCINEX) 600 mg 12 hr tablet Take 1,200 mg by mouth every 12 (twelve) hours prn  Patient not taking: Reported on 12/8/2023    Historical Provider, MD   loratadine (CLARITIN) 10 mg tablet Take 10 mg by mouth daily    Historical Provider, MD   LORazepam (ATIVAN) 1 mg tablet take 1/2 tablet by mouth IN THE MORNING and 1 tablet IN THE EVENING if needed 9/11/23   Kristin Raymundo MD   losartan (Cozaar) 100 MG tablet Take 1 tablet (100 mg total) by mouth daily 12/6/23   Kristin Raymundo MD   Melatonin 10 MG TABS Take 1 tablet (10 mg total) by mouth daily at bedtime 2/24/23   Kristin Raymundo MD   metoprolol tartrate (LOPRESSOR) 50 mg tablet take 1 tablet by mouth twice a day 10/23/23   Kristin Raymundo MD   Multiple Vitamins-Minerals (ONE-A-DAY 50 PLUS PO)  2/1/20   Historical Provider, MD   nystatin (MYCOSTATIN)  powder Apply topically 2 (two) times a day as needed (rash/skin moisture) 10/5/23   Kristin Raymundo MD   OLANZapine (ZyPREXA ZYDIS) 5 mg dispersible tablet Take 1 tablet (5 mg total) by mouth daily at bedtime as needed (if anxious agitated) 12/7/23   Tish Bradford MD   QUEtiapine (SEROquel) 25 mg tablet Take 2 tablets (50 mg total) by mouth daily at bedtime 12/8/23   Tish Bradford MD     I have reviewed home medications with patient personally.    Allergies:   Allergies   Allergen Reactions    Ibuprofen      Reaction Date: 10Aug2005;        Social History:  Marital Status:    Occupation:   Patient Pre-hospital Living Situation:   Patient Pre-hospital Level of Mobility:   Patient Pre-hospital Diet Restrictions:   Substance Use History:   Social History     Substance and Sexual Activity   Alcohol Use Never     Social History     Tobacco Use   Smoking Status Never   Smokeless Tobacco Never     Social History     Substance and Sexual Activity   Drug Use Never       Family History:  No family history on file.    Physical Exam:     Vitals:   Blood Pressure: 139/88 (12/09/23 1641)  Pulse: 85 (12/09/23 1641)  Temperature: 98.1 °F (36.7 °C) (12/09/23 1641)  Temp Source: Tympanic (12/09/23 0955)  Respirations: 18 (12/09/23 1641)  Weight - Scale: 54.6 kg (120 lb 6.4 oz) (12/09/23 1641)  SpO2: 96 % (12/09/23 1641)    Physical Exam  Musculoskeletal:      Right lower leg: No edema.      Left lower leg: No edema.   Neurological:      Mental Status: Mental status is at baseline.      Motor: Weakness present.   Psychiatric:         Behavior: Behavior is uncooperative and agitated.          Additional Data:     Lab Results:  Results from last 7 days   Lab Units 12/09/23  1013   WBC Thousand/uL 7.68   HEMOGLOBIN g/dL 13.3   HEMATOCRIT % 40.0   PLATELETS Thousands/uL 225   NEUTROS PCT % 85*   LYMPHS PCT % 7*   MONOS PCT % 7   EOS PCT % 0     Results from last 7 days   Lab Units 12/09/23  1013   SODIUM  mmol/L 140   POTASSIUM mmol/L 3.3*   CHLORIDE mmol/L 100   CO2 mmol/L 33*   BUN mg/dL 19   CREATININE mg/dL 0.81   ANION GAP mmol/L 7   CALCIUM mg/dL 9.5   ALBUMIN g/dL 4.1   TOTAL BILIRUBIN mg/dL 0.60   ALK PHOS U/L 84   ALT U/L 15   AST U/L 21   GLUCOSE RANDOM mg/dL 124     Results from last 7 days   Lab Units 12/09/23  1013   INR  0.99             Results from last 7 days   Lab Units 12/04/23  0114   PROCALCITONIN ng/ml 0.12       Lines/Drains:  Invasive Devices       Peripheral Intravenous Line  Duration             Peripheral IV 12/09/23 Right Antecubital <1 day                        Imaging: Reviewed radiology reports from this admission including: chest xray and CT head  CTA head and neck with and without contrast   Final Result by Rafa Merino DO (12/09 1301)      CT brain: Advanced chronic microangiopathic change within the cerebral hemispheres with no acute abnormality identified.      Stable lobulated meningioma within the left posterior lateral middle cranial fossa, 1.3 cm in size.      CT angiography: Atherosclerotic change with scattered calcifications. No stenosis or occlusion identified.      Chronic pleural and parenchymal changes at the lung apices.      Enlarging complex cyst within the subcutaneous fat of the posterior neck likely a complex sebaceous cyst.                  Workstation performed: [id number]         XR chest 1 view portable   Final Result by Rios Biacnhi MD (12/09 1759)      No acute cardiopulmonary disease.                  Workstation performed: [id number]             EKG and Other Studies Reviewed on Admission:   EKG: NSR. HR 80's.    ** Please Note: This note has been constructed using a voice recognition system. **

## 2023-12-09 NOTE — ED PROVIDER NOTES
History  Chief Complaint   Patient presents with    Weakness - Generalized     Pt arrived w/ daughter c/o gen weakness past 24hrs. PCP started Abx for cough and cold and increased seroquel. Daughter reports fall to hands and knees w/o injury last night. No thinners, no pain or complaints.     83-year-old female presents with generalized weakness inability to ambulate properly since last night.  Patient has a history of dementia gets agitated so they are adjusting her medications increased her Seroquel last night.  Lives with her daughter, who said that she was walking with her head down and unstable gait.  No headache no head trauma no slurred speech trouble getting words no other symptoms.  No nausea vomiting fevers chills abdominal pain diarrhea cough congestion or any other symptoms      History provided by:  Patient   used: No        Prior to Admission Medications   Prescriptions Last Dose Informant Patient Reported? Taking?   Ascorbic Acid (Vitamin C) 250 MG CHEW   Yes No   LORazepam (ATIVAN) 1 mg tablet   No No   Sig: take 1/2 tablet by mouth IN THE MORNING and 1 tablet IN THE EVENING if needed   Melatonin 10 MG TABS   No No   Sig: Take 1 tablet (10 mg total) by mouth daily at bedtime   Multiple Vitamins-Minerals (ONE-A-DAY 50 PLUS PO)   Yes No   OLANZapine (ZyPREXA ZYDIS) 5 mg dispersible tablet   No No   Sig: Take 1 tablet (5 mg total) by mouth daily at bedtime as needed (if anxious agitated)   QUEtiapine (SEROquel) 25 mg tablet   No No   Sig: Take 2 tablets (50 mg total) by mouth daily at bedtime   amoxicillin (AMOXIL) 875 mg tablet   No No   Sig: Take 1 tablet (875 mg total) by mouth 2 (two) times a day for 7 days   cholecalciferol (VITAMIN D3) 1,000 units tablet   Yes No   Sig: Take 1,000 Units by mouth daily   cyanocobalamin (VITAMIN B-12) 100 mcg tablet   Yes No   Sig: Take by mouth daily   donepezil (ARICEPT) 10 mg tablet   No No   Sig: Take 1 tablet (10 mg total) by mouth daily at  bedtime   guaiFENesin (MUCINEX) 600 mg 12 hr tablet   Yes No   Sig: Take 1,200 mg by mouth every 12 (twelve) hours prn   Patient not taking: Reported on 12/8/2023   loratadine (CLARITIN) 10 mg tablet   Yes No   Sig: Take 10 mg by mouth daily   losartan (Cozaar) 100 MG tablet   No No   Sig: Take 1 tablet (100 mg total) by mouth daily   metoprolol tartrate (LOPRESSOR) 50 mg tablet   No No   Sig: take 1 tablet by mouth twice a day   nystatin (MYCOSTATIN) powder   No No   Sig: Apply topically 2 (two) times a day as needed (rash/skin moisture)      Facility-Administered Medications: None       Past Medical History:   Diagnosis Date    Allergic     Cancer (HCC) 2005    left breast mastectomy    Hypertension     Memory change     Nodule of left lung     Pleural thickening        Past Surgical History:   Procedure Laterality Date    CATARACT EXTRACTION Left 05/2020    CATARACT EXTRACTION Left 06/2020    MASTECTOMY      IN XCAPSL CTRC RMVL INSJ IO LENS PROSTH W/O ECP Left 6/8/2020    Procedure: EXTRACTION EXTRACAPSULAR CATARACT PHACO INTRAOCULAR LENS (IOL);  Surgeon: Cortes Harrison MD;  Location: [facility] MAIN OR;  Service: Ophthalmology       No family history on file.  I have reviewed and agree with the history as documented.    E-Cigarette/Vaping    E-Cigarette Use Never User      E-Cigarette/Vaping Substances    Nicotine No     THC No     CBD No      Social History     Tobacco Use    Smoking status: Never    Smokeless tobacco: Never   Vaping Use    Vaping Use: Never used   Substance Use Topics    Alcohol use: Never    Drug use: Never       Review of Systems   Constitutional: Negative.    HENT: Negative.     Eyes: Negative.    Respiratory: Negative.     Cardiovascular: Negative.    Gastrointestinal: Negative.    Endocrine: Negative.    Genitourinary: Negative.    Musculoskeletal:  Positive for gait problem.   Skin: Negative.    Allergic/Immunologic: Negative.    Hematological: Negative.    Psychiatric/Behavioral: Negative.      All other systems reviewed and are negative.      Physical Exam  Physical Exam  Vitals and nursing note reviewed.   Constitutional:       Appearance: Normal appearance.   HENT:      Head: Normocephalic and atraumatic.      Nose: Nose normal.      Mouth/Throat:      Mouth: Mucous membranes are moist.   Eyes:      Extraocular Movements: Extraocular movements intact.      Pupils: Pupils are equal, round, and reactive to light.   Cardiovascular:      Rate and Rhythm: Normal rate and regular rhythm.   Pulmonary:      Effort: Pulmonary effort is normal.      Breath sounds: Normal breath sounds.   Abdominal:      General: Abdomen is flat. Bowel sounds are normal.      Palpations: Abdomen is soft.   Musculoskeletal:         General: Normal range of motion.      Cervical back: Normal range of motion and neck supple.   Skin:     General: Skin is warm.      Capillary Refill: Capillary refill takes less than 2 seconds.   Neurological:      General: No focal deficit present.      Mental Status: She is alert and oriented to person, place, and time. Mental status is at baseline.      Comments: No gross neurologic deficits noted   Psychiatric:         Mood and Affect: Mood normal.         Thought Content: Thought content normal.         Vital Signs  ED Triage Vitals [12/09/23 0955]   Temperature Pulse Respirations Blood Pressure SpO2   98.6 °F (37 °C) 82 18 165/84 92 %      Temp Source Heart Rate Source Patient Position - Orthostatic VS BP Location FiO2 (%)   Tympanic Monitor Lying Right arm --      Pain Score       No Pain           Vitals:    12/09/23 1430 12/09/23 1500 12/09/23 1515 12/09/23 1530   BP: (!) 184/82 169/82  153/70   Pulse: 77 81 81 75   Patient Position - Orthostatic VS:             Visual Acuity      ED Medications  Medications   potassium chloride (K-DUR,KLOR-CON) CR tablet 40 mEq (40 mEq Oral Not Given 12/9/23 1606)   benzonatate (TESSALON PERLES) capsule 100 mg (has no administration in time range)    cefTRIAXone (ROCEPHIN) IVPB (premix in dextrose) 1,000 mg 50 mL (has no administration in time range)   sodium chloride 0.9 % bolus 500 mL (0 mL Intravenous Stopped 12/9/23 1140)   iohexol (OMNIPAQUE) 350 MG/ML injection (MULTI-DOSE) 75 mL (75 mL Intravenous Given 12/9/23 1216)   labetalol (NORMODYNE) injection 10 mg (10 mg Intravenous Given 12/9/23 1219)   ipratropium-albuterol (DUO-NEB) 0.5-2.5 mg/3 mL inhalation solution 3 mL (3 mL Nebulization Given 12/9/23 1302)   benzonatate (TESSALON PERLES) capsule 100 mg (100 mg Oral Given 12/9/23 1335)       Diagnostic Studies  Results Reviewed       Procedure Component Value Units Date/Time    Urine Microscopic [[id number]]  (Abnormal) Collected: 12/09/23 1359    Lab Status: Final result Specimen: Urine, Other Updated: 12/09/23 1426     RBC, UA None Seen /hpf      WBC, UA 4-10 /hpf      Epithelial Cells Moderate /hpf      Bacteria, UA Moderate /hpf     UA (URINE) with reflex to Scope [[id number]]  (Abnormal) Collected: 12/09/23 1359    Lab Status: Final result Specimen: Urine, Other Updated: 12/09/23 1422     Color, UA Yellow     Clarity, UA Slightly Cloudy     Specific Gravity, UA 1.010     pH, UA 7.0     Leukocytes, UA Moderate     Nitrite, UA Negative     Protein, UA 30 (1+) mg/dl      Glucose, UA Negative mg/dl      Ketones, UA Negative mg/dl      Urobilinogen, UA 0.2 E.U./dl      Bilirubin, UA Negative     Occult Blood, UA Negative    Urine culture [[id number]] Collected: 12/09/23 1359    Lab Status: In process Specimen: Urine, Other Updated: 12/09/23 1403    HS Troponin I 2hr [[id number]]  (Normal) Collected: 12/09/23 1213    Lab Status: Final result Specimen: Blood from Arm, Right Updated: 12/09/23 1241     hs TnI 2hr 12 ng/L      Delta 2hr hsTnI 1 ng/L     FLU/RSV/COVID - if FLU/RSV clinically relevant [[id number]]  (Normal) Collected: 12/09/23 1013    Lab Status: Final result Specimen: Nares from Nose Updated: 12/09/23 1055     SARS-CoV-2 Negative     INFLUENZA A  PCR Negative     INFLUENZA B PCR Negative     RSV PCR Negative    Narrative:      FOR PEDIATRIC PATIENTS - copy/paste COVID Guidelines URL to browser: [email or web address]    SARS-CoV-2 assay is a Nucleic Acid Amplification assay intended for the  qualitative detection of nucleic acid from SARS-CoV-2 in nasopharyngeal  swabs. Results are for the presumptive identification of SARS-CoV-2 RNA.    Positive results are indicative of infection with SARS-CoV-2, the virus  causing COVID-19, but do not rule out bacterial infection or co-infection  with other viruses. Laboratories within the [place] and its  territories are required to report all positive results to the appropriate  public health authorities. Negative results do not preclude SARS-CoV-2  infection and should not be used as the sole basis for treatment or other  patient management decisions. Negative results must be combined with  clinical observations, patient history, and epidemiological information.  This test has not been FDA cleared or approved.    This test has been authorized by FDA under an Emergency Use Authorization  (EUA). This test is only authorized for the duration of time the  declaration that circumstances exist justifying the authorization of the  emergency use of an in vitro diagnostic tests for detection of SARS-CoV-2  virus and/or diagnosis of COVID-19 infection under section 564(b)(1) of  the Act, 21 U.S.C. 360bbb-3(b)(1), unless the authorization is terminated  or revoked sooner. The test has been validated but independent review by FDA  and CLIA is pending.    Test performed using [employer]: This RT-PCR assay targets N2,  a region unique to SARS-CoV-2. A conserved region in the E-gene was chosen  for pan-Sarbecovirus detection which includes SARS-CoV-2.    According to CMS-2020-01-R, this platform meets the definition of high-throughput technology.    TSH [[id number]]   (Normal) Collected: 12/09/23 1013    Lab Status: Final result Specimen: Blood from Arm, Right Updated: 12/09/23 1054     TSH 3RD GENERATON 1.699 uIU/mL     HS Troponin 0hr (reflex protocol) [[id number]]  (Normal) Collected: 12/09/23 1013    Lab Status: Final result Specimen: Blood from Arm, Right Updated: 12/09/23 1042     hs TnI 0hr 11 ng/L     Comprehensive metabolic panel [[id number]]  (Abnormal) Collected: 12/09/23 1013    Lab Status: Final result Specimen: Blood from Arm, Right Updated: 12/09/23 1037     Sodium 140 mmol/L      Potassium 3.3 mmol/L      Chloride 100 mmol/L      CO2 33 mmol/L      ANION GAP 7 mmol/L      BUN 19 mg/dL      Creatinine 0.81 mg/dL      Glucose 124 mg/dL      Calcium 9.5 mg/dL      AST 21 U/L      ALT 15 U/L      Alkaline Phosphatase 84 U/L      Total Protein 7.4 g/dL      Albumin 4.1 g/dL      Total Bilirubin 0.60 mg/dL      eGFR 66 ml/min/1.73sq m     Narrative:      National Kidney Disease Foundation guidelines for Chronic Kidney Disease (CKD):     Stage 1 with normal or high GFR (GFR > 90 mL/min/1.73 square meters)    Stage 2 Mild CKD (GFR = 60-89 mL/min/1.73 square meters)    Stage 3A Moderate CKD (GFR = 45-59 mL/min/1.73 square meters)    Stage 3B Moderate CKD (GFR = 30-44 mL/min/1.73 square meters)    Stage 4 Severe CKD (GFR = 15-29 mL/min/1.73 square meters)    Stage 5 End Stage CKD (GFR <15 mL/min/1.73 square meters)  Note: GFR calculation is accurate only with a steady state creatinine    Magnesium [[id number]]  (Normal) Collected: 12/09/23 1013    Lab Status: Final result Specimen: Blood from Arm, Right Updated: 12/09/23 1037     Magnesium 2.0 mg/dL     Protime-INR [[id number]]  (Normal) Collected: 12/09/23 1013    Lab Status: Final result Specimen: Blood from Arm, Right Updated: 12/09/23 1031     Protime 13.3 seconds      INR 0.99    APTT [[id number]]  (Normal) Collected: 12/09/23 1013    Lab Status: Final result Specimen: Blood from Arm, Right Updated: 12/09/23 1031     PTT 29  seconds     CBC and differential [[id number]]  (Abnormal) Collected: 12/09/23 1013    Lab Status: Final result Specimen: Blood from Arm, Right Updated: 12/09/23 1020     WBC 7.68 Thousand/uL      RBC 3.98 Million/uL      Hemoglobin 13.3 g/dL      Hematocrit 40.0 %       fL      MCH 33.4 pg      MCHC 33.3 g/dL      RDW 12.7 %      MPV 9.7 fL      Platelets 225 Thousands/uL      nRBC 0 /100 WBCs      Neutrophils Relative 85 %      Immat GRANS % 0 %      Lymphocytes Relative 7 %      Monocytes Relative 7 %      Eosinophils Relative 0 %      Basophils Relative 1 %      Neutrophils Absolute 6.49 Thousands/µL      Immature Grans Absolute 0.03 Thousand/uL      Lymphocytes Absolute 0.55 Thousands/µL      Monocytes Absolute 0.55 Thousand/µL      Eosinophils Absolute 0.02 Thousand/µL      Basophils Absolute 0.04 Thousands/µL                    CTA head and neck with and without contrast   Final Result by Rafa Merino DO (12/09 1301)      CT brain: Advanced chronic microangiopathic change within the cerebral hemispheres with no acute abnormality identified.      Stable lobulated meningioma within the left posterior lateral middle cranial fossa, 1.3 cm in size.      CT angiography: Atherosclerotic change with scattered calcifications. No stenosis or occlusion identified.      Chronic pleural and parenchymal changes at the lung apices.      Enlarging complex cyst within the subcutaneous fat of the posterior neck likely a complex sebaceous cyst.                  Workstation performed: [id number]         XR chest 1 view portable   Final Result by Rios Bianchi MD (12/09 1459)      No acute cardiopulmonary disease.                  Workstation performed: [id number]                    Procedures  ECG 12 Lead Documentation Only    Date/Time: 12/9/2023 4:12 PM    Performed by: Marcy Askew DO  Authorized by: Marcy Askew DO    ECG reviewed by me, the ED Provider: yes    Patient location:  ED  Previous ECG:      Previous ECG:  Unavailable    Comparison to cardiac monitor: Yes    Interpretation:     Interpretation: non-specific    Rate:     ECG rate assessment: normal    Rhythm:     Rhythm: sinus rhythm    Ectopy:     Ectopy: none    QRS:     QRS axis:  Normal  Conduction:     Conduction: normal    ST segments:     ST segments:  Non-specific  T waves:     T waves: non-specific             ED Course                               SBIRT 20yo+      Flowsheet Row Most Recent Value   Initial Alcohol Screen: US AUDIT-C     1. How often do you have a drink containing alcohol? 0 Filed at: 12/09/2023 0958   2. How many drinks containing alcohol do you have on a typical day you are drinking?  0 Filed at: 12/09/2023 0958   3a. Male UNDER 65: How often do you have five or more drinks on one occasion? 0 Filed at: 12/09/2023 0958   3b. FEMALE Any Age, or MALE 65+: How often do you have 4 or more drinks on one occassion? 0 Filed at: 12/09/2023 0958   Audit-C Score 0 Filed at: 12/09/2023 0958   RUPERTO: How many times in the past year have you...    Used an illegal drug or used a prescription medication for non-medical reasons? Never Filed at: 12/09/2023 0958                      Medical Decision Making  Patient evaluated with EKG labs imaging admitted to the hospitalist service for further evaluation management    Problems Addressed:  AMS (altered mental status): acute illness or injury  Weakness: acute illness or injury    Amount and/or Complexity of Data Reviewed  External Data Reviewed: notes.  Labs: ordered. Decision-making details documented in ED Course.  Radiology: ordered. Decision-making details documented in ED Course.  ECG/medicine tests: ordered and independent interpretation performed. Decision-making details documented in ED Course.    Risk  Prescription drug management.  Decision regarding hospitalization.             Disposition  Final diagnoses:   Weakness   AMS (altered mental status)     Time reflects when diagnosis was  documented in both MDM as applicable and the Disposition within this note       Time User Action Codes Description Comment    12/9/2023  2:33 PM Marcy Askew [R53.1] Weakness     12/9/2023  2:33 PM Marcy Askew [R41.82] AMS (altered mental status)           ED Disposition       ED Disposition   Admit    Condition   Stable    Date/Time   Sat Dec 9, 2023 1433    Comment                   Follow-up Information    None         Patient's Medications   Discharge Prescriptions    No medications on file       No discharge procedures on file.    PDMP Review       None            ED Provider  Electronically Signed by             Marcy Askew DO  12/09/23 0069

## 2023-12-09 NOTE — PLAN OF CARE
Problem: NEUROSENSORY - ADULT  Goal: Achieves stable or improved neurological status  Description: INTERVENTIONS  - Monitor and report changes in neurological status  - Monitor vital signs such as temperature, blood pressure, glucose, and any other labs ordered   - Initiate measures to prevent increased intracranial pressure  - Monitor for seizure activity and implement precautions if appropriate      Outcome: Progressing  Goal: Remains free of injury related to seizures activity  Description: INTERVENTIONS  - Maintain airway, patient safety  and administer oxygen as ordered  - Monitor patient for seizure activity, document and report duration and description of seizure to physician/advanced practitioner  - If seizure occurs,  ensure patient safety during seizure  - Reorient patient post seizure  - Seizure pads on all 4 side rails  - Instruct patient/family to notify RN of any seizure activity including if an aura is experienced  - Instruct patient/family to call for assistance with activity based on nursing assessment  - Administer anti-seizure medications if ordered    Outcome: Progressing  Goal: Achieves maximal functionality and self care  Description: INTERVENTIONS  - Monitor swallowing and airway patency with patient fatigue and changes in neurological status  - Encourage and assist patient to increase activity and self care.   - Encourage visually impaired, hearing impaired and aphasic patients to use assistive/communication devices  Outcome: Progressing     Problem: SKIN/TISSUE INTEGRITY - ADULT  Goal: Skin Integrity remains intact(Skin Breakdown Prevention)  Description: Assess:  -Perform Jim assessment every   -Clean and moisturize skin every   -Inspect skin when repositioning, toileting, and assisting with ADLS  -Assess under medical devices such as  every   -Assess extremities for adequate circulation and sensation     Bed Management:  -Have minimal linens on bed & keep smooth, unwrinkled  -Change  linens as needed when moist or perspiring  -Avoid sitting or lying in one position for more than  hours while in bed  -Keep HOB at degrees     Toileting:  -Offer bedside commode  -Assess for incontinence every   -Use incontinent care products after each incontinent episode such as     Activity:  -Mobilize patient  times a day  -Encourage activity and walks on unit  -Encourage or provide ROM exercises   -Turn and reposition patient every  Hours  -Use appropriate equipment to lift or move patient in bed  -Instruct/ Assist with weight shifting every  when out of bed in chair  -Consider limitation of chair time  hour intervals    Skin Care:  -Avoid use of baby powder, tape, friction and shearing, hot water or constrictive clothing  -Relieve pressure over bony prominences using   -Do not massage red bony areas    Next Steps:  -Teach patient strategies to minimize risks such as    -Consider consults to  interdisciplinary teams such as   Outcome: Progressing  Goal: Incision(s), wounds(s) or drain site(s) healing without S/S of infection  Description: INTERVENTIONS  - Assess and document dressing, incision, wound bed, drain sites and surrounding tissue  - Provide patient and family education  - Perform skin care/dressing changes every   Outcome: Progressing  Goal: Pressure injury heals and does not worsen  Description: Interventions:  - Implement low air loss mattress or specialty surface (Criteria met)  - Apply silicone foam dressing  - Instruct/assist with weight shifting every  minutes when in chair   - Limit chair time to  hour intervals  - Use special pressure reducing interventions such as  when in chair   - Apply fecal or urinary incontinence containment device   - Perform passive or active ROM every   - Turn and reposition patient & offload bony prominences every  hours   - Utilize friction reducing device or surface for transfers   - Consider consults to  interdisciplinary teams such as   - Use incontinent care  products after each incontinent episode such as   - Consider nutrition services referral as needed  Outcome: Progressing

## 2023-12-10 PROBLEM — E87.8 ELECTROLYTE ABNORMALITY: Status: RESOLVED | Noted: 2022-10-02 | Resolved: 2023-12-10

## 2023-12-10 LAB
ANION GAP SERPL CALCULATED.3IONS-SCNC: 8 MMOL/L
BACTERIA UR CULT: NORMAL
BUN SERPL-MCNC: 16 MG/DL (ref 5–25)
CALCIUM SERPL-MCNC: 8.9 MG/DL (ref 8.4–10.2)
CHLORIDE SERPL-SCNC: 103 MMOL/L (ref 96–108)
CO2 SERPL-SCNC: 30 MMOL/L (ref 21–32)
CREAT SERPL-MCNC: 0.63 MG/DL (ref 0.6–1.3)
ERYTHROCYTE [DISTWIDTH] IN BLOOD BY AUTOMATED COUNT: 13.1 % (ref 11.6–15.1)
GFR SERPL CREATININE-BSD FRML MDRD: 82 ML/MIN/1.73SQ M
GLUCOSE SERPL-MCNC: 78 MG/DL (ref 65–140)
HCT VFR BLD AUTO: 35.2 % (ref 34.8–46.1)
HGB BLD-MCNC: 11.2 G/DL (ref 11.5–15.4)
MAGNESIUM SERPL-MCNC: 2 MG/DL (ref 1.9–2.7)
MCH RBC QN AUTO: 32.7 PG (ref 26.8–34.3)
MCHC RBC AUTO-ENTMCNC: 31.8 G/DL (ref 31.4–37.4)
MCV RBC AUTO: 103 FL (ref 82–98)
PLATELET # BLD AUTO: 205 THOUSANDS/UL (ref 149–390)
PMV BLD AUTO: 10.6 FL (ref 8.9–12.7)
POTASSIUM SERPL-SCNC: 3.5 MMOL/L (ref 3.5–5.3)
RBC # BLD AUTO: 3.42 MILLION/UL (ref 3.81–5.12)
SODIUM SERPL-SCNC: 141 MMOL/L (ref 135–147)
WBC # BLD AUTO: 5.1 THOUSAND/UL (ref 4.31–10.16)

## 2023-12-10 PROCEDURE — 80048 BASIC METABOLIC PNL TOTAL CA: CPT

## 2023-12-10 PROCEDURE — 85027 COMPLETE CBC AUTOMATED: CPT

## 2023-12-10 PROCEDURE — 99232 SBSQ HOSP IP/OBS MODERATE 35: CPT

## 2023-12-10 PROCEDURE — 83735 ASSAY OF MAGNESIUM: CPT

## 2023-12-10 RX ORDER — LORAZEPAM 2 MG/ML
0.5 INJECTION INTRAMUSCULAR ONCE
Status: DISCONTINUED | OUTPATIENT
Start: 2023-12-10 | End: 2023-12-10

## 2023-12-10 RX ORDER — HYDRALAZINE HYDROCHLORIDE 20 MG/ML
5 INJECTION INTRAMUSCULAR; INTRAVENOUS EVERY 6 HOURS PRN
Status: DISCONTINUED | OUTPATIENT
Start: 2023-12-10 | End: 2023-12-14 | Stop reason: HOSPADM

## 2023-12-10 RX ORDER — OLANZAPINE 10 MG/2ML
2.5 INJECTION, POWDER, FOR SOLUTION INTRAMUSCULAR ONCE
Status: COMPLETED | OUTPATIENT
Start: 2023-12-10 | End: 2023-12-10

## 2023-12-10 RX ADMIN — BENZONATATE 100 MG: 100 CAPSULE ORAL at 15:51

## 2023-12-10 RX ADMIN — OLANZAPINE 2.5 MG: 10 INJECTION, POWDER, FOR SOLUTION INTRAMUSCULAR at 22:10

## 2023-12-10 RX ADMIN — LOSARTAN POTASSIUM 100 MG: 50 TABLET, FILM COATED ORAL at 15:45

## 2023-12-10 RX ADMIN — LORAZEPAM 0.5 MG: 0.5 TABLET ORAL at 15:49

## 2023-12-10 RX ADMIN — CEFTRIAXONE 1000 MG: 1 INJECTION, SOLUTION INTRAVENOUS at 16:46

## 2023-12-10 RX ADMIN — LORAZEPAM 0.5 MG: 0.5 TABLET ORAL at 20:06

## 2023-12-10 RX ADMIN — AZITHROMYCIN MONOHYDRATE 250 MG: 500 INJECTION, POWDER, LYOPHILIZED, FOR SOLUTION INTRAVENOUS at 18:12

## 2023-12-10 RX ADMIN — ENOXAPARIN SODIUM 40 MG: 40 INJECTION SUBCUTANEOUS at 15:53

## 2023-12-10 RX ADMIN — DOCUSATE SODIUM 100 MG: 100 CAPSULE, LIQUID FILLED ORAL at 18:18

## 2023-12-10 RX ADMIN — METOPROLOL TARTRATE 50 MG: 50 TABLET, FILM COATED ORAL at 15:47

## 2023-12-10 RX ADMIN — POLYETHYLENE GLYCOL 3350 17 G: 17 POWDER, FOR SOLUTION ORAL at 08:26

## 2023-12-10 NOTE — PROGRESS NOTES
[facility]  Progress Note  Name: Monika Butler I  MRN: [id number]  Unit/Bed#: 2 [address] Date of Admission: 12/9/2023   Date of Service: 12/10/2023 I Hospital Day: 1    Assessment/Plan   Dementia with behavioral disturbance (HCC)  Assessment & Plan  Patient with history of dementia with behavioral disturbance and daughter reporting that agitation  has become increasingly frequent.  Seroquel was just increased from 25 mg to 50 mg yesterday by PCP  Will continue Aricept and Seroquel at this time  Continue Aricept  Psych consult    * Generalized weakness  Assessment & Plan  Presented to ED with complaint of generalized weakness, nonproductive cough and ambulatory difficulty.  Patient was at PCP yesterday and was prescribed Augmentin twice daily.  With no improvement in cough overnight, daughter brought patient to ED for further evaluation.  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  (Other findings as noted in radiology report.)  Received benzonatate 100 mg x 1 dose, DuoNeb nebulization and 500 cc normal saline bolus.  UA with moderate leukocytosis and bacteria  Urine culture pending  Azithromycin IV  x 1 dose  Start ceftriaxone IV  Fall precautions  PT/OT eval and treat  Consult CM for dispo planning        Essential hypertension  Assessment & Plan  SBP in the range of 165-194  Received labetalol 10 mg IV in ED  Continue losartan and metoprolol  Add hydralazine for SBP > 160  Monitor BP    Anxiety  Assessment & Plan  Continue Ativan and Seroquel    Electrolyte abnormality-resolved as of 12/10/2023  Assessment & Plan  Potassium 3.3 > 3.4  Repleted  Check in a.m.               VTE Pharmacologic Prophylaxis:   Moderate Risk (Score 3-4) - Pharmacological DVT Prophylaxis Ordered: enoxaparin (Lovenox).    Mobility:   Basic Mobility Inpatient Raw Score: 18  [facility] Goal:  6: Walk 10 steps or more  [facility]-HLM Achieved: 7: Walk 25 feet or more  HLM Goal achieved. Continue to encourage appropriate mobility.    Patient Centered Rounds: I performed bedside rounds with nursing staff today.   Discussions with Specialists or Other Care Team Provider: Nursing    Education and Discussions with Family / Patient: Updated  (daughter and son in law) at bedside.    Total Time Spent on Date of Encounter in care of patient: 35 mins. This time was spent on one or more of the following: performing physical exam; counseling and coordination of care; obtaining or reviewing history; documenting in the medical record; reviewing/ordering tests, medications or procedures; communicating with other healthcare professionals and discussing with patient's family/caregivers.    Current Length of Stay: 1 day(s)  Current Patient Status: Inpatient   Certification Statement: The patient will continue to require additional inpatient hospital stay due to psych consult, dementia unit placement  Discharge Plan: Anticipate discharge in 24-48 hrs to discharge location to be determined pending rehab evaluations.    Code Status: Level 1 - Full Code    Subjective:   Seen and examined with daughter and son-in-law at bedside.  Patient noted to be sitting up in chair agitated and talking to herself.  Virtual observation in place.  Psych consult pending.  Daughter and son-in-law are requesting for placement at this time.  CM consult    Objective:     Vitals:   Temp (24hrs), Av.3 °F (36.8 °C), Min:98.1 °F (36.7 °C), Max:98.5 °F (36.9 °C)    Temp:  [98.1 °F (36.7 °C)-98.5 °F (36.9 °C)] 98.3 °F (36.8 °C)  HR:  [69-90] 70  Resp:  [16-22] 16  BP: (129-184)/(70-88) 169/80  SpO2:  [84 %-100 %] 93 %  Body mass index is 22.75 kg/m².     Input and Output Summary (last 24 hours):     Intake/Output Summary (Last 24 hours) at 12/10/2023 1312  Last data filed at 2023  Gross per 24 hour   Intake 250 ml   Output --   Net  250 ml       Physical Exam:   Physical Exam  Vitals and nursing note reviewed.   Constitutional:       General: She is not in acute distress.     Appearance: She is well-developed. She is ill-appearing.   HENT:      Head: Normocephalic and atraumatic.   Eyes:      Conjunctiva/sclera: Conjunctivae normal.   Cardiovascular:      Rate and Rhythm: Normal rate and regular rhythm.      Heart sounds: No murmur heard.  Pulmonary:      Effort: Pulmonary effort is normal. No respiratory distress.      Breath sounds: Normal breath sounds.   Abdominal:      Palpations: Abdomen is soft.      Tenderness: There is no abdominal tenderness.   Musculoskeletal:         General: No swelling.      Cervical back: Neck supple.   Skin:     General: Skin is warm and dry.      Capillary Refill: Capillary refill takes less than 2 seconds.   Neurological:      Mental Status: She is alert. Mental status is at baseline.   Psychiatric:         Behavior: Behavior is uncooperative and agitated.          Additional Data:     Labs:  Results from last 7 days   Lab Units 12/10/23  0457 12/09/23  1013   WBC Thousand/uL 5.10 7.68   HEMOGLOBIN g/dL 11.2* 13.3   HEMATOCRIT % 35.2 40.0   PLATELETS Thousands/uL 205 225   NEUTROS PCT %  --  85*   LYMPHS PCT %  --  7*   MONOS PCT %  --  7   EOS PCT %  --  0     Results from last 7 days   Lab Units 12/10/23  0457 12/09/23  1013   SODIUM mmol/L 141 140   POTASSIUM mmol/L 3.5 3.3*   CHLORIDE mmol/L 103 100   CO2 mmol/L 30 33*   BUN mg/dL 16 19   CREATININE mg/dL 0.63 0.81   ANION GAP mmol/L 8 7   CALCIUM mg/dL 8.9 9.5   ALBUMIN g/dL  --  4.1   TOTAL BILIRUBIN mg/dL  --  0.60   ALK PHOS U/L  --  84   ALT U/L  --  15   AST U/L  --  21   GLUCOSE RANDOM mg/dL 78 124     Results from last 7 days   Lab Units 12/09/23  1013   INR  0.99             Results from last 7 days   Lab Units 12/04/23  0114   PROCALCITONIN ng/ml 0.12       Lines/Drains:  Invasive Devices       Peripheral Intravenous Line  Duration              Peripheral IV 12/09/23 Right Antecubital 1 day                          Imaging: No pertinent imaging reviewed.    Recent Cultures (last 7 days):         Last 24 Hours Medication List:   Current Facility-Administered Medications   Medication Dose Route Frequency Provider Last Rate    acetaminophen  650 mg Oral Q6H PRN Olayide D Olaniyan, CRNP      azithromycin  250 mg Intravenous Q24H Olayide D Olaniyan, CRNP      benzonatate  100 mg Oral TID Olayide D Olaniyan, CRNP      cefTRIAXone  1,000 mg Intravenous Q24H Olayide D Olaniyan, CRNP 1,000 mg (12/09/23 1829)    cholecalciferol  1,000 Units Oral Daily Olayide D Olaniyan, CRNP      cyanocobalamin  250 mcg Oral Daily Olayide D Olaniyan, CRNP      docusate sodium  100 mg Oral BID Olayide D Olaniyan, CRNP      donepezil  10 mg Oral HS Olayide D Olaniyan, CRNP      enoxaparin  40 mg Subcutaneous Daily Olayide D Olaniyan, CRNP      hydrALAZINE  5 mg Intravenous Q6H PRN Olayide D Olaniyan, CRNP      loratadine  10 mg Oral Daily Olayide D Olaniyan, CRNP      LORazepam  0.5 mg Oral BID Olayide D Olaniyan, CRNP      losartan  100 mg Oral Daily Olayide D Olaniyan, CRNP      melatonin  9 mg Oral HS Olayide D Olaniyan, CRNP      metoprolol tartrate  50 mg Oral BID Olayide D Olaniyan, CRNP      multivitamin-minerals  1 tablet Oral Daily Olayide D Olaniyan, CRNP      OLANZapine  5 mg Oral HS PRN Olayide D Olaniyan, CRNP      ondansetron  4 mg Intravenous Q6H PRN Olayide D Olaniyan, CRNP      polyethylene glycol  17 g Oral Daily Olayide D Olaniyan, CRNP      QUEtiapine  50 mg Oral HS Olayide D Colette, JEFFREYNP          Today, Patient Was Seen By: DAMARIS Engel    **Please Note: This note may have been constructed using a voice recognition system.**

## 2023-12-10 NOTE — PHYSICAL THERAPY NOTE
"   PT cancellation note     12/10/23 0900   Note Type   Note type Cancelled Session   Cancel Reasons Refusal  (Pt declined any and all activity stating that she was \"tired\".)       "

## 2023-12-10 NOTE — ASSESSMENT & PLAN NOTE
SBP in the range of 165-194  Received labetalol 10 mg IV in ED  Continue losartan and metoprolol  Add hydralazine for SBP > 160  Monitor BP

## 2023-12-10 NOTE — PLAN OF CARE
Problem: NEUROSENSORY - ADULT  Goal: Achieves stable or improved neurological status  Description: INTERVENTIONS  - Monitor and report changes in neurological status  - Monitor vital signs such as temperature, blood pressure, glucose, and any other labs ordered   - Initiate measures to prevent increased intracranial pressure  - Monitor for seizure activity and implement precautions if appropriate      Outcome: Progressing  Goal: Remains free of injury related to seizures activity  Description: INTERVENTIONS  - Maintain airway, patient safety  and administer oxygen as ordered  - Monitor patient for seizure activity, document and report duration and description of seizure to physician/advanced practitioner  - If seizure occurs,  ensure patient safety during seizure  - Reorient patient post seizure  - Seizure pads on all 4 side rails  - Instruct patient/family to notify RN of any seizure activity including if an aura is experienced  - Instruct patient/family to call for assistance with activity based on nursing assessment  - Administer anti-seizure medications if ordered    Outcome: Progressing  Goal: Achieves maximal functionality and self care  Description: INTERVENTIONS  - Monitor swallowing and airway patency with patient fatigue and changes in neurological status  - Encourage and assist patient to increase activity and self care.   - Encourage visually impaired, hearing impaired and aphasic patients to use assistive/communication devices  Outcome: Progressing     Problem: SKIN/TISSUE INTEGRITY - ADULT  Goal: Skin Integrity remains intact(Skin Breakdown Prevention)  Description: Assess:  -Perform Jim assessment every shift  -Clean and moisturize skin every incontinent episode  -Inspect skin when repositioning, toileting, and assisting with ADLS  -Assess under medical devices such as IV every shift and prn  -Assess extremities for adequate circulation and sensation     Bed Management:  -Have minimal linens on  bed & keep smooth, unwrinkled  -Change linens as needed when moist or perspiring  -Avoid sitting or lying in one position for more than 2 hours while in bed  -Keep HOB at 30degrees     Toileting:  -Offer bedside commode  -Assess for incontinence every PRN and shift  -Use incontinent care products after each incontinent episode such as water    Activity:  -Mobilize patient 4 times a day  -Encourage activity and walks on unit  -Encourage or provide ROM exercises   -Turn and reposition patient every 2 Hours  -Use appropriate equipment to lift or move patient in bed  -Instruct/ Assist with weight shifting every 2hrs when out of bed in chair  -Consider limitation of chair time 2 hour intervals    Skin Care:  -Avoid use of baby powder, tape, friction and shearing, hot water or constrictive clothing  -Relieve pressure over bony prominences using Allevyn  -Do not massage red bony areas    Next Steps:  -Teach patient strategies to minimize risks such as utilize call bell   -Consider consults to  interdisciplinary teams   Outcome: Progressing  Goal: Incision(s), wounds(s) or drain site(s) healing without S/S of infection  Description: INTERVENTIONS  - Assess and document dressing, incision, wound bed, drain sites and surrounding tissue  - Provide patient and family education  - Perform skin care/dressing changes every shift and prn  Outcome: Progressing  Goal: Pressure injury heals and does not worsen  Description: Interventions:  - Implement low air loss mattress or specialty surface (Criteria met)  - Apply silicone foam dressing  - Instruct/assist with weight shifting every 90 minutes when in chair   - Limit chair time to 2 hour intervals  - Use special pressure reducing interventions   - Apply fecal or urinary incontinence containment device   - Perform passive or active ROM every 2  - Turn and reposition patient & offload bony prominences every 2 hours   - Utilize friction reducing device or surface for transfers   -  Consider consults to  interdisciplinary teams   - Use incontinent care products after each incontinent episode such as water  - Consider nutrition services referral as needed  Outcome: Progressing     Problem: Prexisting or High Potential for Compromised Skin Integrity  Goal: Skin integrity is maintained or improved  Description: INTERVENTIONS:  - Identify patients at risk for skin breakdown  - Assess and monitor skin integrity  - Assess and monitor nutrition and hydration status  - Monitor labs   - Assess for incontinence   - Turn and reposition patient  - Assist with mobility/ambulation  - Relieve pressure over bony prominences  - Avoid friction and shearing  - Provide appropriate hygiene as needed including keeping skin clean and dry  - Evaluate need for skin moisturizer/barrier cream  - Collaborate with interdisciplinary team   - Patient/family teaching  - Consider wound care consult   Outcome: Progressing

## 2023-12-10 NOTE — OCCUPATIONAL THERAPY NOTE
Occupational Therapy Note     12/10/23 0950   Note Type   Note type Evaluation;Cancelled Session   Cancel Reasons Refusal   Additional Comments Per conversation with PT, pt is refusing therapy today. Will follow up as appropriate.   Licensure   [place] License Number  Mary Rose E. Blanes, MS, OTR/L [id number]

## 2023-12-10 NOTE — PLAN OF CARE
Problem: Prexisting or High Potential for Compromised Skin Integrity  Goal: Skin integrity is maintained or improved  Description: INTERVENTIONS:  - Identify patients at risk for skin breakdown  - Assess and monitor skin integrity  - Assess and monitor nutrition and hydration status  - Monitor labs   - Assess for incontinence   - Turn and reposition patient  - Assist with mobility/ambulation  - Relieve pressure over bony prominences  - Avoid friction and shearing  - Provide appropriate hygiene as needed including keeping skin clean and dry  - Evaluate need for skin moisturizer/barrier cream  - Collaborate with interdisciplinary team   - Patient/family teaching  - Consider wound care consult   Outcome: Progressing     Problem: NEUROSENSORY - ADULT  Goal: Achieves maximal functionality and self care  Description: INTERVENTIONS  - Monitor swallowing and airway patency with patient fatigue and changes in neurological status  - Encourage and assist patient to increase activity and self care.   - Encourage visually impaired, hearing impaired and aphasic patients to use assistive/communication devices  Outcome: Progressing

## 2023-12-10 NOTE — ASSESSMENT & PLAN NOTE
Presented to ED with complaint of generalized weakness, nonproductive cough and ambulatory difficulty.  Patient was at PCP yesterday and was prescribed Augmentin twice daily.  With no improvement in cough overnight, daughter brought patient to ED for further evaluation.  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  (Other findings as noted in radiology report.)  Received benzonatate 100 mg x 1 dose, DuoNeb nebulization and 500 cc normal saline bolus.  UA with moderate leukocytosis and bacteria  Urine culture pending  Azithromycin IV  x 1 dose  Start ceftriaxone IV  Fall precautions  PT/OT eval and treat  Consult CM for dispo planning

## 2023-12-10 NOTE — NURSING NOTE
@930 pt agitated refused to be assisted to the bathroom. Pt request to talk to daughter Anitha and called. Pt sitting in the chair refusing to have  gown/sock change wet with urine. Pt cooperated for sock and gown change. Pt agitated and walking out of the room amb in the hallway standby assist. Pt place on 1 to 1 observation safety.

## 2023-12-11 ENCOUNTER — RA CDI HCC (OUTPATIENT)
Dept: OTHER | Facility: HOSPITAL | Age: 83
End: 2023-12-11

## 2023-12-11 PROCEDURE — 99223 1ST HOSP IP/OBS HIGH 75: CPT | Performed by: PSYCHIATRY & NEUROLOGY

## 2023-12-11 PROCEDURE — 97535 SELF CARE MNGMENT TRAINING: CPT

## 2023-12-11 PROCEDURE — 99232 SBSQ HOSP IP/OBS MODERATE 35: CPT

## 2023-12-11 PROCEDURE — 97163 PT EVAL HIGH COMPLEX 45 MIN: CPT

## 2023-12-11 PROCEDURE — 97167 OT EVAL HIGH COMPLEX 60 MIN: CPT

## 2023-12-11 RX ORDER — LORAZEPAM 2 MG/ML
0.5 INJECTION INTRAMUSCULAR ONCE
Status: COMPLETED | OUTPATIENT
Start: 2023-12-11 | End: 2023-12-11

## 2023-12-11 RX ORDER — OLANZAPINE 5 MG/1
2.5 TABLET, ORALLY DISINTEGRATING ORAL
Status: DISCONTINUED | OUTPATIENT
Start: 2023-12-11 | End: 2023-12-14 | Stop reason: HOSPADM

## 2023-12-11 RX ORDER — OLANZAPINE 10 MG/2ML
2.5 INJECTION, POWDER, FOR SOLUTION INTRAMUSCULAR ONCE
Status: COMPLETED | OUTPATIENT
Start: 2023-12-11 | End: 2023-12-11

## 2023-12-11 RX ADMIN — CEFTRIAXONE 1000 MG: 1 INJECTION, SOLUTION INTRAVENOUS at 16:48

## 2023-12-11 RX ADMIN — LOSARTAN POTASSIUM 100 MG: 50 TABLET, FILM COATED ORAL at 12:58

## 2023-12-11 RX ADMIN — DONEPEZIL HYDROCHLORIDE 10 MG: 5 TABLET ORAL at 21:42

## 2023-12-11 RX ADMIN — LORAZEPAM 0.5 MG: 2 INJECTION INTRAMUSCULAR; INTRAVENOUS at 00:36

## 2023-12-11 RX ADMIN — METOPROLOL TARTRATE 50 MG: 50 TABLET, FILM COATED ORAL at 12:58

## 2023-12-11 RX ADMIN — Medication 9 MG: at 21:42

## 2023-12-11 RX ADMIN — BENZONATATE 100 MG: 100 CAPSULE ORAL at 20:33

## 2023-12-11 RX ADMIN — BENZONATATE 100 MG: 100 CAPSULE ORAL at 12:58

## 2023-12-11 RX ADMIN — DOCUSATE SODIUM 100 MG: 100 CAPSULE, LIQUID FILLED ORAL at 18:57

## 2023-12-11 RX ADMIN — ENOXAPARIN SODIUM 40 MG: 40 INJECTION SUBCUTANEOUS at 12:59

## 2023-12-11 RX ADMIN — OLANZAPINE 2.5 MG: 5 TABLET, ORALLY DISINTEGRATING ORAL at 21:45

## 2023-12-11 RX ADMIN — HYDRALAZINE HYDROCHLORIDE 5 MG: 20 INJECTION INTRAMUSCULAR; INTRAVENOUS at 08:52

## 2023-12-11 RX ADMIN — OLANZAPINE 2.5 MG: 10 INJECTION, POWDER, FOR SOLUTION INTRAMUSCULAR at 23:23

## 2023-12-11 NOTE — PROGRESS NOTES
Patient:    MRN:  [id number]    Aidin Request ID:  [id number]    Level of care reserved:    Partner Reserved:    Clinical needs requested:    Geography searched:  30 miles around [id number]    Start of Service:    Request sent:  12:17pm EST on 12/11/2023 by Marybel Busby    Partner reserved:    Choice list shared:

## 2023-12-11 NOTE — OCCUPATIONAL THERAPY NOTE
Occupational Therapy Evaluation     Patient Name: Monika Butler  Today's Date: 12/11/2023  Problem List  Principal Problem:    Generalized weakness  Active Problems:    Essential hypertension    Dementia with behavioral disturbance (HCC)    Anxiety    Past Medical History  Past Medical History:   Diagnosis Date    Allergic     Cancer (HCC) 2005    left breast mastectomy    Electrolyte abnormality 10/02/2022    Hypertension     Memory change     Nodule of left lung     Pleural thickening      Past Surgical History  Past Surgical History:   Procedure Laterality Date    CATARACT EXTRACTION Left 05/2020    CATARACT EXTRACTION Left 06/2020    MASTECTOMY      [place] XCAPSL CTRC RMVL INSJ IO LENS PROSTH W/O ECP Left 6/8/2020    Procedure: EXTRACTION EXTRACAPSULAR CATARACT PHACO INTRAOCULAR LENS (IOL);  Surgeon: Cortes Harrison MD;  Location: [facility] MAIN OR;  Service: Ophthalmology        12/11/23 1009   OT Last Visit   OT Visit Date 12/11/23  (Monday)   Note Type   Note type Evaluation  (split eval 2446-7411,7438-4582; tx 8320-2904)   Pain Assessment   Pain Assessment Tool FLACC   Effect of Pain on Daily Activities limits activity tolerance and I w/ ADLs   Patient's Stated Pain Goal No pain   Hospital Pain Intervention(s) Repositioned;Ambulation/increased activity;Emotional support   Pain Rating: FLACC (Rest) - Face 0   Pain Rating: FLACC (Rest) - Legs 0   Pain Rating: FLACC (Rest) - Activity 0   Pain Rating: FLACC (Rest) - Cry 0   Pain Rating: FLACC (Rest) - Consolability 0   Score: FLACC (Rest) 0   Pain Rating: FLACC (Activity) - Face 1   Pain Rating: FLACC (Activity) - Legs 1   Pain Rating: FLACC (Activity) - Activity 1   Pain Rating: FLACC (Activity) - Cry 1   Pain Rating: FLACC (Activity) - Consolability 1   Score: FLACC (Activity) 5   Restrictions/Precautions   Weight Bearing Precautions Per Order No   Other Precautions Cognitive;Chair Alarm;Bed Alarm;Fall Risk;Pain;Limb alert;1:1  ([place] on room air; L UE limb  "alert; Virtual 1:1)   Home Living   Type of Home House   Additional Comments Pt is not an accurate historian. Unable to provide home set- up/ social history upon eval. Per chart, pt lives w/ her daughter   Prior Function   Level of [place] Needs assistance with IADLS   Lives With Family   Receives Help From Family   IADLs Family/Friend/Other provides transportation;Family/Friend/Other provides meals;Family/Friend/Other provides medication management   Falls in the last 6 months 1 to 4  (+ fall history. 1 leading to admission. Pt unable to report / quantify)   Vocational Retired   Comments Pt is not an accurate historian upon eval or at baseline. Needs assistance w/ ADL/ IADL due to impaired cognition and limited insight into deficits   Lifestyle   Autonomy Pt needs assistance w/ ADL/ IADL. 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 Pt unable to report upon eval   General   Additional Pertinent History Pt admitted from home following a fall   Family/Caregiver Present No   Additional General Comments Significant PMH impacting her occupational performance includes dementia, HTN, breast cancer, anxiety, brain meningioma (L lateral temporal meningioma), s/p L mastectomy (2005). Personal and environmental factors supporting performance includes supportive family; barriers include advanced age, recent admission (s), limited insight into deficits, fall history, inability to complete IADLs, difficulty completing ADLs   Subjective   Subjective \"I hope that my brother is okay?\"   ADL   Where Assessed Edge of bed  (vs OOB In chair post eval)   Eating Assistance 4  Minimal Assistance   Eating Deficit Setup;Supervision/safety;Verbal cueing;Increased time to complete  (+time, cues to sustain attention after set- up)   Grooming Assistance 4  Minimal Assistance  (seated " "after set- up w/ + time to wash face, comb hair)   Grooming Deficit Setup;Verbal cueing;Supervision/safety;Increased time to complete   UB Bathing Assistance Unable to assess   LB Bathing Assistance Unable to assess   UB Dressing Assistance 4  Minimal Assistance  (seated to don robe w/ + time, cues)   UB Dressing Deficit Setup;Verbal cueing;Supervision/safety;Increased time to complete;Pull around back;Fasteners   LB Dressing Assistance Unable to assess  (Pt declined stating \"I am not getting dressed. I only get dressed if I have an appointment\")   Toileting Assistance  Unable to assess   Bed Mobility   Supine to Sit 4  Minimal assistance   Additional items Assist x 1;HOB elevated;Increased time required;Verbal cues  (to pt's L)   Sit to Supine Unable to assess   Additional Comments Pt seated OOB in chair post eval w/ needs met, call bell in reach and bed alarm activated. Pt initially resistant to participate and spontaneously returned to bed stating \"leave me alone\". This therapist returned few minutes later w/ bed alarming and pt agreeable to get up and OOB.   Transfers   Sit to Stand 4  Minimal assistance   Additional items Assist x 1;Increased time required;Impulsive;Verbal cues;Bedrails;Armrests   Stand to Sit 4  Minimal assistance   Additional items Assist x 1;Increased time required;Verbal cues;Bedrails;Armrests   Additional Comments Pt required min A to complete sit <> stand   Functional Mobility   Functional Mobility 4  Minimal assistance   Additional Comments engaged in short distance functional mobility w/ min HHA short distances in room. Required + time and cues for directional changes   Additional items Hand hold assistance   Balance   Static Sitting Fair +   Static Standing Poor +   Ambulatory Poor +   Activity Tolerance   Activity Tolerance Patient limited by fatigue;Patient limited by pain   Nurse Made Aware spoke w/ RNMacey and Negra WILLS Assessment   RUE Assessment   (observed during " ADLs, unable to follow diretions during MMT)   RUE Strength   RUE Overall Strength Within Functional Limits - able to perform ADL tasks with strength   LUE Assessment   LUE Assessment   (observed during ADLs, unable to follow diretions during MMT)   LUE Strength   LUE Overall Strength Within Functional Limits - able to perform ADL tasks with strength   Hand Function   Gross Motor Coordination Functional   Fine Motor Coordination Impaired   Hand Function Comments Pt reports R hand dominance   Sensation   Light Touch   (responded to light touch)   Additional Comments Redness R hand observed by this therapist   Vision-Basic Assessment   Current Vision Wears glasses all the time   Psychosocial   Patient Behaviors/Mood Irritable   Perception   Motor Planning Cues to use objects appropriately  (at times; due to vision ??, impaired cognition ??)   Cognition   Overall Cognitive Status (S)  Impaired   Arousal/Participation Arousable;Responsive   Attention Difficulty attending to directions   Orientation Level Oriented to person;Disoriented to place;Disoriented to time;Disoriented to situation   Memory Decreased recall of recent events;Decreased short term memory;Decreased recall of precautions;Other (Comment)  (able to report birthdate)   Following Commands Follows one step commands with increased time or repetition   Comments Identified pt by full name and birthdate. Sleeping upon therapist arrival and required consistent stimuli to maintain arousal. Irritable and declined OOB despite encouragement.  Therapist returned few minutes later w/ bed alarming and pt asking about her brother partially long sitting in bed. Recommend ongoing eval of functional cognition as appropriate to assist in DC planning   Assessment   Limitation Decreased ADL status;Decreased UE strength;Decreased Safe judgement during ADL;Decreased cognition;Decreased endurance;Decreased self-care trans;Decreased fine motor control;Decreased high-level  ADLs;Visual deficit   Assessment Pt is an 84yo female [facility] w/ generalized weakness and fall at home. Diagnosed w/ generalized weakness. Recently seen in ED w/ elevated BP. Pt lives w/ her daughter. Pt needs assistance w/ IADLs and denies use of RW. Upon eval, pt alert and pleasantly confused. Required time and cues to sustain attention and follow directions. Perseverative and repeatedly asking about her mother and her brother. Pt required min A to complete bed mobility, min A sit <> stand, min A functional mobility w/ HHA, min A UBD. Pt is completing ADLs below baseline level of I w/ decreased endurance, limited insight into deficits and decreased activity tolerance. Pt would benefit from OT in acute care to maximize functional independence. Recommend level II rehab resource intensity when medically stable. Will continue to follow in acute care.   Goals   Patient Goals Pt unable to state upon eval. Will continue to assess.   Plan   Treatment Interventions ADL retraining;Functional transfer training;Endurance training;UE strengthening/ROM;Cognitive reorientation;Patient/family training;Equipment evaluation/education;Compensatory technique education;Continued evaluation;Energy conservation;Activityengagement   Goal Expiration Date 12/21/23   OT Treatment Day 0  (Monday 12/11/23)   OT Frequency 3-5x/wk   Discharge Recommendation   Recommendation Geriatric Consult   Rehab Resource Intensity Level, OT II (Moderate Resource Intensity)   Equipment Recommended Shower/Tub chair with back ($)   Additional Comments  recommend use of RW for functional mobiilty at this time   AM-PAC Daily Activity Inpatient   Lower Body Dressing 2   Bathing 2   Toileting 2   Upper Body Dressing 3   Grooming 3   Eating 3   Daily Activity Raw Score 15   Daily Activity Standardized Score (Calc for Raw Score >=11) 34.69   AM-PAC Applied Cognition Inpatient   Following a Speech/Presentation 2   Understanding Ordinary  Conversation 2   Taking Medications 2   Remembering Where Things Are Placed or Put Away 1   Remembering List of 4-5 Errands 1   Taking Care of Complicated Tasks 1   Applied Cognition Raw Score 9   Applied Cognition Standardized Score 22.48   Barthel Index   Feeding 5   Bathing 0   Grooming Score 0   Dressing Score 5   Bladder Score 0   Bowels Score 5   Toilet Use Score 5   Transfers (Bed/Chair) Score 10   Mobility (Level Surface) Score 0   Stairs Score 0   Barthel Index Score 30   Additional Treatment Session   Start Time 1030   End Time 1046   Treatment Assessment Pt seen for skilled OT tx session day 1 following eval focusing on activity engagement, challenging activity tolerance and activity engagement. Pt agreeable to participate and reported she would like to use the bathroom. Pt seated OOB in bedside recliner chair following eval w/ needs met, call bell in reach and chair alarm activated. Pt required min A to stand from recliner chair and therapist introduced RW. Pt engaged in functional mobility w/ min A in room. Pt required + time and cues for directional changes to complete approach to target surface. Pt required mod A to manage clothing, brief and personal hygiene. Pt engaged in UB bathing while seated on commode w/ min A and + time, cues. Pt seated OOB in chair at end of session w/ needs, met, call bell in reach and chair alalarm activated   Additional Treatment Day 1  (Monday 12/11/23)   End of Consult   Education Provided Yes  (role of OT)   Patient Position at End of Consult Bed/Chair alarm activated;All needs within reach;Bedside chair   Nurse Communication Nurse aware of consult   Licensure   [place] License Number  Lakia IZABEL GarciaPAMELAR/L [id number]         The patient's raw score on the AM-PAC Daily Activity Inpatient Short Form is 15. A raw score of less than 19 suggests the patient may benefit from discharge to post-acute rehabilitation services. Please refer to the recommendation of the  Occupational Therapist for safe discharge planning.    Pt goals to be met by 12/21/23 to max I w/ ADLs and improve engagement to return home to PLOF w/ family assist / support includes:    -Pt will demonstrate good attention and participation in ongoing eval of functional cognition to assist in DC planning    -Pt will consistently follow 2 step directions during ADLs w/ no more than 1 cue/ prompt to max I and improve engagement to return home w/ family    -Pt will complete functional transfers to bed, chair, and toilet using LRAD, DME as needed w/ S    -Pt will complete UBD w/ S after set- up    -Pt will demonstrate improved activity and sitting tolerance OOB In chair for all meals    -Pt will consistently engage in functional mobility household distances w/ S using LRAD    -Pt will complete LBD w/ min A using LHAE as needed to max I and minimize burden of care    -Pt will demonstrate improved functional standing tolerance for at least 10 minutes w/ at least fair balance using LRAD while engaged in grooming standing at sink to max I and minimize burden of care to return home    Lakia Garcia, OTR/L  [id number]  [id number]

## 2023-12-11 NOTE — CONSULTS
"Consultation - Behavioral Health   Monika Butler 83 y.o. female MRN: [id number]  Unit/Bed#: 2 [place] [address] Encounter: [id number]      Chief Complaint: \"I was sleeping\"    History of Present Illness   Physician Requesting Consult: Henry Richter MD  Reason for Consult / Principal Problem: Dx 1.  AMS (altered mental status) 2.  Dementia (HCC)    Monika Butler is a 83 y.o. female with past medical history of dementia with behavioral disturbance, hypertension who presented to the ED due to weakness and is admitted for generalized weakness. Psychiatry consultation was requested due to dementia with behavioral disturbance/agitation.  Patient is a poor historian and appears to be confused.  She often spoke tangentially and incoherently.  When asked if she felt depressed patient gave an incoherent response.  She denies suicidal or homicidal ideation, plan, or intent.  During the interview she appeared to be responding to internal stimuli.  She appeared to turn to the left and mouth what appeared to be \"thank you\" to someone who was not there.  When asked patient about this patient stated that there was a \"woman\" in the room when there was not.  Per nurse patient had been agitated overnight and had refused medications last night.  Per nurse patient had not been agitated this morning.  Patient did not appear to be agitated during my interview with her this morning.  Per chart patient has received Zyprexa as needed for agitation.    Spoke with the patient's daughters with case management present.  They state that the patient's cognition has worsened in the past week.  They report that the patient has had history of dementia, hallucinations, paranoia, agitation. One of the patient's daughter stated that recently the patient was \"hunched over\" and \"rigid\" and saw her primary care doctor and that the Seroquel increased (per chart to 50 mg qhs).  States that the following day the patient had been more tired and that they " "brought her to the emergency room. It was difficult to obtain history regarding the patient from patient's daughters. One of the patient's daughters appeared to be somewhat agitated.  It was difficult to follow this daughters sequence of history and events-who appeared to provide the majority of the patient's history.  She appeared to be confusing the medication Seroquel and Zyprexa.  I had stated at 2 different times that I had been confused regarding the patient's history and sequence of events and medications she had been receiving and after requesting clarification for the second time the same patient's daughter stated \"what are you confused about?\" in a loud tone.  When trying to explain my role a psychiatric consultant and trying to evaluate for psychiatric illness and offer possible treatment recommendations to consider for treatment of patient's hallucinations and reported previous agitation, the same patient's daughter appeared to deflect from this discussion.  This same daughter contested what I had observed during my encounter with the patient this morning.  I provided education regarding psychiatric diagnoses, psychiatric medications and formulations of such, FDA indications of various antipsychotic and mood stabilizing medications and off-label use of antipsychotic and mood stabilizing medication for patients with dementia with behavioral disturbance, FDA indicated/on label use of medication for dementia with behavioral disturbance (agitation) Rexulti which I explained is not on the hospital formulary, diagnostic clarification regarding psychosis, possible adverse effects of antipsychotic medication Zyprexa, Seroquel, Rexulti (etc) including increase mortality risk in patients with dementia, increased risk of stroke and heart attack, risk of increased cholesterol and glucose levels, risk of increased sedation, increased risk of muscle rigidity, increased risk of neuroleptic malignant syndrome " "-particularly with use of multiple antipsychotics-and the increased mortality risk regarding such.  Ultimately both daughters were in agreement with plan to discontinue Seroquel and to start Zyprexa for psychosis and dementia with behavioral disturbance (agitation).          Psychiatric Review Of Systems:  Unable to assess due to patient factors    Historical Information   Past Psychiatric History:   Patient denies previous psychiatric hospitalizations.  Past Suicide attempts: Denies  Past Violent behavior: Denies  Past Psychiatric medication trial: Denies    Substance Abuse History:  Denies current drug, alcohol, or tobacco use    I have assessed this patient for substance use within the past 12 months    Smoking history: Denies    Family Psychiatric History:   Unable to assess due to patient factors    Social History  Education: Unable to assess due to patient factors  Learning Disabilities: Unable to assess due to patient factors  Marital history: Per chart,   Living arrangement, social support: Patient reports \"[place]\", \"I don't know\"  Occupational History: Unable to assess due to patient factors  Functioning Relationships: Unable to assess due to patient factors.  Other Pertinent History: Unable to assess due to patient factors    Traumatic History:   Abuse: Denies history of physical or sexual abuse  Other Traumatic Events: Denies    Past Medical History:   Diagnosis Date    Allergic     Cancer (HCC) 2005    left breast mastectomy    Electrolyte abnormality 10/02/2022    Hypertension     Memory change     Nodule of left lung     Pleural thickening        Medical Review Of Systems:  Unable to assess due to patient factors      Meds/Allergies     all current active meds have been reviewed and current meds:   Current Facility-Administered Medications   Medication Dose Route Frequency    acetaminophen (TYLENOL) tablet 650 mg  650 mg Oral Q6H PRN    azithromycin (ZITHROMAX) 250 mg in sodium chloride 0.9 " % 250 mL IVPB  250 mg Intravenous Q24H    benzonatate (TESSALON PERLES) capsule 100 mg  100 mg Oral TID    cefTRIAXone (ROCEPHIN) IVPB (premix in dextrose) 1,000 mg 50 mL  1,000 mg Intravenous Q24H    cholecalciferol (VITAMIN D3) tablet 1,000 Units  1,000 Units Oral Daily    cyanocobalamin (VITAMIN B-12) tablet 250 mcg  250 mcg Oral Daily    docusate sodium (COLACE) capsule 100 mg  100 mg Oral BID    donepezil (ARICEPT) tablet 10 mg  10 mg Oral HS    enoxaparin (LOVENOX) subcutaneous injection 40 mg  40 mg Subcutaneous Daily    hydrALAZINE (APRESOLINE) injection 5 mg  5 mg Intravenous Q6H PRN    loratadine (CLARITIN) tablet 10 mg  10 mg Oral Daily    LORazepam (ATIVAN) tablet 0.5 mg  0.5 mg Oral BID    losartan (COZAAR) tablet 100 mg  100 mg Oral Daily    melatonin tablet 9 mg  9 mg Oral HS    metoprolol tartrate (LOPRESSOR) tablet 50 mg  50 mg Oral BID    multivitamin-minerals (CENTRUM) tablet 1 tablet  1 tablet Oral Daily    OLANZapine (ZyPREXA ZYDIS) dispersible tablet 5 mg  5 mg Oral HS PRN    ondansetron (ZOFRAN) injection 4 mg  4 mg Intravenous Q6H PRN    polyethylene glycol (MIRALAX) packet 17 g  17 g Oral Daily    QUEtiapine (SEROquel) tablet 50 mg  50 mg Oral HS     Allergies   Allergen Reactions    Ibuprofen      Reaction Date: 10Aug2005;        Objective     Vital signs in last 24 hours:  Temp:  [97.1 °F (36.2 °C)-97.6 °F (36.4 °C)] 97.1 °F (36.2 °C)  HR:  [67-87] 67  Resp:  [18] 18  BP: (155-176)/(86-93) 176/93    No intake or output data in the 24 hours ending 12/11/23 1008    Mental Status Evaluation:  Appearance:  appears stated age   Behavior:  calm and cooperative   Speech:  normal rate and soft, incoherent    Mood:  Did not state   Affect:  Constricted   Language: Unable to assess due to patient factors   Thought Process:  tangential and incoherent   Associations: tangential associations   Thought Content:  no delusions elicited   Perceptual Disturbances: Appears to be responding to internal  stimuli   Risk Potential: Denies suicidal or homicidal ideation, plan, or intent   Sensorium:  person   Memory:  Memory appears grossly impaired   Cognition:  Memory appears grossly impaired   Consciousness:  alert and awake    Attention: attention span appeared shorter than expected for age   Intellect: Unable to assess due to patient factors   Fund of Knowledge: Unable to assess due to patient factors   Insight:  poor   Judgment: poor   Muscle Strength and Tone: No resistance with passive flexion and extension of the right upper extremity.  There appears to be some resistance with passive flexion and extension of the left upper extremity   Gait/Station: not assessed, patient in bed   Motor Activity: Patient appeared to be tremulous with hands outstretched     Lab Results: I have personally reviewed all pertinent laboratory/tests results.     Labs in last 72 hours:   Recent Labs     12/09/23  1013 12/10/23  0457   WBC 7.68 5.10   RBC 3.98 3.42*   HGB 13.3 11.2*   HCT 40.0 35.2    205   RDW 12.7 13.1   NEUTROABS 6.49  --    SODIUM 140 141   K 3.3* 3.5    103   CO2 33* 30   BUN 19 16   CREATININE 0.81 0.63   GLUC 124 78   CALCIUM 9.5 8.9   AST 21  --    ALT 15  --    ALKPHOS 84  --    TP 7.4  --    ALB 4.1  --    TBILI 0.60  --    [id number] 1.699  --        Imaging Studies:       CTA head and neck with and without contrast  - 12/9/2023      IMPRESSION:     CT brain: Advanced chronic microangiopathic change within the cerebral hemispheres with no acute abnormality identified.     Stable lobulated meningioma within the left posterior lateral middle cranial fossa, 1.3 cm in size.     CT angiography: Atherosclerotic change with scattered calcifications. No stenosis or occlusion identified.     Chronic pleural and parenchymal changes at the lung apices.     Enlarging complex cyst within the subcutaneous fat of the posterior neck likely a complex sebaceous cyst.        ---------------------------    CT head  without contrast    Result Date: 12/4/2023  Narrative: CT BRAIN - WITHOUT CONTRAST INDICATION:   Delirium AMS, meningioma. COMPARISON: Multiple priors most recently 10/3/2023 TECHNIQUE:  CT examination of the brain was performed.  Multiplanar 2D reformatted images were created from the source data. Radiation dose length product (DLP) for this visit:  957 mGy-cm .  This examination, like all CT scans performed in the [facility], was performed utilizing techniques to minimize radiation dose exposure, including the use of iterative reconstruction and automated exposure control. IMAGE QUALITY:  Diagnostic. FINDINGS: PARENCHYMA: Decreased attenuation is noted in periventricular and subcortical white matter demonstrating an appearance that is statistically most likely to represent advanced microangiopathic change. No CT signs of acute infarction. Unchanged left temporal meningioma measuring up to 1 cm without significant mass effect. No acute parenchymal hemorrhage. VENTRICLES AND EXTRA-AXIAL SPACES: Volume loss without hydrocephalus. VISUALIZED ORBITS: Left globe aphakic PARANASAL SINUSES: Normal visualized paranasal sinuses. CALVARIUM AND EXTRACRANIAL SOFT TISSUES:  Normal.     Impression: No acute intracranial abnormality.  Chronic microangiopathic changes. Unchanged small left temporal meningioma. Workstation performed: [employer]     EKG/Pathology/Other Studies:   Lab Results   Component Value Date    VENTRATE 76 12/09/2023    ATRIALRATE 76 12/09/2023    PRINT 158 12/09/2023    QRSDINT 90 12/09/2023    QTINT 416 12/09/2023    QTCINT 468 12/09/2023    PAXIS -16 12/09/2023    QRSAXIS 17 12/09/2023    TWAVEAXIS 110 12/09/2023        Code Status: Level 1 - Full Code  Advance Directive and Living Will:      Power of :    POLST:        Assessment/Plan     Assessment:  Monika Butler is a 83 y.o. female with past medical history of dementia with behavioral disturbance, hypertension who presented to  "the ED due to weakness and is admitted for generalized weakness. Psychiatry consultation was requested due to dementia with behavioral disturbance/agitation.  Patient is a poor historian and appears to be confused.  She often spoke tangentially and incoherently.  When asked if she felt depressed patient gave an incoherent response.  She denies suicidal or homicidal ideation, plan, or intent.  During the interview she appeared to be responding to internal stimuli.  She appeared to turn to the left and mouth what appeared to be \"thank you\" to someone who was not there.  When asked patient about this patient stated that there was a \"woman\" in the room when there was not.  Patient therefore is likely experiencing visual hallucinations. Per nurse patient had been agitated overnight and had refused medications last night.  Per nurse patient had not been agitated this morning.  Patient did not appear to be agitated during my interview with her this morning. Spoke with the patient's daughters with case management present.  They state that the patient's cognition has worsened in the past week.  They report that the patient has had history of dementia, hallucinations, paranoia, agitation. One of the patient's daughter stated that recently the patient was \"hunched over\" and \"rigid\" and saw her primary care doctor and that the Seroquel increased (per chart to 50 mg qhs).  States that the following day the patient had been more tired and that they brought her to the emergency room. It was difficult to obtain history regarding the patient from patient's daughters. One of the patient's daughters appeared to be somewhat agitated.  It was difficult to follow this daughters sequence of history and events-who appeared to provide the majority of the patient's history.  She appeared to be confusing the medication Seroquel and Zyprexa.  Please see HPI above for more information on this.  Ultimately both daughters were in agreement with " "the plan to discontinue Seroquel and to start Zyprexa for psychosis and dementia with behavioral disturbance (agitation).      Diagnosis:  Dementia with psychosis  Dementia with behavioral disturbance    Plan:   Continue medical management  Continue one-to-one observation for safety  There is no clear indication for inpatient psychiatric hospitalization at this time  Discontinue Seroquel  Start Zyprexa ZYDIS oral disintegrating tablet 2.5 mg qhs for psychosis and dementia with behavioral disturbance  As a result, consider discontinuing order for Zyprexa Zydis 5 mg qhs prn if anxious agitated.  If patient requires medication prn for severe agitation can consider Zyprexa Zydis 2.5 mg qhs q8hr prn severe agitation  Patient has order for lorazepam 0.5 mg BID. Do not recommend benzodiazepine use as these medications are generally not recommended in the management of neuropsychiatric symptoms of dementia.  From chart review it appears that patient is prescribed lorazepam 1 mg \"take 1/2 tablet by mouth IN THE MORNING and 1 tablet IN THE EVENING if needed\".  Per PDMP this medication has not been filled since September  Discussed with the primary team  I will follow-up.  Please contact with questions.  For overnights and weekends please contact [facility] for psychiatric purposes     Risks, benefits and possible side effects of Medications:   Risks/benefits/alternativies to treatment discussed, including a myriad of potential adverse medication side effects, to which patient's daughters voiced understanding and consented fully to treatment.       Ash Silvestre,   12/11/23      This note has been constructed using a voice recognition system.    There may be translation, syntax,  or grammatical errors. If you have any questions, please contact the dictating provider.    "

## 2023-12-11 NOTE — ASSESSMENT & PLAN NOTE
Presented to ED with complaint of generalized weakness, nonproductive cough and ambulatory difficulty.  Patient was at PCP yesterday and was prescribed Augmentin twice daily.  With no improvement in cough overnight, daughter brought patient to ED for further evaluation.  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  (Other findings as noted in radiology report.)  Received benzonatate 100 mg x 1 dose, DuoNeb nebulization and 500 cc normal saline bolus.  UA with moderate leukocytosis and bacteria  Urine culture with mixed contaminants x 2  Received azithromycin IV and ceftriaxonex 2 doses  Will monitor off antibiotic at this time   Fall precautions  PT/OT eval and treat  Consult CM for dispo planning

## 2023-12-11 NOTE — PLAN OF CARE
Problem: PHYSICAL THERAPY ADULT  Goal: Performs mobility at highest level of function for planned discharge setting.  See evaluation for individualized goals.  Description: Treatment/Interventions: Therapeutic exercise, LE strengthening/ROM, Functional transfer training, ADL retraining, Gait training, Bed mobility, Equipment eval/education, Patient/family training, Cognitive reorientation          See flowsheet documentation for full assessment, interventions and recommendations.  Note: Prognosis: Good  Problem List: Decreased strength, Impaired balance, Decreased mobility, Decreased cognition, Impaired judgement, Decreased safety awareness  Assessment: Patient is an 83 y.o. year old female seen for Physical Therapy evaluation. Patient admitted with Generalized weakness.  Comorbidities affecting patient's physical performance include: balance problem, memory change, dementia with behavioral disturbance, anxiety.  Personal factors affecting patient at time of initial evaluation include: inability to ambulate household distances, decreased cognition, positive fall history, limited insight into impairments, and inability to perform ADLS. Prior to admission, patient was requiring assist for functional mobility without assistive device and requiring assist for ADLS.  Please find objective findings from Physical Therapy assessment regarding body systems outlined above with impairments and limitations including weakness, impaired balance, decreased endurance, gait deviations, decreased activity tolerance, decreased functional mobility tolerance, fall risk, and decreased cognition.  The Barthel Index was used as a functional outcome tool presenting with a score of Barthel Index Score: 35 today indicating marked limitations of functional mobility and ADLS.  Patient's clinical presentation is currently unstable/unpredictable as seen in patient's presentation of varying levels of cognitive performance, increased fall risk,  new onset of impairment of functional mobility, decreased endurance, and new onset of weakness. Pt would benefit from continued Physical Therapy treatment to address deficits as defined above and maximize level of functional mobility. As demonstrated by objective findings, the assigned level of complexity for this evaluation is high.The patient's -[facility] Basic Mobility Inpatient Short Form Raw Score is 15. A Raw score of less than or equal to 16 suggests the patient may benefit from discharge to post-acute rehabilitation services.        Rehab Resource Intensity Level, PT: II (Moderate Resource Intensity)    See flowsheet documentation for full assessment.

## 2023-12-11 NOTE — PLAN OF CARE
Problem: NEUROSENSORY - ADULT  Goal: Achieves stable or improved neurological status  Description: INTERVENTIONS  - Monitor and report changes in neurological status  - Monitor vital signs such as temperature, blood pressure, glucose, and any other labs ordered   - Initiate measures to prevent increased intracranial pressure  - Monitor for seizure activity and implement precautions if appropriate      Outcome: Progressing     Problem: NEUROSENSORY - ADULT  Goal: Achieves maximal functionality and self care  Description: INTERVENTIONS  - Monitor swallowing and airway patency with patient fatigue and changes in neurological status  - Encourage and assist patient to increase activity and self care.   - Encourage visually impaired, hearing impaired and aphasic patients to use assistive/communication devices  Outcome: Progressing

## 2023-12-11 NOTE — PROGRESS NOTES
[address] [facility] coding opportunities       Chart reviewed, no opportunity found: [address]        Patients Insurance     Medicare Insurance: [employer] Advantage

## 2023-12-11 NOTE — CASE MANAGEMENT
Case Management Discharge Planning Note    Patient name Monika Butler  Location 2 [facility] [address] [place] [address] MRN [id number]  : 1940 Date 2023       Current Admission Date: 2023  Current Admission Diagnosis:Generalized weakness   Patient Active Problem List    Diagnosis Date Noted    Nondisplaced fracture of triquetrum (cuneiform) bone, left wrist, initial encounter for closed fracture 2023    Abrasion of left eyebrow 2023    Injury of left wrist 2023    Insomnia 2023    Anxiety 2023    Absolute anemia 2023    H/O clavicle fracture 2023    Meningioma (HCC) 2023    Pulmonary nodule 2023    Bad odor of urine 2023    Postmenopausal 2023    Prediabetes 2023    Gastroesophageal reflux disease with esophagitis without hemorrhage 2023    Generalized weakness 2022    Urinary frequency 2022    BMI 22.0-22.9, adult 2021    History of breast cancer 2021    Altered mental status 2020    Medicare annual wellness visit, subsequent 2020    Balance problems 2020    Dementia with behavioral disturbance (HCC)     Memory change 10/26/2020    Ear fullness, left 2017    Sinusitis 2017    Hyperlipidemia 2016    Breast cancer (HCC) 2013    Essential hypertension 2012      LOS (days): 2  Geometric Mean LOS (GMLOS) (days):   Days to GMLOS:     OBJECTIVE:  Risk of Unplanned Readmission Score: 24.69         Current admission status: Inpatient   Preferred Pharmacy:   [id number] - [address] [address]  Phone: [phone] Fax: [phone]    Primary Care Provider: Kristin Raymundo MD    Primary Insurance: [employer]INOR  REP  Secondary Insurance:     DISCHARGE DETAILS:    Other Referral/Resources/Interventions Provided:  Referral Comments: CM met with both dtrs Anitha and Anna in the waiting room, initally  with Advanced Pracitioner Brad and then with psychiatrist Dr. Silvestre, introduced self and role, and discharge planning. CM adressed their questions and concerns regarding rehab, insurance and rehab referral process. They voiced preference for [facility] and [facility] if available. CM informed them that referral has been sent to both facilities and CM will be following up with them and will be reaching out to them next day. Anitha asked CM to call Anna with the information. CM provided Anna with CM's contact information and informed that CM will be following up with her next day. Anitha and Anna agreeable to the same.

## 2023-12-11 NOTE — ASSESSMENT & PLAN NOTE
Patient with history of dementia with behavioral disturbance and daughter reporting that agitation  has become increasingly frequent.  Discontinue Seroquel   Decrease Zyprexa to 2.5 mg at bedtime  Continue Aricept  Appreciate psych consultation  Discontinue 1: 1 continue observation  Start every 15 minutes safety checks in anticipation of discharge to rehab

## 2023-12-11 NOTE — PLAN OF CARE
Problem: OCCUPATIONAL THERAPY ADULT  Goal: Performs self-care activities at highest level of function for planned discharge setting.  See evaluation for individualized goals.  Description: Treatment Interventions: ADL retraining, Functional transfer training, Endurance training, UE strengthening/ROM, Cognitive reorientation, Patient/family training, Equipment evaluation/education, Compensatory technique education, Continued evaluation, Energy conservation, Activityengagement  Equipment Recommended: Shower/Tub chair with back ($)       See flowsheet documentation for full assessment, interventions and recommendations.   Note: Limitation: Decreased ADL status, Decreased UE strength, Decreased Safe judgement during ADL, Decreased cognition, Decreased endurance, Decreased self-care trans, Decreased fine motor control, Decreased high-level ADLs, Visual deficit     Assessment: Pt is an 82yo female admitted to [facility] on 12/9/23 w/ generalized weakness and fall at home. Diagnosed w/ generalized weakness. Recently seen in ED w/ elevated BP. Pt lives w/ her daughter. Pt needs assistance w/ IADLs and denies use of RW. Upon eval, pt alert and pleasantly confused. Required time and cues to sustain attention and follow directions. Perseverative and repeatedly asking about her mother and her brother. Pt required min A to complete bed mobility, min A sit <> stand, min A functional mobility w/ HHA, min A UBD. Pt is completing ADLs below baseline level of I w/ decreased endurance, limited insight into deficits and decreased activity tolerance. Pt would benefit from OT in acute care to maximize functional independence. Recommend level II rehab resource intensity when medically stable. Will continue to follow in acute care.  Recommendation: Geriatric Consult  Rehab Resource Intensity Level, OT: II (Moderate Resource Intensity)

## 2023-12-11 NOTE — CASE MANAGEMENT
Case Management Assessment & Discharge Planning Note    Patient name Monika Butler  Location 2 [facility] 216/2 [facility] 216 MRN [id number]  : 1940 Date 2023       Current Admission Date: 2023  Current Admission Diagnosis:Generalized weakness   Patient Active Problem List    Diagnosis Date Noted    Nondisplaced fracture of triquetrum (cuneiform) bone, left wrist, initial encounter for closed fracture 2023    Abrasion of left eyebrow 2023    Injury of left wrist 2023    Insomnia 2023    Anxiety 2023    Absolute anemia 2023    H/O clavicle fracture 2023    Meningioma (HCC) 2023    Pulmonary nodule 2023    Bad odor of urine 2023    Postmenopausal 2023    Prediabetes 2023    Gastroesophageal reflux disease with esophagitis without hemorrhage 2023    Generalized weakness 2022    Urinary frequency 2022    BMI 22.0-22.9, adult 2021    History of breast cancer 2021    Altered mental status 2020    Medicare annual wellness visit, subsequent 2020    Balance problems 2020    Dementia with behavioral disturbance (HCC)     Memory change 10/26/2020    Ear fullness, left 2017    Sinusitis 2017    Hyperlipidemia 2016    Breast cancer (HCC) 2013    Essential hypertension 2012      LOS (days): 2  Geometric Mean LOS (GMLOS) (days):   Days to GMLOS:     OBJECTIVE:    Risk of Unplanned Readmission Score: 24.64         Current admission status: Inpatient  Referral Reason: Other (Discharge Disposition)    Preferred Pharmacy:   [id number] - [address] [address]  Phone: [phone] Fax: [phone]    Primary Care Provider: Kristin Raymundo MD    Primary Insurance: [facility]  Secondary Insurance:     ASSESSMENT:  Active Health Care Proxies    There are no active Health Care Proxies on file.        Readmission Root Cause  30 Day Readmission: No    Patient Information  Admitted from:: Home  Mental Status: Other (Comment) (dementia)  During Assessment patient was accompanied by: Daughter, Other-Comment (son in law)  Assessment information provided by:: Other - please comment (Son in law)  Primary Caregiver: Family  Caregiver's Name:: Anitha Kunz (dtr)  Caregiver's Relationship to Patient:: Family Member  Caregiver's Telephone Number:: [phone]  Support Systems: Daughter, Family members  County of Residence: [place]  What city do you live in?: [place]  Living Arrangements: Other (Comment) (Lives with dtr and son in law)    Activities of Daily Living Prior to Admission  Functional Status: Assistance  Completes ADLs independently?: No  Level of ADL dependence: Assistance  Ambulates independently?: Yes  Does patient use assisted devices?: No  Does patient currently own DME?: No     Patient Information Continued  Income Source: Pension/FPC  Does patient have prescription coverage?: Yes  Does patient receive dialysis treatments?: No     Means of Transportation  [facility]      Housing Stability: Unknown (12/11/2023)    Housing Stability Vital Sign     Unable to Pay for Housing in the Last Year: No     Number of Places Lived in the Last Year: Not on file     Unstable Housing in the Last Year: No   Food Insecurity: No Food Insecurity (12/11/2023)    Hunger Vital Sign     Worried About Running Out of Food in the Last Year: Never true     Ran Out of Food in the Last Year: Never true   Transportation Needs: No Transportation Needs (12/11/2023)    PRAPARE - Transportation     Lack of Transportation (Medical): No     Lack of Transportation (Non-Medical): No   Utilities: Not At Risk (12/11/2023)    [facility] Utilities     Threatened with loss of utilities: No       DISCHARGE DETAILS:    Discharge planning discussed with:: Patient's son in law  Freedom of Choice: Yes  Comments -  Freedom of Choice: Son in law stated family preference is to have patient placed in a facility with dementia/memory care unit  CM contacted family/caregiver?: Yes  Were Treatment Team discharge recommendations reviewed with patient/caregiver?: Yes  Did patient/caregiver verbalize understanding of patient care needs?: N/A- going to facility  Were patient/caregiver advised of the risks associated with not following Treatment Team discharge recommendations?: Yes    Contacts  Patient Contacts: Dtr Anitha and son in law Arias  Relationship to Patient:: Family  Contact Method: In Person  Reason/Outcome: Continuity of Care, Discharge Planning    Requested Home Health Care         Is the patient interested in [facility] at discharge?: No     Other Referral/Resources/Interventions Provided:  Interventions: SNF, Short Term Rehab  Referral Comments: Case management consult received for placement. CM met with patient's son in law outside patient's room, introduced self and role and discuss discharge planning. Discussed discharge planning and he stated patient has been living with dtr and son in law and has been requiring assistance for adls and iadls and the family now feels that she needs to be placed in a facility where she can be better cared for at all times. CM discussed facility preferences, and also informed that CM will be sending referrals to facilities in around 20 mile radius and will reach out to family with the list of available facilities to review. CM sent blanket referral via [facility] to facilities and also faxed [facility] to [facility], awaiting response.     Treatment Team Recommendation: Home with Home Health Care, SNF, Short Term Rehab  Discharge Destination Plan:: SNF, Short Term Rehab, Assisted Living  Transport at Discharge : Other (Comment) (TBD)

## 2023-12-11 NOTE — PHYSICAL THERAPY NOTE
"   PT EVALUATION     12/11/23 1401   PT Last Visit   PT Visit Date 12/11/23   Note Type   Note type Evaluation   Pain Assessment   Pain Assessment Tool FLACC   Pain Rating: FLACC (Rest) - Face 0   Pain Rating: FLACC (Rest) - Legs 0   Pain Rating: FLACC (Rest) - Activity 0   Pain Rating: FLACC (Rest) - Cry 0   Pain Rating: FLACC (Rest) - Consolability 0   Score: FLACC (Rest) 0   Restrictions/Precautions   Other Precautions 1:1;Cognitive;Chair Alarm;Bed Alarm;Impulsive;Combative;Agitated;Fall Risk   Home Living   Type of Home House   Home Layout One level  (few steps in house)   Home Equipment   (none)   Prior Function   Level of [place] Needs assistance with ADLs  (ambulatory without device with supervision)   Lives With Other (Comment)  (Daughter and LISA, also has a caregiver during the day.  Pt has 24 hour supervision.)   Receives Help From Family;Personal care attendant   Falls in the last 6 months 1 to 4   General   Additional Pertinent History Pt admitted with generalized weakness and increasing confusion.   Family/Caregiver Present Yes  (All history obtained from LISA)   Cognition   Overall Cognitive Status Impaired   Arousal/Participation Arousable   Attention Difficulty attending to directions   Orientation Level Oriented to person;Disoriented to place;Disoriented to time;Disoriented to situation   Memory Decreased recall of biographical information;Decreased short term memory   Following Commands Unable to follow one step commands   Subjective   Subjective \"Don't touch me\"   RLE Assessment   RLE Assessment   (ROM WFL, pt unable to follow commands for MMT however pt demonstrates AROM all LE joints)   LLE Assessment   LLE Assessment   (ROM WFL, pt unable to follow commands for MMT however pt demonstrates AROM all LE joints)   Transfers   Sit to Stand 4  Minimal assistance   Stand to Sit 4  Minimal assistance   Ambulation/Elevation   Gait pattern   (very flexed posture)   Gait Assistance 3  Moderate assist "   Assistive Device   (handheld assist)   Distance 30 feet;  Pt walked into the bathroom but would not sit on the toilet even though she said she had to go to the bathroom so pt returned to her chair.  Pt follows few commands and is difficult to redirect. Pt does not like to be touched.    Balance   Static Sitting Fair +   Static Standing Fair   Ambulatory Poor   Activity Tolerance   Activity Tolerance Treatment limited secondary to agitation   Assessment   Prognosis Good   Problem List Decreased strength;Impaired balance;Decreased mobility;Decreased cognition;Impaired judgement;Decreased safety awareness   Assessment Patient is an 83 y.o. year old female seen for Physical Therapy evaluation. Patient admitted with Generalized weakness.  Comorbidities affecting patient's physical performance include: balance problem, memory change, dementia with behavioral disturbance, anxiety.  Personal factors affecting patient at time of initial evaluation include: inability to ambulate household distances, decreased cognition, positive fall history, limited insight into impairments, and inability to perform ADLS. Prior to admission, patient was requiring assist for functional mobility without assistive device and requiring assist for ADLS.  Please find objective findings from Physical Therapy assessment regarding body systems outlined above with impairments and limitations including weakness, impaired balance, decreased endurance, gait deviations, decreased activity tolerance, decreased functional mobility tolerance, fall risk, and decreased cognition.  The Barthel Index was used as a functional outcome tool presenting with a score of Barthel Index Score: 35 today indicating marked limitations of functional mobility and ADLS.  Patient's clinical presentation is currently unstable/unpredictable as seen in patient's presentation of varying levels of cognitive performance, increased fall risk, new onset of impairment of functional  mobility, decreased endurance, and new onset of weakness. Pt would benefit from continued Physical Therapy treatment to address deficits as defined above and maximize level of functional mobility. As demonstrated by objective findings, the assigned level of complexity for this evaluation is high.The patient's [facility] Basic Mobility Inpatient Short Form Raw Score is 15. A Raw score of less than or equal to 16 suggests the patient may benefit from discharge to post-acute rehabilitation services.   Goals   Patient Goals unable to state due to impaired cognition.   STG Expiration Date 12/25/23   Short Term Goal #1 supervision bed mobility, supervision transfers, supervision ambulation with least restricitive device 50 feet   LTG Expiration Date 12/25/23   Long Term Goal #1 no falls, pt will follow 1 step commands 75% of the time so pt can participate in ADL, pt will ambulate 100 feet with superivison assist with least restricitive device, improve standing static posture to at least fair.   Plan   Treatment/Interventions Therapeutic exercise;LE strengthening/ROM;Functional transfer training;ADL retraining;Gait training;Bed mobility;Equipment eval/education;Patient/family training;Cognitive reorientation   PT Frequency Other (Comment)  (5x/week)   Discharge Recommendation   Rehab Resource Intensity Level, PT II (Moderate Resource Intensity)   AM-PAC Basic Mobility Inpatient   Turning in Flat Bed Without Bedrails 3   Lying on Back to Sitting on Edge of Flat Bed Without Bedrails 3   Moving Bed to Chair 3   Standing Up From Chair Using Arms 3   Walk in Room 2   Climb 3-5 Stairs With Railing 1   Basic Mobility Inpatient Raw Score 15   Basic Mobility Standardized Score 36.97   Highest Level Of Mobility   -[facility] Goal 4: Move to chair/commode   -HLM Achieved 7: Walk 25 feet or more   Barthel Index   Feeding 5   Bathing 0   Grooming Score 0   Dressing Score 5   Bladder Score 0   Bowels Score 10   Toilet Use Score 5   Transfers  (Bed/Chair) Score 10   Mobility (Level Surface) Score 0   Stairs Score 0   Barthel Index Score 35   End of Consult   Patient Position at End of Consult All needs within reach;Bed/Chair alarm activated  (Pt's LISA at pt's bedside)   Licensure   [place] License Number  Ira Young PT  [id number]

## 2023-12-11 NOTE — PROGRESS NOTES
[facility]  Progress Note  Name: Monika Butler I  MRN: [id number]  Unit/Bed#: 2 [address] Date of Admission: 12/9/2023   Date of Service: 12/11/2023 I Hospital Day: 2    Assessment/Plan   Dementia with behavioral disturbance (HCC)  Assessment & Plan  Patient with history of dementia with behavioral disturbance and daughter reporting that agitation  has become increasingly frequent.  Discontinue Seroquel   Continue Aricept  Appreciate psych consultation    * Generalized weakness  Assessment & Plan  Presented to ED with complaint of generalized weakness, nonproductive cough and ambulatory difficulty.  Patient was at PCP yesterday and was prescribed Augmentin twice daily.  With no improvement in cough overnight, daughter brought patient to ED for further evaluation.  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  (Other findings as noted in radiology report.)  Received benzonatate 100 mg x 1 dose, DuoNeb nebulization and 500 cc normal saline bolus.  UA with moderate leukocytosis and bacteria  Urine culture with mixed contaminants x 2  Received azithromycin IV and ceftriaxonex 2 doses  Will monitor off antibiotic at this time   Fall precautions  PT/OT eval and treat  Consult CM for dispo planning        Essential hypertension  Assessment & Plan  SBP in the range of 165-194  Received labetalol 10 mg IV in ED  Continue losartan and metoprolol  Add hydralazine for SBP > 160  Monitor BP    Anxiety  Assessment & Plan  Discontinue Ativan and Seroquel  Appreciate psych consultation           VTE Pharmacologic Prophylaxis:   Moderate Risk (Score 3-4) - Pharmacological DVT Prophylaxis Ordered: enoxaparin (Lovenox).     Mobility:   Basic Mobility Inpatient Raw Score: 18  JH-HLM Goal: 6: Walk 10 steps or more  JH-HLM Achieved: 7: Walk 25 feet or more  HLM Goal achieved. Continue to  encourage appropriate mobility.     Patient Centered Rounds: I performed bedside rounds with nursing staff today.   Discussions with Specialists or Other Care Team Provider: Nursing, Psych     Education and Discussions with Family / Patient: Updated  (daughter and son in law) at bedside.     Total Time Spent on Date of Encounter in care of patient: 35 mins. This time was spent on one or more of the following: performing physical exam; counseling and coordination of care; obtaining or reviewing history; documenting in the medical record; reviewing/ordering tests, medications or procedures; communicating with other healthcare professionals and discussing with patient's family/caregivers.     Current Length of Stay: 2 day(s)  Current Patient Status: Inpatient   Certification Statement: The patient will continue to require additional inpatient hospital stay due to psych consult, dementia unit placement  Discharge Plan: Anticipate discharge in 24-48 hrs to discharge location to be determined pending rehab evaluations.     Code Status: Level 1 - Full Code        Subjective:   Seen and examined at bedside with virtual observation in place.  Sleeping but easily arousable.  RN reported overnight agitation which required administration of Zyprexa IM.  Patient able to participate in examination denies any pain.    Objective:     Vitals:   Temp (24hrs), Av.4 °F (36.3 °C), Min:97.1 °F (36.2 °C), Max:97.6 °F (36.4 °C)    Temp:  [97.1 °F (36.2 °C)-97.6 °F (36.4 °C)] 97.1 °F (36.2 °C)  HR:  [67-87] 84  Resp:  [18] 18  BP: (155-176)/(90-93) 155/90  SpO2:  [90 %-96 %] 96 %  Body mass index is 22.75 kg/m².     Input and Output Summary (last 24 hours):   No intake or output data in the 24 hours ending 23 [id number]    Physical Exam:   Physical Exam  Constitutional:       Appearance: She is ill-appearing.   HENT:      Head: Normocephalic.      Nose: Nose normal.   Eyes:      General: No scleral  icterus.  Cardiovascular:      Rate and Rhythm: Normal rate.      Heart sounds: Normal heart sounds.   Pulmonary:      Effort: Pulmonary effort is normal.   Abdominal:      General: Bowel sounds are normal.      Palpations: Abdomen is soft.   Musculoskeletal:         General: Normal range of motion.      Cervical back: Normal range of motion.   Skin:     General: Skin is warm and dry.      Capillary Refill: Capillary refill takes less than 2 seconds.   Neurological:      Mental Status: She is alert. Mental status is at baseline.   Psychiatric:         Cognition and Memory: Cognition is impaired.         Judgment: Judgment is impulsive.          Additional Data:     Labs:  Results from last 7 days   Lab Units 12/10/23  0457 12/09/23  1013   WBC Thousand/uL 5.10 7.68   HEMOGLOBIN g/dL 11.2* 13.3   HEMATOCRIT % 35.2 40.0   PLATELETS Thousands/uL 205 225   NEUTROS PCT %  --  85*   LYMPHS PCT %  --  7*   MONOS PCT %  --  7   EOS PCT %  --  0     Results from last 7 days   Lab Units 12/10/23  0457 12/09/23  1013   SODIUM mmol/L 141 140   POTASSIUM mmol/L 3.5 3.3*   CHLORIDE mmol/L 103 100   CO2 mmol/L 30 33*   BUN mg/dL 16 19   CREATININE mg/dL 0.63 0.81   ANION GAP mmol/L 8 7   CALCIUM mg/dL 8.9 9.5   ALBUMIN g/dL  --  4.1   TOTAL BILIRUBIN mg/dL  --  0.60   ALK PHOS U/L  --  84   ALT U/L  --  15   AST U/L  --  21   GLUCOSE RANDOM mg/dL 78 124     Results from last 7 days   Lab Units 12/09/23  1013   INR  0.99                   Lines/Drains:  Invasive Devices       Peripheral Intravenous Line  Duration             Peripheral IV 12/09/23 Right Antecubital 2 days                          Imaging: No pertinent imaging reviewed.    Recent Cultures (last 7 days):   Results from last 7 days   Lab Units 12/09/23  1359   URINE CULTURE  <10,000 cfu/ml       Last 24 Hours Medication List:   Current Facility-Administered Medications   Medication Dose Route Frequency Provider Last Rate    acetaminophen  650 mg Oral Q6H PRN Olayide D  Olaninick, CRNP      benzonatate  100 mg Oral TID Olayide D Olaniyan, CRNP      cholecalciferol  1,000 Units Oral Daily Olayide D Olaniyan, CRNP      cyanocobalamin  250 mcg Oral Daily Olayide D Olaniyan, CRNP      docusate sodium  100 mg Oral BID Olayide D Olaniyan, CRNP      donepezil  10 mg Oral HS Olayide D Olaniyan, CRNP      enoxaparin  40 mg Subcutaneous Daily Olayide D Olaniyan, CRNP      hydrALAZINE  5 mg Intravenous Q6H PRN Olayide D Olaniyan, CRNP      loratadine  10 mg Oral Daily Olayide D Olaniyan, CRNP      losartan  100 mg Oral Daily Olayide D Olaniyan, CRNP      melatonin  9 mg Oral HS Olayide D Olaniyan, CRNP      metoprolol tartrate  50 mg Oral BID Olayide D Olaniyan, CRNP      multivitamin-minerals  1 tablet Oral Daily Olayide D Olaniyan, CRNP      OLANZapine  2.5 mg Oral HS Olayide D Olaniyan, CRNP      ondansetron  4 mg Intravenous Q6H PRN Olayide D Olaniyan, CRNP      polyethylene glycol  17 g Oral Daily Olayide D Colette, JEFFREYNP          Today, Patient Was Seen By: DAMARIS Engel    **Please Note: This note may have been constructed using a voice recognition system.**

## 2023-12-11 NOTE — UTILIZATION REVIEW
Initial Clinical Review    Admission: Date/Time/Statement:   Admission Orders (From admission, onward)       Ordered        12/09/23 1433  INPATIENT ADMISSION  Once                          Orders Placed This Encounter   Procedures    INPATIENT ADMISSION     Standing Status:   Standing     Number of Occurrences:   1     Order Specific Question:   Level of Care     Answer:   Med Surg [16]     Order Specific Question:   Estimated length of stay     Answer:   More than 2 Midnights     Order Specific Question:   Certification     Answer:   I certify that inpatient services are medically necessary for this patient for a duration of greater than two midnights. See H&P and MD Progress Notes for additional information about the patient's course of treatment.     ED Arrival Information       Expected   12/9/2023     Arrival   12/9/2023 09:46    Acuity   Urgent              Means of arrival   Ambulance    Escorted by   [facility]    Service   Hospitalist    Admission type   Emergency              Arrival complaint   weakness             Chief Complaint   Patient presents with    Weakness - Generalized     Pt arrived w/ daughter c/o gen weakness past 24hrs. PCP started Abx for cough and cold and increased seroquel. Daughter reports fall to hands and knees w/o injury last night. No thinners, no pain or complaints.       Initial Presentation: 83 y.o. female presents to ed from home on 12-9-23 via ems for evaluation and treatment of weakness resulting in fall onto hands and knees.  Recent treatment with antibiotics for cough /cold and increased dose of seroquel.  PMHX: l BREAST CANCER.  Clinical assessment significant for hypertension and hypoxia 84% ra,  UA : MOD LEUKOCYTES, MOD BACTERIA, WBC 4-10, K 3.3.  EKG nsr.  Imaging w/o acute new finding. Initially treated with iv .9% ns bolus x1, iv labetalol x1. Duo neb x1 K dur 40 meq x1, tessalon perles.   Admit to inpatient for generalized weakness, acute  bronchitis,dementia. Plan includes: follow up urine and blood cultures, start iv ceftriaxone & iv azithromycin, obtain therapy evaluations.    Date: 12-10-23    Day 2: inpatient med surg  Patient sitting up in chair but is agitated and talking to herself. Psychiatry consulted to evaluate psychiatric medications. Continue iv ceftriaxone and iv azithromycin .  Follow up cultures. Start virtual observation. Family requesting placement.     Date: 12-11-23     Day 3: Has surpassed a 2nd midnight with active treatments and services, which include iv antibiotics, therapy evaluations for placement, psychiatric consult. .       ED Triage Vitals [12/09/23 0955]   98.6 °F (37 °C) 82 18 165/84 92 %      Tympanic Monitor         No Pain          12/09/23 54.6 kg (120 lb 6.4 oz)     Additional Vital Signs:   Date/Time Temp Pulse Resp BP MAP (mmHg) SpO2 O2 Device   12/11/23 08:49:30 -- 67 -- 176/93 Abnormal   121 94 % --   12/11/23 07:30:42 97.1 °F (36.2 °C)   Abnormal  69 -- 172/91 Abnormal  118 94 % --   12/10/23 2300 -- 87 -- -- -- 90 % --   12/10/23 22:55:16 -- 87 -- -- -- 91 % --   12/10/23 22:37:42 97.6 °F (36.4 °C) -- 18 157/91 113 -- --   12/10/23 2001 -- -- -- -- -- -- None (Room air)   12/10/23 14:59:41 97.5 °F (36.4 °C) -- -- 155/86 109 -- --   12/10/23 14:56:33 -- -- -- 155/86 109 -- --   12/10/23 08:28:43 98.3 °F (36.8 °C) 70 18 169/80 110 93 % --   12/09/23 2300 -- 69 -- -- -- 93 % None (Room air)   12/09/23 22:34:22 98.5 °F (36.9 °C) 77 16 129/72 91 89 % Abnormal  --   12/09/23 19:01:26 -- 90 18 137/72 94 95 % --   12/09/23 1813 -- -- -- -- -- 95 % None (Room air)   12/09/23 16:41:10 98.1 °F (36.7 °C) 85 18 139/88 105 96 % None (Room air)   12/09/23 1530 -- 75 22 153/70 101 93 % --   12/09/23 1515 -- 81 20 -- -- -- --   12/09/23 1500 -- 81 20 169/82 118 92 % --   12/09/23 1430 -- 77 20 184/82 Abnormal  118 84 % Abnormal  --   12/09/23 1345 -- 80 20 166/87 118 96 % --   12/09/23 1330 -- 72 22 179/83 Abnormal  119 100  % --   12/09/23 1300 -- 78 20 162/77 112 93 % --   12/09/23 1100 -- 81 20 194/91 Abnormal  131 94 % --   12/09/23 1030 -- 86 18 170/93 124 94 % --   12/09/23 1015 -- 88 18 -- -- 93 % --   12/09/23 0955 98.6 °F (37 °C) 82 18 165/84 -- 92 % None (Room air)         Pertinent Labs/Diagnostic Test Results:     CTA head and neck with and without contrast   Final (12/09 1301)      CT brain: Advanced chronic microangiopathic change within the cerebral hemispheres with no acute abnormality identified.      Stable lobulated meningioma within the left posterior lateral middle cranial fossa, 1.3 cm in size.      CT angiography: Atherosclerotic change with scattered calcifications. No stenosis or occlusion identified.      Chronic pleural and parenchymal changes at the lung apices.      Enlarging complex cyst within the subcutaneous fat of the posterior neck likely a complex sebaceous cyst.         XR chest 1 view portable   Final  (12/09 1459)      No acute cardiopulmonary disease.           Results from last 7 days   Lab Units 12/09/23  1013   SARS-COV-2  Negative     Results from last 7 days   Lab Units 12/10/23  0457 12/09/23  1013   WBC Thousand/uL 5.10 7.68   HEMOGLOBIN g/dL 11.2* 13.3   HEMATOCRIT % 35.2 40.0   PLATELETS Thousands/uL 205 225   NEUTROS ABS Thousands/µL  --  6.49         Results from last 7 days   Lab Units 12/10/23  0457 12/09/23  1013   SODIUM mmol/L 141 140   POTASSIUM mmol/L 3.5 3.3*   CHLORIDE mmol/L 103 100   CO2 mmol/L 30 33*   ANION GAP mmol/L 8 7   BUN mg/dL 16 19   CREATININE mg/dL 0.63 0.81   EGFR ml/min/1.73sq m 82 66   CALCIUM mg/dL 8.9 9.5   MAGNESIUM mg/dL 2.0 2.0     Results from last 7 days   Lab Units 12/09/23  1013   AST U/L 21   ALT U/L 15   ALK PHOS U/L 84   TOTAL PROTEIN g/dL 7.4   ALBUMIN g/dL 4.1   TOTAL BILIRUBIN mg/dL 0.60         Results from last 7 days   Lab Units 12/10/23  0457 12/09/23  1013   GLUCOSE RANDOM mg/dL 78 124           Results from last 7 days   Lab Units  12/09/23  1213 12/09/23  1013   HS TNI 0HR ng/L  --  11   HS TNI 2HR ng/L 12  --    HSTNI D2 ng/L 1  --          Results from last 7 days   Lab Units 12/09/23  1013   PROTIME seconds 13.3   INR  0.99   PTT seconds 29     Results from last 7 days   Lab Units 12/09/23  1013   TSH 3RD GENERATON uIU/mL 1.699       Results from last 7 days   Lab Units 12/09/23  1359   CLARITY UA  Slightly Cloudy   COLOR UA  Yellow   SPEC GRAV UA  1.010   PH UA  7.0   GLUCOSE UA mg/dl Negative   KETONES UA mg/dl Negative   BLOOD UA  Negative   PROTEIN UA mg/dl 30 (1+)*   NITRITE UA  Negative   BILIRUBIN UA  Negative   UROBILINOGEN UA E.U./dl 0.2   LEUKOCYTES UA  Moderate*   WBC UA /hpf 4-10*   RBC UA /hpf None Seen   BACTERIA UA /hpf Moderate*   EPITHELIAL CELLS WET PREP /hpf Moderate*     Results from last 7 days   Lab Units 12/09/23  1013   INFLUENZA A PCR  Negative   INFLUENZA B PCR  Negative   RSV PCR  Negative       Results from last 7 days   Lab Units 12/09/23  1359   URINE CULTURE  <10,000 cfu/ml           ED Treatment:   Medication Administration from 12/09/2023 0936 to 12/09/2023 1627         Date/Time Order Dose Route Action     12/09/2023 1040 EST sodium chloride 0.9 % bolus 500 mL 500 mL Intravenous New Bag     12/09/2023 1219 EST labetalol (NORMODYNE) injection 10 mg 10 mg Intravenous Given     12/09/2023 1302 EST ipratropium-albuterol (DUO-NEB) 0.5-2.5 mg/3 mL inhalation solution 3 mL 3 mL Nebulization Given     12/09/2023 1335 EST benzonatate (TESSALON PERLES) capsule 100 mg 100 mg Oral Given          Past Medical History:   Diagnosis Date    Allergic     Cancer (HCC) 2005    left breast mastectomy    Electrolyte abnormality 10/02/2022    Hypertension     Memory change     Nodule of left lung     Pleural thickening      Present on Admission:   Anxiety   Dementia with behavioral disturbance (HCC)   (Resolved) Electrolyte abnormality   Essential hypertension   Generalized weakness      Admitting Diagnosis:     Weakness  [R53.1]  AMS (altered mental status) [R41.82]    Age/Sex: 83 y.o. female    Scheduled Medications:    azithromycin, 250 mg, Intravenous, Q24H  benzonatate, 100 mg, Oral, TID  cefTRIAXone, 1,000 mg, Intravenous, Q24H  cholecalciferol, 1,000 Units, Oral, Daily  cyanocobalamin, 250 mcg, Oral, Daily  docusate sodium, 100 mg, Oral, BID  donepezil, 10 mg, Oral, HS  enoxaparin, 40 mg, Subcutaneous, Daily  loratadine, 10 mg, Oral, Daily  LORazepam, 0.5 mg, Oral, BID  losartan, 100 mg, Oral, Daily  melatonin, 9 mg, Oral, HS  metoprolol tartrate, 50 mg, Oral, BID  multivitamin-minerals, 1 tablet, Oral, Daily  polyethylene glycol, 17 g, Oral, Daily  QUEtiapine, 50 mg, Oral, HS      Continuous IV Infusions:     PRN Meds:  acetaminophen, 650 mg, Oral, Q6H PRN  hydrALAZINE, 5 mg, Intravenous, Q6H PRN  OLANZapine, 5 mg, Oral, HS PRN  ondansetron, 4 mg, Intravenous, Q6H PRN        IP CONSULT TO PSYCHIATRY  IP CONSULT TO CASE MANAGEMENT    Network Utilization Review Department  ATTENTION: Please call with any questions or concerns to [phone] and carefully listen to the prompts so that you are directed to the right person. All voicemails are confidential.   For Discharge needs, contact Care Management DC Support Team at [phone] opt. 2  Send all requests for admission clinical reviews, approved or denied determinations and any other requests to dedicated fax number below belonging to the [place] where the patient is receiving treatment. List of dedicated fax numbers for the Facilities:  FACILITY NAME UR FAX NUMBER   ADMISSION DENIALS (Administrative/Medical Necessity) [phone]   DISCHARGE SUPPORT TEAM (NETWORK) [phone]   PARENT CHILD HEALTH (Maternity/NICU/Pediatrics) [phone]   [facility] [phone]   [facility] [phone]   [facility] [phone]   [facility] [phone]   [facility] [phone]   [facility] [phone]   [facility] [phone]   [facility] [phone]   [facility] [phone]   Former[facility] [phone]   [facility] [phone]

## 2023-12-11 NOTE — ASSESSMENT & PLAN NOTE
Patient with history of dementia with behavioral disturbance and daughter reporting that agitation  has become increasingly frequent.  Seroquel was just increased from 25 mg to 50 mg yesterday by PCP  Will continue Aricept and Seroquel at this time  Psych consult

## 2023-12-12 PROBLEM — J21.9 ACUTE BRONCHIOLITIS: Status: RESOLVED | Noted: 2023-12-12 | Resolved: 2023-12-12

## 2023-12-12 PROBLEM — J21.9 ACUTE BRONCHIOLITIS: Status: ACTIVE | Noted: 2023-12-12

## 2023-12-12 LAB
ERYTHROCYTE [DISTWIDTH] IN BLOOD BY AUTOMATED COUNT: 12.7 % (ref 11.6–15.1)
HCT VFR BLD AUTO: 37.7 % (ref 34.8–46.1)
HGB BLD-MCNC: 12.5 G/DL (ref 11.5–15.4)
MCH RBC QN AUTO: 33.4 PG (ref 26.8–34.3)
MCHC RBC AUTO-ENTMCNC: 33.2 G/DL (ref 31.4–37.4)
MCV RBC AUTO: 101 FL (ref 82–98)
PLATELET # BLD AUTO: 231 THOUSANDS/UL (ref 149–390)
PMV BLD AUTO: 9.8 FL (ref 8.9–12.7)
RBC # BLD AUTO: 3.74 MILLION/UL (ref 3.81–5.12)
WBC # BLD AUTO: 5.06 THOUSAND/UL (ref 4.31–10.16)

## 2023-12-12 PROCEDURE — 99232 SBSQ HOSP IP/OBS MODERATE 35: CPT | Performed by: PSYCHIATRY & NEUROLOGY

## 2023-12-12 PROCEDURE — 99232 SBSQ HOSP IP/OBS MODERATE 35: CPT

## 2023-12-12 PROCEDURE — 85027 COMPLETE CBC AUTOMATED: CPT

## 2023-12-12 PROCEDURE — 97535 SELF CARE MNGMENT TRAINING: CPT

## 2023-12-12 RX ADMIN — DONEPEZIL HYDROCHLORIDE 10 MG: 5 TABLET ORAL at 22:04

## 2023-12-12 RX ADMIN — METOPROLOL TARTRATE 50 MG: 50 TABLET, FILM COATED ORAL at 17:27

## 2023-12-12 RX ADMIN — METOPROLOL TARTRATE 50 MG: 50 TABLET, FILM COATED ORAL at 09:09

## 2023-12-12 RX ADMIN — BENZONATATE 100 MG: 100 CAPSULE ORAL at 09:09

## 2023-12-12 RX ADMIN — OLANZAPINE 2.5 MG: 5 TABLET, ORALLY DISINTEGRATING ORAL at 22:05

## 2023-12-12 RX ADMIN — ENOXAPARIN SODIUM 40 MG: 40 INJECTION SUBCUTANEOUS at 09:10

## 2023-12-12 RX ADMIN — Medication 1 TABLET: at 09:09

## 2023-12-12 RX ADMIN — BENZONATATE 100 MG: 100 CAPSULE ORAL at 22:04

## 2023-12-12 RX ADMIN — BENZONATATE 100 MG: 100 CAPSULE ORAL at 16:24

## 2023-12-12 RX ADMIN — POLYETHYLENE GLYCOL 3350 17 G: 17 POWDER, FOR SOLUTION ORAL at 09:10

## 2023-12-12 RX ADMIN — LOSARTAN POTASSIUM 100 MG: 50 TABLET, FILM COATED ORAL at 09:09

## 2023-12-12 RX ADMIN — DOCUSATE SODIUM 100 MG: 100 CAPSULE, LIQUID FILLED ORAL at 09:09

## 2023-12-12 RX ADMIN — Medication 1000 UNITS: at 09:10

## 2023-12-12 RX ADMIN — Medication 9 MG: at 22:04

## 2023-12-12 RX ADMIN — LORATADINE 10 MG: 10 TABLET ORAL at 09:09

## 2023-12-12 NOTE — OCCUPATIONAL THERAPY NOTE
Occupational Therapy Progress Note     Patient Name: Monika Butler  Today's Date: 12/12/2023  Problem List  Principal Problem:    Generalized weakness  Active Problems:    Essential hypertension    Dementia with behavioral disturbance (HCC)    Anxiety       12/12/23 1355   OT Last Visit   OT Visit Date 12/12/23  (Tuesday)   Note Type   Note Type Treatment   Pain Assessment   Pain Assessment Tool 0-10   Pain Score No Pain   Restrictions/Precautions   Weight Bearing Precautions Per Order No   Other Precautions Cognitive;Chair Alarm;Bed Alarm;Fall Risk;Limb alert  ([place] on room air, L UE limb alert)   Lifestyle   Reciprocal Relationships Supportive family   ADL   Where Assessed Chair  (vs standing in room)   UB Dressing Assistance 4  Minimal Assistance   UB Dressing Deficit Setup;Verbal cueing;Supervision/safety;Increased time to complete   Bed Mobility   Supine to Sit Unable to assess   Sit to Supine Unable to assess   Additional Comments Pt seated OOB In chair upon arrival and at end of session w/ needs met, call bell in reach and chair alarm activated   Transfers   Sit to Stand 4  Minimal assistance   Additional items Assist x 1;Armrests;Verbal cues   Stand to Sit 4  Minimal assistance   Additional items Assist x 1;Increased time required;Armrests   Functional Mobility   Functional Mobility 4  Minimal assistance   Additional Comments engaged in household distance functional mobility w/ HHA   Additional items Hand hold assistance   Cognition   Overall Cognitive Status Impaired   Arousal/Participation Alert   Attention Difficulty attending to directions   Orientation Level Oriented to person;Disoriented to place;Disoriented to time;Disoriented to situation   Memory Decreased recall of biographical information;Decreased short term memory;Decreased long term memory;Decreased recall of recent events;Decreased recall of precautions   Following Commands Follows one step commands inconsistently   Comments Identified  "pt by name and wristband.  Alert and pleasantly confused. Appears to be hallucinating; talking to \"Evon\" and was asking if her mother up or still cooking. Pt stated that \"Evon\" was standing on the sink. Difficulty following directions and declined participation in ADLs. Difficult to redirect   Activity Tolerance   Activity Tolerance Other (Comment)  (impaired cognition, limited insight into deficits)   Assessment   Assessment Pt seen for skilled OT tx session focusing on activity engagement, ongoing evaluation. Pt required less physical assistance to stand and engaged in functional mobility farther distances in room w/ less physical assistance. More alert and pleasantly confused. Difficulty sustaining attention and following directions despite multiple attempts to redirect. Continue to recommend level II rehab resource intensity when medically stable. Will continue to follow   Plan   Treatment Interventions ADL retraining;Functional transfer training;Endurance training;Patient/family training;Equipment evaluation/education;Compensatory technique education;Continued evaluation;Energy conservation;Activityengagement   Goal Expiration Date 12/21/23   OT Treatment Day 1  (Tuesday 12/12/23)   OT Frequency 2-3x/wk   Discharge Recommendation   Recommendation Geriatric Consult   Rehab Resource Intensity Level, OT II (Moderate Resource Intensity)   Equipment Recommended Shower/Tub chair with back ($)   AM-PAC Daily Activity Inpatient   Lower Body Dressing 2   Bathing 2   Toileting 2   Upper Body Dressing 3   Grooming 2   Eating 3   Daily Activity Raw Score 14   Daily Activity Standardized Score (Calc for Raw Score >=11) 33.39   AM-PAC Applied Cognition Inpatient   Following a Speech/Presentation 1   Understanding Ordinary Conversation 2   Taking Medications 1   Remembering Where Things Are Placed or Put Away 1   Remembering List of 4-5 Errands 1   Taking Care of Complicated Tasks 1   Applied Cognition Raw Score 7   Applied " Cognition Standardized Score 15.17   Barthel Index   Feeding 5   Bathing 0   Grooming Score 0   Dressing Score 5   Bladder Score 5   Bowels Score 5   Toilet Use Score 5   Transfers (Bed/Chair) Score 10   Mobility (Level Surface) Score 10   Stairs Score 0   Barthel Index Score 45   End of Consult   Education Provided Yes   Patient Position at End of Consult Bed/Chair alarm activated;All needs within reach;Bedside chair   Nurse Communication Nurse aware of consult   Licensure   [place] License Number  Lakia Garcia, OTR/L [id number]       The patient's raw score on the AM-PAC Daily Activity Inpatient Short Form is 14. A raw score of less than 19 suggests the patient may benefit from discharge to post-acute rehabilitation services. Please refer to the recommendation of the Occupational Therapist for safe discharge planning.    Pt goals to be met by 12/21/23 to max I w/ ADLs and improve engagement to return home to PLOF w/ family assist / support includes:     -Pt will demonstrate good attention and participation in ongoing eval of functional cognition to assist in DC planning      -Pt will consistently follow 2 step directions during ADLs w/ no more than 1 cue/ prompt to max I and improve engagement to return home w/ family PROGRESSING     -Pt will complete functional transfers to bed, chair, and toilet using LRAD, DME as needed w/ S PROGRESSING     -Pt will complete UBD w/ S after set- up PROGRESSING     -Pt will demonstrate improved activity and sitting tolerance OOB In chair for all meals PROGRESSING     -Pt will consistently engage in functional mobility household distances w/ S using LRAD PROGRESSING     -Pt will complete LBD w/ min A using LHAE as needed to max I and minimize burden of care PROGRESSING     -Pt will demonstrate improved functional standing tolerance for at least 10 minutes w/ at least fair balance using LRAD while engaged in grooming standing at sink to max I and minimize burden of care to  return home PROGRESSING    Lakia Garcia, OTR/L  [id number]  [id number]

## 2023-12-12 NOTE — PROGRESS NOTES
[facility]  Progress Note  Name: Monika Butler I  MRN: [id number]  Unit/Bed#: 2 [address] Date of Admission: 12/9/2023   Date of Service: 12/12/2023 I Hospital Day: 3    Assessment/Plan   Dementia with behavioral disturbance (HCC)  Assessment & Plan  Patient with history of dementia with behavioral disturbance and daughter reporting that agitation  has become increasingly frequent.  Discontinue Seroquel   Decrease Zyprexa to 2.5 mg at bedtime  Continue Aricept  Appreciate psych consultation  Discontinue 1: 1 continue observation  Start every 15 minutes safety checks in anticipation of discharge to rehab      * Generalized weakness  Assessment & Plan  Presented to ED with complaint of generalized weakness, nonproductive cough and ambulatory difficulty.  Patient was at PCP yesterday and was prescribed Augmentin twice daily.  With no improvement in cough overnight, daughter brought patient to ED for further evaluation.  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  (Other findings as noted in radiology report.)  Received benzonatate 100 mg x 1 dose, DuoNeb nebulization and 500 cc normal saline bolus.  UA with moderate leukocytosis and bacteria  Urine culture with mixed contaminants x 2  Received azithromycin IV and ceftriaxonex 2 doses  Will monitor off antibiotic at this time   Fall precautions  PT/OT eval and treat  Consult CM for dispo planning        Essential hypertension  Assessment & Plan  SBP in the range of 165-194  Received labetalol 10 mg IV in ED  Continue losartan and metoprolol  Add hydralazine for SBP > 160  Monitor BP    Anxiety  Assessment & Plan  Discontinue Ativan and Seroquel  Appreciate psych consultation    Acute bronchiolitis-resolved as of 12/12/2023  Assessment & Plan  Acute bronchitis as evidenced by cough congestion.    Chest x-ray was  negative.    Received azithromycin 100 mg IV daily.    Symptomatic treatment with Mucinex and Tessalon Perles.    Nebulizer as needed              VTE Pharmacologic Prophylaxis:   Moderate Risk (Score 3-4) - Pharmacological DVT Prophylaxis Ordered: enoxaparin (Lovenox).     Mobility:   Basic Mobility Inpatient Raw Score: 18  JH-HLM Goal: 6: Walk 10 steps or more  JH-HLM Achieved: 7: Walk 25 feet or more  HLM Goal achieved. Continue to encourage appropriate mobility.     Patient Centered Rounds: I performed bedside rounds with nursing staff today.   Discussions with Specialists or Other Care Team Provider: Nursing, Psych     Education and Discussions with Family / Patient: Updated  (daughter and son in law) at bedside.     Total Time Spent on Date of Encounter in care of patient: 35 mins. This time was spent on one or more of the following: performing physical exam; counseling and coordination of care; obtaining or reviewing history; documenting in the medical record; reviewing/ordering tests, medications or procedures; communicating with other healthcare professionals and discussing with patient's family/caregivers.     Current Length of Stay: 2 day(s)  Current Patient Status: Inpatient   Certification Statement: The patient will continue to require additional inpatient hospital stay due to psych consult, dementia unit placement  Discharge Plan: Anticipate discharge in 24-48 hrs to discharge location to be determined pending rehab evaluations.     Code Status: Level 1 - Full Code        Subjective:   Patient seen and examined sitting up in chair at that time with continued one-to-one observation.  Patient calm and alert to self and able to participate in examination today.  Denies any pain, nausea or vomiting.    Objective:     Vitals:   Temp (24hrs), Av.1 °F (36.7 °C), Min:97.4 °F (36.3 °C), Max:98.5 °F (36.9 °C)    Temp:  [97.4 °F (36.3 °C)-98.5 °F (36.9 °C)] 97.4 °F (36.3 °C)  HR:  [74-95]  95  Resp:  [16] 16  BP: (156-160)/(94-95) 160/95  SpO2:  [94 %-96 %] 94 %  Body mass index is 22.75 kg/m².     Input and Output Summary (last 24 hours):   No intake or output data in the 24 hours ending 12/12/23 1535      Physical Exam:   Physical Exam  Constitutional:       Appearance: She is ill-appearing.   HENT:      Head: Normocephalic.      Nose: Nose normal.   Eyes:      General: No scleral icterus.  Cardiovascular:      Rate and Rhythm: Normal rate.      Heart sounds: Normal heart sounds.   Pulmonary:      Effort: Pulmonary effort is normal.   Abdominal:      General: Bowel sounds are normal.      Palpations: Abdomen is soft.   Musculoskeletal:         General: Normal range of motion.      Cervical back: Normal range of motion.   Skin:     General: Skin is warm and dry.      Capillary Refill: Capillary refill takes less than 2 seconds.   Neurological:      Mental Status: She is alert. Mental status is at baseline.   Psychiatric:         Cognition and Memory: Cognition is impaired.         Judgment: Judgment is impulsive.       Additional Data:     Labs:  Results from last 7 days   Lab Units 12/12/23  0603 12/10/23  0457 12/09/23  1013   WBC Thousand/uL 5.06   < > 7.68   HEMOGLOBIN g/dL 12.5   < > 13.3   HEMATOCRIT % 37.7   < > 40.0   PLATELETS Thousands/uL 231   < > 225   NEUTROS PCT %  --   --  85*   LYMPHS PCT %  --   --  7*   MONOS PCT %  --   --  7   EOS PCT %  --   --  0    < > = values in this interval not displayed.     Results from last 7 days   Lab Units 12/10/23  0457 12/09/23  1013   SODIUM mmol/L 141 140   POTASSIUM mmol/L 3.5 3.3*   CHLORIDE mmol/L 103 100   CO2 mmol/L 30 33*   BUN mg/dL 16 19   CREATININE mg/dL 0.63 0.81   ANION GAP mmol/L 8 7   CALCIUM mg/dL 8.9 9.5   ALBUMIN g/dL  --  4.1   TOTAL BILIRUBIN mg/dL  --  0.60   ALK PHOS U/L  --  84   ALT U/L  --  15   AST U/L  --  21   GLUCOSE RANDOM mg/dL 78 124     Results from last 7 days   Lab Units 12/09/23  1013   INR  0.99                    Lines/Drains:  Invasive Devices       Peripheral Intravenous Line  Duration             Peripheral IV 12/09/23 Right Antecubital 3 days                          Imaging: No pertinent imaging reviewed.    Recent Cultures (last 7 days):   Results from last 7 days   Lab Units 12/09/23  1359   URINE CULTURE  <10,000 cfu/ml       Last 24 Hours Medication List:   Current Facility-Administered Medications   Medication Dose Route Frequency Provider Last Rate    acetaminophen  650 mg Oral Q6H PRN Olayide D Olaniyan, CRNP      benzonatate  100 mg Oral TID Olayide D Olaniyan, CRNP      cholecalciferol  1,000 Units Oral Daily Olayide D Olaniyan, CRNP      cyanocobalamin  250 mcg Oral Daily Olayide D Olaniyan, CRNP      docusate sodium  100 mg Oral BID Olayide D Olaniyan, CRNP      donepezil  10 mg Oral HS Olayide D Olaniyan, CRNP      enoxaparin  40 mg Subcutaneous Daily Olayide D Olaniyan, CRNP      hydrALAZINE  5 mg Intravenous Q6H PRN Olayide D Olpaula, CRNP      loratadine  10 mg Oral Daily Olayide D Olaniyan, CRNP      losartan  100 mg Oral Daily Olayide D Olaniyan, CRNP      melatonin  9 mg Oral HS Olayide D Olaniyan, CRNP      metoprolol tartrate  50 mg Oral BID Olayide D Olaniyan, CRNP      multivitamin-minerals  1 tablet Oral Daily Olayide D Olaniyan, CRNP      OLANZapine  2.5 mg Oral HS Olayide D Olaniyan, CRNP      ondansetron  4 mg Intravenous Q6H PRN Olayide D Olpaula, CRNP      polyethylene glycol  17 g Oral Daily Olayide DAMARIS Cuellar          Today, Patient Was Seen By: DAMARIS Engel    **Please Note: This note may have been constructed using a voice recognition system.**

## 2023-12-12 NOTE — ASSESSMENT & PLAN NOTE
Acute bronchitis as evidenced by cough congestion.    Chest x-ray was negative.    Received azithromycin 100 mg IV daily.    Symptomatic treatment with Mucinex and Tessalon Perles.    Nebulizer as needed

## 2023-12-12 NOTE — PLAN OF CARE
Problem: OCCUPATIONAL THERAPY ADULT  Goal: Performs self-care activities at highest level of function for planned discharge setting.  See evaluation for individualized goals.  Description: Treatment Interventions: ADL retraining, Functional transfer training, Endurance training, UE strengthening/ROM, Cognitive reorientation, Patient/family training, Equipment evaluation/education, Compensatory technique education, Continued evaluation, Energy conservation, Activityengagement  Equipment Recommended: Shower/Tub chair with back ($)       See flowsheet documentation for full assessment, interventions and recommendations.   Outcome: Progressing  Note: Limitation: Decreased ADL status, Decreased UE strength, Decreased Safe judgement during ADL, Decreased cognition, Decreased endurance, Decreased self-care trans, Decreased fine motor control, Decreased high-level ADLs, Visual deficit     Assessment: Pt seen for skilled OT tx session focusing on activity engagement, ongoing evaluation. Pt required less physical assistance to stand and engaged in functional mobility farther distances in room w/ less physical assistance. More alert and pleasantly confused. Difficulty sustaining attention and following directions despite multiple attempts to redirect. Continue to recommend level II rehab resource intensity when medically stable. Will continue to follow  Recommendation: Geriatric Consult  Rehab Resource Intensity Level, OT: II (Moderate Resource Intensity)

## 2023-12-12 NOTE — PROGRESS NOTES
"Progress Note - Behavioral Health   Monika Butler 83 y.o. female MRN: [id number]  Unit/Bed#: [address] [place] [address] Encounter: [id number]          I came to see the patient for continuity of care.  Per nursing staff patient had been agitated overnight, has not been agitated this morning.  Patient was evaluated with one-to-one present.  Patient was pleasant and cooperative with interview this morning.  She continues to be a poor historian and appears to be confused however appears to be less tangential and less incoherent compared to yesterday.  Reports she slept \"like a log\" and reports that she has been eating.  She denies suicidal or homicidal ideation, plan, or intent.  Denies auditory or visual hallucinations.  However when asked how many people were in the room, patient reported \"10 people\".  When asked who they were patient stated that she did not know their names.  She denies having physical complaints today.    Sleep: \"Like a log\"  Appetite: Reports she has been eating  Medication side effects: None reported  ROS: No physical complaints verbalized    Mental Status Evaluation:  Appearance:  appears stated age and sitting upright in chair   Behavior:  Calm, cooperative, pleasant   Speech:  normal rate, soft, and less incoherent   Mood:  Did not state   Affect:  Euthymic   Language: Within normal limits   Thought Process:  Less tangential and less incoherent   Associations: Less tangential associations   Thought Content:  no delusions elicited   Perceptual Disturbances: Denies auditory or visual hallucinations.  However when asked how many people were in the room, patient reported \"10 people\".  Patient does not appear to be responding to internal stimuli   Risk Potential: Denies suicidal or homicidal ideation, plan, or intent   Sensorium:  person   Memory:  Memory appears grossly impaired   Cognition:  Memory appears grossly impaired   Consciousness:  alert and awake    Attention: attention span appeared shorter than " "expected for age   Insight:  poor   Judgment: poor   Muscle Strength and Tone: Not assessed   Gait/Station: Not assessed, patient in bed   Motor Activity: no abnormal movements noted today         Assessment/Plan  Monika Butler is a 83 y.o. female with past medical history of dementia with behavioral disturbance, hypertension who presented to the ED due to weakness and is admitted for generalized weakness. Psychiatry consultation was requested due to dementia with behavioral disturbance/agitation.  Seroquel was discontinued and Zyprexa was started last night for psychosis and dementia with behavioral disturbance - please see psychiatric consultation note from yesterday 12/11/2023 by myself for more information. Per nursing staff patient had been agitated overnight, has not been agitated this morning.  Patient was evaluated with one-to-one present.  Patient was pleasant and cooperative with interview this morning.  She continues to be a poor historian and appears to be confused however appears to be less tangential and less incoherent compared to yesterday. She denies suicidal or homicidal ideation, plan, or intent.  Denies auditory or visual hallucinations.  However when asked how many people were in the room, patient reported \"10 people\".  When asked who they were patient stated that she did not know their names.  She denies having physical complaints today.  She did not exhibit any agitated or aggressive behaviors during the interview this morning.       Diagnosis:  Dementia with psychosis  Dementia with behavioral disturbance    Recommended Treatment:   Continue medical management  Continue one-to-one observation for safety  There is no clear indication for inpatient psychiatric hospitalization at this time  Continue Zyprexa Zydis oral disintegrating tablet 2.5 mg qhs for psychosis and dementia with behavioral disturbance  Discussed with the primary team  I will follow up as necessary.  Please contact with " questions.  For overnights and weekends please contact [facility] for psychiatric purposes         Medications: all current active meds have been reviewed and continue current psychiatric medications      Risks, benefits and possible side effects of Medications:     Risks, benefits, and possible side effects of medications have been previously explained to the patient's daughters.  No new medication changes today.        Labs: I have personally reviewed all pertinent laboratory results.     I have personally reviewed all pertinent laboratory/tests results.  Labs in last 72 hours:   Recent Labs     12/10/23  0457 12/12/23  0603   WBC 5.10 5.06   RBC 3.42* 3.74*   HGB 11.2* 12.5   HCT 35.2 37.7    231   RDW 13.1 12.7   SODIUM 141  --    K 3.5  --      --    CO2 30  --    BUN 16  --    CREATININE 0.63  --    GLUC 78  --    CALCIUM 8.9  --        Ash Silvestre DO  12/12/23      This note has been constructed using a voice recognition system.    There may be translation, syntax,  or grammatical errors. If you have any questions, please contact the dictating provider.

## 2023-12-12 NOTE — CASE MANAGEMENT
Case Management Discharge Planning Note    Patient name Monika Butler  Location 2 [facility] [address] [place] [address] MRN [id number]  : 1940 Date 2023       Current Admission Date: 2023  Current Admission Diagnosis:Generalized weakness   Patient Active Problem List    Diagnosis Date Noted    Nondisplaced fracture of triquetrum (cuneiform) bone, left wrist, initial encounter for closed fracture 2023    Abrasion of left eyebrow 2023    Injury of left wrist 2023    Insomnia 2023    Anxiety 2023    Absolute anemia 2023    H/O clavicle fracture 2023    Meningioma (HCC) 2023    Pulmonary nodule 2023    Bad odor of urine 2023    Postmenopausal 2023    Prediabetes 2023    Gastroesophageal reflux disease with esophagitis without hemorrhage 2023    Generalized weakness 2022    Urinary frequency 2022    BMI 22.0-22.9, adult 2021    History of breast cancer 2021    Altered mental status 2020    Medicare annual wellness visit, subsequent 2020    Balance problems 2020    Dementia with behavioral disturbance (HCC)     Memory change 10/26/2020    Ear fullness, left 2017    Sinusitis 2017    Hyperlipidemia 2016    Breast cancer (HCC) 2013    Essential hypertension 2012      LOS (days): 3  Geometric Mean LOS (GMLOS) (days): 2.60  Days to GMLOS:-0.4     OBJECTIVE:  Risk of Unplanned Readmission Score: 21.89         Current admission status: Inpatient   Preferred Pharmacy:   [id number] - [address] [address]  Phone: [phone] Fax: [phone]    Primary Care Provider: Kristin Raymundo MD    [facility]  Secondary Insurance:     DISCHARGE DETAILS:    Discharge planning discussed with:: Antonio Castillo CM contacted family/caregiver?: Yes  Were Treatment Team discharge  recommendations reviewed with patient/caregiver?: Yes        Contacts  Patient Contacts: Anna (myar)  Relationship to Patient:: Family  Contact Method: Phone  Phone Number: [phone]  Reason/Outcome: Continuity of Care, Discharge Planning    Requested Home Health Care         Is the patient interested in C at discharge?: No     Other Referral/Resources/Interventions Provided:  Interventions: Short Term Rehab  Referral Comments: CM faxed referral to [facility] and to [facility]. CM called and spoke with Evelia at [place] who informed CM that they do not have any room in their Dementia unit at present. CM spoke with Jeri Ortiz,  at [facility] regarding the referral and she did mention that she spoke with patient's dtr Anitha over phone and will be emailing her information about [facility]. CM called and spoke with cristina Castillo and per her request emailed her the list of available facilities. CM to follow up pending clinical progress and therapy.

## 2023-12-13 ENCOUNTER — TELEPHONE (OUTPATIENT)
Age: 83
End: 2023-12-13

## 2023-12-13 ENCOUNTER — APPOINTMENT (INPATIENT)
Dept: RADIOLOGY | Facility: HOSPITAL | Age: 83
DRG: 884 | End: 2023-12-13
Payer: COMMERCIAL

## 2023-12-13 PROCEDURE — 92610 EVALUATE SWALLOWING FUNCTION: CPT

## 2023-12-13 PROCEDURE — 71045 X-RAY EXAM CHEST 1 VIEW: CPT

## 2023-12-13 PROCEDURE — 99232 SBSQ HOSP IP/OBS MODERATE 35: CPT

## 2023-12-13 RX ADMIN — ENOXAPARIN SODIUM 40 MG: 40 INJECTION SUBCUTANEOUS at 08:59

## 2023-12-13 RX ADMIN — Medication 1000 UNITS: at 08:59

## 2023-12-13 RX ADMIN — LOSARTAN POTASSIUM 100 MG: 50 TABLET, FILM COATED ORAL at 08:58

## 2023-12-13 RX ADMIN — Medication 9 MG: at 22:40

## 2023-12-13 RX ADMIN — DONEPEZIL HYDROCHLORIDE 10 MG: 5 TABLET ORAL at 22:40

## 2023-12-13 RX ADMIN — METOPROLOL TARTRATE 50 MG: 50 TABLET, FILM COATED ORAL at 17:19

## 2023-12-13 RX ADMIN — METOPROLOL TARTRATE 50 MG: 50 TABLET, FILM COATED ORAL at 08:59

## 2023-12-13 RX ADMIN — Medication 1 TABLET: at 08:59

## 2023-12-13 RX ADMIN — OLANZAPINE 2.5 MG: 5 TABLET, ORALLY DISINTEGRATING ORAL at 22:41

## 2023-12-13 RX ADMIN — BENZONATATE 100 MG: 100 CAPSULE ORAL at 15:26

## 2023-12-13 RX ADMIN — BENZONATATE 100 MG: 100 CAPSULE ORAL at 22:40

## 2023-12-13 RX ADMIN — DOCUSATE SODIUM 100 MG: 100 CAPSULE, LIQUID FILLED ORAL at 08:59

## 2023-12-13 RX ADMIN — BENZONATATE 100 MG: 100 CAPSULE ORAL at 08:59

## 2023-12-13 RX ADMIN — LORATADINE 10 MG: 10 TABLET ORAL at 08:59

## 2023-12-13 RX ADMIN — CYANOCOBALAMIN TAB 500 MCG 250 MCG: 500 TAB at 08:59

## 2023-12-13 NOTE — CASE MANAGEMENT
[facility] received request for authorization from Care Manager.  Authorization request for: [facility]  Facility Name:  [facility]   NPI: [id number]  Facility MD:  Dr. Roy   NPI: [id number]  Authorization initiated by contacting insurance:  Hanna  Via: [employer]   Clinicals submitted via: [employer] attachment   Pending Reference #: [id number]

## 2023-12-13 NOTE — CASE MANAGEMENT
Case Management Discharge Planning Note    Patient name Monika Butler  Location 2 [facility] [address] [place] [address] MRN [id number]  : 1940 Date 2023       Current Admission Date: 2023  Current Admission Diagnosis:Generalized weakness   Patient Active Problem List    Diagnosis Date Noted    Acute bronchiolitis 2023    Nondisplaced fracture of triquetrum (cuneiform) bone, left wrist, initial encounter for closed fracture 2023    Abrasion of left eyebrow 2023    Injury of left wrist 2023    Insomnia 2023    Anxiety 2023    Absolute anemia 2023    H/O clavicle fracture 2023    Meningioma (HCC) 2023    Pulmonary nodule 2023    Bad odor of urine 2023    Postmenopausal 2023    Prediabetes 2023    Gastroesophageal reflux disease with esophagitis without hemorrhage 2023    Generalized weakness 2022    Urinary frequency 2022    BMI 22.0-22.9, adult 2021    History of breast cancer 2021    Altered mental status 2020    Medicare annual wellness visit, subsequent 2020    Balance problems 2020    Dementia with behavioral disturbance (HCC)     Memory change 10/26/2020    Ear fullness, left 2017    Sinusitis 2017    Hyperlipidemia 2016    Breast cancer (HCC) 2013    Essential hypertension 2012      LOS (days): 4  Geometric Mean LOS (GMLOS) (days): 2.9  Days to GMLOS:-1.1     OBJECTIVE:  Risk of Unplanned Readmission Score: 21.98         Current admission status: Inpatient   Preferred Pharmacy:   [id number] - [address] [address]  Phone: [phone] Fax: [phone]    Primary Care Provider: Kristin Raymundo MD    [facility]  Secondary Insurance:     DISCHARGE DETAILS:    Discharge planning discussed with:: Dtr Anna  Freedom of Choice: Yes  Comments - Freedom of  Choice: Preference for [facility]  CM contacted family/caregiver?: Yes  Were Treatment Team discharge recommendations reviewed with patient/caregiver?: Yes  Did patient/caregiver verbalize understanding of patient care needs?: N/A- going to facility  Were patient/caregiver advised of the risks associated with not following Treatment Team discharge recommendations?: Yes    Contacts  Patient Contacts: Anna Butler (dtr)  Relationship to Patient:: Family  Contact Method: In Person  Phone Number: [phone]  Reason/Outcome: Discharge Planning    Requested Home Health Care         Is the patient interested in HHC at discharge?: No    DME Referral Provided  Referral made for DME?: No    Other Referral/Resources/Interventions Provided:  Interventions: SNF, Short Term Rehab  Referral Comments: CM met with Anna outside patient's room to discuss STR preference and Anna voiced preference for [facility] and stated that this was discussed with her sister Anitha and they both decided on [facility][1]. CM also informed Anna that CM spoke with JOSE ALEJANDRO Altamirano at [facility] and they do not have availability at present. Anna asked if patient could be added to their waiting list or somethin as they really want their mother to go there. CM informed Anna that CM will reach out to [facility] and request them. CM reserved [facility] in [facility] and tasked [employer] for authorization. CM called and spoke with JOSE ALEJANDRO Altamirano at [facility] who made CM aware that family can reach out to [facility] in [facility] on Friday to request being added to the waitlist. CM to follow up with dtr when insurance auth is approved and inform  her to contact Nanda at [facility].     Treatment Team Recommendation: Short Term Rehab  Discharge Destination Plan:: Short Term Rehab      Accepting Facility Name, City & State : [facility]  Receiving Facility/Agency Phone Number: [phone]  Facility/Agency Fax Number: [phone]

## 2023-12-13 NOTE — SPEECH THERAPY NOTE
Speech-Language Pathology Bedside Swallow Evaluation      Patient Name: Monika Butler    Today's Date: 12/13/2023     Problem List  Principal Problem:    Generalized weakness  Active Problems:    Essential hypertension    Dementia with behavioral disturbance (HCC)    Anxiety    Acute bronchiolitis      Past Medical History  Past Medical History:   Diagnosis Date    Acute bronchiolitis 12/12/2023    Allergic     Cancer (HCC) 2005    left breast mastectomy    Electrolyte abnormality 10/02/2022    Hypertension     Memory change     Nodule of left lung     Pleural thickening        Summary   Pt presented with functional appearing oral and pharyngeal stage swallowing skills with materials administered today (thin liquid, apple sauce, kraig cracker/cookie)    Recommended Diet: regular textured food and thin liquids   Recommended Form of Meds: as tolerated  Precautions: upright posture and slow rate of intake  Other Recommendations: Continue frequent oral care        Current Medical Status  Monika Butler is a 83 y.o. female with past medical history of dementia with behavioral disturbance, hypertension who was admitted 12/9 with c/o  generalized weakness, nonproductive cough and ambulatory difficulty.   Chest x-ray showed no acute cardiopulmonary disease and CTA head/neck demonstrated advanced chronic microvascular angiopathic changes.  Per daughter and son-in-law at bedside, patient is getting more difficult to be cared for at home hence, looking for dementia placement.   On 12/13, pt developed hypoxia ( likely secondary to copious secretions and congestion requiring suctioning)  Stat CXR with no acute cardiopulmonary disease. Stable opacification left lung apex.  Speech therapy for swallow evaluation  NPO until swallow eval    Current Precautions:  Fall & Aspiration    Allergies:  No known food allergies    Past medical history:  Please see H&P for details    Social/Education/Vocational Hx:  Pt lived with family; plan  is for admission to a care facility at this time    Swallow Information   Current Symptoms/Concerns: copious secretions and hypoxic episode reported  Current Diet: NPO   Baseline Diet: regular diet and thin liquids      Baseline Assessment   Behavior/Cognition: alert  Speech/Language Status: able to participate in basic conversation  Patient Positioning: upright in bed  Pain Status/Interventions/Response to Interventions:  No report of or nonverbal indications of pain.       Swallow Mechanism Exam  Facial: symmetrical  Labial: WFL  Lingual: WFL  Velum: symmetrical  Mandible: adequate ROM  Dentition: adequate  Vocal quality:clear/adequate   Volitional Cough: weak   Respiratory Status: on 1L O2      Consistencies Assessed and Performance   Consistencies Administered: thin liquids, puree, and soft solids    Oral Stage:   Mastication was adequate with the materials administered today.  Bolus formation and transfer were functional with no significant oral residue noted.  No overt s/s reduced oral control.    Pharyngeal Stage:   Swallow Mechanics:  Swallowing initiation appeared prompt.  Laryngeal rise was palpated and judged to be within functional limits.  No coughing, throat clearing, change in vocal quality or respiratory status noted today.     Esophageal Concerns: early satiety    Summary and Recommendations (see above)    Results Reviewed with: patient, RN, CRNP, and family     Treatment Recommended: x1 minimum (extend evaluation, ensure safe intake across larger sample size and r/o need for instrumental exam)    Thank you for this referral.  Please do not hesitate to contact me with any questions or concerns.    Vesta Dumont MSCCC  Speech Pathologist  [place] License [id number]  [place] License [id number]  Available via [employer]

## 2023-12-13 NOTE — TELEPHONE ENCOUNTER
I spoke with daughter she is going through a lot of changes with her mom , she  has some medication and other questions . She asked me if you could call her and speak with her for five minutes ? I tried to make an appt for her but she just need some advice. She said she doesn't care what time it is , please advise

## 2023-12-13 NOTE — PROGRESS NOTES
[facility]  Progress Note  Name: Monika Butler I  MRN: [id number]  Unit/Bed#: 2 [address] Date of Admission: 12/9/2023   Date of Service: 12/13/2023 I Hospital Day: 4    Assessment/Plan   Acute bronchiolitis  Assessment & Plan  Acute bronchitis as evidenced by cough congestion.    Chest x-ray was negative.    Received azithromycin 100 mg IV     Symptomatic treatment with Mucinex and Tessalon Perles.    Nebulizer as needed   12/13: Patient noted with hypoxia this morning likely secondary to copious secretions and congestion requiring suctioning  Stat CXR with no acute cardiopulmonary disease. Stable opacification left lung apex.  Speech therapy for swallow evaluation  NPO until swallow eval    Dementia with behavioral disturbance (HCC)  Assessment & Plan  Patient with history of dementia with behavioral disturbance and daughter reporting that agitation  has become increasingly frequent.  Discontinue Seroquel   Decrease Zyprexa to 2.5 mg at bedtime  Continue Aricept  Appreciate psych consultation  Discontinue 1: 1 continue observation  Start every 15 minutes safety checks in anticipation of discharge to rehab      * Generalized weakness  Assessment & Plan  Presented to ED with complaint of generalized weakness, nonproductive cough and ambulatory difficulty.  Patient was at PCP yesterday and was prescribed Augmentin twice daily.  With no improvement in cough overnight, daughter brought patient to ED for further evaluation.  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  (Other findings as noted in radiology report.)  Received benzonatate 100 mg x 1 dose, DuoNeb nebulization and 500 cc normal saline bolus.  UA with moderate leukocytosis and bacteria  Urine culture with mixed contaminants x 2  Received azithromycin IV and ceftriaxonex 2 doses  Will monitor off antibiotic  at this time   Fall precautions  PT/OT eval and treat  Consult CM for dispo planning        Essential hypertension  Assessment & Plan  SBP in the range of 165-194  Received labetalol 10 mg IV in ED  Continue losartan and metoprolol  Add hydralazine for SBP > 160  Monitor BP    Anxiety  Assessment & Plan  Discontinue Ativan and Seroquel  Appreciate psych consultation           VTE Pharmacologic Prophylaxis:   Moderate Risk (Score 3-4) - Pharmacological DVT Prophylaxis Ordered: enoxaparin (Lovenox).     Mobility:   Basic Mobility Inpatient Raw Score: 18  JH-HLM Goal: 6: Walk 10 steps or more  JH-HLM Achieved: 7: Walk 25 feet or more  HLM Goal achieved. Continue to encourage appropriate mobility.     Patient Centered Rounds: I performed bedside rounds with nursing staff today.   Discussions with Specialists or Other Care Team Provider: Nursing, Psych     Education and Discussions with Family / Patient: Updated  (daughter and son in law) at bedside.     Total Time Spent on Date of Encounter in care of patient: 35 mins. This time was spent on one or more of the following: performing physical exam; counseling and coordination of care; obtaining or reviewing history; documenting in the medical record; reviewing/ordering tests, medications or procedures; communicating with other healthcare professionals and discussing with patient's family/caregivers.     Current Length of Stay: 4 day(s)  Current Patient Status: Inpatient   Certification Statement: The patient will continue to require additional inpatient hospital stay due to rehab placement  Discharge Plan: Anticipate discharge in 24-48 hrs to discharge location to be determined pending rehab evaluations.     Code Status: Level 1 - Full Code      Subjective:   Patient seen and examined sitting up in bed and noted with hypoxia.  Suctioned for copious secretions and congestion with improvement.  Stat chest x-ray showed no acute cardiopulmonary disease.  Will  order speech therapy for  swallow eval.    Objective:     Vitals:   Temp (24hrs), Av.4 °F (36.9 °C), Min:97.9 °F (36.6 °C), Max:99.2 °F (37.3 °C)    Temp:  [97.9 °F (36.6 °C)-99.2 °F (37.3 °C)] 99.2 °F (37.3 °C)  HR:  [67-80] 78  Resp:  [16-18] 17  BP: (124-185)/(71-94) 138/77  SpO2:  [91 %-96 %] 91 %  Body mass index is 22.75 kg/m².     Input and Output Summary (last 24 hours):   No intake or output data in the 24 hours ending 23 1256    Physical Exam:   Physical Exam  Constitutional:       General: She is not in acute distress.  HENT:      Mouth/Throat:      Mouth: Mucous membranes are moist.   Cardiovascular:      Rate and Rhythm: Normal rate.      Heart sounds: Normal heart sounds.   Pulmonary:      Breath sounds: Normal breath sounds.   Abdominal:      General: Bowel sounds are normal.      Palpations: Abdomen is soft.   Musculoskeletal:      Cervical back: Normal range of motion.   Skin:     General: Skin is warm.   Neurological:      Mental Status: Mental status is at baseline.   Psychiatric:         Behavior: Behavior normal.          Additional Data:     Labs:  Results from last 7 days   Lab Units 23  0603 12/10/23  0457 23  1013   WBC Thousand/uL 5.06   < > 7.68   HEMOGLOBIN g/dL 12.5   < > 13.3   HEMATOCRIT % 37.7   < > 40.0   PLATELETS Thousands/uL 231   < > 225   NEUTROS PCT %  --   --  85*   LYMPHS PCT %  --   --  7*   MONOS PCT %  --   --  7   EOS PCT %  --   --  0    < > = values in this interval not displayed.     Results from last 7 days   Lab Units 12/10/23  0457 23  1013   SODIUM mmol/L 141 140   POTASSIUM mmol/L 3.5 3.3*   CHLORIDE mmol/L 103 100   CO2 mmol/L 30 33*   BUN mg/dL 16 19   CREATININE mg/dL 0.63 0.81   ANION GAP mmol/L 8 7   CALCIUM mg/dL 8.9 9.5   ALBUMIN g/dL  --  4.1   TOTAL BILIRUBIN mg/dL  --  0.60   ALK PHOS U/L  --  84   ALT U/L  --  15   AST U/L  --  21   GLUCOSE RANDOM mg/dL 78 124     Results from last 7 days   Lab Units 23  1013   INR   0.99                   Lines/Drains:  Invasive Devices       Peripheral Intravenous Line  Duration             Peripheral IV 12/09/23 Right Antecubital 4 days                          Imaging: Reviewed radiology reports from this admission including: chest xray    Recent Cultures (last 7 days):   Results from last 7 days   Lab Units 12/09/23  1359   URINE CULTURE  <10,000 cfu/ml       Last 24 Hours Medication List:   Current Facility-Administered Medications   Medication Dose Route Frequency Provider Last Rate    acetaminophen  650 mg Oral Q6H PRN Olayide D Olaniyan, CRNP      benzonatate  100 mg Oral TID Olayide D Olaniyan, CRNP      cholecalciferol  1,000 Units Oral Daily Olayide D Olaniyan, CRNP      cyanocobalamin  250 mcg Oral Daily Olayide D Olaniyan, CRNP      docusate sodium  100 mg Oral BID Olayide D Olaniyan, CRNP      donepezil  10 mg Oral HS Olayide D Olaniyan, CRNP      enoxaparin  40 mg Subcutaneous Daily Olayide D Olaniyan, CRNP      hydrALAZINE  5 mg Intravenous Q6H PRN Olayide D Olaniyan, CRNP      loratadine  10 mg Oral Daily Olayide D Olaniyan, CRNP      losartan  100 mg Oral Daily Olayide D Olaniyan, CRNP      melatonin  9 mg Oral HS Olayide D Olaniyan, CRNP      metoprolol tartrate  50 mg Oral BID Olayide D Olaniyan, CRNP      multivitamin-minerals  1 tablet Oral Daily Olayide D Olaniyan, CRNP      OLANZapine  2.5 mg Oral HS Olayide D Olaniyan, CRNP      ondansetron  4 mg Intravenous Q6H PRN Olayide D Olaniyan, CRNP      polyethylene glycol  17 g Oral Daily Olayide D Colette, JEFFREYNP          Today, Patient Was Seen By: DAMARIS Engel    **Please Note: This note may have been constructed using a voice recognition system.**

## 2023-12-13 NOTE — TELEPHONE ENCOUNTER
Pt daughter Anitha called and they had to reschedule her appt due to her still being in the hospital, pt daughter is also asking for callback to discuss some things with her case and she been following with them and just needs some medical advice. Please call her back to advise, thank you

## 2023-12-13 NOTE — ASSESSMENT & PLAN NOTE
Acute bronchitis as evidenced by cough congestion.    Chest x-ray was negative.    Received azithromycin 100 mg IV     Symptomatic treatment with Mucinex and Tessalon Perles.    Nebulizer as needed   12/13: Patient noted with hypoxia this morning likely secondary to copious secretions and congestion requiring suctioning  Stat CXR with no acute cardiopulmonary disease. Stable opacification left lung apex.  Speech therapy for swallow evaluation  NPO until swallow eval

## 2023-12-13 NOTE — CASE MANAGEMENT
Case Management Discharge Planning Note    Patient name Monika Butler  Location 2 [facility] [address] [address] MRN [id number]  : 1940 Date 2023       Current Admission Date: 2023  Current Admission Diagnosis:Generalized weakness   Patient Active Problem List    Diagnosis Date Noted    Nondisplaced fracture of triquetrum (cuneiform) bone, left wrist, initial encounter for closed fracture 2023    Abrasion of left eyebrow 2023    Injury of left wrist 2023    Insomnia 2023    Anxiety 2023    Absolute anemia 2023    H/O clavicle fracture 2023    Meningioma (HCC) 2023    Pulmonary nodule 2023    Bad odor of urine 2023    Postmenopausal 2023    Prediabetes 2023    Gastroesophageal reflux disease with esophagitis without hemorrhage 2023    Generalized weakness 2022    Urinary frequency 2022    BMI 22.0-22.9, adult 2021    History of breast cancer 2021    Altered mental status 2020    Medicare annual wellness visit, subsequent 2020    Balance problems 2020    Dementia with behavioral disturbance (HCC)     Memory change 10/26/2020    Ear fullness, left 2017    Sinusitis 2017    Hyperlipidemia 2016    Breast cancer (HCC) 2013    Essential hypertension 2012      LOS (days): 4  Geometric Mean LOS (GMLOS) (days): 2.9  Days to GMLOS:-1     OBJECTIVE:  Risk of Unplanned Readmission Score: 21.98         Current admission status: Inpatient   Preferred Pharmacy:   [id number] - [address] [address]  Phone: [phone] Fax: [phone]    Primary Care Provider: Kristin Raymundo MD    [facility]  Secondary Insurance:     DISCHARGE DETAILS:    Other Referral/Resources/Interventions Provided:  Referral Comments: CM called and left voicemail for dtr Anna requesting call back to  discuss str preferences. CM received call from Yesi from [facility] who informed CM that she does have bed available for patient and will be reaching out to dtr. CM to follow up with dtr.

## 2023-12-14 ENCOUNTER — TRANSITIONAL CARE MANAGEMENT (OUTPATIENT)
Dept: FAMILY MEDICINE CLINIC | Facility: CLINIC | Age: 83
End: 2023-12-14

## 2023-12-14 VITALS
HEART RATE: 69 BPM | RESPIRATION RATE: 17 BRPM | WEIGHT: 120.4 LBS | SYSTOLIC BLOOD PRESSURE: 152 MMHG | BODY MASS INDEX: 22.73 KG/M2 | TEMPERATURE: 98.5 F | DIASTOLIC BLOOD PRESSURE: 98 MMHG | OXYGEN SATURATION: 95 % | HEIGHT: 61 IN

## 2023-12-14 PROBLEM — K59.00 CONSTIPATION: Status: ACTIVE | Noted: 2023-12-14

## 2023-12-14 PROCEDURE — 97116 GAIT TRAINING THERAPY: CPT

## 2023-12-14 PROCEDURE — 92526 ORAL FUNCTION THERAPY: CPT

## 2023-12-14 PROCEDURE — 99239 HOSP IP/OBS DSCHRG MGMT >30: CPT

## 2023-12-14 PROCEDURE — 97530 THERAPEUTIC ACTIVITIES: CPT

## 2023-12-14 RX ORDER — OLANZAPINE 5 MG/1
2.5 TABLET, ORALLY DISINTEGRATING ORAL
Start: 2023-12-14

## 2023-12-14 RX ORDER — POLYETHYLENE GLYCOL 3350 17 G/17G
17 POWDER, FOR SOLUTION ORAL AS NEEDED
Start: 2023-12-14

## 2023-12-14 RX ORDER — DOCUSATE SODIUM 100 MG/1
100 CAPSULE, LIQUID FILLED ORAL 2 TIMES DAILY
Start: 2023-12-14

## 2023-12-14 RX ORDER — BENZONATATE 100 MG/1
100 CAPSULE ORAL 3 TIMES DAILY
Qty: 20 CAPSULE | Refills: 0
Start: 2023-12-14

## 2023-12-14 RX ADMIN — Medication 1 TABLET: at 08:38

## 2023-12-14 RX ADMIN — Medication 1000 UNITS: at 08:39

## 2023-12-14 RX ADMIN — METOPROLOL TARTRATE 50 MG: 50 TABLET, FILM COATED ORAL at 08:39

## 2023-12-14 RX ADMIN — ENOXAPARIN SODIUM 40 MG: 40 INJECTION SUBCUTANEOUS at 08:38

## 2023-12-14 RX ADMIN — LORATADINE 10 MG: 10 TABLET ORAL at 08:39

## 2023-12-14 RX ADMIN — BENZONATATE 100 MG: 100 CAPSULE ORAL at 08:39

## 2023-12-14 RX ADMIN — LOSARTAN POTASSIUM 100 MG: 50 TABLET, FILM COATED ORAL at 08:39

## 2023-12-14 RX ADMIN — CYANOCOBALAMIN TAB 500 MCG 250 MCG: 500 TAB at 08:39

## 2023-12-14 RX ADMIN — POLYETHYLENE GLYCOL 3350 17 G: 17 POWDER, FOR SOLUTION ORAL at 08:38

## 2023-12-14 RX ADMIN — DOCUSATE SODIUM 100 MG: 100 CAPSULE, LIQUID FILLED ORAL at 08:39

## 2023-12-14 NOTE — PLAN OF CARE
Problem: NEUROSENSORY - ADULT  Goal: Achieves stable or improved neurological status  Description: INTERVENTIONS  - Monitor and report changes in neurological status  - Monitor vital signs such as temperature, blood pressure, glucose, and any other labs ordered   - Initiate measures to prevent increased intracranial pressure  - Monitor for seizure activity and implement precautions if appropriate      Outcome: Progressing  Goal: Remains free of injury related to seizures activity  Description: INTERVENTIONS  - Maintain airway, patient safety  and administer oxygen as ordered  - Monitor patient for seizure activity, document and report duration and description of seizure to physician/advanced practitioner  - If seizure occurs,  ensure patient safety during seizure  - Reorient patient post seizure  - Seizure pads on all 4 side rails  - Instruct patient/family to notify RN of any seizure activity including if an aura is experienced  - Instruct patient/family to call for assistance with activity based on nursing assessment  - Administer anti-seizure medications if ordered    Outcome: Progressing  Goal: Achieves maximal functionality and self care  Description: INTERVENTIONS  - Monitor swallowing and airway patency with patient fatigue and changes in neurological status  - Encourage and assist patient to increase activity and self care.   - Encourage visually impaired, hearing impaired and aphasic patients to use assistive/communication devices  Outcome: Progressing

## 2023-12-14 NOTE — CASE MANAGEMENT
[facility] has received approved authorization from insurance:   [employer]  Called in by Rep: Gail MUNOZ#  [phone]  Authorization received for: [facility]  Facility: [facility]   Authorization #: [id number]   Start of Care: 12/14  Next Review Date: 12/18  Continued Stay Care Coordinator:  Vikki MUNOZ#: [phone]  Submit next review to: [phone]   Care Manager notified: Marybel Busby

## 2023-12-14 NOTE — PHYSICAL THERAPY NOTE
"   PT TREATMENT     12/14/23 1100   PT Last Visit   PT Visit Date 12/14/23   Note Type   Note Type Treatment   Pain Assessment   Pain Assessment Tool 0-10   Restrictions/Precautions   Other Precautions Cognitive;Fall Risk;Bed Alarm;Chair Alarm  ([place] on room air)   General   Chart Reviewed Yes   Family/Caregiver Present Yes  (Patient's daughter and son-in-law)   Cognition   Overall Cognitive Status Impaired   Arousal/Participation Cooperative   Attention Attends with cues to redirect   Orientation Level Oriented to person;Disoriented to place;Disoriented to time;Disoriented to situation   Following Commands Follows one step commands with increased time or repetition   Comments At least 2 patient identifiers including name and date of birth   Subjective   Subjective \"I have my sloth with me\"   Bed Mobility   Additional Comments Patient received out of bed in chair   Transfers   Sit to Stand 4  Minimal assistance   Additional items Armrests;Assist x 1;Verbal cues;Increased time required  (Cues for safe hand placement)   Stand to Sit 4  Minimal assistance   Additional items Armrests;Assist x 1;Verbal cues;Increased time required  (Cues for safe hand placement)   Toilet transfer 4  Minimal assistance  (To commode)   Additional items Armrests;Assist x 1;Verbal cues;Increased time required  (Patient is supervision for hygiene after urination)   Ambulation/Elevation   Gait pattern Foward flexed;Decreased foot clearance;Short stride;Step through pattern  (Generalized, multidirectional unsteadiness)   Gait Assistance 4  Minimal assist   Additional items Assist x 1;Verbal cues;Tactile cues;Not tested   Assistive Device None  (Handhold assist)   Distance 50 feet x 2 with change in direction and rest.  In between ambulation trials   Balance   Static Sitting Fair +   Static Standing   (Fair to occasional F-)   Ambulatory Fair -   Activity Tolerance   Activity Tolerance Patient limited by fatigue;Treatment limited secondary to " medical complications (Comment)  (Impaired cognition)   Assessment   Problem List Decreased strength;Impaired balance;Decreased mobility;Decreased coordination;Decreased cognition;Impaired judgement;Decreased safety awareness   Assessment Patient agreeable to PT services with increased encouragement.  Patient with good tolerance to transfers, hand-held ambulation, and functional mobility/toileting.  While admitted, patient will continue to benefit from skilled physical therapy services to prevent loss of strength, functional mobility and gait and to increase patient's endurance and balance.  When medically stable for discharge patient remains appropriate for Level II Moderate Resource Intensity.    The patient's [facility] Basic Mobility Inpatient Short Form Raw Score is 16. A Raw score of less than or equal to 16 suggests the patient may benefit from discharge to post-acute rehabilitation services. Please also refer to the recommendation of the Physical Therapist for safe discharge planning.   Plan   Treatment/Interventions ADL retraining;Functional transfer training;LE strengthening/ROM;Therapeutic exercise;Endurance training;Patient/family training;Equipment eval/education;Bed mobility;Gait training;Compensatory technique education;OT;Spoke to nursing;Family   PT Frequency Other (Comment)  (5x/wk)   Discharge Recommendation   Rehab Resource Intensity Level, PT II (Moderate Resource Intensity)   AM-PAC Basic Mobility Inpatient   Turning in Flat Bed Without Bedrails 3   Lying on Back to Sitting on Edge of Flat Bed Without Bedrails 3   Moving Bed to Chair 3   Standing Up From Chair Using Arms 3   Walk in Room 3   Climb 3-5 Stairs With Railing 1   Basic Mobility Inpatient Raw Score 16   Basic Mobility Standardized Score 38.32   Highest Level Of Mobility   JH-HLM Goal 5: Stand one or more mins   JH-HLM Achieved 7: Walk 25 feet or more   Education   Education Provided Mobility training   Patient Reinforcement needed   End  of Consult   Patient Position at End of Consult Bedside chair;Bed/Chair alarm activated;All needs within reach   Licensure   [place] License Number  Analy Singh, PT [id number]     Portions of the documentation may have been created using voice recognition software. Occasional wrong word or sound alike substitutions may have occurred due to the inherent limitation of the voice recognition software. Read the chart carefully and recognize, using context, where substitutions have occurred.

## 2023-12-14 NOTE — DISCHARGE SUMMARY
[facility]  Discharge- Monika Butler 1940, 83 y.o. female MRN: [id number]  Unit/Bed#: 2 [place] [address] Encounter: [id number]  Primary Care Provider: Kristin Raymundo MD   Date and time admitted to hospital: 12/9/2023  9:46 AM    Dementia with behavioral disturbance (HCC)  Assessment & Plan  Patient with history of dementia with behavioral disturbance and daughter reporting that agitation  has become increasingly frequent.  Discontinue Seroquel   Decrease Zyprexa to 2.5 mg at bedtime  Continue Aricept  Appreciate psych consultation  Discontinue 1: 1 continue observation  Start every 15 minutes safety checks in anticipation of discharge to rehab  Discharging to [facility]      * Generalized weakness  Assessment & Plan  Presented to ED with complaint of generalized weakness, nonproductive cough and ambulatory difficulty.  Patient was at PCP yesterday and was prescribed Augmentin twice daily.  With no improvement in cough overnight, daughter brought patient to ED for further evaluation.  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  (Other findings as noted in radiology report.)  Received benzonatate 100 mg x 1 dose, DuoNeb nebulization and 500 cc normal saline bolus.  UA with moderate leukocytosis and bacteria  Urine culture with mixed contaminants x 2  Received azithromycin IV and ceftriaxonex 2 doses  Will monitor off antibiotic at this time   Fall precautions  PT/OT eval and treat  Consult CM for dispo planning        Acute bronchiolitis  Assessment & Plan  Acute bronchitis as evidenced by cough congestion.    Chest x-ray was negative.    Received azithromycin 100 mg IV     Symptomatic treatment with Mucinex and Tessalon Perles.    Nebulizer as needed   12/13: Patient noted with hypoxia this morning likely secondary to copious secretions and congestion  requiring suctioning  Stat CXR with no acute cardiopulmonary disease. Stable opacification left lung apex.  Speech therapy for swallow evaluation  Resume previous diet . No swallowing difficulty per ST    Essential hypertension  Assessment & Plan  SBP in the range of 165-194  Received labetalol 10 mg IV in ED  Continue losartan and metoprolol  Add hydralazine for SBP > 160  Monitor BP    Anxiety  Assessment & Plan  Discontinue Ativan and Seroquel  Appreciate psych consultation        Medical Problems       Resolved Problems  Date Reviewed: 12/14/2023            Resolved    Electrolyte abnormality 12/10/2023     Resolved by  DAMARIS Engel        Discharging Physician / Practitioner: DAMARIS Engel  PCP: Kristin Raymundo MD  Admission Date:   Admission Orders (From admission, onward)       Ordered        12/09/23 1433  INPATIENT ADMISSION  Once                          Discharge Date: 12/14/23    Consultations During Hospital Stay:  Psych    Procedures Performed:   CXR:  No acute cardiopulmonary disease  CTA head / neck: Stable lobulated meningioma within the left posterior lateral middle cranial fossa, 1.3 cm in size. Atherosclerotic change with scattered calcifications. No stenosis or occlusion identified.     Significant Findings / Test Results:   As noted above      Reason for Admission: Upper respiratory infection, ambulatory dysfunction, dementia with behavioral disturbance    Hospital Course:   Monika Butler is a 83 y.o. female patient who originally presented to the hospital on 12/9/2023 due to above.  Chest x-ray showed no acute cardiopulmonary disease and CTA head/neck demonstrated findings as noted above.  Received in ED benzonatate 100 mg, DuoNeb nebulization and fibrosis and normal saline bolus and was admitted to medicine service.  UA with moderate leukocyte esterase and bacteria, urine culture with mixed contaminants x 2.  Received azithromycin IV and ceftriaxone x 2  "doses.  Patient usually takes Zyprexa 5 mg sublingual daily at home she was noted with episodes of agitation requiring administration of Zyprexa IM, one-to-one observation and subsequently virtual observation.  Psychiatry was consulted and decreased the dose of Zyprexa to 2.5 mg nightly.  12/13: Patient was started with echopraxia likely secondary to his copious secretions and congestion requiring suctioning.  Stat chest x-ray with no acute cardiopulmonary disease and speech therapy for swallow evaluation with no swallowing difficulty.  Patient is being discharged to [facility] for rehabilitation.      Please see above list of diagnoses and related plan for additional information.     Condition at Discharge: stable    Discharge Day Visit / Exam:       Subjective:   Seen and examined sitting in chair eating breakfast.  Alert and oriented to person and stated that she is at [facility] when asked.  No behavioral disturbance noted.  Denies any pain, nausea or vomiting.          Vitals: Blood Pressure: 152/98 (12/14/23 0749)  Pulse: 69 (12/14/23 0749)  Temperature: 98.5 °F (36.9 °C) (12/13/23 2239)  Temp Source: Oral (12/13/23 2039)  Respirations: 17 (12/13/23 2239)  Height: 5' 1\" (154.9 cm) (12/09/23 1641)  Weight - Scale: 54.6 kg (120 lb 6.4 oz) (12/09/23 1641)  SpO2: 95 % (12/14/23 0749)    Exam:   Physical Exam  Constitutional:       General: She is not in acute distress.  HENT:      Mouth/Throat:      Mouth: Mucous membranes are moist.   Cardiovascular:      Rate and Rhythm: Normal rate.      Heart sounds: Normal heart sounds.   Pulmonary:      Breath sounds: Normal breath sounds.   Abdominal:      General: Bowel sounds are normal.      Palpations: Abdomen is soft.   Musculoskeletal:      Cervical back: Normal range of motion.   Skin:     General: Skin is warm.   Neurological:      Mental Status: Mental status is at baseline.   Psychiatric:         Behavior: Behavior normal.    Discussion " with Family: Updated  (daughter and son in law) at bedside.    Discharge instructions/Information to patient and family:   See after visit summary for information provided to patient and family.      Provisions for Follow-Up Care:  See after visit summary for information related to follow-up care and any pertinent home health orders.      Mobility at time of Discharge:   Basic Mobility Inpatient Raw Score: 13  JH-HLM Goal: 4: Move to chair/commode  JH-HLM Achieved: 4: Move to chair/commode  HLM Goal achieved. Continue to encourage appropriate mobility.     Disposition:   Other Skilled Nursing Facility at [facility]    Planned Readmission:  No     Discharge Statement:  I spent 45 minutes discharging the patient. This time was spent on the day of discharge. I had direct contact with the patient on the day of discharge. Greater than 50% of the total time was spent examining patient, answering all patient questions, arranging and discussing plan of care with patient as well as directly providing post-discharge instructions.  Additional time then spent on discharge activities.    Discharge Medications:  See after visit summary for reconciled discharge medications provided to patient and/or family.      **Please Note: This note may have been constructed using a voice recognition system**

## 2023-12-14 NOTE — ASSESSMENT & PLAN NOTE
Patient with history of dementia with behavioral disturbance and daughter reporting that agitation  has become increasingly frequent.  Discontinue Seroquel   Decrease Zyprexa to 2.5 mg at bedtime  Continue Aricept  Appreciate psych consultation  Discontinue 1: 1 continue observation  Start every 15 minutes safety checks in anticipation of discharge to rehab  Discharging to [facility]

## 2023-12-14 NOTE — [place] UNIVERSAL TRANSFER FORM
"[place] UNIV[place]AL TRANSFER FORM  (ALL ITEMS MUST BE COMPLETED)    1. TRANSFER FROM: [facility]      TRANSFER TO: [facility]    2. DATE OF TRANSFER: 12/14/2023                        TIME OF TRANSFER: 230 pm    3. PATIENT NAME: Monika Butler E      YOB: 1940                             GENDER: female    4. LANGUAGE:   English    5. PHYSICIAN NAME:  Esthela Bland MD                   PHONE: [phone]    6. CODE STATUS: Level 1 - Full Code        Out of Hospital DNR Attached: No    7. :                                      :  Extended Emergency Contact Information  Primary Emergency Contact: [place] [address] [place]  Mobile Phone: [phone]  Relation: Daughter  Secondary Emergency Contact: Anna Butler   [place]  Mobile Phone: [phone]  Relation: Daughter           Health Care Representative/Proxy:  No           Legal Guardian:  No             NAME OF:           HEALTH CARE REPRESENTATIVE/PROXY:                                         OR           LEGAL GUARDIAN, IF NOT :                                               PHONE:  (Day)           (Night)                        (Cell)    8. REASON FOR TRANSFER: (Must include brief medical history and recent changes in physical function or cognition.) stable for discharge             V/S: /98   Pulse 69   Temp 98.5 °F (36.9 °C)   Resp 17   Ht 5' 1\" (1.549 m)   Wt 54.6 kg (120 lb 6.4 oz)   SpO2 95%   BMI 22.75 kg/m²           PAIN: None    9. PRIMARY DIAGNOSIS: Generalized weakness      Secondary Diagnosis:         Pacemaker: No      Internal Defib: No          Mental Health Diagnosis (if Applicable):    10. RESTRAINTS: No     11. RESPIRATORY NEEDS: None    12. ISOLATION/PRECAUTION: None    13. ALLERGY: Ibuprofen    14. SENSORY:       Vision Glasses    15. SKIN CONDITION: No Wounds    16. " DIET: Regular    17. IV ACCESS: None    18. PERSONAL ITEMS SENT WITH PATIENT: Glasses    19. ATTACHED DOCUMENTS: MUST ATTACH CURRENT MEDICATION INFORMATION Face Sheet, MAR, Medication Reconciliation, and Discharge Summary    20. AT RISK ALERTS:Falls and Aspiration        HARM TO: N/A    21. WEIGHT BEARING STATUS:         Left Leg: Full        Right Leg: Full    22. MENTAL STATUS:Forgetful and Disoriented    23. FUNCTION:        Walk: With Help        Transfer: With Help        Toilet: With Help        Feed: With Help    24. IMMUNIZATIONS/SCREENING:     Immunization History   Administered Date(s) Administered    COVID-19 MODERNA VACC 0.25 ML IM BOOSTER 02/18/2022    COVID-19 MODERNA VACC 0.5 ML IM 02/26/2021, 03/26/2021, 02/18/2022, 02/18/2022    Influenza Split High Dose Preservative Free IM 10/23/2014, 10/21/2015, 11/02/2016, 10/31/2017    Influenza, high dose seasonal 0.7 mL 12/13/2018, 10/01/2019, 10/26/2020, 02/02/2022, 10/21/2022    Influenza, seasonal, injectable 10/25/2011    Pneumococcal Conjugate Vaccine 20-valent (Pcv20), Polysace 09/28/2022    Pneumococcal Polysaccharide PPV23 10/01/2010       25. BOWEL: Incontinent     26. BLADDER: Incontinent    27. SENDING FAC[facility] CONTACT[facility]                   Title: hospital        Unit: [address] [place] [address]         Phone: [phone]          REC'G FACILITY CONTACT (if known):        Title:        Unit:         Phone:         FORM PREFILLED BY (if applicable)       Title:       Unit:        Phone:         FORM COMPLETED BY Carmencita Arredondo RN      Title: [facility]      Phone: [phone]

## 2023-12-14 NOTE — CASE MANAGEMENT
Case Management Discharge Planning Note    Patient name Monika Butler  Location 2 [facility] 216/2 [facility] 216 MRN [id number]  : 1940 Date 2023       Current Admission Date: 2023  Current Admission Diagnosis:Generalized weakness   Patient Active Problem List    Diagnosis Date Noted    Acute bronchiolitis 2023    Nondisplaced fracture of triquetrum (cuneiform) bone, left wrist, initial encounter for closed fracture 2023    Abrasion of left eyebrow 2023    Injury of left wrist 2023    Insomnia 2023    Anxiety 2023    Absolute anemia 2023    H/O clavicle fracture 2023    Meningioma (HCC) 2023    Pulmonary nodule 2023    Bad odor of urine 2023    Postmenopausal 2023    Prediabetes 2023    Gastroesophageal reflux disease with esophagitis without hemorrhage 2023    Generalized weakness 2022    Urinary frequency 2022    BMI 22.0-22.9, adult 2021    History of breast cancer 2021    Altered mental status 2020    Medicare annual wellness visit, subsequent 2020    Balance problems 2020    Dementia with behavioral disturbance (HCC)     Memory change 10/26/2020    Ear fullness, left 2017    Sinusitis 2017    Hyperlipidemia 2016    Breast cancer (HCC) 2013    Essential hypertension 2012      LOS (days): 5  Geometric Mean LOS (GMLOS) (days): 2.9  Days to GMLOS:-1.8     OBJECTIVE:  Risk of Unplanned Readmission Score: 22.23         Current admission status: Inpatient   Preferred Pharmacy:   [id number] - [address] [address]  Phone: [phone] Fax: [phone]    Primary Care Provider: Kristin Raymundo MD    Primary Insurance: [employer]  REP  Secondary Insurance:     DISCHARGE DETAILS:                                                                                                                Facility Insurance Auth Number: [id number]

## 2023-12-14 NOTE — ASSESSMENT & PLAN NOTE
Acute bronchitis as evidenced by cough congestion.    Chest x-ray was negative.    Received azithromycin 100 mg IV     Symptomatic treatment with Mucinex and Tessalon Perles.    Nebulizer as needed   12/13: Patient noted with hypoxia this morning likely secondary to copious secretions and congestion requiring suctioning  Stat CXR with no acute cardiopulmonary disease. Stable opacification left lung apex.  Speech therapy for swallow evaluation  Resume previous diet . No swallowing difficulty per ST

## 2023-12-14 NOTE — CASE MANAGEMENT
Case Management Discharge Planning Note    Patient name Monika Butler  Location 2 [facility] 216/2 [facility] 216 MRN [id number]  : 1940 Date 2023       Current Admission Date: 2023  Current Admission Diagnosis:Generalized weakness   Patient Active Problem List    Diagnosis Date Noted    Constipation 2023    Acute bronchiolitis 2023    Nondisplaced fracture of triquetrum (cuneiform) bone, left wrist, initial encounter for closed fracture 2023    Abrasion of left eyebrow 2023    Injury of left wrist 2023    Insomnia 2023    Anxiety 2023    Absolute anemia 2023    H/O clavicle fracture 2023    Meningioma (HCC) 2023    Pulmonary nodule 2023    Bad odor of urine 2023    Postmenopausal 2023    Prediabetes 2023    Gastroesophageal reflux disease with esophagitis without hemorrhage 2023    Generalized weakness 2022    Urinary frequency 2022    BMI 22.0-22.9, adult 2021    History of breast cancer 2021    Altered mental status 2020    Medicare annual wellness visit, subsequent 2020    Balance problems 2020    Dementia with behavioral disturbance (HCC)     Memory change 10/26/2020    Ear fullness, left 2017    Sinusitis 2017    Hyperlipidemia 2016    Breast cancer (HCC) 2013    Essential hypertension 2012      LOS (days): 5  Geometric Mean LOS (GMLOS) (days): 2.9  Days to GMLOS:-2     OBJECTIVE:  Risk of Unplanned Readmission Score: 22.94         Current admission status: Inpatient   Preferred Pharmacy:   [id number] - [address] [address]  Phone: [phone] Fax: [phone]    Primary Care Provider: Kristin Raymundo MD    [facility]  Secondary Insurance:     DISCHARGE DETAILS:     Other Referral/Resources/Interventions Provided:  Interventions:  Transportation  Referral Comments: CM requested BLS transport via [employer] for 1330 and  confirmed by [facility] for 1430. Medical team, dtr Anna Saenz at [facility] made aware. of the same.    Treatment Team Recommendation: Short Term Rehab  Discharge Destination Plan:: Short Term Rehab ([facility])  Transport at Discharge : hospitals Ambulance  Dispatcher Contacted: Yes  Number/Name of Dispatcher: [employer]  Transported by (Company and Unit #): HANNAH  [phone]  ETA of Transport (Date): 12/14/23  ETA of Transport (Time): 1430

## 2023-12-14 NOTE — CASE MANAGEMENT
Case Management Discharge Planning Note    Patient name Monika Butler  Location 2 [facility] 216/2 [place] [address] MRN [id number]  : 1940 Date 2023       Current Admission Date: 2023  Current Admission Diagnosis:Generalized weakness   Patient Active Problem List    Diagnosis Date Noted    Constipation 2023    Acute bronchiolitis 2023    Nondisplaced fracture of triquetrum (cuneiform) bone, left wrist, initial encounter for closed fracture 2023    Abrasion of left eyebrow 2023    Injury of left wrist 2023    Insomnia 2023    Anxiety 2023    Absolute anemia 2023    H/O clavicle fracture 2023    Meningioma (HCC) 2023    Pulmonary nodule 2023    Bad odor of urine 2023    Postmenopausal 2023    Prediabetes 2023    Gastroesophageal reflux disease with esophagitis without hemorrhage 2023    Generalized weakness 2022    Urinary frequency 2022    BMI 22.0-22.9, adult 2021    History of breast cancer 2021    Altered mental status 2020    Medicare annual wellness visit, subsequent 2020    Balance problems 2020    Dementia with behavioral disturbance (HCC)     Memory change 10/26/2020    Ear fullness, left 2017    Sinusitis 2017    Hyperlipidemia 2016    Breast cancer (HCC) 2013    Essential hypertension 2012      LOS (days): 5  Geometric Mean LOS (GMLOS) (days): 2.9  Days to GMLOS:-2.1     OBJECTIVE:  Risk of Unplanned Readmission Score: 22.94         Current admission status: Inpatient   Preferred [facility]acy:   [id number] - [address] [address]  Phone: [phone] Fax: [phone]    Primary Care Provider: Kristin Raymundo MD    [facility]  Secondary Insurance:     DISCHARGE DETAILS:       IMM Given (Date):: 23  IMM Given to:: Family      IMM reviewed  with patient's caregiver, patient's caregiver agrees with discharge determination. Original placed in scan bin and a copy was given to the daughter.

## 2023-12-14 NOTE — SPEECH THERAPY NOTE
SLP Progress Note    Patient Name: Monika Butler  Today's Date: 12/14/2023     Subjective:  -  Objective:  F/U to swallowing evaluation continued (last night and this pm).  Last pm, I met with dtr & son-in-law.  They witnessed some oral intake (thin liquid, kraig crackers).  No s/s aspiration with that intake & dtr denied awareness of h/o dysphagia (dtr who does NOT live next to pt).  I contacted CRNP and obtained order to resume diet (and relayed menu selections for pm meal).  This am, I arrived at end of meal.  Pt had consumed 25% of scrambled eggs, 100% of cut fresh fruit cup and thin liquid.  No c/o or reported s/s dysphagia or aspiration during meal.  After meal, pt beginning with phlegm production.     Plan/Recommendations:  Continue as per MD.     Vesta Dumont MS CCC-SLP  [place] License [id number]

## 2023-12-19 NOTE — UTILIZATION REVIEW
NOTIFICATION OF ADMISSION DISCHARGE   This is a Notification of Discharge from [facility]. Please be advised that this patient has been discharge from our facility. Below you will find the admission and discharge date and time including the patient’s disposition.   UTILIZATION REVIEW CONTACT:  Joan Holm  Utilization   Network Utilization Review Department  Phone: [phone] x carefully listen to the prompts. All voicemails are confidential.  Email: [email or web address]     ADMISSION INFORMATION  PRESENTATION DATE: 12/9/2023  9:46 AM  OBERVATION ADMISSION DATE:   INPATIENT ADMISSION DATE: 12/9/23  2:33 PM   DISCHARGE DATE: 12/14/2023  3:24 PM   [facility]    Network Utilization Review Department  ATTENTION: Please call with any questions or concerns to [phone] and carefully listen to the prompts so that you are directed to the right person. All voicemails are confidential.   For Discharge needs, contact Care Management DC Support Team at [phone] opt. [phone]  Send all requests for admission clinical reviews, approved or denied determinations and any other requests to dedicated fax number below belonging to the campus where the patient is receiving treatment. List of dedicated fax numbers for the Facilities:  FACILITY NAME UR FAX NUMBER   ADMISSION DENIALS (Administrative/Medical Necessity) [phone]   DISCHARGE SUPPORT TEAM ([facility]) [phone]   PARENT CHILD HEALTH (Maternity/NICU/Pediatrics) [phone]   [facility] [phone]   [facility] [phone]   [facility] [phone]   [facility] [phone]   [facility] [phone]   [facility] [phone]   [facility] [phone]   [facility]  [phone]   [facility] [phone]   [facility] [phone]   [facility] [phone]

## 2024-01-01 ENCOUNTER — HOME CARE VISIT (OUTPATIENT)
Dept: HOME HEALTH SERVICES | Facility: HOME HEALTHCARE | Age: 84
End: 2024-01-01
Payer: MEDICARE

## 2024-01-01 ENCOUNTER — HOME CARE VISIT (OUTPATIENT)
Dept: HOME HOSPICE | Facility: HOSPICE | Age: 84
End: 2024-01-01
Payer: MEDICARE

## 2024-01-01 ENCOUNTER — HOME CARE VISIT (OUTPATIENT)
Dept: HOME HEALTH SERVICES | Facility: HOME HEALTHCARE | Age: 84
End: 2024-01-01

## 2024-01-01 VITALS — RESPIRATION RATE: 20 BRPM | HEART RATE: 92 BPM

## 2024-01-01 VITALS — HEART RATE: 90 BPM | OXYGEN SATURATION: 94 % | TEMPERATURE: 98.6 F | RESPIRATION RATE: 18 BRPM

## 2024-01-01 VITALS — DIASTOLIC BLOOD PRESSURE: 90 MMHG | RESPIRATION RATE: 18 BRPM | HEART RATE: 80 BPM | SYSTOLIC BLOOD PRESSURE: 150 MMHG

## 2024-01-01 VITALS — HEART RATE: 58 BPM | RESPIRATION RATE: 15 BRPM

## 2024-01-01 VITALS — HEART RATE: 101 BPM | RESPIRATION RATE: 16 BRPM

## 2024-01-01 PROCEDURE — G0156 HHCP-SVS OF AIDE,EA 15 MIN: HCPCS

## 2024-01-01 PROCEDURE — G0299 HHS/HOSPICE OF RN EA 15 MIN: HCPCS

## 2024-01-01 PROCEDURE — G0155 HHCP-SVS OF CSW,EA 15 MIN: HCPCS

## 2024-01-02 ENCOUNTER — HOSPITAL ENCOUNTER (INPATIENT)
Facility: HOSPITAL | Age: 84
LOS: 18 days | Discharge: NURSING, REHAB OR LONG-TERM CARE | DRG: 884 | End: 2024-01-21
Attending: EMERGENCY MEDICINE | Admitting: INTERNAL MEDICINE
Payer: COMMERCIAL

## 2024-01-02 DIAGNOSIS — F03.918 DEMENTIA WITH BEHAVIORAL DISTURBANCE (HCC): ICD-10-CM

## 2024-01-02 DIAGNOSIS — R41.82 ALTERED MENTAL STATUS: Primary | ICD-10-CM

## 2024-01-02 DIAGNOSIS — I10 ESSENTIAL HYPERTENSION: ICD-10-CM

## 2024-01-02 DIAGNOSIS — F03.90 DEMENTIA (HCC): ICD-10-CM

## 2024-01-02 DIAGNOSIS — F41.9 ANXIETY: ICD-10-CM

## 2024-01-02 DIAGNOSIS — R79.89 ELEVATED TROPONIN: ICD-10-CM

## 2024-01-02 DIAGNOSIS — R94.31 ABNORMAL EKG: ICD-10-CM

## 2024-01-02 PROBLEM — E87.6 HYPOKALEMIA: Status: ACTIVE | Noted: 2024-01-02

## 2024-01-02 PROBLEM — R82.90 ABNORMAL URINALYSIS: Status: ACTIVE | Noted: 2024-01-02

## 2024-01-02 LAB
ALBUMIN SERPL BCP-MCNC: 4 G/DL (ref 3.5–5)
ALP SERPL-CCNC: 69 U/L (ref 34–104)
ALT SERPL W P-5'-P-CCNC: 30 U/L (ref 7–52)
AMORPH PHOS CRY URNS QL MICRO: ABNORMAL /HPF
ANION GAP SERPL CALCULATED.3IONS-SCNC: 9 MMOL/L
AST SERPL W P-5'-P-CCNC: 26 U/L (ref 13–39)
BACTERIA UR QL AUTO: ABNORMAL /HPF
BASOPHILS # BLD AUTO: 0.05 THOUSANDS/ÂΜL (ref 0–0.1)
BASOPHILS NFR BLD AUTO: 1 % (ref 0–1)
BILIRUB SERPL-MCNC: 0.46 MG/DL (ref 0.2–1)
BILIRUB UR QL STRIP: NEGATIVE
BUN SERPL-MCNC: 17 MG/DL (ref 5–25)
CALCIUM SERPL-MCNC: 9 MG/DL (ref 8.4–10.2)
CHLORIDE SERPL-SCNC: 104 MMOL/L (ref 96–108)
CLARITY UR: ABNORMAL
CO2 SERPL-SCNC: 31 MMOL/L (ref 21–32)
COLOR UR: YELLOW
CREAT SERPL-MCNC: 0.77 MG/DL (ref 0.6–1.3)
EOSINOPHIL # BLD AUTO: 0.19 THOUSAND/ÂΜL (ref 0–0.61)
EOSINOPHIL NFR BLD AUTO: 4 % (ref 0–6)
ERYTHROCYTE [DISTWIDTH] IN BLOOD BY AUTOMATED COUNT: 13 % (ref 11.6–15.1)
FLUAV RNA RESP QL NAA+PROBE: NEGATIVE
FLUBV RNA RESP QL NAA+PROBE: NEGATIVE
GFR SERPL CREATININE-BSD FRML MDRD: 71 ML/MIN/1.73SQ M
GLUCOSE SERPL-MCNC: 85 MG/DL (ref 65–140)
GLUCOSE SERPL-MCNC: 87 MG/DL (ref 65–140)
GLUCOSE UR STRIP-MCNC: NEGATIVE MG/DL
HCT VFR BLD AUTO: 32.5 % (ref 34.8–46.1)
HGB BLD-MCNC: 10.6 G/DL (ref 11.5–15.4)
HGB UR QL STRIP.AUTO: NEGATIVE
IMM GRANULOCYTES # BLD AUTO: 0.01 THOUSAND/UL (ref 0–0.2)
IMM GRANULOCYTES NFR BLD AUTO: 0 % (ref 0–2)
KETONES UR STRIP-MCNC: ABNORMAL MG/DL
LEUKOCYTE ESTERASE UR QL STRIP: ABNORMAL
LYMPHOCYTES # BLD AUTO: 1.39 THOUSANDS/ÂΜL (ref 0.6–4.47)
LYMPHOCYTES NFR BLD AUTO: 31 % (ref 14–44)
MAGNESIUM SERPL-MCNC: 2 MG/DL (ref 1.9–2.7)
MCH RBC QN AUTO: 32.9 PG (ref 26.8–34.3)
MCHC RBC AUTO-ENTMCNC: 32.6 G/DL (ref 31.4–37.4)
MCV RBC AUTO: 101 FL (ref 82–98)
MONOCYTES # BLD AUTO: 0.59 THOUSAND/ÂΜL (ref 0.17–1.22)
MONOCYTES NFR BLD AUTO: 13 % (ref 4–12)
NEUTROPHILS # BLD AUTO: 2.29 THOUSANDS/ÂΜL (ref 1.85–7.62)
NEUTS SEG NFR BLD AUTO: 51 % (ref 43–75)
NITRITE UR QL STRIP: NEGATIVE
NON-SQ EPI CELLS URNS QL MICRO: ABNORMAL /HPF
NRBC BLD AUTO-RTO: 0 /100 WBCS
PH UR STRIP.AUTO: 7 [PH]
PLATELET # BLD AUTO: 251 THOUSANDS/UL (ref 149–390)
PMV BLD AUTO: 10.4 FL (ref 8.9–12.7)
POTASSIUM SERPL-SCNC: 3 MMOL/L (ref 3.5–5.3)
PROT SERPL-MCNC: 7.2 G/DL (ref 6.4–8.4)
PROT UR STRIP-MCNC: ABNORMAL MG/DL
RBC # BLD AUTO: 3.22 MILLION/UL (ref 3.81–5.12)
RBC #/AREA URNS AUTO: ABNORMAL /HPF
RSV RNA RESP QL NAA+PROBE: NEGATIVE
SARS-COV-2 RNA RESP QL NAA+PROBE: NEGATIVE
SODIUM SERPL-SCNC: 144 MMOL/L (ref 135–147)
SP GR UR STRIP.AUTO: 1.02 (ref 1–1.03)
UROBILINOGEN UR QL STRIP.AUTO: 0.2 E.U./DL
WBC # BLD AUTO: 4.52 THOUSAND/UL (ref 4.31–10.16)
WBC #/AREA URNS AUTO: ABNORMAL /HPF

## 2024-01-02 PROCEDURE — 81001 URINALYSIS AUTO W/SCOPE: CPT | Performed by: EMERGENCY MEDICINE

## 2024-01-02 PROCEDURE — 0241U HB NFCT DS VIR RESP RNA 4 TRGT: CPT | Performed by: INTERNAL MEDICINE

## 2024-01-02 PROCEDURE — 87081 CULTURE SCREEN ONLY: CPT | Performed by: INTERNAL MEDICINE

## 2024-01-02 PROCEDURE — 80053 COMPREHEN METABOLIC PANEL: CPT | Performed by: EMERGENCY MEDICINE

## 2024-01-02 PROCEDURE — 87086 URINE CULTURE/COLONY COUNT: CPT | Performed by: EMERGENCY MEDICINE

## 2024-01-02 PROCEDURE — 85025 COMPLETE CBC W/AUTO DIFF WBC: CPT | Performed by: EMERGENCY MEDICINE

## 2024-01-02 PROCEDURE — 83735 ASSAY OF MAGNESIUM: CPT | Performed by: EMERGENCY MEDICINE

## 2024-01-02 PROCEDURE — 36415 COLL VENOUS BLD VENIPUNCTURE: CPT | Performed by: EMERGENCY MEDICINE

## 2024-01-02 PROCEDURE — 82948 REAGENT STRIP/BLOOD GLUCOSE: CPT

## 2024-01-02 PROCEDURE — 99285 EMERGENCY DEPT VISIT HI MDM: CPT

## 2024-01-02 PROCEDURE — 99223 1ST HOSP IP/OBS HIGH 75: CPT | Performed by: INTERNAL MEDICINE

## 2024-01-02 PROCEDURE — 99285 EMERGENCY DEPT VISIT HI MDM: CPT | Performed by: EMERGENCY MEDICINE

## 2024-01-02 RX ORDER — CEFTRIAXONE 1 G/50ML
1000 INJECTION, SOLUTION INTRAVENOUS EVERY 24 HOURS
Status: DISPENSED | OUTPATIENT
Start: 2024-01-02 | End: 2024-01-05

## 2024-01-02 RX ORDER — ENOXAPARIN SODIUM 100 MG/ML
40 INJECTION SUBCUTANEOUS DAILY
Status: DISCONTINUED | OUTPATIENT
Start: 2024-01-03 | End: 2024-01-04

## 2024-01-02 RX ORDER — DONEPEZIL HYDROCHLORIDE 5 MG/1
10 TABLET, FILM COATED ORAL
Status: DISCONTINUED | OUTPATIENT
Start: 2024-01-02 | End: 2024-01-08

## 2024-01-02 RX ORDER — OLANZAPINE 5 MG/1
2.5 TABLET, ORALLY DISINTEGRATING ORAL
Status: DISCONTINUED | OUTPATIENT
Start: 2024-01-02 | End: 2024-01-02

## 2024-01-02 RX ORDER — MELATONIN
1000 DAILY
Status: DISCONTINUED | OUTPATIENT
Start: 2024-01-03 | End: 2024-01-21 | Stop reason: HOSPADM

## 2024-01-02 RX ORDER — OLANZAPINE 2.5 MG/1
2.5 TABLET, FILM COATED ORAL DAILY
Status: DISCONTINUED | OUTPATIENT
Start: 2024-01-03 | End: 2024-01-03

## 2024-01-02 RX ORDER — METOPROLOL TARTRATE 50 MG/1
50 TABLET, FILM COATED ORAL 2 TIMES DAILY
Status: DISCONTINUED | OUTPATIENT
Start: 2024-01-03 | End: 2024-01-05

## 2024-01-02 RX ORDER — BUSPIRONE HYDROCHLORIDE 5 MG/1
5 TABLET ORAL 2 TIMES DAILY
Status: DISCONTINUED | OUTPATIENT
Start: 2024-01-02 | End: 2024-01-02

## 2024-01-02 RX ORDER — LOSARTAN POTASSIUM 50 MG/1
100 TABLET ORAL ONCE
Status: COMPLETED | OUTPATIENT
Start: 2024-01-02 | End: 2024-01-02

## 2024-01-02 RX ORDER — HYDRALAZINE HYDROCHLORIDE 20 MG/ML
10 INJECTION INTRAMUSCULAR; INTRAVENOUS EVERY 6 HOURS PRN
Status: DISCONTINUED | OUTPATIENT
Start: 2024-01-02 | End: 2024-01-05

## 2024-01-02 RX ORDER — METOPROLOL TARTRATE 50 MG/1
50 TABLET, FILM COATED ORAL ONCE
Status: COMPLETED | OUTPATIENT
Start: 2024-01-02 | End: 2024-01-02

## 2024-01-02 RX ORDER — LORAZEPAM 2 MG/ML
1 INJECTION INTRAMUSCULAR EVERY 6 HOURS PRN
Status: DISCONTINUED | OUTPATIENT
Start: 2024-01-02 | End: 2024-01-03

## 2024-01-02 RX ORDER — PHENOL 1.4 %
10 AEROSOL, SPRAY (ML) MUCOUS MEMBRANE
Status: DISCONTINUED | OUTPATIENT
Start: 2024-01-02 | End: 2024-01-02

## 2024-01-02 RX ORDER — LOSARTAN POTASSIUM 50 MG/1
100 TABLET ORAL DAILY
Status: DISCONTINUED | OUTPATIENT
Start: 2024-01-03 | End: 2024-01-21 | Stop reason: HOSPADM

## 2024-01-02 RX ORDER — ONDANSETRON 2 MG/ML
4 INJECTION INTRAMUSCULAR; INTRAVENOUS EVERY 6 HOURS PRN
Status: DISCONTINUED | OUTPATIENT
Start: 2024-01-02 | End: 2024-01-21 | Stop reason: HOSPADM

## 2024-01-02 RX ORDER — DOCUSATE SODIUM 100 MG/1
100 CAPSULE, LIQUID FILLED ORAL 2 TIMES DAILY
Status: DISCONTINUED | OUTPATIENT
Start: 2024-01-02 | End: 2024-01-21 | Stop reason: HOSPADM

## 2024-01-02 RX ORDER — LANOLIN ALCOHOL/MO/W.PET/CERES
6 CREAM (GRAM) TOPICAL
Status: DISCONTINUED | OUTPATIENT
Start: 2024-01-02 | End: 2024-01-08

## 2024-01-02 RX ORDER — OLANZAPINE 2.5 MG/1
5 TABLET, FILM COATED ORAL
Status: DISCONTINUED | OUTPATIENT
Start: 2024-01-02 | End: 2024-01-03

## 2024-01-02 RX ORDER — ACETAMINOPHEN 325 MG/1
650 TABLET ORAL EVERY 6 HOURS PRN
Status: DISCONTINUED | OUTPATIENT
Start: 2024-01-02 | End: 2024-01-21 | Stop reason: HOSPADM

## 2024-01-02 RX ORDER — POTASSIUM CHLORIDE 20 MEQ/1
40 TABLET, EXTENDED RELEASE ORAL ONCE
Status: COMPLETED | OUTPATIENT
Start: 2024-01-02 | End: 2024-01-02

## 2024-01-02 RX ORDER — BUSPIRONE HYDROCHLORIDE 5 MG/1
5 TABLET ORAL 2 TIMES DAILY
COMMUNITY

## 2024-01-02 RX ADMIN — CEFTRIAXONE 1000 MG: 1 INJECTION, SOLUTION INTRAVENOUS at 18:44

## 2024-01-02 RX ADMIN — METOPROLOL TARTRATE 50 MG: 50 TABLET, FILM COATED ORAL at 15:39

## 2024-01-02 RX ADMIN — HYDRALAZINE HYDROCHLORIDE 10 MG: 20 INJECTION INTRAMUSCULAR; INTRAVENOUS at 18:13

## 2024-01-02 RX ADMIN — LOSARTAN POTASSIUM 100 MG: 50 TABLET, FILM COATED ORAL at 15:39

## 2024-01-02 RX ADMIN — OLANZAPINE 5 MG: 2.5 TABLET, FILM COATED ORAL at 20:47

## 2024-01-02 RX ADMIN — LORAZEPAM 1 MG: 2 INJECTION INTRAMUSCULAR; INTRAVENOUS at 20:48

## 2024-01-02 RX ADMIN — POTASSIUM CHLORIDE 40 MEQ: 1500 TABLET, EXTENDED RELEASE ORAL at 16:18

## 2024-01-02 NOTE — ED PROVIDER NOTES
History  Chief Complaint   Patient presents with    Altered Mental Status     For several months has had increasing AMS. Since yesterday she has become aggressive and more confused.      HPI    83-year-old female with medical history significant for memory change followed by Dr. Greco in psychology at [facility] until recently when she was transferred to [facility] for memory care on December 28, 2023.  The facility reports that she has been increasingly violent making threats of shooting herself or others and smashing bugs generally being uncontrollable.  They stated that she is approaching or at the level of needing a one-to-one care which they cannot provide at this time.  They were unable to provide much speculation as to the origin of the change in behavior but did suggest possible urinary tract infection, they also state that the family is concerned about the patient's blood pressure.  They reviewed medication list to include BuSpar, losartan, metoprolol, Zyprexa, olanzapine.  Patient's son who works for local law enforcement states that his mother has had an acute change in the past few months and has been increasingly agitated.  Family agrees with the need for further evaluation for possible other causes of his altered mental status.  They also raise the question about medication compliance.    Prior to Admission Medications   Prescriptions Last Dose Informant Patient Reported? Taking?   Ascorbic Acid (Vitamin C) 250 MG CHEW Unknown  Yes No   Melatonin 10 MG TABS Unknown  No No   Sig: Take 1 tablet (10 mg total) by mouth daily at bedtime   Multiple Vitamins-Minerals (ONE-A-DAY 50 PLUS PO) Unknown  Yes No   OLANZapine (ZyPREXA ZYDIS) 5 mg dispersible tablet 1/2/2024  No Yes   Sig: Take 0.5 tablets (2.5 mg total) by mouth daily at bedtime   benzonatate (TESSALON PERLES) 100 mg capsule Unknown  No No   Sig: Take 1 capsule (100 mg total) by mouth 3 (three) times a day   busPIRone (BUSPAR) 5 mg tablet  1/2/2024  Yes Yes   Sig: Take 5 mg by mouth 2 (two) times a day   cholecalciferol (VITAMIN D3) 1,000 units tablet 1/2/2024  Yes Yes   Sig: Take 1,000 Units by mouth daily   cyanocobalamin (VITAMIN B-12) 100 mcg tablet 1/2/2024  Yes Yes   Sig: Take by mouth daily   docusate sodium (COLACE) 100 mg capsule Unknown  No No   Sig: Take 1 capsule (100 mg total) by mouth 2 (two) times a day   donepezil (ARICEPT) 10 mg tablet Unknown  No No   Sig: Take 1 tablet (10 mg total) by mouth daily at bedtime   loratadine (CLARITIN) 10 mg tablet Unknown  Yes No   Sig: Take 10 mg by mouth daily   losartan (Cozaar) 100 MG tablet Unknown  No No   Sig: Take 1 tablet (100 mg total) by mouth daily   metoprolol tartrate (LOPRESSOR) 50 mg tablet 1/1/2024  No Yes   Sig: take 1 tablet by mouth twice a day   polyethylene glycol (MIRALAX) 17 g packet Unknown  No No   Sig: Take 17 g by mouth if needed (constipation)      Facility-Administered Medications: None       Past Medical History:   Diagnosis Date    Acute bronchiolitis 12/12/2023    Allergic     Cancer (HCC) 2005    left breast mastectomy    Electrolyte abnormality 10/02/2022    Hypertension     Memory change     Nodule of left lung     Pleural thickening        Past Surgical History:   Procedure Laterality Date    CATARACT EXTRACTION Left 05/2020    CATARACT EXTRACTION Left 06/2020    MASTECTOMY      MI XCAPSL CTRC RMVL INSJ IO LENS PROSTH W/O ECP Left 6/8/2020    Procedure: EXTRACTION EXTRACAPSULAR CATARACT PHACO INTRAOCULAR LENS (IOL);  Surgeon: Cortes Harrison MD;  Location: [facility] MAIN OR;  Service: Ophthalmology       History reviewed. No pertinent family history.  I have reviewed and agree with the history as documented.    E-Cigarette/Vaping    E-Cigarette Use Never User      E-Cigarette/Vaping Substances    Nicotine No     THC No     CBD No      Social History     Tobacco Use    Smoking status: Never    Smokeless tobacco: Never   Vaping Use    Vaping status: Never Used   Substance  Use Topics    Alcohol use: Never    Drug use: Never       Review of Systems   Unable to perform ROS: Mental status change   Patient states that she wants to go home, states home is here, [facility]  Does not want to be admitted would like to stay here, etc. not reliable historian.    Physical Exam  Physical Exam  Vitals and nursing note reviewed.   Constitutional:       Appearance: She is well-developed.   HENT:      Head: Normocephalic and atraumatic.   Eyes:      Pupils: Pupils are equal, round, and reactive to light.   Cardiovascular:      Rate and Rhythm: Normal rate and regular rhythm.   Pulmonary:      Effort: Pulmonary effort is normal.      Breath sounds: Normal breath sounds.   Abdominal:      General: Bowel sounds are normal.      Palpations: Abdomen is soft.   Musculoskeletal:         General: Normal range of motion.      Cervical back: Normal range of motion and neck supple.   Skin:     General: Skin is warm and dry.   Neurological:      Mental Status: She is alert. She is disoriented and confused.      GCS: GCS eye subscore is 4. GCS verbal subscore is 4. GCS motor subscore is 6.         Vital Signs  ED Triage Vitals   Temperature Pulse Respirations Blood Pressure SpO2   01/02/24 1442 01/02/24 1442 01/02/24 1442 01/02/24 1442 01/02/24 1442   97.5 °F (36.4 °C) 70 20 (!) 207/97 94 %      Temp Source Heart Rate Source Patient Position - Orthostatic VS BP Location FiO2 (%)   01/02/24 2332 01/02/24 1545 01/02/24 1545 01/02/24 1545 --   Oral Monitor Sitting Right arm       Pain Score       01/02/24 1442       No Pain           Vitals:    01/03/24 0408 01/03/24 0801 01/03/24 0802 01/03/24 1546   BP: (!) 169/105 163/98 163/98 (!) 174/95   Pulse: 77 84 89 81   Patient Position - Orthostatic VS:   Lying          Visual Acuity  Visual Acuity      Flowsheet Row Most Recent Value   L Pupil Size (mm) 3   R Pupil Size (mm) 3   L Pupil Shape Round   R Pupil Shape Round            ED  Medications  Medications   hydrALAZINE (APRESOLINE) injection 10 mg (10 mg Intravenous Given 1/3/24 0422)   cholecalciferol (VITAMIN D3) tablet 1,000 Units (1,000 Units Oral Given 1/3/24 1647)   docusate sodium (COLACE) capsule 100 mg (100 mg Oral Given 1/3/24 1647)   donepezil (ARICEPT) tablet 10 mg (10 mg Oral Not Given 1/2/24 2159)   losartan (COZAAR) tablet 100 mg (100 mg Oral Given 1/3/24 1646)   metoprolol tartrate (LOPRESSOR) tablet 50 mg (50 mg Oral Given 1/3/24 1647)   enoxaparin (LOVENOX) subcutaneous injection 40 mg (40 mg Subcutaneous Given 1/3/24 1646)   acetaminophen (TYLENOL) tablet 650 mg (has no administration in time range)   ondansetron (ZOFRAN) injection 4 mg (has no administration in time range)   cefTRIAXone (ROCEPHIN) IVPB (premix in dextrose) 1,000 mg 50 mL (1,000 mg Intravenous New Bag 1/2/24 1844)   melatonin tablet 6 mg (6 mg Oral Not Given 1/2/24 2159)   loratadine (CLARITIN) tablet 10 mg (has no administration in time range)   OLANZapine (ZyPREXA) tablet 1.25 mg (has no administration in time range)     And   OLANZapine (ZyPREXA) tablet 3.75 mg (has no administration in time range)   OLANZapine (ZyPREXA) tablet 1.25 mg (has no administration in time range)     Or   OLANZapine (ZyPREXA) IM injection 1.3 mg (has no administration in time range)   metoprolol tartrate (LOPRESSOR) tablet 50 mg (50 mg Oral Given 1/2/24 1539)   losartan (COZAAR) tablet 100 mg (100 mg Oral Given 1/2/24 1539)   potassium chloride (K-DUR,KLOR-CON) CR tablet 40 mEq (40 mEq Oral Given 1/2/24 1618)   OLANZapine (ZyPREXA) IM injection 2.5 mg (2.5 mg Intramuscular Given 1/3/24 0038)   potassium chloride (K-DUR,KLOR-CON) CR tablet 40 mEq (40 mEq Oral Given 1/3/24 1645)       Diagnostic Studies  Results Reviewed       Procedure Component Value Units Date/Time    Basic metabolic panel [[id number]]  (Abnormal) Collected: 01/03/24 0449    Lab Status: Final result Specimen: Blood from Hand, Left Updated: 01/03/24 0578      Sodium 143 mmol/L      Potassium 3.3 mmol/L      Chloride 105 mmol/L      CO2 28 mmol/L      ANION GAP 10 mmol/L      BUN 12 mg/dL      Creatinine 0.60 mg/dL      Glucose 104 mg/dL      Glucose, Fasting 104 mg/dL      Calcium 9.3 mg/dL      eGFR 83 ml/min/1.73sq m     Narrative:      National Kidney Disease Foundation guidelines for Chronic Kidney Disease (CKD):     Stage 1 with normal or high GFR (GFR > 90 mL/min/1.73 square meters)    Stage 2 Mild CKD (GFR = 60-89 mL/min/1.73 square meters)    Stage 3A Moderate CKD (GFR = 45-59 mL/min/1.73 square meters)    Stage 3B Moderate CKD (GFR = 30-44 mL/min/1.73 square meters)    Stage 4 Severe CKD (GFR = 15-29 mL/min/1.73 square meters)    Stage 5 End Stage CKD (GFR <15 mL/min/1.73 square meters)  Note: GFR calculation is accurate only with a steady state creatinine    CBC [[id number]]  (Abnormal) Collected: 01/03/24 0449    Lab Status: Final result Specimen: Blood from Hand, Left Updated: 01/03/24 0518     WBC 5.80 Thousand/uL      RBC 3.82 Million/uL      Hemoglobin 12.5 g/dL      Hematocrit 38.8 %       fL      MCH 32.7 pg      MCHC 32.2 g/dL      RDW 13.1 %      Platelets 237 Thousands/uL      MPV 10.8 fL     COVID/FLU/RSV [[id number]]  (Normal) Collected: 01/02/24 1812    Lab Status: Final result Specimen: Nares from Nasopharyngeal Swab Updated: 01/02/24 1857     SARS-CoV-2 Negative     INFLUENZA A PCR Negative     INFLUENZA B PCR Negative     RSV PCR Negative    Narrative:      FOR PEDIATRIC PATIENTS - copy/paste COVID Guidelines URL to browser: [email or web address]    SARS-CoV-2 assay is a Nucleic Acid Amplification assay intended for the  qualitative detection of nucleic acid from SARS-CoV-2 in nasopharyngeal  swabs. Results are for the presumptive identification of SARS-CoV-2 RNA.    Positive results are indicative of infection with SARS-CoV-2, the virus  causing COVID-19, but do not rule out bacterial  infection or co-infection  with other viruses. Laboratories within the [place] and its  territories are required to report all positive results to the appropriate  public health authorities. Negative results do not preclude SARS-CoV-2  infection and should not be used as the sole basis for treatment or other  patient management decisions. Negative results must be combined with  clinical observations, patient history, and epidemiological information.  This test has not been FDA cleared or approved.    This test has been authorized by FDA under an Emergency Use Authorization  (EUA). This test is only authorized for the duration of time the  declaration that circumstances exist justifying the authorization of the  emergency use of an in vitro diagnostic tests for detection of SARS-CoV-2  virus and/or diagnosis of COVID-19 infection under section 564(b)(1) of  the Act, 21 U.S.C. 360bbb-3(b)(1), unless the authorization is terminated  or revoked sooner. The test has been validated but independent review by FDA  and CLIA is pending.    Test performed using [employer] | [employer] GeneXpert: This RT-PCR assay targets N2,  a region unique to SARS-CoV-2. A conserved region in the E-gene was chosen  for pan-Sarbecovirus detection which includes SARS-CoV-2.    According to CMS-2020-01-R, this platform meets the definition of high-throughput technology.    Urine Microscopic [[id number]]  (Abnormal) Collected: 01/02/24 1713    Lab Status: Final result Specimen: Urine, Clean Catch Updated: 01/02/24 1815     RBC, UA 2-4 /hpf      WBC, UA 2-4 /hpf      Epithelial Cells Occasional /hpf      Bacteria, UA Moderate /hpf      AMORPH PHOSPATES Occasional /hpf     UA (URINE) with reflex to Scope [[id number]]  (Abnormal) Collected: 01/02/24 1713    Lab Status: Final result Specimen: Urine, Clean Catch Updated: 01/02/24 1735     Color, UA Yellow     Clarity, UA Cloudy     Specific Gravity, UA 1.020     pH, UA 7.0     Leukocytes, UA Moderate      Nitrite, UA Negative     Protein, UA 30 (1+) mg/dl      Glucose, UA Negative mg/dl      Ketones, UA 15 (1+) mg/dl      Urobilinogen, UA 0.2 E.U./dl      Bilirubin, UA Negative     Occult Blood, UA Negative    Urine culture [[id number]] Collected: 01/02/24 1713    Lab Status: In process Specimen: Urine, Clean Catch Updated: 01/02/24 1719    Comprehensive metabolic panel [[id number]]  (Abnormal) Collected: 01/02/24 1538    Lab Status: Final result Specimen: Blood from Arm, Right Updated: 01/02/24 1611     Sodium 144 mmol/L      Potassium 3.0 mmol/L      Chloride 104 mmol/L      CO2 31 mmol/L      ANION GAP 9 mmol/L      BUN 17 mg/dL      Creatinine 0.77 mg/dL      Glucose 87 mg/dL      Calcium 9.0 mg/dL      AST 26 U/L      ALT 30 U/L      Alkaline Phosphatase 69 U/L      Total Protein 7.2 g/dL      Albumin 4.0 g/dL      Total Bilirubin 0.46 mg/dL      eGFR 71 ml/min/1.73sq m     Narrative:      National Kidney Disease Foundation guidelines for Chronic Kidney Disease (CKD):     Stage 1 with normal or high GFR (GFR > 90 mL/min/1.73 square meters)    Stage 2 Mild CKD (GFR = 60-89 mL/min/1.73 square meters)    Stage 3A Moderate CKD (GFR = 45-59 mL/min/1.73 square meters)    Stage 3B Moderate CKD (GFR = 30-44 mL/min/1.73 square meters)    Stage 4 Severe CKD (GFR = 15-29 mL/min/1.73 square meters)    Stage 5 End Stage CKD (GFR <15 mL/min/1.73 square meters)  Note: GFR calculation is accurate only with a steady state creatinine    Magnesium [[id number]]  (Normal) Collected: 01/02/24 1538    Lab Status: Final result Specimen: Blood from Arm, Right Updated: 01/02/24 1611     Magnesium 2.0 mg/dL     CBC and differential [[id number]]  (Abnormal) Collected: 01/02/24 1538    Lab Status: Final result Specimen: Blood from Arm, Right Updated: 01/02/24 1550     WBC 4.52 Thousand/uL      RBC 3.22 Million/uL      Hemoglobin 10.6 g/dL      Hematocrit 32.5 %       fL      MCH 32.9 pg      MCHC 32.6 g/dL      RDW 13.0 %      MPV 10.4  fL      Platelets 251 Thousands/uL      nRBC 0 /100 WBCs      Neutrophils Relative 51 %      Immat GRANS % 0 %      Lymphocytes Relative 31 %      Monocytes Relative 13 %      Eosinophils Relative 4 %      Basophils Relative 1 %      Neutrophils Absolute 2.29 Thousands/µL      Immature Grans Absolute 0.01 Thousand/uL      Lymphocytes Absolute 1.39 Thousands/µL      Monocytes Absolute 0.59 Thousand/µL      Eosinophils Absolute 0.19 Thousand/µL      Basophils Absolute 0.05 Thousands/µL     Fingerstick Glucose (POCT) [[id number]]  (Normal) Collected: 01/02/24 1544    Lab Status: Final result Updated: 01/02/24 1545     POC Glucose 85 mg/dl                    No orders to display              Procedures  Procedures         ED Course                               SBIRT 22yo+      Flowsheet Row Most Recent Value   Initial Alcohol Screen: US AUDIT-C     1. How often do you have a drink containing alcohol? 0 Filed at: 01/02/2024 1445   2. How many drinks containing alcohol do you have on a typical day you are drinking?  0 Filed at: 01/02/2024 1445   3a. Male UNDER 65: How often do you have five or more drinks on one occasion? 0 Filed at: 01/02/2024 1445   3b. FEMALE Any Age, or MALE 65+: How often do you have 4 or more drinks on one occassion? 0 Filed at: 01/02/2024 1445   Audit-C Score 0 Filed at: 01/02/2024 1445   RUPERTO: How many times in the past year have you...    Used an illegal drug or used a prescription medication for non-medical reasons? Never Filed at: 01/02/2024 1445                      Medical Decision Making  Problems Addressed:  Altered mental status: acute illness or injury  Dementia (HCC): acute illness or injury    Amount and/or Complexity of Data Reviewed  External Data Reviewed: notes.  Labs: ordered. Decision-making details documented in ED Course.    Risk  Prescription drug management.  Decision regarding hospitalization.      Altered mental status with unclear baseline she has had increased verbal  threats of violence.  Possible worsening of dementia versus UTI versus other toxic metabolic encephalopathy.  - Admit for further evaluation and stabilization as well as to establish safe and appropriate discharge    Hypertensive urgency versus emergency  - No acute symptoms however does have change in mental status from her previously altered baseline.  Potential press syndrome etc.         Disposition  Final diagnoses:   Altered mental status   Dementia (HCC)     Time reflects when diagnosis was documented in both MDM as applicable and the Disposition within this note       Time User Action Codes Description Comment    1/2/2024  4:56 PM Marcy Askew Add [R41.82] Altered mental status     1/2/2024  4:56 PM Marcy Askew Add [F03.90] Dementia (HCC)     1/2/2024  6:27 PM Leyla Guardado Add [F03.918] Dementia with behavioral disturbance (HCC)           ED Disposition       ED Disposition   Admit    Condition   Stable    Date/Time   Tue Jan 2, 2024 1656    Comment                   Follow-up Information    None         Current Discharge Medication List        CONTINUE these medications which have NOT CHANGED    Details   busPIRone (BUSPAR) 5 mg tablet Take 5 mg by mouth 2 (two) times a day      cholecalciferol (VITAMIN D3) 1,000 units tablet Take 1,000 Units by mouth daily      cyanocobalamin (VITAMIN B-12) 100 mcg tablet Take by mouth daily      metoprolol tartrate (LOPRESSOR) 50 mg tablet take 1 tablet by mouth twice a day  Qty: 180 tablet, Refills: 1    Associated Diagnoses: Essential hypertension      OLANZapine (ZyPREXA ZYDIS) 5 mg dispersible tablet Take 0.5 tablets (2.5 mg total) by mouth daily at bedtime    Associated Diagnoses: Dementia (HCC)      Ascorbic Acid (Vitamin C) 250 MG CHEW       benzonatate (TESSALON PERLES) 100 mg capsule Take 1 capsule (100 mg total) by mouth 3 (three) times a day  Qty: 20 capsule, Refills: 0    Associated Diagnoses: Acute bronchiolitis due to unspecified organism      docusate  sodium (COLACE) 100 mg capsule Take 1 capsule (100 mg total) by mouth 2 (two) times a day    Associated Diagnoses: Constipation, unspecified constipation type      donepezil (ARICEPT) 10 mg tablet Take 1 tablet (10 mg total) by mouth daily at bedtime  Qty: 90 tablet, Refills: 3    Associated Diagnoses: Dementia with behavioral disturbance (HCC)      loratadine (CLARITIN) 10 mg tablet Take 10 mg by mouth daily      losartan (Cozaar) 100 MG tablet Take 1 tablet (100 mg total) by mouth daily  Qty: 90 tablet, Refills: 0    Associated Diagnoses: Essential hypertension      Melatonin 10 MG TABS Take 1 tablet (10 mg total) by mouth daily at bedtime  Qty: 90 tablet, Refills: 3    Associated Diagnoses: Insomnia, unspecified type      Multiple Vitamins-Minerals (ONE-A-DAY 50 PLUS PO)       polyethylene glycol (MIRALAX) 17 g packet Take 17 g by mouth if needed (constipation)    Associated Diagnoses: Constipation, unspecified constipation type             No discharge procedures on file.    PDMP Review         Value Time User    PDMP Reviewed  Yes 12/14/2023 10:57 AM DAMARIS Engel            ED Provider  Electronically Signed by             Marcy Askew DO  01/02/24 1641       Marcy Askew DO  01/03/24 6206

## 2024-01-02 NOTE — ASSESSMENT & PLAN NOTE
/97 on arrival, likely in setting of noncompliance and agitation  Continue home losartan 100 mg daily and metoprolol 50 mg daily  Hydralazine prn

## 2024-01-02 NOTE — H&P
[facility]  H&P  Name: Monika Butler 83 y.o. female I MRN: [id number]  Unit/Bed#: ED 03 I Date of Admission: 1/2/2024   Date of Service: 1/2/2024 I Hospital Day: 1      Assessment/Plan   * Dementia with behavioral disturbance (HCC)  Assessment & Plan  Patient has history of dementia with behavioral disturbances, worsening over past several months. Has been getting more agitated and refusing medications since yesterday. Prior facility said she needs a higher level of care and will not accept patient back.  UA pending  Continue current home medications including aricept and zyprexa 2.5 mg hs  Patient [facility]  [facility] minute [facility]  Psychiatry consulted    Essential hypertension  Assessment & Plan  /97 on arrival, likely in setting of noncompliance and agitation  Continue home losartan 100 mg daily and metoprolol 50 mg daily  Hydralazine prn    Abnormal urinalysis  Assessment & Plan  UA positive for leukocytes  Urine culture pending  Start rocephin    Hypokalemia  Assessment & Plan  Potassium 3.0 on admission  Repleted with PO KCl 40 meq  Monitor bmp         VTE Pharmacologic Prophylaxis: VTE Score: 3 Moderate Risk (Score 3-4) - Pharmacological DVT Prophylaxis Ordered: enoxaparin (Lovenox).  Code Status: Prior  Discussion with family: Updated  (son and daughter) at bedside.    Anticipated Length of Stay: Patient will be admitted on an observation basis with an anticipated length of stay of less than 2 midnights secondary to dementia with behavioral disturbances.    Total Time Spent on Date of Encounter in care of patient: 55 mins. This time was spent on one or more of the following: performing physical exam; counseling and coordination of care; obtaining or reviewing history; documenting in the medical record; reviewing/ordering tests, medications or procedures; communicating with other healthcare professionals  and discussing with patient's family/caregivers.    Chief Complaint: dementia with behavioral disturbances    History of Present Illness:  Monika Butler is a 83 y.o. female with a PMH of dementia, HTN who presents with increased confusion and agitation from nursing home. Family at bedside report patient has not eaten or taken her medications last few days and are unsure if she has been getting her medication at nursing home. Patient reports she feels fine and does not need to see a doctor. Per ED note, facility notes that patient has been increasingly violent and making threats to herself and others. Family reports she was started on Buspar since she was last discharged.     Review of Systems:  Review of Systems   Unable to perform ROS: Dementia       Past Medical and Surgical History:   Past Medical History:   Diagnosis Date    Acute bronchiolitis 12/12/2023    Allergic     Cancer (HCC) 2005    left breast mastectomy    Electrolyte abnormality 10/02/2022    Hypertension     Memory change     Nodule of left lung     Pleural thickening        Past Surgical History:   Procedure Laterality Date    CATARACT EXTRACTION Left 05/2020    CATARACT EXTRACTION Left 06/2020    MASTECTOMY      SC XCAPSL CTRC RMVL INSJ IO LENS PROSTH W/O ECP Left 6/8/2020    Procedure: EXTRACTION EXTRACAPSULAR CATARACT PHACO INTRAOCULAR LENS (IOL);  Surgeon: Cortes Harrison MD;  Location: [facility] MAIN OR;  Service: Ophthalmology       Meds/Allergies:  Prior to Admission medications    Medication Sig Start Date End Date Taking? Authorizing Provider   Ascorbic Acid (Vitamin C) 250 MG CHEW  3/1/20   Historical Provider, MD   benzonatate (TESSALON PERLES) 100 mg capsule Take 1 capsule (100 mg total) by mouth 3 (three) times a day 12/14/23   DAMARIS Engel   cholecalciferol (VITAMIN D3) 1,000 units tablet Take 1,000 Units by mouth daily    Historical Provider, MD   cyanocobalamin (VITAMIN B-12) 100 mcg tablet Take by mouth daily    Historical  Provider, MD   docusate sodium (COLACE) 100 mg capsule Take 1 capsule (100 mg total) by mouth 2 (two) times a day 12/14/23   DAMARIS Engel   donepezil (ARICEPT) 10 mg tablet Take 1 tablet (10 mg total) by mouth daily at bedtime 12/8/23   Tish M Kristal Bradford MD   loratadine (CLARITIN) 10 mg tablet Take 10 mg by mouth daily    Historical Provider, MD   losartan (Cozaar) 100 MG tablet Take 1 tablet (100 mg total) by mouth daily 12/6/23   Kristin Raymundo MD   Melatonin 10 MG TABS Take 1 tablet (10 mg total) by mouth daily at bedtime 2/24/23   Kristin Raymundo MD   metoprolol tartrate (LOPRESSOR) 50 mg tablet take 1 tablet by mouth twice a day 10/23/23   Kristin Raymundo MD   Multiple Vitamins-Minerals (ONE-A-DAY 50 PLUS PO)  2/1/20   Historical Provider, MD   OLANZapine (ZyPREXA ZYDIS) 5 mg dispersible tablet Take 0.5 tablets (2.5 mg total) by mouth daily at bedtime 12/14/23   DAMARIS Engel   polyethylene glycol (MIRALAX) 17 g packet Take 17 g by mouth if needed (constipation) 12/14/23   DAMARIS Engel     I have reviewed home medications with patient family member.    Allergies:   Allergies   Allergen Reactions    Ibuprofen      Reaction Date: 10Aug2005;        Social History:  Marital Status:    Patient Pre-hospital Living Situation: SNF  Patient Pre-hospital Level of Mobility: unable to be assessed at time of evaluation  Patient Pre-hospital Diet Restrictions: none  Substance Use History:   Social History     Substance and Sexual Activity   Alcohol Use Never     Social History     Tobacco Use   Smoking Status Never   Smokeless Tobacco Never     Social History     Substance and Sexual Activity   Drug Use Never       Family History:  History reviewed. No pertinent family history.    Physical Exam:     Vitals:   Blood Pressure: (!) 204/100 (01/02/24 1811)  Pulse: 68 (01/02/24 1811)  Temperature: 97.5 °F (36.4 °C) (01/02/24 1442)  Respirations: 20 (01/02/24 1811)  Weight  - Scale: 50.3 kg (111 lb) (01/02/24 1442)  SpO2: 93 % (01/02/24 1811)    Physical Exam  Vitals and nursing note reviewed.   Constitutional:       General: She is not in acute distress.     Appearance: She is well-developed.      Comments: Currently pleasant and cooperative, answering some questions appropriately   Cardiovascular:      Rate and Rhythm: Normal rate and regular rhythm.   Pulmonary:      Effort: Pulmonary effort is normal. No respiratory distress.      Breath sounds: Normal breath sounds.   Abdominal:      Palpations: Abdomen is soft.      Tenderness: There is no abdominal tenderness.   Musculoskeletal:         General: No swelling.   Skin:     General: Skin is warm and dry.      Capillary Refill: Capillary refill takes less than 2 seconds.   Neurological:      Mental Status: She is alert. She is disoriented and confused.   Psychiatric:         Mood and Affect: Mood normal.         Cognition and Memory: Cognition is impaired. Memory is impaired.          Additional Data:     Lab Results:  Results from last 7 days   Lab Units 01/02/24  1538   WBC Thousand/uL 4.52   HEMOGLOBIN g/dL 10.6*   HEMATOCRIT % 32.5*   PLATELETS Thousands/uL 251   NEUTROS PCT % 51   LYMPHS PCT % 31   MONOS PCT % 13*   EOS PCT % 4     Results from last 7 days   Lab Units 01/02/24  1538   SODIUM mmol/L 144   POTASSIUM mmol/L 3.0*   CHLORIDE mmol/L 104   CO2 mmol/L 31   BUN mg/dL 17   CREATININE mg/dL 0.77   ANION GAP mmol/L 9   CALCIUM mg/dL 9.0   ALBUMIN g/dL 4.0   TOTAL BILIRUBIN mg/dL 0.46   ALK PHOS U/L 69   ALT U/L 30   AST U/L 26   GLUCOSE RANDOM mg/dL 87         Results from last 7 days   Lab Units 01/02/24  1544   POC GLUCOSE mg/dl 85               Lines/Drains:  Invasive Devices       Peripheral Intravenous Line  Duration             Peripheral IV 12/09/23 Right Antecubital 24 days    Peripheral IV 01/02/24 Right Antecubital <1 day                        Imaging: No pertinent imaging reviewed.  No orders to display        EKG and Other Studies Reviewed on Admission:   EKG: No EKG obtained.    ** Please Note: This note has been constructed using a voice recognition system. **

## 2024-01-02 NOTE — LETTER
Date: 1/8/2024    To whom it may concern:     This is to certify that Monika Butler has been under my care for the following     diagnosis: dementia, metabolic encephalopathy, urinary tract infection. I feel her     daughter Anna Butler would be unable to serve for jury duty later this week as we     anticipate transition of care from the acute care facility to either short term rehab or     assisted living. Anna's presence would aid greatly in the transfer, helping keeping     patient calm and cooperative. Your consideration of this matter is greatly appreciated.                           Sincerely,      DAMARIS Meza

## 2024-01-02 NOTE — ASSESSMENT & PLAN NOTE
Patient has history of dementia with behavioral disturbances, worsening over past several months. Has been getting more agitated and refusing medications since yesterday. Prior facility said she needs a higher level of care and will not accept patient back.  UA pending  Continue current home medications including aricept and zyprexa 2.5 mg hs  Patient was started at [facility]  q15 minute safety checks  Ativan as needed for agitation  Psychiatry consulted

## 2024-01-02 NOTE — ED NOTES
ED Attending asked PES to call Dr Silvestre about changing the patient's Rx in the ER to avoid another admission.    15:20 - tiger-texted Dr Silvestre.  Came to ER but patient will need to be admitted as current placement unable to accept the patient back due to aggressive behaviors.

## 2024-01-03 LAB
ANION GAP SERPL CALCULATED.3IONS-SCNC: 10 MMOL/L
BACTERIA UR CULT: NORMAL
BUN SERPL-MCNC: 12 MG/DL (ref 5–25)
CALCIUM SERPL-MCNC: 9.3 MG/DL (ref 8.4–10.2)
CARDIAC TROPONIN I PNL SERPL HS: 24 NG/L (ref 8–18)
CHLORIDE SERPL-SCNC: 105 MMOL/L (ref 96–108)
CO2 SERPL-SCNC: 28 MMOL/L (ref 21–32)
CREAT SERPL-MCNC: 0.6 MG/DL (ref 0.6–1.3)
ERYTHROCYTE [DISTWIDTH] IN BLOOD BY AUTOMATED COUNT: 13.1 % (ref 11.6–15.1)
GFR SERPL CREATININE-BSD FRML MDRD: 83 ML/MIN/1.73SQ M
GLUCOSE P FAST SERPL-MCNC: 104 MG/DL (ref 65–99)
GLUCOSE SERPL-MCNC: 104 MG/DL (ref 65–140)
HCT VFR BLD AUTO: 38.8 % (ref 34.8–46.1)
HGB BLD-MCNC: 12.5 G/DL (ref 11.5–15.4)
MCH RBC QN AUTO: 32.7 PG (ref 26.8–34.3)
MCHC RBC AUTO-ENTMCNC: 32.2 G/DL (ref 31.4–37.4)
MCV RBC AUTO: 102 FL (ref 82–98)
PLATELET # BLD AUTO: 237 THOUSANDS/UL (ref 149–390)
PMV BLD AUTO: 10.8 FL (ref 8.9–12.7)
POTASSIUM SERPL-SCNC: 3.3 MMOL/L (ref 3.5–5.3)
RBC # BLD AUTO: 3.82 MILLION/UL (ref 3.81–5.12)
SODIUM SERPL-SCNC: 143 MMOL/L (ref 135–147)
WBC # BLD AUTO: 5.8 THOUSAND/UL (ref 4.31–10.16)

## 2024-01-03 PROCEDURE — 85027 COMPLETE CBC AUTOMATED: CPT | Performed by: INTERNAL MEDICINE

## 2024-01-03 PROCEDURE — 84484 ASSAY OF TROPONIN QUANT: CPT | Performed by: NURSE PRACTITIONER

## 2024-01-03 PROCEDURE — 80048 BASIC METABOLIC PNL TOTAL CA: CPT | Performed by: INTERNAL MEDICINE

## 2024-01-03 PROCEDURE — 99223 1ST HOSP IP/OBS HIGH 75: CPT | Performed by: PSYCHIATRY & NEUROLOGY

## 2024-01-03 PROCEDURE — 99232 SBSQ HOSP IP/OBS MODERATE 35: CPT | Performed by: NURSE PRACTITIONER

## 2024-01-03 RX ORDER — OLANZAPINE 2.5 MG/1
1.25 TABLET, FILM COATED ORAL DAILY
Status: DISCONTINUED | OUTPATIENT
Start: 2024-01-04 | End: 2024-01-08 | Stop reason: DRUGHIGH

## 2024-01-03 RX ORDER — OLANZAPINE 2.5 MG/1
3.75 TABLET, FILM COATED ORAL
Status: DISCONTINUED | OUTPATIENT
Start: 2024-01-03 | End: 2024-01-08

## 2024-01-03 RX ORDER — OLANZAPINE 2.5 MG/1
1.25 TABLET, FILM COATED ORAL EVERY 8 HOURS PRN
Status: DISCONTINUED | OUTPATIENT
Start: 2024-01-03 | End: 2024-01-21 | Stop reason: HOSPADM

## 2024-01-03 RX ORDER — OLANZAPINE 10 MG/2ML
2.5 INJECTION, POWDER, FOR SOLUTION INTRAMUSCULAR ONCE
Status: COMPLETED | OUTPATIENT
Start: 2024-01-03 | End: 2024-01-03

## 2024-01-03 RX ORDER — LORATADINE 10 MG/1
10 TABLET ORAL DAILY
Status: DISCONTINUED | OUTPATIENT
Start: 2024-01-03 | End: 2024-01-21 | Stop reason: HOSPADM

## 2024-01-03 RX ORDER — OLANZAPINE 10 MG/2ML
1.3 INJECTION, POWDER, FOR SOLUTION INTRAMUSCULAR EVERY 8 HOURS PRN
Status: DISCONTINUED | OUTPATIENT
Start: 2024-01-03 | End: 2024-01-21 | Stop reason: HOSPADM

## 2024-01-03 RX ORDER — POTASSIUM CHLORIDE 20 MEQ/1
40 TABLET, EXTENDED RELEASE ORAL ONCE
Status: COMPLETED | OUTPATIENT
Start: 2024-01-03 | End: 2024-01-03

## 2024-01-03 RX ADMIN — HYDRALAZINE HYDROCHLORIDE 10 MG: 20 INJECTION INTRAMUSCULAR; INTRAVENOUS at 23:49

## 2024-01-03 RX ADMIN — OLANZAPINE 2.5 MG: 10 INJECTION, POWDER, FOR SOLUTION INTRAMUSCULAR at 00:38

## 2024-01-03 RX ADMIN — METOPROLOL TARTRATE 50 MG: 50 TABLET, FILM COATED ORAL at 16:47

## 2024-01-03 RX ADMIN — LORAZEPAM 1 MG: 2 INJECTION INTRAMUSCULAR; INTRAVENOUS at 06:07

## 2024-01-03 RX ADMIN — POTASSIUM CHLORIDE 40 MEQ: 1500 TABLET, EXTENDED RELEASE ORAL at 16:45

## 2024-01-03 RX ADMIN — OLANZAPINE 1.3 MG: 10 INJECTION, POWDER, FOR SOLUTION INTRAMUSCULAR at 23:01

## 2024-01-03 RX ADMIN — CEFTRIAXONE 1000 MG: 1 INJECTION, SOLUTION INTRAVENOUS at 19:41

## 2024-01-03 RX ADMIN — OLANZAPINE 3.75 MG: 2.5 TABLET, FILM COATED ORAL at 21:00

## 2024-01-03 RX ADMIN — LOSARTAN POTASSIUM 100 MG: 50 TABLET, FILM COATED ORAL at 16:46

## 2024-01-03 RX ADMIN — HYDRALAZINE HYDROCHLORIDE 10 MG: 20 INJECTION INTRAMUSCULAR; INTRAVENOUS at 04:22

## 2024-01-03 RX ADMIN — DONEPEZIL HYDROCHLORIDE 10 MG: 5 TABLET ORAL at 21:00

## 2024-01-03 RX ADMIN — DOCUSATE SODIUM 100 MG: 100 CAPSULE, LIQUID FILLED ORAL at 16:47

## 2024-01-03 RX ADMIN — Medication 1000 UNITS: at 16:47

## 2024-01-03 RX ADMIN — Medication 6 MG: at 21:00

## 2024-01-03 RX ADMIN — ENOXAPARIN SODIUM 40 MG: 40 INJECTION SUBCUTANEOUS at 16:46

## 2024-01-03 NOTE — ED NOTES
Pt dinner tray at bedside. Pt assisted on bedpan. Pt repositioned in bed. Family at bedside.      Ashwini Owen RN  01/02/24 1936

## 2024-01-03 NOTE — ASSESSMENT & PLAN NOTE
Patient has history of dementia with behavioral disturbances, worsening over past several months. Has been getting more agitated and refusing medications since yesterday. Prior facility said she needs a higher level of care and will not accept patient back.  UA pending  Continue current home medications including aricept and zyprexa 2.5 mg hs  Patient was started at [facility]  Virtual sitter in place  Psychiatry consulted; recommending to avoid Ativan for sedation/agitation, decreased Zyprexa to 1.25 mg p.o. daily in the morning and Zyprexa 3.75 mg p.o. nightly.  Supportive care.

## 2024-01-03 NOTE — ED NOTES
Pt assisted on bedpan. Pt repositioned in bed. Pt given warm blankets and dinner ordered.      Ashwini Owen RN  01/02/24 1934

## 2024-01-03 NOTE — UTILIZATION REVIEW
OBSERVATION 1/2/24 @ 1700 CONVERTED TO INPATIENT 1/3/24 @ 1709 DUE TO DEMENTIA, ABNORMAL UA  Initial Clinical Review    Admission: Date/Time/Statement:   Admission Orders (From admission, onward)       Ordered        01/03/24 1709  Inpatient Admission  Once            01/02/24 1700  Place in Observation  Once            01/02/24 1656                    Orders Placed This Encounter   Procedures    Place in Observation     Standing Status:   Standing     Number of Occurrences:   1     Order Specific Question:   Level of Care     Answer:   Med Surg [16]    Inpatient Admission     Standing Status:   Standing     Number of Occurrences:   1     Order Specific Question:   Level of Care     Answer:   Med Surg [16]     Order Specific Question:   Estimated length of stay     Answer:   More than 2 Midnights     Order Specific Question:   Certification     Answer:   I certify that inpatient services are medically necessary for this patient for a duration of greater than two midnights. See H&P and MD Progress Notes for additional information about the patient's course of treatment.     ED Arrival Information       Expected   -    Arrival   1/2/2024 14:11    Acuity   Urgent              Means of arrival   Walk-In    Escorted by   Family Member    Service   Hospitalist    Admission type   Emergency              Arrival complaint   Altered Mental Status             Chief Complaint   Patient presents with    Altered Mental Status     For several months has had increasing AMS. Since yesterday she has become aggressive and more confused.        Initial Presentation: 83 y.o. female to the ED from nursing home with complaints of memory loss, change in mental status, aggressive.Admitted under observation then converted to inpatient for Dementia, essential hypertension, abnormal ua.  H/O  dementia, HTN . Current care facility is unable to take care of her.  On arrival, GCS 14, she is confused, disoriented, bp elevated. UA + for  leukocytes.  Started on IV ax.  Urine culture pending. Potassium 3.0 on admission.  Repleted and recheck. Was started on Buspar since last admission.   Date:  Psychiatry recommending to avoid ativan, decrease zyprexa dosing. Requiring continuous observation.   More interactive with family.   Behavioral consult:  Suspect she was sedated as a result of medication.  Decrease Zyprexa. No clear indication for ip psychiatric hospitalization. Continue 1:1 observation.  Avoid benzos.   Date: 1/4    Day 3: Has surpassed a 2nd midnight with active treatments and services, which include continued observation, safe discharge planning.      ED Triage Vitals   Temperature Pulse Respirations Blood Pressure SpO2   01/02/24 1442 01/02/24 1442 01/02/24 1442 01/02/24 1442 01/02/24 1442   97.5 °F (36.4 °C) 70 20 (!) 207/97 94 %      Temp Source Heart Rate Source Patient Position - Orthostatic VS BP Location FiO2 (%)   01/02/24 2332 01/02/24 1545 01/02/24 1545 01/02/24 1545 --   Oral Monitor Sitting Right arm       Pain Score       01/02/24 1442       No Pain          Wt Readings from Last 1 Encounters:   01/02/24 51.3 kg (113 lb 1.5 oz)     Additional Vital Signs: Vital Signs (last 2 days)    Date/Time Temp Pulse Resp BP MAP (mmHg) SpO2 O2 Device Patient Position - Orthostatic VS   01/03/24 0802 97.2 °F (36.2 °C) Abnormal  89 18 163/98 120 96 % None (Room air) Lying   01/03/24 08:01:05 -- 84 -- 163/98 120 96 % -- --   01/03/24 04:08:30 -- 77 -- 169/105 Abnormal  126 96 % -- --   01/03/24 04:07:04 -- 81 -- 216/174 Abnormal  188 96 % -- --   01/03/24 03:01:26 -- 85 18 149/106 Abnormal  120 94 % -- --   01/02/24 23:32:38 97.4 °F (36.3 °C) Abnormal  69 18 162/90 114 96 % -- --   01/02/24 2100 -- -- -- -- -- -- None (Room air) --   01/02/24 19:58:43 -- 76 18 173/84 Abnormal  114 91 % -- --   01/02/24 1848 -- 74 20 185/85 Abnormal  -- 93 % None (Room air) Sitting   01/02/24 1811 -- 68 20 204/100 Abnormal  -- 93 % None (Room air) Lying    01/02/24 1712 -- 75 20 210/102 Abnormal    -- 93 % None (Room air) Lying   BP: Dr. Perez made aware. Awaiting further orders. at 01/02/24 1712   01/02/24 1554 -- -- -- -- -- -- None (Room air) --   01/02/24 1545 -- 72 20 217/94 Abnormal  135 93 % None (Room air) Sitting   01/02/24 1539 -- 73 -- 217/94 Abnormal  -- -- -- --   01/02/24 1442 97.5 °F (36.4 °C) 70 20 207/97 Abnormal  -- 94 % None (Room air) --     Pertinent Labs/Diagnostic Test Results:   No orders to display     Results from last 7 days   Lab Units 01/02/24  1812   SARS-COV-2  Negative     Results from last 7 days   Lab Units 01/03/24  0449 01/02/24  1538   WBC Thousand/uL 5.80 4.52   HEMOGLOBIN g/dL 12.5 10.6*   HEMATOCRIT % 38.8 32.5*   PLATELETS Thousands/uL 237 251   NEUTROS ABS Thousands/µL  --  2.29         Results from last 7 days   Lab Units 01/03/24  0449 01/02/24  1538   SODIUM mmol/L 143 144   POTASSIUM mmol/L 3.3* 3.0*   CHLORIDE mmol/L 105 104   CO2 mmol/L 28 31   ANION GAP mmol/L 10 9   BUN mg/dL 12 17   CREATININE mg/dL 0.60 0.77   EGFR ml/min/1.73sq m 83 71   CALCIUM mg/dL 9.3 9.0   MAGNESIUM mg/dL  --  2.0     Results from last 7 days   Lab Units 01/02/24  1538   AST U/L 26   ALT U/L 30   ALK PHOS U/L 69   TOTAL PROTEIN g/dL 7.2   ALBUMIN g/dL 4.0   TOTAL BILIRUBIN mg/dL 0.46     Results from last 7 days   Lab Units 01/02/24  1544   POC GLUCOSE mg/dl 85     Results from last 7 days   Lab Units 01/03/24  0449 01/02/24  1538   GLUCOSE RANDOM mg/dL 104 87         Results from last 7 days   Lab Units 01/02/24  1713   CLARITY UA  Cloudy   COLOR UA  Yellow   SPEC GRAV UA  1.020   PH UA  7.0   GLUCOSE UA mg/dl Negative   KETONES UA mg/dl 15 (1+)*   BLOOD UA  Negative   PROTEIN UA mg/dl 30 (1+)*   NITRITE UA  Negative   BILIRUBIN UA  Negative   UROBILINOGEN UA E.U./dl 0.2   LEUKOCYTES UA  Moderate*   WBC UA /hpf 2-4   RBC UA /hpf 2-4   BACTERIA UA /hpf Moderate*   EPITHELIAL CELLS WET PREP /hpf Occasional     Results from last 7 days   Lab  Units 01/02/24  1812   INFLUENZA A PCR  Negative   INFLUENZA B PCR  Negative   RSV PCR  Negative       ED Treatment:   Medication Administration from 01/02/2024 1411 to 01/02/2024 1947         Date/Time Order Dose Route Action Comments     01/02/2024 1539 EST metoprolol tartrate (LOPRESSOR) tablet 50 mg 50 mg Oral Given --     01/02/2024 1539 EST losartan (COZAAR) tablet 100 mg 100 mg Oral Given --     01/02/2024 1618 EST potassium chloride (K-DUR,KLOR-CON) CR tablet 40 mEq 40 mEq Oral Given --     01/02/2024 1813 EST hydrALAZINE (APRESOLINE) injection 10 mg 10 mg Intravenous Given --     01/02/2024 1844 EST cefTRIAXone (ROCEPHIN) IVPB (premix in dextrose) 1,000 mg 50 mL 1,000 mg Intravenous New Bag --          Past Medical History:   Diagnosis Date    Acute bronchiolitis 12/12/2023    Allergic     Cancer (HCC) 2005    left breast mastectomy    Electrolyte abnormality 10/02/2022    Hypertension     Memory change     Nodule of left lung     Pleural thickening      Admitting Diagnosis: Altered mental status [R41.82]  Dementia ([facility]) [F03.90]  Dementia with behavioral disturbance ([facility]) [F03.918]  Age/Sex: 83 y.o. female  Admission Orders:  Scheduled Medications:  cefTRIAXone, 1,000 mg, Intravenous, Q24H  cholecalciferol, 1,000 Units, Oral, Daily  docusate sodium, 100 mg, Oral, BID  donepezil, 10 mg, Oral, HS  enoxaparin, 40 mg, Subcutaneous, Daily  losartan, 100 mg, Oral, Daily  melatonin, 6 mg, Oral, HS  metoprolol tartrate, 50 mg, Oral, BID  OLANZapine, 2.5 mg, Oral, Daily   And  OLANZapine, 5 mg, Oral, HS  potassium chloride, 40 mEq, Oral, Once      Continuous IV Infusions:     PRN Meds:  acetaminophen, 650 mg, Oral, Q6H PRN  hydrALAZINE, 10 mg, Intravenous, Q6H PRN  morphine injection, 2 mg, Intravenous, Q4H PRN  OLANZapine, 1.25 mg, Oral, Q8H PRN   Or  OLANZapine, 1.3 mg, Intramuscular, Q8H PRN  ondansetron, 4 mg, Intravenous, Q6H PRN        IP CONSULT TO PSYCHIATRY    Network Utilization Review  Department  ATTENTION: Please call with any questions or concerns to [phone] and carefully listen to the prompts so that you are directed to the right person. All voicemails are confidential.   For Discharge needs, contact Care Management DC Support Team at [phone] opt. 2  Send all requests for admission clinical reviews, approved or denied determinations and any other requests to dedicated fax number below belonging to the [place] where the patient is receiving treatment. List of dedicated fax numbers for the Facilities:  FACILITY NAME UR FAX NUMBER   ADMISSION DENIALS (Administrative/Medical Necessity) [phone]   DISCHARGE SUPPORT TEAM (NETWORK) [phone]   PARENT CHILD HEALTH (Maternity/NICU/Pediatrics) [phone]   [facility] [phone]   [facility] [phone]   [facility] [phone]   [facility] [phone]   [facility] [phone]   General acute hospital [phone]   [facility] [phone]   [facility] [phone]   [facility] [phone]   [facility] [phone]   [facility] [phone]

## 2024-01-03 NOTE — PLAN OF CARE
Problem: Potential for Falls  Goal: Patient will remain free of falls  Description: INTERVENTIONS:  - Educate patient/family on patient safety including physical limitations  - Instruct patient to call for assistance with activity   - Consult OT/PT to assist with strengthening/mobility   - Keep Call bell within reach  - Keep bed low and locked with side rails adjusted as appropriate  - Keep care items and personal belongings within reach  - Initiate and maintain comfort rounds  - Make Fall Risk Sign visible to staff  - Offer Toileting every 2 Hours, in advance of need  - Initiate/Maintain bed alarm  - Obtain necessary fall risk management equipment: bed alarm   - Apply yellow socks and bracelet for high fall risk patients  - Consider moving patient to room near nurses station  Outcome: Progressing

## 2024-01-03 NOTE — PROGRESS NOTES
[facility]  Progress Note  Name: Monika Butler I  MRN: [id number]  Unit/Bed#: [address] [place] [address] I Date of Admission: 1/2/2024   Date of Service: 1/3/2024  Hospital Day: 1    Assessment/Plan   * Dementia with behavioral disturbance (HCC)  Assessment & Plan  Patient has history of dementia with behavioral disturbances, worsening over past several months. Has been getting more agitated and refusing medications since yesterday. Prior facility said she needs a higher level of care and will not accept patient back.  UA pending  Continue current home medications including aricept and zyprexa 2.5 mg hs  Patient was started at [facility]  Virtual sitter in place  Psychiatry consulted; recommending to avoid Ativan for sedation/agitation, decreased Zyprexa to 1.25 mg p.o. daily in the morning and Zyprexa 3.75 mg p.o. nightly.  Supportive care.    Abnormal urinalysis  Assessment & Plan  UA positive for leukocytes  Urine culture pending  Continue rocephin    Hypokalemia  Assessment & Plan  Potassium 3.3  Repleted with PO KCl 40 meq  Monitor bmp    Essential hypertension  Assessment & Plan  /97 on arrival, likely in setting of noncompliance and agitation  Continue home losartan 100 mg daily and metoprolol 50 mg daily  Hydralazine prn               VTE Pharmacologic Prophylaxis: VTE Score: 3 Moderate Risk (Score 3-4) - Pharmacological DVT Prophylaxis Ordered: enoxaparin (Lovenox).    Mobility:   Basic Mobility Inpatient Raw Score: 18  JH-HLM Goal: 6: Walk 10 steps or more  JH-HLM Achieved: 6: Walk 10 steps or more  HLM Goal achieved. Continue to encourage appropriate mobility.    Patient Centered Rounds: I performed bedside rounds with nursing staff today.   Discussions with Specialists or Other Care Team Provider: Multidisciplinary team    Education and Discussions with Family / Patient: Updated  (daughter and son in law) at bedside.    Total Time Spent on Date of Encounter  in care of patient: Rater than 45 mins. This time was spent on one or more of the following: performing physical exam; counseling and coordination of care; obtaining or reviewing history; documenting in the medical record; reviewing/ordering tests, medications or procedures; communicating with other healthcare professionals and discussing with patient's family/caregivers.    Current Length of Stay: 1 day(s)  Current Patient Status: Inpatient   Certification Statement: The patient will continue to require additional inpatient hospital stay due to IV ceftriaxone for urinary tract infection, p.o. replacement of potassium, repeat labs.  Psychiatry consultation, follow-up on recommendations  Discharge Plan: Anticipate discharge in 48-72 hrs to prior assisted or independent living facility.    Code Status: Level 1 - Full Code    Subjective:   Patient seen laying in bed, eyes closed.  Opens eyes when spoken to, when asked how she was she stated I am tired and I want to go to sleep.  Closed eyes.  A little later on family was at bedside, spoke with patient again, more interactive with family at bedside.  Did not appear to be having any pain, reported that she was not hungry.  Smiling at family.    Objective:     Vitals:   Temp (24hrs), Av.8 °F (36.6 °C), Min:97.2 °F (36.2 °C), Max:98.8 °F (37.1 °C)    Temp:  [97.2 °F (36.2 °C)-98.8 °F (37.1 °C)] 98.8 °F (37.1 °C)  HR:  [68-89] 81  Resp:  [18-20] 18  BP: (149-216)/() 174/95  SpO2:  [91 %-96 %] 95 %  Body mass index is 21.37 kg/m².     Input and Output Summary (last 24 hours):     Intake/Output Summary (Last 24 hours) at 1/3/2024 1810  Last data filed at 1/3/2024 1632  Gross per 24 hour   Intake --   Output 800 ml   Net -800 ml       Physical Exam:   Physical Exam  Vitals and nursing note reviewed.   Constitutional:       General: She is not in acute distress.     Comments: Patient appears very groggy, does open eyes when spoken to however goes back to sleep  quickly.  She did wake up a bit when her family was here, however states that she is very tired.   HENT:      Head: Normocephalic.      Mouth/Throat:      Mouth: Mucous membranes are dry.   Eyes:      Extraocular Movements: Extraocular movements intact.      Conjunctiva/sclera: Conjunctivae normal.   Cardiovascular:      Rate and Rhythm: Normal rate and regular rhythm.      Pulses: Normal pulses.   Pulmonary:      Effort: Pulmonary effort is normal.      Breath sounds: No wheezing or rhonchi.   Abdominal:      General: There is no distension.      Palpations: Abdomen is soft.      Tenderness: There is no abdominal tenderness.   Genitourinary:     Comments: Nursing reported patient distended, bladder scan 700, urinary retention protocol initiated  Musculoskeletal:      Cervical back: Normal range of motion.      Right lower leg: No edema.      Left lower leg: No edema.   Skin:     General: Skin is warm and dry.      Capillary Refill: Capillary refill takes less than 2 seconds.      Coloration: Skin is pale.   Neurological:      General: No focal deficit present.      Comments: Patient very lethargic, states that she is very tired and wants to sleep.   Psychiatric:      Comments: Calm at this time.          Additional Data:     Labs:  Results from last 7 days   Lab Units 01/03/24  0449 01/02/24  1538   WBC Thousand/uL 5.80 4.52   HEMOGLOBIN g/dL 12.5 10.6*   HEMATOCRIT % 38.8 32.5*   PLATELETS Thousands/uL 237 251   NEUTROS PCT %  --  51   LYMPHS PCT %  --  31   MONOS PCT %  --  13*   EOS PCT %  --  4     Results from last 7 days   Lab Units 01/03/24  0449 01/02/24  1538   SODIUM mmol/L 143 144   POTASSIUM mmol/L 3.3* 3.0*   CHLORIDE mmol/L 105 104   CO2 mmol/L 28 31   BUN mg/dL 12 17   CREATININE mg/dL 0.60 0.77   ANION GAP mmol/L 10 9   CALCIUM mg/dL 9.3 9.0   ALBUMIN g/dL  --  4.0   TOTAL BILIRUBIN mg/dL  --  0.46   ALK PHOS U/L  --  69   ALT U/L  --  30   AST U/L  --  26   GLUCOSE RANDOM mg/dL 104 87          Results from last 7 days   Lab Units 01/02/24  1544   POC GLUCOSE mg/dl 85               Lines/Drains:  Invasive Devices       Peripheral Intravenous Line  Duration             Peripheral IV 12/09/23 Right Antecubital 25 days    Peripheral IV 01/02/24 Right Antecubital 1 day                          Imaging: No pertinent imaging reviewed.    Recent Cultures (last 7 days):         Last 24 Hours Medication List:   Current Facility-Administered Medications   Medication Dose Route Frequency Provider Last Rate    acetaminophen  650 mg Oral Q6H PRN Leyla Guardado PA-C      cefTRIAXone  1,000 mg Intravenous Q24H FREDY HornC 1,000 mg (01/02/24 1844)    cholecalciferol  1,000 Units Oral Daily Leyla Guardado PA-C      docusate sodium  100 mg Oral BID Leyla Guardado PA-C      donepezil  10 mg Oral HS Leyla Guardado PA-C      enoxaparin  40 mg Subcutaneous Daily Leyla Guardado PA-C      hydrALAZINE  10 mg Intravenous Q6H PRN Leyla Guardado PA-C      loratadine  10 mg Oral Daily DAMARIS Meza      losartan  100 mg Oral Daily Leyla Guardado PA-C      melatonin  6 mg Oral HS Manjit Perez DO      metoprolol tartrate  50 mg Oral BID Leyla Guardado PA-C      OLANZapine  1.25 mg Oral Q8H PRN Ash Silvestre, DO      Or    OLANZapine  1.3 mg Intramuscular Q8H PRN Ash Silvestre DO      [START ON 1/4/2024] OLANZapine  1.25 mg Oral Daily Ash Silvestre DO      And    OLANZapine  3.75 mg Oral HS Ash Silvestre DO      ondansetron  4 mg Intravenous Q6H PRN Leyla Guardado PA-C          Today, Patient Was Seen By: DAMARIS Meza    **Please Note: This note may have been constructed using a voice recognition system.**

## 2024-01-03 NOTE — CONSULTS
"Consultation - Behavioral Health   Monika Butler 83 y.o. female MRN: [id number]  Unit/Bed#: [address] [place] [address] Encounter: [id number]      Chief Complaint: None is reported    History of Present Illness   Physician Requesting Consult: Manjit Perez DO  Reason for Consult / Principal Problem: Worsening agitation and confusion over past months, worse in past few days. Patient was making violent threats, not eating, or taking medications  Dx 1.  Dementia with behavioral disturbance (HCC)    Monika Butler is a 83 y.o. female with past medical history of dementia with psychosis, dementia with behavioral disturbance, hypertension who presented to the ED for altered mental status and is admitted for dementia with behavioral disturbance. Psychiatry consultation was requested due to agitation, see documentation above.  Patient is known to the writer-I saw her for psychiatric consultation on 12/11/2023 and for follow-up on 12/12/2023-please see related documentation for more information.  Per H&P attestation after patient's recent hospitalization patient was discharged to [facility], was there until Friday and was transferred to [facility], and reportedly since then has been agitated and refusing to take medications.  Yesterday patient's Zyprexa was increased to 2.5 mg in the morning and 5 mg at bedtime.  Patient's BuSpar was discontinued due to suspicion that this could be leading to mood changes.  Last night patient had received Zyprexa 5 mg qhs and Ativan 1 mg IV.  Overnight patient received the Zyprexa 2.5 mg IM and this morning received Ativan 1 mg IV.    I came to evaluate the patient this morning.  She appeared to be sleeping.   when asked patient questions her eyes remained closed and she made mumbling sounds.  I returned later in the morning again to evaluate.  Patient reports that she feels tired.  When asked where she is she stated at her \"grandparents house\".  She then returned to sleep and would not " "awaken to verbal stimuli.        Psychiatric Review Of Systems:  Unable to assess due to patient factors    Historical Information   Past Psychiatric History:   Unable to assess due to patient factors  Patient previously denied previous psychiatric hospitalizations.  Past Suicide attempts: Previously denied  Past Violent behavior: Previously denied  Past Psychiatric medication trial: Seroquel, Zyprexa, Ativan, BuSpar    Substance Abuse History:  Unable to assess due to patient factors  Patient previously denied drug, alcohol, or tobacco use.  Per chart \"never\" use drugs or alcohol    I am unable to assess the patient for substance use within the past 12 months as they are unable or unwilling to answer      Smoking history: Previously denied.  Per chart \"never\" smoker    Family Psychiatric History:   Unable to assess due to patient factors    Social History  Education: Unable to assess due to patient factors  Learning Disabilities: Unable to assess due to patient factors  Marital history: Per chart,   Living arrangement, social support: Per chart, patient presented from [facility]  Occupational History: Unable to assess due to patient factors  Functioning Relationships: Unable to assess due to patient factors.  Other Pertinent History: Unable to assess due to patient factors    Traumatic History:   Abuse: Unable to assess due to patient factors. Previously denied  Other Traumatic Events: Unable to assess due to patient factors. Previously denied    Past Medical History:   Diagnosis Date    Acute bronchiolitis 12/12/2023    Allergic     Cancer (HCC) 2005    left breast mastectomy    Electrolyte abnormality 10/02/2022    Hypertension     Memory change     Nodule of left lung     Pleural thickening        Medical Review Of Systems:    Unable to assess due to patient factors    Meds/Allergies     all current active meds have been reviewed and current meds:   Current Facility-Administered Medications " "  Medication Dose Route Frequency    acetaminophen (TYLENOL) tablet 650 mg  650 mg Oral Q6H PRN    cefTRIAXone (ROCEPHIN) IVPB (premix in dextrose) 1,000 mg 50 mL  1,000 mg Intravenous Q24H    cholecalciferol (VITAMIN D3) tablet 1,000 Units  1,000 Units Oral Daily    docusate sodium (COLACE) capsule 100 mg  100 mg Oral BID    donepezil (ARICEPT) tablet 10 mg  10 mg Oral HS    enoxaparin (LOVENOX) subcutaneous injection 40 mg  40 mg Subcutaneous Daily    hydrALAZINE (APRESOLINE) injection 10 mg  10 mg Intravenous Q6H PRN    loratadine (CLARITIN) tablet 10 mg  10 mg Oral Daily    LORazepam (ATIVAN) injection 1 mg  1 mg Intravenous Q6H PRN    losartan (COZAAR) tablet 100 mg  100 mg Oral Daily    melatonin tablet 6 mg  6 mg Oral HS    metoprolol tartrate (LOPRESSOR) tablet 50 mg  50 mg Oral BID    OLANZapine (ZyPREXA) tablet 2.5 mg  2.5 mg Oral Daily    And    OLANZapine (ZyPREXA) tablet 5 mg  5 mg Oral HS    ondansetron (ZOFRAN) injection 4 mg  4 mg Intravenous Q6H PRN    potassium chloride (K-DUR,KLOR-CON) CR tablet 40 mEq  40 mEq Oral Once     Allergies   Allergen Reactions    Ibuprofen      Reaction Date: 10Aug2005;        Objective     Vital signs in last 24 hours:  Temp:  [97.2 °F (36.2 °C)-97.5 °F (36.4 °C)] 97.2 °F (36.2 °C)  HR:  [68-89] 89  Resp:  [18-20] 18  BP: (149-217)/() 163/98    No intake or output data in the 24 hours ending 01/03/24 1355    Mental Status Evaluation:  Appearance:  appears stated age and laying in bed   Behavior:  Eyes closed   Speech:  Mumbling, soft, scant   Mood:  Reports \"tired\"   Affect:  Constricted   Language: Unable to assess due to patient factors   Thought Process:  Unable to assess due to patient factors   Associations: Unable to assess due to patient factors   Thought Content:  Unable to assess due to patient factors   Perceptual Disturbances: Unable to assess due to patient factors   Risk Potential: Unable to assess due to patient factors   Sensorium:  Unable to " assess due to patient factors   Memory:  Unable to assess due to patient factors   Cognition:  Unable to assess due to patient factors   Consciousness:  Appears sedated   Attention: attention span appeared shorter than expected for age   Intellect: Unable to assess due to patient factors   Fund of Knowledge: Unable to assess due to patient factors   Insight:  Unable to assess due to patient factors   Judgment: Unable to assess due to patient factors   Muscle Strength and Tone: Not assessed   Gait/Station: Not assessed, patient sleeping in bed   Motor Activity: no abnormal movements noted     Lab Results: I have personally reviewed all pertinent laboratory/tests results.     Labs in last 72 hours:   Recent Labs     01/02/24  1538 01/03/24  0449   WBC 4.52 5.80   RBC 3.22* 3.82   HGB 10.6* 12.5   HCT 32.5* 38.8    237   RDW 13.0 13.1   NEUTROABS 2.29  --    SODIUM 144 143   K 3.0* 3.3*    105   CO2 31 28   BUN 17 12   CREATININE 0.77 0.60   GLUC 87 104   GLUF  --  104*   CALCIUM 9.0 9.3   AST 26  --    ALT 30  --    ALKPHOS 69  --    TP 7.2  --    ALB 4.0  --    TBILI 0.46  --        Code Status: Level 1 - Full Code  Advance Directive and Living Will:      Power of :    POLST:          Assessment/Plan     Assessment:  Monika Butler is a 83 y.o. female with past medical history of dementia with psychosis, dementia with behavioral disturbance, hypertension who presented to the ED for altered mental status and is admitted for dementia with behavioral disturbance. Psychiatry consultation was requested due to agitation, see documentation above.  Patient is known to the writer-I saw her for psychiatric consultation on 12/11/2023 and for follow-up on 12/12/2023-please see related documentation for more information.  Per H&P attestation patient had recently transferred to a different facility recently and since then has reportedly been agitated and refusing to take medications.  Per nursing home  "documentation patient is prescribed Zyprexa 2.5 mg twice daily.  Yesterday patient's Zyprexa was increased to 2.5 mg in the morning and 5 mg at bedtime.  Patient's BuSpar was discontinued due to suspicion that this could be leading to mood changes.     Patient appears sedated on examination.  She reports that she feels tired. When asked where she is she stated at her \"grandparents house\".  She then returned to sleep and would not awaken to verbal stimuli. Last night patient had received Zyprexa 5 mg qhs and Ativan 1 mg IV.  Overnight patient received the Zyprexa 2.5 mg IM and this morning received Ativan 1 mg IV.  I suspect that patient is sedated as a result of the medications she had received.  Recommend decreasing dosage of Zyprexa to previous total dosing that patient had been receiving however we will change dosing to 1.25 mg daily and 3.75 mg qhs to decrease daytime sedation.  There is no clear indication for inpatient psychiatric hospitalization at this time.      Diagnosis:  Dementia with behavioral disturbance    Plan:   Continue medical management  Continue virtual one-to-one for safety  There is no clear indication for inpatient psychiatric hospitalization at this time  Decrease Zyprexa to 1.25 mg daily and 3.75 mg qhs  Hold dose if patient is sedated  Avoid utilizing benzodiazapines if possible as they are associated with increased risks of adverse effects in patients with dementia and are not recommended in the treatment of neuropsychiatric symptoms of dementia  Do not administer IM/IV benzodiazapines if possible within 1 hour of administration of IM Zyprexa as this is associated with excessive sedation and cardiorespiratory suppression  Therefore do not recommend lorazepam 1 mg IV every 6 hours prn agitation  If patient requires medication prn for severe agitation can consider Zyprexa PO 1.25-2.5 mg every 8 hours prn severe agitation, with Zyprexa 1.3 mg IM alternative if oral route is not " available  Discussed with the primary team  Psychiatry will follow up as necessary.  Please contact psychiatry consultation role in [facility] with questions/follow-up  For psychiatric follow-up tomorrow please contact [facility]  For overnights and weekends please contact [facility] for [facility] purposes     Risks, benefits and possible side effects of Medications:   Unable to have discussion due to patient factors         Ash Silvestre DO  01/03/24      This note has been constructed using a voice recognition system.    There may be translation, syntax,  or grammatical errors. If you have any questions, please contact the dictating provider.

## 2024-01-04 ENCOUNTER — APPOINTMENT (INPATIENT)
Dept: RADIOLOGY | Facility: HOSPITAL | Age: 84
DRG: 884 | End: 2024-01-04
Payer: COMMERCIAL

## 2024-01-04 PROBLEM — R79.89 ELEVATED TROPONIN: Status: ACTIVE | Noted: 2024-01-04

## 2024-01-04 LAB
ANION GAP SERPL CALCULATED.3IONS-SCNC: 11 MMOL/L
BUN SERPL-MCNC: 19 MG/DL (ref 5–25)
CALCIUM SERPL-MCNC: 9.5 MG/DL (ref 8.4–10.2)
CARDIAC TROPONIN I PNL SERPL HS: 266 NG/L (ref 8–18)
CARDIAC TROPONIN I PNL SERPL HS: 300 NG/L (ref 8–18)
CHLORIDE SERPL-SCNC: 105 MMOL/L (ref 96–108)
CO2 SERPL-SCNC: 27 MMOL/L (ref 21–32)
CREAT SERPL-MCNC: 0.96 MG/DL (ref 0.6–1.3)
ERYTHROCYTE [DISTWIDTH] IN BLOOD BY AUTOMATED COUNT: 13.4 % (ref 11.6–15.1)
GFR SERPL CREATININE-BSD FRML MDRD: 54 ML/MIN/1.73SQ M
GLUCOSE SERPL-MCNC: 124 MG/DL (ref 65–140)
HCT VFR BLD AUTO: 37 % (ref 34.8–46.1)
HGB BLD-MCNC: 12.3 G/DL (ref 11.5–15.4)
MCH RBC QN AUTO: 33.3 PG (ref 26.8–34.3)
MCHC RBC AUTO-ENTMCNC: 33.2 G/DL (ref 31.4–37.4)
MCV RBC AUTO: 100 FL (ref 82–98)
MRSA NOSE QL CULT: NORMAL
PLATELET # BLD AUTO: 254 THOUSANDS/UL (ref 149–390)
PMV BLD AUTO: 10.4 FL (ref 8.9–12.7)
POTASSIUM SERPL-SCNC: 3.8 MMOL/L (ref 3.5–5.3)
RBC # BLD AUTO: 3.69 MILLION/UL (ref 3.81–5.12)
SODIUM SERPL-SCNC: 143 MMOL/L (ref 135–147)
WBC # BLD AUTO: 4.81 THOUSAND/UL (ref 4.31–10.16)

## 2024-01-04 PROCEDURE — 99232 SBSQ HOSP IP/OBS MODERATE 35: CPT | Performed by: NURSE PRACTITIONER

## 2024-01-04 PROCEDURE — 97163 PT EVAL HIGH COMPLEX 45 MIN: CPT

## 2024-01-04 PROCEDURE — G1004 CDSM NDSC: HCPCS

## 2024-01-04 PROCEDURE — 80048 BASIC METABOLIC PNL TOTAL CA: CPT | Performed by: NURSE PRACTITIONER

## 2024-01-04 PROCEDURE — 70450 CT HEAD/BRAIN W/O DYE: CPT

## 2024-01-04 PROCEDURE — 93005 ELECTROCARDIOGRAM TRACING: CPT

## 2024-01-04 PROCEDURE — 84484 ASSAY OF TROPONIN QUANT: CPT | Performed by: NURSE PRACTITIONER

## 2024-01-04 PROCEDURE — 85027 COMPLETE CBC AUTOMATED: CPT | Performed by: NURSE PRACTITIONER

## 2024-01-04 RX ORDER — CEFTRIAXONE 1 G/50ML
1000 INJECTION, SOLUTION INTRAVENOUS ONCE
Status: COMPLETED | OUTPATIENT
Start: 2024-01-04 | End: 2024-01-04

## 2024-01-04 RX ORDER — LORAZEPAM 2 MG/ML
1 INJECTION INTRAMUSCULAR ONCE
Status: DISCONTINUED | OUTPATIENT
Start: 2024-01-04 | End: 2024-01-04

## 2024-01-04 RX ORDER — LIDOCAINE 50 MG/G
1 PATCH TOPICAL DAILY
Status: DISCONTINUED | OUTPATIENT
Start: 2024-01-04 | End: 2024-01-21 | Stop reason: HOSPADM

## 2024-01-04 RX ORDER — ENOXAPARIN SODIUM 100 MG/ML
1 INJECTION SUBCUTANEOUS EVERY 12 HOURS SCHEDULED
Status: DISCONTINUED | OUTPATIENT
Start: 2024-01-04 | End: 2024-01-09

## 2024-01-04 RX ADMIN — LIDOCAINE 1 PATCH: 700 PATCH TOPICAL at 09:37

## 2024-01-04 RX ADMIN — MORPHINE SULFATE 2 MG: 2 INJECTION, SOLUTION INTRAMUSCULAR; INTRAVENOUS at 20:39

## 2024-01-04 RX ADMIN — DOCUSATE SODIUM 100 MG: 100 CAPSULE, LIQUID FILLED ORAL at 09:37

## 2024-01-04 RX ADMIN — MORPHINE SULFATE 2 MG: 2 INJECTION, SOLUTION INTRAMUSCULAR; INTRAVENOUS at 04:51

## 2024-01-04 RX ADMIN — Medication 6 MG: at 21:28

## 2024-01-04 RX ADMIN — CEFTRIAXONE 1000 MG: 1 INJECTION, SOLUTION INTRAVENOUS at 19:51

## 2024-01-04 RX ADMIN — ENOXAPARIN SODIUM 40 MG: 40 INJECTION SUBCUTANEOUS at 09:37

## 2024-01-04 RX ADMIN — LOSARTAN POTASSIUM 100 MG: 50 TABLET, FILM COATED ORAL at 09:37

## 2024-01-04 RX ADMIN — OLANZAPINE 1.3 MG: 10 INJECTION, POWDER, FOR SOLUTION INTRAMUSCULAR at 22:30

## 2024-01-04 RX ADMIN — OLANZAPINE 1.25 MG: 2.5 TABLET, FILM COATED ORAL at 09:37

## 2024-01-04 RX ADMIN — METOPROLOL TARTRATE 50 MG: 50 TABLET, FILM COATED ORAL at 18:17

## 2024-01-04 RX ADMIN — Medication 1000 UNITS: at 09:37

## 2024-01-04 RX ADMIN — ENOXAPARIN SODIUM 50 MG: 60 INJECTION SUBCUTANEOUS at 18:18

## 2024-01-04 RX ADMIN — DONEPEZIL HYDROCHLORIDE 10 MG: 5 TABLET ORAL at 21:29

## 2024-01-04 RX ADMIN — OLANZAPINE 3.75 MG: 2.5 TABLET, FILM COATED ORAL at 20:40

## 2024-01-04 RX ADMIN — LORATADINE 10 MG: 10 TABLET ORAL at 09:37

## 2024-01-04 RX ADMIN — METOPROLOL TARTRATE 50 MG: 50 TABLET, FILM COATED ORAL at 09:37

## 2024-01-04 RX ADMIN — DOCUSATE SODIUM 100 MG: 100 CAPSULE, LIQUID FILLED ORAL at 18:18

## 2024-01-04 NOTE — ASSESSMENT & PLAN NOTE
Troponin noted to be elevated at 24 on 1/3, repeated 1/4 elevated 300.  Consulted cardiology, make n.p.o. after midnight with sips.  Cardiogram ordered.  Possible stress test in a.m., follow-up with cardiology.  Repeat EKG.  Telemetry  Monitor

## 2024-01-04 NOTE — PLAN OF CARE
Problem: Potential for Falls  Goal: Patient will remain free of falls  Description: INTERVENTIONS:  - Educate patient/family on patient safety including physical limitations  - Instruct patient to call for assistance with activity   - Consult OT/PT to assist with strengthening/mobility   - Keep Call bell within reach  - Keep bed low and locked with side rails adjusted as appropriate  - Keep care items and personal belongings within reach  - Initiate and maintain comfort rounds  - Make Fall Risk Sign visible to staff  - Offer Toileting every 2 Hours, in advance of need  - Initiate/Maintain bed alarm  - Obtain necessary fall risk management equipment: fall risk sign on door  - Apply yellow socks and bracelet for high fall risk patients  - Consider moving patient to room near nurses station  1/4/2024 1042 by Tabatha Sanchez RN  Outcome: Progressing  1/4/2024 1039 by Tabatha Sanchez RN  Outcome: Progressing     Problem: Prexisting or High Potential for Compromised Skin Integrity  Goal: Skin integrity is maintained or improved  Description: INTERVENTIONS:  - Identify patients at risk for skin breakdown  - Assess and monitor skin integrity  - Assess and monitor nutrition and hydration status  - Monitor labs   - Assess for incontinence   - Turn and reposition patient  - Assist with mobility/ambulation  - Relieve pressure over bony prominences  - Avoid friction and shearing  - Provide appropriate hygiene as needed including keeping skin clean and dry  - Evaluate need for skin moisturizer/barrier cream  - Collaborate with interdisciplinary team   - Patient/family teaching  - Consider wound care consult   1/4/2024 1042 by Tabatha Sanchez RN  Outcome: Progressing  1/4/2024 1039 by Tabatha Sanchez RN  Outcome: Progressing     Problem: PAIN - ADULT  Goal: Verbalizes/displays adequate comfort level or baseline comfort level  Description: Interventions:  - Encourage patient to monitor pain and request assistance  - Assess pain using  appropriate pain scale  - Administer analgesics based on type and severity of pain and evaluate response  - Implement non-pharmacological measures as appropriate and evaluate response  - Consider cultural and social influences on pain and pain management  - Notify physician/advanced practitioner if interventions unsuccessful or patient reports new pain  Outcome: Progressing     Problem: INFECTION - ADULT  Goal: Absence or prevention of progression during hospitalization  Description: INTERVENTIONS:  - Assess and monitor for signs and symptoms of infection  - Monitor lab/diagnostic results  - Monitor all insertion sites, i.e. indwelling lines, tubes, and drains  - Monitor endotracheal if appropriate and nasal secretions for changes in amount and color  - [place] appropriate cooling/warming therapies per order  - Administer medications as ordered  - Instruct and encourage patient and family to use good hand hygiene technique  - Identify and instruct in appropriate isolation precautions for identified infection/condition  Outcome: Progressing  Goal: Absence of fever/infection during neutropenic period  Description: INTERVENTIONS:  - Monitor WBC    Outcome: Progressing     Problem: DISCHARGE PLANNING  Goal: Discharge to home or other facility with appropriate resources  Description: INTERVENTIONS:  - Identify barriers to discharge w/patient and caregiver  - Arrange for needed discharge resources and transportation as appropriate  - Identify discharge learning needs (meds, wound care, etc.)  - Arrange for interpretive services to assist at discharge as needed  - Refer to Case Management Department for coordinating discharge planning if the patient needs post-hospital services based on physician/advanced practitioner order or complex needs related to functional status, cognitive ability, or social support system  Outcome: Progressing     Problem: Knowledge Deficit  Goal: Patient/family/caregiver demonstrates  understanding of disease process, treatment plan, medications, and discharge instructions  Description: Complete learning assessment and assess knowledge base.  Interventions:  - Provide teaching at level of understanding  - Provide teaching via preferred learning methods  Outcome: Progressing     Problem: NEUROSENSORY - ADULT  Goal: Achieves stable or improved neurological status  Description: INTERVENTIONS  - Monitor and report changes in neurological status  - Monitor vital signs such as temperature, blood pressure, glucose, and any other labs ordered   - Initiate measures to prevent increased intracranial pressure  - Monitor for seizure activity and implement precautions if appropriate      Outcome: Progressing  Goal: Achieves maximal functionality and self care  Description: INTERVENTIONS  - Monitor swallowing and airway patency with patient fatigue and changes in neurological status  - Encourage and assist patient to increase activity and self care.   - Encourage visually impaired, hearing impaired and aphasic patients to use assistive/communication devices  Outcome: Progressing     Problem: GENITOURINARY - ADULT  Goal: Maintains or returns to baseline urinary function  Description: INTERVENTIONS:  - Assess urinary function  - Encourage oral fluids to ensure adequate hydration if ordered  - Administer IV fluids as ordered to ensure adequate hydration  - Administer ordered medications as needed  - Offer frequent toileting  - Follow urinary retention protocol if ordered  Outcome: Progressing  Goal: Absence of urinary retention  Description: INTERVENTIONS:  - Assess patient’s ability to void and empty bladder  - Monitor I/O  - Bladder scan as needed  - Discuss with physician/AP medications to alleviate retention as needed  - Discuss catheterization for long term situations as appropriate  Outcome: Progressing     Problem: METABOLIC, FLUID AND ELECTROLYTES - ADULT  Goal: Electrolytes maintained within normal  limits  Description: INTERVENTIONS:  - Monitor labs and assess patient for signs and symptoms of electrolyte imbalances  - Administer electrolyte replacement as ordered  - Monitor response to electrolyte replacements, including repeat lab results as appropriate  - Instruct patient on fluid and nutrition as appropriate  Outcome: Progressing  Goal: Fluid balance maintained  Description: INTERVENTIONS:  - Monitor labs   - Monitor I/O and WT  - Instruct patient on fluid and nutrition as appropriate  - Assess for signs & symptoms of volume excess or deficit  Outcome: Progressing     Problem: HEMATOLOGIC - ADULT  Goal: Maintains hematologic stability  Description: INTERVENTIONS  - Assess for signs and symptoms of bleeding or hemorrhage  - Monitor labs  - Administer supportive blood products/factors as ordered and appropriate  Outcome: Progressing     Problem: Nutrition/Hydration-ADULT  Goal: Nutrient/Hydration intake appropriate for improving, restoring or maintaining nutritional needs  Description: Monitor and assess patient's nutrition/hydration status for malnutrition. Collaborate with interdisciplinary team and initiate plan and interventions as ordered.  Monitor patient's weight and dietary intake as ordered or per policy. Utilize nutrition screening tool and intervene as necessary. Determine patient's food preferences and provide high-protein, high-caloric foods as appropriate.     INTERVENTIONS:  - Monitor oral intake, urinary output, labs, and treatment plans  - Assess nutrition and hydration status and recommend course of action  - Evaluate amount of meals eaten  - Assist patient with eating if necessary   - Allow adequate time for meals  - Recommend/ encourage appropriate diets, oral nutritional supplements, and vitamin/mineral supplements  - Order, calculate, and assess calorie counts as needed  - Recommend, monitor, and adjust tube feedings and TPN/PPN based on assessed needs  - Assess need for intravenous  fluids  - Provide specific nutrition/hydration education as appropriate  - Include patient/family/caregiver in decisions related to nutrition  Outcome: Progressing

## 2024-01-04 NOTE — PROGRESS NOTES
[facility]  Progress Note  Name: Monika Butler I  MRN: [id number]  Unit/Bed#: [address] [place] [address] Date of Admission: 1/2/2024   Date of Service: 1/4/2024 I Hospital Day: 2    Assessment/Plan   * Dementia with behavioral disturbance (HCC)  Assessment & Plan  Patient has history of dementia with behavioral disturbances, worsening over past several months. Has been getting more agitated and refusing medications since yesterday. Prior facility said she needs a higher level of care and will not accept patient back.  UA showed moderate leukocytes and bacteria, ceftriaxone IV day 3 today  Continue current home medications including aricept and zyprexa 2.5 mg hs  Patient was started at [facility], discontinued  Psychiatry consulted; recommending to avoid Ativan for sedation/agitation, decreased Zyprexa to 1.25 mg p.o. daily in the morning and Zyprexa 3.75 mg p.o. nightly.  Patient very lethargic this evening, family concerned.  Will get stat CT of head to altered mental status  Follow-up on results  Supportive care.    Elevated troponin  Assessment & Plan  Troponin noted to be elevated at 24 on 1/3, repeated 1/4 elevated 300.  Consulted cardiology, make n.p.o. after midnight with sips.  Cardiogram ordered.  Possible stress test in a.m., follow-up with cardiology.  Repeat EKG.  Telemetry  Monitor    Abnormal urinalysis  Assessment & Plan  UA positive for leukocytes  Urine culture mixed contaminants  Continue rocephin 3 doses    Hypokalemia  Assessment & Plan  Potassium 3.3  Repleted with PO KCl 40 meq  Monitor bmp    Essential hypertension  Assessment & Plan  /97 on arrival, likely in setting of noncompliance and agitation  Continue home losartan 100 mg daily and metoprolol 50 mg daily  Hydralazine prn               VTE Pharmacologic Prophylaxis: VTE Score: 3 Moderate Risk (Score 3-4) - Pharmacological DVT Prophylaxis Ordered: enoxaparin (Lovenox).    Mobility:   Basic Mobility  Inpatient Raw Score: 9  JH-HLM Goal: 3: Sit at edge of bed  JH-HLM Achieved: 4: Move to chair/commode  HLM Goal achieved. Continue to encourage appropriate mobility.    Patient Centered Rounds: I performed bedside rounds with nursing staff today.   Discussions with Specialists or Other Care Team Provider: multidisciplinary team     Education and Discussions with Family / Patient: Updated  (daughter and son in law) at bedside.    Total Time Spent on Date of Encounter in care of patient: greater than 45 mins. This time was spent on one or more of the following: performing physical exam; counseling and coordination of care; obtaining or reviewing history; documenting in the medical record; reviewing/ordering tests, medications or procedures; communicating with other healthcare professionals and discussing with patient's family/caregivers.    Current Length of Stay: 2 day(s)  Current Patient Status: Inpatient   Certification Statement: The patient will continue to require additional inpatient hospital stay due to med adjustment, cardiology consult, echo, possible stress test, PT and OT   Discharge Plan: Anticipate discharge in 48-72 hrs to prior assisted or independent living facility.    Code Status: Level 1 - Full Code    Subjective:   Seen on morning rounds awake, had just had some breakfast, reports that she had some back pain overnight however did not go down her legs.She was pleasant and talkative in the morning. On evening rounds noted to be lethargic; family voiced concerns; no focal neuro deficit; will get Stat CT head for AMS as per discussion with family     Objective:     Vitals:   Temp (24hrs), Av.3 °F (35.7 °C), Min:96.3 °F (35.7 °C), Max:96.3 °F (35.7 °C)    Temp:  [96.3 °F (35.7 °C)] 96.3 °F (35.7 °C)  HR:  [74-92] 74  Resp:  [16-19] 19  BP: (102-174)/(68-97) 138/75  SpO2:  [95 %-96 %] 95 %  Body mass index is 21.37 kg/m².     Input and Output Summary (last 24 hours):   No intake or  output data in the 24 hours ending 01/04/24 1709    Physical Exam:   Physical Exam  Vitals and nursing note reviewed.   Constitutional:       Comments: Patient more alert than previous exam in the morning; on evening rounds more lethargic   HENT:      Head: Normocephalic.      Mouth/Throat:      Mouth: Mucous membranes are moist.   Eyes:      Extraocular Movements: Extraocular movements intact.      Conjunctiva/sclera: Conjunctivae normal.   Cardiovascular:      Rate and Rhythm: Normal rate and regular rhythm.      Pulses: Normal pulses.      Heart sounds: Normal heart sounds.   Pulmonary:      Effort: Pulmonary effort is normal.      Breath sounds: Normal breath sounds.   Abdominal:      General: Bowel sounds are normal. There is no distension.      Palpations: Abdomen is soft.      Tenderness: There is no abdominal tenderness.   Genitourinary:     Comments: Voiding spontaneously   Musculoskeletal:      Cervical back: Normal range of motion.      Right lower leg: No edema.      Left lower leg: No edema.      Comments: Generalized weakness    Skin:     General: Skin is warm and dry.      Capillary Refill: Capillary refill takes less than 2 seconds.   Neurological:      General: No focal deficit present.      Mental Status: She is oriented to person, place, and time.   Psychiatric:      Comments: Pleasant and cooperative with morning rounds, less responsive with evening rounds           Additional Data:     Labs:  Results from last 7 days   Lab Units 01/04/24  1639 01/03/24  0449 01/02/24  1538   WBC Thousand/uL 4.81   < > 4.52   HEMOGLOBIN g/dL 12.3   < > 10.6*   HEMATOCRIT % 37.0   < > 32.5*   PLATELETS Thousands/uL 254   < > 251   NEUTROS PCT %  --   --  51   LYMPHS PCT %  --   --  31   MONOS PCT %  --   --  13*   EOS PCT %  --   --  4    < > = values in this interval not displayed.     Results from last 7 days   Lab Units 01/03/24  0449 01/02/24  1538   SODIUM mmol/L 143 144   POTASSIUM mmol/L 3.3* 3.0*    CHLORIDE mmol/L 105 104   CO2 mmol/L 28 31   BUN mg/dL 12 17   CREATININE mg/dL 0.60 0.77   ANION GAP mmol/L 10 9   CALCIUM mg/dL 9.3 9.0   ALBUMIN g/dL  --  4.0   TOTAL BILIRUBIN mg/dL  --  0.46   ALK PHOS U/L  --  69   ALT U/L  --  30   AST U/L  --  26   GLUCOSE RANDOM mg/dL 104 87         Results from last 7 days   Lab Units 01/02/24  1544   POC GLUCOSE mg/dl 85               Lines/Drains:  Invasive Devices       Peripheral Intravenous Line  Duration             Peripheral IV 12/09/23 Right Antecubital 26 days    Peripheral IV 01/02/24 Right Antecubital 2 days                      Telemetry:  Telemetry Orders (From admission, onward)               24 Hour Telemetry Monitoring  Continuous x 24 Hours (Telem)        Question:  Reason for 24 Hour Telemetry  Answer:  Arrhythmias requiring acute medical intervention / PPM or ICD malfunction                     Telemetry Reviewed: Normal Sinus Rhythm  Indication for Continued Telemetry Use: Arrthymias requiring medical therapy             Imaging: None; CT head pending     Recent Cultures (last 7 days):   Results from last 7 days   Lab Units 01/02/24  1713   URINE CULTURE  >100,000 cfu/ml       Last 24 Hours Medication List:   Current Facility-Administered Medications   Medication Dose Route Frequency Provider Last Rate    acetaminophen  650 mg Oral Q6H PRN Leyla Guardado PA-C      cefTRIAXone  1,000 mg Intravenous Q24H DAMARIS Meza 1,000 mg (01/03/24 1941)    cholecalciferol  1,000 Units Oral Daily Leyla Guardado PA-C      docusate sodium  100 mg Oral BID Leyla Guardado PA-C      donepezil  10 mg Oral HS Leyla Guardado PA-C      enoxaparin  1 mg/kg Subcutaneous Q12H [facility] DAMARIS Meza      hydrALAZINE  10 mg Intravenous Q6H PRN Leyla Guardado PA-C      lidocaine  1 patch Topical Daily DAMARIS Meza      loratadine  10 mg Oral Daily DAMARIS Meza      losartan  100 mg Oral Daily Leyla Guardado PA-C       melatonin  6 mg Oral HS Manjit Perez, DO      metoprolol tartrate  50 mg Oral BID Leyla Guardado PA-C      morphine injection  2 mg Intravenous Q4H PRN Gonzales Pinto MD      OLANZapine  1.25 mg Oral Q8H PRN Ash Silvestre DO      Or    OLANZapine  1.3 mg Intramuscular Q8H PRN Ash Silvestre DO      OLANZapine  1.25 mg Oral Daily Ash Silvestre DO      And    OLANZapine  3.75 mg Oral HS Ash Silvestre DO      ondansetron  4 mg Intravenous Q6H PRN Leyla Guardado PA-C          Today, Patient Was Seen By: DAMARIS Meza    **Please Note: This note may have been constructed using a voice recognition system.**

## 2024-01-04 NOTE — QUICK NOTE
Saw patient as the patient has been yelling out all night and not sleeping.  Reviewed the day team note and also psychiatry note,  to avoid Ativan.  Patient has been trying to get out of bed, also just very seems stiff and not comfortable.  At this time we will try little bit of morphine to see if her agitation is primarily due to some pain and arthritic component.  At this time she will not taken thing oral and therefore I am not able to schedule Tylenol.  I will use some IV morphine to see if this will help her.

## 2024-01-04 NOTE — PLAN OF CARE
Problem: Potential for Falls  Goal: Patient will remain free of falls  Description: INTERVENTIONS:  - Educate patient/family on patient safety including physical limitations  - Instruct patient to call for assistance with activity   - Consult OT/PT to assist with strengthening/mobility   - Keep Call bell within reach  - Keep bed low and locked with side rails adjusted as appropriate  - Keep care items and personal belongings within reach  - Initiate and maintain comfort rounds  - Make Fall Risk Sign visible to staff  - Offer Toileting every 2 Hours, in advance of need  - Initiate/Maintain bed alarm  - Obtain necessary fall risk management equipment: continual observation  Problem: SAFETY,RESTRAINT: NV/NON-SELF DESTRUCTIVE BEHAVIOR  Goal: Remains free of harm/injury (restraint for non violent/non self-detsructive behavior)  Description: INTERVENTIONS:  - Instruct patient/family regarding restraint use   - Assess and monitor physiologic and psychological status   - Provide interventions and comfort measures to meet assessed patient needs   - Identify and implement measures to help patient regain control  - Assess readiness for release of restraint   Outcome: Completed  Goal: Returns to optimal restraint-free functioning  Description: INTERVENTIONS:  - Assess the patient's behavior and symptoms that indicate continued need for restraint  - Identify and implement measures to help patient regain control  - Assess readiness for release of restraint   Outcome: Completed     - Apply yellow socks and bracelet for high fall risk patients  - Consider moving patient to room near nurses station  Outcome: Progressing

## 2024-01-04 NOTE — PHYSICAL THERAPY NOTE
PT EVALUATION     01/04/24 1201   PT Last Visit   PT Visit Date 01/04/24   Note Type   Note type Evaluation   Pain Assessment   Pain Assessment Tool FLACC   Pain Rating: FLACC (Rest) - Face 0   Pain Rating: FLACC (Rest) - Legs 0   Pain Rating: FLACC (Rest) - Activity 0   Pain Rating: FLACC (Rest) - Cry 0   Pain Rating: FLACC (Rest) - Consolability 0   Score: FLACC (Rest) 0   Restrictions/Precautions   Other Precautions Fall Risk;Chair Alarm;Bed Alarm;Cognitive  (virtual 1:1)   Home Living   Type of Home Assisted living  ([facility])   Home Equipment Wheelchair-manual   Prior Function   Level of [place] Needs assistance with ADLs;Needs assistance with functional mobility  (Ambulates without a device with supervision.)   Lives With Facility staff   Receives Help From Personal care attendant   General   Additional Pertinent History Pt admitted with dementia with behavioral disturbance, pt's behaviors had been uncontrollable.  Pt was recently transitioned from STR to senior care.   Family/Caregiver Present Yes  (daughter and son in law)   Cognition   Overall Cognitive Status Impaired   Arousal/Participation Arousable   Orientation Level Unable to assess   Following Commands Follows one step commands inconsistently   Subjective   Subjective little verbalization   RLE Assessment   RLE Assessment   (PROM WFL, Pt not able to follow MMT commands but does demonstrate some AROM)   LLE Assessment   LLE Assessment   (PROM WFL, Pt not able to follow MMT commands but does demonstrate some AROM)   Bed Mobility   Supine to Sit 2  Maximal assistance   Additional items Increased time required;LE management   Transfers   Sit to Stand 3  Moderate assistance   Additional items Assist x 2;Verbal cues;Increased time required   Stand to Sit 3  Moderate assistance   Additional items Assist x 2   Stand pivot 3  Moderate assistance   Additional items Assist x 2  (hand held assist;  pt with flexed posture and eyes closed)    Ambulation/Elevation   Gait Assistance   (unable to assess due to lethargy)   Balance   Static Sitting Poor, leans L in sitting   Static Standing Poor   Activity Tolerance   Activity Tolerance Patient limited by fatigue   Assessment   Prognosis Good   Problem List Decreased cognition;Decreased mobility;Impaired balance;Decreased strength   Assessment Patient is an 83 y.o. year old female seen for Physical Therapy evaluation. Patient admitted with Dementia with behavioral disturbance (HCC).  Comorbidities affecting patient's physical performance include: dementia with behavioural disturbance, anxiety.  Personal factors affecting patient at time of initial evaluation include: inability to ambulate household distances, decreased cognition, anxiety, decreased initiation and engagement, and inability to perform ADLS. Prior to admission, patient was requiring assist with mobility and ADLs.  Please find objective findings from Physical Therapy assessment regarding body systems outlined above with impairments and limitations including weakness, impaired balance, decreased activity tolerance, fall risk, and decreased cognition.  The Barthel Index was used as a functional outcome tool presenting with a score of Barthel Index Score: 15 today indicating marked limitations of functional mobility and ADLS.  Patient's clinical presentation is currently unstable/unpredictable as seen in patient's presentation of increased fall risk, new onset of impairment of functional mobility, and new onset of weakness. Pt would benefit from continued Physical Therapy treatment to address deficits as defined above and maximize level of functional mobility. As demonstrated by objective findings, the assigned level of complexity for this evaluation is high.The patient's AM-[facility] Basic Mobility Inpatient Short Form Raw Score is 9. A Raw score of less than or equal to 16 suggests the patient may benefit from discharge to post-acute rehabilitation  services. Please also refer to the recommendation of the Physical Therapist for safe discharge planning.   Goals   Patient Goals per family:  for pt to be able to walk and talk again   STG Expiration Date 01/11/24   Short Term Goal #1 Improve bed mobility to moderate assist, improve transfers to moderate assist, patient will ambulate with hand-held assist of 2 x 50 feet   LTG Expiration Date 01/18/24   Long Term Goal #1 Supervision bed mobility, supervision transfers, min assist ambulation indoor level surfaces 100 feet   Plan   Treatment/Interventions Therapeutic exercise;LE strengthening/ROM;Functional transfer training;ADL retraining;Cognitive reorientation;Gait training;Bed mobility;Equipment eval/education   PT Frequency Other (Comment)  (5x/week)   Discharge Recommendation   Rehab Resource Intensity Level, PT II (Moderate Resource Intensity)   AM-PAC Basic Mobility Inpatient   Turning in Flat Bed Without Bedrails 3   Lying on Back to Sitting on Edge of Flat Bed Without Bedrails 2   Moving Bed to Chair 1   Standing Up From Chair Using Arms 1   Walk in Room 1   Climb 3-5 Stairs With Railing 1   Basic Mobility Inpatient Raw Score 9   Turning Head Towards Sound 2   Follow Simple Instructions 1   Low Function Basic Mobility Raw Score  12   Low Function Basic Mobility Standardized Score  18.33   Highest Level Of Mobility   JH-HLM Goal 3: Sit at edge of bed   JH-HLM Achieved 4: Move to chair/commode   Barthel Index   Feeding 5   Bathing 0   Grooming Score 0   Dressing Score 0   Bladder Score 0   Bowels Score 0   Toilet Use Score 5   Transfers (Bed/Chair) Score 5   Mobility (Level Surface) Score 0   Stairs Score 0   Barthel Index Score 15   End of Consult   Patient Position at End of Consult All needs within reach;Bed/Chair alarm activated;Bedside chair  (family at bedside, 1:1 virtual)   Licensure   [place] License Number  Ira Hannah [id number]

## 2024-01-04 NOTE — CASE MANAGEMENT
Case Management Assessment & Discharge Planning Note    Patient name Monika Butler  Location [address] [place] [address]/[address] [place] [place] [address]-* MRN [id number]  : 1940 Date 2024       Current Admission Date: 2024  Current Admission Diagnosis:Dementia with behavioral disturbance (HCC)   Patient Active Problem List    Diagnosis Date Noted    Hypokalemia 2024    Abnormal urinalysis 2024    Constipation 2023    Acute bronchiolitis 2023    Nondisplaced fracture of triquetrum (cuneiform) bone, left wrist, initial encounter for closed fracture 2023    Abrasion of left eyebrow 2023    Injury of left wrist 2023    Insomnia 2023    Anxiety 2023    Absolute anemia 2023    H/O clavicle fracture 2023    Meningioma (HCC) 2023    Pulmonary nodule 2023    Bad odor of urine 2023    Postmenopausal 2023    Prediabetes 2023    Gastroesophageal reflux disease with esophagitis without hemorrhage 2023    Generalized weakness 2022    Urinary frequency 2022    BMI 22.0-22.9, adult 2021    History of breast cancer 2021    Altered mental status 2020    Medicare annual wellness visit, subsequent 2020    Balance problems 2020    Dementia with behavioral disturbance (HCC)     Memory change 10/26/2020    Ear fullness, left 2017    Sinusitis 2017    Hyperlipidemia 2016    Breast cancer (HCC) 2013    Essential hypertension 2012      LOS (days): 2  Geometric Mean LOS (GMLOS) (days): 5  Days to GMLOS:4.2     OBJECTIVE:  PATIENT READMITTED TO HOSPITAL  Risk of Unplanned Readmission Score: 28.34       Current admission status: Inpatient  Referral Reason: Other (Discharge planning)    Preferred Pharmacy:   [id number] - [address] [address]  Phone: [phone] Fax: [phone]    Primary Care Provider: Kristin  MD Zackary    Primary Insurance: [facility]  Secondary Insurance:     ASSESSMENT:  Active Health Care Proxies    There are no active Health Care Proxies on file.        Obs Notice Signed: 01/02/24    Readmission Root Cause  30 Day Readmission: Yes  Who directed you to return to the hospital?: Other (comment) (Assisted living facility ([facility]))  Did you understand whom to contact if you had questions or problems?: Yes  Did you get your prescriptions before you left the hospital?: No  Reason:: Not preferred pharmacy  Were you able to get your prescriptions filled when you left the hospital?: Yes  Did you take your medications as prescribed?: Yes  Were you able to get to your follow-up appointments?: No  Reason:: Readmitted prior to appointment  During previous admission, was a post-acute recommendation made?: Yes  What post-acute resources were offered?: [facility]  Patient was readmitted due to: Agitation, demential with behavior disturbance - pt was discharged to [facility] at [facility], then transitioned to [facility] memory care unit  Action Plan: Medical management, psych following for medication changes, plan is for return to [facility] when stable    Patient Information  Admitted from:: Facility ([facility])  Mental Status: Confused  During Assessment patient was accompanied by: Daughter (Daughter Anitha and son-in-law at bedside, daughter Anna on phone)  Assessment information provided by:: Daughter  Primary Caregiver: Other (Comment)  Caregiver's Name:: [facility]  Caregiver's Relationship to Patient:: Family Member  Caregiver's Telephone Number:: [phone]  Support Systems: Daughter, Family members  County of Residence: [place]  What city do you live in?: [place]  Home entry access options. Select all that apply.: No steps to enter home  Type of Current Residence: Facility    Activities of Daily Living Prior to Admission  Functional Status: Assistance  Completes ADLs  independently?: No  Level of ADL dependence: Assistance  Ambulates independently?: Yes  Does the patient have a history of Short-Term Rehab?: Yes ([facility])  Does patient currently have [facility]?: No    Patient Information Continued  Income Source: Pension/senior living  Does patient have prescription coverage?: Yes  Does patient receive dialysis treatments?: No  Does patient have a history of substance abuse?: No  Does patient have a history of Mental Health Diagnosis?: No    Means of Transportation  Means of Transport to hospitals:: Family transport      Housing Stability: Low Risk  (1/4/2024)    Housing Stability Vital Sign     Unable to Pay for Housing in the Last Year: No     Number of Places Lived in the Last Year: 2     Unstable Housing in the Last Year: No   Food Insecurity: No Food Insecurity (1/4/2024)    Hunger Vital Sign     Worried About Running Out of Food in the Last Year: Never true     Ran Out of Food in the Last Year: Never true   Transportation Needs: No Transportation Needs (1/4/2024)    PRAPARE - Transportation     Lack of Transportation (Medical): No     Lack of Transportation (Non-Medical): No   Utilities: Not At Risk (1/4/2024)    [facility] Utilities     Threatened with loss of utilities: No       DISCHARGE DETAILS:    Discharge planning discussed with:: Daughters Anitha and Anna  Freedom of Choice: Yes    Comments - Freedom of Choice: OZZY spoke with daughters Anitha (at bedside) and Anna (by phone) to introduce role of CM, conduct assessment and discuss discharge planning.  Family's preference is for patient to return to [facility] assisted living when medically cleared.  Patient recently moved in [facility] but was sent back to the hospital when her behaviors became unmanageable at the facility.      OZZY spoke by phone with Yesi in admissions at [facility] [phone] and she stated that the facility is willing to accept patient back when her behaviors are stabilized.   Yesi stated that she will be off starting Friday 1/5 and  Harriett or director of wellness Marysol will be covering.  Daughter Anna provided contact information for Maryann Espinoza who will be covering the weekend [phone].  Family plans to transport patient if appropriate.  SW will continue to follow.      CM contacted family/caregiver?: Yes  Were Treatment Team discharge recommendations reviewed with patient/caregiver?: Yes  Did patient/caregiver verbalize understanding of patient care needs?: Yes  Were patient/caregiver advised of the risks associated with not following Treatment Team discharge recommendations?: Yes    Contacts  Patient Contacts: Anitha Butler (dtrs)  Relationship to Patient:: Family  Contact Method: In Person, Phone  Phone Number: [phone]  Reason/Outcome: Discharge Planning, Emergency Contact    Requested Home Health Care         Is the patient interested in HHC at discharge?: No    DME Referral Provided  Referral made for DME?: No    Other Referral/Resources/Interventions Provided:  Interventions: Assisted Living, Facility Return    Would you like to participate in our [facility] service program?  : No - Declined    Treatment Team Recommendation: Assisted Living, Facility Return  Discharge Destination Plan:: Assisted Living, Facility Return  Transport at Discharge : Family

## 2024-01-04 NOTE — ASSESSMENT & PLAN NOTE
Patient has history of dementia with behavioral disturbances, worsening over past several months. Has been getting more agitated and refusing medications since yesterday. Prior facility said she needs a higher level of care and will not accept patient back.  UA showed moderate leukocytes and bacteria, ceftriaxone IV day 3 today  Continue current home medications including aricept and zyprexa 2.5 mg hs  Patient was started at [facility], discontinued  Psychiatry consulted; recommending to avoid Ativan for sedation/agitation, decreased Zyprexa to 1.25 mg p.o. daily in the morning and Zyprexa 3.75 mg p.o. nightly.  Patient very lethargic this evening, family concerned.  Will get stat CT of head to altered mental status  Follow-up on results  Supportive care.

## 2024-01-04 NOTE — PHYSICAL THERAPY NOTE
PT EVALUATION     01/04/24 1201   PT Last Visit   PT Visit Date 01/04/24   Note Type   Note type Evaluation   Pain Assessment   Pain Assessment Tool FLACC   Pain Rating: FLACC (Rest) - Face 0   Pain Rating: FLACC (Rest) - Legs 0   Pain Rating: FLACC (Rest) - Activity 0   Pain Rating: FLACC (Rest) - Cry 0   Pain Rating: FLACC (Rest) - Consolability 0   Score: FLACC (Rest) 0   Restrictions/Precautions   Other Precautions Fall Risk;Chair Alarm;Bed Alarm;Cognitive  (virtual 1:1)   Home Living   Type of Home Assisted living  ([facility])   Home Equipment Wheelchair-manual   Prior Function   Level of [place] Needs assistance with ADLs;Needs assistance with functional mobility  (Ambulates without a device with supervision/min assist.)   Lives With Facility staff   Receives Help From Personal care attendant   General   Additional Pertinent History Pt admitted with dementia with behavioural disturbance, pt's behaviors had been uncontrollable at the [facility].  Pt was recently transitioned from [facility] to [facility].   Family/Caregiver Present Yes  (daughter and son in law)   Cognition   Overall Cognitive Status Impaired   Arousal/Participation Arousable;  Keeps eyes open with max verbal cues   Orientation Level Unable to assess   Following Commands Follows one step commands inconsistently   Subjective   Subjective little verbalization   RLE Assessment   RLE Assessment   (PROM WFL, Pt not able to follow MMT commands but does demonstrate some AROM)   LLE Assessment   LLE Assessment   (PROM WFL, Pt not able to follow MMT commands but does demonstrate some AROM)   Bed Mobility   Supine to Sit 2  Maximal assistance   Additional items Increased time required;LE management   Transfers   Sit to Stand 3  Moderate assistance   Additional items Assist x 2;Verbal cues;Increased time required   Stand to Sit 3  Moderate assistance   Additional items Assist x 2   Stand pivot 3  Moderate assistance   Additional items Assist x 2  (hand held  assist;  pt with flexed posture and eyes closed)   Ambulation/Elevation   Gait Assistance   (unable to assess due to lethargy)   Balance   Static Sitting Poor   Static Standing Poor   Activity Tolerance   Activity Tolerance Patient limited by fatigue   Assessment   Prognosis Good   Problem List Decreased cognition;Decreased mobility;Impaired balance;Decreased strength   Assessment Patient is an 83 y.o. year old female seen for Physical Therapy evaluation. Patient admitted with Dementia with behavioral disturbance (HCC).  Comorbidities affecting patient's physical performance include: dementia with behavioural disturbance, anxiety.  Personal factors affecting patient at time of initial evaluation include: inability to ambulate household distances, decreased cognition, anxiety, decreased initiation and engagement, and inability to perform ADLS. Prior to admission, patient was requiring assist with mobility and ADLs.  Please find objective findings from Physical Therapy assessment regarding body systems outlined above with impairments and limitations including weakness, impaired balance, decreased activity tolerance, fall risk, and decreased cognition.  The Barthel Index was used as a functional outcome tool presenting with a score of Barthel Index Score: 15 today indicating marked limitations of functional mobility and ADLS.  Patient's clinical presentation is currently unstable/unpredictable as seen in patient's presentation of increased fall risk, new onset of impairment of functional mobility, and new onset of weakness. Pt would benefit from continued Physical Therapy treatment to address deficits as defined above and maximize level of functional mobility. As demonstrated by objective findings, the assigned level of complexity for this evaluation is high.The patient's -[facility] Basic Mobility Inpatient Short Form Raw Score is 9. A Raw score of less than or equal to 16 suggests the patient may benefit from discharge to  post-acute rehabilitation services. Please also refer to the recommendation of the Physical Therapist for safe discharge planning.   Goals   Patient Goals per family:  for pt to be able to walk and talk again   STG Expiration Date 01/11/24   Short Term Goal #1 Improve bed mobility to moderate assist, improve transfers to moderate assist, patient will ambulate with hand-held assist of 2 x 50 feet   LTG Expiration Date 01/18/24   Long Term Goal #1 Supervision bed mobility, supervision transfers, min assist ambulation indoor level surfaces 100 feet   Plan   Treatment/Interventions Therapeutic exercise;LE strengthening/ROM;Functional transfer training;ADL retraining;Cognitive reorientation;Gait training;Bed mobility;Equipment eval/education   Discharge Recommendation   Rehab Resource Intensity Level, PT II (Moderate Resource Intensity)   AM-PAC Basic Mobility Inpatient   Turning in Flat Bed Without Bedrails 3   Lying on Back to Sitting on Edge of Flat Bed Without Bedrails 2   Moving Bed to Chair 1   Standing Up From Chair Using Arms 1   Walk in Room 1   Climb 3-5 Stairs With Railing 1   Basic Mobility Inpatient Raw Score 9   Turning Head Towards Sound 2   Follow Simple Instructions 1   Low Function Basic Mobility Raw Score  12   Low Function Basic Mobility Standardized Score  18.33   Highest Level Of Mobility   JH-HL Goal 3: Sit at edge of bed   JH-HLM Achieved 4: Move to chair/commode   Barthel Index   Feeding 5   Bathing 0   Grooming Score 0   Dressing Score 0   Bladder Score 0   Bowels Score 0   Toilet Use Score 5   Transfers (Bed/Chair) Score 5   Mobility (Level Surface) Score 0   Stairs Score 0   Barthel Index Score 15   End of Consult   Patient Position at End of Consult All needs within reach;Bed/Chair alarm activated;Bedside chair  (family at bedside, 1:1 virtual)   Licensure   [place] License Number  Ira Young PT [id number]

## 2024-01-05 ENCOUNTER — APPOINTMENT (INPATIENT)
Dept: NON INVASIVE DIAGNOSTICS | Facility: HOSPITAL | Age: 84
DRG: 884 | End: 2024-01-05
Payer: COMMERCIAL

## 2024-01-05 PROBLEM — E87.6 HYPOKALEMIA: Status: RESOLVED | Noted: 2024-01-02 | Resolved: 2024-01-05

## 2024-01-05 LAB
ANION GAP SERPL CALCULATED.3IONS-SCNC: 7 MMOL/L
AORTIC ROOT: 2.8 CM
APICAL FOUR CHAMBER EJECTION FRACTION: 68 %
BUN SERPL-MCNC: 22 MG/DL (ref 5–25)
CALCIUM SERPL-MCNC: 9.4 MG/DL (ref 8.4–10.2)
CARDIAC TROPONIN I PNL SERPL HS: 215 NG/L (ref 8–18)
CHLORIDE SERPL-SCNC: 108 MMOL/L (ref 96–108)
CO2 SERPL-SCNC: 30 MMOL/L (ref 21–32)
CREAT SERPL-MCNC: 1.03 MG/DL (ref 0.6–1.3)
E WAVE DECELERATION TIME: 341 MS
E/A RATIO: 0.72
ERYTHROCYTE [DISTWIDTH] IN BLOOD BY AUTOMATED COUNT: 13.5 % (ref 11.6–15.1)
FRACTIONAL SHORTENING: 43 (ref 28–44)
GFR SERPL CREATININE-BSD FRML MDRD: 50 ML/MIN/1.73SQ M
GLUCOSE SERPL-MCNC: 103 MG/DL (ref 65–140)
HCT VFR BLD AUTO: 36.8 % (ref 34.8–46.1)
HGB BLD-MCNC: 11.5 G/DL (ref 11.5–15.4)
INTERVENTRICULAR SEPTUM IN DIASTOLE (PARASTERNAL SHORT AXIS VIEW): 1.1 CM
INTERVENTRICULAR SEPTUM: 1.1 CM (ref 0.6–1.1)
LAAS-AP2: 18.3 CM2
LAAS-AP4: 16.2 CM2
LEFT ATRIUM SIZE: 3.1 CM
LEFT ATRIUM VOLUME (MOD BIPLANE): 56 ML
LEFT ATRIUM VOLUME INDEX (MOD BIPLANE): 37.8 ML/M2
LEFT INTERNAL DIMENSION IN SYSTOLE: 2.1 CM (ref 2.1–4)
LEFT VENTRICULAR INTERNAL DIMENSION IN DIASTOLE: 3.7 CM (ref 3.5–6)
LEFT VENTRICULAR POSTERIOR WALL IN END DIASTOLE: 1 CM
LEFT VENTRICULAR STROKE VOLUME: 44 ML
LVSV (TEICH): 44 ML
MCH RBC QN AUTO: 32.4 PG (ref 26.8–34.3)
MCHC RBC AUTO-ENTMCNC: 31.3 G/DL (ref 31.4–37.4)
MCV RBC AUTO: 104 FL (ref 82–98)
MV E'TISSUE VEL-LAT: 6 CM/S
MV E'TISSUE VEL-SEP: 4 CM/S
MV PEAK A VEL: 1.22 M/S
MV PEAK E VEL: 88 CM/S
MV STENOSIS PRESSURE HALF TIME: 99 MS
MV VALVE AREA P 1/2 METHOD: 2.22
PLATELET # BLD AUTO: 238 THOUSANDS/UL (ref 149–390)
PMV BLD AUTO: 10.8 FL (ref 8.9–12.7)
POTASSIUM SERPL-SCNC: 4.4 MMOL/L (ref 3.5–5.3)
RBC # BLD AUTO: 3.55 MILLION/UL (ref 3.81–5.12)
RIGHT ATRIUM AREA SYSTOLE A4C: 8.5 CM2
RIGHT VENTRICLE ID DIMENSION: 3 CM
SL CV LEFT ATRIUM LENGTH A2C: 4.6 CM
SL CV LV EF: 60
SL CV PED ECHO LEFT VENTRICLE DIASTOLIC VOLUME (MOD BIPLANE) 2D: 58 ML
SL CV PED ECHO LEFT VENTRICLE SYSTOLIC VOLUME (MOD BIPLANE) 2D: 14 ML
SODIUM SERPL-SCNC: 145 MMOL/L (ref 135–147)
TR MAX PG: 45 MMHG
TR PEAK VELOCITY: 3.4 M/S
TRICUSPID ANNULAR PLANE SYSTOLIC EXCURSION: 1.7 CM
TRICUSPID VALVE PEAK REGURGITATION VELOCITY: 3.35 M/S
WBC # BLD AUTO: 5.86 THOUSAND/UL (ref 4.31–10.16)

## 2024-01-05 PROCEDURE — 99233 SBSQ HOSP IP/OBS HIGH 50: CPT | Performed by: PSYCHIATRY & NEUROLOGY

## 2024-01-05 PROCEDURE — 92610 EVALUATE SWALLOWING FUNCTION: CPT

## 2024-01-05 PROCEDURE — 93306 TTE W/DOPPLER COMPLETE: CPT | Performed by: INTERNAL MEDICINE

## 2024-01-05 PROCEDURE — 84484 ASSAY OF TROPONIN QUANT: CPT | Performed by: NURSE PRACTITIONER

## 2024-01-05 PROCEDURE — 99222 1ST HOSP IP/OBS MODERATE 55: CPT | Performed by: INTERNAL MEDICINE

## 2024-01-05 PROCEDURE — 99232 SBSQ HOSP IP/OBS MODERATE 35: CPT

## 2024-01-05 PROCEDURE — 93306 TTE W/DOPPLER COMPLETE: CPT

## 2024-01-05 PROCEDURE — 85027 COMPLETE CBC AUTOMATED: CPT | Performed by: NURSE PRACTITIONER

## 2024-01-05 PROCEDURE — 80048 BASIC METABOLIC PNL TOTAL CA: CPT | Performed by: NURSE PRACTITIONER

## 2024-01-05 RX ORDER — OLANZAPINE 10 MG/2ML
5 INJECTION, POWDER, FOR SOLUTION INTRAMUSCULAR ONCE
Status: COMPLETED | OUTPATIENT
Start: 2024-01-05 | End: 2024-01-05

## 2024-01-05 RX ORDER — METOPROLOL SUCCINATE 50 MG/1
50 TABLET, EXTENDED RELEASE ORAL
Status: DISCONTINUED | OUTPATIENT
Start: 2024-01-05 | End: 2024-01-08

## 2024-01-05 RX ORDER — SODIUM CHLORIDE 9 MG/ML
75 INJECTION, SOLUTION INTRAVENOUS CONTINUOUS
Status: DISCONTINUED | OUTPATIENT
Start: 2024-01-05 | End: 2024-01-06

## 2024-01-05 RX ADMIN — ENOXAPARIN SODIUM 50 MG: 60 INJECTION SUBCUTANEOUS at 22:33

## 2024-01-05 RX ADMIN — MORPHINE SULFATE 2 MG: 2 INJECTION, SOLUTION INTRAMUSCULAR; INTRAVENOUS at 19:42

## 2024-01-05 RX ADMIN — Medication 1000 UNITS: at 12:49

## 2024-01-05 RX ADMIN — OLANZAPINE 5 MG: 10 INJECTION, POWDER, FOR SOLUTION INTRAMUSCULAR at 22:32

## 2024-01-05 RX ADMIN — ENOXAPARIN SODIUM 50 MG: 60 INJECTION SUBCUTANEOUS at 12:56

## 2024-01-05 RX ADMIN — SODIUM CHLORIDE 75 ML/HR: 0.9 INJECTION, SOLUTION INTRAVENOUS at 16:51

## 2024-01-05 RX ADMIN — LIDOCAINE 1 PATCH: 700 PATCH TOPICAL at 12:57

## 2024-01-05 NOTE — PLAN OF CARE
Problem: Potential for Falls  Goal: Patient will remain free of falls  Description: INTERVENTIONS:  - Educate patient/family on patient safety including physical limitations  - Instruct patient to call for assistance with activity   - Consult OT/PT to assist with strengthening/mobility   - Keep Call bell within reach  - Keep bed low and locked with side rails adjusted as appropriate  - Keep care items and personal belongings within reach  - Initiate and maintain comfort rounds  - Make Fall Risk Sign visible to staff  - Offer Toileting every 2 Hours, in advance of need  - Initiate/Maintain bed alarm  - Obtain necessary fall risk management equipment: slipper socks  - Apply yellow socks and bracelet for high fall risk patients  - Consider moving patient to room near nurses station  Outcome: Progressing     Problem: Prexisting or High Potential for Compromised Skin Integrity  Goal: Skin integrity is maintained or improved  Description: INTERVENTIONS:  - Identify patients at risk for skin breakdown  - Assess and monitor skin integrity  - Assess and monitor nutrition and hydration status  - Monitor labs   - Assess for incontinence   - Turn and reposition patient  - Assist with mobility/ambulation  - Relieve pressure over bony prominences  - Avoid friction and shearing  - Provide appropriate hygiene as needed including keeping skin clean and dry  - Evaluate need for skin moisturizer/barrier cream  - Collaborate with interdisciplinary team   - Patient/family teaching  - Consider wound care consult   Outcome: Progressing     Problem: PAIN - ADULT  Goal: Verbalizes/displays adequate comfort level or baseline comfort level  Description: Interventions:  - Encourage patient to monitor pain and request assistance  - Assess pain using appropriate pain scale  - Administer analgesics based on type and severity of pain and evaluate response  - Implement non-pharmacological measures as appropriate and evaluate response  - Consider  cultural and social influences on pain and pain management  - Notify physician/advanced practitioner if interventions unsuccessful or patient reports new pain  Outcome: Progressing     Problem: INFECTION - ADULT  Goal: Absence or prevention of progression during hospitalization  Description: INTERVENTIONS:  - Assess and monitor for signs and symptoms of infection  - Monitor lab/diagnostic results  - Monitor all insertion sites, i.e. indwelling lines, tubes, and drains  - Monitor endotracheal if appropriate and nasal secretions for changes in amount and color  - [place] appropriate cooling/warming therapies per order  - Administer medications as ordered  - Instruct and encourage patient and family to use good hand hygiene technique  - Identify and instruct in appropriate isolation precautions for identified infection/condition  Outcome: Progressing  Goal: Absence of fever/infection during neutropenic period  Description: INTERVENTIONS:  - Monitor WBC    Outcome: Progressing     Problem: DISCHARGE PLANNING  Goal: Discharge to home or other facility with appropriate resources  Description: INTERVENTIONS:  - Identify barriers to discharge w/patient and caregiver  - Arrange for needed discharge resources and transportation as appropriate  - Identify discharge learning needs (meds, wound care, etc.)  - Arrange for interpretive services to assist at discharge as needed  - Refer to Case Management Department for coordinating discharge planning if the patient needs post-hospital services based on physician/advanced practitioner order or complex needs related to functional status, cognitive ability, or social support system  Outcome: Progressing     Problem: Knowledge Deficit  Goal: Patient/family/caregiver demonstrates understanding of disease process, treatment plan, medications, and discharge instructions  Description: Complete learning assessment and assess knowledge base.  Interventions:  - Provide teaching at level of  understanding  - Provide teaching via preferred learning methods  Outcome: Progressing     Problem: NEUROSENSORY - ADULT  Goal: Achieves stable or improved neurological status  Description: INTERVENTIONS  - Monitor and report changes in neurological status  - Monitor vital signs such as temperature, blood pressure, glucose, and any other labs ordered   - Initiate measures to prevent increased intracranial pressure  - Monitor for seizure activity and implement precautions if appropriate      Outcome: Progressing  Goal: Achieves maximal functionality and self care  Description: INTERVENTIONS  - Monitor swallowing and airway patency with patient fatigue and changes in neurological status  - Encourage and assist patient to increase activity and self care.   - Encourage visually impaired, hearing impaired and aphasic patients to use assistive/communication devices  Outcome: Progressing     Problem: GENITOURINARY - ADULT  Goal: Maintains or returns to baseline urinary function  Description: INTERVENTIONS:  - Assess urinary function  - Encourage oral fluids to ensure adequate hydration if ordered  - Administer IV fluids as ordered to ensure adequate hydration  - Administer ordered medications as needed  - Offer frequent toileting  - Follow urinary retention protocol if ordered  Outcome: Progressing  Goal: Absence of urinary retention  Description: INTERVENTIONS:  - Assess patient’s ability to void and empty bladder  - Monitor I/O  - Bladder scan as needed  - Discuss with physician/AP medications to alleviate retention as needed  - Discuss catheterization for long term situations as appropriate  Outcome: Progressing     Problem: METABOLIC, FLUID AND ELECTROLYTES - ADULT  Goal: Electrolytes maintained within normal limits  Description: INTERVENTIONS:  - Monitor labs and assess patient for signs and symptoms of electrolyte imbalances  - Administer electrolyte replacement as ordered  - Monitor response to electrolyte  replacements, including repeat lab results as appropriate  - Instruct patient on fluid and nutrition as appropriate  Outcome: Progressing  Goal: Fluid balance maintained  Description: INTERVENTIONS:  - Monitor labs   - Monitor I/O and WT  - Instruct patient on fluid and nutrition as appropriate  - Assess for signs & symptoms of volume excess or deficit  Outcome: Progressing     Problem: HEMATOLOGIC - ADULT  Goal: Maintains hematologic stability  Description: INTERVENTIONS  - Assess for signs and symptoms of bleeding or hemorrhage  - Monitor labs  - Administer supportive blood products/factors as ordered and appropriate  Outcome: Progressing     Problem: Nutrition/Hydration-ADULT  Goal: Nutrient/Hydration intake appropriate for improving, restoring or maintaining nutritional needs  Description: Monitor and assess patient's nutrition/hydration status for malnutrition. Collaborate with interdisciplinary team and initiate plan and interventions as ordered.  Monitor patient's weight and dietary intake as ordered or per policy. Utilize nutrition screening tool and intervene as necessary. Determine patient's food preferences and provide high-protein, high-caloric foods as appropriate.     INTERVENTIONS:  - Monitor oral intake, urinary output, labs, and treatment plans  - Assess nutrition and hydration status and recommend course of action  - Evaluate amount of meals eaten  - Assist patient with eating if necessary   - Allow adequate time for meals  - Recommend/ encourage appropriate diets, oral nutritional supplements, and vitamin/mineral supplements  - Order, calculate, and assess calorie counts as needed  - Recommend, monitor, and adjust tube feedings and TPN/PPN based on assessed needs  - Assess need for intravenous fluids  - Provide specific nutrition/hydration education as appropriate  - Include patient/family/caregiver in decisions related to nutrition  Outcome: Progressing

## 2024-01-05 NOTE — SPEECH THERAPY NOTE
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    Electrolyte abnormality 10/02/2022    Hypertension     Memory change     Nodule of left lung     Pleural thickening        Past Surgical History  Past Surgical History:   Procedure Laterality Date    CATARACT EXTRACTION Left 05/2020    CATARACT EXTRACTION Left 06/2020    MASTECTOMY      FL XCAPSL CTRC RMVL INSJ IO LENS PROSTH W/O ECP Left 6/8/2020    Procedure: EXTRACTION EXTRACAPSULAR CATARACT PHACO INTRAOCULAR LENS (IOL);  Surgeon: Cortes Harrison MD;  Location: [facility] MAIN OR;  Service: Ophthalmology       Summary   Pt presented with inability to sustain alertness with HIGH risk for aspiration and dehydration at this time.    Recommended Diet: NPO (or NPO except medications if more alert)  Recommended Form of Meds: IV (or crushed and in puree)  Aspiration precautions and swallowing strategies: upright posture, only feed when fully alert, and slow rate of feeding  Other Recommendations: Continue frequent oral care        Current Medical Status  Monika Butler is a 83 y.o. female with a PMH of dementia, HTN who presents with increased confusion, agitation.  Family at bedside report patient has not eaten or taken her medications last few days and are unsure if she has been getting her medication.   Assessment    Dementia with behavioral disturbance (HCC)    Essential hypertension    Hypokalemia    Abnormal urinalysis     Plan  Dementia with behavioral disturbance.  Likely secondary to recent relocation to long-term care facility.  Also evidence of UTI.  Agree with  ceftriaxone.  Discontinue buspirone as this could be leading to mood changes.  Will schedule olanzapine 2.5 mg in the morning and 5 mg at bedtime.  Psychiatry consulted.  Primary hypertension.  Likely secondary to agitation.  Continue losartan and metoprolol.  Hypokalemia.  Replace and recheck tomorrow.    This pm, pt presented with s/s concerning for dysphagia and aspiration/risk with food and liquid.  Pt made NPO and SLP Swallowing Evaluation ordered at this time. Dtr Anitha present for initiation of assessment today.    Current Precautions:  Fall     Allergies:  No known food allergies    Past medical history:  Please see H&P for details    Special Studies:  1/4 CT of head:  No acute intracranial abnormality.  Stable marked chronic small vessel ischemic changes.  Stable 1.3 cm meningioma at the left posterior lateral middle cranial fossa.     Social/Education/Vocational Hx:  Pt lives at [facility].  Had lived with or next to dtr before recent admit (was assisted by dtr daily)    Swallow Information   Current Symptoms/Concerns: coughing on water, coughed up phlegm and applesauce after medication administration in applesauce  Current Diet: NPO   Baseline Diet: regular diet and thin liquids      Baseline Assessment   Behavior/Cognition: lethargic/ low alertness level  Speech/Language Status: not able to follow commands and limited verbal output  Patient Positioning: upright in bed  Pain Status/Interventions/Response to Interventions:  No report of or nonverbal indications of pain.       Swallow Mechanism Exam  Labial/Facial: unable to test 2/2 limited command following  Lingual: unable to test 2/2 limited command following (but intelligible speech at times)  Velum: unable to visualize  Mandible: unable to test 2/2 limited command following  Dentition: adequate  Vocal quality:minimal speech produced or elicited   Respiratory Status: on RA       Consistencies Assessed and Performance   Consistencies Administered:  nectar thick clear liquid    Oral Stage: IMPAIRED  Inconsistent retrieval, bolus formation and transfer (began evaluation with ability to retrieve material by tsp and (small sips by) cup. After 1st tsps/sips, delayed then absent transfer noted-->Material spilled out lips.    Pharyngeal Stage: HIGH RISK for aspiration at this time given severity of oral/cognitively controlled stage/low LOC.  Swallow Mechanics:   Laryngeal rise was palpated and judged to be within functional limits.  No coughing, throat clearing, change in vocal quality or respiratory status noted today with initial tsps    Strategies and Efficacy: pt unable to sustain alertness and participation despite max multimodality stimulation    Summary and Recommendations (see above)    Results Reviewed with: patient, RN, MD, and family dtr Anitha    Diagnostic Treatment Recommended    Frequency of treatment: as medically and clinically indicated.     Dysphagia LTG  -Patient will demonstrate safe and effective oral intake (without overt s/s significant oral/pharyngeal dysphagia including s/s penetration or aspiration) for the highest appropriate diet level.     Short Term Goals to follow continued assessment.     Thank you for this referral.  Please do not hesitate to contact me with any questions or concerns.    Vesta Dumont MS CCC-SLP  [place] License [id number]  [place] License [id number]  Available via [employer]

## 2024-01-05 NOTE — ASSESSMENT & PLAN NOTE
Troponin noted to be elevated at 24 on 1/3, repeated 1/4 elevated 300.  Consulted cardiology  Follow up on echocardiogram  Stress test canceled by cardiology for patient agitation  Monitor

## 2024-01-05 NOTE — OCCUPATIONAL THERAPY NOTE
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    Electrolyte abnormality 10/02/2022    Hypertension     Memory change     Nodule of left lung     Pleural thickening      Past Surgical History  Past Surgical History:   Procedure Laterality Date    CATARACT EXTRACTION Left 05/2020    CATARACT EXTRACTION Left 06/2020    MASTECTOMY      MD XCAPSL CTRC RMVL INSJ IO LENS PROSTH W/O ECP Left 6/8/2020    Procedure: EXTRACTION EXTRACAPSULAR CATARACT PHACO INTRAOCULAR LENS (IOL);  Surgeon: Cortes Harrison MD;  Location: [facility] MAIN OR;  Service: Ophthalmology        01/05/24 1011   Note Type   Note type Cancelled Session  (Friday 1/5/24)   Additional Comments OT orders received and chart review completed. Spoke w/ RNMelisa. Pt was agitated earlier and refusing testing. Presently sleeping, resting comfortably. Will cancel and continue to follow as appropriate / schedule allows.     Lakia Garcia, OTR/L  [id number]  [id number]

## 2024-01-05 NOTE — PLAN OF CARE
Problem: Potential for Falls  Goal: Patient will remain free of falls  Description: INTERVENTIONS:  - Educate patient/family on patient safety including physical limitations  - Instruct patient to call for assistance with activity   - Consult OT/PT to assist with strengthening/mobility   - Keep Call bell within reach  - Keep bed low and locked with side rails adjusted as appropriate  - Keep care items and personal belongings within reach  - Initiate and maintain comfort rounds  - Make Fall Risk Sign visible to staff  - Offer Toileting every 2 Hours, in advance of need  - Initiate/Maintain bed alarm  - Obtain necessary fall risk management equipment: yes   - Apply yellow socks and bracelet for high fall risk patients  - Consider moving patient to room near nurses station  Outcome: Progressing     Problem: Prexisting or High Potential for Compromised Skin Integrity  Goal: Skin integrity is maintained or improved  Description: INTERVENTIONS:  - Identify patients at risk for skin breakdown  - Assess and monitor skin integrity  - Assess and monitor nutrition and hydration status  - Monitor labs   - Assess for incontinence   - Turn and reposition patient  - Assist with mobility/ambulation  - Relieve pressure over bony prominences  - Avoid friction and shearing  - Provide appropriate hygiene as needed including keeping skin clean and dry  - Evaluate need for skin moisturizer/barrier cream  - Collaborate with interdisciplinary team   - Patient/family teaching  - Consider wound care consult   Outcome: Progressing     Problem: PAIN - ADULT  Goal: Verbalizes/displays adequate comfort level or baseline comfort level  Description: Interventions:  - Encourage patient to monitor pain and request assistance  - Assess pain using appropriate pain scale  - Administer analgesics based on type and severity of pain and evaluate response  - Implement non-pharmacological measures as appropriate and evaluate response  - Consider cultural  and social influences on pain and pain management  - Notify physician/advanced practitioner if interventions unsuccessful or patient reports new pain  Outcome: Progressing     Problem: INFECTION - ADULT  Goal: Absence or prevention of progression during hospitalization  Description: INTERVENTIONS:  - Assess and monitor for signs and symptoms of infection  - Monitor lab/diagnostic results  - Monitor all insertion sites, i.e. indwelling lines, tubes, and drains  - Monitor endotracheal if appropriate and nasal secretions for changes in amount and color  - [place] appropriate cooling/warming therapies per order  - Administer medications as ordered  - Instruct and encourage patient and family to use good hand hygiene technique  - Identify and instruct in appropriate isolation precautions for identified infection/condition  Outcome: Progressing  Goal: Absence of fever/infection during neutropenic period  Description: INTERVENTIONS:  - Monitor WBC    Outcome: Progressing     Problem: DISCHARGE PLANNING  Goal: Discharge to home or other facility with appropriate resources  Description: INTERVENTIONS:  - Identify barriers to discharge w/patient and caregiver  - Arrange for needed discharge resources and transportation as appropriate  - Identify discharge learning needs (meds, wound care, etc.)  - Arrange for interpretive services to assist at discharge as needed  - Refer to Case Management Department for coordinating discharge planning if the patient needs post-hospital services based on physician/advanced practitioner order or complex needs related to functional status, cognitive ability, or social support system  Outcome: Progressing     Problem: Knowledge Deficit  Goal: Patient/family/caregiver demonstrates understanding of disease process, treatment plan, medications, and discharge instructions  Description: Complete learning assessment and assess knowledge base.  Interventions:  - Provide teaching at level of  understanding  - Provide teaching via preferred learning methods  Outcome: Progressing     Problem: NEUROSENSORY - ADULT  Goal: Achieves stable or improved neurological status  Description: INTERVENTIONS  - Monitor and report changes in neurological status  - Monitor vital signs such as temperature, blood pressure, glucose, and any other labs ordered   - Initiate measures to prevent increased intracranial pressure  - Monitor for seizure activity and implement precautions if appropriate      Outcome: Progressing  Goal: Achieves maximal functionality and self care  Description: INTERVENTIONS  - Monitor swallowing and airway patency with patient fatigue and changes in neurological status  - Encourage and assist patient to increase activity and self care.   - Encourage visually impaired, hearing impaired and aphasic patients to use assistive/communication devices  Outcome: Progressing     Problem: GENITOURINARY - ADULT  Goal: Maintains or returns to baseline urinary function  Description: INTERVENTIONS:  - Assess urinary function  - Encourage oral fluids to ensure adequate hydration if ordered  - Administer IV fluids as ordered to ensure adequate hydration  - Administer ordered medications as needed  - Offer frequent toileting  - Follow urinary retention protocol if ordered  Outcome: Progressing  Goal: Absence of urinary retention  Description: INTERVENTIONS:  - Assess patient’s ability to void and empty bladder  - Monitor I/O  - Bladder scan as needed  - Discuss with physician/AP medications to alleviate retention as needed  - Discuss catheterization for long term situations as appropriate  Outcome: Progressing     Problem: METABOLIC, FLUID AND ELECTROLYTES - ADULT  Goal: Electrolytes maintained within normal limits  Description: INTERVENTIONS:  - Monitor labs and assess patient for signs and symptoms of electrolyte imbalances  - Administer electrolyte replacement as ordered  - Monitor response to electrolyte  replacements, including repeat lab results as appropriate  - Instruct patient on fluid and nutrition as appropriate  Outcome: Progressing  Goal: Fluid balance maintained  Description: INTERVENTIONS:  - Monitor labs   - Monitor I/O and WT  - Instruct patient on fluid and nutrition as appropriate  - Assess for signs & symptoms of volume excess or deficit  Outcome: Progressing     Problem: HEMATOLOGIC - ADULT  Goal: Maintains hematologic stability  Description: INTERVENTIONS  - Assess for signs and symptoms of bleeding or hemorrhage  - Monitor labs  - Administer supportive blood products/factors as ordered and appropriate  Outcome: Progressing     Problem: Nutrition/Hydration-ADULT  Goal: Nutrient/Hydration intake appropriate for improving, restoring or maintaining nutritional needs  Description: Monitor and assess patient's nutrition/hydration status for malnutrition. Collaborate with interdisciplinary team and initiate plan and interventions as ordered.  Monitor patient's weight and dietary intake as ordered or per policy. Utilize nutrition screening tool and intervene as necessary. Determine patient's food preferences and provide high-protein, high-caloric foods as appropriate.     INTERVENTIONS:  - Monitor oral intake, urinary output, labs, and treatment plans  - Assess nutrition and hydration status and recommend course of action  - Evaluate amount of meals eaten  - Assist patient with eating if necessary   - Allow adequate time for meals  - Recommend/ encourage appropriate diets, oral nutritional supplements, and vitamin/mineral supplements  - Order, calculate, and assess calorie counts as needed  - Recommend, monitor, and adjust tube feedings and TPN/PPN based on assessed needs  - Assess need for intravenous fluids  - Provide specific nutrition/hydration education as appropriate  - Include patient/family/caregiver in decisions related to nutrition  Outcome: Progressing

## 2024-01-05 NOTE — NURSING NOTE
Patient refusing EKG at this time. Pulling blankets over head and pushing staff away when given reassurance and repositioning. Will try again at a later time. Radha Holman made aware

## 2024-01-05 NOTE — PROGRESS NOTES
"Progress Note - Behavioral Health   Monika Butler 83 y.o. female MRN: [id number]  Unit/Bed#: [address] [place] [address] Encounter: [id number]          I came to see the patient for continuity of care.  Patient's daughter and son-in-law were present in the interview.  Patient was irritable and minimally cooperative.  She repeatedly stated \"get out\".  She appeared to be trying to sleep. She was scant with her responses.  She reports feeling \"cold\".  Denies being in pain.  She denies suicidal or homicidal ideation.      Patient's daughter (whom I had previously met and spoke with on the day of patient's psychiatric consultation during patient's previous admission here on 12/11/2023-please see my note from then note for more information) and son-in-law were present in the room and they reported that they feel that the patient's condition has worsened over the past month.  They discussed patient's behaviors had worsened when patient was transferred from [facility] to [facility].  Discussed with patient's family my evaluation with the patient during the consultation and having decreased her total dosage of Zyprexa and changing daytime and bedtime dosage amounts in order to decrease daytime sedation.  Questions answered.    Medication side effects: Unable to assess due to patient factors  ROS: Unable to assess due to patient factors    Mental Status Evaluation:  Appearance:  appears stated age and laying in bed   Behavior:  minimally cooperative and eyes closed   Speech:  scant and loud   Mood:  Does not answer   Affect:  Constricted and Irritable   Language: Unable to assess due to patient factors   Thought Process:  Unable to assess due to patient factors   Associations: Unable to assess due to patient factors   Thought Content:  Unable to assess due to patient factors   Perceptual Disturbances: Unable to assess due to patient factors   Risk Potential: Denies suicidal or homicidal ideation   Sensorium:  Unable to " "assess due to patient factors   Memory:  Appears grossly impaired   Cognition:  Appears grossly impaired   Consciousness:  Appears less sedated compared to previous evaluation.  Appears alert   Attention: attention span appeared shorter than expected for age   Intellect: Unable to assess due to patient factors   Fund of Knowledge: Unable to assess due to patient factors   Insight:  poor   Judgment: poor   Muscle Strength and Tone: Not assessed   Gait/Station: Not assessed, patient in bed   Motor Activity: no abnormal movements         Assessment/Plan  Monika Butler is a 83 y.o. female with past medical history of dementia with psychosis, dementia with behavioral disturbance, hypertension who presented to the ED for altered mental status and is admitted for dementia with behavioral disturbance.  Psychiatric consultation was performed 1/3/2024-please see my consult note from then for more information.  During the initial evaluation patient had appeared to be sedated suspected as a result of as needed medications she had previously received.  Therefore patient's total Zyprexa dosage had been decreased and dosing was changed to 1.25 mg daily and 3.75 mg qhs to decrease daytime sedation.  Patient appears to be less sedated today.  She appeared to be alert however was minimally cooperative, irritable, and she appeared to be trying to sleep.  She repeatedly stated \"get out\".  Although patient appeared to be irritable and repeatedly stated \"get out\" she however did not appear to exhibit any overt/significant agitated or aggressive behaviors during my evaluation.  She reports feeling \"cold\".  Denies being in pain.  Denies suicidal or homicidal ideation.  Discussed with patient's daughter and son-in-law who were present in the room and they reported that they feel that the patient's condition has worsened over the past month and recounted patient's behaviors had worsened when patient was transferred to a different facility. " Discussed with patient's daughter and son-in-law my evaluation with the patient during the consultation and having decreased her total dosage of Zyprexa and changing daytime and bedtime dosage amounts in order to decrease daytime sedation.  Questions answered.  Discussed that etiology of patient's reported worsening of behaviors is not fully clear.  I discussed with patient's family the progressive nature of dementia.  I discussed with patient the psychiatric medication changes that were made.  Discussed that I do not recommend increasing patient's psychiatric medications for agitation at this time due to concerns of sedation (which appear to be improved today compared to previous evaluation). Discussed plan to await further therapeutic effect of psychiatric medication changes that were made 2 days ago and patient's daughter and son-in-law verbalized understanding and were in agreement.  Subsequently discussed with the primary team and also with cardiology.  Primary team, cardiology, myself went together to discuss with patient's patient's family.      Diagnosis:  Dementia with behavioral disturbance    Recommended Treatment:   Continue medical management  Continue virtual one-to-one for safety  There is no clear indication for inpatient psychiatric hospitalization at this time  Continue Zyprexa 1.25 mg daily and 3.75 mg qhs  Hold dose if patient is sedated  Avoid utilizing benzodiazapines if possible as they are associated with increased risks of adverse effects in patients with dementia and are not recommended in the treatment of neuropsychiatric symptoms of dementia  Do not administer IM/IV benzodiazapines if possible within 1 hour of administration of IM Zyprexa as this is associated with excessive sedation and cardiorespiratory suppression  Can continue Zyprexa PO 1.25 q8hr prn severe agitation with Zyprexa 1.3 mg IM alternative (if oral route is not available), if patient requires prn medication for severe  agitation  Discussed with the primary team  Psychiatry to follow up as necessary.  Please contact psychiatry consultation role in [facility] with questions/follow-up  For after hours and weekends please contact [facility] for psychiatric purposes         Medications: all current active meds have been reviewed and continue current psychiatric medications      Risks, benefits and possible side effects of Medications:     Risks, benefits, and possible side effects of medications explained have been previously explained to patient's daughter      Labs: I have personally reviewed all pertinent laboratory results.     I have personally reviewed all pertinent laboratory/tests results.  Labs in last 72 hours:   Recent Labs     01/02/24  1538 01/03/24  0449 01/04/24  1639 01/05/24  0559   WBC 4.52 5.80   < > 5.86   RBC 3.22* 3.82   < > 3.55*   HGB 10.6* 12.5   < > 11.5   HCT 32.5* 38.8   < > 36.8    237   < > 238   RDW 13.0 13.1   < > 13.5   NEUTROABS 2.29  --   --   --    SODIUM 144 143   < > 145   K 3.0* 3.3*   < > 4.4    105   < > 108   CO2 31 28   < > 30   BUN 17 12   < > 22   CREATININE 0.77 0.60   < > 1.03   GLUC 87 104   < > 103   GLUF  --  104*  --   --    CALCIUM 9.0 9.3   < > 9.4   AST 26  --   --   --    ALT 30  --   --   --    ALKPHOS 69  --   --   --    TP 7.2  --   --   --    ALB 4.0  --   --   --    TBILI 0.46  --   --   --     < > = values in this interval not displayed.         Ash Silvestre,   01/05/24      This note has been constructed using a voice recognition system.    There may be translation, syntax,  or grammatical errors. If you have any questions, please contact the dictating provider.

## 2024-01-05 NOTE — PROGRESS NOTES
[facility]  Progress Note  Name: Monika Butler I  MRN: [id number]  Unit/Bed#: [address] [place] [address] Date of Admission: 1/2/2024   Date of Service: 1/5/2024 I Hospital Day: 3    Assessment/Plan   * Dementia with behavioral disturbance (HCC)  Assessment & Plan  Patient has history of dementia with behavioral disturbances, worsening over past several months. Has been getting more agitated and refusing medications since yesterday. Prior facility said she needs a higher level of care and will not accept patient back.  UA showed moderate leukocytes and bacteria, completed ceftriaxone IV for 3 doses  Continue current home medications including aricept and zyprexa 2.5 mg hs  Patient was started at [facility], discontinued  Psychiatry consulted; recommending to avoid Ativan for sedation/agitation, decreased Zyprexa to 1.25 mg p.o. daily in the morning and Zyprexa 3.75 mg p.o. nightly.  Patient very lethargic this evening, family concerned.    Stat CT of head: showed no acute intracranial abnormality but demonstrated stable marked chronic small vessel ischemic changes  and stable 1.3 cm meningioma at the left posterior lateral middle cranial fossa.  Supportive care.    Essential hypertension  Assessment & Plan  /97 on arrival, likely in setting of noncompliance and agitation  Continue home losartan 100 mg daily and metoprolol 50 mg daily  Hydralazine prn    Elevated troponin  Assessment & Plan  Troponin noted to be elevated at 24 on 1/3, repeated 1/4 elevated 300.  Consulted cardiology  Follow up on echocardiogram  Stress test canceled by cardiology for patient agitation  Monitor    Abnormal urinalysis  Assessment & Plan  UA positive for leukocytes  Urine culture mixed contaminants  Completed rocephin 3 doses    Hypokalemia-resolved as of 1/5/2024  Assessment & Plan  Potassium 3.3  Repleted with PO KCl 40 meq  Monitor bmp               VTE Pharmacologic Prophylaxis: VTE Score: 3 High Risk  "(Score >/= 5) - Pharmacological DVT Prophylaxis Ordered: enoxaparin (Lovenox). Sequential Compression Devices Ordered.    Mobility:   Basic Mobility Inpatient Raw Score: 9  -HLM Goal: 3: Sit at edge of bed  JH-HLM Achieved: 2: Bed activities/Dependent transfer (refused)  HLM Goal NOT achieved. Continue with multidisciplinary rounding and encourage appropriate mobility to improve upon HLM goals.    Patient Centered Rounds: I performed bedside rounds with nursing staff today.   Discussions with Specialists or Other Care Team Provider: Nursing, Psych, Cardiology, CM    Education and Discussions with Family / Patient: Updated  (daughter and son in law) at bedside.    Total Time Spent on Date of Encounter in care of patient: 50 mins. This time was spent on one or more of the following: performing physical exam; counseling and coordination of care; obtaining or reviewing history; documenting in the medical record; reviewing/ordering tests, medications or procedures; communicating with other healthcare professionals and discussing with patient's family/caregivers.    Current Length of Stay: 3 day(s)  Current Patient Status: Inpatient   Certification Statement: The patient will continue to require additional inpatient hospital stay due to pending placement  Discharge Plan: Anticipate discharge in 24-48 hrs to discharge location to be determined pending rehab evaluations.    Code Status: Level 1 - Full Code    Subjective:   Patient seen and examined at bedside.  Patient uncooperative with examination, agitated and trying to hit provider yelling \"stay away from me\".  Echocardiogram and stress test were canceled as patient was hitting the technicians.  Spoke with daughter and son-in-law at bedside and they requested that they will assist in calming the patient for echocardiogram.  Cardiology reordered echocardiogram, will follow-up on the results.      Objective:     Vitals:   Temp (24hrs), Av.4 °F (36.9 " °C), Min:98.2 °F (36.8 °C), Max:98.6 °F (37 °C)    Temp:  [98.2 °F (36.8 °C)-98.6 °F (37 °C)] 98.2 °F (36.8 °C)  HR:  [] 77  Resp:  [18] 18  BP: (127-177)/(67-87) 127/67  SpO2:  [91 %-96 %] 93 %  Body mass index is 21.37 kg/m².     Input and Output Summary (last 24 hours):   No intake or output data in the 24 hours ending 01/05/24 1711    Physical Exam:   Physical Exam  HENT:      Head: Normocephalic.   Cardiovascular:      Rate and Rhythm: Normal rate.   Skin:     General: Skin is warm and dry.   Neurological:      Mental Status: She is alert. Mental status is at baseline.   Psychiatric:         Mood and Affect: Affect is angry.         Behavior: Behavior is uncooperative and agitated.         Judgment: Judgment is inappropriate.          Additional Data:     Labs:  Results from last 7 days   Lab Units 01/05/24  0559 01/03/24 0449 01/02/24  1538   WBC Thousand/uL 5.86   < > 4.52   HEMOGLOBIN g/dL 11.5   < > 10.6*   HEMATOCRIT % 36.8   < > 32.5*   PLATELETS Thousands/uL 238   < > 251   NEUTROS PCT %  --   --  51   LYMPHS PCT %  --   --  31   MONOS PCT %  --   --  13*   EOS PCT %  --   --  4    < > = values in this interval not displayed.     Results from last 7 days   Lab Units 01/05/24  0559 01/03/24 0449 01/02/24  1538   SODIUM mmol/L 145   < > 144   POTASSIUM mmol/L 4.4   < > 3.0*   CHLORIDE mmol/L 108   < > 104   CO2 mmol/L 30   < > 31   BUN mg/dL 22   < > 17   CREATININE mg/dL 1.03   < > 0.77   ANION GAP mmol/L 7   < > 9   CALCIUM mg/dL 9.4   < > 9.0   ALBUMIN g/dL  --   --  4.0   TOTAL BILIRUBIN mg/dL  --   --  0.46   ALK PHOS U/L  --   --  69   ALT U/L  --   --  30   AST U/L  --   --  26   GLUCOSE RANDOM mg/dL 103   < > 87    < > = values in this interval not displayed.         Results from last 7 days   Lab Units 01/02/24  1544   POC GLUCOSE mg/dl 85               Lines/Drains:  Invasive Devices       Peripheral Intravenous Line  Duration             Peripheral IV 01/02/24 Right Antecubital 3 days                           Imaging: No pertinent imaging reviewed.    Recent Cultures (last 7 days):   Results from last 7 days   Lab Units 01/02/24  1713   URINE CULTURE  >100,000 cfu/ml       Last 24 Hours Medication List:   Current Facility-Administered Medications   Medication Dose Route Frequency Provider Last Rate    acetaminophen  650 mg Oral Q6H PRN Leyla Guardado PA-C      cefTRIAXone  1,000 mg Intravenous Q24H DAMARIS Meza 1,000 mg (01/03/24 1941)    cholecalciferol  1,000 Units Oral Daily Leyla Guardado PA-C      docusate sodium  100 mg Oral BID Leyla Guardado PA-C      donepezil  10 mg Oral HS Leyla Guardado PA-C      enoxaparin  1 mg/kg Subcutaneous Q12H [facility] DAMARIS Meza      lidocaine  1 patch Topical Daily DAMARIS Meza      loratadine  10 mg Oral Daily DAMARIS Meza      losartan  100 mg Oral Daily Leyla Guardado PA-C      melatonin  6 mg Oral HS Manjit Perez,       metoprolol succinate  50 mg Oral HS DAMARIS Addison      morphine injection  2 mg Intravenous Q4H PRN Gonzales Pinto MD      OLANZapine  1.25 mg Oral Q8H PRN Ash Silvestre, DO      Or    OLANZapine  1.3 mg Intramuscular Q8H PRN Ash Silvestre, DO      OLANZapine  1.25 mg Oral Daily Ash Silvestre, DO      And    OLANZapine  3.75 mg Oral HS Ash Silvestre DO      ondansetron  4 mg Intravenous Q6H PRN Leyla Guardado PA-C      sodium chloride  75 mL/hr Intravenous Continuous DAMARIS Engel 75 mL/hr (01/05/24 1651)        Today, Patient Was Seen By: DAMARIS Engel    **Please Note: This note may have been constructed using a voice recognition system.**

## 2024-01-05 NOTE — CONSULTS
Consultation - Cardiology   Monika Butler 83 y.o. female MRN: [id number]  Unit/Bed#: [address] [place] [address] Encounter: [id number]    Assessment/Plan     Assessment:  1.  Dementia with behavioral disturbance  2.  Non-MI troponin elevation  3.  Hypertension      Plan:  Patient has been admitted to the hospitalist service  1.  Attempts to obtain 2D echocardiogram and EKG have been unsuccessful due to behavioral disturbances.  Will cancel these tests at this time.    2.  Will discontinue patient's short acting Lopressor and change to Toprol-XL 50 mg once a day for easier medication administration    3.  Continue Cozaar 100 mg daily    4.  Blood pressure appears to be improving with regular medications    5.  Due to patient's behavioral issues, at this time would recommend optimal medical therapy with beta-blocker and ARB.    Thank you for this consult.      History of Present Illness   Physician Requesting Consult: Manjit Perez DO  Reason for Consult / Principal Problem: Abnormal troponins      HPI: Monika Butler is a 83 y.o. year old female who was admitted on 1/2/2024 with behavioral issues.  Patient unfortunately has dementia and her behaviors seem to be escalating.  She was recently transferred to the [facility].  Since her admission there her behaviors have escalated requiring one-to-one care and also she has been refusing to take medication and has been assaultive to the staff.  Initially was concern for UTI with moderate leukocytosis and bacteria on recent UA and patient has been treated with ceftriaxone.  Patient continues to have aggressive behaviors to staff when she is awoken or stimulated.    Incidental findings, troponins were obtained and patient's high-sensitivity troponins were 24, 300, 266 and 215.  They are relatively flat and most likely due to nonischemic troponin elevation due to UTI, behavioral issues and uncontrolled hypertension on admission due to noncompliance with  medication and agitation.  Patient, per staff, has not had any complaints of chest discomfort.  Blood pressures are slowly improving with resumption of her medications.  Telemetry demonstrates sinus rhythm without any ectopy.        Inpatient consult to Cardiology  Consult performed by: DAMARIS Addison  Consult ordered by: DAMARIS Meza          Review of Systems   Unable to perform ROS: Dementia       Historical Information   Past Medical History:   Diagnosis Date    Acute bronchiolitis 12/12/2023    Allergic     Cancer (HCC) 2005    left breast mastectomy    Electrolyte abnormality 10/02/2022    Hypertension     Memory change     Nodule of left lung     Pleural thickening      Past Surgical History:   Procedure Laterality Date    CATARACT EXTRACTION Left 05/2020    CATARACT EXTRACTION Left 06/2020    MASTECTOMY      HI XCAPSL CTRC RMVL INSJ IO LENS PROSTH W/O ECP Left 6/8/2020    Procedure: EXTRACTION EXTRACAPSULAR CATARACT PHACO INTRAOCULAR LENS (IOL);  Surgeon: Cortes Harrison MD;  Location: [facility] MAIN OR;  Service: Ophthalmology     Social History     Substance and Sexual Activity   Alcohol Use Never     Social History     Substance and Sexual Activity   Drug Use Never     E-Cigarette/Vaping    E-Cigarette Use Never User      E-Cigarette/Vaping Substances    Nicotine No     THC No     CBD No      Social History     Tobacco Use   Smoking Status Never   Smokeless Tobacco Never     Family History: History reviewed. No pertinent family history.    Meds/Allergies   all current active meds have been reviewed, current meds:   Current Facility-Administered Medications   Medication Dose Route Frequency    acetaminophen (TYLENOL) tablet 650 mg  650 mg Oral Q6H PRN    cefTRIAXone (ROCEPHIN) IVPB (premix in dextrose) 1,000 mg 50 mL  1,000 mg Intravenous Q24H    cholecalciferol (VITAMIN D3) tablet 1,000 Units  1,000 Units Oral Daily    docusate sodium (COLACE) capsule 100 mg  100 mg Oral BID    donepezil  (ARICEPT) tablet 10 mg  10 mg Oral HS    enoxaparin (LOVENOX) subcutaneous injection 50 mg  1 mg/kg Subcutaneous Q12H JAYMIE    lidocaine (LIDODERM) 5 % patch 1 patch  1 patch Topical Daily    loratadine (CLARITIN) tablet 10 mg  10 mg Oral Daily    losartan (COZAAR) tablet 100 mg  100 mg Oral Daily    melatonin tablet 6 mg  6 mg Oral HS    metoprolol tartrate (LOPRESSOR) tablet 50 mg  50 mg Oral BID    morphine injection 2 mg  2 mg Intravenous Q4H PRN    OLANZapine (ZyPREXA) tablet 1.25 mg  1.25 mg Oral Q8H PRN    Or    OLANZapine (ZyPREXA) IM injection 1.3 mg  1.3 mg Intramuscular Q8H PRN    OLANZapine (ZyPREXA) tablet 1.25 mg  1.25 mg Oral Daily    And    OLANZapine (ZyPREXA) tablet 3.75 mg  3.75 mg Oral HS    ondansetron (ZOFRAN) injection 4 mg  4 mg Intravenous Q6H PRN   , and PTA meds:   Prior to Admission Medications   Prescriptions Last Dose Informant Patient Reported? Taking?   Ascorbic Acid (Vitamin C) 250 MG CHEW Unknown  Yes No   Melatonin 10 MG TABS Unknown  No No   Sig: Take 1 tablet (10 mg total) by mouth daily at bedtime   Multiple Vitamins-Minerals (ONE-A-DAY 50 PLUS PO) Unknown  Yes No   OLANZapine (ZyPREXA ZYDIS) 5 mg dispersible tablet 1/2/2024  No Yes   Sig: Take 0.5 tablets (2.5 mg total) by mouth daily at bedtime   benzonatate (TESSALON PERLES) 100 mg capsule Unknown  No No   Sig: Take 1 capsule (100 mg total) by mouth 3 (three) times a day   busPIRone (BUSPAR) 5 mg tablet 1/2/2024  Yes Yes   Sig: Take 5 mg by mouth 2 (two) times a day   cholecalciferol (VITAMIN D3) 1,000 units tablet 1/2/2024  Yes Yes   Sig: Take 1,000 Units by mouth daily   cyanocobalamin (VITAMIN B-12) 100 mcg tablet 1/2/2024  Yes Yes   Sig: Take by mouth daily   docusate sodium (COLACE) 100 mg capsule Unknown  No No   Sig: Take 1 capsule (100 mg total) by mouth 2 (two) times a day   donepezil (ARICEPT) 10 mg tablet Unknown  No No   Sig: Take 1 tablet (10 mg total) by mouth daily at bedtime   loratadine (CLARITIN) 10 mg tablet  "Unknown  Yes No   Sig: Take 10 mg by mouth daily   losartan (Cozaar) 100 MG tablet Unknown  No No   Sig: Take 1 tablet (100 mg total) by mouth daily   metoprolol tartrate (LOPRESSOR) 50 mg tablet 1/1/2024  No Yes   Sig: take 1 tablet by mouth twice a day   polyethylene glycol (MIRALAX) 17 g packet Unknown  No No   Sig: Take 17 g by mouth if needed (constipation)      Facility-Administered Medications: None     Allergies   Allergen Reactions    Ibuprofen      Reaction Date: 10Aug2005;        Objective   Vitals: Blood pressure 146/72, pulse 97, temperature (!) 96.3 °F (35.7 °C), resp. rate 18, height 5' 1\" (1.549 m), weight 51.3 kg (113 lb 1.5 oz), SpO2 96%.  Orthostatic Blood Pressures      Flowsheet Row Most Recent Value   Blood Pressure 146/72 filed at 01/05/2024 0608   Patient Position - Orthostatic VS Lying filed at 01/03/2024 0802            No intake or output data in the 24 hours ending 01/05/24 1039    Invasive Devices       Peripheral Intravenous Line  Duration             Peripheral IV 12/09/23 Right Antecubital 27 days    Peripheral IV 01/02/24 Right Antecubital 2 days                    Physical Exam  Vitals and nursing note reviewed.   Constitutional:       Appearance: Normal appearance. She is normal weight.   HENT:      Right Ear: External ear normal.      Left Ear: External ear normal.   Eyes:      General:         Right eye: No discharge.         Left eye: No discharge.   Cardiovascular:      Rate and Rhythm: Normal rate and regular rhythm.      Pulses: Normal pulses.   Pulmonary:      Effort: Pulmonary effort is normal. No accessory muscle usage or respiratory distress.      Breath sounds: Examination of the right-lower field reveals decreased breath sounds. Examination of the left-lower field reveals decreased breath sounds. Decreased breath sounds present.   Abdominal:      General: Bowel sounds are normal. There is no distension.      Palpations: Abdomen is soft.   Musculoskeletal:      Right " lower leg: No edema.      Left lower leg: No edema.   Skin:     General: Skin is warm and dry.      Capillary Refill: Capillary refill takes less than 2 seconds.   Neurological:      Mental Status: She is alert.   Psychiatric:         Behavior: Behavior is uncooperative, agitated and aggressive.         Cognition and Memory: Cognition is impaired. Memory is impaired.         Judgment: Judgment is impulsive and inappropriate.         Lab Results: I have personally reviewed pertinent lab results.    CBC with diff:   Results from last 7 days   Lab Units 01/05/24  0559   WBC Thousand/uL 5.86   RBC Million/uL 3.55*   HEMOGLOBIN g/dL 11.5   HEMATOCRIT % 36.8   MCV fL 104*   MCH pg 32.4   MCHC g/dL 31.3*   RDW % 13.5   MPV fL 10.8   PLATELETS Thousands/uL 238     CMP:   Results from last 7 days   Lab Units 01/05/24  0559 01/03/24  0449 01/02/24  1538   SODIUM mmol/L 145   < > 144   CHLORIDE mmol/L 108   < > 104   CO2 mmol/L 30   < > 31   BUN mg/dL 22   < > 17   CREATININE mg/dL 1.03   < > 0.77   CALCIUM mg/dL 9.4   < > 9.0   AST U/L  --   --  26   ALT U/L  --   --  30   ALK PHOS U/L  --   --  69   EGFR ml/min/1.73sq m 50   < > 71    < > = values in this interval not displayed.     HS Troponin:   0   Lab Value Date/Time    HSTNI 215 (H) 01/05/2024 0606    HSTNI0 11 12/09/2023 1013    HSTNI2 12 12/09/2023 1213     BNP:   Results from last 7 days   Lab Units 01/05/24  0559   POTASSIUM mmol/L 4.4   CHLORIDE mmol/L 108   CO2 mmol/L 30   BUN mg/dL 22   CREATININE mg/dL 1.03   CALCIUM mg/dL 9.4   EGFR ml/min/1.73sq m 50     Magnesium:   Results from last 7 days   Lab Units 01/02/24  1538   MAGNESIUM mg/dL 2.0     Lipid Profile:     Imaging: I have personally reviewed pertinent reports.    EKG: Unable to obtain due to patient's aggressive behavior  VTE Prophylaxis: Sequential compression device (Venodyne)     Code Status: Level 1 - Full Code  Advance Directive and Living Will:      Power of :    POLST:      Radha Holman  CRNP  Cardiology

## 2024-01-05 NOTE — ASSESSMENT & PLAN NOTE
Patient has history of dementia with behavioral disturbances, worsening over past several months. Has been getting more agitated and refusing medications since yesterday. Prior facility said she needs a higher level of care and will not accept patient back.  UA showed moderate leukocytes and bacteria, completed ceftriaxone IV for 3 doses  Continue current home medications including aricept and zyprexa 2.5 mg hs  Patient was started at [facility], discontinued  Psychiatry consulted; recommending to avoid Ativan for sedation/agitation, decreased Zyprexa to 1.25 mg p.o. daily in the morning and Zyprexa 3.75 mg p.o. nightly.  Patient very lethargic this evening, family concerned.    Stat CT of head: showed no acute intracranial abnormality but demonstrated stable marked chronic small vessel ischemic changes  and stable 1.3 cm meningioma at the left posterior lateral middle cranial fossa.  Supportive care.

## 2024-01-06 PROBLEM — R82.90 ABNORMAL URINALYSIS: Status: RESOLVED | Noted: 2024-01-02 | Resolved: 2024-01-06

## 2024-01-06 LAB
ANION GAP SERPL CALCULATED.3IONS-SCNC: 8 MMOL/L
ATRIAL RATE: 83 BPM
BUN SERPL-MCNC: 25 MG/DL (ref 5–25)
CALCIUM SERPL-MCNC: 8.8 MG/DL (ref 8.4–10.2)
CHLORIDE SERPL-SCNC: 110 MMOL/L (ref 96–108)
CO2 SERPL-SCNC: 29 MMOL/L (ref 21–32)
CREAT SERPL-MCNC: 0.7 MG/DL (ref 0.6–1.3)
ERYTHROCYTE [DISTWIDTH] IN BLOOD BY AUTOMATED COUNT: 13.6 % (ref 11.6–15.1)
GFR SERPL CREATININE-BSD FRML MDRD: 79 ML/MIN/1.73SQ M
GLUCOSE SERPL-MCNC: 93 MG/DL (ref 65–140)
HCT VFR BLD AUTO: 35.3 % (ref 34.8–46.1)
HGB BLD-MCNC: 10.9 G/DL (ref 11.5–15.4)
MAGNESIUM SERPL-MCNC: 1.9 MG/DL (ref 1.9–2.7)
MCH RBC QN AUTO: 32.2 PG (ref 26.8–34.3)
MCHC RBC AUTO-ENTMCNC: 30.9 G/DL (ref 31.4–37.4)
MCV RBC AUTO: 104 FL (ref 82–98)
P AXIS: 70 DEGREES
PLATELET # BLD AUTO: 212 THOUSANDS/UL (ref 149–390)
PMV BLD AUTO: 10.5 FL (ref 8.9–12.7)
POTASSIUM SERPL-SCNC: 3.5 MMOL/L (ref 3.5–5.3)
PR INTERVAL: 160 MS
QRS AXIS: 35 DEGREES
QRSD INTERVAL: 84 MS
QT INTERVAL: 418 MS
QTC INTERVAL: 491 MS
RBC # BLD AUTO: 3.38 MILLION/UL (ref 3.81–5.12)
SODIUM SERPL-SCNC: 147 MMOL/L (ref 135–147)
T WAVE AXIS: 90 DEGREES
VENTRICULAR RATE: 83 BPM
WBC # BLD AUTO: 4.74 THOUSAND/UL (ref 4.31–10.16)

## 2024-01-06 PROCEDURE — 97535 SELF CARE MNGMENT TRAINING: CPT

## 2024-01-06 PROCEDURE — 97167 OT EVAL HIGH COMPLEX 60 MIN: CPT

## 2024-01-06 PROCEDURE — 92507 TX SP LANG VOICE COMM INDIV: CPT

## 2024-01-06 PROCEDURE — 92526 ORAL FUNCTION THERAPY: CPT

## 2024-01-06 PROCEDURE — 85027 COMPLETE CBC AUTOMATED: CPT

## 2024-01-06 PROCEDURE — 80048 BASIC METABOLIC PNL TOTAL CA: CPT

## 2024-01-06 PROCEDURE — 99232 SBSQ HOSP IP/OBS MODERATE 35: CPT

## 2024-01-06 PROCEDURE — 83735 ASSAY OF MAGNESIUM: CPT | Performed by: NURSE PRACTITIONER

## 2024-01-06 RX ORDER — SODIUM CHLORIDE, SODIUM GLUCONATE, SODIUM ACETATE, POTASSIUM CHLORIDE, MAGNESIUM CHLORIDE, SODIUM PHOSPHATE, DIBASIC, AND POTASSIUM PHOSPHATE .53; .5; .37; .037; .03; .012; .00082 G/100ML; G/100ML; G/100ML; G/100ML; G/100ML; G/100ML; G/100ML
75 INJECTION, SOLUTION INTRAVENOUS CONTINUOUS
Status: DISCONTINUED | OUTPATIENT
Start: 2024-01-06 | End: 2024-01-07

## 2024-01-06 RX ORDER — LABETALOL HYDROCHLORIDE 5 MG/ML
10 INJECTION, SOLUTION INTRAVENOUS EVERY 6 HOURS PRN
Status: DISCONTINUED | OUTPATIENT
Start: 2024-01-06 | End: 2024-01-21 | Stop reason: HOSPADM

## 2024-01-06 RX ADMIN — SODIUM CHLORIDE, SODIUM GLUCONATE, SODIUM ACETATE, POTASSIUM CHLORIDE, MAGNESIUM CHLORIDE, SODIUM PHOSPHATE, DIBASIC, AND POTASSIUM PHOSPHATE 75 ML/HR: .53; .5; .37; .037; .03; .012; .00082 INJECTION, SOLUTION INTRAVENOUS at 14:49

## 2024-01-06 RX ADMIN — Medication 6 MG: at 21:48

## 2024-01-06 RX ADMIN — OLANZAPINE 1.3 MG: 10 INJECTION, POWDER, FOR SOLUTION INTRAMUSCULAR at 15:34

## 2024-01-06 RX ADMIN — ENOXAPARIN SODIUM 50 MG: 60 INJECTION SUBCUTANEOUS at 21:46

## 2024-01-06 RX ADMIN — OLANZAPINE 3.75 MG: 2.5 TABLET, FILM COATED ORAL at 21:48

## 2024-01-06 RX ADMIN — DONEPEZIL HYDROCHLORIDE 10 MG: 5 TABLET ORAL at 21:48

## 2024-01-06 RX ADMIN — SODIUM CHLORIDE 75 ML/HR: 0.9 INJECTION, SOLUTION INTRAVENOUS at 13:56

## 2024-01-06 RX ADMIN — METOPROLOL SUCCINATE 50 MG: 50 TABLET, EXTENDED RELEASE ORAL at 21:48

## 2024-01-06 RX ADMIN — OLANZAPINE 1.3 MG: 10 INJECTION, POWDER, FOR SOLUTION INTRAMUSCULAR at 23:39

## 2024-01-06 RX ADMIN — ENOXAPARIN SODIUM 50 MG: 60 INJECTION SUBCUTANEOUS at 09:51

## 2024-01-06 NOTE — ASSESSMENT & PLAN NOTE
Patient has history of dementia with behavioral disturbances, worsening over past several months. Has been getting more agitated and refusing medications since day prior to admission. Prior facility said she needs a higher level of care and will not accept patient back.  UA showed moderate leukocytes and bacteria, completed ceftriaxone x 3 doses  Continue current home medications including aricept and zyprexa 2.5 mg hs  Patient was started at [facility], discontinued  Psychiatry consulted; recommending to avoid Ativan for sedation/agitation, decreased Zyprexa to 1.25 mg p.o. daily in the morning and Zyprexa 3.75 mg p.o. nightly  Patient very lethargic on 1/4  Stat CT of head: no acute intracranial abnormality but demonstrated stable marked chronic small vessel ischemic changes and stable 1.3 cm meningioma at the left posterior lateral middle cranial fossa  Supportive care  Improving as of this morning

## 2024-01-06 NOTE — PLAN OF CARE
Problem: Potential for Falls  Goal: Patient will remain free of falls  Description: INTERVENTIONS:  - Educate patient/family on patient safety including physical limitations  - Instruct patient to call for assistance with activity   - Consult OT/PT to assist with strengthening/mobility   - Keep Call bell within reach  - Keep bed low and locked with side rails adjusted as appropriate  - Keep care items and personal belongings within reach  - Initiate and maintain comfort rounds  - Make Fall Risk Sign visible to staff  - Offer Toileting every 2 Hours, in advance of need  - Initiate/Maintain bed alarm  - Obtain necessary fall risk management equipment: yes   - Apply yellow socks and bracelet for high fall risk patients  - Consider moving patient to room near nurses station  Outcome: Progressing     Problem: Prexisting or High Potential for Compromised Skin Integrity  Goal: Skin integrity is maintained or improved  Description: INTERVENTIONS:  - Identify patients at risk for skin breakdown  - Assess and monitor skin integrity  - Assess and monitor nutrition and hydration status  - Monitor labs   - Assess for incontinence   - Turn and reposition patient  - Assist with mobility/ambulation  - Relieve pressure over bony prominences  - Avoid friction and shearing  - Provide appropriate hygiene as needed including keeping skin clean and dry  - Evaluate need for skin moisturizer/barrier cream  - Collaborate with interdisciplinary team   - Patient/family teaching  - Consider wound care consult   Outcome: Progressing     Problem: PAIN - ADULT  Goal: Verbalizes/displays adequate comfort level or baseline comfort level  Description: Interventions:  - Encourage patient to monitor pain and request assistance  - Assess pain using appropriate pain scale  - Administer analgesics based on type and severity of pain and evaluate response  - Implement non-pharmacological measures as appropriate and evaluate response  - Consider cultural  and social influences on pain and pain management  - Notify physician/advanced practitioner if interventions unsuccessful or patient reports new pain  Outcome: Progressing     Problem: INFECTION - ADULT  Goal: Absence or prevention of progression during hospitalization  Description: INTERVENTIONS:  - Assess and monitor for signs and symptoms of infection  - Monitor lab/diagnostic results  - Monitor all insertion sites, i.e. indwelling lines, tubes, and drains  - Monitor endotracheal if appropriate and nasal secretions for changes in amount and color  - [place] appropriate cooling/warming therapies per order  - Administer medications as ordered  - Instruct and encourage patient and family to use good hand hygiene technique  - Identify and instruct in appropriate isolation precautions for identified infection/condition  Outcome: Progressing  Goal: Absence of fever/infection during neutropenic period  Description: INTERVENTIONS:  - Monitor WBC    Outcome: Progressing     Problem: DISCHARGE PLANNING  Goal: Discharge to home or other facility with appropriate resources  Description: INTERVENTIONS:  - Identify barriers to discharge w/patient and caregiver  - Arrange for needed discharge resources and transportation as appropriate  - Identify discharge learning needs (meds, wound care, etc.)  - Arrange for interpretive services to assist at discharge as needed  - Refer to Case Management Department for coordinating discharge planning if the patient needs post-hospital services based on physician/advanced practitioner order or complex needs related to functional status, cognitive ability, or social support system  Outcome: Progressing     Problem: Knowledge Deficit  Goal: Patient/family/caregiver demonstrates understanding of disease process, treatment plan, medications, and discharge instructions  Description: Complete learning assessment and assess knowledge base.  Interventions:  - Provide teaching at level of  understanding  - Provide teaching via preferred learning methods  Outcome: Progressing     Problem: NEUROSENSORY - ADULT  Goal: Achieves stable or improved neurological status  Description: INTERVENTIONS  - Monitor and report changes in neurological status  - Monitor vital signs such as temperature, blood pressure, glucose, and any other labs ordered   - Initiate measures to prevent increased intracranial pressure  - Monitor for seizure activity and implement precautions if appropriate      Outcome: Progressing  Goal: Achieves maximal functionality and self care  Description: INTERVENTIONS  - Monitor swallowing and airway patency with patient fatigue and changes in neurological status  - Encourage and assist patient to increase activity and self care.   - Encourage visually impaired, hearing impaired and aphasic patients to use assistive/communication devices  Outcome: Progressing     Problem: GENITOURINARY - ADULT  Goal: Maintains or returns to baseline urinary function  Description: INTERVENTIONS:  - Assess urinary function  - Encourage oral fluids to ensure adequate hydration if ordered  - Administer IV fluids as ordered to ensure adequate hydration  - Administer ordered medications as needed  - Offer frequent toileting  - Follow urinary retention protocol if ordered  Outcome: Progressing  Goal: Absence of urinary retention  Description: INTERVENTIONS:  - Assess patient’s ability to void and empty bladder  - Monitor I/O  - Bladder scan as needed  - Discuss with physician/AP medications to alleviate retention as needed  - Discuss catheterization for long term situations as appropriate  Outcome: Progressing     Problem: METABOLIC, FLUID AND ELECTROLYTES - ADULT  Goal: Electrolytes maintained within normal limits  Description: INTERVENTIONS:  - Monitor labs and assess patient for signs and symptoms of electrolyte imbalances  - Administer electrolyte replacement as ordered  - Monitor response to electrolyte  replacements, including repeat lab results as appropriate  - Instruct patient on fluid and nutrition as appropriate  Outcome: Progressing  Goal: Fluid balance maintained  Description: INTERVENTIONS:  - Monitor labs   - Monitor I/O and WT  - Instruct patient on fluid and nutrition as appropriate  - Assess for signs & symptoms of volume excess or deficit  Outcome: Progressing     Problem: HEMATOLOGIC - ADULT  Goal: Maintains hematologic stability  Description: INTERVENTIONS  - Assess for signs and symptoms of bleeding or hemorrhage  - Monitor labs  - Administer supportive blood products/factors as ordered and appropriate  Outcome: Progressing     Problem: Nutrition/Hydration-ADULT  Goal: Nutrient/Hydration intake appropriate for improving, restoring or maintaining nutritional needs  Description: Monitor and assess patient's nutrition/hydration status for malnutrition. Collaborate with interdisciplinary team and initiate plan and interventions as ordered.  Monitor patient's weight and dietary intake as ordered or per policy. Utilize nutrition screening tool and intervene as necessary. Determine patient's food preferences and provide high-protein, high-caloric foods as appropriate.     INTERVENTIONS:  - Monitor oral intake, urinary output, labs, and treatment plans  - Assess nutrition and hydration status and recommend course of action  - Evaluate amount of meals eaten  - Assist patient with eating if necessary   - Allow adequate time for meals  - Recommend/ encourage appropriate diets, oral nutritional supplements, and vitamin/mineral supplements  - Order, calculate, and assess calorie counts as needed  - Recommend, monitor, and adjust tube feedings and TPN/PPN based on assessed needs  - Assess need for intravenous fluids  - Provide specific nutrition/hydration education as appropriate  - Include patient/family/caregiver in decisions related to nutrition  Outcome: Progressing

## 2024-01-06 NOTE — ASSESSMENT & PLAN NOTE
Troponin noted to be elevated at 24 on 1/3, repeated 1/4 elevated 300  Cardiology consulted  ECHO 1/5/24: EF 60-65%, systolic function normal, diastolic function mildly abnormal consistent with G1DD  Stress test canceled after discussion between cardiology and patient's family  Continue medical therapy with beta blocker and ARB

## 2024-01-06 NOTE — OCCUPATIONAL THERAPY NOTE
"Occupational Therapy Evaluation/Treatment       01/06/24 1445   OT Last Visit   OT Visit Date 01/06/24   Note Type   Note type Evaluation   Pain Assessment   Pain Assessment Tool FLACC   Pain Rating: FLACC (Rest) - Face 0   Pain Rating: FLACC (Rest) - Legs 0   Pain Rating: FLACC (Rest) - Activity 0   Pain Rating: FLACC (Rest) - Cry 0   Pain Rating: FLACC (Rest) - Consolability 0   Score: FLACC (Rest) 0   Restrictions/Precautions   Weight Bearing Precautions Per Order No   Other Precautions Fall Risk;Chair Alarm;Bed Alarm;Cognitive   Home Living   Type of Home Assisted living  ([facility])   Home Equipment Wheelchair-manual   Prior Function   Level of [place] Needs assistance with ADLs;Needs assistance with functional mobility;Needs assistance with IADLS   Lives With Facility staff   Receives Help From Personal care attendant   General   Additional Pertinent History Pt admitted with dementia with behavioral disturbance, pt's behaviors had been uncontrollable.  Pt was recently transitioned from STR to [facility].   Subjective   Subjective \"I need a tissue!\"   ADL   Eating Assistance 2  Maximal Assistance   Grooming Assistance 2  Maximal Assistance   UB Bathing Assistance 2  Maximal Assistance   LB Bathing Assistance 2  Maximal Assistance   UB Dressing Assistance 2  Maximal Assistance   LB Dressing Assistance 2  Maximal Assistance   Toileting Assistance  2  Maximal Assistance   Bed Mobility   Supine to Sit 2  Maximal assistance   Additional items Increased time required;LE management;Verbal cues   Sit to Supine Unable to assess   Additional Comments Pt left OOB in recliner chair at end of session, all needs in reach, chair alarm activated, daughter present   Transfers   Sit to Stand 3  Moderate assistance   Additional items Assist x 1;Verbal cues;Increased time required   Stand to Sit 3  Moderate assistance   Additional items Assist x 1;Verbal cues;Increased time required   Stand pivot 3  Moderate assistance "   Additional items Assist x 1;Verbal cues;Increased time required   Balance   Static Sitting Poor   Dynamic Sitting Poor   Static Standing Poor   Dynamic Standing Poor   Activity Tolerance   Activity Tolerance Patient limited by fatigue;Treatment limited secondary to medical complications (Comment)  (behavior)   RUE Assessment   RUE Assessment   (unable to formally assess due to behavior/cog, as seen during functional assessment pt at least 3/5 MMT grossly)   LUE Assessment   LUE Assessment   (unable to formally assess due to behavior/cog, as seen during functional assessment pt at least 3/5 MMT grossly)   Cognition   Overall Cognitive Status Impaired   Attention Attends with cues to redirect   Orientation Level Oriented to person;Disoriented to place;Disoriented to time;Disoriented to situation   Memory Decreased recall of biographical information;Decreased short term memory;Decreased recall of recent events   Following Commands Follows one step commands inconsistently   Assessment   Limitation Decreased ADL status;Decreased UE strength;Decreased Safe judgement during ADL;Decreased cognition;Decreased endurance;Decreased self-care trans;Decreased high-level ADLs  (decreased balance and mobility)   Prognosis Fair   Assessment Patient evaluated by Occupational Therapy.  Patient admitted with Dementia with behavioral disturbance (HCC).  The patients occupational profile, medical and therapy history includes a extensive additional review of physical, cognitive, or psychosocial history related to current functional performance.  Comorbidities affecting functional mobility and ADLS include: dementia with behavioural disturbance, anxiety .  Prior to admission, patient was requiring assist for functional mobility without assistive device, requiring assist for ADLS, and requiring assist for IADLS.  The evaluation identifies the following performance deficits: weakness, impaired balance, decreased endurance, decreased  coordination, increased fall risk, new onset of impairment of functional mobility, decreased ADLS, decreased IADLS, decreased activity tolerance, decreased safety awareness, impaired judgement, decreased cognition, and decreased strength, that result in activity limitations and/or participation restrictions. This evaluation requires clinical decision making of high complexity, because the patient presents with comorbidites that affect occupational performance and required significant modification of tasks or assistance with consideration of multiple treatment options.  The Barthel Index was used as a functional outcome tool presenting with a score of Barthel Index Score: 15, indicating marked limitations of functional mobility and ADLS.  The patient's raw score on the AM-PAC Daily Activity Inpatient Short Form is 12. A raw score of less than 19 suggests the patient may benefit from discharge to post-acute rehabilitation services. Please refer to the recommendation of the Occupational Therapist for safe discharge planning.  Patient will benefit from skilled Occupational Therapy services to address above deficits and facilitate a safe return to prior level of function.   Goals   Patient Goals unable to state   STG Time Frame   (1-7 days)   Short Term Goal  Goals established to promote Patient Goals: unable to state:  Eating: mod assist; Grooming: mod assist standing at sink; Bathing: mod assist; Upper Body Dressing mod assist; Lower Body Dressing: mod assist; Toileting: mod assist; Patient will increase ambulatory standard toilet transfer to min assist with LRAD to increase performance and safety with ADLS and functional mobility; Patient will increase standing tolerance to 5 minutes during ADL task to decrease assistance level and decrease fall risk; Patient will increase bed mobility to mod assist in preparation for ADLS and transfers; Patient will increase functional mobility to and from bathroom with LRAD with min  assist to increase performance with ADLS and to use a toilet; Patient will tolerate 5 minutes of UE ROM/strengthening to increase general activity tolerance and performance in ADLS/IADLS; Patient will improve functional activity tolerance to 5 minutes of sustained functional tasks to increase participation in basic self-care and decrease assistance level; Patient will increase dynamic sitting balance to poor+ to improve the ability to sit at edge of bed or on a chair for ADLS;  Patient will increase dynamic standing balance to poor+ to improve postural stability and decrease fall risk during standing ADLS and transfers.   LTG Time Frame   (8-14 days)   Long Term Goal Eating: min assist; Grooming: min assist standing at sink; Bathing: min assist; Upper Body Dressing min assist; Lower Body Dressing: min assist; Toileting: min assist; Patient will increase ambulatory standard toilet transfer to supervision with LRAD to increase performance and safety with ADLS and functional mobility; Patient will increase standing tolerance to 10 minutes during ADL task to decrease assistance level and decrease fall risk; Patient will increase bed mobility to min assist in preparation for ADLS and transfers; Patient will increase functional mobility to and from bathroom with LRAD with supervision to increase performance with ADLS and to use a toilet; Patient will tolerate 10 minutes of UE ROM/strengthening to increase general activity tolerance and performance in ADLS/IADLS; Patient will improve functional activity tolerance to 10 minutes of sustained functional tasks to increase participation in basic self-care and decrease assistance level; Patient will increase dynamic sitting balance to fair- to improve the ability to sit at edge of bed or on a chair for ADLS;  Patient will increase dynamic standing balance to fair- to improve postural stability and decrease fall risk during standing ADLS and transfers.  Pt will score >/= 16/24 on  AM-PAC Daily Activity Inpatient scale to promote safe independence with ADLs and functional mobility; Pt will score >/= 45/100 on Barthel Index in order to decrease caregiver assistance needed and increase ability to perform ADLs and functional mobility.   Plan   Treatment Interventions ADL retraining;Functional transfer training;UE strengthening/ROM;Endurance training;Cognitive reorientation;Patient/family training;Equipment evaluation/education;Compensatory technique education;Activityengagement   Goal Expiration Date 01/20/24   OT Frequency 3-5x/wk   Discharge Recommendation   Rehab Resource Intensity Level, OT II (Moderate Resource Intensity)   AM-PAC Daily Activity Inpatient   Lower Body Dressing 2   Bathing 2   Toileting 2   Upper Body Dressing 2   Grooming 2   Eating 2   Daily Activity Raw Score 12   Daily Activity Standardized Score (Calc for Raw Score >=11) 30.6   AM-PAC Applied Cognition Inpatient   Following a Speech/Presentation 1   Understanding Ordinary Conversation 4   Taking Medications 1   Remembering Where Things Are Placed or Put Away 1   Remembering List of 4-5 Errands 1   Taking Care of Complicated Tasks 1   Applied Cognition Raw Score 9   Applied Cognition Standardized Score 22.48   Barthel Index   Feeding 5   Bathing 0   Grooming Score 0   Dressing Score 0   Bladder Score 0   Bowels Score 0   Toilet Use Score 5   Transfers (Bed/Chair) Score 5   Mobility (Level Surface) Score 0   Stairs Score 0   Barthel Index Score 15   Additional Treatment Session   Start Time 1435   End Time 1445   Treatment Assessment S: denies pain. O: Functional mobility from recliner to bathroom with HHA mod A x1. Standard toilet transfer with mod A. Max A for clothing management, voided bladder, able to perform perineal hygiene when handed toilet paper and given max verbal cues. Standing from toilet mod A. Returned to recliner with HHA and mod A, stand to sit with mod A. Max verbal cues required throughout all mobility  and during transfers and toileting. A: Pt tolerated treatment session fairly, limited by cog deficits and unstable behaviors. P: Pt would benefit from cont OT services to maximize safety and functional performance of ADLs.   End of Consult   Patient Position at End of Consult Bedside chair;Bed/Chair alarm activated;All needs within reach  (daughter present)   Licensure   [place] License Number  Ira Harp OTR/L [id number]

## 2024-01-06 NOTE — SPEECH THERAPY NOTE
"Speech/Language Pathology Progress Note    Patient Name: Monika Butler  Today's Date: 1/6/2024     Problem List  Principal Problem:    Dementia with behavioral disturbance (HCC)  Active Problems:    Essential hypertension    Abnormal urinalysis    Elevated troponin    Subjective:  \"So is Imtiaz\" when I told her I was coming (with some water which she requested).     Objective:  Pt was seen for diagnostic dysphagia therapy to assess readiness for oral diet.  Pt was alert and positioned upright. She expressed desire for water and juice.  Oral cavity was inspected to begin and was clear.  However, after introduction of ice chips, she evidence cough and did raise frothy secretions.    Pt was assessed with ice chips, teaspoon and straw sips of water, teaspoon and cup sips of nectar thick water and cranberry juice and 3 teaspoons of applesauce.  She evidence of manipulation but no true mastication with ice.  Material she evidenced prolonged holding of material followed by labial escape which or spitting of material.  She evidenced swallow initiation only to approximately 15 trials.     Assessment:  Level of alertness is somewhat improved when compared to last p.m. oral transfer and swallow are observed with only approximately 2 of 15 trials.  After prolonged holding of material, she spat out most material (on occasion material spilled anteriorly from the lips).     Plan/Recommendations:  Continue IVs and diagnostic dysphagia therapy daily.  I will relay findings/impressions and my plan to daughters.    P.S. I spoke with cristina Welsh by phone (12 mins), reported performance this am and plan (continued IVs, oral care and moisture-->swabs over ice left bedside, SLP re-evaluation tomorrow).     Vesta Dumont MS [facility]-SLP  [place] License [id number]  [place] License [id number]  Available via [employer]           "

## 2024-01-06 NOTE — PROGRESS NOTES
[facility]  Progress Note  Name: Monika Butler I  MRN: [id number]  Unit/Bed#: [address] [place] [address] Date of Admission: 1/2/2024   Date of Service: 1/6/2024 I Hospital Day: 4    Assessment/Plan   * Dementia with behavioral disturbance (HCC)  Assessment & Plan  Patient has history of dementia with behavioral disturbances, worsening over past several months. Has been getting more agitated and refusing medications since day prior to admission. Prior facility said she needs a higher level of care and will not accept patient back.  UA showed moderate leukocytes and bacteria, completed ceftriaxone x 3 doses  Continue current home medications including aricept and zyprexa 2.5 mg hs  Patient was started at [facility], discontinued  Psychiatry consulted; recommending to avoid Ativan for sedation/agitation, decreased Zyprexa to 1.25 mg p.o. daily in the morning and Zyprexa 3.75 mg p.o. nightly  Patient very lethargic on 1/4  Stat CT of head: no acute intracranial abnormality but demonstrated stable marked chronic small vessel ischemic changes and stable 1.3 cm meningioma at the left posterior lateral middle cranial fossa  Supportive care  Improving as of this morning    Essential hypertension  Assessment & Plan  /97 on arrival, likely in setting of noncompliance and agitation  Continue home losartan 100 mg daily and metoprolol 50 mg daily  Labetalol prn as patient is current npo    Elevated troponin  Assessment & Plan  Troponin noted to be elevated at 24 on 1/3, repeated 1/4 elevated 300  Cardiology consulted  ECHO 1/5/24: EF 60-65%, systolic function normal, diastolic function mildly abnormal consistent with G1DD  Stress test canceled after discussion between cardiology and patient's family  Continue medical therapy with beta blocker and ARB    Abnormal urinalysis-resolved as of 1/6/2024  Assessment & Plan  UA positive for leukocytes  Urine culture mixed contaminants  Completed rocephin 3  doses           VTE Pharmacologic Prophylaxis: VTE Score: 3 Moderate Risk (Score 3-4) - Pharmacological DVT Prophylaxis Ordered: enoxaparin (Lovenox).    Mobility:   Basic Mobility Inpatient Raw Score: 9  -HLM Goal: 3: Sit at edge of bed  JH-HLM Achieved: 2: Bed activities/Dependent transfer  HLM Goal NOT achieved. Continue with multidisciplinary rounding and encourage appropriate mobility to improve upon HLM goals.    Patient Centered Rounds: I performed bedside rounds with nursing staff today.   Discussions with Specialists or Other Care Team Provider: rnnajma    Education and Discussions with Family / Patient: Updated  (daughter) at bedside.    Total Time Spent on Date of Encounter in care of patient: 35 mins. This time was spent on one or more of the following: performing physical exam; counseling and coordination of care; obtaining or reviewing history; documenting in the medical record; reviewing/ordering tests, medications or procedures; communicating with other healthcare professionals and discussing with patient's family/caregivers.    Current Length of Stay: 4 day(s)  Current Patient Status: Inpatient   Certification Statement: The patient will continue to require additional inpatient hospital stay due to monitor behavior, adjust medications, NPO status  Discharge Plan: Anticipate discharge in 48 hrs to rehab facility.    Code Status: Level 1 - Full Code    Subjective:   Patient seen and examined at bedside this morning with family present.  They report she is much more awake and talkative today.  RN reported earlier this morning she was agitated but when family came in she is more pleasant and cooperative.  Patient is confused but pleasantly communicating with family.  Appears comfortable.  Denies any current complaints.    Objective:     Vitals:   Temp (24hrs), Av.5 °F (36.9 °C), Min:98.2 °F (36.8 °C), Max:98.8 °F (37.1 °C)    Temp:  [98.2 °F (36.8 °C)-98.8 °F (37.1 °C)] 98.5 °F (36.9  °C)  HR:  [77-91] 91  Resp:  [18-19] 18  BP: (127-163)/(67-95) 162/94  SpO2:  [93 %-96 %] 96 %  Body mass index is 21.37 kg/m².     Input and Output Summary (last 24 hours):     Intake/Output Summary (Last 24 hours) at 1/6/2024 1413  Last data filed at 1/6/2024 0251  Gross per 24 hour   Intake 438.75 ml   Output --   Net 438.75 ml       Physical Exam:   Physical Exam  Vitals and nursing note reviewed.   Constitutional:       General: She is not in acute distress.     Appearance: She is well-developed.   Cardiovascular:      Rate and Rhythm: Normal rate and regular rhythm.   Pulmonary:      Effort: Pulmonary effort is normal. No respiratory distress.      Breath sounds: Normal breath sounds.   Abdominal:      Palpations: Abdomen is soft.      Tenderness: There is no abdominal tenderness.   Musculoskeletal:         General: No swelling.   Skin:     General: Skin is warm and dry.      Capillary Refill: Capillary refill takes less than 2 seconds.   Neurological:      Mental Status: She is alert. Mental status is at baseline. She is disoriented and confused.   Psychiatric:         Mood and Affect: Mood normal.          Additional Data:     Labs:  Results from last 7 days   Lab Units 01/06/24  0623 01/03/24 0449 01/02/24  1538   WBC Thousand/uL 4.74   < > 4.52   HEMOGLOBIN g/dL 10.9*   < > 10.6*   HEMATOCRIT % 35.3   < > 32.5*   PLATELETS Thousands/uL 212   < > 251   NEUTROS PCT %  --   --  51   LYMPHS PCT %  --   --  31   MONOS PCT %  --   --  13*   EOS PCT %  --   --  4    < > = values in this interval not displayed.     Results from last 7 days   Lab Units 01/06/24  0623 01/03/24  0449 01/02/24  1538   SODIUM mmol/L 147   < > 144   POTASSIUM mmol/L 3.5   < > 3.0*   CHLORIDE mmol/L 110*   < > 104   CO2 mmol/L 29   < > 31   BUN mg/dL 25   < > 17   CREATININE mg/dL 0.70   < > 0.77   ANION GAP mmol/L 8   < > 9   CALCIUM mg/dL 8.8   < > 9.0   ALBUMIN g/dL  --   --  4.0   TOTAL BILIRUBIN mg/dL  --   --  0.46   ALK PHOS U/L   --   --  69   ALT U/L  --   --  30   AST U/L  --   --  26   GLUCOSE RANDOM mg/dL 93   < > 87    < > = values in this interval not displayed.         Results from last 7 days   Lab Units 01/02/24  1544   POC GLUCOSE mg/dl 85               Lines/Drains:  Invasive Devices       Peripheral Intravenous Line  Duration             Peripheral IV 01/02/24 Right Antecubital 3 days                          Imaging: Reviewed radiology reports from this admission including: ECHO    Recent Cultures (last 7 days):   Results from last 7 days   Lab Units 01/02/24  1713   URINE CULTURE  >100,000 cfu/ml       Last 24 Hours Medication List:   Current Facility-Administered Medications   Medication Dose Route Frequency Provider Last Rate    acetaminophen  650 mg Oral Q6H PRN Leyla Guardado PA-C      cholecalciferol  1,000 Units Oral Daily Leyla Guardado PA-C      docusate sodium  100 mg Oral BID Leyla Guardado PA-C      donepezil  10 mg Oral HS Leyla Guardado PA-C      enoxaparin  1 mg/kg Subcutaneous Q12H [facility] DAMARIS Meza      labetalol  10 mg Intravenous Q6H PRN Leyla Guardado PA-C      lidocaine  1 patch Topical Daily DAMARIS Meza      loratadine  10 mg Oral Daily DAMARIS Meza      losartan  100 mg Oral Daily Leyla Guardado PA-C      melatonin  6 mg Oral HS Manjit Perez,       metoprolol succinate  50 mg Oral HS DAMARIS Addison      morphine injection  2 mg Intravenous Q4H PRN Gonzales Pinto MD      OLANZapine  1.25 mg Oral Q8H PRN Ash Silvestre, DO      Or    OLANZapine  1.3 mg Intramuscular Q8H PRN Ash Aparicioo, DO      OLANZapine  1.25 mg Oral Daily Ash Silvestre, DO      And    OLANZapine  3.75 mg Oral HS Ash Silvestre, DO      ondansetron  4 mg Intravenous Q6H PRN Leyla Guardado PA-C      sodium chloride  75 mL/hr Intravenous Continuous Olayide DAMARIS Cuellar 75 mL/hr (01/06/24 3846)        Today, Patient Was Seen By: Leyla Guardado PA-C    **Please Note: This  note may have been constructed using a voice recognition system.**

## 2024-01-06 NOTE — ASSESSMENT & PLAN NOTE
/97 on arrival, likely in setting of noncompliance and agitation  Continue home losartan 100 mg daily and metoprolol 50 mg daily  Labetalol prn as patient is current npo

## 2024-01-07 LAB
ANION GAP SERPL CALCULATED.3IONS-SCNC: 12 MMOL/L
BUN SERPL-MCNC: 15 MG/DL (ref 5–25)
CALCIUM SERPL-MCNC: 7.1 MG/DL (ref 8.4–10.2)
CHLORIDE SERPL-SCNC: 103 MMOL/L (ref 96–108)
CO2 SERPL-SCNC: 27 MMOL/L (ref 21–32)
CREAT SERPL-MCNC: 0.47 MG/DL (ref 0.6–1.3)
ERYTHROCYTE [DISTWIDTH] IN BLOOD BY AUTOMATED COUNT: 13.5 % (ref 11.6–15.1)
GFR SERPL CREATININE-BSD FRML MDRD: 90 ML/MIN/1.73SQ M
GLUCOSE SERPL-MCNC: 110 MG/DL (ref 65–140)
HCT VFR BLD AUTO: 30.8 % (ref 34.8–46.1)
HGB BLD-MCNC: 10 G/DL (ref 11.5–15.4)
MCH RBC QN AUTO: 33 PG (ref 26.8–34.3)
MCHC RBC AUTO-ENTMCNC: 32.1 G/DL (ref 31.4–37.4)
MCV RBC AUTO: 103 FL (ref 82–98)
PLATELET # BLD AUTO: 200 THOUSANDS/UL (ref 149–390)
PMV BLD AUTO: 10.2 FL (ref 8.9–12.7)
POTASSIUM SERPL-SCNC: 3.7 MMOL/L (ref 3.5–5.3)
RBC # BLD AUTO: 3 MILLION/UL (ref 3.81–5.12)
SODIUM SERPL-SCNC: 142 MMOL/L (ref 135–147)
WBC # BLD AUTO: 3.47 THOUSAND/UL (ref 4.31–10.16)

## 2024-01-07 PROCEDURE — 97530 THERAPEUTIC ACTIVITIES: CPT | Performed by: PHYSICAL THERAPIST

## 2024-01-07 PROCEDURE — 99232 SBSQ HOSP IP/OBS MODERATE 35: CPT

## 2024-01-07 PROCEDURE — 85027 COMPLETE CBC AUTOMATED: CPT

## 2024-01-07 PROCEDURE — 80048 BASIC METABOLIC PNL TOTAL CA: CPT | Performed by: NURSE PRACTITIONER

## 2024-01-07 PROCEDURE — 85027 COMPLETE CBC AUTOMATED: CPT | Performed by: NURSE PRACTITIONER

## 2024-01-07 PROCEDURE — 92526 ORAL FUNCTION THERAPY: CPT

## 2024-01-07 RX ADMIN — DOCUSATE SODIUM 100 MG: 100 CAPSULE, LIQUID FILLED ORAL at 17:35

## 2024-01-07 RX ADMIN — LOSARTAN POTASSIUM 100 MG: 50 TABLET, FILM COATED ORAL at 09:27

## 2024-01-07 RX ADMIN — MORPHINE SULFATE 2 MG: 2 INJECTION, SOLUTION INTRAMUSCULAR; INTRAVENOUS at 00:35

## 2024-01-07 RX ADMIN — Medication 1000 UNITS: at 09:27

## 2024-01-07 RX ADMIN — OLANZAPINE 1.25 MG: 2.5 TABLET, FILM COATED ORAL at 09:27

## 2024-01-07 RX ADMIN — DONEPEZIL HYDROCHLORIDE 10 MG: 5 TABLET ORAL at 21:07

## 2024-01-07 RX ADMIN — MORPHINE SULFATE 2 MG: 2 INJECTION, SOLUTION INTRAMUSCULAR; INTRAVENOUS at 20:42

## 2024-01-07 RX ADMIN — SODIUM CHLORIDE, SODIUM GLUCONATE, SODIUM ACETATE, POTASSIUM CHLORIDE, MAGNESIUM CHLORIDE, SODIUM PHOSPHATE, DIBASIC, AND POTASSIUM PHOSPHATE 75 ML/HR: .53; .5; .37; .037; .03; .012; .00082 INJECTION, SOLUTION INTRAVENOUS at 09:25

## 2024-01-07 RX ADMIN — LORATADINE 10 MG: 10 TABLET ORAL at 09:27

## 2024-01-07 RX ADMIN — OLANZAPINE 1.3 MG: 10 INJECTION, POWDER, FOR SOLUTION INTRAMUSCULAR at 23:17

## 2024-01-07 RX ADMIN — ENOXAPARIN SODIUM 50 MG: 60 INJECTION SUBCUTANEOUS at 09:27

## 2024-01-07 RX ADMIN — Medication 6 MG: at 21:00

## 2024-01-07 RX ADMIN — OLANZAPINE 3.75 MG: 2.5 TABLET, FILM COATED ORAL at 21:00

## 2024-01-07 RX ADMIN — MORPHINE SULFATE 2 MG: 2 INJECTION, SOLUTION INTRAMUSCULAR; INTRAVENOUS at 05:57

## 2024-01-07 RX ADMIN — DOCUSATE SODIUM 100 MG: 100 CAPSULE, LIQUID FILLED ORAL at 09:27

## 2024-01-07 RX ADMIN — ENOXAPARIN SODIUM 50 MG: 60 INJECTION SUBCUTANEOUS at 21:07

## 2024-01-07 RX ADMIN — OLANZAPINE 1.3 MG: 10 INJECTION, POWDER, FOR SOLUTION INTRAMUSCULAR at 15:17

## 2024-01-07 NOTE — ASSESSMENT & PLAN NOTE
/97 on arrival, likely in setting of noncompliance and agitation  Continue home losartan 100 mg daily and metoprolol 50 mg daily

## 2024-01-07 NOTE — PLAN OF CARE
Problem: Potential for Falls  Goal: Patient will remain free of falls  Description: INTERVENTIONS:  - Educate patient/family on patient safety including physical limitations  - Instruct patient to call for assistance with activity   - Consult OT/PT to assist with strengthening/mobility   - Keep Call bell within reach  - Keep bed low and locked with side rails adjusted as appropriate  - Keep care items and personal belongings within reach  - Initiate and maintain comfort rounds  - Make Fall Risk Sign visible to staff  - Offer Toileting every 2 Hours, in advance of need  - Initiate/Maintain bed alarm  - Obtain necessary fall risk management equipment  - Apply yellow socks and bracelet for high fall risk patients  - Consider moving patient to room near nurses station  Outcome: Progressing     Problem: Prexisting or High Potential for Compromised Skin Integrity  Goal: Skin integrity is maintained or improved  Description: INTERVENTIONS:  - Identify patients at risk for skin breakdown  - Assess and monitor skin integrity  - Assess and monitor nutrition and hydration status  - Monitor labs   - Assess for incontinence   - Turn and reposition patient  - Assist with mobility/ambulation  - Relieve pressure over bony prominences  - Avoid friction and shearing  - Provide appropriate hygiene as needed including keeping skin clean and dry  - Evaluate need for skin moisturizer/barrier cream  - Collaborate with interdisciplinary team   - Patient/family teaching  - Consider wound care consult   Outcome: Progressing     Problem: PAIN - ADULT  Goal: Verbalizes/displays adequate comfort level or baseline comfort level  Description: Interventions:  - Encourage patient to monitor pain and request assistance  - Assess pain using appropriate pain scale  - Administer analgesics based on type and severity of pain and evaluate response  - Implement non-pharmacological measures as appropriate and evaluate response  - Consider cultural and  social influences on pain and pain management  - Notify physician/advanced practitioner if interventions unsuccessful or patient reports new pain  Outcome: Progressing     Problem: INFECTION - ADULT  Goal: Absence or prevention of progression during hospitalization  Description: INTERVENTIONS:  - Assess and monitor for signs and symptoms of infection  - Monitor lab/diagnostic results  - Monitor all insertion sites, i.e. indwelling lines, tubes, and drains  - Monitor endotracheal if appropriate and nasal secretions for changes in amount and color  - [place] appropriate cooling/warming therapies per order  - Administer medications as ordered  - Instruct and encourage patient and family to use good hand hygiene technique  - Identify and instruct in appropriate isolation precautions for identified infection/condition  Outcome: Progressing  Goal: Absence of fever/infection during neutropenic period  Description: INTERVENTIONS:  - Monitor WBC    Outcome: Progressing     Problem: DISCHARGE PLANNING  Goal: Discharge to home or other facility with appropriate resources  Description: INTERVENTIONS:  - Identify barriers to discharge w/patient and caregiver  - Arrange for needed discharge resources and transportation as appropriate  - Identify discharge learning needs (meds, wound care, etc.)  - Arrange for interpretive services to assist at discharge as needed  - Refer to Case Management Department for coordinating discharge planning if the patient needs post-hospital services based on physician/advanced practitioner order or complex needs related to functional status, cognitive ability, or social support system  Outcome: Progressing     Problem: Knowledge Deficit  Goal: Patient/family/caregiver demonstrates understanding of disease process, treatment plan, medications, and discharge instructions  Description: Complete learning assessment and assess knowledge base.  Interventions:  - Provide teaching at level of  understanding  - Provide teaching via preferred learning methods  Outcome: Progressing     Problem: NEUROSENSORY - ADULT  Goal: Achieves stable or improved neurological status  Description: INTERVENTIONS  - Monitor and report changes in neurological status  - Monitor vital signs such as temperature, blood pressure, glucose, and any other labs ordered   - Initiate measures to prevent increased intracranial pressure  - Monitor for seizure activity and implement precautions if appropriate      Outcome: Progressing  Goal: Achieves maximal functionality and self care  Description: INTERVENTIONS  - Monitor swallowing and airway patency with patient fatigue and changes in neurological status  - Encourage and assist patient to increase activity and self care.   - Encourage visually impaired, hearing impaired and aphasic patients to use assistive/communication devices  Outcome: Progressing     Problem: GENITOURINARY - ADULT  Goal: Maintains or returns to baseline urinary function  Description: INTERVENTIONS:  - Assess urinary function  - Encourage oral fluids to ensure adequate hydration if ordered  - Administer IV fluids as ordered to ensure adequate hydration  - Administer ordered medications as needed  - Offer frequent toileting  - Follow urinary retention protocol if ordered  Outcome: Progressing  Goal: Absence of urinary retention  Description: INTERVENTIONS:  - Assess patient’s ability to void and empty bladder  - Monitor I/O  - Bladder scan as needed  - Discuss with physician/AP medications to alleviate retention as needed  - Discuss catheterization for long term situations as appropriate  Outcome: Progressing     Problem: METABOLIC, FLUID AND ELECTROLYTES - ADULT  Goal: Electrolytes maintained within normal limits  Description: INTERVENTIONS:  - Monitor labs and assess patient for signs and symptoms of electrolyte imbalances  - Administer electrolyte replacement as ordered  - Monitor response to electrolyte  replacements, including repeat lab results as appropriate  - Instruct patient on fluid and nutrition as appropriate  Outcome: Progressing  Goal: Fluid balance maintained  Description: INTERVENTIONS:  - Monitor labs   - Monitor I/O and WT  - Instruct patient on fluid and nutrition as appropriate  - Assess for signs & symptoms of volume excess or deficit  Outcome: Progressing     Problem: HEMATOLOGIC - ADULT  Goal: Maintains hematologic stability  Description: INTERVENTIONS  - Assess for signs and symptoms of bleeding or hemorrhage  - Monitor labs  - Administer supportive blood products/factors as ordered and appropriate  Outcome: Progressing     Problem: Nutrition/Hydration-ADULT  Goal: Nutrient/Hydration intake appropriate for improving, restoring or maintaining nutritional needs  Description: Monitor and assess patient's nutrition/hydration status for malnutrition. Collaborate with interdisciplinary team and initiate plan and interventions as ordered.  Monitor patient's weight and dietary intake as ordered or per policy. Utilize nutrition screening tool and intervene as necessary. Determine patient's food preferences and provide high-protein, high-caloric foods as appropriate.     INTERVENTIONS:  - Monitor oral intake, urinary output, labs, and treatment plans  - Assess nutrition and hydration status and recommend course of action  - Evaluate amount of meals eaten  - Assist patient with eating if necessary   - Allow adequate time for meals  - Recommend/ encourage appropriate diets, oral nutritional supplements, and vitamin/mineral supplements  - Order, calculate, and assess calorie counts as needed  - Recommend, monitor, and adjust tube feedings and TPN/PPN based on assessed needs  - Assess need for intravenous fluids  - Provide specific nutrition/hydration education as appropriate  - Include patient/family/caregiver in decisions related to nutrition  Outcome: Progressing

## 2024-01-07 NOTE — PLAN OF CARE
Problem: Potential for Falls  Goal: Patient will remain free of falls  Description: INTERVENTIONS:  - Educate patient/family on patient safety including physical limitations  - Instruct patient to call for assistance with activity   - Consult OT/PT to assist with strengthening/mobility   - Keep Call bell within reach  - Keep bed low and locked with side rails adjusted as appropriate  - Keep care items and personal belongings within reach  - Initiate and maintain comfort rounds  - Make Fall Risk Sign visible to staff  - Offer Toileting every 2 Hours, in advance of need  - Initiate/Maintain bed alarm  - Obtain necessary fall risk management equipment: fall risk sign on door  - Apply yellow socks and bracelet for high fall risk patients  - Consider moving patient to room near nurses station  Outcome: Progressing     Problem: Prexisting or High Potential for Compromised Skin Integrity  Goal: Skin integrity is maintained or improved  Description: INTERVENTIONS:  - Identify patients at risk for skin breakdown  - Assess and monitor skin integrity  - Assess and monitor nutrition and hydration status  - Monitor labs   - Assess for incontinence   - Turn and reposition patient  - Assist with mobility/ambulation  - Relieve pressure over bony prominences  - Avoid friction and shearing  - Provide appropriate hygiene as needed including keeping skin clean and dry  - Evaluate need for skin moisturizer/barrier cream  - Collaborate with interdisciplinary team   - Patient/family teaching  - Consider wound care consult   Outcome: Progressing     Problem: PAIN - ADULT  Goal: Verbalizes/displays adequate comfort level or baseline comfort level  Description: Interventions:  - Encourage patient to monitor pain and request assistance  - Assess pain using appropriate pain scale  - Administer analgesics based on type and severity of pain and evaluate response  - Implement non-pharmacological measures as appropriate and evaluate response  -  Consider cultural and social influences on pain and pain management  - Notify physician/advanced practitioner if interventions unsuccessful or patient reports new pain  Outcome: Progressing     Problem: INFECTION - ADULT  Goal: Absence or prevention of progression during hospitalization  Description: INTERVENTIONS:  - Assess and monitor for signs and symptoms of infection  - Monitor lab/diagnostic results  - Monitor all insertion sites, i.e. indwelling lines, tubes, and drains  - Monitor endotracheal if appropriate and nasal secretions for changes in amount and color  - [place] appropriate cooling/warming therapies per order  - Administer medications as ordered  - Instruct and encourage patient and family to use good hand hygiene technique  - Identify and instruct in appropriate isolation precautions for identified infection/condition  Outcome: Progressing  Goal: Absence of fever/infection during neutropenic period  Description: INTERVENTIONS:  - Monitor WBC    Outcome: Progressing     Problem: DISCHARGE PLANNING  Goal: Discharge to home or other facility with appropriate resources  Description: INTERVENTIONS:  - Identify barriers to discharge w/patient and caregiver  - Arrange for needed discharge resources and transportation as appropriate  - Identify discharge learning needs (meds, wound care, etc.)  - Arrange for interpretive services to assist at discharge as needed  - Refer to Case Management Department for coordinating discharge planning if the patient needs post-hospital services based on physician/advanced practitioner order or complex needs related to functional status, cognitive ability, or social support system  Outcome: Progressing     Problem: Knowledge Deficit  Goal: Patient/family/caregiver demonstrates understanding of disease process, treatment plan, medications, and discharge instructions  Description: Complete learning assessment and assess knowledge base.  Interventions:  - Provide teaching at  level of understanding  - Provide teaching via preferred learning methods  Outcome: Progressing     Problem: NEUROSENSORY - ADULT  Goal: Achieves stable or improved neurological status  Description: INTERVENTIONS  - Monitor and report changes in neurological status  - Monitor vital signs such as temperature, blood pressure, glucose, and any other labs ordered   - Initiate measures to prevent increased intracranial pressure  - Monitor for seizure activity and implement precautions if appropriate      Outcome: Progressing  Goal: Achieves maximal functionality and self care  Description: INTERVENTIONS  - Monitor swallowing and airway patency with patient fatigue and changes in neurological status  - Encourage and assist patient to increase activity and self care.   - Encourage visually impaired, hearing impaired and aphasic patients to use assistive/communication devices  Outcome: Progressing     Problem: GENITOURINARY - ADULT  Goal: Maintains or returns to baseline urinary function  Description: INTERVENTIONS:  - Assess urinary function  - Encourage oral fluids to ensure adequate hydration if ordered  - Administer IV fluids as ordered to ensure adequate hydration  - Administer ordered medications as needed  - Offer frequent toileting  - Follow urinary retention protocol if ordered  Outcome: Progressing  Goal: Absence of urinary retention  Description: INTERVENTIONS:  - Assess patient’s ability to void and empty bladder  - Monitor I/O  - Bladder scan as needed  - Discuss with physician/AP medications to alleviate retention as needed  - Discuss catheterization for long term situations as appropriate  Outcome: Progressing     Problem: METABOLIC, FLUID AND ELECTROLYTES - ADULT  Goal: Electrolytes maintained within normal limits  Description: INTERVENTIONS:  - Monitor labs and assess patient for signs and symptoms of electrolyte imbalances  - Administer electrolyte replacement as ordered  - Monitor response to electrolyte  replacements, including repeat lab results as appropriate  - Instruct patient on fluid and nutrition as appropriate  Outcome: Progressing  Goal: Fluid balance maintained  Description: INTERVENTIONS:  - Monitor labs   - Monitor I/O and WT  - Instruct patient on fluid and nutrition as appropriate  - Assess for signs & symptoms of volume excess or deficit  Outcome: Progressing     Problem: HEMATOLOGIC - ADULT  Goal: Maintains hematologic stability  Description: INTERVENTIONS  - Assess for signs and symptoms of bleeding or hemorrhage  - Monitor labs  - Administer supportive blood products/factors as ordered and appropriate  Outcome: Progressing     Problem: Nutrition/Hydration-ADULT  Goal: Nutrient/Hydration intake appropriate for improving, restoring or maintaining nutritional needs  Description: Monitor and assess patient's nutrition/hydration status for malnutrition. Collaborate with interdisciplinary team and initiate plan and interventions as ordered.  Monitor patient's weight and dietary intake as ordered or per policy. Utilize nutrition screening tool and intervene as necessary. Determine patient's food preferences and provide high-protein, high-caloric foods as appropriate.     INTERVENTIONS:  - Monitor oral intake, urinary output, labs, and treatment plans  - Assess nutrition and hydration status and recommend course of action  - Evaluate amount of meals eaten  - Assist patient with eating if necessary   - Allow adequate time for meals  - Recommend/ encourage appropriate diets, oral nutritional supplements, and vitamin/mineral supplements  - Order, calculate, and assess calorie counts as needed  - Recommend, monitor, and adjust tube feedings and TPN/PPN based on assessed needs  - Assess need for intravenous fluids  - Provide specific nutrition/hydration education as appropriate  - Include patient/family/caregiver in decisions related to nutrition  Outcome: Progressing

## 2024-01-07 NOTE — ASSESSMENT & PLAN NOTE
Patient has history of dementia with behavioral disturbances, worsening over past several months. Has been getting more agitated and refusing medications since day prior to admission. Prior facility said she needs a higher level of care and will not accept patient back.  UA showed moderate leukocytes and bacteria, completed ceftriaxone x 3 doses  Continue current home medications including aricept and zyprexa 2.5 mg hs  Patient was started at [facility], discontinued  Psychiatry consulted; recommending to avoid Ativan for sedation/agitation, decreased Zyprexa to 1.25 mg p.o. daily in the morning and Zyprexa 3.75 mg p.o. nightly  Patient very lethargic on 1/4, likely from agitation night prior  Stat CT of head: no acute intracranial abnormality but demonstrated stable marked chronic small vessel ischemic changes and stable 1.3 cm meningioma at the left posterior lateral middle cranial fossa  Supportive care  Mental status improving, behavior appears stable  Upgrade to mechanical soft diet and thin liquids per speech recommendations

## 2024-01-07 NOTE — PHYSICAL THERAPY NOTE
"PT TREATMENT     Time In: 1158  Time Out: 1208       01/07/24 1158   PT Last Visit   PT Visit Date 01/07/24   Note Type   Note Type Treatment   Pain Assessment   Pain Assessment Tool 0-10   Restrictions/Precautions   Weight Bearing Precautions Per Order No   Other Precautions Fall Risk;Chair Alarm;Bed Alarm;Cognitive   General   Chart Reviewed Yes   Family/Caregiver Present Yes  (daughter and granddaughter)   Cognition   Overall Cognitive Status Impaired   Attention Attends with cues to redirect   Orientation Level Oriented to person;Disoriented to place;Disoriented to time;Disoriented to situation   Memory Decreased recall of biographical information;Decreased short term memory;Decreased recall of recent events   Following Commands Follows one step commands inconsistently   Subjective   Subjective \"I got to pee!\"   Bed Mobility   Rolling R 3  Moderate assistance   Additional items Assist x 1   Rolling L 3  Moderate assistance   Additional items Assist x 1   Additional Comments Rolling for bedpan placement and removal and for pad placement and removal of old pad on bed.   Ambulation/Elevation   Gait Assistance Not tested   Assessment   Problem List Decreased strength;Decreased endurance;Impaired balance;Decreased mobility;Decreased cognition;Impaired judgement;Decreased safety awareness   Assessment Pt supine in bed upon PT arrival, reporting \"I need to go to the bathroom.\" RN Tabatha able to aid with bed mobility and toileting, patient needing cueing for bed mobility and hand placement for rolling for placement of bed pan. Pt urinating in bed pan, rolling L and R for bed pan placement and removal and for soiled pad placemente and removal. Call bell and phone within reach. All needs met and pt reports no further questions for PT at this time. Patient will benefit from increasing functional mobility with clinical staff and continued physical therapy with progression as tolerated to regain function. The patient's AM-PAC " Basic Mobility Inpatient Short Form Raw Score is 9. A Raw score of less than or equal to 16 suggests the patient may benefit from discharge to post-acute rehabilitation services. Please also refer to the recommendation of the Physical Therapist for safe discharge planning.   Goals   STG Expiration Date 01/11/24   LTG Expiration Date 01/18/24   Plan   Treatment/Interventions ADL retraining;Functional transfer training;LE strengthening/ROM;Therapeutic exercise;Endurance training;Cognitive reorientation;Patient/family training;Bed mobility;Gait training;Spoke to nursing;OT   PT Frequency   (5x/wk)   Discharge Recommendation   Rehab Resource Intensity Level, PT II (Moderate Resource Intensity)   AM-PAC Basic Mobility Inpatient   Turning in Flat Bed Without Bedrails 3   Lying on Back to Sitting on Edge of Flat Bed Without Bedrails 2   Moving Bed to Chair 1   Standing Up From Chair Using Arms 1   Walk in Room 1   Climb 3-5 Stairs With Railing 1   Basic Mobility Inpatient Raw Score 9   Turning Head Towards Sound 2   Follow Simple Instructions 1   Low Function Basic Mobility Raw Score  12   Low Function Basic Mobility Standardized Score  18.33   Highest Level Of Mobility   JH-HLM Goal 3: Sit at edge of bed   JH-HLM Achieved 2: Bed activities/Dependent transfer   End of Consult   Patient Position at End of Consult Supine;All needs within reach   Licensure   [place] License Number  Mine Mancuso PT, DPT [id number]

## 2024-01-07 NOTE — PHYSICAL THERAPY NOTE
"PT TREATMENT     Time In: 1158  Time Out: 1208       01/07/24 1158   PT Last Visit   PT Visit Date 01/07/24   Note Type   Note Type Treatment   Pain Assessment   Pain Assessment Tool 0-10   Restrictions/Precautions   Weight Bearing Precautions Per Order No   Other Precautions Fall Risk;Chair Alarm;Bed Alarm;Cognitive   General   Chart Reviewed Yes   Family/Caregiver Present Yes  (daughter and granddaughter)   Cognition   Overall Cognitive Status Impaired   Attention Attends with cues to redirect   Orientation Level Oriented to person;Disoriented to place;Disoriented to time;Disoriented to situation   Memory Decreased recall of biographical information;Decreased short term memory;Decreased recall of recent events   Following Commands Follows one step commands inconsistently   Subjective   Subjective \"I got to pee!\"   Bed Mobility   Rolling R 3  Moderate assistance   Additional items Assist x 1   Rolling L 3  Moderate assistance   Additional items Assist x 1   Additional Comments Rolling for bedpan placement and removal and for pad placement and removal of old pad on bed.   Ambulation/Elevation   Gait Assistance Not tested   Assessment   Problem List Decreased strength;Decreased endurance;Impaired balance;Decreased mobility;Decreased cognition;Impaired judgement;Decreased safety awareness   Assessment Pt supine in bed upon PT arrival, reporting \"I need to go to the bathroom.\" RN Tabatha able to aid with bed mobility and toileting, patient needing cueing for bed mobility and hand placement for rolling for placement of bed pain. Pt urinating in bed pain, rolling L and R for bed pan placement and removal and for soiled pad placemente and removal. Call bell and phone within reach. All needs met and pt reports no further questions for PT at this time. Patient will benefit from increasing functional mobility with clinical staff and continued physical therapy with progression as tolerated to regain function. The patient's " AM-PAC Basic Mobility Inpatient Short Form Raw Score is 9. A Raw score of less than or equal to 16 suggests the patient may benefit from discharge to post-acute rehabilitation services. Please also refer to the recommendation of the Physical Therapist for safe discharge planning.   Goals   STG Expiration Date 01/11/24   LTG Expiration Date 01/18/24   Plan   Treatment/Interventions ADL retraining;Functional transfer training;LE strengthening/ROM;Therapeutic exercise;Endurance training;Cognitive reorientation;Patient/family training;Bed mobility;Gait training;Spoke to nursing;OT   PT Frequency   (5x/wk)   Discharge Recommendation   Rehab Resource Intensity Level, PT II (Moderate Resource Intensity)   AM-PAC Basic Mobility Inpatient   Turning in Flat Bed Without Bedrails 3   Lying on Back to Sitting on Edge of Flat Bed Without Bedrails 2   Moving Bed to Chair 1   Standing Up From Chair Using Arms 1   Walk in Room 1   Climb 3-5 Stairs With Railing 1   Basic Mobility Inpatient Raw Score 9   Turning Head Towards Sound 2   Follow Simple Instructions 1   Low Function Basic Mobility Raw Score  12   Low Function Basic Mobility Standardized Score  18.33   Highest Level Of Mobility   JH-HLM Goal 3: Sit at edge of bed   JH-HLM Achieved 2: Bed activities/Dependent transfer   End of Consult   Patient Position at End of Consult Supine;All needs within reach   Licensure   [place] License Number  Mine Mancuso PT, DPT [id number]

## 2024-01-07 NOTE — PROGRESS NOTES
[facility]  Progress Note  Name: Monika Butler I  MRN: [id number]  Unit/Bed#: [address] [place] [address] Date of Admission: 1/2/2024   Date of Service: 1/7/2024 I Hospital Day: 5    Assessment/Plan   * Dementia with behavioral disturbance (HCC)  Assessment & Plan  Patient has history of dementia with behavioral disturbances, worsening over past several months. Has been getting more agitated and refusing medications since day prior to admission. Prior facility said she needs a higher level of care and will not accept patient back.  UA showed moderate leukocytes and bacteria, completed ceftriaxone x 3 doses  Continue current home medications including aricept and zyprexa 2.5 mg hs  Patient was started at [facility], discontinued  Psychiatry consulted; recommending to avoid Ativan for sedation/agitation, decreased Zyprexa to 1.25 mg p.o. daily in the morning and Zyprexa 3.75 mg p.o. nightly  Patient very lethargic on 1/4, likely from agitation night prior  Stat CT of head: no acute intracranial abnormality but demonstrated stable marked chronic small vessel ischemic changes and stable 1.3 cm meningioma at the left posterior lateral middle cranial fossa  Supportive care  Mental status improving, behavior appears stable  Upgrade to mechanical soft diet and thin liquids per speech recommendations    Essential hypertension  Assessment & Plan  /97 on arrival, likely in setting of noncompliance and agitation  Continue home losartan 100 mg daily and metoprolol 50 mg daily    Elevated troponin  Assessment & Plan  Troponin noted to be elevated at 24 on 1/3, repeated 1/4 elevated 300  Cardiology consulted  ECHO 1/5/24: EF 60-65%, systolic function normal, diastolic function mildly abnormal consistent with G1DD  Stress test canceled after discussion between cardiology and patient's family  Continue medical therapy with beta blocker and ARB           VTE Pharmacologic Prophylaxis: VTE Score: 3  Moderate Risk (Score 3-4) - Pharmacological DVT Prophylaxis Ordered: enoxaparin (Lovenox).    Mobility:   Basic Mobility Inpatient Raw Score: 9  -HLM Goal: 3: Sit at edge of bed  JH-HLM Achieved: 2: Bed activities/Dependent transfer  HLM Goal achieved. Continue to encourage appropriate mobility.    Patient Centered Rounds: I performed bedside rounds with nursing staff today.   Discussions with Specialists or Other Care Team Provider: rnnajma    Education and Discussions with Family / Patient:  will update at bedside.     Total Time Spent on Date of Encounter in care of patient: 35 mins. This time was spent on one or more of the following: performing physical exam; counseling and coordination of care; obtaining or reviewing history; documenting in the medical record; reviewing/ordering tests, medications or procedures; communicating with other healthcare professionals and discussing with patient's family/caregivers.    Current Length of Stay: 5 day(s)  Current Patient Status: Inpatient   Certification Statement: The patient will continue to require additional inpatient hospital stay due to advance diet, monitor behavior  Discharge Plan: Anticipate discharge in 24-48 hrs to rehab facility.    Code Status: Level 1 - Full Code    Subjective:   Patient resting comfortably in bed. She is very pleasant and cooperative. Reports she is full from breakfast. Per RN, she was a little agitated this morning but has since been calm. Voiding independently, no urinary retention. Patient pleasantly talking about her family.    Objective:     Vitals:   Temp (24hrs), Av.2 °F (36.8 °C), Min:97.7 °F (36.5 °C), Max:98.9 °F (37.2 °C)    Temp:  [97.7 °F (36.5 °C)-98.9 °F (37.2 °C)] 97.7 °F (36.5 °C)  HR:  [72-91] 72  Resp:  [16-18] 16  BP: (162-169)/(94-96) 168/94  SpO2:  [93 %-96 %] 94 %  Body mass index is 21.37 kg/m².     Input and Output Summary (last 24 hours):     Intake/Output Summary (Last 24 hours) at 2024 1028  Last data  filed at 1/7/2024 0830  Gross per 24 hour   Intake 10 ml   Output 300 ml   Net -290 ml       Physical Exam:   Physical Exam  Vitals and nursing note reviewed.   Constitutional:       General: She is not in acute distress.     Appearance: She is well-developed.   Cardiovascular:      Rate and Rhythm: Normal rate and regular rhythm.   Pulmonary:      Effort: Pulmonary effort is normal. No respiratory distress.      Breath sounds: Normal breath sounds.   Abdominal:      Palpations: Abdomen is soft.      Tenderness: There is no abdominal tenderness.   Musculoskeletal:         General: No swelling.   Skin:     General: Skin is warm and dry.      Capillary Refill: Capillary refill takes less than 2 seconds.   Neurological:      Mental Status: She is alert.   Psychiatric:         Mood and Affect: Mood normal.      Comments: Pleasantly confused, cooperative          Additional Data:     Labs:  Results from last 7 days   Lab Units 01/06/24 0623 01/03/24 0449 01/02/24  1538   WBC Thousand/uL 4.74   < > 4.52   HEMOGLOBIN g/dL 10.9*   < > 10.6*   HEMATOCRIT % 35.3   < > 32.5*   PLATELETS Thousands/uL 212   < > 251   NEUTROS PCT %  --   --  51   LYMPHS PCT %  --   --  31   MONOS PCT %  --   --  13*   EOS PCT %  --   --  4    < > = values in this interval not displayed.     Results from last 7 days   Lab Units 01/06/24 0623 01/03/24 0449 01/02/24  1538   SODIUM mmol/L 147   < > 144   POTASSIUM mmol/L 3.5   < > 3.0*   CHLORIDE mmol/L 110*   < > 104   CO2 mmol/L 29   < > 31   BUN mg/dL 25   < > 17   CREATININE mg/dL 0.70   < > 0.77   ANION GAP mmol/L 8   < > 9   CALCIUM mg/dL 8.8   < > 9.0   ALBUMIN g/dL  --   --  4.0   TOTAL BILIRUBIN mg/dL  --   --  0.46   ALK PHOS U/L  --   --  69   ALT U/L  --   --  30   AST U/L  --   --  26   GLUCOSE RANDOM mg/dL 93   < > 87    < > = values in this interval not displayed.         Results from last 7 days   Lab Units 01/02/24  1544   POC GLUCOSE mg/dl 85                Lines/Drains:  Invasive Devices       Peripheral Intravenous Line  Duration             Peripheral IV 01/02/24 Right Antecubital 4 days    Peripheral IV 01/07/24 Right;Ventral (anterior) Forearm <1 day                          Imaging: No pertinent imaging reviewed.    Recent Cultures (last 7 days):   Results from last 7 days   Lab Units 01/02/24  1713   URINE CULTURE  >100,000 cfu/ml       Last 24 Hours Medication List:   Current Facility-Administered Medications   Medication Dose Route Frequency Provider Last Rate    acetaminophen  650 mg Oral Q6H PRN Leyla Guardado PA-C      cholecalciferol  1,000 Units Oral Daily Leyla Guardado PA-C      docusate sodium  100 mg Oral BID Leyla Guardado PA-C      donepezil  10 mg Oral HS Leyla Guardado PA-C      enoxaparin  1 mg/kg Subcutaneous Q12H [facility] DAMARIS Meza      labetalol  10 mg Intravenous Q6H PRN Leyla Guardado PA-C      lidocaine  1 patch Topical Daily DAMARIS Meza      loratadine  10 mg Oral Daily DAMARIS Meza      losartan  100 mg Oral Daily Leyla Guardado PA-C      melatonin  6 mg Oral HS Manjit Perez, DO      metoprolol succinate  50 mg Oral HS DAMARIS Addison      morphine injection  2 mg Intravenous Q4H PRN Gonzales Pinto MD      multi-electrolyte  75 mL/hr Intravenous Continuous Leyla Guardado PA-C 75 mL/hr (01/07/24 0925)    OLANZapine  1.25 mg Oral Q8H PRN Ash Ardoterrello, DO      Or    OLANZapine  1.3 mg Intramuscular Q8H PRN Ash Marteldolino, DO      OLANZapine  1.25 mg Oral Daily Ash Ardolino, DO      And    OLANZapine  3.75 mg Oral HS Ash Ardolino, DO      ondansetron  4 mg Intravenous Q6H PRN Leyla Guardado PA-C          Today, Patient Was Seen By: Leyla Guardado PA-C    **Please Note: This note may have been constructed using a voice recognition system.**

## 2024-01-07 NOTE — PLAN OF CARE
Assessment:  Recent AMS, now improving. Mild/mod oral dysphagia suspected with mild aspiration risk alvarado with thin liquid.     Plan/Recommendations:  Consider mechanical soft diet, thin liquid by individual sips/   Pills as desired and tolerated.   SLP to continue diagnostic tx and caregiver education.

## 2024-01-08 ENCOUNTER — APPOINTMENT (INPATIENT)
Dept: RADIOLOGY | Facility: HOSPITAL | Age: 84
DRG: 884 | End: 2024-01-08
Payer: COMMERCIAL

## 2024-01-08 LAB
ANION GAP SERPL CALCULATED.3IONS-SCNC: 6 MMOL/L
ATRIAL RATE: 71 BPM
BUN SERPL-MCNC: 20 MG/DL (ref 5–25)
CALCIUM SERPL-MCNC: 9.4 MG/DL (ref 8.4–10.2)
CHLORIDE SERPL-SCNC: 105 MMOL/L (ref 96–108)
CO2 SERPL-SCNC: 33 MMOL/L (ref 21–32)
CREAT SERPL-MCNC: 0.98 MG/DL (ref 0.6–1.3)
ERYTHROCYTE [DISTWIDTH] IN BLOOD BY AUTOMATED COUNT: 13.3 % (ref 11.6–15.1)
GFR SERPL CREATININE-BSD FRML MDRD: 53 ML/MIN/1.73SQ M
GLUCOSE SERPL-MCNC: 94 MG/DL (ref 65–140)
HCT VFR BLD AUTO: 34.3 % (ref 34.8–46.1)
HGB BLD-MCNC: 11 G/DL (ref 11.5–15.4)
MCH RBC QN AUTO: 32.5 PG (ref 26.8–34.3)
MCHC RBC AUTO-ENTMCNC: 32.1 G/DL (ref 31.4–37.4)
MCV RBC AUTO: 102 FL (ref 82–98)
P AXIS: 84 DEGREES
PLATELET # BLD AUTO: 208 THOUSANDS/UL (ref 149–390)
PMV BLD AUTO: 10.6 FL (ref 8.9–12.7)
POTASSIUM SERPL-SCNC: 3 MMOL/L (ref 3.5–5.3)
PR INTERVAL: 172 MS
QRS AXIS: 57 DEGREES
QRSD INTERVAL: 90 MS
QT INTERVAL: 442 MS
QTC INTERVAL: 480 MS
RBC # BLD AUTO: 3.38 MILLION/UL (ref 3.81–5.12)
SODIUM SERPL-SCNC: 144 MMOL/L (ref 135–147)
T WAVE AXIS: 87 DEGREES
VENTRICULAR RATE: 71 BPM
WBC # BLD AUTO: 5.65 THOUSAND/UL (ref 4.31–10.16)

## 2024-01-08 PROCEDURE — 99232 SBSQ HOSP IP/OBS MODERATE 35: CPT | Performed by: NURSE PRACTITIONER

## 2024-01-08 PROCEDURE — 93005 ELECTROCARDIOGRAM TRACING: CPT

## 2024-01-08 PROCEDURE — 80048 BASIC METABOLIC PNL TOTAL CA: CPT

## 2024-01-08 PROCEDURE — 99232 SBSQ HOSP IP/OBS MODERATE 35: CPT | Performed by: PSYCHIATRY & NEUROLOGY

## 2024-01-08 PROCEDURE — 85027 COMPLETE CBC AUTOMATED: CPT

## 2024-01-08 RX ORDER — LANOLIN ALCOHOL/MO/W.PET/CERES
3 CREAM (GRAM) TOPICAL DAILY
Status: DISCONTINUED | OUTPATIENT
Start: 2024-01-08 | End: 2024-01-15

## 2024-01-08 RX ORDER — POTASSIUM CHLORIDE 20 MEQ/1
40 TABLET, EXTENDED RELEASE ORAL 2 TIMES DAILY
Status: COMPLETED | OUTPATIENT
Start: 2024-01-08 | End: 2024-01-09

## 2024-01-08 RX ORDER — LANOLIN ALCOHOL/MO/W.PET/CERES
3 CREAM (GRAM) TOPICAL
Status: DISCONTINUED | OUTPATIENT
Start: 2024-01-08 | End: 2024-01-08

## 2024-01-08 RX ORDER — METOPROLOL SUCCINATE 50 MG/1
50 TABLET, EXTENDED RELEASE ORAL DAILY
Status: DISCONTINUED | OUTPATIENT
Start: 2024-01-08 | End: 2024-01-10

## 2024-01-08 RX ORDER — DONEPEZIL HYDROCHLORIDE 5 MG/1
10 TABLET, FILM COATED ORAL EVERY MORNING
Status: DISCONTINUED | OUTPATIENT
Start: 2024-01-09 | End: 2024-01-21 | Stop reason: HOSPADM

## 2024-01-08 RX ORDER — OLANZAPINE 2.5 MG/1
5 TABLET, FILM COATED ORAL DAILY
Status: DISCONTINUED | OUTPATIENT
Start: 2024-01-08 | End: 2024-01-15

## 2024-01-08 RX ORDER — OLANZAPINE 2.5 MG/1
5 TABLET, FILM COATED ORAL
Status: DISCONTINUED | OUTPATIENT
Start: 2024-01-08 | End: 2024-01-08

## 2024-01-08 RX ADMIN — ACETAMINOPHEN 650 MG: 325 TABLET ORAL at 20:10

## 2024-01-08 RX ADMIN — LOSARTAN POTASSIUM 100 MG: 50 TABLET, FILM COATED ORAL at 08:28

## 2024-01-08 RX ADMIN — Medication 1000 UNITS: at 08:28

## 2024-01-08 RX ADMIN — OLANZAPINE 5 MG: 2.5 TABLET, FILM COATED ORAL at 20:10

## 2024-01-08 RX ADMIN — POTASSIUM CHLORIDE 40 MEQ: 1500 TABLET, EXTENDED RELEASE ORAL at 18:38

## 2024-01-08 RX ADMIN — ENOXAPARIN SODIUM 50 MG: 60 INJECTION SUBCUTANEOUS at 08:28

## 2024-01-08 RX ADMIN — MORPHINE SULFATE 2 MG: 2 INJECTION, SOLUTION INTRAMUSCULAR; INTRAVENOUS at 00:43

## 2024-01-08 RX ADMIN — DOCUSATE SODIUM 100 MG: 100 CAPSULE, LIQUID FILLED ORAL at 08:28

## 2024-01-08 RX ADMIN — ENOXAPARIN SODIUM 50 MG: 60 INJECTION SUBCUTANEOUS at 20:13

## 2024-01-08 RX ADMIN — LORATADINE 10 MG: 10 TABLET ORAL at 08:28

## 2024-01-08 RX ADMIN — Medication 3 MG: at 20:11

## 2024-01-08 RX ADMIN — METOPROLOL SUCCINATE 50 MG: 50 TABLET, EXTENDED RELEASE ORAL at 20:11

## 2024-01-08 RX ADMIN — OLANZAPINE 1.25 MG: 2.5 TABLET, FILM COATED ORAL at 08:28

## 2024-01-08 NOTE — PLAN OF CARE
Problem: Potential for Falls  Goal: Patient will remain free of falls  Description: INTERVENTIONS:  - Educate patient/family on patient safety including physical limitations  - Instruct patient to call for assistance with activity   - Consult OT/PT to assist with strengthening/mobility   - Keep Call bell within reach  - Keep bed low and locked with side rails adjusted as appropriate  - Keep care items and personal belongings within reach  - Initiate and maintain comfort rounds  - Make Fall Risk Sign visible to staff  - Offer Toileting every 2 Hours, in advance of need  - Initiate/Maintain bed alarm  - Obtain necessary fall risk management equipment: yellow socks  - Apply yellow socks and bracelet for high fall risk patients  - Consider moving patient to room near nurses station  Outcome: Progressing     Problem: Prexisting or High Potential for Compromised Skin Integrity  Goal: Skin integrity is maintained or improved  Description: INTERVENTIONS:  - Identify patients at risk for skin breakdown  - Assess and monitor skin integrity  - Assess and monitor nutrition and hydration status  - Monitor labs   - Assess for incontinence   - Turn and reposition patient  - Assist with mobility/ambulation  - Relieve pressure over bony prominences  - Avoid friction and shearing  - Provide appropriate hygiene as needed including keeping skin clean and dry  - Evaluate need for skin moisturizer/barrier cream  - Collaborate with interdisciplinary team   - Patient/family teaching  - Consider wound care consult   Outcome: Progressing     Problem: PAIN - ADULT  Goal: Verbalizes/displays adequate comfort level or baseline comfort level  Description: Interventions:  - Encourage patient to monitor pain and request assistance  - Assess pain using appropriate pain scale  - Administer analgesics based on type and severity of pain and evaluate response  - Implement non-pharmacological measures as appropriate and evaluate response  - Consider  cultural and social influences on pain and pain management  - Notify physician/advanced practitioner if interventions unsuccessful or patient reports new pain  Outcome: Progressing     Problem: INFECTION - ADULT  Goal: Absence or prevention of progression during hospitalization  Description: INTERVENTIONS:  - Assess and monitor for signs and symptoms of infection  - Monitor lab/diagnostic results  - Monitor all insertion sites, i.e. indwelling lines, tubes, and drains  - Monitor endotracheal if appropriate and nasal secretions for changes in amount and color  - [place] appropriate cooling/warming therapies per order  - Administer medications as ordered  - Instruct and encourage patient and family to use good hand hygiene technique  - Identify and instruct in appropriate isolation precautions for identified infection/condition  Outcome: Progressing  Goal: Absence of fever/infection during neutropenic period  Description: INTERVENTIONS:  - Monitor WBC    Outcome: Progressing     Problem: DISCHARGE PLANNING  Goal: Discharge to home or other facility with appropriate resources  Description: INTERVENTIONS:  - Identify barriers to discharge w/patient and caregiver  - Arrange for needed discharge resources and transportation as appropriate  - Identify discharge learning needs (meds, wound care, etc.)  - Arrange for interpretive services to assist at discharge as needed  - Refer to Case Management Department for coordinating discharge planning if the patient needs post-hospital services based on physician/advanced practitioner order or complex needs related to functional status, cognitive ability, or social support system  Outcome: Progressing     Problem: Knowledge Deficit  Goal: Patient/family/caregiver demonstrates understanding of disease process, treatment plan, medications, and discharge instructions  Description: Complete learning assessment and assess knowledge base.  Interventions:  - Provide teaching at level of  understanding  - Provide teaching via preferred learning methods  Outcome: Progressing     Problem: NEUROSENSORY - ADULT  Goal: Achieves stable or improved neurological status  Description: INTERVENTIONS  - Monitor and report changes in neurological status  - Monitor vital signs such as temperature, blood pressure, glucose, and any other labs ordered   - Initiate measures to prevent increased intracranial pressure  - Monitor for seizure activity and implement precautions if appropriate      Outcome: Progressing  Goal: Achieves maximal functionality and self care  Description: INTERVENTIONS  - Monitor swallowing and airway patency with patient fatigue and changes in neurological status  - Encourage and assist patient to increase activity and self care.   - Encourage visually impaired, hearing impaired and aphasic patients to use assistive/communication devices  Outcome: Progressing     Problem: GENITOURINARY - ADULT  Goal: Maintains or returns to baseline urinary function  Description: INTERVENTIONS:  - Assess urinary function  - Encourage oral fluids to ensure adequate hydration if ordered  - Administer IV fluids as ordered to ensure adequate hydration  - Administer ordered medications as needed  - Offer frequent toileting  - Follow urinary retention protocol if ordered  Outcome: Progressing  Goal: Absence of urinary retention  Description: INTERVENTIONS:  - Assess patient’s ability to void and empty bladder  - Monitor I/O  - Bladder scan as needed  - Discuss with physician/AP medications to alleviate retention as needed  - Discuss catheterization for long term situations as appropriate  Outcome: Progressing     Problem: METABOLIC, FLUID AND ELECTROLYTES - ADULT  Goal: Electrolytes maintained within normal limits  Description: INTERVENTIONS:  - Monitor labs and assess patient for signs and symptoms of electrolyte imbalances  - Administer electrolyte replacement as ordered  - Monitor response to electrolyte  replacements, including repeat lab results as appropriate  - Instruct patient on fluid and nutrition as appropriate  Outcome: Progressing  Goal: Fluid balance maintained  Description: INTERVENTIONS:  - Monitor labs   - Monitor I/O and WT  - Instruct patient on fluid and nutrition as appropriate  - Assess for signs & symptoms of volume excess or deficit  Outcome: Progressing     Problem: HEMATOLOGIC - ADULT  Goal: Maintains hematologic stability  Description: INTERVENTIONS  - Assess for signs and symptoms of bleeding or hemorrhage  - Monitor labs  - Administer supportive blood products/factors as ordered and appropriate  Outcome: Progressing     Problem: Nutrition/Hydration-ADULT  Goal: Nutrient/Hydration intake appropriate for improving, restoring or maintaining nutritional needs  Description: Monitor and assess patient's nutrition/hydration status for malnutrition. Collaborate with interdisciplinary team and initiate plan and interventions as ordered.  Monitor patient's weight and dietary intake as ordered or per policy. Utilize nutrition screening tool and intervene as necessary. Determine patient's food preferences and provide high-protein, high-caloric foods as appropriate.     INTERVENTIONS:  - Monitor oral intake, urinary output, labs, and treatment plans  - Assess nutrition and hydration status and recommend course of action  - Evaluate amount of meals eaten  - Assist patient with eating if necessary   - Allow adequate time for meals  - Recommend/ encourage appropriate diets, oral nutritional supplements, and vitamin/mineral supplements  - Order, calculate, and assess calorie counts as needed  - Recommend, monitor, and adjust tube feedings and TPN/PPN based on assessed needs  - Assess need for intravenous fluids  - Provide specific nutrition/hydration education as appropriate  - Include patient/family/caregiver in decisions related to nutrition  Outcome: Progressing

## 2024-01-08 NOTE — ASSESSMENT & PLAN NOTE
Patient has history of dementia with behavioral disturbances, worsening over past several months. Has been getting more agitated and refusing medications since day prior to admission. Prior facility said she needs a higher level of care and will not accept patient back.  UA showed moderate leukocytes and bacteria, completed ceftriaxone x 3 doses  Continue current home medications including aricept and zyprexa 2.5 mg hs  Patient was started at [facility], discontinued  Psychiatry consulted; recommending to avoid Ativan for sedation/agitation, decreased Zyprexa to 1.25 mg p.o. daily in the morning and Zyprexa 3.75 mg p.o. nightly  Patient very lethargic on 1/4, likely from agitation night prior  Stat CT of head: no acute intracranial abnormality but demonstrated stable marked chronic small vessel ischemic changes and stable 1.3 cm meningioma at the left posterior lateral middle cranial fossa  Supportive care  Mental status improving, behavior appears stable  Upgrade to mechanical soft diet and thin liquids per speech recommendations  1/8 patient once again appeared very sedated, spent most of the day sleeping.  Discussed with family and psychiatry, will do once a day dosing Zyprexa 5 mg, decrease melatonin to 3 mg and give these 2 medications around 1930 to see if we can get patient's wake sleep cycle more aligned to normal circadian rhythm.  Will also give patient's Aricept in the a.m. as psychiatry indicated this can add to patient's insomnia.  Monitor response  Avoid sedating medications

## 2024-01-08 NOTE — SPEECH THERAPY NOTE
I arrived to see patient for lunch assessment staff reported that patient was up most of the night as she is now sleeping soundly.  I spoke with daughter Anitha and obtained selections for dinner meal (including some mechanical soft and some puréed foods).   Plan: Will follow-up tomorrow to ensure safe intake of least restrictive diet over time and continue caregiver education as indicated.   Vesta Dumont MS CCC-SLP  [place] License [id number]

## 2024-01-08 NOTE — CASE MANAGEMENT
Case Management Discharge Planning Note    Patient name Monika Butler  Location [address] [address]/[address]-* MRN [id number]  : 1940 Date 2024       Current Admission Date: 2024  Current Admission Diagnosis:Dementia with behavioral disturbance (HCC)   Patient Active Problem List    Diagnosis Date Noted    Elevated troponin 2024    Constipation 2023    Acute bronchiolitis 2023    Nondisplaced fracture of triquetrum (cuneiform) bone, left wrist, initial encounter for closed fracture 2023    Abrasion of left eyebrow 2023    Injury of left wrist 2023    Insomnia 2023    Anxiety 2023    Absolute anemia 2023    H/O clavicle fracture 2023    Meningioma (HCC) 2023    Pulmonary nodule 2023    Bad odor of urine 2023    Postmenopausal 2023    Prediabetes 2023    Gastroesophageal reflux disease with esophagitis without hemorrhage 2023    Generalized weakness 2022    Urinary frequency 2022    BMI 22.0-22.9, adult 2021    History of breast cancer 2021    Altered mental status 2020    Medicare annual wellness visit, subsequent 2020    Balance problems 2020    Dementia with behavioral disturbance (HCC)     Memory change 10/26/2020    Ear fullness, left 2017    Sinusitis 2017    Hyperlipidemia 2016    Breast cancer (HCC) 2013    Essential hypertension 2012      LOS (days): 6  Geometric Mean LOS (GMLOS) (days): 5  Days to GMLOS:0.2     OBJECTIVE:  Risk of Unplanned Readmission Score: 32.13       Current admission status: Inpatient   Preferred Pharmacy:   [id number] - [address] [address]  Phone: [phone] Fax: [phone]    Primary Care Provider: Kristin Raymundo MD    [facility]  Secondary Insurance:     DISCHARGE DETAILS:        IMM Given (Date)::  01/08/24  IMM Given to:: Family (IMM reviewed with and signed by son-in-law.  Copy given to son-in-law and copy placed in scan bin for chart.)      OZZY left a voice mail for Ignacio Bahena  Harriett [phone] to inquire about arranging for patient's return to the assisted living facility.  Harriett left a voice mail in return stating that she, admissions liaison Yesi and the  are all off today and they do not have Epic access.  She also stated that one of them will need to assess patient at bedside to determine the facility's ability to accept patient back and this will not occur until at least Tuesday 1/9.  OZZY will be in touch with Ignacio Bahena tomorrow to follow up.

## 2024-01-08 NOTE — PROGRESS NOTES
Progress Note - Behavioral Health   Monika Butler 83 y.o. female MRN: [id number]  Unit/Bed#: [address] [place] [address] Encounter: [id number]          I came to see the patient for continuity of care as requested by the primary team. Per primary team patient's family is concerned regarding the patient still sundowning, sleeping during the day, agitated at night.  Patient was asleep during evaluation this morning.  She did not awaken to verbal stimuli.  Patient's daughter Anna was in the patient's room who called patient's daughter Anitha on the phone and spoke to them both.  They report that 1 day over the weekend they saw the patient at about 10 AM to early afternoon and that she was not agitated.  Reports that subsequently they had received a call that the patient had been agitated later in the afternoon.     Medication side effects: Unable to assess due to patient factors  ROS: Unable to assess due to patient factors      Mental Status Evaluation:  Appearance:  appears stated age and sleeping in bed   Behavior:  Sleeping in bed   Speech:  Does not answer   Mood:  Unable to assess due to patient factors   Affect:  Unable to assess due to patient factors   Language: Unable to assess due to patient factors   Thought Process:  Unable to assess due to patient factors   Associations: Unable to assess due to patient factors   Thought Content:  Unable to assess due to patient factors   Perceptual Disturbances: Unable to assess due to patient factors   Risk Potential: Unable to assess due to patient factors   Sensorium:  Unable to assess due to patient factors   Memory:  Unable to assess due to patient factors   Cognition:  Unable to assess due to patient factors   Consciousness:  Patient sleeping    Attention: Unable to assess due to patient factors=   Intellect: Unable to assess due to patient factors   Fund of Knowledge: Unable to assess due to patient factors   Insight:  Unable to assess due to patient factors   Judgment:  Unable to assess due to patient factors   Muscle Strength and Tone: Not assessed   Gait/Station: Not assessed, patient sleeping in bed   Motor Activity: No abnormal movements noted         Assessment/Plan  Monika Butler is a 83 y.o. female with past medical history of dementia with psychosis, dementia with behavioral disturbance, hypertension who presented to the ED for altered mental status and is admitted for dementia with behavioral disturbance.  Psychiatric consultation was performed 1/3/2024-please see my consult note from then for more information.  During the initial evaluation patient had appeared to be sedated suspected as a result of as needed medications she had previously received.  Therefore patient's total Zyprexa dosage had been decreased and dosing was changed to 1.25 mg daily and 3.75 mg qhs to decrease daytime sedation. Please see my notes from 1/3/24 and 1/5/24 for more information. I came to see the patient for continuity of care as requested by the primary team. Per primary team patient's family is concerned regarding the patient still sundowning, sleeping during the day, agitated at night. Patient was asleep during evaluation this morning.  She did not awaken to verbal stimuli.  Patient's daughter Anna was in the patient's room who called patient's daughter Anitha on the phone and spoke to them both.  They report that one day over the weekend they saw the patient at about 10 AM to around 1/1:30 PM and that patient had not been agitated.  Reports that subsequently they had received a call that the patient had been agitated later in the afternoon.  Discussed with family that patient had received Zyprexa prn yesterday afternoon and last night, as well as morphine.  Family states that patient in the past has slept around 6/7 PM which they feel is early.  Discussed that the qhs medications here have been ordered for 9/10 PM.  Discussed that patient has been receiving melatonin 6 mg which appears  to be a higher dosage and has been administered later in the evening. Discussed that patient sleep-wake cycle has been disturbed as patient has been sleeping during the day.  Discussed also that patient is receiving Aricept at bedtime which per literature review has been associated with insomnia.    Patient's daughters discussed regarding whether patient is depressed and if this is a reason for patient's behaviors and whether an antidepressant could be started.  Discussed importance of current focus regarding improving patient's agitation and sleep and to monitor patient's symptoms/mood as a result and to evaluate for need for antidepressant medication in the future as to not complicate the situation currently and run the risk of worsening patient's agitation by starting another medication and patient's family verbalized understanding and agreement.  Provided psychoeducation on Zyprexa.  Discussed plan to continue total dosing of Zyprexa but to combine dosage to 5 mg qhs to improve daytime sedation and to improve agitation in the evening and overnight.  Discussed plan to also decrease dosage of melatonin to 3 mg qhs as her current dose appears to be on the higher side and it is unclear whether this is contributing to patient being sedated during the day.  Also generally lower doses of melatonin are possibly as effective as higher dosages. Discussed plan to order earlier administration time of 7:30 PM for Zyprexa and melatonin to improve patient's sleep-wake cycle and for improvement of daytime sedation.  Also discussed plan to discuss with primary team considering changing patient's Aricept to daytime to improve patient's sleep overnight as per literature review it has been associated with insomnia and patient has been receiving this at bedtime and it is unclear if this is contributing to patient's agitation overnight as a result.  Patient's family verbalized understanding and agreement with the  plan      Diagnosis:  Demential with behavioral disturbance    Recommended Treatment:   Continue medical management  Continue virtual 1:1 observation for safety  Continue total dosage of Zyprexa but will change to 5 mg qhs to improve agitation in the evening and overnight and to improve daytime sedation  Hold if patient is sedated  Decrease melatonin to 3 mg qhs as generally lower dose of melatonin or possibly as effective as higher doses and is unclear whether this is contributing to patient being sedated during the day   Will order earlier administration time of 7:30 PM for Zyprexa and melatonin to improve patient's sleep-wake cycle and for improvement of daytime sedation.    Discussed with primary team consideration to change Aricept dosing to daytime as per literature review it has been associated with insomnia and patient has been receiving this at bedtime and it is unclear if this is contributing to patient's agitation overnight as a result.    Can continue Zyprexa PO 1.25 q8hr prn severe agitation with Zyprexa 1.3 mg IM alternative (if oral route is not available), if patient requires prn medication for severe agitation  However would encourage use of nonpharmacological measures for treatment of agitation  Recommend frequent reorientation, verbal redirection.  If patient uses assistive devices (eye glasses, hearing aids), please make sure that they are working properly.  Continue to encourage adequate hydration, nutrition and monitor bowel movements.  Maintain sleep-wake cycle.  Consider making hospital environment more familiar such as placing pictures of family  Patient's QTc was 491 on 1/4/2024  Recommend repeating EKG for QTc monitoring  Maintain magnesium over 2 and potassium over 4  Caution use of antipsychotics with QTc >500 ms  In such a case would consider use of benzodiazepines for agitation rather than antipsychotics as benzodiazepines are not associated with QTc prolongation  Discussed with the  primary team  I will follow up tomorrow.  Please contact with questions  For after hours and weekends please contact [facility] for psychiatric purposes         Medications: all current active meds have been reviewed.  See recommended treatment above      Risks, benefits and possible side effects of Medications:     Risks, benefits, and possible side effects of medications explained to patient's daughters who verbalize understanding.        Labs: I have personally reviewed all pertinent laboratory results.     I have personally reviewed all pertinent laboratory/tests results.  Labs in last 72 hours:   Recent Labs     01/08/24  0734   WBC 5.65   RBC 3.38*   HGB 11.0*   HCT 34.3*      RDW 13.3   SODIUM 144   K 3.0*      CO2 33*   BUN 20   CREATININE 0.98   GLUC 94   CALCIUM 9.4     EKG   Lab Results   Component Value Date    VENTRATE 83 01/04/2024    ATRIALRATE 83 01/04/2024    PRINT 160 01/04/2024    QRSDINT 84 01/04/2024    QTINT 418 01/04/2024    QTCINT 491 01/04/2024    PAXIS 70 01/04/2024    QRSAXIS 35 01/04/2024    TWAVEAXIS 90 01/04/2024         Ash Silvestre DO  01/08/24      This note has been constructed using a voice recognition system.    There may be translation, syntax,  or grammatical errors. If you have any questions, please contact the dictating provider.

## 2024-01-08 NOTE — PLAN OF CARE
Problem: Potential for Falls  Goal: Patient will remain free of falls  Description: INTERVENTIONS:  - Educate patient/family on patient safety including physical limitations  - Instruct patient to call for assistance with activity   - Consult OT/PT to assist with strengthening/mobility   - Keep Call bell within reach  - Keep bed low and locked with side rails adjusted as appropriate  - Keep care items and personal belongings within reach  - Initiate and maintain comfort rounds  - Make Fall Risk Sign visible to staff  - Offer Toileting every 3 Hours, in advance of need  - Initiate/Maintain bed/chair alarm  - Obtain necessary fall risk management equipment: bed/chair alarm  - Apply yellow socks and bracelet for high fall risk patients  - Consider moving patient to room near nurses station  Outcome: Progressing     Problem: Prexisting or High Potential for Compromised Skin Integrity  Goal: Skin integrity is maintained or improved  Description: INTERVENTIONS:  - Identify patients at risk for skin breakdown  - Assess and monitor skin integrity  - Assess and monitor nutrition and hydration status  - Monitor labs   - Assess for incontinence   - Turn and reposition patient  - Assist with mobility/ambulation  - Relieve pressure over bony prominences  - Avoid friction and shearing  - Provide appropriate hygiene as needed including keeping skin clean and dry  - Evaluate need for skin moisturizer/barrier cream  - Collaborate with interdisciplinary team   - Patient/family teaching  - Consider wound care consult   Outcome: Progressing     Problem: PAIN - ADULT  Goal: Verbalizes/displays adequate comfort level or baseline comfort level  Description: Interventions:  - Encourage patient to monitor pain and request assistance  - Assess pain using appropriate pain scale  - Administer analgesics based on type and severity of pain and evaluate response  - Implement non-pharmacological measures as appropriate and evaluate response  -  Consider cultural and social influences on pain and pain management  - Notify physician/advanced practitioner if interventions unsuccessful or patient reports new pain  Outcome: Progressing     Problem: INFECTION - ADULT  Goal: Absence or prevention of progression during hospitalization  Description: INTERVENTIONS:  - Assess and monitor for signs and symptoms of infection  - Monitor lab/diagnostic results  - Monitor all insertion sites, i.e. indwelling lines, tubes, and drains  - Monitor endotracheal if appropriate and nasal secretions for changes in amount and color  - [place] appropriate cooling/warming therapies per order  - Administer medications as ordered  - Instruct and encourage patient and family to use good hand hygiene technique  - Identify and instruct in appropriate isolation precautions for identified infection/condition  Outcome: Progressing  Goal: Absence of fever/infection during neutropenic period  Description: INTERVENTIONS:  - Monitor WBC    Outcome: Progressing     Problem: DISCHARGE PLANNING  Goal: Discharge to home or other facility with appropriate resources  Description: INTERVENTIONS:  - Identify barriers to discharge w/patient and caregiver  - Arrange for needed discharge resources and transportation as appropriate  - Identify discharge learning needs (meds, wound care, etc.)  - Arrange for interpretive services to assist at discharge as needed  - Refer to Case Management Department for coordinating discharge planning if the patient needs post-hospital services based on physician/advanced practitioner order or complex needs related to functional status, cognitive ability, or social support system  Outcome: Progressing     Problem: Knowledge Deficit  Goal: Patient/family/caregiver demonstrates understanding of disease process, treatment plan, medications, and discharge instructions  Description: Complete learning assessment and assess knowledge base.  Interventions:  - Provide teaching at  level of understanding  - Provide teaching via preferred learning methods  Outcome: Progressing     Problem: NEUROSENSORY - ADULT  Goal: Achieves stable or improved neurological status  Description: INTERVENTIONS  - Monitor and report changes in neurological status  - Monitor vital signs such as temperature, blood pressure, glucose, and any other labs ordered   - Initiate measures to prevent increased intracranial pressure  - Monitor for seizure activity and implement precautions if appropriate      Outcome: Progressing  Goal: Achieves maximal functionality and self care  Description: INTERVENTIONS  - Monitor swallowing and airway patency with patient fatigue and changes in neurological status  - Encourage and assist patient to increase activity and self care.   - Encourage visually impaired, hearing impaired and aphasic patients to use assistive/communication devices  Outcome: Progressing     Problem: GENITOURINARY - ADULT  Goal: Maintains or returns to baseline urinary function  Description: INTERVENTIONS:  - Assess urinary function  - Encourage oral fluids to ensure adequate hydration if ordered  - Administer IV fluids as ordered to ensure adequate hydration  - Administer ordered medications as needed  - Offer frequent toileting  - Follow urinary retention protocol if ordered  Outcome: Progressing  Goal: Absence of urinary retention  Description: INTERVENTIONS:  - Assess patient’s ability to void and empty bladder  - Monitor I/O  - Bladder scan as needed  - Discuss with physician/AP medications to alleviate retention as needed  - Discuss catheterization for long term situations as appropriate  Outcome: Progressing     Problem: METABOLIC, FLUID AND ELECTROLYTES - ADULT  Goal: Electrolytes maintained within normal limits  Description: INTERVENTIONS:  - Monitor labs and assess patient for signs and symptoms of electrolyte imbalances  - Administer electrolyte replacement as ordered  - Monitor response to electrolyte  replacements, including repeat lab results as appropriate  - Instruct patient on fluid and nutrition as appropriate  Outcome: Progressing  Goal: Fluid balance maintained  Description: INTERVENTIONS:  - Monitor labs   - Monitor I/O and WT  - Instruct patient on fluid and nutrition as appropriate  - Assess for signs & symptoms of volume excess or deficit  Outcome: Progressing     Problem: HEMATOLOGIC - ADULT  Goal: Maintains hematologic stability  Description: INTERVENTIONS  - Assess for signs and symptoms of bleeding or hemorrhage  - Monitor labs  - Administer supportive blood products/factors as ordered and appropriate  Outcome: Progressing     Problem: Nutrition/Hydration-ADULT  Goal: Nutrient/Hydration intake appropriate for improving, restoring or maintaining nutritional needs  Description: Monitor and assess patient's nutrition/hydration status for malnutrition. Collaborate with interdisciplinary team and initiate plan and interventions as ordered.  Monitor patient's weight and dietary intake as ordered or per policy. Utilize nutrition screening tool and intervene as necessary. Determine patient's food preferences and provide high-protein, high-caloric foods as appropriate.     INTERVENTIONS:  - Monitor oral intake, urinary output, labs, and treatment plans  - Assess nutrition and hydration status and recommend course of action  - Evaluate amount of meals eaten  - Assist patient with eating if necessary   - Allow adequate time for meals  - Recommend/ encourage appropriate diets, oral nutritional supplements, and vitamin/mineral supplements  - Order, calculate, and assess calorie counts as needed  - Recommend, monitor, and adjust tube feedings and TPN/PPN based on assessed needs  - Assess need for intravenous fluids  - Provide specific nutrition/hydration education as appropriate  - Include patient/family/caregiver in decisions related to nutrition  Outcome: Progressing

## 2024-01-08 NOTE — PROGRESS NOTES
[facility]  Progress Note  Name: Monika Butler I  MRN: [id number]  Unit/Bed#: [address] [place] [address] Date of Admission: 1/2/2024   Date of Service: 1/8/2024 I Hospital Day: 6    Assessment/Plan   * Dementia with behavioral disturbance (HCC)  Assessment & Plan  Patient has history of dementia with behavioral disturbances, worsening over past several months. Has been getting more agitated and refusing medications since day prior to admission. Prior facility said she needs a higher level of care and will not accept patient back.  UA showed moderate leukocytes and bacteria, completed ceftriaxone x 3 doses  Continue current home medications including aricept and zyprexa 2.5 mg hs  Patient was started at [facility], discontinued  Psychiatry consulted; recommending to avoid Ativan for sedation/agitation, decreased Zyprexa to 1.25 mg p.o. daily in the morning and Zyprexa 3.75 mg p.o. nightly  Patient very lethargic on 1/4, likely from agitation night prior  Stat CT of head: no acute intracranial abnormality but demonstrated stable marked chronic small vessel ischemic changes and stable 1.3 cm meningioma at the left posterior lateral middle cranial fossa  Supportive care  Mental status improving, behavior appears stable  Upgrade to mechanical soft diet and thin liquids per speech recommendations  1/8 patient once again appeared very sedated, spent most of the day sleeping.  Discussed with family and psychiatry, will do once a day dosing Zyprexa 5 mg, decrease melatonin to 3 mg and give these 2 medications around 1930 to see if we can get patient's wake sleep cycle more aligned to normal circadian rhythm.  Will also give patient's Aricept in the a.m. as psychiatry indicated this can add to patient's insomnia.  Monitor response  Avoid sedating medications    Elevated troponin  Assessment & Plan  Troponin noted to be elevated at 24 on 1/3, repeated 1/4 elevated 300  Cardiology consulted  ECHO  1/5/24: EF 60-65%, systolic function normal, diastolic function mildly abnormal consistent with G1DD  Stress test canceled after discussion between cardiology and patient's family  Continue medical therapy with beta blocker and ARB    Essential hypertension  Assessment & Plan  /97 on arrival, likely in setting of noncompliance and agitation  Continue home losartan 100 mg daily and metoprolol 50 mg daily               VTE Pharmacologic Prophylaxis: VTE Score: 3 Moderate Risk (Score 3-4) - Pharmacological DVT Prophylaxis Ordered: enoxaparin (Lovenox).    Mobility:   Basic Mobility Inpatient Raw Score: 9  -HLM Goal: 3: Sit at edge of bed  JH-HLM Achieved: 3: Sit at edge of bed  HLM Goal achieved. Continue to encourage appropriate mobility.    Patient Centered Rounds: I performed bedside rounds with nursing staff today.   Discussions with Specialists or Other Care Team Provider: Multidisciplinary team    Education and Discussions with Family / Patient: Updated  (daughter and son in law) at bedside.    Total Time Spent on Date of Encounter in care of patient: Greater than 45 mins. This time was spent on one or more of the following: performing physical exam; counseling and coordination of care; obtaining or reviewing history; documenting in the medical record; reviewing/ordering tests, medications or procedures; communicating with other healthcare professionals and discussing with patient's family/caregivers.    Current Length of Stay: 6 day(s)  Current Patient Status: Inpatient   Certification Statement: The patient will continue to require additional inpatient hospital stay due to medication adjustment as per discussion with psychiatry, monitor response  Discharge Plan: Anticipate discharge in 24-48 hrs to prior assisted or independent living facility.    Code Status: Level 1 - Full Code    Subjective:   Patient seen laying in bed, sleeping.  When her name is called and she is shaken she does open  her eyes, reports that she is tired and closes her eyes.  Later in the morning she was seen where she was awake and interactive for period of time however return to sleep.  Denies any pain when awake.    Objective:     Vitals:   Temp (24hrs), Av.4 °F (36.3 °C), Min:96.4 °F (35.8 °C), Max:98.7 °F (37.1 °C)    Temp:  [96.4 °F (35.8 °C)-98.7 °F (37.1 °C)] 97.6 °F (36.4 °C)  HR:  [61-84] 69  Resp:  [18-20] 18  BP: (109-176)/() 167/80  SpO2:  [92 %-98 %] 92 %  Body mass index is 21.37 kg/m².     Input and Output Summary (last 24 hours):   No intake or output data in the 24 hours ending 24 1824    Physical Exam:   Physical Exam  Vitals and nursing note reviewed.   Constitutional:       Comments: Chronically ill-appearing frail female, sleeping, opens eyes briefly when spoken to and returns to sleep.   HENT:      Head: Normocephalic.      Nose: Nose normal.      Mouth/Throat:      Mouth: Mucous membranes are dry.   Eyes:      Extraocular Movements: Extraocular movements intact.      Conjunctiva/sclera: Conjunctivae normal.   Cardiovascular:      Rate and Rhythm: Normal rate.      Pulses: Normal pulses.   Pulmonary:      Effort: Pulmonary effort is normal.      Breath sounds: Normal breath sounds.   Abdominal:      General: Bowel sounds are normal. There is no distension.      Palpations: Abdomen is soft.      Tenderness: There is no abdominal tenderness.   Genitourinary:     Comments: Voiding spontaneously   Musculoskeletal:      Comments: Generalized deconditioning    Skin:     General: Skin is warm and dry.      Capillary Refill: Capillary refill takes less than 2 seconds.      Coloration: Skin is pale.   Neurological:      Comments: Patient sedated, no focal deficit noted    Psychiatric:      Comments: Opens eyes briefly, returns to sleep           Additional Data:     Labs:  Results from last 7 days   Lab Units 24  0734 24  0449 24  1538   WBC Thousand/uL 5.65   < > 4.52   HEMOGLOBIN  g/dL 11.0*   < > 10.6*   HEMATOCRIT % 34.3*   < > 32.5*   PLATELETS Thousands/uL 208   < > 251   NEUTROS PCT %  --   --  51   LYMPHS PCT %  --   --  31   MONOS PCT %  --   --  13*   EOS PCT %  --   --  4    < > = values in this interval not displayed.     Results from last 7 days   Lab Units 01/08/24  0734 01/03/24  0449 01/02/24  1538   SODIUM mmol/L 144   < > 144   POTASSIUM mmol/L 3.0*   < > 3.0*   CHLORIDE mmol/L 105   < > 104   CO2 mmol/L 33*   < > 31   BUN mg/dL 20   < > 17   CREATININE mg/dL 0.98   < > 0.77   ANION GAP mmol/L 6   < > 9   CALCIUM mg/dL 9.4   < > 9.0   ALBUMIN g/dL  --   --  4.0   TOTAL BILIRUBIN mg/dL  --   --  0.46   ALK PHOS U/L  --   --  69   ALT U/L  --   --  30   AST U/L  --   --  26   GLUCOSE RANDOM mg/dL 94   < > 87    < > = values in this interval not displayed.         Results from last 7 days   Lab Units 01/02/24  1544   POC GLUCOSE mg/dl 85               Lines/Drains:  Invasive Devices       Peripheral Intravenous Line  Duration             Peripheral IV 01/07/24 Right;Ventral (anterior) Forearm 1 day                          Imaging: No pertinent imaging reviewed.    Recent Cultures (last 7 days):   Results from last 7 days   Lab Units 01/02/24  1713   URINE CULTURE  >100,000 cfu/ml       Last 24 Hours Medication List:   Current Facility-Administered Medications   Medication Dose Route Frequency Provider Last Rate    acetaminophen  650 mg Oral Q6H PRN Leyla Guardado PA-C      cholecalciferol  1,000 Units Oral Daily Leyla Guardado PA-C      docusate sodium  100 mg Oral BID Leyla Guardado PA-C      [START ON 1/9/2024] donepezil  10 mg Oral QAM DMAARIS Meza      enoxaparin  1 mg/kg Subcutaneous Q12H [facility] DAMARIS Meza      labetalol  10 mg Intravenous Q6H PRN Leyla Guardado PA-C      lidocaine  1 patch Topical Daily Inna Maryann Tejas, CRNP      loratadine  10 mg Oral Daily Inna Lazaro, DAMARIS      losartan  100 mg Oral Daily Leyla Guardado,  LIAN      melatonin  3 mg Oral Daily DAMARIS Meza      metoprolol succinate  50 mg Oral Daily DAMARIS Meza      OLANZapine  1.25 mg Oral Q8H PRN Ash Silvestre DO      Or    OLANZapine  1.3 mg Intramuscular Q8H PRN Ash Silvestre,       OLANZapine  5 mg Oral Daily DAMARIS Meza      ondansetron  4 mg Intravenous Q6H PRN Leyla Guardado PA-C      potassium chloride  40 mEq Oral BID DAMARIS Meza          Today, Patient Was Seen By: DAMARIS Meza    **Please Note: This note may have been constructed using a voice recognition system.**

## 2024-01-09 ENCOUNTER — APPOINTMENT (INPATIENT)
Dept: RADIOLOGY | Facility: HOSPITAL | Age: 84
DRG: 884 | End: 2024-01-09
Payer: COMMERCIAL

## 2024-01-09 LAB
ANION GAP SERPL CALCULATED.3IONS-SCNC: 8 MMOL/L
BUN SERPL-MCNC: 18 MG/DL (ref 5–25)
CALCIUM SERPL-MCNC: 9.3 MG/DL (ref 8.4–10.2)
CHLORIDE SERPL-SCNC: 106 MMOL/L (ref 96–108)
CO2 SERPL-SCNC: 32 MMOL/L (ref 21–32)
CREAT SERPL-MCNC: 0.98 MG/DL (ref 0.6–1.3)
ERYTHROCYTE [DISTWIDTH] IN BLOOD BY AUTOMATED COUNT: 13.5 % (ref 11.6–15.1)
GFR SERPL CREATININE-BSD FRML MDRD: 53 ML/MIN/1.73SQ M
GLUCOSE SERPL-MCNC: 116 MG/DL (ref 65–140)
HCT VFR BLD AUTO: 35.4 % (ref 34.8–46.1)
HGB BLD-MCNC: 11.7 G/DL (ref 11.5–15.4)
MCH RBC QN AUTO: 33.8 PG (ref 26.8–34.3)
MCHC RBC AUTO-ENTMCNC: 33.1 G/DL (ref 31.4–37.4)
MCV RBC AUTO: 102 FL (ref 82–98)
PLATELET # BLD AUTO: 202 THOUSANDS/UL (ref 149–390)
PMV BLD AUTO: 10.9 FL (ref 8.9–12.7)
POTASSIUM SERPL-SCNC: 3.2 MMOL/L (ref 3.5–5.3)
RBC # BLD AUTO: 3.46 MILLION/UL (ref 3.81–5.12)
SODIUM SERPL-SCNC: 146 MMOL/L (ref 135–147)
WBC # BLD AUTO: 5.16 THOUSAND/UL (ref 4.31–10.16)

## 2024-01-09 PROCEDURE — 99232 SBSQ HOSP IP/OBS MODERATE 35: CPT | Performed by: NURSE PRACTITIONER

## 2024-01-09 PROCEDURE — 71045 X-RAY EXAM CHEST 1 VIEW: CPT

## 2024-01-09 PROCEDURE — 97530 THERAPEUTIC ACTIVITIES: CPT

## 2024-01-09 PROCEDURE — 99232 SBSQ HOSP IP/OBS MODERATE 35: CPT | Performed by: PSYCHIATRY & NEUROLOGY

## 2024-01-09 PROCEDURE — 80048 BASIC METABOLIC PNL TOTAL CA: CPT | Performed by: NURSE PRACTITIONER

## 2024-01-09 PROCEDURE — 85027 COMPLETE CBC AUTOMATED: CPT | Performed by: NURSE PRACTITIONER

## 2024-01-09 RX ORDER — ENOXAPARIN SODIUM 100 MG/ML
40 INJECTION SUBCUTANEOUS
Status: DISCONTINUED | OUTPATIENT
Start: 2024-01-10 | End: 2024-01-09

## 2024-01-09 RX ORDER — ENOXAPARIN SODIUM 100 MG/ML
30 INJECTION SUBCUTANEOUS
Status: DISCONTINUED | OUTPATIENT
Start: 2024-01-10 | End: 2024-01-21 | Stop reason: HOSPADM

## 2024-01-09 RX ORDER — POTASSIUM CHLORIDE 20 MEQ/1
40 TABLET, EXTENDED RELEASE ORAL 2 TIMES DAILY
Status: COMPLETED | OUTPATIENT
Start: 2024-01-09 | End: 2024-01-09

## 2024-01-09 RX ADMIN — POTASSIUM CHLORIDE 40 MEQ: 1500 TABLET, EXTENDED RELEASE ORAL at 13:32

## 2024-01-09 RX ADMIN — ENOXAPARIN SODIUM 50 MG: 60 INJECTION SUBCUTANEOUS at 08:43

## 2024-01-09 RX ADMIN — ACETAMINOPHEN 650 MG: 325 TABLET ORAL at 21:06

## 2024-01-09 RX ADMIN — DONEPEZIL HYDROCHLORIDE 10 MG: 5 TABLET ORAL at 08:43

## 2024-01-09 RX ADMIN — Medication 3 MG: at 21:06

## 2024-01-09 RX ADMIN — LABETALOL HYDROCHLORIDE 10 MG: 5 INJECTION, SOLUTION INTRAVENOUS at 07:43

## 2024-01-09 RX ADMIN — LOSARTAN POTASSIUM 100 MG: 50 TABLET, FILM COATED ORAL at 08:43

## 2024-01-09 RX ADMIN — POTASSIUM CHLORIDE 40 MEQ: 1500 TABLET, EXTENDED RELEASE ORAL at 18:29

## 2024-01-09 RX ADMIN — DOCUSATE SODIUM 100 MG: 100 CAPSULE, LIQUID FILLED ORAL at 18:29

## 2024-01-09 RX ADMIN — OLANZAPINE 5 MG: 2.5 TABLET, FILM COATED ORAL at 08:42

## 2024-01-09 RX ADMIN — POTASSIUM CHLORIDE 40 MEQ: 1500 TABLET, EXTENDED RELEASE ORAL at 08:43

## 2024-01-09 RX ADMIN — LORATADINE 10 MG: 10 TABLET ORAL at 08:43

## 2024-01-09 RX ADMIN — Medication 1000 UNITS: at 08:43

## 2024-01-09 RX ADMIN — OLANZAPINE 1.3 MG: 10 INJECTION, POWDER, FOR SOLUTION INTRAMUSCULAR at 02:20

## 2024-01-09 RX ADMIN — LABETALOL HYDROCHLORIDE 10 MG: 5 INJECTION, SOLUTION INTRAVENOUS at 19:16

## 2024-01-09 RX ADMIN — DOCUSATE SODIUM 100 MG: 100 CAPSULE, LIQUID FILLED ORAL at 08:43

## 2024-01-09 RX ADMIN — METOPROLOL SUCCINATE 50 MG: 50 TABLET, EXTENDED RELEASE ORAL at 08:43

## 2024-01-09 NOTE — PLAN OF CARE
Problem: Potential for Falls  Goal: Patient will remain free of falls  Description: INTERVENTIONS:  - Educate patient/family on patient safety including physical limitations  - Instruct patient to call for assistance with activity   - Consult OT/PT to assist with strengthening/mobility   - Keep Call bell within reach  - Keep bed low and locked with side rails adjusted as appropriate  - Keep care items and personal belongings within reach  - Initiate and maintain comfort rounds  - Make Fall Risk Sign visible to staff  - Offer Toileting every 3 Hours, in advance of need  - Initiate/Maintain bed/chair alarm  - Obtain necessary fall risk management equipment: bed/chair alarm  - Apply yellow socks and bracelet for high fall risk patients  - Consider moving patient to room near nurses station  Outcome: Progressing     Problem: Prexisting or High Potential for Compromised Skin Integrity  Goal: Skin integrity is maintained or improved  Description: INTERVENTIONS:  - Identify patients at risk for skin breakdown  - Assess and monitor skin integrity  - Assess and monitor nutrition and hydration status  - Monitor labs   - Assess for incontinence   - Turn and reposition patient  - Assist with mobility/ambulation  - Relieve pressure over bony prominences  - Avoid friction and shearing  - Provide appropriate hygiene as needed including keeping skin clean and dry  - Evaluate need for skin moisturizer/barrier cream  - Collaborate with interdisciplinary team   - Patient/family teaching  - Consider wound care consult   Outcome: Progressing     Problem: PAIN - ADULT  Goal: Verbalizes/displays adequate comfort level or baseline comfort level  Description: Interventions:  - Encourage patient to monitor pain and request assistance  - Assess pain using appropriate pain scale  - Administer analgesics based on type and severity of pain and evaluate response  - Implement non-pharmacological measures as appropriate and evaluate response  -  Consider cultural and social influences on pain and pain management  - Notify physician/advanced practitioner if interventions unsuccessful or patient reports new pain  Outcome: Progressing     Problem: INFECTION - ADULT  Goal: Absence or prevention of progression during hospitalization  Description: INTERVENTIONS:  - Assess and monitor for signs and symptoms of infection  - Monitor lab/diagnostic results  - Monitor all insertion sites, i.e. indwelling lines, tubes, and drains  - Monitor endotracheal if appropriate and nasal secretions for changes in amount and color  - Ruby appropriate cooling/warming therapies per order  - Administer medications as ordered  - Instruct and encourage patient and family to use good hand hygiene technique  - Identify and instruct in appropriate isolation precautions for identified infection/condition  Outcome: Progressing  Goal: Absence of fever/infection during neutropenic period  Description: INTERVENTIONS:  - Monitor WBC    Outcome: Progressing     Problem: DISCHARGE PLANNING  Goal: Discharge to home or other facility with appropriate resources  Description: INTERVENTIONS:  - Identify barriers to discharge w/patient and caregiver  - Arrange for needed discharge resources and transportation as appropriate  - Identify discharge learning needs (meds, wound care, etc.)  - Arrange for interpretive services to assist at discharge as needed  - Refer to Case Management Department for coordinating discharge planning if the patient needs post-hospital services based on physician/advanced practitioner order or complex needs related to functional status, cognitive ability, or social support system  Outcome: Progressing     Problem: Knowledge Deficit  Goal: Patient/family/caregiver demonstrates understanding of disease process, treatment plan, medications, and discharge instructions  Description: Complete learning assessment and assess knowledge base.  Interventions:  - Provide teaching at  level of understanding  - Provide teaching via preferred learning methods  Outcome: Progressing     Problem: NEUROSENSORY - ADULT  Goal: Achieves stable or improved neurological status  Description: INTERVENTIONS  - Monitor and report changes in neurological status  - Monitor vital signs such as temperature, blood pressure, glucose, and any other labs ordered   - Initiate measures to prevent increased intracranial pressure  - Monitor for seizure activity and implement precautions if appropriate      Outcome: Progressing  Goal: Achieves maximal functionality and self care  Description: INTERVENTIONS  - Monitor swallowing and airway patency with patient fatigue and changes in neurological status  - Encourage and assist patient to increase activity and self care.   - Encourage visually impaired, hearing impaired and aphasic patients to use assistive/communication devices  Outcome: Progressing     Problem: GENITOURINARY - ADULT  Goal: Maintains or returns to baseline urinary function  Description: INTERVENTIONS:  - Assess urinary function  - Encourage oral fluids to ensure adequate hydration if ordered  - Administer IV fluids as ordered to ensure adequate hydration  - Administer ordered medications as needed  - Offer frequent toileting  - Follow urinary retention protocol if ordered  Outcome: Progressing  Goal: Absence of urinary retention  Description: INTERVENTIONS:  - Assess patient’s ability to void and empty bladder  - Monitor I/O  - Bladder scan as needed  - Discuss with physician/AP medications to alleviate retention as needed  - Discuss catheterization for long term situations as appropriate  Outcome: Progressing     Problem: METABOLIC, FLUID AND ELECTROLYTES - ADULT  Goal: Electrolytes maintained within normal limits  Description: INTERVENTIONS:  - Monitor labs and assess patient for signs and symptoms of electrolyte imbalances  - Administer electrolyte replacement as ordered  - Monitor response to electrolyte  replacements, including repeat lab results as appropriate  - Instruct patient on fluid and nutrition as appropriate  Outcome: Progressing  Goal: Fluid balance maintained  Description: INTERVENTIONS:  - Monitor labs   - Monitor I/O and WT  - Instruct patient on fluid and nutrition as appropriate  - Assess for signs & symptoms of volume excess or deficit  Outcome: Progressing     Problem: HEMATOLOGIC - ADULT  Goal: Maintains hematologic stability  Description: INTERVENTIONS  - Assess for signs and symptoms of bleeding or hemorrhage  - Monitor labs  - Administer supportive blood products/factors as ordered and appropriate  Outcome: Progressing     Problem: Nutrition/Hydration-ADULT  Goal: Nutrient/Hydration intake appropriate for improving, restoring or maintaining nutritional needs  Description: Monitor and assess patient's nutrition/hydration status for malnutrition. Collaborate with interdisciplinary team and initiate plan and interventions as ordered.  Monitor patient's weight and dietary intake as ordered or per policy. Utilize nutrition screening tool and intervene as necessary. Determine patient's food preferences and provide high-protein, high-caloric foods as appropriate.     INTERVENTIONS:  - Monitor oral intake, urinary output, labs, and treatment plans  - Assess nutrition and hydration status and recommend course of action  - Evaluate amount of meals eaten  - Assist patient with eating if necessary   - Allow adequate time for meals  - Recommend/ encourage appropriate diets, oral nutritional supplements, and vitamin/mineral supplements  - Order, calculate, and assess calorie counts as needed  - Recommend, monitor, and adjust tube feedings and TPN/PPN based on assessed needs  - Assess need for intravenous fluids  - Provide specific nutrition/hydration education as appropriate  - Include patient/family/caregiver in decisions related to nutrition  Outcome: Progressing

## 2024-01-09 NOTE — PHYSICAL THERAPY NOTE
Physical Therapy Cancellation Note       01/09/24 1342   Note Type   Note Type Cancelled Session   Cancel Reasons Refusal  (Attemtped to see pt this afternoon for PT treatment. Per discussion with family member pt recently adjusted/situated for comfort in bedside chair. Family member requesting PT return later. Will follow-up as schedule allows.)   Licensure   [place] License Number  iMchelle Burks [id number]

## 2024-01-09 NOTE — ASSESSMENT & PLAN NOTE
/97 on arrival, likely in setting of noncompliance and agitation  Continue home losartan 100 mg daily  BP remaining consistently elevated, will increase to metoprolol 75 mg daily

## 2024-01-09 NOTE — CASE MANAGEMENT
Case Management Progress Note    Patient name Monika Butler  Location [address] [address]/4 [place] 411-* MRN [id number]  : 1940 Date 2024       LOS (days): 7  Geometric Mean LOS (GMLOS) (days): 5  Days to GMLOS:-1        OBJECTIVE:      Current admission status: Inpatient  Preferred Pharmacy:   [id number] - [address] [address]  Phone: [phone] Fax: [phone]    Primary Care Provider: Kristin Raymundo MD    [facility]  Secondary Insurance:     PROGRESS NOTE:     left a voice mail for [facility]  Maryann Espinoza [phone] to inquire about scheduling an on-site visit by [facility] staff as required for return.  Contact information was left and call back requested.

## 2024-01-09 NOTE — ASSESSMENT & PLAN NOTE
Troponin noted to be elevated at 24 on 1/3, repeated 1/4 elevated 300  Cardiology consulted  ECHO 1/5/24: EF 60-65%, systolic function normal, diastolic function mildly abnormal consistent with G1DD  Stress test canceled after discussion between cardiology and patient's family  Continue medical therapy with beta blocker and ARB  BP reading elevated, will increase patient's metoprolol to 75 mg daily

## 2024-01-09 NOTE — SPEECH THERAPY NOTE
When I arrived, pt had already been fed some of her breakfast by son-in-law and she declined more when I offered (staff present for intake of some food plus medications).  S/S dysphagia with thin liquid or pureed/mech soft food denies.  Plan: will f/u at meal with trial of advancing diet.   Vesta Dumont MS CCC-SLP  [place] License [id number]

## 2024-01-09 NOTE — PROGRESS NOTES
[facility]  Progress Note  Name: Monika Butler I  MRN: [id number]  Unit/Bed#: [address] [place] [address] Date of Admission: 1/2/2024   Date of Service: 1/9/2024 I Hospital Day: 7    Assessment/Plan   * Dementia with behavioral disturbance (HCC)  Assessment & Plan  Patient has history of dementia with behavioral disturbances, worsening over past several months. Has been getting more agitated and refusing medications since day prior to admission. Prior facility said she needs a higher level of care and will not accept patient back.  UA showed moderate leukocytes and bacteria, completed ceftriaxone x 3 doses  Continue current home medications including aricept and zyprexa 2.5 mg hs  Patient was started at [facility], discontinued  Psychiatry consulted; recommending to avoid Ativan for sedation/agitation, decreased Zyprexa to 1.25 mg p.o. daily in the morning and Zyprexa 3.75 mg p.o. nightly  Patient very lethargic on 1/4, likely from agitation night prior  Stat CT of head: no acute intracranial abnormality but demonstrated stable marked chronic small vessel ischemic changes and stable 1.3 cm meningioma at the left posterior lateral middle cranial fossa  Supportive care  Mental status improving, behavior appears stable  Upgrade to mechanical soft diet and thin liquids per speech recommendations  1/8 patient once again appeared very sedated, spent most of the day sleeping.  Discussed with family and psychiatry, will do once a day dosing Zyprexa 5 mg, decrease melatonin to 3 mg and give these 2 medications around 1930 to see if we can get patient's wake sleep cycle more aligned to normal circadian rhythm.  Will also give patient's Aricept in the a.m. as psychiatry indicated this can add to patient's insomnia.  Monitor response  Avoid sedating medications  1/9 patient seen in the morning time, smiling, pleasant and cooperative.  She did have some periods during the course of the day where she had  outburst yelling and being angry at people, was redirectable with tremendous patience by the nursing staff.  Continue to monitor.      Elevated troponin  Assessment & Plan  Troponin noted to be elevated at 24 on 1/3, repeated 1/4 elevated 300  Cardiology consulted  ECHO 1/5/24: EF 60-65%, systolic function normal, diastolic function mildly abnormal consistent with G1DD  Stress test canceled after discussion between cardiology and patient's family  Continue medical therapy with beta blocker and ARB  BP reading elevated, will increase patient's metoprolol to 75 mg daily    Essential hypertension  Assessment & Plan  /97 on arrival, likely in setting of noncompliance and agitation  Continue home losartan 100 mg daily  BP remaining consistently elevated, will increase to metoprolol 75 mg daily               VTE Pharmacologic Prophylaxis: VTE Score: 3 Moderate Risk (Score 3-4) - Pharmacological DVT Prophylaxis Ordered: enoxaparin (Lovenox).    Mobility:   Basic Mobility Inpatient Raw Score: 9  JH-HLM Goal: 3: Sit at edge of bed  JH-HLM Achieved: 3: Sit at edge of bed  HLM Goal achieved. Continue to encourage appropriate mobility.    Patient Centered Rounds: I performed bedside rounds with nursing staff today.   Discussions with Specialists or Other Care Team Provider: Multidisciplinary team    Education and Discussions with Family / Patient: Updated  (daughter) at bedside.    Total Time Spent on Date of Encounter in care of patient: greater than 45 mins. This time was spent on one or more of the following: performing physical exam; counseling and coordination of care; obtaining or reviewing history; documenting in the medical record; reviewing/ordering tests, medications or procedures; communicating with other healthcare professionals and discussing with patient's family/caregivers.    Current Length of Stay: 7 day(s)  Current Patient Status: Inpatient   Certification Statement: The patient will  continue to require additional inpatient hospital stay due to medication adjustment   Discharge Plan: Anticipate discharge in 48-72 hrs to prior assisted or independent living facility.    Code Status: Level 1 - Full Code    Subjective:   Patient seen in the morning time calm pleasant and cooperative.  Later in the day patient was heard to be yelling, when asked if everything is all right she was yelling for people to get out of her room, verbal reassurance provided to patient.    Objective:     Vitals:   Temp (24hrs), Av.3 °F (36.3 °C), Min:97.1 °F (36.2 °C), Max:97.5 °F (36.4 °C)    Temp:  [97.1 °F (36.2 °C)-97.5 °F (36.4 °C)] 97.1 °F (36.2 °C)  HR:  [68-84] 79  Resp:  [18] 18  BP: (166-178)/() 169/95  SpO2:  [91 %-96 %] 96 %  Body mass index is 21.37 kg/m².     Input and Output Summary (last 24 hours):   No intake or output data in the 24 hours ending 24 2851    Physical Exam:   Physical Exam  Vitals and nursing note reviewed.   Constitutional:       General: She is not in acute distress.  HENT:      Head: Normocephalic.      Nose: Nose normal.      Mouth/Throat:      Mouth: Mucous membranes are moist.   Eyes:      Extraocular Movements: Extraocular movements intact.      Conjunctiva/sclera: Conjunctivae normal.   Cardiovascular:      Rate and Rhythm: Normal rate and regular rhythm.      Pulses: Normal pulses.   Pulmonary:      Effort: Pulmonary effort is normal.      Breath sounds: Normal breath sounds.   Abdominal:      General: Bowel sounds are normal. There is no distension.      Palpations: Abdomen is soft.      Tenderness: There is no abdominal tenderness.   Genitourinary:     Comments: Voiding spontaneously   Musculoskeletal:      Cervical back: Normal range of motion.      Right lower leg: No edema.      Left lower leg: No edema.   Skin:     General: Skin is warm and dry.      Capillary Refill: Capillary refill takes less than 2 seconds.      Coloration: Skin is pale.   Neurological:       General: No focal deficit present.      Mental Status: She is alert.      Comments: Patient does not know her location, the year or president.   Psychiatric:      Comments: Patient was noted in the morning to be calm and cooperative, pleasant and smiling.  Later in the day patient was noted to be angry, frowning, yelling at people telling them to get out of her room.  Verbal reassurance and redirection provided to patient.  Patient continues to have intermittent bursts of yelling at people          Additional Data:     Labs:  Results from last 7 days   Lab Units 01/09/24  0936   WBC Thousand/uL 5.16   HEMOGLOBIN g/dL 11.7   HEMATOCRIT % 35.4   PLATELETS Thousands/uL 202     Results from last 7 days   Lab Units 01/09/24  0936   SODIUM mmol/L 146   POTASSIUM mmol/L 3.2*   CHLORIDE mmol/L 106   CO2 mmol/L 32   BUN mg/dL 18   CREATININE mg/dL 0.98   ANION GAP mmol/L 8   CALCIUM mg/dL 9.3   GLUCOSE RANDOM mg/dL 116                       Lines/Drains:  Invasive Devices       Peripheral Intravenous Line  Duration             Peripheral IV 01/07/24 Right;Ventral (anterior) Forearm 2 days                          Imaging: No pertinent imaging reviewed.    Recent Cultures (last 7 days):         Last 24 Hours Medication List:   Current Facility-Administered Medications   Medication Dose Route Frequency Provider Last Rate    acetaminophen  650 mg Oral Q6H PRN Leyla Guardado PA-C      cholecalciferol  1,000 Units Oral Daily Leyla Guardado PA-C      docusate sodium  100 mg Oral BID Leyla Guardado PA-C      donepezil  10 mg Oral QAM DAMARIS Meza      enoxaparin  1 mg/kg Subcutaneous Q12H [facility] DAMARIS Meza      labetalol  10 mg Intravenous Q6H PRN Leyla Guardado PA-C      lidocaine  1 patch Topical Daily DAMARIS Meza      loratadine  10 mg Oral Daily DAMARIS Meza      losartan  100 mg Oral Daily Leyla Guardado PA-C      melatonin  3 mg Oral Daily Inna Lazaro  DAMARIS      metoprolol succinate  50 mg Oral Daily DAMARIS Meza      OLANZapine  1.25 mg Oral Q8H PRN Ash Silvestre DO      Or    OLANZapine  1.3 mg Intramuscular Q8H PRN Ash Silvestre DO      OLANZapine  5 mg Oral Daily DAMARIS Meza      ondansetron  4 mg Intravenous Q6H PRN Leyla Guardado PA-C      potassium chloride  40 mEq Oral BID DAMARIS Meza          Today, Patient Was Seen By: DAMARIS Meza    **Please Note: This note may have been constructed using a voice recognition system.**

## 2024-01-09 NOTE — PLAN OF CARE
Problem: Potential for Falls  Goal: Patient will remain free of falls  Description: INTERVENTIONS:  - Educate patient/family on patient safety including physical limitations  - Instruct patient to call for assistance with activity   - Consult OT/PT to assist with strengthening/mobility   - Keep Call bell within reach  - Keep bed low and locked with side rails adjusted as appropriate  - Keep care items and personal belongings within reach  - Initiate and maintain comfort rounds  - Make Fall Risk Sign visible to staff  - Offer Toileting every 3 Hours, in advance of need  - Initiate/Maintain bed/chair alarm  - Obtain necessary fall risk management equipment: bed/chair alarm  - Apply yellow socks and bracelet for high fall risk patients  - Consider moving patient to room near nurses station  Outcome: Progressing     Problem: Prexisting or High Potential for Compromised Skin Integrity  Goal: Skin integrity is maintained or improved  Description: INTERVENTIONS:  - Identify patients at risk for skin breakdown  - Assess and monitor skin integrity  - Assess and monitor nutrition and hydration status  - Monitor labs   - Assess for incontinence   - Turn and reposition patient  - Assist with mobility/ambulation  - Relieve pressure over bony prominences  - Avoid friction and shearing  - Provide appropriate hygiene as needed including keeping skin clean and dry  - Evaluate need for skin moisturizer/barrier cream  - Collaborate with interdisciplinary team   - Patient/family teaching  - Consider wound care consult   Outcome: Progressing     Problem: PAIN - ADULT  Goal: Verbalizes/displays adequate comfort level or baseline comfort level  Description: Interventions:  - Encourage patient to monitor pain and request assistance  - Assess pain using appropriate pain scale  - Administer analgesics based on type and severity of pain and evaluate response  - Implement non-pharmacological measures as appropriate and evaluate response  -  Consider cultural and social influences on pain and pain management  - Notify physician/advanced practitioner if interventions unsuccessful or patient reports new pain  Outcome: Progressing     Problem: INFECTION - ADULT  Goal: Absence or prevention of progression during hospitalization  Description: INTERVENTIONS:  - Assess and monitor for signs and symptoms of infection  - Monitor lab/diagnostic results  - Monitor all insertion sites, i.e. indwelling lines, tubes, and drains  - Monitor endotracheal if appropriate and nasal secretions for changes in amount and color  - [place] appropriate cooling/warming therapies per order  - Administer medications as ordered  - Instruct and encourage patient and family to use good hand hygiene technique  - Identify and instruct in appropriate isolation precautions for identified infection/condition  Outcome: Progressing  Goal: Absence of fever/infection during neutropenic period  Description: INTERVENTIONS:  - Monitor WBC    Outcome: Progressing     Problem: DISCHARGE PLANNING  Goal: Discharge to home or other facility with appropriate resources  Description: INTERVENTIONS:  - Identify barriers to discharge w/patient and caregiver  - Arrange for needed discharge resources and transportation as appropriate  - Identify discharge learning needs (meds, wound care, etc.)  - Arrange for interpretive services to assist at discharge as needed  - Refer to Case Management Department for coordinating discharge planning if the patient needs post-hospital services based on physician/advanced practitioner order or complex needs related to functional status, cognitive ability, or social support system  Outcome: Progressing     Problem: Knowledge Deficit  Goal: Patient/family/caregiver demonstrates understanding of disease process, treatment plan, medications, and discharge instructions  Description: Complete learning assessment and assess knowledge base.  Interventions:  - Provide teaching at  level of understanding  - Provide teaching via preferred learning methods  Outcome: Progressing     Problem: NEUROSENSORY - ADULT  Goal: Achieves stable or improved neurological status  Description: INTERVENTIONS  - Monitor and report changes in neurological status  - Monitor vital signs such as temperature, blood pressure, glucose, and any other labs ordered   - Initiate measures to prevent increased intracranial pressure  - Monitor for seizure activity and implement precautions if appropriate      Outcome: Progressing  Goal: Achieves maximal functionality and self care  Description: INTERVENTIONS  - Monitor swallowing and airway patency with patient fatigue and changes in neurological status  - Encourage and assist patient to increase activity and self care.   - Encourage visually impaired, hearing impaired and aphasic patients to use assistive/communication devices  Outcome: Progressing     Problem: GENITOURINARY - ADULT  Goal: Maintains or returns to baseline urinary function  Description: INTERVENTIONS:  - Assess urinary function  - Encourage oral fluids to ensure adequate hydration if ordered  - Administer IV fluids as ordered to ensure adequate hydration  - Administer ordered medications as needed  - Offer frequent toileting  - Follow urinary retention protocol if ordered  Outcome: Progressing  Goal: Absence of urinary retention  Description: INTERVENTIONS:  - Assess patient’s ability to void and empty bladder  - Monitor I/O  - Bladder scan as needed  - Discuss with physician/AP medications to alleviate retention as needed  - Discuss catheterization for long term situations as appropriate  Outcome: Progressing     Problem: METABOLIC, FLUID AND ELECTROLYTES - ADULT  Goal: Electrolytes maintained within normal limits  Description: INTERVENTIONS:  - Monitor labs and assess patient for signs and symptoms of electrolyte imbalances  - Administer electrolyte replacement as ordered  - Monitor response to electrolyte  replacements, including repeat lab results as appropriate  - Instruct patient on fluid and nutrition as appropriate  Outcome: Progressing  Goal: Fluid balance maintained  Description: INTERVENTIONS:  - Monitor labs   - Monitor I/O and WT  - Instruct patient on fluid and nutrition as appropriate  - Assess for signs & symptoms of volume excess or deficit  Outcome: Progressing     Problem: HEMATOLOGIC - ADULT  Goal: Maintains hematologic stability  Description: INTERVENTIONS  - Assess for signs and symptoms of bleeding or hemorrhage  - Monitor labs  - Administer supportive blood products/factors as ordered and appropriate  Outcome: Progressing     Problem: Nutrition/Hydration-ADULT  Goal: Nutrient/Hydration intake appropriate for improving, restoring or maintaining nutritional needs  Description: Monitor and assess patient's nutrition/hydration status for malnutrition. Collaborate with interdisciplinary team and initiate plan and interventions as ordered.  Monitor patient's weight and dietary intake as ordered or per policy. Utilize nutrition screening tool and intervene as necessary. Determine patient's food preferences and provide high-protein, high-caloric foods as appropriate.     INTERVENTIONS:  - Monitor oral intake, urinary output, labs, and treatment plans  - Assess nutrition and hydration status and recommend course of action  - Evaluate amount of meals eaten  - Assist patient with eating if necessary   - Allow adequate time for meals  - Recommend/ encourage appropriate diets, oral nutritional supplements, and vitamin/mineral supplements  - Order, calculate, and assess calorie counts as needed  - Recommend, monitor, and adjust tube feedings and TPN/PPN based on assessed needs  - Assess need for intravenous fluids  - Provide specific nutrition/hydration education as appropriate  - Include patient/family/caregiver in decisions related to nutrition  Outcome: Progressing

## 2024-01-09 NOTE — OCCUPATIONAL THERAPY NOTE
OT TREATMENT         01/09/24 1150   OT Last Visit   OT Visit Date 01/09/24   Note Type   Note Type Treatment   Pain Assessment   Pain Assessment Tool 0-10   Pain Score No Pain   Restrictions/Precautions   Other Precautions Chair Alarm;Bed Alarm;Fall Risk;Cognitive   Bed Mobility   Supine to Sit 4  Minimal assistance   Additional items Assist x 1;Verbal cues   Transfers   Sit to Stand 3  Moderate assistance   Additional items Assist x 2;Verbal cues   Stand to Sit 3  Moderate assistance   Additional items Assist x 2;Verbal cues   Stand pivot 3  Moderate assistance   Additional items Assist x 2;Verbal cues  (bed to chair with hand hold assist.)   Toilet transfer 3  Moderate assistance   Additional items Assist x 2;Commode   Functional Mobility   Functional Mobility 3  Moderate assistance   Additional Comments assist of 2, few steps with hand hold assist   Cognition   Overall Cognitive Status Impaired   Arousal/Participation Uncooperative   Attention Difficulty dividing attention   Orientation Level Oriented to person   Following Commands Follows one step commands inconsistently   Comments pt agitated and yelling throughout session, wanting therapist to leave the room   Assessment   Assessment Patient seen for OT treatment.  Patient is agitated.  Limited assessment and education due to patient agitation.  Patient required mod assist of 2 for transfers today. The patient's raw score on the AM-PAC Daily Activity Inpatient Short Form is 14. A raw score of less than 19 suggests the patient may benefit from discharge to post-acute rehabilitation services. Please refer to the recommendation of the Occupational Therapist for safe discharge planning.   Plan   Treatment Interventions ADL retraining;Functional transfer training;Cognitive reorientation;Patient/family training;Activityengagement   OT Frequency 3-5x/wk   Discharge Recommendation   Rehab Resource Intensity Level, OT II (Moderate Resource Intensity)   AM-PAC Daily  Activity Inpatient   Lower Body Dressing 2   Bathing 2   Toileting 2   Upper Body Dressing 2   Grooming 3   Eating 3   Daily Activity Raw Score 14   Daily Activity Standardized Score (Calc for Raw Score >=11) 33.39   AM-PAC Applied Cognition Inpatient   Following a Speech/Presentation 1   Understanding Ordinary Conversation 3   Taking Medications 1   Remembering Where Things Are Placed or Put Away 1   Remembering List of 4-5 Errands 1   Taking Care of Complicated Tasks 1   Applied Cognition Raw Score 8   Applied Cognition Standardized Score 19.32   End of Consult   Nurse Communication Nurse aware of consult   End of Consult Comments pt present with nurse at end of session and aides.   Licensure   [place] License Number  Michelle Fabian MS OTR/L [id number]

## 2024-01-09 NOTE — PROGRESS NOTES
"Progress Note - Behavioral Health   Monika Butler 83 y.o. female MRN: [id number]  Unit/Bed#: [address] [place] [address] Encounter: [id number]          I came to see the patient for continuity of care.  Patient was awake, alert, and appears to be pleasantly confused.  Reports her mood as \"good\".  Denies having any physical complaints.  Denies suicidal or homicidal ideation, plan, or intent.    Behavior over the last 24 hours:  improved  Medication side effects: Denies having any physical complaints  ROS: Denies having any physical complaints    Mental Status Evaluation:  Appearance:  appears stated age   Behavior:  calm, cooperative, and pleasant   Speech:  normal rate and normal volume   Mood:  \"Good\"   Affect:  Euthymic, smiling   Language: Unable to assess due to patient factors   Thought Process:  Tangential, incoherent   Associations: Tangential associations   Thought Content:  no delusions elicited   Perceptual Disturbances: No hallucinations verbalized.  Does not appear to be responding to internal stimuli   Risk Potential: Denies suicidal or homicidal ideation, plan, or intent   Sensorium:  person   Memory:  Memory appears grossly impaired   Cognition:  Memory appears grossly impaired   Consciousness:  Awake and alert   Attention: attention span appeared shorter than expected for age   Intellect: Unable to assess due to patient factors   Fund of Knowledge: Unable to assess due to patient factors   Insight:  poor   Judgment: poor   Muscle Strength and Tone: Not assessed   Gait/Station: Not assessed, patient in bed   Motor Activity: no abnormal movements         Assessment/Plan  Monika Butler is a 83 y.o. female past medical history of dementia with psychosis, dementia with behavioral disturbance, hypertension who presented to the ED for altered mental status and is admitted for dementia with behavioral disturbance.  Psychiatric consultation was performed 1/3/2024 and was seen for follow-up 1/8/2024-please see those " "notes by me for more information.  Yesterday patient's Zyprexa was changed from 1.25 mg daily and 3.75 mg qhs to 5 mg qhs to improve agitation in the evening and overnight and to improve daytime sedation.  Melatonin was decreased to 3 mg qhs as generally lower dose of melatonin or possibly as effective as higher doses and is unclear whether this had been contributing to patient being sedated during the day.  Zyprexa and melatonin administration times were ordered for an earlier time of 7:30 PM to improve patient's sleep-wake cycle and for improvement of daytime sedation.  Primary team changed Aricept dosing to daytime as per literature review it has been associated with insomnia and patient had been receiving this at bedtime and it is unclear if this had been contributing to patient's agitation overnight as a result.    Overnight patient appears to have received Zyprexa 1.3 mg IM.  Morphine appears to have been discontinued yesterday afternoon and therefore patient does not appear to have received morphine overnight. I came to see the patient for continuity of care.  Patient was awake, alert, and appears to be pleasantly confused.  Reports her mood as \"good\".  Denies having any physical complaints.  Denies suicidal or homicidal ideation, plan, or intent.  Patient appears to have had improvement of daytime sedation.  Patient appears to have had improvement in daytime sedation.  She did not exhibit any agitated or aggressive behaviors during my evaluation.  As a result recommend continuing patient's psychiatric medications at the current dosages.  Discussed with the primary team.     Diagnosis:  Dementia with behavioral disturbance    Recommended Treatment:   Continue medical management  There is no clear indication for inpatient psychiatric hospitalization at this time  Continue Zyprexa 5 mg qhs  Continue melatonin 3 mg qhs  Can continue Zyprexa PO 1.25 q8hr prn severe agitation with Zyprexa 1.3 mg IM alternative (if " oral route is not available), if patient requires prn medication for severe agitation  However would encourage use of nonpharmacological measures for treatment of agitation  Recommend frequent reorientation, verbal redirection.  If patient uses assistive devices (eye glasses, hearing aids), please make sure that they are working properly.  Continue to encourage adequate hydration, nutrition and monitor bowel movements.  Maintain sleep-wake cycle.  Consider making hospital environment more familiar such as placing pictures of family  Patient's QTc had decreased from 491 on 1/4/2020 to 480 yesterday 1/8/2024  Recommend continuing to monitor Qtc via EKG  Generally it is recommended to maintain magnesium over 2 and potassium over 4  Potassium was 3.2 today, discussed with primary team consideration to replete potassium and subsequently to repeat EKG for QTc monitoring  Magnesium was 1.9 on 1/6/2024 which is near 2. Can consider repleting this as well  Caution use of antipsychotics with QTc >500 ms  In such a case would consider use of benzodiazepines for agitation rather than antipsychotics as benzodiazepines are not associated with QTc prolongation  Discussed with the primary team  I will follow up as necessary.  Please contact with questions  For after hours and weekends please contact [facility] for psychiatric purposes         Medications: all current active meds have been reviewed.  See recommended treatment above      Risks, benefits and possible side effects of Medications:   Risks, benefits, and possible side effects of medications have been previously explained to patient's family who had verbalized understanding.  No new medication changes today.        Labs: I have personally reviewed all pertinent laboratory results.     I have personally reviewed all pertinent laboratory/tests results.  Labs in last 72 hours:   Recent Labs     01/09/24  0936   WBC 5.16   RBC 3.46*   HGB 11.7   HCT 35.4      RDW 13.5    SODIUM 146   K 3.2*      CO2 32   BUN 18   CREATININE 0.98   GLUC 116   CALCIUM 9.3     Magnesium   Lab Results   Component Value Date    MG 1.9 01/06/2024     EKG   Lab Results   Component Value Date    VENTRATE 71 01/08/2024    ATRIALRATE 71 01/08/2024    PRINT 172 01/08/2024    QRSDINT 90 01/08/2024    QTINT 442 01/08/2024    QTCINT 480 01/08/2024    PAXIS 84 01/08/2024    QRSAXIS 57 01/08/2024    TWAVEAXIS 87 01/08/2024         Ash Silvestre DO  01/09/24      This note has been constructed using a voice recognition system.    There may be translation, syntax,  or grammatical errors. If you have any questions, please contact the dictating provider.

## 2024-01-09 NOTE — ASSESSMENT & PLAN NOTE
Patient ID:  Vic is a 72 year old male.    Referring Physician:   Lucía Rivers DO  [address] [place] [address]    Primary Care Provider:  Lucía Rivers DO     Cancer Staging Information:  Cancer Staging  No matching staging information was found for the patient.       Subjective    History of Present Illness: 72-year-old man is seen for leukopenia/neutropenia.  Per EMR, his WBC has been at the lower end of normal since at least March 2016.  He has had neutropenia since at least November of 2018 (ANC 1.2).  He takes a lot of vitamins every daily.  His energy level is okay, stable.  His appetite is stable.  A couple of years ago, he weighed 210-215lbs, now he weighs 180lbs.  He changed his diet.  He eats less.  He has been having some constipation.  In the last 6-8 months, he has been eating grape cereal; this has helped his constipation.  He stopped eating certain grains and dairy a few years ago.  He does not eat a lot of red meat a few times a month.  He eats fish and chicken.  He does not eat a lot of spinach.  He eats some ashly greens.  He eats a vegetable with every meal.  No fevers, chills, or sweats.  He had Covid in January.      He has spinal stenosis.  He is looking at minimal invasive lumbar decompression at [facility].  He has intermittent middle abdominal pain that he attributes to his hiatal hernia.  Hepatitis A, B, and C serologies in June of 2020 were negative, but hepatitis A IgM was negative.    Review of Systems   Constitutional: Negative for activity change, appetite change, chills, diaphoresis, fatigue, fever and unexpected weight change.   HENT: Negative for hearing loss, trouble swallowing and voice change.    Eyes: Negative for visual disturbance.   Respiratory: Negative for apnea, cough, choking, chest tightness, shortness of breath and wheezing.    Cardiovascular: Negative for chest pain, palpitations and leg swelling.   Gastrointestinal: Negative  Patient has history of dementia with behavioral disturbances, worsening over past several months. Has been getting more agitated and refusing medications since day prior to admission. Prior facility said she needs a higher level of care and will not accept patient back.  UA showed moderate leukocytes and bacteria, completed ceftriaxone x 3 doses  Continue current home medications including aricept and zyprexa 2.5 mg hs  Patient was started at [facility], discontinued  Psychiatry consulted; recommending to avoid Ativan for sedation/agitation, decreased Zyprexa to 1.25 mg p.o. daily in the morning and Zyprexa 3.75 mg p.o. nightly  Patient very lethargic on 1/4, likely from agitation night prior  Stat CT of head: no acute intracranial abnormality but demonstrated stable marked chronic small vessel ischemic changes and stable 1.3 cm meningioma at the left posterior lateral middle cranial fossa  Supportive care  Mental status improving, behavior appears stable  Upgrade to mechanical soft diet and thin liquids per speech recommendations  1/8 patient once again appeared very sedated, spent most of the day sleeping.  Discussed with family and psychiatry, will do once a day dosing Zyprexa 5 mg, decrease melatonin to 3 mg and give these 2 medications around 1930 to see if we can get patient's wake sleep cycle more aligned to normal circadian rhythm.  Will also give patient's Aricept in the a.m. as psychiatry indicated this can add to patient's insomnia.  Monitor response  Avoid sedating medications  1/9 patient seen in the morning time, smiling, pleasant and cooperative.  She did have some periods during the course of the day where she had outburst yelling and being angry at people, was redirectable with tremendous patience by the nursing staff.  Continue to monitor.     for abdominal distention, abdominal pain, blood in stool, constipation, diarrhea, nausea and vomiting.   Genitourinary: Negative for difficulty urinating, dysuria, frequency and hematuria.   Musculoskeletal: Negative for arthralgias, back pain, joint swelling and myalgias.   Skin: Negative for rash.   Neurological: Negative for dizziness, syncope, speech difficulty, weakness, light-headedness and headaches.   Hematological: Negative for adenopathy. Does not bruise/bleed easily.   Psychiatric/Behavioral: Negative for confusion and sleep disturbance. The patient is nervous/anxious.       Past Medical History:   Diagnosis Date   • Anxiety    • Arthritis    • Gastritis    • Hiatal hernia    • Hyperlipoproteinemia    • Hypertension    • Panic attack    • Spinal stenosis      Past Surgical History:   Procedure Laterality Date   • Cardiac catherization     • Ir spine injection  2018    lumbar      Family History   Problem Relation Age of Onset   • Coronary Artery Disease Father          MI at age 72   • Other Mother          Coronary thrombosis  3   CVA at age 4   • Cancer Brother    • Aneurysm Neg Hx         Negative for AAA      Social History     Occupational History   • Occupation: retired   Tobacco Use   • Smoking status: Former Smoker   • Smokeless tobacco: Never Used   • Tobacco comment: Former tobacco use. used to smoke but quit and 1972   Substance and Sexual Activity   • Alcohol use: No     Comment: drinks rarely   • Drug use: Never   • Sexual activity: Not on file     ALLERGIES:   Allergen Reactions   • Atorvastatin Calcium Other (See Comments)     myalgias   • Gluten Meal   (Food Or Med) Other (See Comments)   • Penicillins RASH and Other (See Comments)     Cerner Allergy Text Annotation: penicillins, Cerner Reaction: rash       Current Outpatient Medications   Medication Sig Dispense Refill   • gabapentin (NEURONTIN) 100 MG capsule      • niacin (NIASPAN) 500 MG CR tablet TAKE ONE TABLET BY MOUTH NIGHTLY 90  tablet 1   • hydrochlorothiazide (HYDRODIURIL) 12.5 MG tablet Take 12.5 mg by mouth daily.     • losartan (COZAAR) 100 MG tablet Take 100 mg by mouth daily.     • Ascorbic Acid (VITAMIN C PO) Take 1 tablet by mouth daily.     • tadalafil (CIALIS) 5 MG tablet Take 5 mg by mouth.     • Multiple Vitamin tablet Take 1 tablet by mouth.     • magnesium oxide (MAG-OX) 400 MG tablet Take 400 mg by mouth. Pt states he is only taking 200mg now     • CALCIUM PO      • Milk Thistle 1000 MG Cap daily.     • Saccharomyces boulardii (PROBIOTIC) 250 MG Cap Take 250 mg by mouth.     • coenzyme Q10 100 MG capsule 1 capsule daily     • Cholecalciferol (VITAMIN D3) 5000 units Tab 1 tablet daily       No current facility-administered medications for this visit.       Preventative Health:  Last colonoscopy: EGD on 9/9/21 with gastritis; colonoscopy 2020, normal  Last PSA: annual, no cancer (7/27/22)    Physical Exam:  Vital Signs for this encounter:  BSA: 2.06 meters squared  Visit Vitals  Ht 6' 1\" (1.854 m)   BMI 23.81 kg/m²        Physical Exam  Vitals reviewed.   Constitutional:       General: He is not in acute distress.  HENT:      Head: Normocephalic and atraumatic.      Mouth/Throat:      Mouth: Mucous membranes are moist.      Pharynx: Oropharynx is clear.      Neck: Normal range of motion and neck supple.   Eyes:      General: No scleral icterus.     Extraocular Movements: Extraocular movements intact.      Conjunctiva/sclera: Conjunctivae normal.      Pupils: Pupils are equal, round, and reactive to light.   Cardiovascular:      Rate and Rhythm: Normal rate and regular rhythm.      Pulses: Normal pulses.      Heart sounds: Normal heart sounds. No murmur heard.  Pulmonary:      Breath sounds: Normal breath sounds. No wheezing or rales.   Abdominal:      General: Bowel sounds are normal. There is no distension.      Palpations: Abdomen is soft.      Tenderness: There is no abdominal tenderness.   Musculoskeletal:         General:  No swelling or tenderness. Normal range of motion.   Lymphadenopathy:      Head:      Right side of head: No submental, submandibular, tonsillar, preauricular, posterior auricular or occipital adenopathy.      Left side of head: No submental, submandibular, tonsillar, preauricular, posterior auricular or occipital adenopathy.      Cervical: No cervical adenopathy.      Upper Body:      Right upper body: No supraclavicular or axillary adenopathy.      Left upper body: No supraclavicular or axillary adenopathy.      Lower Body: No right inguinal adenopathy. No left inguinal adenopathy.   Skin:     General: Skin is warm and dry.      Coloration: Skin is not jaundiced.      Findings: No bruising or rash.   Neurological:      General: No focal deficit present.      Mental Status: He is alert and oriented to person, place, and time. Mental status is at baseline.   Psychiatric:         Mood and Affect: Mood normal.         Behavior: Behavior normal.         Judgment: Judgment normal.          Performance Status:  Asymptomatic  Pain Scale: 0      Results:  Lab Results   Component Value Date    WBC 3.8 (L) 09/26/2022    WBC 2.9 (L) 03/22/2022    HCT 39.9 09/26/2022    HCT 38.3 03/22/2022    HGB 13.1 09/26/2022    HGB 12.8 (L) 03/22/2022     09/26/2022     (L) 03/22/2022     06/03/2021     04/15/2021     Lab Results   Component Value Date    SODIUM 140 09/26/2022    SODIUM 138 03/22/2022    POTASSIUM 4.5 09/26/2022    POTASSIUM 4.3 03/22/2022    CHLORIDE 105 09/26/2022    CHLORIDE 100 03/22/2022    CO2 30 09/26/2022    CO2 30 03/22/2022    BUN 13 09/26/2022    BUN 21 03/22/2022    BUN 14 04/14/2021    BUN 12 07/05/2019    CREATININE 0.99 09/26/2022    CREATININE 1.20 03/22/2022    CREATININE 0.97 04/14/2021    CREATININE 1.00 07/05/2019    GLUCOSE 105 (H) 09/26/2022    GLUCOSE 108 (H) 03/22/2022    GLUCOSE 95 04/14/2021    GLUCOSE 101 07/05/2019     Lab Results   Component Value Date    AST 29  09/26/2022    AST 38 03/22/2022    GPT 39 09/26/2022    GPT 33 03/22/2022    ALKPT 60 09/26/2022    ALKPT 59 03/22/2022    BILIRUBIN 0.4 09/26/2022    BILIRUBIN 0.6 03/22/2022            ASSESSMENT and PLAN:  1. Chronic leukopenia/neutropenia.  This may be congenital and benign.  We will check some vitamin levels - B12, folate, and copper, given his dietary restrictions.  He should also have viral serologies to see if this may be due to chronic viral myelosuppression.  He is in agreement with HIV, hepatitis screen, EBV, and CMV serologies.  Check baseline CBC and CMP.    The patient will call 1-2 days after completing the above studies to discuss the results with a RN or the physician.    The patient will return to clinic in 4 weeks or earlier as needed and call at any time with any additional questions.  The risks and benefits of the above plan were fully explained.  The patient voiced understanding of these issues and the plan, and is in agreement with the plan.  All of the patient's concerns were addressed and questions were answered.    Approximately 60 minutes were spent regarding the patient, of which greater than 50% was in counseling.    Lissa Coffman MD  10/18/22  1:08 PM

## 2024-01-10 LAB
ANION GAP SERPL CALCULATED.3IONS-SCNC: 6 MMOL/L
BUN SERPL-MCNC: 20 MG/DL (ref 5–25)
CALCIUM SERPL-MCNC: 9.2 MG/DL (ref 8.4–10.2)
CHLORIDE SERPL-SCNC: 110 MMOL/L (ref 96–108)
CO2 SERPL-SCNC: 29 MMOL/L (ref 21–32)
CREAT SERPL-MCNC: 0.81 MG/DL (ref 0.6–1.3)
ERYTHROCYTE [DISTWIDTH] IN BLOOD BY AUTOMATED COUNT: 13.5 % (ref 11.6–15.1)
GFR SERPL CREATININE-BSD FRML MDRD: 66 ML/MIN/1.73SQ M
GLUCOSE SERPL-MCNC: 91 MG/DL (ref 65–140)
HCT VFR BLD AUTO: 33.1 % (ref 34.8–46.1)
HGB BLD-MCNC: 10.8 G/DL (ref 11.5–15.4)
MAGNESIUM SERPL-MCNC: 1.7 MG/DL (ref 1.9–2.7)
MCH RBC QN AUTO: 33.2 PG (ref 26.8–34.3)
MCHC RBC AUTO-ENTMCNC: 32.6 G/DL (ref 31.4–37.4)
MCV RBC AUTO: 102 FL (ref 82–98)
PLATELET # BLD AUTO: 200 THOUSANDS/UL (ref 149–390)
PMV BLD AUTO: 11.1 FL (ref 8.9–12.7)
POTASSIUM SERPL-SCNC: 4.2 MMOL/L (ref 3.5–5.3)
RBC # BLD AUTO: 3.25 MILLION/UL (ref 3.81–5.12)
SODIUM SERPL-SCNC: 145 MMOL/L (ref 135–147)
WBC # BLD AUTO: 4.15 THOUSAND/UL (ref 4.31–10.16)

## 2024-01-10 PROCEDURE — 92526 ORAL FUNCTION THERAPY: CPT

## 2024-01-10 PROCEDURE — 97530 THERAPEUTIC ACTIVITIES: CPT

## 2024-01-10 PROCEDURE — 80048 BASIC METABOLIC PNL TOTAL CA: CPT | Performed by: NURSE PRACTITIONER

## 2024-01-10 PROCEDURE — 85027 COMPLETE CBC AUTOMATED: CPT | Performed by: NURSE PRACTITIONER

## 2024-01-10 PROCEDURE — 83735 ASSAY OF MAGNESIUM: CPT | Performed by: NURSE PRACTITIONER

## 2024-01-10 PROCEDURE — 99232 SBSQ HOSP IP/OBS MODERATE 35: CPT

## 2024-01-10 PROCEDURE — 99232 SBSQ HOSP IP/OBS MODERATE 35: CPT | Performed by: PSYCHIATRY & NEUROLOGY

## 2024-01-10 RX ORDER — SERTRALINE HYDROCHLORIDE 25 MG/1
25 TABLET, FILM COATED ORAL DAILY
Status: DISCONTINUED | OUTPATIENT
Start: 2024-01-11 | End: 2024-01-21 | Stop reason: HOSPADM

## 2024-01-10 RX ORDER — LANOLIN ALCOHOL/MO/W.PET/CERES
400 CREAM (GRAM) TOPICAL 2 TIMES DAILY
Status: DISPENSED | OUTPATIENT
Start: 2024-01-10 | End: 2024-01-11

## 2024-01-10 RX ADMIN — DOCUSATE SODIUM 100 MG: 100 CAPSULE, LIQUID FILLED ORAL at 09:25

## 2024-01-10 RX ADMIN — LIDOCAINE 1 PATCH: 700 PATCH TOPICAL at 09:25

## 2024-01-10 RX ADMIN — OLANZAPINE 5 MG: 2.5 TABLET, FILM COATED ORAL at 09:25

## 2024-01-10 RX ADMIN — OLANZAPINE 1.3 MG: 10 INJECTION, POWDER, FOR SOLUTION INTRAMUSCULAR at 21:19

## 2024-01-10 RX ADMIN — LOSARTAN POTASSIUM 100 MG: 50 TABLET, FILM COATED ORAL at 09:25

## 2024-01-10 RX ADMIN — LORATADINE 10 MG: 10 TABLET ORAL at 09:28

## 2024-01-10 RX ADMIN — ACETAMINOPHEN 650 MG: 325 TABLET ORAL at 20:30

## 2024-01-10 RX ADMIN — Medication 3 MG: at 20:31

## 2024-01-10 RX ADMIN — ENOXAPARIN SODIUM 30 MG: 30 INJECTION SUBCUTANEOUS at 09:28

## 2024-01-10 RX ADMIN — DONEPEZIL HYDROCHLORIDE 10 MG: 5 TABLET ORAL at 09:25

## 2024-01-10 RX ADMIN — Medication 400 MG: at 18:27

## 2024-01-10 RX ADMIN — DOCUSATE SODIUM 100 MG: 100 CAPSULE, LIQUID FILLED ORAL at 18:27

## 2024-01-10 RX ADMIN — LABETALOL HYDROCHLORIDE 10 MG: 5 INJECTION, SOLUTION INTRAVENOUS at 07:29

## 2024-01-10 RX ADMIN — METOPROLOL SUCCINATE 50 MG: 50 TABLET, EXTENDED RELEASE ORAL at 09:25

## 2024-01-10 RX ADMIN — Medication 1000 UNITS: at 09:25

## 2024-01-10 NOTE — PHYSICAL THERAPY NOTE
"   PT TREATMENT       01/10/24 1025   PT Last Visit   PT Visit Date 01/10/24   Note Type   Note Type Treatment   Pain Assessment   Pain Assessment Tool FLACC   Patient's Stated Pain Goal No pain   Pain Rating: FLACC (Rest) - Face 0   Pain Rating: FLACC (Rest) - Legs 0   Pain Rating: FLACC (Rest) - Activity 0   Pain Rating: FLACC (Rest) - Cry 0   Pain Rating: FLACC (Rest) - Consolability 0   Score: FLACC (Rest) 0   Pain Rating: FLACC (Activity) - Face 0   Pain Rating: FLACC (Activity) - Legs 0   Pain Rating: FLACC (Activity) - Activity 0   Pain Rating: FLACC (Activity) - Cry 0   Pain Rating: FLACC (Activity) - Consolability 0   Score: FLACC (Activity) 0   Restrictions/Precautions   Weight Bearing Precautions Per Order No   Other Precautions Bed Alarm;Chair Alarm;Fall Risk;Pain;Cognitive   General   Chart Reviewed Yes   Family/Caregiver Present No   Cognition   Overall Cognitive Status Impaired   Arousal/Participation Cooperative   Orientation Level Oriented to person;Disoriented to time;Disoriented to place;Disoriented to situation   Following Commands Follows one step commands with increased time or repetition   Subjective   Subjective \"I'm always cold\"   Bed Mobility   Supine to Sit 4  Minimal assistance   Additional items Assist x 1;Verbal cues;Increased time required;LE management   Sit to Supine Unable to assess   Additional Comments transferred to bedside chair   Transfers   Sit to Stand 4  Minimal assistance   Additional items Assist x 2;Verbal cues   Stand to Sit 4  Minimal assistance   Additional items Assist x 1;Verbal cues   Stand pivot 4  Minimal assistance   Additional items Assist x 2;Verbal cues   Ambulation/Elevation   Gait Assistance 4  Minimal assist   Additional items Assist x 2;Verbal cues   Assistive Device   (bilat HHA)   Distance 2 feet to chair   Stair Management Assistance Not tested   Balance   Static Sitting Fair   Dynamic Sitting Fair -   Static Standing Fair -   Dynamic Standing Poor + "   Ambulatory Poor +   Activity Tolerance   Activity Tolerance Patient tolerated treatment well;Patient limited by fatigue   Assessment   Prognosis Good   Problem List Decreased strength;Decreased endurance;Impaired balance;Decreased mobility;Decreased cognition;Impaired judgement;Decreased safety awareness   Assessment Pt seen for PT session this AM. Pt cooperative this AM, agreeable with OOB to chair. Able to progress to perform bed > chair transfer with Min A x 2 + constant verbal/tactile cues for safety. Pt refusing to particpate in exercise at EOS - repositioned for comfort with all needs within reach.  The patient's AM-PAC Basic Mobility Inpatient Short Form Raw Score is 13. A Raw score of less than or equal to 16 suggests the patient may benefit from discharge to post-acute rehabilitation services. Please also refer to the recommendation of the Physical Therapist for safe discharge planning.     Goals   Patient Goals unable to state due to cognition   Plan   Treatment/Interventions ADL retraining;Functional transfer training;LE strengthening/ROM;Endurance training;Therapeutic exercise;Bed mobility;Gait training;Spoke to nursing;OT   Discharge Recommendation   Rehab Resource Intensity Level, PT II (Moderate Resource Intensity)   AM-PAC Basic Mobility Inpatient   Turning in Flat Bed Without Bedrails 3   Lying on Back to Sitting on Edge of Flat Bed Without Bedrails 3   Moving Bed to Chair 2   Standing Up From Chair Using Arms 2   Walk in Room 2   Climb 3-5 Stairs With Railing 1   Basic Mobility Inpatient Raw Score 13   Basic Mobility Standardized Score 33.99   Turning Head Towards Sound 3   Follow Simple Instructions 2   Low Function Basic Mobility Raw Score  18   Low Function Basic Mobility Standardized Score  29.25   Highest Level Of Mobility   JH-HLM Goal 4: Move to chair/commode   JH-HLM Achieved 4: Move to chair/commode   End of Consult   Patient Position at End of Consult Bedside chair;Bed/Chair alarm  activated;All needs within reach   Licensure   [place] License Number  Michelle Kimmie [id number]

## 2024-01-10 NOTE — PLAN OF CARE
Problem: PHYSICAL THERAPY ADULT  Goal: Performs mobility at highest level of function for planned discharge setting.  See evaluation for individualized goals.  Description: Treatment/Interventions: ADL retraining, Functional transfer training, LE strengthening/ROM, Endurance training, Therapeutic exercise, Bed mobility, Gait training, Spoke to nursing, OT          See flowsheet documentation for full assessment, interventions and recommendations.  Outcome: Progressing  Note: Prognosis: Good  Problem List: Decreased strength, Decreased endurance, Impaired balance, Decreased mobility, Decreased cognition, Impaired judgement, Decreased safety awareness  Assessment: Pt seen for PT session this AM. Pt cooperative this AM, agreeable with OOB to chair. Able to progress to perform bed > chair transfer with Min A x 2 + constant verbal/tactile cues for safety. Pt refusing to particpate in exercise at EOS - repositioned for comfort with all needs within reach.        Rehab Resource Intensity Level, PT: II (Moderate Resource Intensity)    See flowsheet documentation for full assessment.

## 2024-01-10 NOTE — PLAN OF CARE
Problem: Potential for Falls  Goal: Patient will remain free of falls  Description: INTERVENTIONS:  - Educate patient/family on patient safety including physical limitations  - Instruct patient to call for assistance with activity   - Consult OT/PT to assist with strengthening/mobility   - Keep Call bell within reach  - Keep bed low and locked with side rails adjusted as appropriate  - Keep care items and personal belongings within reach  - Initiate and maintain comfort rounds  - Make Fall Risk Sign visible to staff  - Offer Toileting every 3 Hours, in advance of need  - Initiate/Maintain bed/chair alarm  - Obtain necessary fall risk management equipment: bed/chair alarm  - Apply yellow socks and bracelet for high fall risk patients  - Consider moving patient to room near nurses station  Outcome: Progressing     Problem: Prexisting or High Potential for Compromised Skin Integrity  Goal: Skin integrity is maintained or improved  Description: INTERVENTIONS:  - Identify patients at risk for skin breakdown  - Assess and monitor skin integrity  - Assess and monitor nutrition and hydration status  - Monitor labs   - Assess for incontinence   - Turn and reposition patient  - Assist with mobility/ambulation  - Relieve pressure over bony prominences  - Avoid friction and shearing  - Provide appropriate hygiene as needed including keeping skin clean and dry  - Evaluate need for skin moisturizer/barrier cream  - Collaborate with interdisciplinary team   - Patient/family teaching  - Consider wound care consult   Outcome: Progressing     Problem: PAIN - ADULT  Goal: Verbalizes/displays adequate comfort level or baseline comfort level  Description: Interventions:  - Encourage patient to monitor pain and request assistance  - Assess pain using appropriate pain scale  - Administer analgesics based on type and severity of pain and evaluate response  - Implement non-pharmacological measures as appropriate and evaluate response  -  Consider cultural and social influences on pain and pain management  - Notify physician/advanced practitioner if interventions unsuccessful or patient reports new pain  Outcome: Progressing     Problem: INFECTION - ADULT  Goal: Absence or prevention of progression during hospitalization  Description: INTERVENTIONS:  - Assess and monitor for signs and symptoms of infection  - Monitor lab/diagnostic results  - Monitor all insertion sites, i.e. indwelling lines, tubes, and drains  - Monitor endotracheal if appropriate and nasal secretions for changes in amount and color  - [place] appropriate cooling/warming therapies per order  - Administer medications as ordered  - Instruct and encourage patient and family to use good hand hygiene technique  - Identify and instruct in appropriate isolation precautions for identified infection/condition  Outcome: Progressing  Goal: Absence of fever/infection during neutropenic period  Description: INTERVENTIONS:  - Monitor WBC    Outcome: Progressing     Problem: DISCHARGE PLANNING  Goal: Discharge to home or other facility with appropriate resources  Description: INTERVENTIONS:  - Identify barriers to discharge w/patient and caregiver  - Arrange for needed discharge resources and transportation as appropriate  - Identify discharge learning needs (meds, wound care, etc.)  - Arrange for interpretive services to assist at discharge as needed  - Refer to Case Management Department for coordinating discharge planning if the patient needs post-hospital services based on physician/advanced practitioner order or complex needs related to functional status, cognitive ability, or social support system  Outcome: Progressing     Problem: Knowledge Deficit  Goal: Patient/family/caregiver demonstrates understanding of disease process, treatment plan, medications, and discharge instructions  Description: Complete learning assessment and assess knowledge base.  Interventions:  - Provide teaching at  level of understanding  - Provide teaching via preferred learning methods  Outcome: Progressing     Problem: NEUROSENSORY - ADULT  Goal: Achieves stable or improved neurological status  Description: INTERVENTIONS  - Monitor and report changes in neurological status  - Monitor vital signs such as temperature, blood pressure, glucose, and any other labs ordered   - Initiate measures to prevent increased intracranial pressure  - Monitor for seizure activity and implement precautions if appropriate      Outcome: Progressing  Goal: Achieves maximal functionality and self care  Description: INTERVENTIONS  - Monitor swallowing and airway patency with patient fatigue and changes in neurological status  - Encourage and assist patient to increase activity and self care.   - Encourage visually impaired, hearing impaired and aphasic patients to use assistive/communication devices  Outcome: Progressing     Problem: GENITOURINARY - ADULT  Goal: Maintains or returns to baseline urinary function  Description: INTERVENTIONS:  - Assess urinary function  - Encourage oral fluids to ensure adequate hydration if ordered  - Administer IV fluids as ordered to ensure adequate hydration  - Administer ordered medications as needed  - Offer frequent toileting  - Follow urinary retention protocol if ordered  Outcome: Progressing  Goal: Absence of urinary retention  Description: INTERVENTIONS:  - Assess patient’s ability to void and empty bladder  - Monitor I/O  - Bladder scan as needed  - Discuss with physician/AP medications to alleviate retention as needed  - Discuss catheterization for long term situations as appropriate  Outcome: Progressing     Problem: METABOLIC, FLUID AND ELECTROLYTES - ADULT  Goal: Electrolytes maintained within normal limits  Description: INTERVENTIONS:  - Monitor labs and assess patient for signs and symptoms of electrolyte imbalances  - Administer electrolyte replacement as ordered  - Monitor response to electrolyte  replacements, including repeat lab results as appropriate  - Instruct patient on fluid and nutrition as appropriate  Outcome: Progressing  Goal: Fluid balance maintained  Description: INTERVENTIONS:  - Monitor labs   - Monitor I/O and WT  - Instruct patient on fluid and nutrition as appropriate  - Assess for signs & symptoms of volume excess or deficit  Outcome: Progressing     Problem: HEMATOLOGIC - ADULT  Goal: Maintains hematologic stability  Description: INTERVENTIONS  - Assess for signs and symptoms of bleeding or hemorrhage  - Monitor labs  - Administer supportive blood products/factors as ordered and appropriate  Outcome: Progressing     Problem: Nutrition/Hydration-ADULT  Goal: Nutrient/Hydration intake appropriate for improving, restoring or maintaining nutritional needs  Description: Monitor and assess patient's nutrition/hydration status for malnutrition. Collaborate with interdisciplinary team and initiate plan and interventions as ordered.  Monitor patient's weight and dietary intake as ordered or per policy. Utilize nutrition screening tool and intervene as necessary. Determine patient's food preferences and provide high-protein, high-caloric foods as appropriate.     INTERVENTIONS:  - Monitor oral intake, urinary output, labs, and treatment plans  - Assess nutrition and hydration status and recommend course of action  - Evaluate amount of meals eaten  - Assist patient with eating if necessary   - Allow adequate time for meals  - Recommend/ encourage appropriate diets, oral nutritional supplements, and vitamin/mineral supplements  - Order, calculate, and assess calorie counts as needed  - Recommend, monitor, and adjust tube feedings and TPN/PPN based on assessed needs  - Assess need for intravenous fluids  - Provide specific nutrition/hydration education as appropriate  - Include patient/family/caregiver in decisions related to nutrition  Outcome: Progressing

## 2024-01-10 NOTE — PROGRESS NOTES
"Progress Note - Behavioral Health   Monika Butler 83 y.o. female MRN: [id number]  Unit/Bed#: [address] [place] [address] Encounter: [id number]          I came to see the patient for continuity of care prior request from primary team.  Per primary team patient's family has reported that the patient has been more depressed.  Patient's daughter Anna was present in the room. Patient was minimally cooperative and irritable during interview.  She reports that she is trying to get an \"inheritance\" from her parents and states that her family are \"greedy\" and are trying to take it from her.  Patient was perseverative on this. Patient provided vague and incoherent answers to questioning including whether or not she has physical complaints, suicidal or homicidal thoughts, or auditory or visual hallucinations. With repeated questioning regarding suicidal or homicidal thoughts patient stated \"I have not thought about it, now shut up!\".  Patient states that we are currently in her house.  Reports that she would be interested in returning to [facility] after discharge.    Went to see patient again later on in the afternoon and she appeared to have improvement in her affect.  She was calm and cooperative with interview.  She did not recall the previous interview or having met with me before.  She denies having physical complaints.  She denies suicidal or homicidal ideation, plan, or intent. She did not exhibit any agitation or aggressive behaviors.    Behavior over the last 24 hours:  regressed  Sleep: Reports poor sleep overnight  Appetite: Patient does not appear to have eaten for lunch  Medication side effects: No physical complaints verbalized  ROS: No physical complaints verbalized    Mental Status Evaluation:  Appearance:  appears stated age, sitting upright in chair, and wearing glasses   Behavior:  Initially minimally cooperative and poor eye contact.  Later calm and cooperative   Speech:  scant, mumbled, soft at times, loud at " "times   Mood:  irritable   Affect:  Initially irritable, dysphoric.  Later in the afternoon appeared brighter   Language: Unable to assess   Thought Process:  Tangential, incoherent   Associations: intact associations   Thought Content:  no delusions elicited   Perceptual Disturbances: No auditory or visual hallucinations verbalized   Risk Potential: Denies suicidal or homicidal ideation, plan, or intent   Sensorium:  person   Memory:  Memory appears grossly impaired   Cognition:  Memory appears grossly impaired   Consciousness:  alert and awake    Attention: attention span appeared shorter than expected for age   Intellect: Unable to assess due to patient factors   Fund of Knowledge: Unable to assess due to patient factors   Insight:  poor   Judgment: poor   Muscle Strength and Tone: Not assessed   Gait/Station: Not assessed, patient in bed   Motor Activity: no abnormal movements         Assessment/Plan  Monika Butler is a 83 y.o. female withpast medical history of dementia with psychosis, dementia with behavioral disturbance, hypertension who presented to the ED for altered mental status and is admitted for dementia with behavioral disturbance.  Psychiatric consultation was performed 1/3/2024 and was seen for follow-up - please see previous notes by me for more information. Per primary team patient's family has reported that the patient has been more depressed.  Patient's daughter Anna was present in the room. Patient was minimally cooperative and irritable during interview.  She was perseverative regarding and \"inheritance\" from her parents and states that her family are \"greedy\" and are trying to take it from her.  Patient provided vague and incoherent answers to questioning including whether or not she has physical complaints, suicidal or homicidal thoughts, or auditory or visual hallucinations. With repeated questioning regarding suicidal or homicidal thoughts patient stated \"I have not thought about it, " "now shut up!\".  Patient states that we are currently in her house.  Reports that she would be interested in returning to [facility] after discharge.  Patient's daughter Anna states that she feels that the patient has more depressed today and feels that the patient has been more depressed recently and cited that her sister/patient's other daughter Anitha had also reported that patient has been more depressed and was interested in the patient starting an antidepressant.  I had discussed this previously with patient's daughter Anitha on 1/8/2024-see progress note from then for more information.  However patient appears to be irritable and dysphoric today.  She verbalized vague and incoherent answers regarding multiple questions.  Patient's daughter denies the patient having verbalized any suicidal or homicidal statements and  that she is not concerned regarding suicidality or homicidality with the patient.  Patient's daughter verbalized interest in patient starting an antidepressant.  Discussed plan to start Zoloft for improvement of depression.  Discussed with primary team.  Went to see patient again later on in the afternoon and she appeared to have improvement in her affect.  She was calm and cooperative with interview.  She did not recall the previous interview or having met with me before.  She denies having physical complaints.  She denies suicidal or homicidal ideation, plan, or intent. She did not exhibit any agitation or aggressive behaviors.  There is no clear indication for inpatient psychiatric hospitalization at this time.      Diagnosis:  Dementia with behavioral disturbance  Depressive disorder unspecified    Recommended Treatment:   Continue medical management  There is no clear indication for inpatient psychiatric hospitalization at this time  Start Zoloft 25 mg daily  Continue Zyprexa 5 mg qhs  EKG from 1/8/2024 shows QTc of 480  Patient's potassium today is 4.2, magnesium is 1.7  Generally it " is recommended to maintain magnesium over 2 and potassium over 4  Recommend repeat EKG for QTc monitoring  Caution use of antipsychotics with QTc >500 ms  In such a case would consider use of benzodiazepines for agitation rather than antipsychotics as benzodiazepines are not associated with QTc prolongation  Discussed with the primary team  Psychiatry to follow-up tomorrow.  Please contact with questions  For overnights and weekends please contact [facility] for psychiatric purposes         Medications: all current active meds have been reviewed.  See recommended treatment above      Risks, benefits and possible side effects of Medications:     Risks, benefits, and possible side effects of medications explained to patient's daughter who verbalizes understanding.        Labs: I have personally reviewed all pertinent laboratory results.     I have personally reviewed all pertinent laboratory/tests results.  Labs in last 72 hours:   Recent Labs     01/10/24  0420   WBC 4.15*   RBC 3.25*   HGB 10.8*   HCT 33.1*      RDW 13.5   SODIUM 145   K 4.2   *   CO2 29   BUN 20   CREATININE 0.81   GLUC 91   CALCIUM 9.2     Magnesium   Lab Results   Component Value Date    MG 1.7 (L) 01/10/2024     EKG   Lab Results   Component Value Date    VENTRATE 71 01/08/2024    ATRIALRATE 71 01/08/2024    PRINT 172 01/08/2024    QRSDINT 90 01/08/2024    QTINT 442 01/08/2024    QTCINT 480 01/08/2024    PAXIS 84 01/08/2024    QRSAXIS 57 01/08/2024    TWAVEAXIS 87 01/08/2024         Ash Silvestre DO  01/10/24      This note has been constructed using a voice recognition system.    There may be translation, syntax,  or grammatical errors. If you have any questions, please contact the dictating provider.

## 2024-01-10 NOTE — CASE MANAGEMENT
Case Management Progress Note    Patient name Monika Butler  Location [address] [address]/[address] [place] [address]-* MRN [id number]  : 1940 Date 1/10/2024       LOS (days): 8  Geometric Mean LOS (GMLOS) (days): 5  Days to GMLOS:-1.8        OBJECTIVE:        Current admission status: Inpatient  Preferred Pharmacy:   [id number] - [address] [id number]  Phone: [phone] Fax: [phone]    Primary Care Provider: Kristin Raymundo MD    Primary Insurance: [employer] RE[employer]  Secondary Insurance:     PROGRESS NOTE:      CM called Harriett at [facility] to confirm when someone from the facility will be visiting patient bedside to evaluate her. Maryann Espinoza confirmed Stella will be out to visit patient later today. Maryann Espinoza also requested an update from medical staff. CM sent TT to RAYSHAWN Mayer requesting her call Harriett to provide an update on the patient.

## 2024-01-10 NOTE — PROGRESS NOTES
[facility]  Progress Note  Name: Monika Butler I  MRN: [id number]  Unit/Bed#: [address] [place] [address] Date of Admission: 1/2/2024   Date of Service: 1/10/2024 I Hospital Day: 8    Assessment/Plan   * Dementia with behavioral disturbance (HCC)  Assessment & Plan  Patient has history of dementia with behavioral disturbances, worsening over past several months. Has been getting more agitated and refusing medications since day prior to admission. Prior facility said she needs a higher level of care and will not accept patient back.  UA showed moderate leukocytes and bacteria, completed ceftriaxone x 3 doses  Continue current home medications including aricept and zyprexa 2.5 mg hs  Patient was started at [facility], discontinued  Psychiatry consulted; recommending to avoid Ativan for sedation/agitation, decreased Zyprexa to 1.25 mg p.o. daily in the morning and Zyprexa 3.75 mg p.o. nightly  Patient very lethargic on 1/4, likely from agitation night prior  Stat CT of head: no acute intracranial abnormality but demonstrated stable marked chronic small vessel ischemic changes and stable 1.3 cm meningioma at the left posterior lateral middle cranial fossa  Supportive care  Mental status improving, behavior appears stable  Upgrade to mechanical soft diet and thin liquids per speech recommendations  Discussed with family and psychiatry, will do once a day dosing Zyprexa 5 mg, decrease melatonin to 3 mg and give these 2 medications around 1930 to see if we can get patient's wake sleep cycle more aligned to normal circadian rhythm.  Will also give patient's Aricept in the a.m. as psychiatry indicated this can add to patient's insomnia.  Monitor response  Avoid sedating medications  Patient continues to be overall pleasant and cooperative with intermittent agitation requiring redirection and occasional prn zyprexa  Started on zoloft 25 mg daily for depressive symptoms  Follow up repeat  EKG    Essential hypertension  Assessment & Plan  BP intermittently high, likely in setting of agitation  Continue home losartan 100 mg daily  increased metoprolol to 75 mg daily    Elevated troponin  Assessment & Plan  Troponin noted to be elevated at 24 on 1/3, repeated 1/4 elevated 300  Cardiology consulted  ECHO 1/5/24: EF 60-65%, systolic function normal, diastolic function mildly abnormal consistent with G1DD  Stress test canceled after discussion between cardiology and patient's family  Continue medical therapy with beta blocker and ARB  BP reading elevated, will increase patient's metoprolol to 75 mg daily           VTE Pharmacologic Prophylaxis: VTE Score: 3 Moderate Risk (Score 3-4) - Pharmacological DVT Prophylaxis Ordered: enoxaparin (Lovenox).    Mobility:   Basic Mobility Inpatient Raw Score: 13  -HLM Goal: 4: Move to chair/commode  JH-HLM Achieved: 4: Move to chair/commode  HLM Goal achieved. Continue to encourage appropriate mobility.    Patient Centered Rounds: I performed bedside rounds with nursing staff today.   Discussions with Specialists or Other Care Team Provider: rn, cm, psychiatry    Education and Discussions with Family / Patient: Updated  (daughter) at bedside.    Total Time Spent on Date of Encounter in care of patient: 45 mins. This time was spent on one or more of the following: performing physical exam; counseling and coordination of care; obtaining or reviewing history; documenting in the medical record; reviewing/ordering tests, medications or procedures; communicating with other healthcare professionals and discussing with patient's family/caregivers.    Current Length of Stay: 8 day(s)  Current Patient Status: Inpatient   Certification Statement: The patient will continue to require additional inpatient hospital stay due to monitor behavior, adjust medication  Discharge Plan:  pending acceptance back to facility    Code Status: Level 1 - Full Code    Subjective:    Patient seen and examined at bedside this morning. Was reportedly somewhat agitated earlier this morning but was cooperative during my exam. Less talkative than prior interactions but not aggressive or agitated. Daughter at bedside thinks she appears more depressed today.    Objective:     Vitals:   Temp (24hrs), Av.9 °F (36.6 °C), Min:97.6 °F (36.4 °C), Max:98.2 °F (36.8 °C)    Temp:  [97.6 °F (36.4 °C)-98.2 °F (36.8 °C)] 98.2 °F (36.8 °C)  HR:  [62-90] 69  Resp:  [18] 18  BP: (155-194)/(84-94) 175/86  SpO2:  [95 %-96 %] 95 %  Body mass index is 21.37 kg/m².     Input and Output Summary (last 24 hours):   No intake or output data in the 24 hours ending 01/10/24 1641    Physical Exam:   Physical Exam  Vitals and nursing note reviewed.   Constitutional:       General: She is not in acute distress.     Appearance: She is well-developed.   Cardiovascular:      Rate and Rhythm: Normal rate and regular rhythm.   Pulmonary:      Effort: Pulmonary effort is normal. No respiratory distress.      Breath sounds: Normal breath sounds.   Abdominal:      Palpations: Abdomen is soft.      Tenderness: There is no abdominal tenderness.   Musculoskeletal:         General: No swelling.   Skin:     General: Skin is warm and dry.      Capillary Refill: Capillary refill takes less than 2 seconds.   Neurological:      Mental Status: She is alert. Mental status is at baseline. She is disoriented and confused.   Psychiatric:         Mood and Affect: Mood normal.          Additional Data:     Labs:  Results from last 7 days   Lab Units 01/10/24  0420   WBC Thousand/uL 4.15*   HEMOGLOBIN g/dL 10.8*   HEMATOCRIT % 33.1*   PLATELETS Thousands/uL 200     Results from last 7 days   Lab Units 01/10/24  0420   SODIUM mmol/L 145   POTASSIUM mmol/L 4.2   CHLORIDE mmol/L 110*   CO2 mmol/L 29   BUN mg/dL 20   CREATININE mg/dL 0.81   ANION GAP mmol/L 6   CALCIUM mg/dL 9.2   GLUCOSE RANDOM mg/dL 91                       Lines/Drains:  Invasive  Devices       Peripheral Intravenous Line  Duration             Peripheral IV 01/07/24 Right;Ventral (anterior) Forearm 3 days                          Imaging: No pertinent imaging reviewed.    Recent Cultures (last 7 days):         Last 24 Hours Medication List:   Current Facility-Administered Medications   Medication Dose Route Frequency Provider Last Rate    acetaminophen  650 mg Oral Q6H PRN Leyla Guardado PA-C      cholecalciferol  1,000 Units Oral Daily Leyla Guardado PA-C      docusate sodium  100 mg Oral BID Leyla Guardado PA-C      donepezil  10 mg Oral QAM DAMARIS Meza      enoxaparin  30 mg Subcutaneous Q24H [facility] DAMARIS Meza      labetalol  10 mg Intravenous Q6H PRN Leyla Guardado PA-C      lidocaine  1 patch Topical Daily DAMARIS Meza      loratadine  10 mg Oral Daily DAMARIS Meza      losartan  100 mg Oral Daily Leyla Guardado PA-C      magnesium Oxide  400 mg Oral BID Leyla Guardado PA-C      melatonin  3 mg Oral Daily DAMARIS Meza      [START ON 1/11/2024] metoprolol succinate  75 mg Oral Daily Leyla Guardado PA-C      OLANZapine  1.25 mg Oral Q8H PRN Ash Silvestre DO      Or    OLANZapine  1.3 mg Intramuscular Q8H PRN Ash Silvestre DO      OLANZapine  5 mg Oral Daily DAMARIS Meza      ondansetron  4 mg Intravenous Q6H PRN Leyla Guardado PA-C      [START ON 1/11/2024] sertraline  25 mg Oral Daily Ash Silvestre DO          Today, Patient Was Seen By: Leyla Guardado PA-C    **Please Note: This note may have been constructed using a voice recognition system.**

## 2024-01-10 NOTE — ASSESSMENT & PLAN NOTE
BP intermittently high, likely in setting of agitation  Continue home losartan 100 mg daily  increased metoprolol to 75 mg daily

## 2024-01-10 NOTE — SPEECH THERAPY NOTE
"Speech/Language Pathology Progress Note    Patient Name: Monika Butler  Today's Date: 1/10/2024     Problem List  Principal Problem:    Dementia with behavioral disturbance (HCC)  Active Problems:    Essential hypertension    Elevated troponin    Subjective:  \" I would like some treatment and coffee,\"    Objective:  Pt was seen for diagnostic dysphagia therapy to ensure tolerance of current diet (NDD2, thin liq) and to assess potential for safe diet upgrade.  Pt was somnolent to begin but roused over time with multimodality stimulation.  She was positioned fully upright and fed foods slowly.  She self-administered liquids.  She was pleasant and cooperative but appeared to be experiencing visual hallucinations at times (spoke of seeing dogs in the room and occasionally reprimanded them for coming to close to her tray).    Pt was assessed with 4 ounces of coffee, 6 ounces of Ensure Plus with whole pills, minimal amounts of yogurt and soft fresh fruit/honeydew melon. Dentures were in place.  Mastication was timely and effective.  Bolus formation and transfer were effective with foods and liquids.  Oral transfer with whole pills required additional sips of liquid or purée at times. Swallow initiation appeared prompt. Laryngeal rise was good by palpation. No coughing, throat clearing, c/o stasis, multiple swallows, change in vocal quality noted c po intake today.     Assessment:  Alertness, cooperation and oral control appeared improved.    Plan/Recommendations:  Consider upgrade to level 3 dysphagia advanced diet, continue thin liquid by single sips.  Cautious administration with medications (if sleepy, please crush and place in sweet purée.  Otherwise, okay to try small whole pills with Ensure/liquid.  SLP to continue follow-up including caregiver education to maximize safety and efficiency of oral intake.    Vesta Dumont MS CCC-SLP  [place] License [id number]    "

## 2024-01-10 NOTE — PLAN OF CARE
Problem: Potential for Falls  Goal: Patient will remain free of falls  Description: INTERVENTIONS:  - Educate patient/family on patient safety including physical limitations  - Instruct patient to call for assistance with activity   - Consult OT/PT to assist with strengthening/mobility   - Keep Call bell within reach  - Keep bed low and locked with side rails adjusted as appropriate  - Keep care items and personal belongings within reach  - Initiate and maintain comfort rounds  - Make Fall Risk Sign visible to staff  - Offer Toileting every 3 Hours, in advance of need  - Initiate/Maintain bed/chair alarm  - Obtain necessary fall risk management equipment: bed/chair alarm  - Apply yellow socks and bracelet for high fall risk patients  - Consider moving patient to room near nurses station  Outcome: Progressing     Problem: Prexisting or High Potential for Compromised Skin Integrity  Goal: Skin integrity is maintained or improved  Description: INTERVENTIONS:  - Identify patients at risk for skin breakdown  - Assess and monitor skin integrity  - Assess and monitor nutrition and hydration status  - Monitor labs   - Assess for incontinence   - Turn and reposition patient  - Assist with mobility/ambulation  - Relieve pressure over bony prominences  - Avoid friction and shearing  - Provide appropriate hygiene as needed including keeping skin clean and dry  - Evaluate need for skin moisturizer/barrier cream  - Collaborate with interdisciplinary team   - Patient/family teaching  - Consider wound care consult   Outcome: Progressing     Problem: PAIN - ADULT  Goal: Verbalizes/displays adequate comfort level or baseline comfort level  Description: Interventions:  - Encourage patient to monitor pain and request assistance  - Assess pain using appropriate pain scale  - Administer analgesics based on type and severity of pain and evaluate response  - Implement non-pharmacological measures as appropriate and evaluate response  -  Consider cultural and social influences on pain and pain management  - Notify physician/advanced practitioner if interventions unsuccessful or patient reports new pain  Outcome: Progressing

## 2024-01-10 NOTE — ASSESSMENT & PLAN NOTE
Patient has history of dementia with behavioral disturbances, worsening over past several months. Has been getting more agitated and refusing medications since day prior to admission. Prior facility said she needs a higher level of care and will not accept patient back.  UA showed moderate leukocytes and bacteria, completed ceftriaxone x 3 doses  Continue current home medications including aricept and zyprexa 2.5 mg hs  Patient was started at [facility], discontinued  Psychiatry consulted; recommending to avoid Ativan for sedation/agitation, decreased Zyprexa to 1.25 mg p.o. daily in the morning and Zyprexa 3.75 mg p.o. nightly  Patient very lethargic on 1/4, likely from agitation night prior  Stat CT of head: no acute intracranial abnormality but demonstrated stable marked chronic small vessel ischemic changes and stable 1.3 cm meningioma at the left posterior lateral middle cranial fossa  Supportive care  Mental status improving, behavior appears stable  Upgrade to mechanical soft diet and thin liquids per speech recommendations  Discussed with family and psychiatry, will do once a day dosing Zyprexa 5 mg, decrease melatonin to 3 mg and give these 2 medications around 1930 to see if we can get patient's wake sleep cycle more aligned to normal circadian rhythm.  Will also give patient's Aricept in the a.m. as psychiatry indicated this can add to patient's insomnia.  Monitor response  Avoid sedating medications  Patient continues to be overall pleasant and cooperative with intermittent agitation requiring redirection and occasional prn zyprexa  Started on zoloft 25 mg daily for depressive symptoms  Follow up repeat EKG

## 2024-01-11 LAB
ANION GAP SERPL CALCULATED.3IONS-SCNC: 7 MMOL/L
BUN SERPL-MCNC: 20 MG/DL (ref 5–25)
CALCIUM SERPL-MCNC: 9.4 MG/DL (ref 8.4–10.2)
CHLORIDE SERPL-SCNC: 104 MMOL/L (ref 96–108)
CO2 SERPL-SCNC: 29 MMOL/L (ref 21–32)
CREAT SERPL-MCNC: 0.78 MG/DL (ref 0.6–1.3)
GFR SERPL CREATININE-BSD FRML MDRD: 70 ML/MIN/1.73SQ M
GLUCOSE SERPL-MCNC: 103 MG/DL (ref 65–140)
MAGNESIUM SERPL-MCNC: 1.8 MG/DL (ref 1.9–2.7)
POTASSIUM SERPL-SCNC: 3.7 MMOL/L (ref 3.5–5.3)
SODIUM SERPL-SCNC: 140 MMOL/L (ref 135–147)

## 2024-01-11 PROCEDURE — 99233 SBSQ HOSP IP/OBS HIGH 50: CPT | Performed by: PSYCHIATRY & NEUROLOGY

## 2024-01-11 PROCEDURE — 80048 BASIC METABOLIC PNL TOTAL CA: CPT

## 2024-01-11 PROCEDURE — 83735 ASSAY OF MAGNESIUM: CPT

## 2024-01-11 PROCEDURE — 99232 SBSQ HOSP IP/OBS MODERATE 35: CPT

## 2024-01-11 RX ORDER — LANOLIN ALCOHOL/MO/W.PET/CERES
400 CREAM (GRAM) TOPICAL 2 TIMES DAILY
Status: COMPLETED | OUTPATIENT
Start: 2024-01-11 | End: 2024-01-11

## 2024-01-11 RX ADMIN — OLANZAPINE 1.3 MG: 10 INJECTION, POWDER, FOR SOLUTION INTRAMUSCULAR at 20:16

## 2024-01-11 RX ADMIN — LIDOCAINE 1 PATCH: 700 PATCH TOPICAL at 09:18

## 2024-01-11 RX ADMIN — DOCUSATE SODIUM 100 MG: 100 CAPSULE, LIQUID FILLED ORAL at 17:05

## 2024-01-11 RX ADMIN — Medication 400 MG: at 17:05

## 2024-01-11 RX ADMIN — DOCUSATE SODIUM 100 MG: 100 CAPSULE, LIQUID FILLED ORAL at 09:15

## 2024-01-11 RX ADMIN — Medication 1000 UNITS: at 09:15

## 2024-01-11 RX ADMIN — Medication 400 MG: at 13:46

## 2024-01-11 RX ADMIN — LORATADINE 10 MG: 10 TABLET ORAL at 09:15

## 2024-01-11 RX ADMIN — DONEPEZIL HYDROCHLORIDE 10 MG: 5 TABLET ORAL at 09:15

## 2024-01-11 RX ADMIN — METOPROLOL SUCCINATE 75 MG: 50 TABLET, EXTENDED RELEASE ORAL at 09:15

## 2024-01-11 RX ADMIN — OLANZAPINE 5 MG: 2.5 TABLET, FILM COATED ORAL at 09:14

## 2024-01-11 RX ADMIN — LOSARTAN POTASSIUM 100 MG: 50 TABLET, FILM COATED ORAL at 09:15

## 2024-01-11 RX ADMIN — SERTRALINE 25 MG: 25 TABLET, FILM COATED ORAL at 09:15

## 2024-01-11 RX ADMIN — ENOXAPARIN SODIUM 30 MG: 30 INJECTION SUBCUTANEOUS at 09:14

## 2024-01-11 NOTE — PLAN OF CARE
Problem: Potential for Falls  Goal: Patient will remain free of falls  Description: INTERVENTIONS:  - Educate patient/family on patient safety including physical limitations  - Instruct patient to call for assistance with activity   - Consult OT/PT to assist with strengthening/mobility   - Keep Call bell within reach  - Keep bed low and locked with side rails adjusted as appropriate  - Keep care items and personal belongings within reach  - Initiate and maintain comfort rounds  - Make Fall Risk Sign visible to staff  - Offer Toileting every 3 Hours, in advance of need  - Initiate/Maintain bed/chair alarm  - Obtain necessary fall risk management equipment: bed/chair alarm  - Apply yellow socks and bracelet for high fall risk patients  - Consider moving patient to room near nurses station  Outcome: Progressing     Problem: Prexisting or High Potential for Compromised Skin Integrity  Goal: Skin integrity is maintained or improved  Description: INTERVENTIONS:  - Identify patients at risk for skin breakdown  - Assess and monitor skin integrity  - Assess and monitor nutrition and hydration status  - Monitor labs   - Assess for incontinence   - Turn and reposition patient  - Assist with mobility/ambulation  - Relieve pressure over bony prominences  - Avoid friction and shearing  - Provide appropriate hygiene as needed including keeping skin clean and dry  - Evaluate need for skin moisturizer/barrier cream  - Collaborate with interdisciplinary team   - Patient/family teaching  - Consider wound care consult   Outcome: Progressing     Problem: PAIN - ADULT  Goal: Verbalizes/displays adequate comfort level or baseline comfort level  Description: Interventions:  - Encourage patient to monitor pain and request assistance  - Assess pain using appropriate pain scale  - Administer analgesics based on type and severity of pain and evaluate response  - Implement non-pharmacological measures as appropriate and evaluate response  -  Consider cultural and social influences on pain and pain management  - Notify physician/advanced practitioner if interventions unsuccessful or patient reports new pain  Outcome: Progressing     Problem: INFECTION - ADULT  Goal: Absence or prevention of progression during hospitalization  Description: INTERVENTIONS:  - Assess and monitor for signs and symptoms of infection  - Monitor lab/diagnostic results  - Monitor all insertion sites, i.e. indwelling lines, tubes, and drains  - Monitor endotracheal if appropriate and nasal secretions for changes in amount and color  - [place] appropriate cooling/warming therapies per order  - Administer medications as ordered  - Instruct and encourage patient and family to use good hand hygiene technique  - Identify and instruct in appropriate isolation precautions for identified infection/condition  Outcome: Progressing  Goal: Absence of fever/infection during neutropenic period  Description: INTERVENTIONS:  - Monitor WBC    Outcome: Progressing     Problem: DISCHARGE PLANNING  Goal: Discharge to home or other facility with appropriate resources  Description: INTERVENTIONS:  - Identify barriers to discharge w/patient and caregiver  - Arrange for needed discharge resources and transportation as appropriate  - Identify discharge learning needs (meds, wound care, etc.)  - Arrange for interpretive services to assist at discharge as needed  - Refer to Case Management Department for coordinating discharge planning if the patient needs post-hospital services based on physician/advanced practitioner order or complex needs related to functional status, cognitive ability, or social support system  Outcome: Progressing     Problem: Knowledge Deficit  Goal: Patient/family/caregiver demonstrates understanding of disease process, treatment plan, medications, and discharge instructions  Description: Complete learning assessment and assess knowledge base.  Interventions:  - Provide teaching at  level of understanding  - Provide teaching via preferred learning methods  Outcome: Progressing     Problem: NEUROSENSORY - ADULT  Goal: Achieves stable or improved neurological status  Description: INTERVENTIONS  - Monitor and report changes in neurological status  - Monitor vital signs such as temperature, blood pressure, glucose, and any other labs ordered   - Initiate measures to prevent increased intracranial pressure  - Monitor for seizure activity and implement precautions if appropriate      Outcome: Progressing  Goal: Achieves maximal functionality and self care  Description: INTERVENTIONS  - Monitor swallowing and airway patency with patient fatigue and changes in neurological status  - Encourage and assist patient to increase activity and self care.   - Encourage visually impaired, hearing impaired and aphasic patients to use assistive/communication devices  Outcome: Progressing     Problem: GENITOURINARY - ADULT  Goal: Maintains or returns to baseline urinary function  Description: INTERVENTIONS:  - Assess urinary function  - Encourage oral fluids to ensure adequate hydration if ordered  - Administer IV fluids as ordered to ensure adequate hydration  - Administer ordered medications as needed  - Offer frequent toileting  - Follow urinary retention protocol if ordered  Outcome: Progressing  Goal: Absence of urinary retention  Description: INTERVENTIONS:  - Assess patient’s ability to void and empty bladder  - Monitor I/O  - Bladder scan as needed  - Discuss with physician/AP medications to alleviate retention as needed  - Discuss catheterization for long term situations as appropriate  Outcome: Progressing     Problem: METABOLIC, FLUID AND ELECTROLYTES - ADULT  Goal: Electrolytes maintained within normal limits  Description: INTERVENTIONS:  - Monitor labs and assess patient for signs and symptoms of electrolyte imbalances  - Administer electrolyte replacement as ordered  - Monitor response to electrolyte  replacements, including repeat lab results as appropriate  - Instruct patient on fluid and nutrition as appropriate  Outcome: Progressing  Goal: Fluid balance maintained  Description: INTERVENTIONS:  - Monitor labs   - Monitor I/O and WT  - Instruct patient on fluid and nutrition as appropriate  - Assess for signs & symptoms of volume excess or deficit  Outcome: Progressing     Problem: HEMATOLOGIC - ADULT  Goal: Maintains hematologic stability  Description: INTERVENTIONS  - Assess for signs and symptoms of bleeding or hemorrhage  - Monitor labs  - Administer supportive blood products/factors as ordered and appropriate  Outcome: Progressing     Problem: Nutrition/Hydration-ADULT  Goal: Nutrient/Hydration intake appropriate for improving, restoring or maintaining nutritional needs  Description: Monitor and assess patient's nutrition/hydration status for malnutrition. Collaborate with interdisciplinary team and initiate plan and interventions as ordered.  Monitor patient's weight and dietary intake as ordered or per policy. Utilize nutrition screening tool and intervene as necessary. Determine patient's food preferences and provide high-protein, high-caloric foods as appropriate.     INTERVENTIONS:  - Monitor oral intake, urinary output, labs, and treatment plans  - Assess nutrition and hydration status and recommend course of action  - Evaluate amount of meals eaten  - Assist patient with eating if necessary   - Allow adequate time for meals  - Recommend/ encourage appropriate diets, oral nutritional supplements, and vitamin/mineral supplements  - Order, calculate, and assess calorie counts as needed  - Recommend, monitor, and adjust tube feedings and TPN/PPN based on assessed needs  - Assess need for intravenous fluids  - Provide specific nutrition/hydration education as appropriate  - Include patient/family/caregiver in decisions related to nutrition  Outcome: Progressing

## 2024-01-11 NOTE — CASE MANAGEMENT
Case Management Progress Note    Patient name Monika Butler  Location [address] [address]/[address] MRN [id number]  : 1940 Date 2024       LOS (days): 9  Geometric Mean LOS (GMLOS) (days): 5  Days to GMLOS:-3        OBJECTIVE:      Current admission status: Inpatient  Preferred Pharmacy:   [id number] - [address] [address]  Phone: [phone] Fax: [phone]    Primary Care Provider: Kristin Raymundo MD    Primary Insurance: [employer]  REP  Secondary Insurance:     PROGRESS NOTE:    Voice mail left for Ignacio Bahena director Maryann Espinoza [phone] at [phone] requesting call back to discuss patient's return. Contact information provided.

## 2024-01-11 NOTE — ASSESSMENT & PLAN NOTE
Troponin noted to be elevated at 24 on 1/3, repeated 1/4 elevated 300  Cardiology consulted  ECHO 1/5/24: EF 60-65%, systolic function normal, diastolic function mildly abnormal consistent with G1DD  Stress test canceled after discussion between cardiology and patient's family  Continue medical therapy with beta blocker and ARB  BP elevated, will increase patient's metoprolol to 75 mg daily

## 2024-01-11 NOTE — PROGRESS NOTES
[facility]  Progress Note  Name: Monika Butler I  MRN: [id number]  Unit/Bed#: [address] [place] [address] Date of Admission: 1/2/2024   Date of Service: 1/11/2024 I Hospital Day: 9    Assessment/Plan   * Dementia with behavioral disturbance (HCC)  Assessment & Plan  Patient has history of dementia with behavioral disturbances, worsening over past several months. Has been getting more agitated and refusing medications since day prior to admission. Prior facility said she needs a higher level of care and will not accept patient back.  UA showed moderate leukocytes and bacteria, completed ceftriaxone x 3 doses  Continue current home medications including aricept and zyprexa 2.5 mg hs  Patient was started at [facility], discontinued  Psychiatry consulted; recommending to avoid Ativan for sedation/agitation, decreased Zyprexa to 1.25 mg p.o. daily in the morning and Zyprexa 3.75 mg p.o. nightly  Patient very lethargic on 1/4, likely from agitation night prior  Stat CT of head: no acute intracranial abnormality but demonstrated stable marked chronic small vessel ischemic changes and stable 1.3 cm meningioma at the left posterior lateral middle cranial fossa  Supportive care  Mental status improving, behavior appears stable  Upgrade to mechanical soft diet and thin liquids per speech recommendations  Discussed with family and psychiatry, will do once a day dosing Zyprexa 5 mg, decrease melatonin to 3 mg and give these 2 medications around 1930 to see if we can get patient's wake sleep cycle more aligned to normal circadian rhythm.  Will also give patient's Aricept in the a.m. as psychiatry indicated this can add to patient's insomnia.  Monitor response  Avoid sedating medications  Patient continues to be overall pleasant and cooperative with intermittent agitation requiring redirection and occasional prn zyprexa  Started on zoloft 25 mg daily for depressive symptoms  Follow up repeat  EKG    Essential hypertension  Assessment & Plan  BP intermittently high, likely in setting of agitation  Continue home losartan 100 mg daily  Increased metoprolol to 75 mg daily    Elevated troponin  Assessment & Plan  Troponin noted to be elevated at 24 on 1/3, repeated 1/4 elevated 300  Cardiology consulted  ECHO 1/5/24: EF 60-65%, systolic function normal, diastolic function mildly abnormal consistent with G1DD  Stress test canceled after discussion between cardiology and patient's family  Continue medical therapy with beta blocker and ARB  BP elevated, will increase patient's metoprolol to 75 mg daily           VTE Pharmacologic Prophylaxis: VTE Score: 3 Moderate Risk (Score 3-4) - Pharmacological DVT Prophylaxis Ordered: enoxaparin (Lovenox).    Mobility:   Basic Mobility Inpatient Raw Score: 13  -HLM Goal: 4: Move to chair/commode  JH-HLM Achieved: 4: Move to chair/commode  HLM Goal achieved. Continue to encourage appropriate mobility.    Patient Centered Rounds: I performed bedside rounds with nursing staff today.   Discussions with Specialists or Other Care Team Provider: psych, rn, cm    Education and Discussions with Family / Patient:  will update family at bedside.     Total Time Spent on Date of Encounter in care of patient: 35 mins. This time was spent on one or more of the following: performing physical exam; counseling and coordination of care; obtaining or reviewing history; documenting in the medical record; reviewing/ordering tests, medications or procedures; communicating with other healthcare professionals and discussing with patient's family/caregivers.    Current Length of Stay: 9 day(s)  Current Patient Status: Inpatient   Certification Statement: The patient will continue to require additional inpatient hospital stay due to medication changes, monitor behavior  Discharge Plan: Anticipate discharge in 24-48 hrs to prior assisted or independent living facility.    Code Status: Level 1 - Full  Code    Subjective:   Patient seen and examined at bedside this morning. She was pleasant and cooperative. Talking about her children. RN reports patient was only agitated this morning when she had to use the restroom. She ate breakfast. Denies any complaints currently.    Discussed with psychiatry and family, they do not want patient to receive morphine ever.    Objective:     Vitals:   Temp (24hrs), Av.8 °F (36.6 °C), Min:97.8 °F (36.6 °C), Max:97.8 °F (36.6 °C)    Temp:  [97.8 °F (36.6 °C)] 97.8 °F (36.6 °C)  HR:  [64-68] 64  Resp:  [17-18] 18  BP: (165-177)/(89-94) 177/94  SpO2:  [97 %] 97 %  Body mass index is 21.37 kg/m².     Input and Output Summary (last 24 hours):     Intake/Output Summary (Last 24 hours) at 2024 1142  Last data filed at 2024 0915  Gross per 24 hour   Intake --   Output 300 ml   Net -300 ml       Physical Exam:   Physical Exam  Vitals and nursing note reviewed.   Constitutional:       General: She is not in acute distress.     Appearance: She is well-developed.   Cardiovascular:      Rate and Rhythm: Normal rate and regular rhythm.      Heart sounds: No murmur heard.  Pulmonary:      Effort: Pulmonary effort is normal. No respiratory distress.      Breath sounds: Normal breath sounds.   Abdominal:      Palpations: Abdomen is soft.      Tenderness: There is no abdominal tenderness.   Musculoskeletal:         General: No swelling.   Skin:     General: Skin is warm and dry.      Capillary Refill: Capillary refill takes less than 2 seconds.   Neurological:      Mental Status: She is alert. Mental status is at baseline.      Comments: Pleasantly confused   Psychiatric:         Mood and Affect: Mood normal.          Additional Data:     Labs:  Results from last 7 days   Lab Units 01/10/24  0420   WBC Thousand/uL 4.15*   HEMOGLOBIN g/dL 10.8*   HEMATOCRIT % 33.1*   PLATELETS Thousands/uL 200     Results from last 7 days   Lab Units 24  0445   SODIUM mmol/L 140   POTASSIUM  mmol/L 3.7   CHLORIDE mmol/L 104   CO2 mmol/L 29   BUN mg/dL 20   CREATININE mg/dL 0.78   ANION GAP mmol/L 7   CALCIUM mg/dL 9.4   GLUCOSE RANDOM mg/dL 103                       Lines/Drains:  Invasive Devices       Peripheral Intravenous Line  Duration             Peripheral IV 01/07/24 Right;Ventral (anterior) Forearm 4 days                          Imaging: No pertinent imaging reviewed.    Recent Cultures (last 7 days):         Last 24 Hours Medication List:   Current Facility-Administered Medications   Medication Dose Route Frequency Provider Last Rate    acetaminophen  650 mg Oral Q6H PRN Leyla Guardado PA-C      cholecalciferol  1,000 Units Oral Daily Leyla Guardado PA-C      docusate sodium  100 mg Oral BID Leyla Guardado PA-C      donepezil  10 mg Oral QAM DAMARIS Meza      enoxaparin  30 mg Subcutaneous Q24H [facility] DAMARIS Meza      labetalol  10 mg Intravenous Q6H PRN Leyla Guardado PA-C      lidocaine  1 patch Topical Daily DAMARIS Meza      loratadine  10 mg Oral Daily DAMARIS Meza      losartan  100 mg Oral Daily Leyla Guardado PA-C      melatonin  3 mg Oral Daily Inna Lazaro, DAMARIS      metoprolol succinate  75 mg Oral Daily Leyla Guardado PA-C      OLANZapine  1.25 mg Oral Q8H PRN Ash Silvestre DO      Or    OLANZapine  1.3 mg Intramuscular Q8H PRN Ash Silvestre DO      OLANZapine  5 mg Oral Daily DAMARIS Meza      ondansetron  4 mg Intravenous Q6H PRN Leyla Guardado PA-C      sertraline  25 mg Oral Daily Ash Silvestre DO          Today, Patient Was Seen By: Leyla Guardado PA-C    **Please Note: This note may have been constructed using a voice recognition system.**

## 2024-01-11 NOTE — PROGRESS NOTES
"Progress Note - Behavioral Health   Monika Butler 83 y.o. female MRN: [id number]  Unit/Bed#: [address] [place] [address] Encounter: [id number]          I came to see the patient for continuity of care.  Patient was minimally cooperative with interview today.  She appeared to be sleeping. She would awaken to answer questions and then would go back to sleep.  Denies having any physical complaints today.  She denies having suicidal or homicidal ideation, plan, or intent.    Received request to speak with reportedly the director of [facility].  Called patient's daughter Anitha and spoke with her.  Discussed the request and the patient's daughter provided verbal consent for me to discuss the patient's care.    Called Director of [facility] with number provided [phone] however after calling this number twice both times it said that the number was disconnected.  Reached out to primary team regarding contact for [place], who provided the number for Maryann Montiel, who reportedly is the .  Called and spoke to Maryann Montiel ([phone]). She verbalized having multiple concerns regarding the patient.  Stated that the patient had been reportedly \"violent, nasty, threatening\".  She states that upon the evaluation that they conducted yesterday that the patient is \"no different\".  She reported having concerns regarding her \"residents being at risk\".  She discussed whether benzodiazepine such as Xanax could be started for the patient.  Discussed concerns regarding these medications including multiple adverse side effects and that these medications are generally not recommended for treatment of neuropsychiatric symptoms of dementia. Questions regarding patient's psychiatric treatment were answered.      Behavior over the last 24 hours:  unchanged  Sleep: Unable to assess due to patient factors  Appetite: Unable to assess due to patient factors  Medication side effects: No physical complaints " verbalized  ROS: No physical complaints verbalized    Mental Status Evaluation:  Appearance:  appears stated age and laying in bed   Behavior:  calm and minimally cooperative   Speech:  scant   Mood:  Uncertain   Affect:  Irritable edge   Language: Unable to assess due to patient factors   Thought Process:  Unable to assess due to patient factors   Associations: Unable to assess due to patient factors   Thought Content:  Unable to assess due to patient factors   Perceptual Disturbances: Unable to assess due to patient factors   Risk Potential: Denies suicidal or homicidal ideation, plan, or intent   Sensorium:  Unable to assess due to patient factors   Memory:  Memory appears grossly impaired   Cognition:  Memory appears grossly impaired   Consciousness:  She appeared to be sleeping. She would awaken to answer questions and then would go back to sleep.     Attention: attention span appeared shorter than expected for age   Intellect: Unable to assess due to patient factors   Fund of Knowledge: Unable to assess due to patient factors   Insight:  poor   Judgment: poor   Muscle Strength and Tone: Not assessed   Gait/Station: Not assessed, patient in bed   Motor Activity: no abnormal movements         Assessment/Plan  Monika Butler is a 83 y.o. female with past medical history of dementia with psychosis, dementia with behavioral disturbance, hypertension who presented to the ED for altered mental status and is admitted for dementia with behavioral disturbance.  Psychiatric consultation was performed 1/3/2024 and was seen for follow-up - please see previous notes by me for more information. Patient received first dosage of Zoloft this morning. Patient was minimally cooperative with interview today.  She appeared to be sleeping. She would awaken to answer questions and then would go back to sleep.  Denies having any physical complaints today.  She denies having suicidal or homicidal ideation, plan, or intent. Received  request to speak with Stella from [facility] - called patient's daughter Anitha, discussed the request, and daughter provided verbal consent for me to discuss the patient's care.  Please see above regarding discussion with Maryann Montiel from [facility].  Will continue patient's current psychiatric medications at the current dosages.  There is no clear indication for inpatient psychiatric hospitalization at this time.    Diagnosis:  Dementia with behavioral disturbance    Recommended Treatment:   Continue medical management  Continue Zoloft 25 mg daily  Continue Zyprexa 5 mg qhs  Can continue current Zyprexa orders for prn severe agitation  Per chart patient refused EKG  Recommend trying again to obtain EKG for QTc monitoring  Generally it is recommended to maintain magnesium over 2 and potassium over 4  Caution use of antipsychotics with QTc >500 ms  In such a case would consider use of benzodiazepines for severe agitation if necessary rather than antipsychotics as benzodiazepines are not associated with QTc prolongation  Discussed with the primary team  Discussed with case management  Psychiatry will follow up as necessary.  Please contact with questions/for requested follow-up  For after hours and weekends please contact [facility] for psychiatric purposes         Medications: all current active meds have been reviewed and continue current psychiatric medications      Risks, benefits and possible side effects of Medications:   Risks, benefits, and possible side effects of medications have been previously discussed with the patient's daughter who verbalized understanding.        Labs: I have personally reviewed all pertinent laboratory results.     I have personally reviewed all pertinent laboratory/tests results.  Labs in last 72 hours:   Recent Labs     01/10/24  0420 01/11/24  0445   WBC 4.15*  --    RBC 3.25*  --    HGB 10.8*  --    HCT 33.1*  --      --    RDW 13.5  --    SODIUM 145 140   K 4.2 3.7    * 104   CO2 29 29   BUN 20 20   CREATININE 0.81 0.78   GLUC 91 103   CALCIUM 9.2 9.4             Ash Silvestre DO  01/11/24      This note has been constructed using a voice recognition system.    There may be translation, syntax,  or grammatical errors. If you have any questions, please contact the dictating provider.

## 2024-01-11 NOTE — NURSING NOTE
Report given to Maranda MUHAMMAD on [facility]. Pt belongings (clothing, glasses, stuffed animal sloth) sent with pt on bed.

## 2024-01-11 NOTE — ASSESSMENT & PLAN NOTE
Patient has history of dementia with behavioral disturbances, worsening over past several months. Has been getting more agitated and refusing medications since day prior to admission. Prior facility said she needs a higher level of care and will not accept patient back.  UA showed moderate leukocytes and bacteria, completed ceftriaxone x 3 doses  Continue current home medications including aricept and zyprexa 2.5 mg hs  Patient was started at [facility], discontinued  Psychiatry consulted; recommending to avoid Ativan for sedation/agitation, decreased Zyprexa to 1.25 mg p.o. daily in the morning and Zyprexa 3.75 mg p.o. nightly  Patient very lethargic on 1/4, likely from agitation night prior  Stat CT of head: no acute intracranial abnormality but demonstrated stable marked chronic small vessel ischemic changes and stable 1.3 cm meningioma at the left posterior lateral middle cranial fossa  Supportive care  Mental status improving, behavior appears stable  Upgrade to mechanical soft diet and thin liquids per speech recommendations  Discussed with family and psychiatry, will do once a day dosing Zyprexa 5 mg, decrease melatonin to 3 mg and give these 2 medications around 1930 to see if we can get patient's wake sleep cycle more aligned to normal circadian rhythm.  Will also give patient's Aricept in the a.m. as psychiatry indicated this can add to patient's insomnia.  Monitor response  Avoid sedating medications  Patient continues to be overall pleasant and cooperative with intermittent agitation requiring redirection and occasional prn zyprexa  Started on zoloft 25 mg daily for depressive symptoms  Follow up repeat EKG

## 2024-01-12 PROCEDURE — 99232 SBSQ HOSP IP/OBS MODERATE 35: CPT | Performed by: NURSE PRACTITIONER

## 2024-01-12 PROCEDURE — 92526 ORAL FUNCTION THERAPY: CPT

## 2024-01-12 RX ADMIN — Medication 3 MG: at 21:30

## 2024-01-12 RX ADMIN — ENOXAPARIN SODIUM 30 MG: 30 INJECTION SUBCUTANEOUS at 10:43

## 2024-01-12 RX ADMIN — OLANZAPINE 1.3 MG: 10 INJECTION, POWDER, FOR SOLUTION INTRAMUSCULAR at 21:32

## 2024-01-12 RX ADMIN — LIDOCAINE 1 PATCH: 700 PATCH TOPICAL at 10:46

## 2024-01-12 NOTE — PLAN OF CARE
Problem: Potential for Falls  Goal: Patient will remain free of falls  Description: INTERVENTIONS:  - Educate patient/family on patient safety including physical limitations  - Instruct patient to call for assistance with activity   - Consult OT/PT to assist with strengthening/mobility   - Keep Call bell within reach  - Keep bed low and locked with side rails adjusted as appropriate  - Keep care items and personal belongings within reach  - Initiate and maintain comfort rounds  - Make Fall Risk Sign visible to staff  - Offer Toileting every 3 Hours, in advance of need  - Initiate/Maintain bed/chair alarm  - Obtain necessary fall risk management equipment: bed/chair alarm  - Apply yellow socks and bracelet for high fall risk patients  - Consider moving patient to room near nurses station  Outcome: Progressing     Problem: Prexisting or High Potential for Compromised Skin Integrity  Goal: Skin integrity is maintained or improved  Description: INTERVENTIONS:  - Identify patients at risk for skin breakdown  - Assess and monitor skin integrity  - Assess and monitor nutrition and hydration status  - Monitor labs   - Assess for incontinence   - Turn and reposition patient  - Assist with mobility/ambulation  - Relieve pressure over bony prominences  - Avoid friction and shearing  - Provide appropriate hygiene as needed including keeping skin clean and dry  - Evaluate need for skin moisturizer/barrier cream  - Collaborate with interdisciplinary team   - Patient/family teaching  - Consider wound care consult   Outcome: Progressing     Problem: PAIN - ADULT  Goal: Verbalizes/displays adequate comfort level or baseline comfort level  Description: Interventions:  - Encourage patient to monitor pain and request assistance  - Assess pain using appropriate pain scale  - Administer analgesics based on type and severity of pain and evaluate response  - Implement non-pharmacological measures as appropriate and evaluate response  -  Consider cultural and social influences on pain and pain management  - Notify physician/advanced practitioner if interventions unsuccessful or patient reports new pain  Outcome: Progressing     Problem: INFECTION - ADULT  Goal: Absence or prevention of progression during hospitalization  Description: INTERVENTIONS:  - Assess and monitor for signs and symptoms of infection  - Monitor lab/diagnostic results  - Monitor all insertion sites, i.e. indwelling lines, tubes, and drains  - Administer medications as ordered  - Instruct and encourage patient and family to use good hand hygiene technique  - Identify and instruct in appropriate isolation precautions for identified infection/condition  Outcome: Progressing  Goal: Absence of fever/infection during neutropenic period  Description: INTERVENTIONS:  - Monitor WBC    Outcome: Progressing     Problem: DISCHARGE PLANNING  Goal: Discharge to home or other facility with appropriate resources  Description: INTERVENTIONS:  - Identify barriers to discharge w/patient and caregiver  - Arrange for needed discharge resources and transportation as appropriate  - Identify discharge learning needs (meds, wound care, etc.)  - Refer to Case Management Department for coordinating discharge planning if the patient needs post-hospital services based on physician/advanced practitioner order or complex needs related to functional status, cognitive ability, or social support system  Outcome: Progressing     Problem: Knowledge Deficit  Goal: Patient/family/caregiver demonstrates understanding of disease process, treatment plan, medications, and discharge instructions  Description: Complete learning assessment and assess knowledge base.  Interventions:  - Provide teaching at level of understanding  - Provide teaching via preferred learning methods  Outcome: Progressing     Problem: NEUROSENSORY - ADULT  Goal: Achieves stable or improved neurological status  Description: INTERVENTIONS  -  Monitor and report changes in neurological status  - Monitor vital signs such as temperature, blood pressure, glucose, and any other labs ordered   - Initiate measures to prevent increased intracranial pressure  - Monitor for seizure activity and implement precautions if appropriate      Outcome: Progressing  Goal: Achieves maximal functionality and self care  Description: INTERVENTIONS  - Monitor swallowing and airway patency with patient fatigue and changes in neurological status  - Encourage and assist patient to increase activity and self care.   - Encourage visually impaired, hearing impaired and aphasic patients to use assistive/communication devices  Outcome: Progressing     Problem: GENITOURINARY - ADULT  Goal: Maintains or returns to baseline urinary function  Description: INTERVENTIONS:  - Assess urinary function  - Encourage oral fluids to ensure adequate hydration if ordered  - Administer IV fluids as ordered to ensure adequate hydration  - Administer ordered medications as needed  - Offer frequent toileting  - Follow urinary retention protocol if ordered  Outcome: Progressing  Goal: Absence of urinary retention  Description: INTERVENTIONS:  - Assess patient's ability to void and empty bladder  - Monitor I/O  - Bladder scan as needed  - Discuss with physician/AP medications to alleviate retention as needed  - Discuss catheterization for long term situations as appropriate  Outcome: Progressing     Problem: METABOLIC, FLUID AND ELECTROLYTES - ADULT  Goal: Electrolytes maintained within normal limits  Description: INTERVENTIONS:  - Monitor labs and assess patient for signs and symptoms of electrolyte imbalances  - Administer electrolyte replacement as ordered  - Monitor response to electrolyte replacements, including repeat lab results as appropriate  - Instruct patient on fluid and nutrition as appropriate  Outcome: Progressing  Goal: Fluid balance maintained  Description: INTERVENTIONS:  - Monitor labs    - Monitor I/O and WT  - Instruct patient on fluid and nutrition as appropriate  - Assess for signs & symptoms of volume excess or deficit  Outcome: Progressing     Problem: HEMATOLOGIC - ADULT  Goal: Maintains hematologic stability  Description: INTERVENTIONS  - Assess for signs and symptoms of bleeding or hemorrhage  - Monitor labs  - Administer supportive blood products/factors as ordered and appropriate  Outcome: Progressing     Problem: Nutrition/Hydration-ADULT  Goal: Nutrient/Hydration intake appropriate for improving, restoring or maintaining nutritional needs  Description: Monitor and assess patient's nutrition/hydration status for malnutrition. Collaborate with interdisciplinary team and initiate plan and interventions as ordered.  Monitor patient's weight and dietary intake as ordered or per policy. Utilize nutrition screening tool and intervene as necessary. Determine patient's food preferences and provide high-protein, high-caloric foods as appropriate.     INTERVENTIONS:  - Monitor oral intake, urinary output, labs, and treatment plans  - Assess nutrition and hydration status and recommend course of action  - Evaluate amount of meals eaten  - Assist patient with eating if necessary   - Allow adequate time for meals  - Recommend/ encourage appropriate diets, oral nutritional supplements, and vitamin/mineral supplements  - Assess need for intravenous fluids  - Provide specific nutrition/hydration education as appropriate  - Include patient/family/caregiver in decisions related to nutrition  Outcome: Progressing

## 2024-01-12 NOTE — PROGRESS NOTES
[facility]  Progress Note  Name: Monika Butler I  MRN: [id number]  Unit/Bed#: [address] [place] [address] Date of Admission: 1/2/2024   Date of Service: 1/12/2024 I Hospital Day: 10    Assessment/Plan   * Dementia with behavioral disturbance (HCC)  Assessment & Plan  Patient has history of dementia with behavioral disturbances, worsening over past several months. Has been getting more agitated and refusing medications since day prior to admission. Prior facility said she needs a higher level of care and will not accept patient back.  UA showed moderate leukocytes and bacteria, completed ceftriaxone x 3 doses  Continue current home medications including aricept and zyprexa 2.5 mg hs  Patient was started at [facility], discontinued  Psychiatry consulted; recommending to avoid Ativan for sedation/agitation, decreased Zyprexa to 1.25 mg p.o. daily in the morning and Zyprexa 3.75 mg p.o. nightly  Patient very lethargic on 1/4, likely from agitation night prior  Stat CT of head: no acute intracranial abnormality but demonstrated stable marked chronic small vessel ischemic changes and stable 1.3 cm meningioma at the left posterior lateral middle cranial fossa  Supportive care  Upgrade to mechanical soft diet and thin liquids per speech recommendations  Discussed with family and psychiatry, will do once a day dosing Zyprexa 5 mg, decrease melatonin to 3 mg and give these 2 medications around 1930 to see if we can get patient's wake sleep cycle more aligned to normal circadian rhythm.  Will also give patient's Aricept in the a.m. as psychiatry indicated this can add to patient's insomnia.  Monitor response  Avoid sedating medications  Patient continues to be overall pleasant and cooperative with intermittent agitation requiring redirection and occasional prn zyprexa  Started on zoloft 25 mg daily for depressive symptoms  Follow up repeat EKG    Elevated troponin  Assessment & Plan  Troponin noted to  be elevated at 24 on 1/3, repeated 1/4 elevated 300  Cardiology consulted  ECHO 1/5/24: EF 60-65%, systolic function normal, diastolic function mildly abnormal consistent with G1DD  Stress test canceled after discussion between cardiology and patient's family  Continue medical therapy with beta blocker and ARB  BP elevated, will increase patient's metoprolol to 75 mg daily  Monitor     Essential hypertension  Assessment & Plan  BP intermittently high, likely in setting of agitation  Continue home losartan 100 mg daily  Increased metoprolol to 75 mg daily               VTE Pharmacologic Prophylaxis: VTE Score: 3 Moderate Risk (Score 3-4) - Pharmacological DVT Prophylaxis Ordered: enoxaparin (Lovenox).    Mobility:   Basic Mobility Inpatient Raw Score: 13  -HLM Goal: 4: Move to chair/commode  JH-HLM Achieved: 2: Bed activities/Dependent transfer  HLM Goal NOT achieved. Continue with multidisciplinary rounding and encourage appropriate mobility to improve upon HLM goals.    Patient Centered Rounds: I performed bedside rounds with nursing staff today.   Discussions with Specialists or Other Care Team Provider: multidisciplinary team     Education and Discussions with Family / Patient: Updated  (sister) at bedside. Other sister and son in law on speaker phone     Total Time Spent on Date of Encounter in care of patient:   Greater than 45 mins. This time was spent on one or more of the following: performing physical exam; counseling and coordination of care; obtaining or reviewing history; documenting in the medical record; reviewing/ordering tests, medications or procedures; communicating with other healthcare professionals and discussing with patient's family/caregivers.    Current Length of Stay: 10 day(s)  Current Patient Status: Inpatient   Certification Statement: The patient will continue to require additional inpatient hospital stay due to medication adjustment, monitor response; try to normalize  wake sleep cycle   Discharge Plan: Anticipate discharge in 24-48 hrs to prior assisted or independent living facility.    Code Status: Level 1 - Full Code    Subjective:   Patient seen laying in bed very somnolent, awakens when spoken to very loudly, opens eyes and says what?  And then closes eyes and returns to sleep.  Nursing staff reported that patient had a rough night, was agitated and up most of the night.    Objective:     Vitals:   Temp (24hrs), Av.4 °F (36.3 °C), Min:97.1 °F (36.2 °C), Max:97.7 °F (36.5 °C)    Temp:  [97.1 °F (36.2 °C)-97.7 °F (36.5 °C)] 97.1 °F (36.2 °C)  HR:  [56-58] 58  Resp:  [16] 16  BP: (152-168)/(73-99) 152/73  SpO2:  [94 %-96 %] 94 %  Body mass index is 21.37 kg/m².     Input and Output Summary (last 24 hours):   No intake or output data in the 24 hours ending 24 0068    Physical Exam:   Physical Exam  Vitals and nursing note reviewed.   Constitutional:       General: She is not in acute distress.     Comments: Patient very somnolent; nursing reported patient had a rough night, agitated, not sleeping    HENT:      Head: Normocephalic.      Mouth/Throat:      Mouth: Mucous membranes are dry.   Eyes:      Extraocular Movements: Extraocular movements intact.      Conjunctiva/sclera: Conjunctivae normal.      Comments: Patient did open eyes when spoken to loudly   Cardiovascular:      Rate and Rhythm: Normal rate.      Pulses: Normal pulses.   Pulmonary:      Effort: Pulmonary effort is normal. No respiratory distress.      Breath sounds: No wheezing.   Abdominal:      General: Bowel sounds are normal. There is no distension.      Palpations: Abdomen is soft.   Genitourinary:     Comments: Voiding spontaneously   Musculoskeletal:      Comments: Generalized deconditioning    Skin:     General: Skin is warm and dry.      Capillary Refill: Capillary refill takes less than 2 seconds.      Coloration: Skin is pale.   Neurological:      Comments: Very somnolent; opens eyes when  "spoken to, says \"What?\" Then returns to sleep.           Additional Data:     Labs:  Results from last 7 days   Lab Units 01/10/24  0420   WBC Thousand/uL 4.15*   HEMOGLOBIN g/dL 10.8*   HEMATOCRIT % 33.1*   PLATELETS Thousands/uL 200     Results from last 7 days   Lab Units 01/11/24  0445   SODIUM mmol/L 140   POTASSIUM mmol/L 3.7   CHLORIDE mmol/L 104   CO2 mmol/L 29   BUN mg/dL 20   CREATININE mg/dL 0.78   ANION GAP mmol/L 7   CALCIUM mg/dL 9.4   GLUCOSE RANDOM mg/dL 103                       Lines/Drains:  Invasive Devices       Peripheral Intravenous Line  Duration             Peripheral IV 01/07/24 Right;Ventral (anterior) Forearm 5 days                          Imaging: No pertinent imaging reviewed.    Recent Cultures (last 7 days):         Last 24 Hours Medication List:   Current Facility-Administered Medications   Medication Dose Route Frequency Provider Last Rate    acetaminophen  650 mg Oral Q6H PRN Leyla Guardado PA-C      cholecalciferol  1,000 Units Oral Daily Leyla Guardado PA-C      docusate sodium  100 mg Oral BID Leyla Guardado PA-C      donepezil  10 mg Oral QAM DAMARIS Meza      enoxaparin  30 mg Subcutaneous Q24H [facility] DAMARIS Meza      labetalol  10 mg Intravenous Q6H PRN Leyla Guardado PA-C      lidocaine  1 patch Topical Daily DAMARIS Meza      loratadine  10 mg Oral Daily DAMARIS Meza      losartan  100 mg Oral Daily Leyla Guardado PA-C      melatonin  3 mg Oral Daily DAMARIS Meza      metoprolol succinate  75 mg Oral Daily Leyla Guardado PA-C      OLANZapine  1.25 mg Oral Q8H PRN Ash Silvestre DO      Or    OLANZapine  1.3 mg Intramuscular Q8H PRN Ash Silvestre DO      OLANZapine  5 mg Oral Daily DAMARIS Meza      ondansetron  4 mg Intravenous Q6H PRN Leyla Guardado PA-C      sertraline  25 mg Oral Daily Ash Silvestre DO          Today, Patient Was Seen By: Inna Lazaro, " CRNP    **Please Note: This note may have been constructed using a voice recognition system.**

## 2024-01-12 NOTE — ASSESSMENT & PLAN NOTE
Troponin noted to be elevated at 24 on 1/3, repeated 1/4 elevated 300  Cardiology consulted  ECHO 1/5/24: EF 60-65%, systolic function normal, diastolic function mildly abnormal consistent with G1DD  Stress test canceled after discussion between cardiology and patient's family  Continue medical therapy with beta blocker and ARB  BP elevated, will increase patient's metoprolol to 75 mg daily  Monitor

## 2024-01-12 NOTE — SPEECH THERAPY NOTE
SLP Progress Note    Patient Name: Monika Butler  Today's Date: 1/12/2024     Problem List  Principal Problem:    Dementia with behavioral disturbance (HCC)  Active Problems:    Essential hypertension    Elevated troponin    Subjective:  Lethargic;  required persistent stimulation and encouragement to take small amounts.     Objective:  Pt was seen for diagnostic dysphagia therapy to ensure tolerance of current diet (NCC3 thin liquid) given presence of lethargy.  Pt was positioned upright and fed slowly    Pt was assessed with small amounts of yogurt and coffee. Bolus formation and transfer were effective.  Swallow initiation appeared prompt. Laryngeal rise was good by palpation. No coughing, throat clearing, c/o stasis, multiple swallows, change in vocal quality noted c po intake today.     Assessment:  MS varies (dementia, changes in environment/sleep/medications) with risk for aspiration if too sleepy.  No s/s aspiration with yogurt or individual sips of thin liquid this am.    Plan/Recommendations:  Continue diet, continue cautious feedings/aspiration precautions.   SLP to continue f/u min of 2x weekly while hospitalized.     Vesta Dumont MS [facility]-SLP  [place] License [id number]

## 2024-01-12 NOTE — ASSESSMENT & PLAN NOTE
Patient has history of dementia with behavioral disturbances, worsening over past several months. Has been getting more agitated and refusing medications since day prior to admission. Prior facility said she needs a higher level of care and will not accept patient back.  UA showed moderate leukocytes and bacteria, completed ceftriaxone x 3 doses  Continue current home medications including aricept and zyprexa 2.5 mg hs  Patient was started at [facility], discontinued  Psychiatry consulted; recommending to avoid Ativan for sedation/agitation, decreased Zyprexa to 1.25 mg p.o. daily in the morning and Zyprexa 3.75 mg p.o. nightly  Patient very lethargic on 1/4, likely from agitation night prior  Stat CT of head: no acute intracranial abnormality but demonstrated stable marked chronic small vessel ischemic changes and stable 1.3 cm meningioma at the left posterior lateral middle cranial fossa  Supportive care  Upgrade to mechanical soft diet and thin liquids per speech recommendations  Discussed with family and psychiatry, will do once a day dosing Zyprexa 5 mg, decrease melatonin to 3 mg and give these 2 medications around 1930 to see if we can get patient's wake sleep cycle more aligned to normal circadian rhythm.  Will also give patient's Aricept in the a.m. as psychiatry indicated this can add to patient's insomnia.  Monitor response  Avoid sedating medications  Patient continues to be overall pleasant and cooperative with intermittent agitation requiring redirection and occasional prn zyprexa  Started on zoloft 25 mg daily for depressive symptoms  Follow up repeat EKG

## 2024-01-13 LAB
ANION GAP SERPL CALCULATED.3IONS-SCNC: 7 MMOL/L
BUN SERPL-MCNC: 26 MG/DL (ref 5–25)
CALCIUM SERPL-MCNC: 9.4 MG/DL (ref 8.4–10.2)
CHLORIDE SERPL-SCNC: 102 MMOL/L (ref 96–108)
CO2 SERPL-SCNC: 32 MMOL/L (ref 21–32)
CREAT SERPL-MCNC: 0.93 MG/DL (ref 0.6–1.3)
ERYTHROCYTE [DISTWIDTH] IN BLOOD BY AUTOMATED COUNT: 13.2 % (ref 11.6–15.1)
GFR SERPL CREATININE-BSD FRML MDRD: 56 ML/MIN/1.73SQ M
GLUCOSE SERPL-MCNC: 96 MG/DL (ref 65–140)
HCT VFR BLD AUTO: 35.8 % (ref 34.8–46.1)
HGB BLD-MCNC: 11.9 G/DL (ref 11.5–15.4)
MCH RBC QN AUTO: 33.1 PG (ref 26.8–34.3)
MCHC RBC AUTO-ENTMCNC: 33.2 G/DL (ref 31.4–37.4)
MCV RBC AUTO: 100 FL (ref 82–98)
PLATELET # BLD AUTO: 224 THOUSANDS/UL (ref 149–390)
PMV BLD AUTO: 11.3 FL (ref 8.9–12.7)
POTASSIUM SERPL-SCNC: 3.6 MMOL/L (ref 3.5–5.3)
RBC # BLD AUTO: 3.59 MILLION/UL (ref 3.81–5.12)
SODIUM SERPL-SCNC: 141 MMOL/L (ref 135–147)
WBC # BLD AUTO: 4.16 THOUSAND/UL (ref 4.31–10.16)

## 2024-01-13 PROCEDURE — 85027 COMPLETE CBC AUTOMATED: CPT | Performed by: NURSE PRACTITIONER

## 2024-01-13 PROCEDURE — 99232 SBSQ HOSP IP/OBS MODERATE 35: CPT | Performed by: NURSE PRACTITIONER

## 2024-01-13 PROCEDURE — 80048 BASIC METABOLIC PNL TOTAL CA: CPT | Performed by: NURSE PRACTITIONER

## 2024-01-13 RX ORDER — HALOPERIDOL 5 MG/ML
2 INJECTION INTRAMUSCULAR ONCE
Status: DISCONTINUED | OUTPATIENT
Start: 2024-01-13 | End: 2024-01-15

## 2024-01-13 RX ADMIN — ENOXAPARIN SODIUM 30 MG: 30 INJECTION SUBCUTANEOUS at 12:17

## 2024-01-13 RX ADMIN — DONEPEZIL HYDROCHLORIDE 10 MG: 5 TABLET ORAL at 11:48

## 2024-01-13 RX ADMIN — OLANZAPINE 1.25 MG: 2.5 TABLET, FILM COATED ORAL at 17:05

## 2024-01-13 RX ADMIN — Medication 3 MG: at 21:35

## 2024-01-13 RX ADMIN — METOPROLOL SUCCINATE 75 MG: 50 TABLET, EXTENDED RELEASE ORAL at 11:48

## 2024-01-13 RX ADMIN — OLANZAPINE 5 MG: 2.5 TABLET, FILM COATED ORAL at 09:20

## 2024-01-13 RX ADMIN — SERTRALINE 25 MG: 25 TABLET, FILM COATED ORAL at 11:48

## 2024-01-13 RX ADMIN — DOCUSATE SODIUM 100 MG: 100 CAPSULE, LIQUID FILLED ORAL at 17:05

## 2024-01-13 RX ADMIN — Medication 1000 UNITS: at 11:47

## 2024-01-13 RX ADMIN — LOSARTAN POTASSIUM 100 MG: 50 TABLET, FILM COATED ORAL at 11:48

## 2024-01-13 NOTE — ASSESSMENT & PLAN NOTE
Troponin noted to be elevated at 24 on 1/3, repeated 1/4 elevated 300  Cardiology consulted  ECHO 1/5/24: EF 60-65%, systolic function normal, diastolic function mildly abnormal consistent with G1DD  Stress test canceled after discussion between cardiology and patient's family  Continue medical therapy with beta blocker and ARB  BP elevated, increased patient's metoprolol to 75 mg daily  Monitor

## 2024-01-13 NOTE — PROGRESS NOTES
[facility]  Progress Note  Name: Monika Butler I  MRN: [id number]  Unit/Bed#: [address] [place] [address] Date of Admission: 1/2/2024   Date of Service: 1/13/2024 I Hospital Day: 11    Assessment/Plan   * Dementia with behavioral disturbance (HCC)  Assessment & Plan  Patient has history of dementia with behavioral disturbances, worsening over past several months. Has been getting more agitated and refusing medications since day prior to admission. Prior facility said she needs a higher level of care and will not accept patient back.  UA showed moderate leukocytes and bacteria, completed ceftriaxone x 3 doses  Continue current home medications including aricept and zyprexa 2.5 mg hs  Patient was started at [facility], discontinued  Psychiatry consulted; recommending to avoid Ativan for sedation/agitation, decreased Zyprexa to 1.25 mg p.o. daily in the morning and Zyprexa 3.75 mg p.o. nightly  Patient very lethargic on 1/4, likely from agitation night prior  Stat CT of head: no acute intracranial abnormality but demonstrated stable marked chronic small vessel ischemic changes and stable 1.3 cm meningioma at the left posterior lateral middle cranial fossa  Supportive care  Upgrade to mechanical soft diet and thin liquids per speech recommendations  Discussed with family and psychiatry, will do once a day dosing Zyprexa 5 mg, decrease melatonin to 3 mg and give these 2 medications around 1930 to see if we can get patient's wake sleep cycle more aligned to normal circadian rhythm.  Will also give patient's Aricept in the a.m. as psychiatry indicated this can add to patient's insomnia.  Monitor response  Avoid sedating medications  Patient continues to be overall pleasant and cooperative with intermittent agitation requiring redirection and occasional prn zyprexa  Started on zoloft 25 mg daily for depressive symptoms  Follow up repeat EKG am    Elevated troponin  Assessment & Plan  Troponin noted  to be elevated at 24 on 1/3, repeated 1/4 elevated 300  Cardiology consulted  ECHO 1/5/24: EF 60-65%, systolic function normal, diastolic function mildly abnormal consistent with G1DD  Stress test canceled after discussion between cardiology and patient's family  Continue medical therapy with beta blocker and ARB  BP elevated, increased patient's metoprolol to 75 mg daily  Monitor     Essential hypertension  Assessment & Plan  BP intermittently high, likely in setting of agitation  Continue home losartan 100 mg daily  Increased metoprolol to 75 mg daily               VTE Pharmacologic Prophylaxis: VTE Score: 3 Moderate Risk (Score 3-4) - Pharmacological DVT Prophylaxis Ordered: enoxaparin (Lovenox).    Mobility:   Basic Mobility Inpatient Raw Score: 13  -HLM Goal: 4: Move to chair/commode  JH-HLM Achieved: 6: Walk 10 steps or more  HLM Goal achieved. Continue to encourage appropriate mobility.    Patient Centered Rounds: I performed bedside rounds with nursing staff today.   Discussions with Specialists or Other Care Team Provider: case management     Education and Discussions with Family / Patient: Updated  (daughter) via phone.    Total Time Spent on Date of Encounter in care of patient: greater than 45  mins. This time was spent on one or more of the following: performing physical exam; counseling and coordination of care; obtaining or reviewing history; documenting in the medical record; reviewing/ordering tests, medications or procedures; communicating with other healthcare professionals and discussing with patient's family/caregivers.    Current Length of Stay: 11 day(s)  Current Patient Status: Inpatient   Certification Statement: The patient will continue to require additional inpatient hospital stay due to medication adjustment; Clifford Bahena to re-assess patient on Monday as per discussion with case management  Discharge Plan: Anticipate discharge in 24-48 hrs to prior assisted or  independent living facility.    Code Status: Level 1 - Full Code    Subjective:   Patient seen in the morning, was very angry, cursing at staff.  Time spent providing verbal reassurance and patient did calm down.  Patient seen later on rounds in the afternoon very pleasant cooperative, smiling and interactive with staff.  Denies any pain.  Is looking forward to having some spaghetti for dinner.    Objective:     Vitals:   Temp (24hrs), Av.2 °F (36.8 °C), Min:97.5 °F (36.4 °C), Max:99 °F (37.2 °C)    Temp:  [97.5 °F (36.4 °C)-99 °F (37.2 °C)] 98 °F (36.7 °C)  HR:  [66-85] 66  Resp:  [16-20] 16  BP: (101-130)/(55-71) 118/70  SpO2:  [92 %-96 %] 95 %  Body mass index is 21.37 kg/m².     Input and Output Summary (last 24 hours):   No intake or output data in the 24 hours ending 24 1610    Physical Exam:   Physical Exam  Vitals and nursing note reviewed.   Constitutional:       General: She is not in acute distress.     Comments: Patient initially sleeping, woke up when spoken to   HENT:      Head: Normocephalic.      Nose: Nose normal.      Mouth/Throat:      Mouth: Mucous membranes are dry.   Eyes:      Extraocular Movements: Extraocular movements intact.      Conjunctiva/sclera: Conjunctivae normal.   Cardiovascular:      Rate and Rhythm: Normal rate.      Pulses: Normal pulses.   Pulmonary:      Effort: Pulmonary effort is normal.      Comments: Diminished at the bases   Abdominal:      General: Bowel sounds are normal. There is no distension.      Palpations: Abdomen is soft.      Tenderness: There is no abdominal tenderness.   Genitourinary:     Comments: Voiding spontaneously   Musculoskeletal:         General: Normal range of motion.      Cervical back: Normal range of motion.      Right lower leg: No edema.      Left lower leg: No edema.      Comments: Generalized deconditioning    Skin:     Capillary Refill: Capillary refill takes less than 2 seconds.   Neurological:      Comments: Patient refused to  participate in exam; no focal deficits noted.   Psychiatric:         Mood and Affect: Affect is labile and angry.         Behavior: Behavior is uncooperative and agitated.         Judgment: Judgment is impulsive.          Additional Data:     Labs:  Results from last 7 days   Lab Units 01/13/24  0607   WBC Thousand/uL 4.16*   HEMOGLOBIN g/dL 11.9   HEMATOCRIT % 35.8   PLATELETS Thousands/uL 224     Results from last 7 days   Lab Units 01/13/24  0607   SODIUM mmol/L 141   POTASSIUM mmol/L 3.6   CHLORIDE mmol/L 102   CO2 mmol/L 32   BUN mg/dL 26*   CREATININE mg/dL 0.93   ANION GAP mmol/L 7   CALCIUM mg/dL 9.4   GLUCOSE RANDOM mg/dL 96                       Lines/Drains:  Invasive Devices       Peripheral Intravenous Line  Duration             Peripheral IV 01/13/24 Proximal;Right;Ventral (anterior) Forearm <1 day                          Imaging: No pertinent imaging reviewed.    Recent Cultures (last 7 days):         Last 24 Hours Medication List:   Current Facility-Administered Medications   Medication Dose Route Frequency Provider Last Rate    acetaminophen  650 mg Oral Q6H PRN Leyla Guardado PA-C      cholecalciferol  1,000 Units Oral Daily Leyla Guardado PA-C      docusate sodium  100 mg Oral BID Leyla Guardado PA-C      donepezil  10 mg Oral QAM DAMARIS Meza      enoxaparin  30 mg Subcutaneous Q24H [facility] DAMARIS Meza      labetalol  10 mg Intravenous Q6H PRN Leyla Guardado PA-C      lidocaine  1 patch Topical Daily DAMARIS Meza      loratadine  10 mg Oral Daily DAMARIS Meza      losartan  100 mg Oral Daily Leyla Guardado PA-C      melatonin  3 mg Oral Daily DAMARIS Meza      metoprolol succinate  75 mg Oral Daily Leyla Guardado PA-C      OLANZapine  1.25 mg Oral Q8H PRN Ash Silvestre DO      Or    OLANZapine  1.3 mg Intramuscular Q8H PRN Ash Silvestre DO      OLANZapine  5 mg Oral Daily DAMARIS Meza       ondansetron  4 mg Intravenous Q6H PRN Leyla Guardado PA-C      sertraline  25 mg Oral Daily Ahs Silvestre DO          Today, Patient Was Seen By: DAMARIS Meza    **Please Note: This note may have been constructed using a voice recognition system.**

## 2024-01-13 NOTE — ASSESSMENT & PLAN NOTE
Patient has history of dementia with behavioral disturbances, worsening over past several months. Has been getting more agitated and refusing medications since day prior to admission. Prior facility said she needs a higher level of care and will not accept patient back.  UA showed moderate leukocytes and bacteria, completed ceftriaxone x 3 doses  Continue current home medications including aricept and zyprexa 2.5 mg hs  Patient was started at [facility] [place], discontinued  Psychiatry consulted; recommending to avoid Ativan for sedation/agitation, decreased Zyprexa to 1.25 mg p.o. daily in the morning and Zyprexa 3.75 mg p.o. nightly  Patient very lethargic on 1/4, likely from agitation night prior  Stat CT of head: no acute intracranial abnormality but demonstrated stable marked chronic small vessel ischemic changes and stable 1.3 cm meningioma at the left posterior lateral middle cranial fossa  Supportive care  Upgrade to mechanical soft diet and thin liquids per speech recommendations  Discussed with family and psychiatry, will do once a day dosing Zyprexa 5 mg, decrease melatonin to 3 mg and give these 2 medications around 1930 to see if we can get patient's wake sleep cycle more aligned to normal circadian rhythm.  Will also give patient's Aricept in the a.m. as psychiatry indicated this can add to patient's insomnia.  Monitor response  Avoid sedating medications  Patient continues to be overall pleasant and cooperative with intermittent agitation requiring redirection and occasional prn zyprexa  Started on zoloft 25 mg daily for depressive symptoms  Follow up repeat EKG am

## 2024-01-14 LAB
ANION GAP SERPL CALCULATED.3IONS-SCNC: 7 MMOL/L
BUN SERPL-MCNC: 29 MG/DL (ref 5–25)
CALCIUM SERPL-MCNC: 9.5 MG/DL (ref 8.4–10.2)
CHLORIDE SERPL-SCNC: 102 MMOL/L (ref 96–108)
CO2 SERPL-SCNC: 30 MMOL/L (ref 21–32)
CREAT SERPL-MCNC: 0.89 MG/DL (ref 0.6–1.3)
ERYTHROCYTE [DISTWIDTH] IN BLOOD BY AUTOMATED COUNT: 13.3 % (ref 11.6–15.1)
GFR SERPL CREATININE-BSD FRML MDRD: 59 ML/MIN/1.73SQ M
GLUCOSE SERPL-MCNC: 91 MG/DL (ref 65–140)
HCT VFR BLD AUTO: 36.2 % (ref 34.8–46.1)
HGB BLD-MCNC: 11.6 G/DL (ref 11.5–15.4)
MCH RBC QN AUTO: 32.6 PG (ref 26.8–34.3)
MCHC RBC AUTO-ENTMCNC: 32 G/DL (ref 31.4–37.4)
MCV RBC AUTO: 102 FL (ref 82–98)
PLATELET # BLD AUTO: 232 THOUSANDS/UL (ref 149–390)
PMV BLD AUTO: 12.1 FL (ref 8.9–12.7)
POTASSIUM SERPL-SCNC: 3.7 MMOL/L (ref 3.5–5.3)
RBC # BLD AUTO: 3.56 MILLION/UL (ref 3.81–5.12)
SODIUM SERPL-SCNC: 139 MMOL/L (ref 135–147)
WBC # BLD AUTO: 4.65 THOUSAND/UL (ref 4.31–10.16)

## 2024-01-14 PROCEDURE — 99232 SBSQ HOSP IP/OBS MODERATE 35: CPT | Performed by: NURSE PRACTITIONER

## 2024-01-14 PROCEDURE — 80048 BASIC METABOLIC PNL TOTAL CA: CPT | Performed by: NURSE PRACTITIONER

## 2024-01-14 PROCEDURE — 85027 COMPLETE CBC AUTOMATED: CPT | Performed by: NURSE PRACTITIONER

## 2024-01-14 RX ADMIN — DONEPEZIL HYDROCHLORIDE 10 MG: 5 TABLET ORAL at 09:55

## 2024-01-14 RX ADMIN — SERTRALINE 25 MG: 25 TABLET, FILM COATED ORAL at 09:55

## 2024-01-14 RX ADMIN — LIDOCAINE 1 PATCH: 700 PATCH TOPICAL at 09:54

## 2024-01-14 RX ADMIN — OLANZAPINE 5 MG: 2.5 TABLET, FILM COATED ORAL at 09:55

## 2024-01-14 RX ADMIN — Medication 1000 UNITS: at 09:55

## 2024-01-14 RX ADMIN — METOPROLOL SUCCINATE 75 MG: 50 TABLET, EXTENDED RELEASE ORAL at 09:55

## 2024-01-14 RX ADMIN — Medication 3 MG: at 20:47

## 2024-01-14 RX ADMIN — LORATADINE 10 MG: 10 TABLET ORAL at 09:55

## 2024-01-14 RX ADMIN — DOCUSATE SODIUM 100 MG: 100 CAPSULE, LIQUID FILLED ORAL at 09:54

## 2024-01-14 RX ADMIN — DOCUSATE SODIUM 100 MG: 100 CAPSULE, LIQUID FILLED ORAL at 17:14

## 2024-01-14 RX ADMIN — LOSARTAN POTASSIUM 100 MG: 50 TABLET, FILM COATED ORAL at 09:55

## 2024-01-14 RX ADMIN — ENOXAPARIN SODIUM 30 MG: 30 INJECTION SUBCUTANEOUS at 09:56

## 2024-01-14 RX ADMIN — OLANZAPINE 1.3 MG: 10 INJECTION, POWDER, FOR SOLUTION INTRAMUSCULAR at 20:47

## 2024-01-14 NOTE — ASSESSMENT & PLAN NOTE
Patient has history of dementia with behavioral disturbances, worsening over past several months. Has been getting more agitated and refusing medications since day prior to admission. Prior facility said she needs a higher level of care and will not accept patient back.  UA showed moderate leukocytes and bacteria, completed ceftriaxone x 3 doses  Continue current home medications including aricept and zyprexa 2.5 mg hs  Patient was started at [facility], discontinued  Psychiatry consulted; recommending to avoid Ativan for sedation/agitation, decreased Zyprexa to 1.25 mg p.o. daily in the morning and Zyprexa 3.75 mg p.o. nightly  Patient very lethargic on 1/4, likely from agitation night prior  Stat CT of head: no acute intracranial abnormality but demonstrated stable marked chronic small vessel ischemic changes and stable 1.3 cm meningioma at the left posterior lateral middle cranial fossa  Supportive care  Upgrade to mechanical soft diet and thin liquids per speech recommendations  Discussed with family and psychiatry, will do once a day dosing Zyprexa 5 mg, decrease melatonin to 3 mg and give these 2 medications around 1930 to see if we can get patient's wake sleep cycle more aligned to normal circadian rhythm.  Will also give patient's Aricept in the a.m. as psychiatry indicated this can add to patient's insomnia.  Monitor response  Avoid sedating medications  Patient continues to be overall pleasant and cooperative with intermittent agitation requiring redirection and occasional prn zyprexa  Started on zoloft 25 mg daily for depressive symptoms  Follow up repeat EKG am; pending

## 2024-01-14 NOTE — NURSING NOTE
Patient attempting to climb out of bed, instructed to call multiple times by staff for assistance and help. Patient screaming at staff, hitting staff with hands and feet and attempting to push staff away. Patient refuses to notify staff when she is getting up, when staff attempted to put patient back into her bed, she continued to be verbally aggressive and uncooperative while attempting to hit staff. Attempts to re-orient patient unsuccessful at this time. Hospitalist notified of behavior and restraints placed for safety of patient and staff.

## 2024-01-14 NOTE — PLAN OF CARE
Problem: Potential for Falls  Goal: Patient will remain free of falls  Description: INTERVENTIONS:  - Educate patient/family on patient safety including physical limitations  - Instruct patient to call for assistance with activity   - Consult OT/PT to assist with strengthening/mobility   - Keep Call bell within reach  - Keep bed low and locked with side rails adjusted as appropriate  - Keep care items and personal belongings within reach  - Initiate and maintain comfort rounds  - Make Fall Risk Sign visible to staff  - Offer Toileting every 3 Hours, in advance of need  - Initiate/Maintain bed/chair alarm  - Obtain necessary fall risk management equipment: bed/chair alarm  - Apply yellow socks and bracelet for high fall risk patients  - Consider moving patient to room near nurses station  Outcome: Progressing     Problem: Prexisting or High Potential for Compromised Skin Integrity  Goal: Skin integrity is maintained or improved  Description: INTERVENTIONS:  - Identify patients at risk for skin breakdown  - Assess and monitor skin integrity  - Assess and monitor nutrition and hydration status  - Monitor labs   - Assess for incontinence   - Turn and reposition patient  - Assist with mobility/ambulation  - Relieve pressure over bony prominences  - Avoid friction and shearing  - Provide appropriate hygiene as needed including keeping skin clean and dry  - Evaluate need for skin moisturizer/barrier cream  - Collaborate with interdisciplinary team   - Patient/family teaching  - Consider wound care consult   Outcome: Progressing     Problem: PAIN - ADULT  Goal: Verbalizes/displays adequate comfort level or baseline comfort level  Description: Interventions:  - Encourage patient to monitor pain and request assistance  - Assess pain using appropriate pain scale  - Administer analgesics based on type and severity of pain and evaluate response  - Implement non-pharmacological measures as appropriate and evaluate response  -  Consider cultural and social influences on pain and pain management  - Notify physician/advanced practitioner if interventions unsuccessful or patient reports new pain  Outcome: Progressing     Problem: INFECTION - ADULT  Goal: Absence or prevention of progression during hospitalization  Description: INTERVENTIONS:  - Assess and monitor for signs and symptoms of infection  - Monitor lab/diagnostic results  - Monitor all insertion sites, i.e. indwelling lines, tubes, and drains  - Administer medications as ordered  - Instruct and encourage patient and family to use good hand hygiene technique  - Identify and instruct in appropriate isolation precautions for identified infection/condition  Outcome: Progressing  Goal: Absence of fever/infection during neutropenic period  Description: INTERVENTIONS:  - Monitor WBC    Outcome: Progressing     Problem: DISCHARGE PLANNING  Goal: Discharge to home or other facility with appropriate resources  Description: INTERVENTIONS:  - Identify barriers to discharge w/patient and caregiver  - Arrange for needed discharge resources and transportation as appropriate  - Identify discharge learning needs (meds, wound care, etc.)  - Refer to Case Management Department for coordinating discharge planning if the patient needs post-hospital services based on physician/advanced practitioner order or complex needs related to functional status, cognitive ability, or social support system  Outcome: Progressing     Problem: Knowledge Deficit  Goal: Patient/family/caregiver demonstrates understanding of disease process, treatment plan, medications, and discharge instructions  Description: Complete learning assessment and assess knowledge base.  Interventions:  - Provide teaching at level of understanding  - Provide teaching via preferred learning methods  Outcome: Progressing     Problem: NEUROSENSORY - ADULT  Goal: Achieves stable or improved neurological status  Description: INTERVENTIONS  -  Monitor and report changes in neurological status  - Monitor vital signs such as temperature, blood pressure, glucose, and any other labs ordered   - Initiate measures to prevent increased intracranial pressure  - Monitor for seizure activity and implement precautions if appropriate      Outcome: Progressing  Goal: Achieves maximal functionality and self care  Description: INTERVENTIONS  - Monitor swallowing and airway patency with patient fatigue and changes in neurological status  - Encourage and assist patient to increase activity and self care.   - Encourage visually impaired, hearing impaired and aphasic patients to use assistive/communication devices  Outcome: Progressing     Problem: GENITOURINARY - ADULT  Goal: Maintains or returns to baseline urinary function  Description: INTERVENTIONS:  - Assess urinary function  - Encourage oral fluids to ensure adequate hydration if ordered  - Administer IV fluids as ordered to ensure adequate hydration  - Administer ordered medications as needed  - Offer frequent toileting  - Follow urinary retention protocol if ordered  Outcome: Progressing  Goal: Absence of urinary retention  Description: INTERVENTIONS:  - Assess patient's ability to void and empty bladder  - Monitor I/O  - Bladder scan as needed  - Discuss with physician/AP medications to alleviate retention as needed  - Discuss catheterization for long term situations as appropriate  Outcome: Progressing     Problem: METABOLIC, FLUID AND ELECTROLYTES - ADULT  Goal: Electrolytes maintained within normal limits  Description: INTERVENTIONS:  - Monitor labs and assess patient for signs and symptoms of electrolyte imbalances  - Administer electrolyte replacement as ordered  - Monitor response to electrolyte replacements, including repeat lab results as appropriate  - Instruct patient on fluid and nutrition as appropriate  Outcome: Progressing  Goal: Fluid balance maintained  Description: INTERVENTIONS:  - Monitor labs    - Monitor I/O and WT  - Instruct patient on fluid and nutrition as appropriate  - Assess for signs & symptoms of volume excess or deficit  Outcome: Progressing     Problem: HEMATOLOGIC - ADULT  Goal: Maintains hematologic stability  Description: INTERVENTIONS  - Assess for signs and symptoms of bleeding or hemorrhage  - Monitor labs  - Administer supportive blood products/factors as ordered and appropriate  Outcome: Progressing     Problem: Nutrition/Hydration-ADULT  Goal: Nutrient/Hydration intake appropriate for improving, restoring or maintaining nutritional needs  Description: Monitor and assess patient's nutrition/hydration status for malnutrition. Collaborate with interdisciplinary team and initiate plan and interventions as ordered.  Monitor patient's weight and dietary intake as ordered or per policy. Utilize nutrition screening tool and intervene as necessary. Determine patient's food preferences and provide high-protein, high-caloric foods as appropriate.     INTERVENTIONS:  - Monitor oral intake, urinary output, labs, and treatment plans  - Assess nutrition and hydration status and recommend course of action  - Evaluate amount of meals eaten  - Assist patient with eating if necessary   - Allow adequate time for meals  - Recommend/ encourage appropriate diets, oral nutritional supplements, and vitamin/mineral supplements  - Assess need for intravenous fluids  - Provide specific nutrition/hydration education as appropriate  - Include patient/family/caregiver in decisions related to nutrition  Outcome: Progressing     Problem: SAFETY,RESTRAINT: NV/NON-SELF DESTRUCTIVE BEHAVIOR  Goal: Remains free of harm/injury (restraint for non violent/non self-detsructive behavior)  Description: INTERVENTIONS:  - Instruct patient/family regarding restraint use   - Assess and monitor physiologic and psychological status   - Provide interventions and comfort measures to meet assessed patient needs   - Identify and  implement measures to help patient regain control  - Assess readiness for release of restraint   Outcome: Progressing  Goal: Returns to optimal restraint-free functioning  Description: INTERVENTIONS:  - Assess the patient's behavior and symptoms that indicate continued need for restraint  - Identify and implement measures to help patient regain control  - Assess readiness for release of restraint   Outcome: Progressing

## 2024-01-14 NOTE — PROGRESS NOTES
Formerly [facility], later [facility]  Progress Note  Name: Monika Butler I  MRN: [id number]  Unit/Bed#: [address] [place] [address] Date of Admission: 1/2/2024   Date of Service: 1/14/2024 I Hospital Day: 12    Assessment/Plan   * Dementia with behavioral disturbance (HCC)  Assessment & Plan  Patient has history of dementia with behavioral disturbances, worsening over past several months. Has been getting more agitated and refusing medications since day prior to admission. Prior facility said she needs a higher level of care and will not accept patient back.  UA showed moderate leukocytes and bacteria, completed ceftriaxone x 3 doses  Continue current home medications including aricept and zyprexa 2.5 mg hs  Patient was started at [facility], discontinued  Psychiatry consulted; recommending to avoid Ativan for sedation/agitation, decreased Zyprexa to 1.25 mg p.o. daily in the morning and Zyprexa 3.75 mg p.o. nightly  Patient very lethargic on 1/4, likely from agitation night prior  Stat CT of head: no acute intracranial abnormality but demonstrated stable marked chronic small vessel ischemic changes and stable 1.3 cm meningioma at the left posterior lateral middle cranial fossa  Supportive care  Upgrade to mechanical soft diet and thin liquids per speech recommendations  Discussed with family and psychiatry, will do once a day dosing Zyprexa 5 mg, decrease melatonin to 3 mg and give these 2 medications around 1930 to see if we can get patient's wake sleep cycle more aligned to normal circadian rhythm.  Will also give patient's Aricept in the a.m. as psychiatry indicated this can add to patient's insomnia.  Monitor response  Avoid sedating medications  Patient continues to be overall pleasant and cooperative with intermittent agitation requiring redirection and occasional prn zyprexa  Started on zoloft 25 mg daily for depressive symptoms  Follow up repeat EKG am; pending     Elevated troponin  Assessment &  Plan  Troponin noted to be elevated at 24 on 1/3, repeated 1/4 elevated 300  Cardiology consulted  ECHO 1/5/24: EF 60-65%, systolic function normal, diastolic function mildly abnormal consistent with G1DD  Stress test canceled after discussion between cardiology and patient's family  Continue medical therapy with beta blocker and ARB  BP elevated, increased patient's metoprolol to 75 mg daily  Monitor     Essential hypertension  Assessment & Plan  BP intermittently high, likely in setting of agitation  Continue home losartan 100 mg daily  Increased metoprolol to 75 mg daily               VTE Pharmacologic Prophylaxis: VTE Score: 3 Moderate Risk (Score 3-4) - Pharmacological DVT Prophylaxis Ordered: enoxaparin (Lovenox).    Mobility:   Basic Mobility Inpatient Raw Score: 13  -HLM Goal: 4: Move to chair/commode  JH-HLM Achieved: 6: Walk 10 steps or more  HLM Goal achieved. Continue to encourage appropriate mobility.    Patient Centered Rounds: I performed bedside rounds with nursing staff today.   Discussions with Specialists or Other Care Team Provider: nursing     Education and Discussions with Family / Patient: Updated  (daughter) at bedside.    Total Time Spent on Date of Encounter in care of patient: greater than 45 mins. This time was spent on one or more of the following: performing physical exam; counseling and coordination of care; obtaining or reviewing history; documenting in the medical record; reviewing/ordering tests, medications or procedures; communicating with other healthcare professionals and discussing with patient's family/caregivers.    Current Length of Stay: 12 day(s)  Current Patient Status: Inpatient   Certification Statement: The patient will continue to require additional inpatient hospital stay due to PT and OT Clifford Bahena to assess patient on Monday as per discussion with case management   Discharge Plan: Anticipate discharge in 24-48 hrs to prior assisted or  "independent living facility.    Code Status: Level 1 - Full Code    Subjective:   Patient seen laying in bed, legs flung over side rails, yelling out at the top of her voice \"help me!\"  Over and over again.  When spoken to patient calm down, was cooperative with repositioning and allowed this provider to assist her with breakfast.  She did not know that she was in the hospital, did not know the year nor the president.  When handed Fork patient was able to stab her food, however when she was attempting to feed herself she was placing the fork in her chest region.  Even with prompting patient was having difficulty.    Objective:     Vitals:   Temp (24hrs), Av.1 °F (36.7 °C), Min:97.4 °F (36.3 °C), Max:98.5 °F (36.9 °C)    Temp:  [97.4 °F (36.3 °C)-98.5 °F (36.9 °C)] 97.4 °F (36.3 °C)  HR:  [66-83] 69  Resp:  [16-19] 19  BP: (112-178)/(70-84) 178/84  SpO2:  [93 %-95 %] 94 %  Body mass index is 21.37 kg/m².     Input and Output Summary (last 24 hours):   No intake or output data in the 24 hours ending 24 1145    Physical Exam:   Physical Exam  Vitals and nursing note reviewed.   Constitutional:       Appearance: Normal appearance.   HENT:      Head: Normocephalic.      Nose: Nose normal.      Mouth/Throat:      Mouth: Mucous membranes are dry.   Eyes:      Extraocular Movements: Extraocular movements intact.      Conjunctiva/sclera: Conjunctivae normal.   Cardiovascular:      Rate and Rhythm: Normal rate and regular rhythm.      Pulses: Normal pulses.      Heart sounds: Normal heart sounds.   Pulmonary:      Effort: Pulmonary effort is normal.      Breath sounds: Normal breath sounds.   Abdominal:      General: Bowel sounds are normal. There is no distension.      Palpations: Abdomen is soft.      Tenderness: There is no abdominal tenderness.   Genitourinary:     Comments: Voiding spontaneously   Musculoskeletal:         General: Normal range of motion.      Cervical back: Normal range of motion.      Right " lower leg: No edema.      Left lower leg: No edema.   Skin:     General: Skin is warm and dry.      Capillary Refill: Capillary refill takes less than 2 seconds.   Neurological:      General: No focal deficit present.      Mental Status: She is alert.   Psychiatric:      Comments: Patient initially was screaming, calling for help. Calmed down when spoken to; she was cooperative with repositioning and allowed this provider to assist her with breakfast.           Additional Data:     Labs:  Results from last 7 days   Lab Units 01/14/24  0441   WBC Thousand/uL 4.65   HEMOGLOBIN g/dL 11.6   HEMATOCRIT % 36.2   PLATELETS Thousands/uL 232     Results from last 7 days   Lab Units 01/14/24  0441   SODIUM mmol/L 139   POTASSIUM mmol/L 3.7   CHLORIDE mmol/L 102   CO2 mmol/L 30   BUN mg/dL 29*   CREATININE mg/dL 0.89   ANION GAP mmol/L 7   CALCIUM mg/dL 9.5   GLUCOSE RANDOM mg/dL 91                       Lines/Drains:  Invasive Devices       Peripheral Intravenous Line  Duration             Peripheral IV 01/13/24 Proximal;Right;Ventral (anterior) Forearm 1 day                          Imaging: No pertinent imaging reviewed.    Recent Cultures (last 7 days):         Last 24 Hours Medication List:   Current Facility-Administered Medications   Medication Dose Route Frequency Provider Last Rate    acetaminophen  650 mg Oral Q6H PRN Leyla Guardado PA-C      cholecalciferol  1,000 Units Oral Daily Leyla Guardado PA-C      docusate sodium  100 mg Oral BID Leyla Guardado PA-C      donepezil  10 mg Oral QAM DAMARIS Meza      enoxaparin  30 mg Subcutaneous Q24H Pending sale to [facility] DAMARIS Meza      haloperidol lactate  2 mg Intravenous Once Ash Maxwell MD      labetalol  10 mg Intravenous Q6H PRN Leyla Guardado PA-C      lidocaine  1 patch Topical Daily DAMARIS Meza      loratadine  10 mg Oral Daily DAMARIS Meza      losartan  100 mg Oral Daily Leyla Guardado PA-C      melatonin  3 mg  Oral Daily DAMARIS Meza      metoprolol succinate  75 mg Oral Daily Leyla Guardado PA-C      OLANZapine  1.25 mg Oral Q8H PRN Ash Silvestre DO      Or    OLANZapine  1.3 mg Intramuscular Q8H PRN Ash Silvestre DO      OLANZapine  5 mg Oral Daily DAMARIS Meza      ondansetron  4 mg Intravenous Q6H PRN Leyla Guardado PA-C      sertraline  25 mg Oral Daily Ash Silvestre DO          Today, Patient Was Seen By: DAMARIS Meza    **Please Note: This note may have been constructed using a voice recognition system.**

## 2024-01-14 NOTE — PLAN OF CARE
Problem: SAFETY,RESTRAINT: NV/NON-SELF DESTRUCTIVE BEHAVIOR  Goal: Remains free of harm/injury (restraint for non violent/non self-detsructive behavior)  Description: INTERVENTIONS:  - Instruct patient/family regarding restraint use   - Assess and monitor physiologic and psychological status   - Provide interventions and comfort measures to meet assessed patient needs   - Identify and implement measures to help patient regain control  - Assess readiness for release of restraint   Outcome: Progressing  Goal: Returns to optimal restraint-free functioning  Description: INTERVENTIONS:  - Assess the patient's behavior and symptoms that indicate continued need for restraint  - Identify and implement measures to help patient regain control  - Assess readiness for release of restraint   Outcome: Progressing     Problem: PAIN - ADULT  Goal: Verbalizes/displays adequate comfort level or baseline comfort level  Description: Interventions:  - Encourage patient to monitor pain and request assistance  - Assess pain using appropriate pain scale  - Administer analgesics based on type and severity of pain and evaluate response  - Implement non-pharmacological measures as appropriate and evaluate response  - Consider cultural and social influences on pain and pain management  - Notify physician/advanced practitioner if interventions unsuccessful or patient reports new pain  Outcome: Progressing

## 2024-01-15 LAB
ANION GAP SERPL CALCULATED.3IONS-SCNC: 8 MMOL/L
ATRIAL RATE: 55 BPM
BUN SERPL-MCNC: 27 MG/DL (ref 5–25)
CALCIUM SERPL-MCNC: 9.6 MG/DL (ref 8.4–10.2)
CHLORIDE SERPL-SCNC: 103 MMOL/L (ref 96–108)
CO2 SERPL-SCNC: 30 MMOL/L (ref 21–32)
CREAT SERPL-MCNC: 0.79 MG/DL (ref 0.6–1.3)
ERYTHROCYTE [DISTWIDTH] IN BLOOD BY AUTOMATED COUNT: 13.2 % (ref 11.6–15.1)
GFR SERPL CREATININE-BSD FRML MDRD: 68 ML/MIN/1.73SQ M
GLUCOSE SERPL-MCNC: 100 MG/DL (ref 65–140)
HCT VFR BLD AUTO: 37.7 % (ref 34.8–46.1)
HGB BLD-MCNC: 12.5 G/DL (ref 11.5–15.4)
MCH RBC QN AUTO: 33.6 PG (ref 26.8–34.3)
MCHC RBC AUTO-ENTMCNC: 33.2 G/DL (ref 31.4–37.4)
MCV RBC AUTO: 101 FL (ref 82–98)
P AXIS: 8 DEGREES
PLATELET # BLD AUTO: 227 THOUSANDS/UL (ref 149–390)
PMV BLD AUTO: 11.5 FL (ref 8.9–12.7)
POTASSIUM SERPL-SCNC: 3.5 MMOL/L (ref 3.5–5.3)
PR INTERVAL: 144 MS
QRS AXIS: 54 DEGREES
QRSD INTERVAL: 84 MS
QT INTERVAL: 480 MS
QTC INTERVAL: 459 MS
RBC # BLD AUTO: 3.72 MILLION/UL (ref 3.81–5.12)
SODIUM SERPL-SCNC: 141 MMOL/L (ref 135–147)
T WAVE AXIS: 94 DEGREES
VENTRICULAR RATE: 55 BPM
WBC # BLD AUTO: 4.6 THOUSAND/UL (ref 4.31–10.16)

## 2024-01-15 PROCEDURE — 99232 SBSQ HOSP IP/OBS MODERATE 35: CPT | Performed by: PSYCHIATRY & NEUROLOGY

## 2024-01-15 PROCEDURE — 93005 ELECTROCARDIOGRAM TRACING: CPT

## 2024-01-15 PROCEDURE — 85027 COMPLETE CBC AUTOMATED: CPT | Performed by: NURSE PRACTITIONER

## 2024-01-15 PROCEDURE — 97116 GAIT TRAINING THERAPY: CPT

## 2024-01-15 PROCEDURE — 80048 BASIC METABOLIC PNL TOTAL CA: CPT | Performed by: NURSE PRACTITIONER

## 2024-01-15 PROCEDURE — 97530 THERAPEUTIC ACTIVITIES: CPT

## 2024-01-15 PROCEDURE — 99232 SBSQ HOSP IP/OBS MODERATE 35: CPT

## 2024-01-15 RX ORDER — OLANZAPINE 2.5 MG/1
1.25 TABLET, FILM COATED ORAL DAILY
Status: DISCONTINUED | OUTPATIENT
Start: 2024-01-16 | End: 2024-01-21 | Stop reason: HOSPADM

## 2024-01-15 RX ORDER — LANOLIN ALCOHOL/MO/W.PET/CERES
3 CREAM (GRAM) TOPICAL DAILY
Status: DISCONTINUED | OUTPATIENT
Start: 2024-01-15 | End: 2024-01-21 | Stop reason: HOSPADM

## 2024-01-15 RX ORDER — OLANZAPINE 2.5 MG/1
5 TABLET, FILM COATED ORAL
Status: DISCONTINUED | OUTPATIENT
Start: 2024-01-16 | End: 2024-01-21 | Stop reason: HOSPADM

## 2024-01-15 RX ADMIN — LOSARTAN POTASSIUM 100 MG: 50 TABLET, FILM COATED ORAL at 10:58

## 2024-01-15 RX ADMIN — Medication 3 MG: at 20:16

## 2024-01-15 RX ADMIN — DOCUSATE SODIUM 100 MG: 100 CAPSULE, LIQUID FILLED ORAL at 10:37

## 2024-01-15 RX ADMIN — LORATADINE 10 MG: 10 TABLET ORAL at 10:37

## 2024-01-15 RX ADMIN — SERTRALINE 25 MG: 25 TABLET, FILM COATED ORAL at 10:57

## 2024-01-15 RX ADMIN — OLANZAPINE 5 MG: 2.5 TABLET, FILM COATED ORAL at 10:58

## 2024-01-15 RX ADMIN — LIDOCAINE 1 PATCH: 700 PATCH TOPICAL at 10:38

## 2024-01-15 RX ADMIN — LABETALOL HYDROCHLORIDE 10 MG: 5 INJECTION, SOLUTION INTRAVENOUS at 15:48

## 2024-01-15 RX ADMIN — METOPROLOL SUCCINATE 75 MG: 50 TABLET, EXTENDED RELEASE ORAL at 10:56

## 2024-01-15 RX ADMIN — ENOXAPARIN SODIUM 30 MG: 30 INJECTION SUBCUTANEOUS at 10:36

## 2024-01-15 RX ADMIN — Medication 1000 UNITS: at 10:37

## 2024-01-15 RX ADMIN — DONEPEZIL HYDROCHLORIDE 10 MG: 5 TABLET ORAL at 10:37

## 2024-01-15 NOTE — PHYSICAL THERAPY NOTE
PT TREATMENT     01/15/24 1105   PT Last Visit   PT Visit Date 01/15/24   Note Type   Note Type Treatment   Pain Assessment   Pain Assessment Tool Colon-Baker FACES   Colon-Baker FACES Pain Rating 0   Restrictions/Precautions   Other Precautions Cognitive;Fall Risk;Bed Alarm;Chair Alarm   General   Chart Reviewed Yes   Family/Caregiver Present Yes  (Patient's daughter and son-in-law)   Cognition   Overall Cognitive Status Impaired   Arousal/Participation Persistent stimuli required   Attention Difficulty attending to directions   Orientation Level Disoriented X4   Following Commands Follows one step commands inconsistently   Subjective   Subjective Patient cooperative with PT   Bed Mobility   Supine to Sit 4  Minimal assistance   Additional items Assist x 2;Verbal cues;Increased time required;LE management;HOB elevated  (Trunk management)   Transfers   Sit to Stand 4  Minimal assistance   Additional items Assist x 2;Verbal cues;Increased time required   Stand to Sit 4  Minimal assistance   Additional items Assist x 2;Verbal cues;Increased time required   Ambulation/Elevation   Gait pattern Narrow DANIA;Short stride;Step through pattern;Decreased foot clearance;Inconsistent donna;Redundant gait at times;Retropulsion;Improper Weight shift   Gait Assistance 4  Minimal assist   Additional items Assist x 2;Verbal cues;Tactile cues   Assistive Device None  (Bilateral handhold assist)   Distance Patient ambulated 5 feet bed to chair as patient had spilled coffee and was incontinent in bed; patient sat out of bed in chair with supervision of son-in-law while PT changed patient's bed completely; patient then sit to stand transfer with minimal handhold assist of 2 and while standing with assist of son-in-law, PT performed hygiene of patient due to urine and bowel incontinence and also changed patient's gown.  Patient then ambulated with minimal handhold assist of 2 x 50 feet with change in direction, patient needing slight  increase in assist at end of ambulation due to fatigue.  Patient remained out of bed in the chair with supervision of patient's daughter and son-in-law. SABINA Verma present and aware.   Balance   Static Sitting   (P+/F- unsupported)   Static Standing Poor   Ambulatory Poor   Activity Tolerance   Activity Tolerance Patient limited by fatigue;Treatment limited secondary to medical complications (Comment)  (Impaired cognition)   Assessment   Problem List Decreased strength;Decreased endurance;Impaired balance;Decreased mobility;Decreased coordination;Decreased cognition;Impaired judgement;Decreased safety awareness   Assessment With encouragement, patient cooperative with PT for functional mobility, ADL and ambulation.  While admitted, patient will continue to benefit from skilled physical therapy services to further increase her strength, safe functional mobility and gait, balance and endurance.  Patient is progressing toward PT goals.  When medically stable for discharge, patient is appropriate for Level II Moderate Resource Intensity.    The patient's [facility] Basic Mobility Inpatient Short Form Raw Score is 8. A Raw score of less than or equal to 16 suggests the patient may benefit from discharge to post-acute rehabilitation services. Please also refer to the recommendation of the Physical Therapist for safe discharge planning.   Plan   Treatment/Interventions ADL retraining;Functional transfer training;LE strengthening/ROM;Therapeutic exercise;Endurance training;Cognitive reorientation;Patient/family training;Equipment eval/education;Bed mobility;Gait training;Compensatory technique education   PT Frequency Other (Comment)  (5x/wk)   Discharge Recommendation   Rehab Resource Intensity Level, PT II (Moderate Resource Intensity)   AM-PAC Basic Mobility Inpatient   Turning in Flat Bed Without Bedrails 2   Lying on Back to Sitting on Edge of Flat Bed Without Bedrails 2   Moving Bed to Chair 1   Standing Up From Chair  Using Arms 1   Walk in Room 1   Climb 3-5 Stairs With Railing 1   Basic Mobility Inpatient Raw Score 8   Turning Head Towards Sound 2   Follow Simple Instructions 2   Low Function Basic Mobility Raw Score  12   Low Function Basic Mobility Standardized Score  18.33   Highest Level Of Mobility   JH-HLM Goal 3: Sit at edge of bed   JH-HLM Achieved 7: Walk 25 feet or more   Education   Education Provided Mobility training   Patient Reinforcement needed   End of Consult   Patient Position at End of Consult Bedside chair;All needs within reach  (Supervised by family, RN aware)   Licensure   [place] License Number  Analy Singh, PT [id number]     Portions of the documentation may have been created using voice recognition software. Occasional wrong word or sound alike substitutions may have occurred due to the inherent limitation of the voice recognition software. Read the chart carefully and recognize, using context, where substitutions have occurred.

## 2024-01-15 NOTE — CASE MANAGEMENT
Case Management Discharge Planning Note    Patient name Monika Butler  Location [address] [address]/[address] [place] [address]-* MRN [id number]  : 1940 Date 1/15/2024       Current Admission Date: 2024  Current Admission Diagnosis:Dementia with behavioral disturbance (HCC)   Patient Active Problem List    Diagnosis Date Noted    Elevated troponin 2024    Constipation 2023    Acute bronchiolitis 2023    Nondisplaced fracture of triquetrum (cuneiform) bone, left wrist, initial encounter for closed fracture 2023    Abrasion of left eyebrow 2023    Injury of left wrist 2023    Insomnia 2023    Anxiety 2023    Absolute anemia 2023    H/O clavicle fracture 2023    Meningioma (HCC) 2023    Pulmonary nodule 2023    Bad odor of urine 2023    Postmenopausal 2023    Prediabetes 2023    Gastroesophageal reflux disease with esophagitis without hemorrhage 2023    Generalized weakness 2022    Urinary frequency 2022    BMI 22.0-22.9, adult 2021    History of breast cancer 2021    Altered mental status 2020    Medicare annual wellness visit, subsequent 2020    Balance problems 2020    Dementia with behavioral disturbance (HCC)     Memory change 10/26/2020    Ear fullness, left 2017    Sinusitis 2017    Hyperlipidemia 2016    Breast cancer (HCC) 2013    Essential hypertension 2012      LOS (days): 13  Geometric Mean LOS (GMLOS) (days): 5  Days to GMLOS:-7     OBJECTIVE:  Risk of Unplanned Readmission Score: 32.06         Current admission status: Inpatient   Preferred Pharmacy:   [id number] - [address] [address]  Phone: [phone] Fax: [phone]    Primary Care Provider: Kristin Raymundo MD    Primary Insurance: [facility] REP  Secondary Insurance:     DISCHARGE DETAILS:    Other  Referral/Resources/Interventions Provided:  Referral Comments: LUPE along with advanced practitioner Brad spoke with the  Maryann Espinoza [phone] at [facility] who informed that patient was already evaluated and if she goes back to [facility], will require personal aide to be with her for which Maryann Espinoza will require time to arrange for the resources and it was discussed that in 24 hours it may be okay for patient to go back to [facility]. Patient's dtr Anitha approached CM in the charting area and mentioned she is  on phone with Maryann Espinoza, Anna approached and per conversations between them Maryann Espinoza informed Anna and Anitha that patient will require private aide which both the dtrs will have to arrange for as [facility] does not have the resources for and also tried to talk them into looking at [facility] and [facility] where patient would be able to get the required services due to dementia. Per Anna, she left voicemail with [facility] for touring the facility and is still waiting to hear back from them. LUPE also provided Anna with a list of private caregivers to review. CM to follow up next day regarding [facility].

## 2024-01-15 NOTE — PROGRESS NOTES
Progress Note - Behavioral Health   Monika Butler 83 y.o. female MRN: [id number]  Unit/Bed#: [address] [place] [address] Encounter: [id number]          I came to see the patient for continuity of care. Per chart review patient appears to have been receiving her scheduled Zyprexa in the morning rather than when recommended for 1930. Spoke with patient's family who stated that the patient was given Zyprexa 5 mg this morning and has been sleeping.  Patient on examination patient appears to be sleeping.  She grunted to however would not awaken to verbal stimuli despite multiple attempts.      Mental Status Evaluation:  Appearance:  appears stated age   Behavior:  Appears to be sleeping   Speech:  Grunted   Mood:  Unable to assess due to patient factors   Affect:  Unable to assess due to patient factors   Language: Unable to assess due to patient factors   Thought Process:  Unable to assess due to patient factors   Associations: Unable to assess due to patient factors   Thought Content:  Unable to assess due to patient factors   Perceptual Disturbances: Unable to assess due to patient factors   Risk Potential: Unable to assess due to patient factors   Sensorium:  Unable to assess due to patient factors   Memory:  Unable to assess due to patient factors   Cognition:  Unable to assess due to patient factors   Consciousness:  Appears to be sleeping    Attention: Unable to assess due to patient factors   Intellect: Unable to assess due to patient factors   Fund of Knowledge: Unable to assess due to patient factors   Insight:  Unable to assess due to patient factors   Judgment: Unable to assess due to patient factors   Muscle Strength and Tone: Not assessed   Gait/Station: Not assessed   Motor Activity: no abnormal movements noted         Assessment/Plan  Monika Butler is a 83 y.o. female with  past medical history of dementia with psychosis, dementia with behavioral disturbance, hypertension who presented to the ED for altered mental  status and is admitted for dementia with behavioral disturbance. Please see previous psychiatric consultation and progress notes by me for more information.  Per chart review patient appears to have been receiving her scheduled Zyprexa in the morning rather than when recommended for 1930.  This could be related to why patient has been noted to have been tired in the morning. Spoke with patient's family who stated that the patient was given Zyprexa 5 mg this morning and has been sleeping.  Patient on examination patient appears to be sleeping.  She grunted to however would not awaken to verbal stimuli despite multiple attempts. Patient also appears to have been receiving Zyprexa as needed severe agitation every day the past six days between 1.25-1.3 mg and therefore will add 1.25 mg to her total daily regimen of Zyprexa for improvement of agitation and in hopes of limiting PRN IM medication. Will change orders to reflect previous recommendations of: administering Zyprexa 5 mg qhs at 1930 and will increase Zyprexa to include daily dosage of 1.25 mg daily for improvement of agitation and in hopes of limiting PRN IM medication. Patient also appears to have been receiving melatonin 3 mg later than recommended - will change order to 1930. Discussed medication changes with family who verbalized understanding and were in agreement.    Diagnosis:  Dementia with behavioral disturbance      Recommended Treatment:   Continue medical management  There in no clear indication for inpatient psychiatric hospitalization at this time  Continue Zoloft 25 mg daily  Change Zyprexa order to 5 mg qhs 1930 (starting tomorrow as patient received 5 mg of Zyprexa already this morning and is currently asleep) and also increase dosage to include daily dosage of 1.25 mg daily for improvement of agitation as patient appears to have been receiving Zyprexa prn every day for the past 6 days between 1.25-1.3 mg and therefore will add this to her total  daily regimen of Zyprexa for improvement of agitation and in hopes of limiting PRN medication and in particular PRN IM medication.  Patient also appears to have been receiving melatonin 3 mg later than recommended - will change order to 1930  QTC has decreased to 459 from EKG this morning  Discussed with the primary team  Psychiatry will follow up as necessary.  Please contact with questions/for requested follow-up  For overnights and weekends please contact [facility] for psychiatric purposes         Medications: all current active meds have been reviewed. See recommended treatment above      Risks, benefits and possible side effects of Medications:     Risks, benefits, and possible side effects of medications have been explained to patient's family who have verbalized understanding.      Labs: I have personally reviewed all pertinent laboratory results.     I have personally reviewed all pertinent laboratory/tests results.  Labs in last 72 hours:   Recent Labs     01/15/24  0425   WBC 4.60   RBC 3.72*   HGB 12.5   HCT 37.7      RDW 13.2   SODIUM 141   K 3.5      CO2 30   BUN 27*   CREATININE 0.79   GLUC 100   CALCIUM 9.6     EKG   Lab Results   Component Value Date    VENTRATE 55 01/15/2024    ATRIALRATE 55 01/15/2024    PRINT 144 01/15/2024    QRSDINT 84 01/15/2024    QTINT 480 01/15/2024    QTCINT 459 01/15/2024    PAXIS 8 01/15/2024    QRSAXIS 54 01/15/2024    TWAVEAXIS 94 01/15/2024         Ash Silvestre, DO  01/15/24      This note has been constructed using a voice recognition system.    There may be translation, syntax,  or grammatical errors. If you have any questions, please contact the dictating provider.

## 2024-01-15 NOTE — ASSESSMENT & PLAN NOTE
Patient has history of dementia with behavioral disturbances, worsening over past several months. Has been getting more agitated and refusing medications since day prior to admission. Prior facility said she needs a higher level of care and will not accept patient back.  UA showed moderate leukocytes and bacteria, completed ceftriaxone x 3 doses  Continue current home medications including aricept and zyprexa 2.5 mg hs  Patient was started on Buspar at [facility], discontinued  Psychiatry consulted; recommending to avoid Ativan for sedation/agitation, decreased Zyprexa to 1.25 mg p.o. daily in the morning and Zyprexa 3.75 mg p.o. nightly  Patient very lethargic on 1/4, likely from agitation night prior  Stat CT of head: no acute intracranial abnormality but demonstrated stable marked chronic small vessel ischemic changes and stable 1.3 cm meningioma at the left posterior lateral middle cranial fossa  Supportive care  Upgrade to mechanical soft diet and thin liquids per speech recommendations  Discussed with family and psychiatry, will do once a day dosing Zyprexa 5 mg, decrease melatonin to 3 mg and give these 2 medications around 1930 to see if we can get patient's wake sleep cycle more aligned to normal circadian rhythm.  Will also give patient's Aricept in the a.m. as psychiatry indicated this can add to patient's insomnia.  Monitor response  Avoid sedating medications  Continue zoloft 25 mg daily for depressive symptoms  Repeat EKG QTc of 459.

## 2024-01-15 NOTE — CASE MANAGEMENT
Case Management Discharge Planning Note    Patient name Monika Butler  Location [address] [place] [address]/[address] [place] [address]-* MRN [id number]  : 1940 Date 1/15/2024       Current Admission Date: 2024  Current Admission Diagnosis:Dementia with behavioral disturbance (HCC)   Patient Active Problem List    Diagnosis Date Noted    Elevated troponin 2024    Constipation 2023    Acute bronchiolitis 2023    Nondisplaced fracture of triquetrum (cuneiform) bone, left wrist, initial encounter for closed fracture 2023    Abrasion of left eyebrow 2023    Injury of left wrist 2023    Insomnia 2023    Anxiety 2023    Absolute anemia 2023    H/O clavicle fracture 2023    Meningioma (HCC) 2023    Pulmonary nodule 2023    Bad odor of urine 2023    Postmenopausal 2023    Prediabetes 2023    Gastroesophageal reflux disease with esophagitis without hemorrhage 2023    Generalized weakness 2022    Urinary frequency 2022    BMI 22.0-22.9, adult 2021    History of breast cancer 2021    Altered mental status 2020    Medicare annual wellness visit, subsequent 2020    Balance problems 2020    Dementia with behavioral disturbance (HCC)     Memory change 10/26/2020    Ear fullness, left 2017    Sinusitis 2017    Hyperlipidemia 2016    Breast cancer (HCC) 2013    Essential hypertension 2012      LOS (days): 13  Geometric Mean LOS (GMLOS) (days): 5  Days to GMLOS:-6.7     OBJECTIVE:  Risk of Unplanned Readmission Score: 31.93         Current admission status: Inpatient   Preferred Pharmacy:   [id number] - [address] [address]  Phone: [phone] Fax: [phone]    Primary Care Provider: Kristin Raymundo MD    Primary Insurance: [facility]  Secondary Insurance:     DISCHARGE DETAILS:    Other  Referral/Resources/Interventions Provided:  Referral Comments: LUPE reached out to Maryann Espinoza at [facility] who informed CM that she will call CM back with the time that her nurse will be making a visit to evalaute patient here  in the hospital. LUPE made dtr Anna aware of the same via response to her email. Advanced Practitioner Brad made aware of the same.     Treatment Team Recommendation: Facility Return, Assisted Living  Discharge Destination Plan:: Assisted Living, Facility Return

## 2024-01-15 NOTE — PROGRESS NOTES
[facility]  Progress Note  Name: Monika Butler I  MRN: [id number]  Unit/Bed#: [address] [place] [address] Date of Admission: 1/2/2024   Date of Service: 1/15/2024 I Hospital Day: 13    Assessment/Plan   * Dementia with behavioral disturbance (HCC)  Assessment & Plan  Patient has history of dementia with behavioral disturbances, worsening over past several months. Has been getting more agitated and refusing medications since day prior to admission. Prior facility said she needs a higher level of care and will not accept patient back.  UA showed moderate leukocytes and bacteria, completed ceftriaxone x 3 doses  Continue current home medications including aricept and zyprexa 2.5 mg hs  Patient was started on Buspar at [facility], discontinued  Psychiatry consulted; recommending to avoid Ativan for sedation/agitation, decreased Zyprexa to 1.25 mg p.o. daily in the morning and Zyprexa 3.75 mg p.o. nightly  Patient very lethargic on 1/4, likely from agitation night prior  Stat CT of head: no acute intracranial abnormality but demonstrated stable marked chronic small vessel ischemic changes and stable 1.3 cm meningioma at the left posterior lateral middle cranial fossa  Supportive care  Upgrade to mechanical soft diet and thin liquids per speech recommendations  Discussed with family and psychiatry, will do once a day dosing Zyprexa 5 mg, decrease melatonin to 3 mg and give these 2 medications around 1930 to see if we can get patient's wake sleep cycle more aligned to normal circadian rhythm.  Will also give patient's Aricept in the a.m. as psychiatry indicated this can add to patient's insomnia.  Monitor response  Avoid sedating medications  Continue zoloft 25 mg daily for depressive symptoms  Repeat EKG QTc of 459.    Essential hypertension  Assessment & Plan  BP intermittently high, likely in setting of agitation  Continue home losartan 100 mg daily  Increased metoprolol to 75 mg daily    Elevated  troponin  Assessment & Plan  Troponin noted to be elevated at 24 on 1/3, repeated 1/4 elevated 300  Cardiology consulted  ECHO 1/5/24: EF 60-65%, systolic function normal, diastolic function mildly abnormal consistent with G1DD  Stress test canceled after discussion between cardiology and patient's family  Continue medical therapy with beta blocker and ARB  BP elevated, increased patient's metoprolol to 75 mg daily  Monitor                VTE Pharmacologic Prophylaxis: VTE Score: 3 Moderate Risk (Score 3-4) - Pharmacological DVT Prophylaxis Ordered: enoxaparin (Lovenox).    Mobility:   Basic Mobility Inpatient Raw Score: 8  -HLM Goal: 3: Sit at edge of bed  JH-HLM Achieved: 7: Walk 25 feet or more  HLM Goal NOT achieved. Continue with multidisciplinary rounding and encourage appropriate mobility to improve upon HLM goals.    Patient Centered Rounds: I performed bedside rounds with nursing staff today.   Discussions with Specialists or Other Care Team Provider: Nursing, Psych    Education and Discussions with Family / Patient: Updated  (daughter and son in law) at bedside.Anitha and Anna    Total Time Spent on Date of Encounter in care of patient:  mins. This time was spent on one or more of the following: performing physical exam; counseling and coordination of care; obtaining or reviewing history; documenting in the medical record; reviewing/ordering tests, medications or procedures; communicating with other healthcare professionals and discussing with patient's family/caregivers.    Current Length of Stay: 13 day(s)  Current Patient Status: Inpatient   Certification Statement: The patient will continue to require additional inpatient hospital stay due to rehab placement  Discharge Plan: Anticipate discharge in 24-48 hrs to discharge location to be determined pending rehab evaluations.    Code Status: Level 1 - Full Code    Subjective:   Patient seen attempting to get out of bed and stated that  she is going out.  Reoriented patient that she is in the hospital and offered to feed her.  Patient refused and asked the provider to leave the room.    Objective:     Vitals:   Temp (24hrs), Av.6 °F (28.1 °C), Min:36.4 °F (2.4 °C), Max:98.7 °F (37.1 °C)    Temp:  [36.4 °F (2.4 °C)-98.7 °F (37.1 °C)] 97.7 °F (36.5 °C)  HR:  [57-85] 67  Resp:  [18-20] 18  BP: (150-208)/(63-96) 208/86  SpO2:  [91 %-94 %] 94 %  Body mass index is 21.37 kg/m².     Input and Output Summary (last 24 hours):   No intake or output data in the 24 hours ending 01/15/24 1520    Physical Exam:   Physical Exam  Vitals reviewed.          Additional Data:     Labs:  Results from last 7 days   Lab Units 01/15/24  0425   WBC Thousand/uL 4.60   HEMOGLOBIN g/dL 12.5   HEMATOCRIT % 37.7   PLATELETS Thousands/uL 227     Results from last 7 days   Lab Units 01/15/24  0425   SODIUM mmol/L 141   POTASSIUM mmol/L 3.5   CHLORIDE mmol/L 103   CO2 mmol/L 30   BUN mg/dL 27*   CREATININE mg/dL 0.79   ANION GAP mmol/L 8   CALCIUM mg/dL 9.6   GLUCOSE RANDOM mg/dL 100                       Lines/Drains:  Invasive Devices       Peripheral Intravenous Line  Duration             Peripheral IV 24 Proximal;Right;Ventral (anterior) Forearm 2 days                          Imaging: No pertinent imaging reviewed.    Recent Cultures (last 7 days):         Last 24 Hours Medication List:   Current Facility-Administered Medications   Medication Dose Route Frequency Provider Last Rate    acetaminophen  650 mg Oral Q6H PRN Leyla Guardado PA-C      cholecalciferol  1,000 Units Oral Daily Leyla Guardado PA-C      docusate sodium  100 mg Oral BID Leyla Guardado PA-C      donepezil  10 mg Oral QAM DAMARIS Meza      enoxaparin  30 mg Subcutaneous Q24H [facility] DAMARIS Meza      labetalol  10 mg Intravenous Q6H PRN Leyla Guardado PA-C      lidocaine  1 patch Topical Daily DAMARIS Meza      loratadine  10 mg Oral Daily Inna Wolf  DAMARIS Lazaro      losartan  100 mg Oral Daily Leyla Guardado PA-C      melatonin  3 mg Oral Daily Ash Silvestre,       metoprolol succinate  75 mg Oral Daily Leyla Guardado PA-C      OLANZapine  1.25 mg Oral Q8H PRN Ash Silvestre,       Or    OLANZapine  1.3 mg Intramuscular Q8H PRN Ash Silvestre,       [START ON 1/16/2024] OLANZapine  1.25 mg Oral Daily sAh Silvestre, DO      [START ON 1/16/2024] OLANZapine  5 mg Oral HS Ash Silvestre,       ondansetron  4 mg Intravenous Q6H PRN Leyla Guardado PA-C      sertraline  25 mg Oral Daily Ash Silvestre DO          Today, Patient Was Seen By: DAMARIS Engel    **Please Note: This note may have been constructed using a voice recognition system.**

## 2024-01-15 NOTE — PLAN OF CARE
Problem: PHYSICAL THERAPY ADULT  Goal: Performs mobility at highest level of function for planned discharge setting.  See evaluation for individualized goals.  Description: Treatment/Interventions: ADL retraining, Functional transfer training, LE strengthening/ROM, Therapeutic exercise, Endurance training, Cognitive reorientation, Patient/family training, Equipment eval/education, Bed mobility, Gait training, Compensatory technique education          See flowsheet documentation for full assessment, interventions and recommendations.  1/15/2024 1136 by Analy Singh, PT  Outcome: Progressing  Note: Prognosis: Good  Problem List: Decreased strength, Decreased endurance, Impaired balance, Decreased mobility, Decreased coordination, Decreased cognition, Impaired judgement, Decreased safety awareness  Assessment: With encouragement, patient cooperative with PT for functional mobility, ADL and ambulation.  While admitted, patient will continue to benefit from skilled physical therapy services to further increase her strength, safe functional mobility and gait, balance and endurance.  Patient is progressing toward PT goals.  When medically stable for discharge, patient is appropriate for Level II Moderate Resource Intensity.        Rehab Resource Intensity Level, PT: II (Moderate Resource Intensity)    See flowsheet documentation for full assessment.     1/15/2024 1136 by Analy Singh PT  Note: Prognosis: Good  Problem List: Decreased strength, Decreased endurance, Impaired balance, Decreased mobility, Decreased coordination, Decreased cognition, Impaired judgement, Decreased safety awareness  Assessment: With encouragement, patient cooperative with PT for functional mobility, ADL and ambulation.  While admitted, patient will continue to benefit from skilled physical therapy services to further increase her strength, safe functional mobility and gait, balance and endurance.  Patient is progressing toward PT  goals.  When medically stable for discharge, patient is appropriate for Level II Moderate Resource Intensity.        Rehab Resource Intensity Level, PT: II (Moderate Resource Intensity)    See flowsheet documentation for full assessment.

## 2024-01-16 PROBLEM — B37.0 THRUSH, ORAL: Status: ACTIVE | Noted: 2024-01-16

## 2024-01-16 PROCEDURE — 99232 SBSQ HOSP IP/OBS MODERATE 35: CPT

## 2024-01-16 PROCEDURE — 92526 ORAL FUNCTION THERAPY: CPT

## 2024-01-16 RX ADMIN — LOSARTAN POTASSIUM 100 MG: 50 TABLET, FILM COATED ORAL at 09:42

## 2024-01-16 RX ADMIN — METOPROLOL SUCCINATE 75 MG: 50 TABLET, EXTENDED RELEASE ORAL at 09:42

## 2024-01-16 RX ADMIN — NYSTATIN 500000 UNITS: 100000 SUSPENSION ORAL at 18:39

## 2024-01-16 RX ADMIN — LORATADINE 10 MG: 10 TABLET ORAL at 09:42

## 2024-01-16 RX ADMIN — DONEPEZIL HYDROCHLORIDE 10 MG: 5 TABLET ORAL at 09:42

## 2024-01-16 RX ADMIN — OLANZAPINE 5 MG: 2.5 TABLET, FILM COATED ORAL at 18:39

## 2024-01-16 RX ADMIN — LIDOCAINE 1 PATCH: 700 PATCH TOPICAL at 09:44

## 2024-01-16 RX ADMIN — SERTRALINE 25 MG: 25 TABLET, FILM COATED ORAL at 09:42

## 2024-01-16 RX ADMIN — ENOXAPARIN SODIUM 30 MG: 30 INJECTION SUBCUTANEOUS at 09:44

## 2024-01-16 RX ADMIN — OLANZAPINE 1.25 MG: 2.5 TABLET, FILM COATED ORAL at 09:43

## 2024-01-16 RX ADMIN — DOCUSATE SODIUM 100 MG: 100 CAPSULE, LIQUID FILLED ORAL at 09:42

## 2024-01-16 RX ADMIN — NYSTATIN 500000 UNITS: 100000 SUSPENSION ORAL at 12:29

## 2024-01-16 RX ADMIN — Medication 3 MG: at 18:39

## 2024-01-16 RX ADMIN — Medication 1000 UNITS: at 09:42

## 2024-01-16 NOTE — ASSESSMENT & PLAN NOTE
Patient has history of dementia with behavioral disturbances, worsening over past several months. Has been getting more agitated and refusing medications since day prior to admission. Prior facility said she needs a higher level of care and will not accept patient back.  UA showed moderate leukocytes and bacteria, completed ceftriaxone x 3 doses  Continue current home medications including aricept and zyprexa 2.5 mg hs  Patient was started on Buspar at [facility] center, discontinued  Psychiatry consulted; recommending to avoid Ativan for sedation/agitation, decreased Zyprexa to 1.25 mg p.o. daily in the morning and Zyprexa 3.75 mg p.o. nightly  Patient very lethargic on 1/4, likely from agitation night prior  Stat CT of head: no acute intracranial abnormality but demonstrated stable marked chronic small vessel ischemic changes and stable 1.3 cm meningioma at the left posterior lateral middle cranial fossa  Supportive care  Upgrade to mechanical soft diet and thin liquids per speech recommendations  Psychiatry recs: Change Zyprexa order to 5 mg qhs 1930 and also increase dosage to include daily dosage of 1.25 mg daily for improvement of agitation as patient appears to have been receiving Zyprexa prn every day for the past 6 days between 1.25-1.3 mg and therefore will add this to her total daily regimen of Zyprexa for improvement of agitation and in hopes of limiting PRN medication and in particular PRN IM medication.  Administer  melatonin 3 mg at 1930  Continue zoloft 25 mg daily for depressive symptoms  1/15: Repeat EKG QTc of 459.

## 2024-01-16 NOTE — PLAN OF CARE
Problem: Potential for Falls  Goal: Patient will remain free of falls  Description: INTERVENTIONS:  - Educate patient/family on patient safety including physical limitations  - Instruct patient to call for assistance with activity   - Consult OT/PT to assist with strengthening/mobility   - Keep Call bell within reach  - Keep bed low and locked with side rails adjusted as appropriate  - Keep care items and personal belongings within reach  - Initiate and maintain comfort rounds  - Make Fall Risk Sign visible to staff  - Offer Toileting every 3 Hours, in advance of need  - Initiate/Maintain bed/chair alarm  - Obtain necessary fall risk management equipment: bed/chair alarm  - Apply yellow socks and bracelet for high fall risk patients  - Consider moving patient to room near nurses station  Outcome: Progressing     Problem: Prexisting or High Potential for Compromised Skin Integrity  Goal: Skin integrity is maintained or improved  Description: INTERVENTIONS:  - Identify patients at risk for skin breakdown  - Assess and monitor skin integrity  - Assess and monitor nutrition and hydration status  - Monitor labs   - Assess for incontinence   - Turn and reposition patient  - Assist with mobility/ambulation  - Relieve pressure over bony prominences  - Avoid friction and shearing  - Provide appropriate hygiene as needed including keeping skin clean and dry  - Evaluate need for skin moisturizer/barrier cream  - Collaborate with interdisciplinary team   - Patient/family teaching  - Consider wound care consult   Outcome: Progressing     Problem: PAIN - ADULT  Goal: Verbalizes/displays adequate comfort level or baseline comfort level  Description: Interventions:  - Encourage patient to monitor pain and request assistance  - Assess pain using appropriate pain scale  - Administer analgesics based on type and severity of pain and evaluate response  - Implement non-pharmacological measures as appropriate and evaluate response  -  Consider cultural and social influences on pain and pain management  - Notify physician/advanced practitioner if interventions unsuccessful or patient reports new pain  Outcome: Progressing     Problem: INFECTION - ADULT  Goal: Absence or prevention of progression during hospitalization  Description: INTERVENTIONS:  - Assess and monitor for signs and symptoms of infection  - Monitor lab/diagnostic results  - Monitor all insertion sites, i.e. indwelling lines, tubes, and drains  - Administer medications as ordered  - Instruct and encourage patient and family to use good hand hygiene technique  - Identify and instruct in appropriate isolation precautions for identified infection/condition  Outcome: Progressing     Problem: INFECTION - ADULT  Goal: Absence of fever/infection during neutropenic period  Description: INTERVENTIONS:  - Monitor WBC    Outcome: Progressing     Problem: DISCHARGE PLANNING  Goal: Discharge to home or other facility with appropriate resources  Description: INTERVENTIONS:  - Identify barriers to discharge w/patient and caregiver  - Arrange for needed discharge resources and transportation as appropriate  - Identify discharge learning needs (meds, wound care, etc.)  - Refer to Case Management Department for coordinating discharge planning if the patient needs post-hospital services based on physician/advanced practitioner order or complex needs related to functional status, cognitive ability, or social support system  Outcome: Progressing

## 2024-01-16 NOTE — PROGRESS NOTES
[facility]  Progress Note  Name: Monika Butler I  MRN: [id number]  Unit/Bed#: [address] [place] [address] Date of Admission: 1/2/2024   Date of Service: 1/16/2024 I Hospital Day: 14    Assessment/Plan   * Dementia with behavioral disturbance (HCC)  Assessment & Plan  Patient has history of dementia with behavioral disturbances, worsening over past several months. Has been getting more agitated and refusing medications since day prior to admission. Prior facility said she needs a higher level of care and will not accept patient back.  UA showed moderate leukocytes and bacteria, completed ceftriaxone x 3 doses  Continue current home medications including aricept and zyprexa 2.5 mg hs  Patient was started on Buspar at [facility], discontinued  Psychiatry consulted; recommending to avoid Ativan for sedation/agitation, decreased Zyprexa to 1.25 mg p.o. daily in the morning and Zyprexa 3.75 mg p.o. nightly  Patient very lethargic on 1/4, likely from agitation night prior  Stat CT of head: no acute intracranial abnormality but demonstrated stable marked chronic small vessel ischemic changes and stable 1.3 cm meningioma at the left posterior lateral middle cranial fossa  Supportive care  Upgrade to mechanical soft diet and thin liquids per speech recommendations  Psychiatry recs: Change Zyprexa order to 5 mg qhs 1930 and also increase dosage to include daily dosage of 1.25 mg daily for improvement of agitation as patient appears to have been receiving Zyprexa prn every day for the past 6 days between 1.25-1.3 mg and therefore will add this to her total daily regimen of Zyprexa for improvement of agitation and in hopes of limiting PRN medication and in particular PRN IM medication.  Administer  melatonin 3 mg at 1930  Continue zoloft 25 mg daily for depressive symptoms  1/15: Repeat EKG QTc of 459.    Essential hypertension  Assessment & Plan  BP intermittently high, likely in setting of agitation  Continue  home losartan 100 mg daily  Increased metoprolol to 75 mg daily    Thrush, oral  Assessment & Plan  Started nystatin swish and swallow  Continue to monitor    Elevated troponin  Assessment & Plan  Troponin noted to be elevated at 24 on 1/3, repeated 1/4 elevated 300  Cardiology consulted  ECHO 1/5/24: EF 60-65%, systolic function normal, diastolic function mildly abnormal consistent with G1DD  Stress test canceled after discussion between cardiology and patient's family  Continue medical therapy with beta blocker and ARB  BP elevated, increased patient's metoprolol to 75 mg daily  Monitor            VTE Pharmacologic Prophylaxis: VTE Score: 3 Moderate Risk (Score 3-4) - Pharmacological DVT Prophylaxis Ordered: enoxaparin (Lovenox).     Mobility:   Basic Mobility Inpatient Raw Score: 8  -HLM Goal: 3: Sit at edge of bed  JH-HLM Achieved: 7: Walk 25 feet or more  HLM Goal NOT achieved. Continue with multidisciplinary rounding and encourage appropriate mobility to improve upon HLM goals.     Patient Centered Rounds: I performed bedside rounds with nursing staff today.   Discussions with Specialists or Other Care Team Provider: Nursing, Psych     Education and Discussions with Family / Patient: Updated  (daughter and son in law) at bedside.Ankur     Total Time Spent on Date of Encounter in care of patient:  mins. This time was spent on one or more of the following: performing physical exam; counseling and coordination of care; obtaining or reviewing history; documenting in the medical record; reviewing/ordering tests, medications or procedures; communicating with other healthcare professionals and discussing with patient's family/caregivers.     Current Length of Stay: 14 day(s)  Current Patient Status: Inpatient   Certification Statement: The patient will continue to require additional inpatient hospital stay due to rehab placement  Discharge Plan: Anticipate discharge in 24-48 hrs to  discharge location to be determined pending rehab evaluations.     Code Status: Level 1 - Full Code    Subjective:   Patient seen resting comfortably in bed.  Alert and able to participate in exam.  Complains of burning in mouth, on examination, thrush noted on tongue.  Denies abdominal pain, nausea or vomiting.    Objective:     Vitals:   Temp (24hrs), Av.6 °F (36.4 °C), Min:97.3 °F (36.3 °C), Max:97.8 °F (36.6 °C)    Temp:  [97.3 °F (36.3 °C)-97.8 °F (36.6 °C)] 97.8 °F (36.6 °C)  HR:  [58-63] 63  Resp:  [20-22] 20  BP: (160-166)/(74-80) 166/80  SpO2:  [94 %-95 %] 95 %  Body mass index is 21.37 kg/m².     Input and Output Summary (last 24 hours):   No intake or output data in the 24 hours ending 24 8946    Physical Exam:   Physical Exam  Constitutional:       Appearance: She is ill-appearing.   HENT:      Head: Normocephalic.      Mouth/Throat:      Mouth: Mucous membranes are moist.   Cardiovascular:      Rate and Rhythm: Normal rate.   Pulmonary:      Effort: Pulmonary effort is normal.   Abdominal:      General: Bowel sounds are normal.      Palpations: Abdomen is soft.   Musculoskeletal:         General: Normal range of motion.   Skin:     General: Skin is warm.      Capillary Refill: Capillary refill takes less than 2 seconds.   Neurological:      Mental Status: She is alert.   Psychiatric:         Behavior: Behavior is cooperative.         Cognition and Memory: Cognition is impaired.         Judgment: Judgment is impulsive.          Additional Data:     Labs:  Results from last 7 days   Lab Units 01/15/24  0425   WBC Thousand/uL 4.60   HEMOGLOBIN g/dL 12.5   HEMATOCRIT % 37.7   PLATELETS Thousands/uL 227     Results from last 7 days   Lab Units 01/15/24  0425   SODIUM mmol/L 141   POTASSIUM mmol/L 3.5   CHLORIDE mmol/L 103   CO2 mmol/L 30   BUN mg/dL 27*   CREATININE mg/dL 0.79   ANION GAP mmol/L 8   CALCIUM mg/dL 9.6   GLUCOSE RANDOM mg/dL 100                       Lines/Drains:  Invasive Devices        Peripheral Intravenous Line  Duration             Peripheral IV 01/13/24 Proximal;Right;Ventral (anterior) Forearm 3 days                          Imaging: No pertinent imaging reviewed.    Recent Cultures (last 7 days):         Last 24 Hours Medication List:   Current Facility-Administered Medications   Medication Dose Route Frequency Provider Last Rate    acetaminophen  650 mg Oral Q6H PRN Leyla Guardado PA-C      cholecalciferol  1,000 Units Oral Daily Leyla Guardado PA-C      docusate sodium  100 mg Oral BID Leyla Guardado PA-C      donepezil  10 mg Oral QAM DAMARIS Meza      enoxaparin  30 mg Subcutaneous Q24H JAYMIE DAMARIS Meza      labetalol  10 mg Intravenous Q6H PRN Leyla Guardado PA-C      lidocaine  1 patch Topical Daily DAMARIS Meza      loratadine  10 mg Oral Daily DAMARIS Meza      losartan  100 mg Oral Daily Leyla Guardado PA-C      melatonin  3 mg Oral Daily Ash Silvestre,       metoprolol succinate  75 mg Oral Daily Leyla Guadrado PA-C      nystatin  500,000 Units Swish & Swallow 4x Daily DAMARIS Engel      OLANZapine  1.25 mg Oral Q8H PRN Ash Silvestre DO      Or    OLANZapine  1.3 mg Intramuscular Q8H PRN Ash Silvestre,       OLANZapine  1.25 mg Oral Daily Ash Silvestre,       OLANZapine  5 mg Oral HS Ash Silvestre DO      ondansetron  4 mg Intravenous Q6H PRN Leyla Guardado PA-C      sertraline  25 mg Oral Daily Ash Silevstre DO          Today, Patient Was Seen By: DAMARIS Engel    **Please Note: This note may have been constructed using a voice recognition system.**

## 2024-01-16 NOTE — SPEECH THERAPY NOTE
"Speech/Language Pathology Progress Note    Patient Name: Monika Butler  Today's Date: 1/16/2024     Problem List  Principal Problem:    Dementia with behavioral disturbance (HCC)  Active Problems:    Essential hypertension    Elevated troponin    Subjective:  \"I'm cold\" (pt covered with 3 additional blankets) and \"My mouth burns\" (spoke ammy OCAMPO who assessed same and is ordering Nystatin (sxs of thrush).     Objective:  Pt was seen for diagnostic dysphagia therapy to ensure tolerance of oral diet.  Pt was alert and positioned upright. Initially, she presented as confused (calling out for her mom) but more appropriate in actions as session progressed.    Pt was assessed with liquids (liked coffee and apple juice).  She declined food.  Bolus transfer and swallow initiation appeared prompt. No coughing, throat clearing, c/o stasis, multiple swallows, change in vocal quality noted c po intake today.     Assessment:  No s/s aspiration with thin liquid today.     Plan/Recommendations:  Continue current diet, assistance with al meals/snacks, supplements per RD.    Vesta Dumont MS CCC-SLP  [place] License [id number]  Available via [employer]       " 68

## 2024-01-16 NOTE — CASE MANAGEMENT
MD Support Center received request for authorization from Care Manager.  Authorization request for: [facility]  Facility Name:  [facility] NPI: [id number]  Facility MD:   Dr. Roy  NPI: [id number]  Authorization initiated by contacting insurance:  Hanna  Via: [employer]   Clinicals submitted via: [employer] attachment   Pending Reference #: [id number]

## 2024-01-16 NOTE — CASE MANAGEMENT
Case Management Discharge Planning Note    Patient name Monika Butler  Location [address] [address]/[address]-* MRN [id number]  : 1940 Date 2024       Current Admission Date: 2024  Current Admission Diagnosis:Dementia with behavioral disturbance (HCC)   Patient Active Problem List    Diagnosis Date Noted    Thrush, oral 2024    Elevated troponin 2024    Constipation 2023    Acute bronchiolitis 2023    Nondisplaced fracture of triquetrum (cuneiform) bone, left wrist, initial encounter for closed fracture 2023    Abrasion of left eyebrow 2023    Injury of left wrist 2023    Insomnia 2023    Anxiety 2023    Absolute anemia 2023    H/O clavicle fracture 2023    Meningioma (HCC) 2023    Pulmonary nodule 2023    Bad odor of urine 2023    Postmenopausal 2023    Prediabetes 2023    Gastroesophageal reflux disease with esophagitis without hemorrhage 2023    Generalized weakness 2022    Urinary frequency 2022    BMI 22.0-22.9, adult 2021    History of breast cancer 2021    Altered mental status 2020    Medicare annual wellness visit, subsequent 2020    Balance problems 2020    Dementia with behavioral disturbance (HCC)     Memory change 10/26/2020    Ear fullness, left 2017    Sinusitis 2017    Hyperlipidemia 2016    Breast cancer (HCC) 2013    Essential hypertension 2012      LOS (days): 14  Geometric Mean LOS (GMLOS) (days): 5  Days to GMLOS:-8     OBJECTIVE:  Risk of Unplanned Readmission Score: 32.72         Current admission status: Inpatient   Preferred Pharmacy:   [id number] - [address] [address]  Phone: [phone] Fax: [phone]    Primary Care Provider: Krsitin Raymundo MD    Primary Insurance: [facility] REP  Secondary Insurance:     DISCHARGE  DETAILS:    Discharge planning discussed with:: Dtrs Anitha and Anna     Other Referral/Resources/Interventions Provided:  Referral Comments: CM called dtr Anna to follow up if she had heard from [facility] and [facility]. Anna stated she has not heard from [facility] and she is scheduled for a tour at [facility] tomm 1/17 in afternoon. CM discussed therapy recs for moderate resource intensity with Anna and if agreaable for STR while placement between [facility] and [facility] is decided. CM also followed up with Anna regarding the list of private cg and if she was able to contact anyone. Per Anna the one in [place] did not work out and having a private aide at [place] would cost too much more.Anna stated she will talk to Anitha about STR and will call back. Anitha called CM back questioning medical stability when pysch meds take a while to work. LUPE explained to Anitha that psych meds can be managed in an outpatient setting and may not require acute hospital setting. Anna requested psych physician Dr. Silvestre to call her back to give an update. CM informed that he may probably not be working today and asked if the advanced practitioner Brad or SABINA Verma can call to give update. Anitha agreeable for SABINA Verma to call her back and was agreeable for CM to send referral to [facility] but wanted patient to be on the 1st floor. CM informed Anitha that CM will mention that in the referral note. [facility], [facility], awaiting response. Anna called CM asking for an update from [facility]. CM made Anna aware that Laura from [facility] has reviewed the referral and patient behavior has been in question and she would like to review the referral again tom pending patient's behavior. Anna stated she does not want patient to go to [facility]. CM informed  Anna that if patient is medically stable and has any one facility accepting her, she may have to go with that facility, [facility]. CM also reviewed Medicare rights with Anna over phone. CM made SABINA Verma aware to call Anitha with any update when she gets chance. CM to follow up with both dtrs Anna and Anitha  and str facilities tomm.

## 2024-01-17 LAB
ANION GAP SERPL CALCULATED.3IONS-SCNC: 8 MMOL/L
BUN SERPL-MCNC: 34 MG/DL (ref 5–25)
CALCIUM SERPL-MCNC: 9.7 MG/DL (ref 8.4–10.2)
CHLORIDE SERPL-SCNC: 103 MMOL/L (ref 96–108)
CO2 SERPL-SCNC: 30 MMOL/L (ref 21–32)
CREAT SERPL-MCNC: 0.87 MG/DL (ref 0.6–1.3)
ERYTHROCYTE [DISTWIDTH] IN BLOOD BY AUTOMATED COUNT: 13.4 % (ref 11.6–15.1)
GFR SERPL CREATININE-BSD FRML MDRD: 61 ML/MIN/1.73SQ M
GLUCOSE SERPL-MCNC: 141 MG/DL (ref 65–140)
HCT VFR BLD AUTO: 39.3 % (ref 34.8–46.1)
HGB BLD-MCNC: 12.6 G/DL (ref 11.5–15.4)
MCH RBC QN AUTO: 32.3 PG (ref 26.8–34.3)
MCHC RBC AUTO-ENTMCNC: 32.1 G/DL (ref 31.4–37.4)
MCV RBC AUTO: 101 FL (ref 82–98)
PLATELET # BLD AUTO: 234 THOUSANDS/UL (ref 149–390)
PMV BLD AUTO: 11 FL (ref 8.9–12.7)
POTASSIUM SERPL-SCNC: 3.5 MMOL/L (ref 3.5–5.3)
PROCALCITONIN SERPL-MCNC: 0.25 NG/ML
RBC # BLD AUTO: 3.9 MILLION/UL (ref 3.81–5.12)
SODIUM SERPL-SCNC: 141 MMOL/L (ref 135–147)
WBC # BLD AUTO: 5.24 THOUSAND/UL (ref 4.31–10.16)

## 2024-01-17 PROCEDURE — 80048 BASIC METABOLIC PNL TOTAL CA: CPT | Performed by: NURSE PRACTITIONER

## 2024-01-17 PROCEDURE — 85027 COMPLETE CBC AUTOMATED: CPT | Performed by: NURSE PRACTITIONER

## 2024-01-17 PROCEDURE — 84145 PROCALCITONIN (PCT): CPT | Performed by: NURSE PRACTITIONER

## 2024-01-17 PROCEDURE — 99232 SBSQ HOSP IP/OBS MODERATE 35: CPT | Performed by: NURSE PRACTITIONER

## 2024-01-17 RX ORDER — OLANZAPINE 10 MG/2ML
1.3 INJECTION, POWDER, FOR SOLUTION INTRAMUSCULAR ONCE
Status: COMPLETED | OUTPATIENT
Start: 2024-01-17 | End: 2024-01-17

## 2024-01-17 RX ADMIN — OLANZAPINE 1.3 MG: 10 INJECTION, POWDER, FOR SOLUTION INTRAMUSCULAR at 15:05

## 2024-01-17 RX ADMIN — NYSTATIN 500000 UNITS: 100000 SUSPENSION ORAL at 18:25

## 2024-01-17 RX ADMIN — METOPROLOL SUCCINATE 75 MG: 50 TABLET, EXTENDED RELEASE ORAL at 11:48

## 2024-01-17 RX ADMIN — Medication 1000 UNITS: at 11:48

## 2024-01-17 RX ADMIN — NYSTATIN 500000 UNITS: 100000 SUSPENSION ORAL at 22:33

## 2024-01-17 RX ADMIN — Medication 3 MG: at 18:39

## 2024-01-17 RX ADMIN — ENOXAPARIN SODIUM 30 MG: 30 INJECTION SUBCUTANEOUS at 12:01

## 2024-01-17 RX ADMIN — OLANZAPINE 5 MG: 2.5 TABLET, FILM COATED ORAL at 18:39

## 2024-01-17 RX ADMIN — LIDOCAINE 1 PATCH: 700 PATCH TOPICAL at 12:01

## 2024-01-17 RX ADMIN — SERTRALINE 25 MG: 25 TABLET, FILM COATED ORAL at 11:48

## 2024-01-17 RX ADMIN — DONEPEZIL HYDROCHLORIDE 10 MG: 5 TABLET ORAL at 11:48

## 2024-01-17 RX ADMIN — OLANZAPINE 1.3 MG: 10 INJECTION, POWDER, FOR SOLUTION INTRAMUSCULAR at 10:43

## 2024-01-17 RX ADMIN — LOSARTAN POTASSIUM 100 MG: 50 TABLET, FILM COATED ORAL at 11:48

## 2024-01-17 RX ADMIN — LORATADINE 10 MG: 10 TABLET ORAL at 11:48

## 2024-01-17 NOTE — CASE MANAGEMENT
Case Management Progress Note    Patient name Monika Butler  Location [address] [place] [address] [place] [address] MRN [id number]  : 1940 Date 2024       LOS (days): 15  Geometric Mean LOS (GMLOS) (days): 5  Days to GMLOS:-8.9        OBJECTIVE:        Current admission status: Inpatient  Preferred Pharmacy:   [id number] - [address] [address]  Phone: [phone] Fax: [phone]    Primary Care Provider: Kristin Raymundo MD    Primary Insurance: [employer] REP  Secondary Insurance:     PROGRESS NOTE:    CM called and spoke with Harriett  at [facility] to follow up if able to take patient back. Harriett stated she already spoke with both dtrs Anitha and Anna that they will need to arrange for a 1:1 private aide for patient atleast for 72 hours at [facility] and that she has not heard back from them since then. Donovan also provided names of  private caregiver agencies to CM which she had provided to both dtrs. CM received an email from dtr Anna this morning for an update. CM informed Anna that str auth approval is still pending and so is acceptance from [facility]. LUPE asked again about [facility] and [facility] and also suggested to look into options such has having a private aide at home to care for patient if possible which may also help patient with her behavior. Anna stated her sister may not be agreeable with that option. Case discussed with Advanced Practitioner Giuliana. LUPE was then made aware that STR auth was denied by insurance. CM provided peer to peer information to Giuliana who agreed to call for peer to peer. Dtr Anna made aware of the str auth denial. LUPE asked Anna about her appt with [facility] and Anna then informed CM that now the appt with [facility] is not until  and she was told that it may possibly take upto 1 to 3 weeks for patient to be  accepted at [facility]. Advanced Practitioner Giuliana and Clinical Coordinator Marysol made aware of the situation.

## 2024-01-17 NOTE — ASSESSMENT & PLAN NOTE
Patient has history of dementia with behavioral disturbances, worsening over past several months. Has been getting more agitated and refusing medications since day prior to admission. Prior facility said she needs a higher level of care and will not accept patient back.  UA showed moderate leukocytes and bacteria, completed ceftriaxone x 3 doses  Continue current home medications including aricept and zyprexa 2.5 mg hs  Patient was started on Buspar at [facility], discontinued  Psychiatry consulted; recommending to avoid Ativan for sedation/agitation, decreased Zyprexa to 1.25 mg p.o. daily in the morning and Zyprexa 3.75 mg p.o. nightly  Patient very lethargic on 1/4, likely from agitation night prior  Stat CT of head: no acute intracranial abnormality but demonstrated stable marked chronic small vessel ischemic changes and stable 1.3 cm meningioma at the left posterior lateral middle cranial fossa  Supportive care  Upgrade to mechanical soft diet and thin liquids per speech recommendations  Psychiatry recs: Change Zyprexa order to 5 mg qhs 1930 and also increase dosage to include daily dosage of 1.25 mg daily for improvement of agitation as patient appears to have been receiving Zyprexa prn every day for the past 6 days between 1.25-1.3 mg and therefore will add this to her total daily regimen of Zyprexa for improvement of agitation and in hopes of limiting PRN medication and in particular PRN IM medication.  Administer  melatonin 3 mg at 1930  Continue zoloft 25 mg daily for depressive symptoms  1/15: Repeat EKG QTc of 459.  Patient intermittently continues to have periods where she is yelling, needs redirection

## 2024-01-17 NOTE — PLAN OF CARE
Problem: Potential for Falls  Goal: Patient will remain free of falls  Description: INTERVENTIONS:  - Educate patient/family on patient safety including physical limitations  - Instruct patient to call for assistance with activity   - Consult OT/PT to assist with strengthening/mobility   - Keep Call bell within reach  - Keep bed low and locked with side rails adjusted as appropriate  - Keep care items and personal belongings within reach  - Initiate and maintain comfort rounds  - Make Fall Risk Sign visible to staff  - Offer Toileting every 3 Hours, in advance of need  - Initiate/Maintain bed/chair alarm  - Obtain necessary fall risk management equipment: bed/chair alarm  - Apply yellow socks and bracelet for high fall risk patients  - Consider moving patient to room near nurses station  Outcome: Progressing     Problem: DISCHARGE PLANNING  Goal: Discharge to home or other facility with appropriate resources  Description: INTERVENTIONS:  - Identify barriers to discharge w/patient and caregiver  - Arrange for needed discharge resources and transportation as appropriate  - Identify discharge learning needs (meds, wound care, etc.)  - Refer to Case Management Department for coordinating discharge planning if the patient needs post-hospital services based on physician/advanced practitioner order or complex needs related to functional status, cognitive ability, or social support system  Outcome: Progressing     Problem: SAFETY,RESTRAINT: NV/NON-SELF DESTRUCTIVE BEHAVIOR  Goal: Returns to optimal restraint-free functioning  Description: INTERVENTIONS:  - Assess the patient's behavior and symptoms that indicate continued need for restraint  - Identify and implement measures to help patient regain control  - Assess readiness for release of restraint   Outcome: Progressing

## 2024-01-17 NOTE — PROGRESS NOTES
[facility]  Progress Note  Name: Monika Butler I  MRN: [id number]  Unit/Bed#: [address] [place] [address] Date of Admission: 1/2/2024   Date of Service: 1/17/2024 I Hospital Day: 15    Assessment/Plan   * Dementia with behavioral disturbance (HCC)  Assessment & Plan  Patient has history of dementia with behavioral disturbances, worsening over past several months. Has been getting more agitated and refusing medications since day prior to admission. Prior facility said she needs a higher level of care and will not accept patient back.  UA showed moderate leukocytes and bacteria, completed ceftriaxone x 3 doses  Continue current home medications including aricept and zyprexa 2.5 mg hs  Patient was started on Buspar at [facility], discontinued  Psychiatry consulted; recommending to avoid Ativan for sedation/agitation, decreased Zyprexa to 1.25 mg p.o. daily in the morning and Zyprexa 3.75 mg p.o. nightly  Patient very lethargic on 1/4, likely from agitation night prior  Stat CT of head: no acute intracranial abnormality but demonstrated stable marked chronic small vessel ischemic changes and stable 1.3 cm meningioma at the left posterior lateral middle cranial fossa  Supportive care  Upgrade to mechanical soft diet and thin liquids per speech recommendations  Psychiatry recs: Change Zyprexa order to 5 mg qhs 1930 and also increase dosage to include daily dosage of 1.25 mg daily for improvement of agitation as patient appears to have been receiving Zyprexa prn every day for the past 6 days between 1.25-1.3 mg and therefore will add this to her total daily regimen of Zyprexa for improvement of agitation and in hopes of limiting PRN medication and in particular PRN IM medication.  Administer  melatonin 3 mg at 1930  Continue zoloft 25 mg daily for depressive symptoms  1/15: Repeat EKG QTc of 459.  Patient intermittently continues to have periods where she is yelling, needs redirection    Elevated  troponin  Assessment & Plan  Troponin noted to be elevated at 24 on 1/3, repeated 1/4 elevated 300  Cardiology consulted  ECHO 1/5/24: EF 60-65%, systolic function normal, diastolic function mildly abnormal consistent with G1DD  Stress test canceled after discussion between cardiology and patient's family  Continue medical therapy with beta blocker and ARB  BP occasionally elevated usually associated with periods of agitation, increased patient's metoprolol to 75 mg daily  Monitor     Essential hypertension  Assessment & Plan  BP intermittently high, likely in setting of agitation  Continue home losartan 100 mg daily  Increased metoprolol to 75 mg daily    Thrush, oral  Assessment & Plan  Started nystatin swish and swallow  Continue to monitor               VTE Pharmacologic Prophylaxis: VTE Score: 3 Moderate Risk (Score 3-4) - Pharmacological DVT Prophylaxis Ordered: enoxaparin (Lovenox).    Mobility:   Basic Mobility Inpatient Raw Score: 16  JH-HLM Goal: 5: Stand one or more mins  JH-HLM Achieved: 6: Walk 10 steps or more  HLM Goal achieved. Continue to encourage appropriate mobility.    Patient Centered Rounds: I performed bedside rounds with nursing staff today.   Discussions with Specialists or Other Care Team Provider: multidisciplinary team     Education and Discussions with Family / Patient: Updated  (daughter) at bedside.    Total Time Spent on Date of Encounter in care of patient: greater than 45 mins. This time was spent on one or more of the following: performing physical exam; counseling and coordination of care; obtaining or reviewing history; documenting in the medical record; reviewing/ordering tests, medications or procedures; communicating with other healthcare professionals and discussing with patient's family/caregivers.    Current Length of Stay: 15 day(s)  Current Patient Status: Inpatient   Certification Statement: The patient will continue to require additional inpatient  hospital stay due to PT and OT, pending peer to peer review with insurance company for authorization for PT and OT recommended STR  Discharge Plan: Anticipate discharge in 24-48 hrs to STR vs assisted living    Code Status: Level 1 - Full Code    Subjective:   Patient seen in the morning time, curled up in a ball, answering with one-word answers.  Indicated that she was cold and tired.  Did participate in exam.  Later in the morning patient was yelling at staff to get out of her room, required redirection.    Objective:     Vitals:   Temp (24hrs), Av.9 °F (36.6 °C), Min:97.8 °F (36.6 °C), Max:97.9 °F (36.6 °C)    Temp:  [97.8 °F (36.6 °C)-97.9 °F (36.6 °C)] 97.8 °F (36.6 °C)  HR:  [53-64] 63  Resp:  [18-20] 20  BP: (151-163)/(68-79) 151/68  SpO2:  [85 %-95 %] 95 %  Body mass index is 21.37 kg/m².     Input and Output Summary (last 24 hours):     Intake/Output Summary (Last 24 hours) at 2024 1659  Last data filed at 2024 0825  Gross per 24 hour   Intake 60 ml   Output --   Net 60 ml       Physical Exam:   Physical Exam  Vitals and nursing note reviewed.   Constitutional:       Comments: Initially seen on rounds, curled up, eyes closed.  Does answer questions monosyllabically.  Later patient was seen, yelling at staff, yelling for them to get out of her room required redirection.   HENT:      Head: Normocephalic.      Nose: Nose normal.      Mouth/Throat:      Mouth: Mucous membranes are dry.   Eyes:      Extraocular Movements: Extraocular movements intact.      Conjunctiva/sclera: Conjunctivae normal.   Cardiovascular:      Rate and Rhythm: Normal rate and regular rhythm.      Pulses: Normal pulses.      Heart sounds: Normal heart sounds.   Pulmonary:      Effort: Pulmonary effort is normal.      Breath sounds: Normal breath sounds.   Abdominal:      General: Bowel sounds are normal. There is no distension.      Palpations: Abdomen is soft.      Tenderness: There is no abdominal tenderness.    Genitourinary:     Comments: Voiding spontaneously  Musculoskeletal:         General: Normal range of motion.      Cervical back: Normal range of motion.      Comments: Generalized deconditioning noted    Skin:     General: Skin is warm and dry.      Capillary Refill: Capillary refill takes less than 2 seconds.      Coloration: Skin is pale.   Neurological:      Comments: Patient has no focal deficit, unable to tell me where she is at, the date or the president   Psychiatric:      Comments: Patient seen early in day, withdrawn, reports feeling cold. Later patient was seen yelling at staff to get out of her room           Additional Data:     Labs:  Results from last 7 days   Lab Units 01/17/24  1257   WBC Thousand/uL 5.24   HEMOGLOBIN g/dL 12.6   HEMATOCRIT % 39.3   PLATELETS Thousands/uL 234     Results from last 7 days   Lab Units 01/17/24  1257   SODIUM mmol/L 141   POTASSIUM mmol/L 3.5   CHLORIDE mmol/L 103   CO2 mmol/L 30   BUN mg/dL 34*   CREATININE mg/dL 0.87   ANION GAP mmol/L 8   CALCIUM mg/dL 9.7   GLUCOSE RANDOM mg/dL 141*                 Results from last 7 days   Lab Units 01/17/24  1257   PROCALCITONIN ng/ml 0.25       Lines/Drains:  Invasive Devices       Peripheral Intravenous Line  Duration             Peripheral IV 01/13/24 Proximal;Right;Ventral (anterior) Forearm 4 days                          Imaging: No pertinent imaging reviewed.    Recent Cultures (last 7 days):         Last 24 Hours Medication List:   Current Facility-Administered Medications   Medication Dose Route Frequency Provider Last Rate    acetaminophen  650 mg Oral Q6H PRN Leyla Guardado PA-C      cholecalciferol  1,000 Units Oral Daily Leyla Guardado PA-C      docusate sodium  100 mg Oral BID Leyla Guardado PA-C      donepezil  10 mg Oral QAM DAMARIS Meza      enoxaparin  30 mg Subcutaneous Q24H [facility] DAMARIS Meza      labetalol  10 mg Intravenous Q6H PRN Leyla Guardado PA-C      lidocaine  1 patch  Topical Daily DAMARIS Meza      loratadine  10 mg Oral Daily DAMARIS Meza      losartan  100 mg Oral Daily Leyla Guardado PA-C      melatonin  3 mg Oral Daily Ash Silvestre, DO      metoprolol succinate  75 mg Oral Daily Leyla Guardado PA-C      nystatin  500,000 Units Swish & Swallow 4x Daily Olayide D DAMARIS Alvarenga      OLANZapine  1.25 mg Oral Q8H PRN Ash Silvestre, DO      Or    OLANZapine  1.3 mg Intramuscular Q8H PRN Ash Silvestre, DO      OLANZapine  1.25 mg Oral Daily Ash Silvestre, DO      OLANZapine  5 mg Oral HS Ash Silvestre, DO      ondansetron  4 mg Intravenous Q6H PRN Leyla Guardado PA-C      sertraline  25 mg Oral Daily Ash Silvestre, DO          Today, Patient Was Seen By: DAMARIS Meza    **Please Note: This note may have been constructed using a voice recognition system.**

## 2024-01-17 NOTE — PHYSICAL THERAPY NOTE
PHYSICAL THERAPY     01/17/24 1150   Note Type   Note Type Cancelled Session   Cancel Reasons Other  (patient agitated with PT request. will re-attempt at a later time as appropriate)   Licensure   [place] License Number  Laura Kennedy PT [id number]

## 2024-01-17 NOTE — CASE MANAGEMENT
Support Center has received Intent to Deny.   Received for SNF Authorization.   Insurance: [employer]  Intent to Deny Reason: no skilled needs  Facility:[facility]    Pending Auth #: [id number]   Peer to Peer Phone#:   [phone] opt 1    Deadline: 1/17 @ 4:30  Appeal P#  [phone]   Gail P#: [phone]  Care Manager notified: Marybel Busby

## 2024-01-17 NOTE — PLAN OF CARE
Problem: Potential for Falls  Goal: Patient will remain free of falls  Description: INTERVENTIONS:  - Educate patient/family on patient safety including physical limitations  - Instruct patient to call for assistance with activity   - Consult OT/PT to assist with strengthening/mobility   - Keep Call bell within reach  - Keep bed low and locked with side rails adjusted as appropriate  - Keep care items and personal belongings within reach  - Initiate and maintain comfort rounds  - Make Fall Risk Sign visible to staff  - Offer Toileting every 3 Hours, in advance of need  - Initiate/Maintain bed/chair alarm  - Obtain necessary fall risk management equipment: bed/chair alarm  - Apply yellow socks and bracelet for high fall risk patients  - Consider moving patient to room near nurses station  Outcome: Progressing     Problem: INFECTION - ADULT  Goal: Absence or prevention of progression during hospitalization  Description: INTERVENTIONS:  - Assess and monitor for signs and symptoms of infection  - Monitor lab/diagnostic results  - Monitor all insertion sites, i.e. indwelling lines, tubes, and drains  - Administer medications as ordered  - Instruct and encourage patient and family to use good hand hygiene technique  - Identify and instruct in appropriate isolation precautions for identified infection/condition  Outcome: Progressing     Problem: GENITOURINARY - ADULT  Goal: Maintains or returns to baseline urinary function  Description: INTERVENTIONS:  - Assess urinary function  - Encourage oral fluids to ensure adequate hydration if ordered  - Administer IV fluids as ordered to ensure adequate hydration  - Administer ordered medications as needed  - Offer frequent toileting  - Follow urinary retention protocol if ordered  Outcome: Progressing     Problem: METABOLIC, FLUID AND ELECTROLYTES - ADULT  Goal: Fluid balance maintained  Description: INTERVENTIONS:  - Monitor labs   - Monitor I/O and WT  - Instruct patient on  fluid and nutrition as appropriate  - Assess for signs & symptoms of volume excess or deficit  Outcome: Progressing

## 2024-01-17 NOTE — ASSESSMENT & PLAN NOTE
Troponin noted to be elevated at 24 on 1/3, repeated 1/4 elevated 300  Cardiology consulted  ECHO 1/5/24: EF 60-65%, systolic function normal, diastolic function mildly abnormal consistent with G1DD  Stress test canceled after discussion between cardiology and patient's family  Continue medical therapy with beta blocker and ARB  BP occasionally elevated usually associated with periods of agitation, increased patient's metoprolol to 75 mg daily  Monitor

## 2024-01-18 LAB
ANION GAP SERPL CALCULATED.3IONS-SCNC: 6 MMOL/L
BUN SERPL-MCNC: 40 MG/DL (ref 5–25)
CALCIUM SERPL-MCNC: 9.4 MG/DL (ref 8.4–10.2)
CHLORIDE SERPL-SCNC: 107 MMOL/L (ref 96–108)
CO2 SERPL-SCNC: 31 MMOL/L (ref 21–32)
CREAT SERPL-MCNC: 0.9 MG/DL (ref 0.6–1.3)
ERYTHROCYTE [DISTWIDTH] IN BLOOD BY AUTOMATED COUNT: 13.3 % (ref 11.6–15.1)
GFR SERPL CREATININE-BSD FRML MDRD: 58 ML/MIN/1.73SQ M
GLUCOSE SERPL-MCNC: 111 MG/DL (ref 65–140)
HCT VFR BLD AUTO: 33.9 % (ref 34.8–46.1)
HGB BLD-MCNC: 11.1 G/DL (ref 11.5–15.4)
MCH RBC QN AUTO: 32.8 PG (ref 26.8–34.3)
MCHC RBC AUTO-ENTMCNC: 32.7 G/DL (ref 31.4–37.4)
MCV RBC AUTO: 100 FL (ref 82–98)
PLATELET # BLD AUTO: 211 THOUSANDS/UL (ref 149–390)
PMV BLD AUTO: 11.2 FL (ref 8.9–12.7)
POTASSIUM SERPL-SCNC: 3.8 MMOL/L (ref 3.5–5.3)
RBC # BLD AUTO: 3.38 MILLION/UL (ref 3.81–5.12)
SODIUM SERPL-SCNC: 144 MMOL/L (ref 135–147)
WBC # BLD AUTO: 4.43 THOUSAND/UL (ref 4.31–10.16)

## 2024-01-18 PROCEDURE — 85027 COMPLETE CBC AUTOMATED: CPT | Performed by: NURSE PRACTITIONER

## 2024-01-18 PROCEDURE — 97535 SELF CARE MNGMENT TRAINING: CPT

## 2024-01-18 PROCEDURE — 99232 SBSQ HOSP IP/OBS MODERATE 35: CPT | Performed by: NURSE PRACTITIONER

## 2024-01-18 PROCEDURE — 80048 BASIC METABOLIC PNL TOTAL CA: CPT | Performed by: NURSE PRACTITIONER

## 2024-01-18 PROCEDURE — 97110 THERAPEUTIC EXERCISES: CPT

## 2024-01-18 RX ADMIN — DONEPEZIL HYDROCHLORIDE 10 MG: 5 TABLET ORAL at 10:40

## 2024-01-18 RX ADMIN — Medication 3 MG: at 19:12

## 2024-01-18 RX ADMIN — LOSARTAN POTASSIUM 100 MG: 50 TABLET, FILM COATED ORAL at 10:40

## 2024-01-18 RX ADMIN — METOPROLOL SUCCINATE 75 MG: 50 TABLET, EXTENDED RELEASE ORAL at 10:40

## 2024-01-18 RX ADMIN — Medication 1000 UNITS: at 10:40

## 2024-01-18 RX ADMIN — OLANZAPINE 1.25 MG: 2.5 TABLET, FILM COATED ORAL at 10:40

## 2024-01-18 RX ADMIN — OLANZAPINE 5 MG: 2.5 TABLET, FILM COATED ORAL at 19:12

## 2024-01-18 RX ADMIN — SERTRALINE 25 MG: 25 TABLET, FILM COATED ORAL at 10:40

## 2024-01-18 NOTE — ASSESSMENT & PLAN NOTE
BP intermittently high, likely in setting of agitation  Continue home losartan 100 mg daily  Continue metoprolol to 75 mg daily

## 2024-01-18 NOTE — PROGRESS NOTES
[facility]  Progress Note  Name: Monika Butler I  MRN: [id number]  Unit/Bed#: [address] [place] [address] Date of Admission: 1/2/2024   Date of Service: 1/18/2024 I Hospital Day: 16    Assessment/Plan   * Dementia with behavioral disturbance (HCC)  Assessment & Plan  Patient has history of dementia with behavioral disturbances, worsening over past several months. Has been getting more agitated and refusing medications since day prior to admission. Prior facility said she needs a higher level of care and will not accept patient back.  UA showed moderate leukocytes and bacteria, completed ceftriaxone x 3 doses  Continue current home medications including aricept and zyprexa 2.5 mg hs  Patient was started on Buspar at [facility], discontinued  Psychiatry consulted; recommending to avoid Ativan for sedation/agitation, decreased Zyprexa to 1.25 mg p.o. daily in the morning and Zyprexa 5 mg p.o. nightly  Patient very lethargic on 1/4, likely from agitation night prior  Stat CT of head: no acute intracranial abnormality but demonstrated stable marked chronic small vessel ischemic changes and stable 1.3 cm meningioma at the left posterior lateral middle cranial fossa  Supportive care  Upgrade to mechanical soft diet and thin liquids per speech recommendations  Psychiatry recs: Change Zyprexa order to 5 mg qhs 1930 and also increase dosage to include daily dosage of 1.25 mg daily for improvement of agitation as patient appears to have been receiving Zyprexa prn every day for the past 6 days between 1.25-1.3 mg and therefore will add this to her total daily regimen of Zyprexa for improvement of agitation and in hopes of limiting PRN medication and in particular PRN IM medication.  Administer  melatonin 3 mg at 1930  Continue zoloft 25 mg daily for depressive symptoms  1/15: Repeat EKG QTc of 459.  Patient intermittently continues to have periods where she is yelling, needs redirection at times   1/18 was  calling out; settled when spoken to, cooperative with exam; assisted with her meal    Elevated troponin  Assessment & Plan  Troponin noted to be elevated at 24 on 1/3, repeated 1/4 elevated 300  Cardiology consulted  ECHO 1/5/24: EF 60-65%, systolic function normal, diastolic function mildly abnormal consistent with G1DD  Stress test canceled after discussion between cardiology and patient's family  Continue medical therapy with beta blocker and ARB  BP occasionally elevated usually associated with periods of agitation, increased patient's metoprolol to 75 mg daily  BP more stable     Essential hypertension  Assessment & Plan  BP intermittently high, likely in setting of agitation  Continue home losartan 100 mg daily  Continue metoprolol to 75 mg daily    Thrush, oral  Assessment & Plan  Started nystatin swish and swallow  Continue to monitor               VTE Pharmacologic Prophylaxis: VTE Score: 3 Moderate Risk (Score 3-4) - Pharmacological DVT Prophylaxis Ordered: enoxaparin (Lovenox).    Mobility:   Basic Mobility Inpatient Raw Score: 11  JH-HLM Goal: 4: Move to chair/commode  JH-HLM Achieved: 5: Stand (1 or more minutes)  HLM Goal achieved. Continue to encourage appropriate mobility.    Patient Centered Rounds: I performed bedside rounds with nursing staff today.   Discussions with Specialists or Other Care Team Provider: multidisciplinary team     Education and Discussions with Family / Patient: Updated  (daughter) at bedside.    Total Time Spent on Date of Encounter in care of patient: greater than 45  mins. This time was spent on one or more of the following: performing physical exam; counseling and coordination of care; obtaining or reviewing history; documenting in the medical record; reviewing/ordering tests, medications or procedures; communicating with other healthcare professionals and discussing with patient's family/caregivers.    Current Length of Stay: 16 day(s)  Current Patient  "Status: Inpatient   Certification Statement: The patient will continue to require additional inpatient hospital stay due to generalized deconditioning, peer to peer review with [facility] insurance  Discharge Plan:  When bed available for STR     Code Status: Level 1 - Full Code    Subjective:   Patient seen initially on rounds sleeping; allowed patient to sleep. Later around lunch time patient was calling out for help. Verbal reassurance provided, patient settled, stated \"I was scared.\" Assisted patient with meal; chewing and swallowing without any difficulties at this time. Cooperative with exam. She continues to intermittently call out; redirects.     Objective:     Vitals:   Temp (24hrs), Av.8 °F (36.6 °C), Min:97.4 °F (36.3 °C), Max:98.6 °F (37 °C)    Temp:  [97.4 °F (36.3 °C)-98.6 °F (37 °C)] 97.4 °F (36.3 °C)  HR:  [63-79] 79  Resp:  [18-20] 18  BP: (144-164)/(63-92) 154/92  SpO2:  [91 %-95 %] 92 %  Body mass index is 21.37 kg/m².     Input and Output Summary (last 24 hours):     Intake/Output Summary (Last 24 hours) at 2024 1247  Last data filed at 2024 0800  Gross per 24 hour   Intake 0 ml   Output --   Net 0 ml       Physical Exam:   Physical Exam  Vitals and nursing note reviewed.   Constitutional:       General: She is not in acute distress.     Comments: Patient initially seen sleeping; later calling out for help; settled when spoken to and reassured; stated, \"I was afraid.\"    HENT:      Head: Normocephalic.      Nose: Nose normal.      Mouth/Throat:      Mouth: Mucous membranes are dry.   Eyes:      Extraocular Movements: Extraocular movements intact.      Conjunctiva/sclera: Conjunctivae normal.   Cardiovascular:      Rate and Rhythm: Normal rate.      Pulses: Normal pulses.   Pulmonary:      Effort: Pulmonary effort is normal.      Breath sounds: Normal breath sounds.   Abdominal:      General: Bowel sounds are normal. There is no distension.      Palpations: Abdomen is soft.      " Tenderness: There is no abdominal tenderness.   Genitourinary:     Comments: Occasionally incontinent of urine   Musculoskeletal:         General: Normal range of motion.      Cervical back: Normal range of motion.      Comments: Generalized deconditioning    Skin:     General: Skin is warm and dry.      Capillary Refill: Capillary refill takes less than 2 seconds.      Coloration: Skin is pale.   Neurological:      General: No focal deficit present.      Comments: Patient did not know location, year or president. Recognized her daughter.    Psychiatric:      Comments: Intermittently yelling for help, redirected when spoken to. Stated she was scared; verbal reassurance provided           Additional Data:     Labs:  Results from last 7 days   Lab Units 01/18/24  0517   WBC Thousand/uL 4.43   HEMOGLOBIN g/dL 11.1*   HEMATOCRIT % 33.9*   PLATELETS Thousands/uL 211     Results from last 7 days   Lab Units 01/18/24  0517   SODIUM mmol/L 144   POTASSIUM mmol/L 3.8   CHLORIDE mmol/L 107   CO2 mmol/L 31   BUN mg/dL 40*   CREATININE mg/dL 0.90   ANION GAP mmol/L 6   CALCIUM mg/dL 9.4   GLUCOSE RANDOM mg/dL 111                 Results from last 7 days   Lab Units 01/17/24  1257   PROCALCITONIN ng/ml 0.25       Lines/Drains:  Invasive Devices       Peripheral Intravenous Line  Duration             Peripheral IV 01/13/24 Proximal;Right;Ventral (anterior) Forearm 5 days                          Imaging: No pertinent imaging reviewed.    Recent Cultures (last 7 days):         Last 24 Hours Medication List:   Current Facility-Administered Medications   Medication Dose Route Frequency Provider Last Rate    acetaminophen  650 mg Oral Q6H PRN Leyla Guardado PA-C      cholecalciferol  1,000 Units Oral Daily Leyla Guardado PA-C      docusate sodium  100 mg Oral BID Leyla Guardado PA-C      donepezil  10 mg Oral QAM DAMARIS Meza      enoxaparin  30 mg Subcutaneous Q24H [facility] DAMARIS Meza      labetalol  10  mg Intravenous Q6H PRN Leyla Guardado PA-C      lidocaine  1 patch Topical Daily DAMARIS Meza      loratadine  10 mg Oral Daily DAMARIS Meza      losartan  100 mg Oral Daily Leyla Guardado PA-C      melatonin  3 mg Oral Daily Ash Silvestre,       metoprolol succinate  75 mg Oral Daily Leyla Guardado PA-C      nystatin  500,000 Units Swish & Swallow 4x Daily Olayide DAMARIS Cuellar      OLANZapine  1.25 mg Oral Q8H PRN Ash Silvestre,       Or    OLANZapine  1.3 mg Intramuscular Q8H PRN Ash Silvestre,       OLANZapine  1.25 mg Oral Daily Ash Silvestre,       OLANZapine  5 mg Oral HS Ash Silvestre, DO      ondansetron  4 mg Intravenous Q6H PRN Leyla Guardado PA-C      sertraline  25 mg Oral Daily Ash Silvestre,           Today, Patient Was Seen By: DAMARIS Meza    **Please Note: This note may have been constructed using a voice recognition system.**

## 2024-01-18 NOTE — ASSESSMENT & PLAN NOTE
Troponin noted to be elevated at 24 on 1/3, repeated 1/4 elevated 300  Cardiology consulted  ECHO 1/5/24: EF 60-65%, systolic function normal, diastolic function mildly abnormal consistent with G1DD  Stress test canceled after discussion between cardiology and patient's family  Continue medical therapy with beta blocker and ARB  BP occasionally elevated usually associated with periods of agitation, increased patient's metoprolol to 75 mg daily  BP more stable

## 2024-01-18 NOTE — CASE MANAGEMENT
Case Management Discharge Planning Note    Patient name Monika Butler  Location [address] [address]/[address] [place] [address]-* MRN [id number]  : 1940 Date 2024       Current Admission Date: 2024  Current Admission Diagnosis:Dementia with behavioral disturbance (HCC)   Patient Active Problem List    Diagnosis Date Noted    Thrush, oral 2024    Elevated troponin 2024    Constipation 2023    Acute bronchiolitis 2023    Nondisplaced fracture of triquetrum (cuneiform) bone, left wrist, initial encounter for closed fracture 2023    Abrasion of left eyebrow 2023    Injury of left wrist 2023    Insomnia 2023    Anxiety 2023    Absolute anemia 2023    H/O clavicle fracture 2023    Meningioma (HCC) 2023    Pulmonary nodule 2023    Bad odor of urine 2023    Postmenopausal 2023    Prediabetes 2023    Gastroesophageal reflux disease with esophagitis without hemorrhage 2023    Generalized weakness 2022    Urinary frequency 2022    BMI 22.0-22.9, adult 2021    History of breast cancer 2021    Altered mental status 2020    Medicare annual wellness visit, subsequent 2020    Balance problems 2020    Dementia with behavioral disturbance (HCC)     Memory change 10/26/2020    Ear fullness, left 2017    Sinusitis 2017    Hyperlipidemia 2016    Breast cancer (HCC) 2013    Essential hypertension 2012      LOS (days): 16  Geometric Mean LOS (GMLOS) (days): 5  Days to GMLOS:-9.9     OBJECTIVE:  Risk of Unplanned Readmission Score: 36.18     Current admission status: Inpatient   Preferred Pharmacy:   [id number] - [address] [address]  Phone: [phone] Fax: [phone]    Primary Care Provider: Kristin Raymundo MD    Primary Insurance: [facility] REP  Secondary Insurance:     DISCHARGE  DETAILS:    Discharge planning discussed with:: Daughter, Anna Romo  Freedom of Choice: Yes  Comments - Freedom of Choice: SW following to assist with DCP.  SW placed call to pt's daughter to inform her that [facility] has accepted pt for admission.  Daughter said she would like to see what [facility] says also.  [facility] representative is planning to evaluate pt in person later this afternoon.  Daughter had additional questions about both facilities.  SW offered to have facilities call daughter to address questions.  SW reviewed plan for pt to discharge as soon as plans are in place.  Daughter verbalized understanding.  SW will follow.  CM contacted family/caregiver?: Yes    Contacts  Patient Contacts: Anna Luke Clayyuri  Relationship to Patient:: Family  Contact Method: Phone  Phone Number: [phone]  Reason/Outcome: Discharge Planning    Treatment Team Recommendation: Short Term Rehab  Discharge Destination Plan:: Short Term Rehab  Transport at Discharge : [facility]

## 2024-01-18 NOTE — PHYSICAL THERAPY NOTE
PT TREATMENT     01/18/24 1135   PT Last Visit   PT Visit Date 01/18/24   Note Type   Note Type Treatment   Pain Assessment   Pain Assessment Tool Colon-Baker FACES   Colon-Baker FACES Pain Rating 0   Restrictions/Precautions   Other Precautions Cognitive;Chair Alarm;Bed Alarm;Fall Risk   General   Chart Reviewed Yes   Family/Caregiver Present No   Cognition   Arousal/Participation Cooperative   Subjective   Subjective patient states feeling cold   Bed Mobility   Supine to Sit 3  Moderate assistance   Additional items Assist x 1   Sit to Supine 3  Moderate assistance   Additional items Assist x 1   Transfers   Sit to Stand 3  Moderate assistance   Additional items Assist x 1   Stand to Sit 3  Moderate assistance   Additional items Assist x 1   Ambulation/Elevation   Gait Assistance 2  Maximal assist   Additional items Assist x 1   Assistive Device Rolling walker   Distance 2-3steps at bedside with assist for weight shifting and cuing for patient participation as well as safety with walker usage   Balance   Static Sitting Fair -   Dynamic Sitting Poor +   Static Standing Poor +   Dynamic Standing Poor   Ambulatory Poor   Activity Tolerance   Activity Tolerance Patient limited by fatigue;Treatment limited secondary to medical complications (Comment)  (limited by cognition)   Exercises   Hamstring Stretch Supine;5 reps;PROM;Bilateral   Heelslides Supine;10 reps;Bilateral   Hip Flexion Sitting;10 reps;AAROM;Bilateral   Hip Abduction Sitting;10 reps;AAROM;Bilateral   Knee AROM Long Arc Quad Sitting;10 reps;AAROM;Bilateral   Heel Cord Stretch Supine;5 reps;PROM;Bilateral   Neuro re-ed sit to stand completed x3 with static standing for strength and balance training   Balance training  sitting and standing balance activity completed with weight shifting and LE exercise without lumbar support on bed edge   Assessment   Assessment Patient cooperative and able to demonstrate standing and gait mobility this session.  Patient  will benefit from continued physical therapy with progression as tolerated and increasing functional mobility with clinical staff as well. The patient's -[facility] Basic Mobility Inpatient Short Form Raw Score is 11. A Raw score of less than or equal to 16 suggests the patient may benefit from discharge to post-acute rehabilitation services. Please also refer to the recommendation of the Physical Therapist for safe discharge planning.         Plan   Treatment/Interventions ADL retraining;Functional transfer training;LE strengthening/ROM;Therapeutic exercise;Endurance training;Cognitive reorientation;Patient/family training;Equipment eval/education;Bed mobility;Gait training;Compensatory technique education   PT Frequency Other (Comment)  (5w)   Discharge Recommendation   Rehab Resource Intensity Level, PT II (Moderate Resource Intensity)   AM-PAC Basic Mobility Inpatient   Turning in Flat Bed Without Bedrails 2   Lying on Back to Sitting on Edge of Flat Bed Without Bedrails 2   Moving Bed to Chair 2   Standing Up From Chair Using Arms 2   Walk in Room 2   Climb 3-5 Stairs With Railing 1   Basic Mobility Inpatient Raw Score 11   Basic Mobility Standardized Score 30.25   Highest Level Of Mobility   JH-HLM Goal 4: Move to chair/commode   JH-HLM Achieved 5: Stand (1 or more minutes)   Education   Patient Reinforcement needed   Licensure   [place] License Number  Laura Kennedy PT [id number]

## 2024-01-18 NOTE — PLAN OF CARE
Problem: OCCUPATIONAL THERAPY ADULT  Goal: Performs self-care activities at highest level of function for planned discharge setting.  See evaluation for individualized goals.  Description: Treatment Interventions: ADL retraining, Functional transfer training, UE strengthening/ROM, Endurance training, Cognitive reorientation, Patient/family training, Equipment evaluation/education, Compensatory technique education, Activityengagement          See flowsheet documentation for full assessment, interventions and recommendations.   Outcome: Progressing  Note: Limitation: Decreased ADL status, Decreased UE strength, Decreased Safe judgement during ADL, Decreased cognition, Decreased endurance, Decreased self-care trans, Decreased high-level ADLs (decreased balance and mobility)  Prognosis: Fair  Assessment: Pt seen for skilled OT tx session focusing on activity engagement, challenging activity / standing tolerance. Pt required encouragement to participate. Pt demonstrated improved activity and sitting tolerance this afternoon while seated at EOB. Tolerated sitting at EOB at least 15 minutes to participate in feeding, UBD/LBD w/ assistance and cues. Pt performed sit <> stand 2 X w/ mod A from EOB. Continue to recommend level II rehab resource intensity when medically stable for discharge from acute care. Will continue to follow 2-3X week in acute care.     Rehab Resource Intensity Level, OT: II (Moderate Resource Intensity)

## 2024-01-18 NOTE — PLAN OF CARE
Problem: Potential for Falls  Goal: Patient will remain free of falls  Description: INTERVENTIONS:  - Educate patient/family on patient safety including physical limitations  - Instruct patient to call for assistance with activity   - Consult OT/PT to assist with strengthening/mobility   - Keep Call bell within reach  - Keep bed low and locked with side rails adjusted as appropriate  - Keep care items and personal belongings within reach  - Initiate and maintain comfort rounds  - Make Fall Risk Sign visible to staff  - Offer Toileting every 3 Hours, in advance of need  - Initiate/Maintain bed/chair alarm  - Obtain necessary fall risk management equipment: bed/chair alarm  - Apply yellow socks and bracelet for high fall risk patients  - Consider moving patient to room near nurses station  Outcome: Progressing     Problem: DISCHARGE PLANNING  Goal: Discharge to home or other facility with appropriate resources  Description: INTERVENTIONS:  - Identify barriers to discharge w/patient and caregiver  - Arrange for needed discharge resources and transportation as appropriate  - Identify discharge learning needs (meds, wound care, etc.)  - Refer to Case Management Department for coordinating discharge planning if the patient needs post-hospital services based on physician/advanced practitioner order or complex needs related to functional status, cognitive ability, or social support system  Outcome: Progressing     Problem: Nutrition/Hydration-ADULT  Goal: Nutrient/Hydration intake appropriate for improving, restoring or maintaining nutritional needs  Description: Monitor and assess patient's nutrition/hydration status for malnutrition. Collaborate with interdisciplinary team and initiate plan and interventions as ordered.  Monitor patient's weight and dietary intake as ordered or per policy. Utilize nutrition screening tool and intervene as necessary. Determine patient's food preferences and provide high-protein,  high-caloric foods as appropriate.     INTERVENTIONS:  - Monitor oral intake, urinary output, labs, and treatment plans  - Assess nutrition and hydration status and recommend course of action  - Evaluate amount of meals eaten  - Assist patient with eating if necessary   - Allow adequate time for meals  - Recommend/ encourage appropriate diets, oral nutritional supplements, and vitamin/mineral supplements  - Assess need for intravenous fluids  - Provide specific nutrition/hydration education as appropriate  - Include patient/family/caregiver in decisions related to nutrition  Outcome: Progressing

## 2024-01-18 NOTE — ASSESSMENT & PLAN NOTE
Patient has history of dementia with behavioral disturbances, worsening over past several months. Has been getting more agitated and refusing medications since day prior to admission. Prior facility said she needs a higher level of care and will not accept patient back.  UA showed moderate leukocytes and bacteria, completed ceftriaxone x 3 doses  Continue current home medications including aricept and zyprexa 2.5 mg hs  Patient was started on Buspar at [facility], discontinued  Psychiatry consulted; recommending to avoid Ativan for sedation/agitation, decreased Zyprexa to 1.25 mg p.o. daily in the morning and Zyprexa 5 mg p.o. nightly  Patient very lethargic on 1/4, likely from agitation night prior  Stat CT of head: no acute intracranial abnormality but demonstrated stable marked chronic small vessel ischemic changes and stable 1.3 cm meningioma at the left posterior lateral middle cranial fossa  Supportive care  Upgrade to mechanical soft diet and thin liquids per speech recommendations  Psychiatry recs: Change Zyprexa order to 5 mg qhs 1930 and also increase dosage to include daily dosage of 1.25 mg daily for improvement of agitation as patient appears to have been receiving Zyprexa prn every day for the past 6 days between 1.25-1.3 mg and therefore will add this to her total daily regimen of Zyprexa for improvement of agitation and in hopes of limiting PRN medication and in particular PRN IM medication.  Administer  melatonin 3 mg at 1930  Continue zoloft 25 mg daily for depressive symptoms  1/15: Repeat EKG QTc of 459.  Patient intermittently continues to have periods where she is yelling, needs redirection at times   1/18 was calling out; settled when spoken to, cooperative with exam; assisted with her meal

## 2024-01-18 NOTE — OCCUPATIONAL THERAPY NOTE
"  Occupational Therapy Progress Note     Patient Name: Monika Butler  Today's Date: 1/18/2024  Problem List  Principal Problem:    Dementia with behavioral disturbance (HCC)  Active Problems:    Essential hypertension    Elevated troponin    Thrush, oral       01/18/24 1259   OT Last Visit   OT Visit Date 01/18/24  (Thursday)   Note Type   Note Type Treatment for insurance authorization   Pain Assessment   Pain Assessment Tool FLACC   Patient's Stated Pain Goal No pain   Hospital Pain Intervention(s) Repositioned;Ambulation/increased activity;Emotional support   Pain Rating: FLACC (Rest) - Face 0   Pain Rating: FLACC (Rest) - Legs 0   Pain Rating: FLACC (Rest) - Activity 0   Pain Rating: FLACC (Rest) - Cry 0   Pain Rating: FLACC (Rest) - Consolability 0   Score: FLACC (Rest) 0   Pain Rating: FLACC (Activity) - Face 1   Pain Rating: FLACC (Activity) - Legs 1   Pain Rating: FLACC (Activity) - Activity 1   Pain Rating: FLACC (Activity) - Cry 2   Pain Rating: FLACC (Activity) - Consolability 2   Score: FLACC (Activity) 7   Restrictions/Precautions   Weight Bearing Precautions Per Order No   Other Precautions Cognitive;Chair Alarm;Bed Alarm;Fall Risk;Pain   Lifestyle   Autonomy Pt admit from [facility] where she recently transitioned to from rehab at [facility]   Reciprocal Relationships Supportive family. Daughter present during tx session   Service to Others Pt is retired   Intrinsic Gratification Pt enjoys holding onto her \"sloth\" stuffed animal from her grand daughter   ADL   Where Assessed Edge of bed   Eating Assistance 2  Maximal Assistance   Eating Deficit Setup;Supervision/safety;Increased time to complete;Bringing food to mouth assist;Scoop assist   Eating Comments pt required max A to scoop food and bring to mouth. Decreased appetite and required encouragement to increase intake   UB Dressing Assistance 3  Moderate Assistance   UB Dressing Deficit Setup;Verbal " "cueing;Supervision/safety;Increased time to complete   UB Dressing Comments seated at EOB to don robe   LB Dressing Assistance 2  Maximal Assistance   LB Dressing Deficit Setup;Steadying;Verbal cueing;Supervision/safety;Increased time to complete;Thread LLE into pants;Thread RLE into pants;Pull up over hips   LB Dressing Comments pt required max A to thread LE into pants and total A to manage up and over hips. Pt engaged in sit <> stand 2X to manage pants up, over hips   Bed Mobility   Supine to Sit 3  Moderate assistance   Additional items Assist x 1;Increased time required;Verbal cues;LE management;HOB elevated;Bedrails  (to pt's R)   Sit to Supine 2  Maximal assistance   Additional items Assist x 1;Increased time required;Verbal cues;LE management   Additional Comments Pt supine HOB elevated upon arrival and R side lying post eval w/ HOB elevated. Bed alarm activated and daughter present assisting pt w/ remainder of her lunch   Transfers   Sit to Stand 3  Moderate assistance  (mod HHA x1)   Additional items Assist x 1;Increased time required;Verbal cues   Stand to Sit 3  Moderate assistance  (mod HHA)   Additional items Assist x 1;Increased time required;Verbal cues   Stand pivot Unable to assess   Additional Comments Pt performed sit <> stand 2X from EOB   Functional Mobility   Additional Comments NT. Will continue to assess. Deferred due to pt refusal to stand   Subjective   Subjective \"I can't. I am falling into a ditch\"   Cognition   Overall Cognitive Status (S)  Impaired   Arousal/Participation Responsive   Attention Difficulty attending to directions   Orientation Level Oriented to person  (recognized and spoke to her daughter present)   Memory Decreased short term memory;Decreased recall of recent events;Decreased recall of precautions;Decreased long term memory   Following Commands Follows one step commands inconsistently   Comments Identified pt by name and wristband. Alert but irritable and agitated at " times. Calling out and appeared furstrated / upset when her daughter spoke to other people. Difficulty following directions   Activity Tolerance   Activity Tolerance Patient limited by fatigue;Other (Comment)  (impaired cognition)   Medical Staff Made Aware spoke w/ LUPE, Marybel; PT, Laura and RN   Assessment   Assessment Pt seen for skilled OT tx session focusing on activity engagement, challenging activity / standing tolerance. Pt required encouragement to participate. Pt demonstrated improved activity and sitting tolerance this afternoon while seated at EOB. Tolerated sitting at EOB at least 15 minutes to participate in feeding, UBD/LBD w/ assistance and cues. Pt performed sit <> stand 2 X w/ mod A from EOB. Continue to recommend level II rehab resource intensity when medically stable for discharge from acute care. Will continue to follow 2-3X week in acute care.   Plan   Treatment Interventions ADL retraining;Functional transfer training;Endurance training;Patient/family training;Equipment evaluation/education;Compensatory technique education;Continued evaluation;Energy conservation;Activityengagement   Goal Expiration Date 01/20/24   OT Treatment Day 1  (Thursday 1/18/24)   OT Frequency 2-3x/wk   Discharge Recommendation   Rehab Resource Intensity Level, OT II (Moderate Resource Intensity)   AM-PAC Daily Activity Inpatient   Lower Body Dressing 2   Bathing 2   Toileting 2   Upper Body Dressing 2   Grooming 2   Eating 2   Daily Activity Raw Score 12   Daily Activity Standardized Score (Calc for Raw Score >=11) 30.6   AM-PAC Applied Cognition Inpatient   Following a Speech/Presentation 1   Understanding Ordinary Conversation 2   Taking Medications 1   Remembering Where Things Are Placed or Put Away 1   Remembering List of 4-5 Errands 1   Taking Care of Complicated Tasks 1   Applied Cognition Raw Score 7   Applied Cognition Standardized Score 15.17   Barthel Index   Feeding 5   Bathing 0   Grooming Score 0   Dressing  Score 0   Bladder Score 0   Bowels Score 0   Toilet Use Score 5   Transfers (Bed/Chair) Score 5   Mobility (Level Surface) Score 0   Stairs Score 0   Barthel Index Score 15   End of Consult   Education Provided Yes;Family or social support of family present for education by provider   Patient Position at End of Consult Supine;Bed/Chair alarm activated;All needs within reach   Nurse Communication Nurse aware of consult   Licensure   [place] License Number  Lakia Garcia, OTR/L [id number]        The patient's raw score on the -PAC Daily Activity Inpatient Short Form is 12. A raw score of less than 19 suggests the patient may benefit from discharge to post-acute rehabilitation services. Please refer to the recommendation of the Occupational Therapist for safe discharge planning.      Lakia Garcia OTR/L  [id number]  [id number]

## 2024-01-18 NOTE — CASE MANAGEMENT
Case Management Discharge Planning Note    Patient name Monika Butler  Location [address] [address]/[address] [place] [address]-* MRN [id number]  : 1940 Date 2024       Current Admission Date: 2024  Current Admission Diagnosis:Dementia with behavioral disturbance (HCC)   Patient Active Problem List    Diagnosis Date Noted    Thrush, oral 2024    Elevated troponin 2024    Constipation 2023    Acute bronchiolitis 2023    Nondisplaced fracture of triquetrum (cuneiform) bone, left wrist, initial encounter for closed fracture 2023    Abrasion of left eyebrow 2023    Injury of left wrist 2023    Insomnia 2023    Anxiety 2023    Absolute anemia 2023    H/O clavicle fracture 2023    Meningioma (HCC) 2023    Pulmonary nodule 2023    Bad odor of urine 2023    Postmenopausal 2023    Prediabetes 2023    Gastroesophageal reflux disease with esophagitis without hemorrhage 2023    Generalized weakness 2022    Urinary frequency 2022    BMI 22.0-22.9, adult 2021    History of breast cancer 2021    Altered mental status 2020    Medicare annual wellness visit, subsequent 2020    Balance problems 2020    Dementia with behavioral disturbance (HCC)     Memory change 10/26/2020    Ear fullness, left 2017    Sinusitis 2017    Hyperlipidemia 2016    Breast cancer (HCC) 2013    Essential hypertension 2012      LOS (days): 16  Geometric Mean LOS (GMLOS) (days): 5  Days to GMLOS:-10.1     OBJECTIVE:  Risk of Unplanned Readmission Score: 35.96     Current admission status: Inpatient   [facility]erred [facility]acy:   [id number] - [address] [address]  Phone: [phone] Fax: [phone]    Primary Care Provider: Kristin Raymundo MD    Primary Insurance: [facility] REP  Secondary Insurance:     DISCHARGE  DETAILS:    Discharge planning discussed with:: Daughter, Anitha Kunz, son-in-law and Beverly Romo over phone  Freedom of Choice: Yes  Comments - Freedom of Choice: OZZY following to assist with DCP.  OZZY met with pt's daughter and son-in-law to review plans and options.  DaughterAnna, was on phone.  [facility] representative was in to evaluate pt late this afternoon.  Following her meeting with pt and family she was not able to provide any insight on whether pt will be accepted by facility.  She anticipated an answer maybe by Monday.  OZZY reviewed list of confirmed accepting facilities with family.  Per Anna when she spoke with CHALINO Sanchez liaison for [facility], Laura was going to try to talk with [facility] admissions to see if anything could be done to make a bed for pt.  [facility] had responded in [place] that no beds are available at this time.  Anna requested to get final answer from [facility] in morning.  Family will be reviewing options amongst themselves this evening.  Anitha and son-in-law are aware of [facility] bed offer and discharge timeframe.  OZZY requested family call SW in morning to confirm facility choice so plans for transfer can be made.  OZZY will continue to follow to assist as needed.  CM contacted family/caregiver?: Yes  Were Treatment Team discharge recommendations reviewed with patient/caregiver?: Yes  Did patient/caregiver verbalize understanding of patient care needs?: Yes    Contacts  Patient Contacts: Anitha Kunz and   Relationship to Patient:: Family (daughter and son-in-law)  Contact Method: In Person  Reason/Outcome: Discharge Planning    Other Referral/Resources/Interventions Provided:  Referral Comments: Message sent to [facility] in [place] requesting confirmation of availability    Treatment Team Recommendation: Short Term Rehab  Discharge Destination Plan:: Short Term Rehab  Transport at Discharge :  [facility]

## 2024-01-18 NOTE — CASE MANAGEMENT
Case Management Discharge Planning Note    Patient name Monika Butler  Location [address] [address]/[address] [place] [address]-* MRN [id number]  : 1940 Date 2024       Current Admission Date: 2024  Current Admission Diagnosis:Dementia with behavioral disturbance (HCC)   Patient Active Problem List    Diagnosis Date Noted    Thrush, oral 2024    Elevated troponin 2024    Constipation 2023    Acute bronchiolitis 2023    Nondisplaced fracture of triquetrum (cuneiform) bone, left wrist, initial encounter for closed fracture 2023    Abrasion of left eyebrow 2023    Injury of left wrist 2023    Insomnia 2023    Anxiety 2023    Absolute anemia 2023    H/O clavicle fracture 2023    Meningioma (HCC) 2023    Pulmonary nodule 2023    Bad odor of urine 2023    Postmenopausal 2023    Prediabetes 2023    Gastroesophageal reflux disease with esophagitis without hemorrhage 2023    Generalized weakness 2022    Urinary frequency 2022    BMI 22.0-22.9, adult 2021    History of breast cancer 2021    Altered mental status 2020    Medicare annual wellness visit, subsequent 2020    Balance problems 2020    Dementia with behavioral disturbance (HCC)     Memory change 10/26/2020    Ear fullness, left 2017    Sinusitis 2017    Hyperlipidemia 2016    Breast cancer (HCC) 2013    Essential hypertension 2012      LOS (days): 16  Geometric Mean LOS (GMLOS) (days): 5  Days to GMLOS:-9.8     OBJECTIVE:  Risk of Unplanned Readmission Score: 36.18     Current admission status: Inpatient   Preferred Pharmacy:   [id number] - [address] [address]  Phone: [phone] Fax: [phone]    Primary Care Provider: Kristin Raymundo MD    Primary Insurance: [facility] REP  Secondary Insurance:     DISCHARGE  DETAILS:    Discharge planning discussed with:: Daughter, Morena Romo  Freedom of Choice: Yes  Comments - Freedom of Choice: SW following to assist with planning.  OZZY spoke at length with pt's daughter earlier today over phone reviewing options available to family including STR placement if authorization approval was received from [facility], returning to [facility] with private caregiver as offered by facility and out of pocket stay in skilled nursing facility if rehab denied by insurance.  Daughter said family was hoping for STR placement.  During call SW encouraged daughter to contact [facility] about plans that would need to be made to return in the event rehab placement was not possible.  Since this morning's call DAMARIS was able to complete Peer to Peer Review with [facility] and was told that the rehab authorization will now be approved. OZZY met with pt's daughter this afternoon when she arrived at hospital to discuss approval and current facility options.  [facility] are in the process of reviewing referral.  [facility] have accepted and beds are available. Provided daughter with list of all referrals made and reviewed status of each referral.  Daughter is requesting to wait for admission decisions from [facility] before making final decision.  Daughter aware that per CRNP pt is stable for discharge and plans are to be made for transfer.  OZZY reviewed IMM with daughter as well.  SW will continue to follow to monitor progress and assist with transfer when ready.  CM contacted family/caregiver?: Yes  Were Treatment Team discharge recommendations reviewed with patient/caregiver?: Yes  Did patient/caregiver verbalize understanding of patient care needs?: Yes  Were patient/caregiver advised of the risks associated with not following Treatment Team discharge recommendations?: Yes    Contacts  Patient Contacts: Anna Butler  Clarissa  Relationship to Patient:: Family (daughter)  Contact Method: Phone, In Person  Phone Number: [phone]  Reason/Outcome: Discharge Planning    Other Referral/Resources/Interventions Provided:  Interventions: Short Term Rehab    Treatment Team Recommendation: Short Term Rehab  Discharge Destination Plan:: Short Term Rehab  Transport at Discharge : BLS Ambulance    IMM Given (Date):: 01/18/24  IMM Given to:: Family (IMM #3 reviewed with pt's daughter.  Daughter verbalized understanding.  Daughter signed IMM and copy given. Copy also placed in scan bin for chart.)

## 2024-01-18 NOTE — CASE MANAGEMENT
[facility] has received approved authorization from insurance:   [facility]  Called in by Rep: Gail MUNOZ#  [phone]  Authorization received for: [facility]  Facility: [facility]   Authorization #: [id number]   Start of Care: 1/18  Next Review Date: 1/22  Continued Stay Care Coordinator:  Vikki MUNOZ#: [phone]  Submit next review to: [phone]     Called Gail @ [facility] to inquire about next step to change facility. Gail stated new auth would be needed. CM can add CM Discharge Support back onto task once notes are in.     Care Manager notified: Marybel Busby

## 2024-01-18 NOTE — CASE MANAGEMENT
Case Management Discharge Planning Note    Patient name Monika Butler  Location [address] [address]/[address] [place] [address]-* MRN [id number]  : 1940 Date 2024       Current Admission Date: 2024  Current Admission Diagnosis:Dementia with behavioral disturbance (HCC)   Patient Active Problem List    Diagnosis Date Noted    Thrush, oral 2024    Elevated troponin 2024    Constipation 2023    Acute bronchiolitis 2023    Nondisplaced fracture of triquetrum (cuneiform) bone, left wrist, initial encounter for closed fracture 2023    Abrasion of left eyebrow 2023    Injury of left wrist 2023    Insomnia 2023    Anxiety 2023    Absolute anemia 2023    H/O clavicle fracture 2023    Meningioma (HCC) 2023    Pulmonary nodule 2023    Bad odor of urine 2023    Postmenopausal 2023    Prediabetes 2023    Gastroesophageal reflux disease with esophagitis without hemorrhage 2023    Generalized weakness 2022    Urinary frequency 2022    BMI 22.0-22.9, adult 2021    History of breast cancer 2021    Altered mental status 2020    Medicare annual wellness visit, subsequent 2020    Balance problems 2020    Dementia with behavioral disturbance (HCC)     Memory change 10/26/2020    Ear fullness, left 2017    Sinusitis 2017    Hyperlipidemia 2016    Breast cancer (HCC) 2013    Essential hypertension 2012      LOS (days): 16  Geometric Mean LOS (GMLOS) (days): 5  Days to GMLOS:-9.8     OBJECTIVE:  Risk of Unplanned Readmission Score: 36.18         Current admission status: Inpatient   Preferred Pharmacy:   [id number] - [address] [address]  Phone: [phone] Fax: [phone]    Primary Care Provider: Kristin Raymundo MD    Primary Insurance: [facility] REP  Secondary Insurance:     DISCHARGE  DETAILS:                                                                                                               Facility Insurance Auth Number: [id number]

## 2024-01-18 NOTE — PLAN OF CARE
Problem: Potential for Falls  Goal: Patient will remain free of falls  Description: INTERVENTIONS:  - Educate patient/family on patient safety including physical limitations  - Instruct patient to call for assistance with activity   - Consult OT/PT to assist with strengthening/mobility   - Keep Call bell within reach  - Keep bed low and locked with side rails adjusted as appropriate  - Keep care items and personal belongings within reach  - Initiate and maintain comfort rounds  - Make Fall Risk Sign visible to staff  - Offer Toileting every 3 Hours, in advance of need  - Initiate/Maintain bed/chair alarm  - Obtain necessary fall risk management equipment: bed/chair alarm  - Apply yellow socks and bracelet for high fall risk patients  - Consider moving patient to room near nurses station  Outcome: Progressing     Problem: Prexisting or High Potential for Compromised Skin Integrity  Goal: Skin integrity is maintained or improved  Description: INTERVENTIONS:  - Identify patients at risk for skin breakdown  - Assess and monitor skin integrity  - Assess and monitor nutrition and hydration status  - Monitor labs   - Assess for incontinence   - Turn and reposition patient  - Assist with mobility/ambulation  - Relieve pressure over bony prominences  - Avoid friction and shearing  - Provide appropriate hygiene as needed including keeping skin clean and dry  - Evaluate need for skin moisturizer/barrier cream  - Collaborate with interdisciplinary team   - Patient/family teaching  - Consider wound care consult   Outcome: Progressing     Problem: SAFETY,RESTRAINT: NV/NON-SELF DESTRUCTIVE BEHAVIOR  Goal: Remains free of harm/injury (restraint for non violent/non self-detsructive behavior)  Description: INTERVENTIONS:  - Instruct patient/family regarding restraint use   - Assess and monitor physiologic and psychological status   - Provide interventions and comfort measures to meet assessed patient needs   - Identify and  implement measures to help patient regain control  - Assess readiness for release of restraint   Outcome: Progressing     Problem: SAFETY,RESTRAINT: NV/NON-SELF DESTRUCTIVE BEHAVIOR  Goal: Returns to optimal restraint-free functioning  Description: INTERVENTIONS:  - Assess the patient's behavior and symptoms that indicate continued need for restraint  - Identify and implement measures to help patient regain control  - Assess readiness for release of restraint   Outcome: Progressing     Problem: PAIN - ADULT  Goal: Verbalizes/displays adequate comfort level or baseline comfort level  Description: Interventions:  - Encourage patient to monitor pain and request assistance  - Assess pain using appropriate pain scale  - Administer analgesics based on type and severity of pain and evaluate response  - Implement non-pharmacological measures as appropriate and evaluate response  - Consider cultural and social influences on pain and pain management  - Notify physician/advanced practitioner if interventions unsuccessful or patient reports new pain  Outcome: Progressing     Problem: INFECTION - ADULT  Goal: Absence or prevention of progression during hospitalization  Description: INTERVENTIONS:  - Assess and monitor for signs and symptoms of infection  - Monitor lab/diagnostic results  - Monitor all insertion sites, i.e. indwelling lines, tubes, and drains  - Administer medications as ordered  - Instruct and encourage patient and family to use good hand hygiene technique  - Identify and instruct in appropriate isolation precautions for identified infection/condition  Outcome: Progressing     Problem: DISCHARGE PLANNING  Goal: Discharge to home or other facility with appropriate resources  Description: INTERVENTIONS:  - Identify barriers to discharge w/patient and caregiver  - Arrange for needed discharge resources and transportation as appropriate  - Identify discharge learning needs (meds, wound care, etc.)  - Refer to Case  Management Department for coordinating discharge planning if the patient needs post-hospital services based on physician/advanced practitioner order or complex needs related to functional status, cognitive ability, or social support system  Outcome: Progressing     Problem: Knowledge Deficit  Goal: Patient/family/caregiver demonstrates understanding of disease process, treatment plan, medications, and discharge instructions  Description: Complete learning assessment and assess knowledge base.  Interventions:  - Provide teaching at level of understanding  - Provide teaching via preferred learning methods  Outcome: Progressing     Problem: GENITOURINARY - ADULT  Goal: Maintains or returns to baseline urinary function  Description: INTERVENTIONS:  - Assess urinary function  - Encourage oral fluids to ensure adequate hydration if ordered  - Administer IV fluids as ordered to ensure adequate hydration  - Administer ordered medications as needed  - Offer frequent toileting  - Follow urinary retention protocol if ordered  Outcome: Progressing     Problem: GENITOURINARY - ADULT  Goal: Absence of urinary retention  Description: INTERVENTIONS:  - Assess patient's ability to void and empty bladder  - Monitor I/O  - Bladder scan as needed  - Discuss with physician/AP medications to alleviate retention as needed  - Discuss catheterization for long term situations as appropriate  Outcome: Progressing     Problem: METABOLIC, FLUID AND ELECTROLYTES - ADULT  Goal: Fluid balance maintained  Description: INTERVENTIONS:  - Monitor labs   - Monitor I/O and WT  - Instruct patient on fluid and nutrition as appropriate  - Assess for signs & symptoms of volume excess or deficit  Outcome: Progressing     Problem: HEMATOLOGIC - ADULT  Goal: Maintains hematologic stability  Description: INTERVENTIONS  - Assess for signs and symptoms of bleeding or hemorrhage  - Monitor labs  - Administer supportive blood products/factors as ordered and  appropriate  Outcome: Progressing     Problem: Nutrition/Hydration-ADULT  Goal: Nutrient/Hydration intake appropriate for improving, restoring or maintaining nutritional needs  Description: Monitor and assess patient's nutrition/hydration status for malnutrition. Collaborate with interdisciplinary team and initiate plan and interventions as ordered.  Monitor patient's weight and dietary intake as ordered or per policy. Utilize nutrition screening tool and intervene as necessary. Determine patient's food preferences and provide high-protein, high-caloric foods as appropriate.     INTERVENTIONS:  - Monitor oral intake, urinary output, labs, and treatment plans  - Assess nutrition and hydration status and recommend course of action  - Evaluate amount of meals eaten  - Assist patient with eating if necessary   - Allow adequate time for meals  - Recommend/ encourage appropriate diets, oral nutritional supplements, and vitamin/mineral supplements  - Assess need for intravenous fluids  - Provide specific nutrition/hydration education as appropriate  - Include patient/family/caregiver in decisions related to nutrition  Outcome: Progressing

## 2024-01-19 LAB
ANION GAP SERPL CALCULATED.3IONS-SCNC: 5 MMOL/L
BUN SERPL-MCNC: 37 MG/DL (ref 5–25)
CALCIUM SERPL-MCNC: 9.5 MG/DL (ref 8.4–10.2)
CHLORIDE SERPL-SCNC: 107 MMOL/L (ref 96–108)
CO2 SERPL-SCNC: 31 MMOL/L (ref 21–32)
CREAT SERPL-MCNC: 0.82 MG/DL (ref 0.6–1.3)
ERYTHROCYTE [DISTWIDTH] IN BLOOD BY AUTOMATED COUNT: 13.3 % (ref 11.6–15.1)
GFR SERPL CREATININE-BSD FRML MDRD: 65 ML/MIN/1.73SQ M
GLUCOSE SERPL-MCNC: 91 MG/DL (ref 65–140)
HCT VFR BLD AUTO: 32.9 % (ref 34.8–46.1)
HGB BLD-MCNC: 11 G/DL (ref 11.5–15.4)
MCH RBC QN AUTO: 33.6 PG (ref 26.8–34.3)
MCHC RBC AUTO-ENTMCNC: 33.4 G/DL (ref 31.4–37.4)
MCV RBC AUTO: 101 FL (ref 82–98)
PLATELET # BLD AUTO: 188 THOUSANDS/UL (ref 149–390)
PMV BLD AUTO: 10.4 FL (ref 8.9–12.7)
POTASSIUM SERPL-SCNC: 3.5 MMOL/L (ref 3.5–5.3)
RBC # BLD AUTO: 3.27 MILLION/UL (ref 3.81–5.12)
SARS-COV-2 RNA RESP QL NAA+PROBE: NEGATIVE
SODIUM SERPL-SCNC: 143 MMOL/L (ref 135–147)
WBC # BLD AUTO: 4.07 THOUSAND/UL (ref 4.31–10.16)

## 2024-01-19 PROCEDURE — 97110 THERAPEUTIC EXERCISES: CPT

## 2024-01-19 PROCEDURE — 80048 BASIC METABOLIC PNL TOTAL CA: CPT | Performed by: NURSE PRACTITIONER

## 2024-01-19 PROCEDURE — 87635 SARS-COV-2 COVID-19 AMP PRB: CPT | Performed by: STUDENT IN AN ORGANIZED HEALTH CARE EDUCATION/TRAINING PROGRAM

## 2024-01-19 PROCEDURE — 99239 HOSP IP/OBS DSCHRG MGMT >30: CPT | Performed by: STUDENT IN AN ORGANIZED HEALTH CARE EDUCATION/TRAINING PROGRAM

## 2024-01-19 PROCEDURE — 85027 COMPLETE CBC AUTOMATED: CPT | Performed by: NURSE PRACTITIONER

## 2024-01-19 RX ORDER — OLANZAPINE 2.5 MG/1
1.25 TABLET, FILM COATED ORAL EVERY 8 HOURS PRN
Start: 2024-01-19 | End: 2024-01-21

## 2024-01-19 RX ORDER — SERTRALINE HYDROCHLORIDE 25 MG/1
25 TABLET, FILM COATED ORAL DAILY
Start: 2024-01-20

## 2024-01-19 RX ORDER — METOPROLOL SUCCINATE 25 MG/1
75 TABLET, EXTENDED RELEASE ORAL DAILY
Qty: 30 TABLET | Refills: 0 | Status: SHIPPED | OUTPATIENT
Start: 2024-01-20

## 2024-01-19 RX ADMIN — Medication 1000 UNITS: at 09:38

## 2024-01-19 RX ADMIN — OLANZAPINE 1.3 MG: 10 INJECTION, POWDER, FOR SOLUTION INTRAMUSCULAR at 21:53

## 2024-01-19 RX ADMIN — OLANZAPINE 1.25 MG: 2.5 TABLET, FILM COATED ORAL at 09:38

## 2024-01-19 RX ADMIN — OLANZAPINE 5 MG: 2.5 TABLET, FILM COATED ORAL at 20:43

## 2024-01-19 RX ADMIN — Medication 3 MG: at 20:43

## 2024-01-19 RX ADMIN — DONEPEZIL HYDROCHLORIDE 10 MG: 5 TABLET ORAL at 09:38

## 2024-01-19 RX ADMIN — LOSARTAN POTASSIUM 100 MG: 50 TABLET, FILM COATED ORAL at 09:38

## 2024-01-19 RX ADMIN — SERTRALINE 25 MG: 25 TABLET, FILM COATED ORAL at 09:38

## 2024-01-19 RX ADMIN — LORATADINE 10 MG: 10 TABLET ORAL at 09:38

## 2024-01-19 RX ADMIN — METOPROLOL SUCCINATE 75 MG: 50 TABLET, EXTENDED RELEASE ORAL at 09:38

## 2024-01-19 NOTE — CASE MANAGEMENT
[facility] received request for authorization from Care Manager.  Authorization request for: SNF  Facility Name: [facility]  NPI:[id number]  Facility MD:  Zeeshan   NPI: [id number]  Authorization initiated by contacting insurance:  sushma  Via: [email or web address]   Clinicals submitted via: [email or web address] attachment   Pending Reference #:[id number]

## 2024-01-19 NOTE — DISCHARGE SUMMARY
[facility]  Discharge- Monika Butler 1940, 83 y.o. female MRN: [id number]  Unit/Bed#: [address] [place] [address] Encounter: [id number]  Primary Care Provider: Kristin Raymundo MD   Date and time admitted to hospital: 1/2/2024  2:55 PM    * Dementia with behavioral disturbance (HCC)  Assessment & Plan  Patient has history of dementia with behavioral disturbances, worsening over past several months. Has been getting more agitated and refusing medications since day prior to admission. Prior facility said she needs a higher level of care and will not accept patient back.  UA showed moderate leukocytes and bacteria, completed ceftriaxone x 3 doses  Continue current home medications including aricept and zyprexa 2.5 mg hs  Patient was started on Buspar at [facility], discontinued  Psychiatry consulted; recommending to avoid Ativan for sedation/agitation, decreased Zyprexa to 1.25 mg p.o. daily in the morning and Zyprexa 5 mg p.o. nightly  Patient very lethargic on 1/4, likely from agitation night prior  Stat CT of head: no acute intracranial abnormality but demonstrated stable marked chronic small vessel ischemic changes and stable 1.3 cm meningioma at the left posterior lateral middle cranial fossa  Supportive care  Upgrade to mechanical soft diet and thin liquids per speech recommendations  Psychiatry recs: Change Zyprexa order to 5 mg qhs 1930 and also increase dosage to include daily dosage of 1.25 mg daily for improvement of agitation as patient appears to have been receiving Zyprexa prn every day for the past 6 days between 1.25-1.3 mg and therefore will add this to her total daily regimen of Zyprexa for improvement of agitation and in hopes of limiting PRN medication and in particular PRN IM medication.  Administer  melatonin 3 mg at 1930  Continue zoloft 25 mg daily for depressive symptoms  1/15: Repeat EKG QTc of 459.  Patient intermittently continues to have periods where she is yelling,  needs redirection at times   Follow psych recs as above    Elevated troponin  Assessment & Plan  Troponin noted to be elevated at 24 on 1/3, repeated 1/4 elevated 300  Cardiology consulted  ECHO 1/5/24: EF 60-65%, systolic function normal, diastolic function mildly abnormal consistent with G1DD  Stress test canceled after discussion between cardiology and patient's family  Continue medical therapy with beta blocker and ARB  BP occasionally elevated usually associated with periods of agitation, increased patient's metoprolol to 75 mg daily  BP more stable     Thrush, oral  Assessment & Plan  Started nystatin swish and swallow  Continue to monitor    Essential hypertension  Assessment & Plan  BP intermittently high, likely in setting of agitation  Continue home losartan 100 mg daily  Continue metoprolol to 75 mg daily  As needed with parameters  Follow-up outpatient with PCP for monitoring and titration    Abnormal urinalysis-resolved as of 1/6/2024  Assessment & Plan  UA positive for leukocytes  Urine culture mixed contaminants  Completed rocephin 3 doses    Hypokalemia-resolved as of 1/5/2024  Assessment & Plan  Potassium 3.3  Repleted with PO KCl 40 meq  Monitor bmp        Medical Problems       Resolved Problems  Date Reviewed: 1/18/2024            Resolved    Hypokalemia 1/5/2024     Resolved by  DAMARIS Engel    Abnormal urinalysis 1/6/2024     Resolved by  Leyla Guardado PA-C        Discharging Physician / Practitioner: Kylah Huizar MD  PCP: Kristin Raymundo MD  Admission Date:   Admission Orders (From admission, onward)       Ordered        01/03/24 1709  Inpatient Admission  Once            01/02/24 1700  Place in Observation  Once            01/02/24 1656  INPATIENT ADMISSION  Once,   Status:  Canceled                          Discharge Date: 01/19/24      Consultations During Hospital Stay:  Psych, cardiology    Procedures Performed:   N/A    Significant Findings / Test Results:  "  N/A    Incidental Findings:   N/A    Test Results Pending at Discharge (will require follow up):   N/A     Outpatient Tests Requested:  N/A    Complications:  N/A    Hospital Course:   Monika Butler is a 83 y.o. female patient who originally presented to the hospital on 1/2/2024 due to worsening agitation while at previous facility.  Upon arrival patient noted to have dementia with behavioral disturbances likely 2/2 UTI; completed antibiotics for UTI.  Patient seen by inpatient psychiatrist with recommendations for Zyprexa as listed on MAR : 1.25 mg p.o. daily in the morning and Zyprexa 5 mg p.o. nightly with as needed 3-scripts printed for discharge for external facility; recommended avoiding sedating meds or benzo.  Patient noted to have elevated troponin likely in setting of UTI and agitation, seen by cardiology, not a candidate for any cardiac intervention or stress test, continue medical management will continue metoprolol and losartan as directed.  Patient has history of hypertension continue medications as listed above.  Patient noted to have thrush and completed nystatin swish and swallow while inpatient.    Please see above list of diagnoses and related plan for additional information.     Condition at Discharge: fair    Discharge Day Visit / Exam:   Subjective: Patient seen and examined at bedside with daughter present, reported no headache, blurry vision, chest pain or shortness of breath.  Was alert and oriented to self and daughter.  Patient daughter reported patient was most interactive and lucid that she seen in a while.  Patient reported eating and drinking okay and having appropriate bowel movement.  Nursing reported consistent outbursts since admission.    Vitals: Blood Pressure: 161/70 (01/19/24 1500)  Pulse: 61 (01/19/24 1500)  Temperature: 97.7 °F (36.5 °C) (01/19/24 1500)  Temp Source: Temporal (01/19/24 1500)  Respirations: 22 (01/19/24 1500)  Height: 5' 1\" (154.9 cm) (01/05/24 " 1302)  Weight - Scale: 51.3 kg (113 lb 1.5 oz) (01/05/24 1302)  SpO2: 94 % (01/19/24 1500)      Physical Exam  Vitals and nursing note reviewed.   Constitutional:       General: She is not in acute distress.     Appearance: She is well-developed.   HENT:      Head: Normocephalic and atraumatic.   Eyes:      Conjunctiva/sclera: Conjunctivae normal.   Cardiovascular:      Rate and Rhythm: Normal rate and regular rhythm.      Heart sounds: No murmur heard.     No friction rub. No gallop.   Pulmonary:      Effort: Pulmonary effort is normal. No respiratory distress.      Breath sounds: Normal breath sounds. No stridor. No wheezing, rhonchi or rales.   Abdominal:      Palpations: Abdomen is soft.      Tenderness: There is no abdominal tenderness. There is no guarding or rebound.   Musculoskeletal:         General: No swelling or tenderness.      Cervical back: Neck supple.      Right lower leg: No edema.      Left lower leg: No edema.   Skin:     General: Skin is warm and dry.      Capillary Refill: Capillary refill takes less than 2 seconds.      Findings: No bruising.   Neurological:      Mental Status: She is alert and oriented to person, place, and time.      Motor: No weakness.   Psychiatric:         Behavior: Behavior is agitated.          Discussion with Family: Updated  (daughter) at bedside.    Discharge instructions/Information to patient and family:   See after visit summary for information provided to patient and family.      Provisions for Follow-Up Care:  See after visit summary for information related to follow-up care and any pertinent home health orders.      Mobility at time of Discharge:   Basic Mobility Inpatient Raw Score: 13  -HLM Goal: 4: Move to chair/commode  JH-HLM Achieved: 6: Walk 10 steps or more  HLM Goal NOT achieved. Continue to encourage mobility in post discharge setting.     Disposition:   Acute Rehab at [facility] per     Planned Readmission: No     Discharge Statement:  I  spent 35 minutes discharging the patient. This time was spent on the day of discharge. I had direct contact with the patient on the day of discharge. Greater than 50% of the total time was spent examining patient, answering all patient questions, arranging and discussing plan of care with patient as well as directly providing post-discharge instructions.  Additional time then spent on discharge activities.    Discharge Medications:  See after visit summary for reconciled discharge medications provided to patient and/or family.      **Please Note: This note may have been constructed using a voice recognition system**

## 2024-01-19 NOTE — PROGRESS NOTES
Patient:    MRN:  [id number]    Rubi Request ID:  [id number]    Level of care reserved:  Skilled Nursing Facility    Partner Reserved:  [facility] [place], [place], [place] [id number] [phone]    Clinical needs requested:  dementia, memory care    Geography searched:  40 miles around [id number]    Start of Service:    Request sent:  3:51pm EST on 1/16/2024 by Marybel Busby    Partner reserved:  10:46am EST on 1/19/2024 by Maryann Patel    Choice list shared:  9:36am EST on 1/19/2024 by Maryann Patel

## 2024-01-19 NOTE — PLAN OF CARE
Problem: Potential for Falls  Goal: Patient will remain free of falls  Description: INTERVENTIONS:  - Educate patient/family on patient safety including physical limitations  - Instruct patient to call for assistance with activity   - Consult OT/PT to assist with strengthening/mobility   - Keep Call bell within reach  - Keep bed low and locked with side rails adjusted as appropriate  - Keep care items and personal belongings within reach  - Initiate and maintain comfort rounds  - Make Fall Risk Sign visible to staff  - Offer Toileting every 3 Hours, in advance of need  - Initiate/Maintain bed/chair alarm  - Obtain necessary fall risk management equipment: bed/chair alarm  - Apply yellow socks and bracelet for high fall risk patients  - Consider moving patient to room near nurses station  Outcome: Progressing     Problem: Prexisting or High Potential for Compromised Skin Integrity  Goal: Skin integrity is maintained or improved  Description: INTERVENTIONS:  - Identify patients at risk for skin breakdown  - Assess and monitor skin integrity  - Assess and monitor nutrition and hydration status  - Monitor labs   - Assess for incontinence   - Turn and reposition patient  - Assist with mobility/ambulation  - Relieve pressure over bony prominences  - Avoid friction and shearing  - Provide appropriate hygiene as needed including keeping skin clean and dry  - Evaluate need for skin moisturizer/barrier cream  - Collaborate with interdisciplinary team   - Patient/family teaching  - Consider wound care consult   Outcome: Progressing     Problem: DISCHARGE PLANNING  Goal: Discharge to home or other facility with appropriate resources  Description: INTERVENTIONS:  - Identify barriers to discharge w/patient and caregiver  - Arrange for needed discharge resources and transportation as appropriate  - Identify discharge learning needs (meds, wound care, etc.)  - Refer to Case Management Department for coordinating discharge  planning if the patient needs post-hospital services based on physician/advanced practitioner order or complex needs related to functional status, cognitive ability, or social support system  Outcome: Progressing

## 2024-01-19 NOTE — ASSESSMENT & PLAN NOTE
BP intermittently high, likely in setting of agitation  Continue home losartan 100 mg daily  Continue metoprolol to 75 mg daily  As needed with parameters  Follow-up outpatient with PCP for monitoring and titration

## 2024-01-19 NOTE — ASSESSMENT & PLAN NOTE
Patient has history of dementia with behavioral disturbances, worsening over past several months. Has been getting more agitated and refusing medications since day prior to admission. Prior facility said she needs a higher level of care and will not accept patient back.  UA showed moderate leukocytes and bacteria, completed ceftriaxone x 3 doses  Continue current home medications including aricept and zyprexa 2.5 mg hs  Patient was started on Buspar at [facility] center, discontinued  Psychiatry consulted; recommending to avoid Ativan for sedation/agitation, decreased Zyprexa to 1.25 mg p.o. daily in the morning and Zyprexa 5 mg p.o. nightly  Patient very lethargic on 1/4, likely from agitation night prior  Stat CT of head: no acute intracranial abnormality but demonstrated stable marked chronic small vessel ischemic changes and stable 1.3 cm meningioma at the left posterior lateral middle cranial fossa  Supportive care  Upgrade to mechanical soft diet and thin liquids per speech recommendations  Psychiatry recs: Change Zyprexa order to 5 mg qhs 1930 and also increase dosage to include daily dosage of 1.25 mg daily for improvement of agitation as patient appears to have been receiving Zyprexa prn every day for the past 6 days between 1.25-1.3 mg and therefore will add this to her total daily regimen of Zyprexa for improvement of agitation and in hopes of limiting PRN medication and in particular PRN IM medication.  Administer  melatonin 3 mg at 1930  Continue zoloft 25 mg daily for depressive symptoms  1/15: Repeat EKG QTc of 459.  Patient intermittently continues to have periods where she is yelling, needs redirection at times   Follow psych recs as above

## 2024-01-19 NOTE — CASE MANAGEMENT
[facility] has received approved authorization from insurance:   [employer]ush[employer]  Called in by Rep:kateryna MUNOZ#  [phone]  Authorization received for: [facility]  Facility: [facility]    Authorization #:[id number]    Start of Care:1/19  Next Review Date:1/22  Continued Stay Care Coordinator: zuleyka MUNOZ#: [phone]  Submit next review to: fax [phone]   Care Manager notified:alexis sunshine

## 2024-01-19 NOTE — CASE MANAGEMENT
Case Management Discharge Planning Note    Patient name Monika Butler  Location [address] [address]/[address] [place] [address]-* MRN [id number]  : 1940 Date 2024       Current Admission Date: 2024  Current Admission Diagnosis:Dementia with behavioral disturbance (HCC)   Patient Active Problem List    Diagnosis Date Noted    Thrush, oral 2024    Elevated troponin 2024    Constipation 2023    Acute bronchiolitis 2023    Nondisplaced fracture of triquetrum (cuneiform) bone, left wrist, initial encounter for closed fracture 2023    Abrasion of left eyebrow 2023    Injury of left wrist 2023    Insomnia 2023    Anxiety 2023    Absolute anemia 2023    H/O clavicle fracture 2023    Meningioma (HCC) 2023    Pulmonary nodule 2023    Bad odor of urine 2023    Postmenopausal 2023    Prediabetes 2023    Gastroesophageal reflux disease with esophagitis without hemorrhage 2023    Generalized weakness 2022    Urinary frequency 2022    BMI 22.0-22.9, adult 2021    History of breast cancer 2021    Altered mental status 2020    Medicare annual wellness visit, subsequent 2020    Balance problems 2020    Dementia with behavioral disturbance (HCC)     Memory change 10/26/2020    Ear fullness, left 2017    Sinusitis 2017    Hyperlipidemia 2016    Breast cancer (HCC) 2013    Essential hypertension 2012      LOS (days): 17  Geometric Mean LOS (GMLOS) (days): 5  Days to GMLOS:-10.7     OBJECTIVE:  Risk of Unplanned Readmission Score: 36.23     Current admission status: Inpatient   Preferred Pharmacy:   [id number] - [address] [address]  Phone: [phone] Fax: [phone]    Primary Care Provider: Kristin Raymundo MD    Primary Insurance: [facility] REP  Secondary Insurance:     DISCHARGE  DETAILS:    Discharge planning discussed with:: Anna Chowdary  Freedom of Choice: Yes  Comments - Freedom of Choice: SW following to assist with DCP.  SW placed another call to Anna chowdary, to determine facility choice.  Anna requested pt be transferred to [facility] for skilled rehab.  Facility will be reserved in Aidin.  SW will contact CM Discharge Support to amend the authorization approval from [facility].  SW will continue to follow to assist with transfer when ready.  CM contacted family/caregiver?: Yes  Were Treatment Team discharge recommendations reviewed with patient/caregiver?: Yes  Did patient/caregiver verbalize understanding of patient care needs?: Yes  Were patient/caregiver advised of the risks associated with not following Treatment Team discharge recommendations?: Yes    Contacts  Patient Contacts: Anna Romo  Relationship to Patient:: Family  Contact Method: Phone  Phone Number: [phone]  Reason/Outcome: Discharge Planning    Other Referral/Resources/Interventions Provided:  Interventions: Short Term Rehab  Referral Comments: Skilled rehab facility placement is planned at [facility].  CM Discharge Support provided with information to amend the authorization approval.    Treatment Team Recommendation: Short Term Rehab  Discharge Destination Plan:: Short Term Rehab  Transport at Discharge : [facility]

## 2024-01-19 NOTE — CASE MANAGEMENT
Case Management Discharge Planning Note    Patient name Monika Butler  Location [address] [place] [address]/[address] [place] [address]-* MRN [id number]  : 1940 Date 2024       Current Admission Date: 2024  Current Admission Diagnosis:Dementia with behavioral disturbance (HCC)   Patient Active Problem List    Diagnosis Date Noted    Thrush, oral 2024    Elevated troponin 2024    Constipation 2023    Acute bronchiolitis 2023    Nondisplaced fracture of triquetrum (cuneiform) bone, left wrist, initial encounter for closed fracture 2023    Abrasion of left eyebrow 2023    Injury of left wrist 2023    Insomnia 2023    Anxiety 2023    Absolute anemia 2023    H/O clavicle fracture 2023    Meningioma (HCC) 2023    Pulmonary nodule 2023    Bad odor of urine 2023    Postmenopausal 2023    Prediabetes 2023    Gastroesophageal reflux disease with esophagitis without hemorrhage 2023    Generalized weakness 2022    Urinary frequency 2022    BMI 22.0-22.9, adult 2021    History of breast cancer 2021    Altered mental status 2020    Medicare annual wellness visit, subsequent 2020    Balance problems 2020    Dementia with behavioral disturbance (HCC)     Memory change 10/26/2020    Ear fullness, left 2017    Sinusitis 2017    Hyperlipidemia 2016    Breast cancer (HCC) 2013    Essential hypertension 2012      LOS (days): 17  Geometric Mean LOS (GMLOS) (days): 5  Days to GMLOS:-10.7     OBJECTIVE:  Risk of Unplanned Readmission Score: 36.23     Current admission status: Inpatient   Preferred Pharmacy:   [id number] - [address] [address]  Phone: [phone] Fax: [phone]    Primary Care Provider: Kristin Raymundo MD    Primary Insurance: [employer] REP  Secondary Insurance:     DISCHARGE  DETAILS:    Discharge planning discussed with:: Daughter, Anna Romo  Freedom of Choice: Yes  Comments - Freedom of Choice: SW following to assist with DCP.  SW contacted both daughters regarding facility choices.  [facility] can accept pt on Sunday.  [facility] can accept today.  Message left for Anitha. Spoke to Anna.  Anna aware of above options.  Anna inquired about [facility].  [facility] has not confirmed intention to accept.  Anna said she will speak to her sister about choices and call SW back.  SW reviewed that discharge is anticipated today.  Daughter verbalized understanding.  Anna said she will call SW back after talking with her sister.  CM contacted family/caregiver?: Yes  Were Treatment Team discharge recommendations reviewed with patient/caregiver?: Yes  Did patient/caregiver verbalize understanding of patient care needs?: Yes  Were patient/caregiver advised of the risks associated with not following Treatment Team discharge recommendations?: Yes    Contacts  Patient Contacts: Anna Romo  Relationship to Patient:: Family (daughter)  Contact Method: Phone  Phone Number: [phone]  Reason/Outcome: Discharge Planning    Other Referral/Resources/Interventions Provided:  Interventions: Short Term Rehab    Treatment Team Recommendation: Short Term Rehab  Discharge Destination Plan:: Short Term Rehab  Transport at Discharge : [facility]

## 2024-01-19 NOTE — CASE MANAGEMENT
Case Management Discharge Planning Note    Patient name Monika Butler  Location [address] [place] [address] [address]/3 [place] [address]-* MRN [id number]  : 1940 Date 2024       Current Admission Date: 2024  Current Admission Diagnosis:Dementia with behavioral disturbance (HCC)   Patient Active Problem List    Diagnosis Date Noted    Thrush, oral 2024    Elevated troponin 2024    Constipation 2023    Acute bronchiolitis 2023    Nondisplaced fracture of triquetrum (cuneiform) bone, left wrist, initial encounter for closed fracture 2023    Abrasion of left eyebrow 2023    Injury of left wrist 2023    Insomnia 2023    Anxiety 2023    Absolute anemia 2023    H/O clavicle fracture 2023    Meningioma (HCC) 2023    Pulmonary nodule 2023    Bad odor of urine 2023    Postmenopausal 2023    Prediabetes 2023    Gastroesophageal reflux disease with esophagitis without hemorrhage 2023    Generalized weakness 2022    Urinary frequency 2022    BMI 22.0-22.9, adult 2021    History of breast cancer 2021    Altered mental status 2020    Medicare annual wellness visit, subsequent 2020    Balance problems 2020    Dementia with behavioral disturbance (HCC)     Memory change 10/26/2020    Ear fullness, left 2017    Sinusitis 2017    Hyperlipidemia 2016    Breast cancer (HCC) 2013    Essential hypertension 2012      LOS (days): 17  Geometric Mean LOS (GMLOS) (days): 5  Days to GMLOS:-10.9     OBJECTIVE:  Risk of Unplanned Readmission Score: 37.17     Current admission status: Inpatient   Preferred Pharmacy:   [id number] - [address] [address]  Phone: [phone] Fax: [phone]    Primary Care Provider: Kristin Raymundo MD    Primary Insurance: [facility] REP  Secondary Insurance:     DISCHARGE  DETAILS:      Other Referral/Resources/Interventions Provided:  Interventions: Short Term Rehab  Referral Comments: Skilled rehab placement at [facility] has been requested by family.  Insurance authorization has been changed by CM Discharge Support to reflect new facility.  COVID results received and sent to facility via Aidin.  Per [facility] bed will be available over weekend.  Discharge has been ordered.  Family has appealed discharge to Medicare.  SW will follow.    Treatment Team Recommendation: Short Term Rehab  Discharge Destination Plan:: Short Term Rehab  Transport at Discharge : [facility]

## 2024-01-19 NOTE — CASE MANAGEMENT
Case Management Discharge Planning Note    Patient name Monika Butler  Location [address] [place] [address] [address]/3 [place] 330-* MRN [id number]  : 1940 Date 2024       Current Admission Date: 2024  Current Admission Diagnosis:Dementia with behavioral disturbance (HCC)   Patient Active Problem List    Diagnosis Date Noted    Thrush, oral 2024    Elevated troponin 2024    Constipation 2023    Acute bronchiolitis 2023    Nondisplaced fracture of triquetrum (cuneiform) bone, left wrist, initial encounter for closed fracture 2023    Abrasion of left eyebrow 2023    Injury of left wrist 2023    Insomnia 2023    Anxiety 2023    Absolute anemia 2023    H/O clavicle fracture 2023    Meningioma (HCC) 2023    Pulmonary nodule 2023    Bad odor of urine 2023    Postmenopausal 2023    Prediabetes 2023    Gastroesophageal reflux disease with esophagitis without hemorrhage 2023    Generalized weakness 2022    Urinary frequency 2022    BMI 22.0-22.9, adult 2021    History of breast cancer 2021    Altered mental status 2020    Medicare annual wellness visit, subsequent 2020    Balance problems 2020    Dementia with behavioral disturbance (HCC)     Memory change 10/26/2020    Ear fullness, left 2017    Sinusitis 2017    Hyperlipidemia 2016    Breast cancer (HCC) 2013    Essential hypertension 2012      LOS (days): 17  Geometric Mean LOS (GMLOS) (days): 5  Days to GMLOS:-10.8     OBJECTIVE:  Risk of Unplanned Readmission Score: 36.23         Current admission status: Inpatient   Preferred Pharmacy:   [id number] - [address] [address]  Phone: [phone] Fax: [phone]    Primary Care Provider: Kristin Raymundo MD    Primary Insurance: [facility] REP  Secondary Insurance:     DISCHARGE  DETAILS:                                                                                                               Facility Insurance Auth Number: [id number]

## 2024-01-19 NOTE — PLAN OF CARE
Problem: Potential for Falls  Goal: Patient will remain free of falls  Description: INTERVENTIONS:  - Educate patient/family on patient safety including physical limitations  - Instruct patient to call for assistance with activity   - Consult OT/PT to assist with strengthening/mobility   - Keep Call bell within reach  - Keep bed low and locked with side rails adjusted as appropriate  - Keep care items and personal belongings within reach  - Initiate and maintain comfort rounds  - Make Fall Risk Sign visible to staff  - Offer Toileting every 3 Hours, in advance of need  - Initiate/Maintain bed/chair alarm  - Obtain necessary fall risk management equipment: bed/chair alarm  - Apply yellow socks and bracelet for high fall risk patients  - Consider moving patient to room near nurses station  Outcome: Progressing     Problem: Prexisting or High Potential for Compromised Skin Integrity  Goal: Skin integrity is maintained or improved  Description: INTERVENTIONS:  - Identify patients at risk for skin breakdown  - Assess and monitor skin integrity  - Assess and monitor nutrition and hydration status  - Monitor labs   - Assess for incontinence   - Turn and reposition patient  - Assist with mobility/ambulation  - Relieve pressure over bony prominences  - Avoid friction and shearing  - Provide appropriate hygiene as needed including keeping skin clean and dry  - Evaluate need for skin moisturizer/barrier cream  - Collaborate with interdisciplinary team   - Patient/family teaching  - Consider wound care consult   Outcome: Progressing     Problem: PAIN - ADULT  Goal: Verbalizes/displays adequate comfort level or baseline comfort level  Description: Interventions:  - Encourage patient to monitor pain and request assistance  - Assess pain using appropriate pain scale  - Administer analgesics based on type and severity of pain and evaluate response  - Implement non-pharmacological measures as appropriate and evaluate response  -  Consider cultural and social influences on pain and pain management  - Notify physician/advanced practitioner if interventions unsuccessful or patient reports new pain  Outcome: Progressing     Problem: INFECTION - ADULT  Goal: Absence or prevention of progression during hospitalization  Description: INTERVENTIONS:  - Assess and monitor for signs and symptoms of infection  - Monitor lab/diagnostic results  - Monitor all insertion sites, i.e. indwelling lines, tubes, and drains  - Administer medications as ordered  - Instruct and encourage patient and family to use good hand hygiene technique  - Identify and instruct in appropriate isolation precautions for identified infection/condition  Outcome: Progressing     Problem: DISCHARGE PLANNING  Goal: Discharge to home or other facility with appropriate resources  Description: INTERVENTIONS:  - Identify barriers to discharge w/patient and caregiver  - Arrange for needed discharge resources and transportation as appropriate  - Identify discharge learning needs (meds, wound care, etc.)  - Refer to Case Management Department for coordinating discharge planning if the patient needs post-hospital services based on physician/advanced practitioner order or complex needs related to functional status, cognitive ability, or social support system  Outcome: Progressing     Problem: Knowledge Deficit  Goal: Patient/family/caregiver demonstrates understanding of disease process, treatment plan, medications, and discharge instructions  Description: Complete learning assessment and assess knowledge base.  Interventions:  - Provide teaching at level of understanding  - Provide teaching via preferred learning methods  Outcome: Progressing     Problem: NEUROSENSORY - ADULT  Goal: Achieves stable or improved neurological status  Description: INTERVENTIONS  - Monitor and report changes in neurological status  - Monitor vital signs such as temperature, blood pressure, glucose, and any other  labs ordered   - Initiate measures to prevent increased intracranial pressure  - Monitor for seizure activity and implement precautions if appropriate      Outcome: Progressing  Goal: Achieves maximal functionality and self care  Description: INTERVENTIONS  - Monitor swallowing and airway patency with patient fatigue and changes in neurological status  - Encourage and assist patient to increase activity and self care.   - Encourage visually impaired, hearing impaired and aphasic patients to use assistive/communication devices  Outcome: Progressing     Problem: GENITOURINARY - ADULT  Goal: Maintains or returns to baseline urinary function  Description: INTERVENTIONS:  - Assess urinary function  - Encourage oral fluids to ensure adequate hydration if ordered  - Administer IV fluids as ordered to ensure adequate hydration  - Administer ordered medications as needed  - Offer frequent toileting  - Follow urinary retention protocol if ordered  Outcome: Progressing  Goal: Absence of urinary retention  Description: INTERVENTIONS:  - Assess patient's ability to void and empty bladder  - Monitor I/O  - Bladder scan as needed  - Discuss with physician/AP medications to alleviate retention as needed  - Discuss catheterization for long term situations as appropriate  Outcome: Progressing     Problem: METABOLIC, FLUID AND ELECTROLYTES - ADULT  Goal: Electrolytes maintained within normal limits  Description: INTERVENTIONS:  - Monitor labs and assess patient for signs and symptoms of electrolyte imbalances  - Administer electrolyte replacement as ordered  - Monitor response to electrolyte replacements, including repeat lab results as appropriate  - Instruct patient on fluid and nutrition as appropriate  Outcome: Progressing  Goal: Fluid balance maintained  Description: INTERVENTIONS:  - Monitor labs   - Monitor I/O and WT  - Instruct patient on fluid and nutrition as appropriate  - Assess for signs & symptoms of volume excess or  deficit  Outcome: Progressing     Problem: HEMATOLOGIC - ADULT  Goal: Maintains hematologic stability  Description: INTERVENTIONS  - Assess for signs and symptoms of bleeding or hemorrhage  - Monitor labs  - Administer supportive blood products/factors as ordered and appropriate  Outcome: Progressing     Problem: Nutrition/Hydration-ADULT  Goal: Nutrient/Hydration intake appropriate for improving, restoring or maintaining nutritional needs  Description: Monitor and assess patient's nutrition/hydration status for malnutrition. Collaborate with interdisciplinary team and initiate plan and interventions as ordered.  Monitor patient's weight and dietary intake as ordered or per policy. Utilize nutrition screening tool and intervene as necessary. Determine patient's food preferences and provide high-protein, high-caloric foods as appropriate.     INTERVENTIONS:  - Monitor oral intake, urinary output, labs, and treatment plans  - Assess nutrition and hydration status and recommend course of action  - Evaluate amount of meals eaten  - Assist patient with eating if necessary   - Allow adequate time for meals  - Recommend/ encourage appropriate diets, oral nutritional supplements, and vitamin/mineral supplements  - Assess need for intravenous fluids  - Provide specific nutrition/hydration education as appropriate  - Include patient/family/caregiver in decisions related to nutrition  Outcome: Progressing     Problem: SAFETY,RESTRAINT: NV/NON-SELF DESTRUCTIVE BEHAVIOR  Goal: Remains free of harm/injury (restraint for non violent/non self-detsructive behavior)  Description: INTERVENTIONS:  - Instruct patient/family regarding restraint use   - Assess and monitor physiologic and psychological status   - Provide interventions and comfort measures to meet assessed patient needs   - Identify and implement measures to help patient regain control  - Assess readiness for release of restraint   Outcome: Progressing  Goal: Returns to  optimal restraint-free functioning  Description: INTERVENTIONS:  - Assess the patient's behavior and symptoms that indicate continued need for restraint  - Identify and implement measures to help patient regain control  - Assess readiness for release of restraint   Outcome: Progressing

## 2024-01-19 NOTE — PHYSICAL THERAPY NOTE
PT TREATMENT     01/19/24 1455   PT Last Visit   PT Visit Date 01/19/24   Note Type   Note Type Treatment   Pain Assessment   Pain Assessment Tool 0-10   Pain Score No Pain   General   Chart Reviewed Yes   Family/Caregiver Present No   Cognition   Overall Cognitive Status (S)  Impaired   Arousal/Participation   (partially cooperative)   Attention Attends with cues to redirect   Following Commands Follows one step commands inconsistently   Subjective   Subjective nonscense, agitated at times   Transfers   Additional Comments No OOB due to agitation with attempts to do therapy.   Ambulation/Elevation   Gait Assistance Not tested   Activity Tolerance   Activity Tolerance Treatment limited secondary to agitation   Exercises   Heelslides Supine;10 reps;AAROM;Bilateral   Hip Abduction Supine;10 reps;AAROM;Bilateral   Knee AROM Short Arc Quad Supine;10 reps;Bilateral   Ankle Pumps Supine;10 reps;AAROM;Bilateral   Assessment   Prognosis Good   Problem List Decreased strength;Decreased endurance;Impaired balance;Decreased mobility;Decreased coordination;Decreased cognition;Impaired judgement;Decreased safety awareness   Assessment Pt is agitated and yelling about everything.  Could hear pt in the hallway.  Pt tolerated exercises in bed fair.  Needed encouragement .  Cont. as per plan.  The patient's AM-PAC Basic Mobility Inpatient Short Form Raw Score is 13. A Raw score of less than or equal to 16 suggests the patient may benefit from discharge to post-acute rehabilitation services. Please also refer to the recommendation of the Physical Therapist for safe discharge planning     Goals   Patient Goals unable to state due to mentation.   Plan   Treatment/Interventions ADL retraining;Functional transfer training;Therapeutic exercise;Endurance training;Cognitive reorientation;Patient/family training;Bed mobility;Gait training;Spoke to nursing;Spoke to case management   Progress Slow progress, cognitive deficits   PT Frequency  Other (Comment)  (5/wk)   Discharge Recommendation   Rehab Resource Intensity Level, PT II (Moderate Resource Intensity)   AM-PAC Basic Mobility Inpatient   Turning in Flat Bed Without Bedrails 3   Lying on Back to Sitting on Edge of Flat Bed Without Bedrails 3   Moving Bed to Chair 2   Standing Up From Chair Using Arms 2   Walk in Room 2   Climb 3-5 Stairs With Railing 1   Basic Mobility Inpatient Raw Score 13   Basic Mobility Standardized Score 33.99   Highest Level Of Mobility   -[facility] Goal 4: Move to chair/commode   -HLM Achieved 2: Bed activities/Dependent transfer   End of Consult   Patient Position at End of Consult Supine;Bed/Chair alarm activated;All needs within reach   Licensure   [place] License Number  Ashley Aguirre PT  [id number]

## 2024-01-20 RX ADMIN — DONEPEZIL HYDROCHLORIDE 10 MG: 5 TABLET ORAL at 08:48

## 2024-01-20 RX ADMIN — SERTRALINE 25 MG: 25 TABLET, FILM COATED ORAL at 08:49

## 2024-01-20 RX ADMIN — OLANZAPINE 5 MG: 2.5 TABLET, FILM COATED ORAL at 18:35

## 2024-01-20 RX ADMIN — METOPROLOL SUCCINATE 75 MG: 50 TABLET, EXTENDED RELEASE ORAL at 08:48

## 2024-01-20 RX ADMIN — Medication 1000 UNITS: at 08:49

## 2024-01-20 RX ADMIN — OLANZAPINE 1.25 MG: 2.5 TABLET, FILM COATED ORAL at 08:49

## 2024-01-20 RX ADMIN — LOSARTAN POTASSIUM 100 MG: 50 TABLET, FILM COATED ORAL at 08:49

## 2024-01-20 RX ADMIN — LORATADINE 10 MG: 10 TABLET ORAL at 08:49

## 2024-01-20 RX ADMIN — Medication 3 MG: at 18:35

## 2024-01-20 NOTE — CASE MANAGEMENT
Case Management Discharge Planning Note    Patient name Monika Butler  Location [address] [address]/[address] [place] [address]-* MRN [id number]  : 1940 Date 2024       Current Admission Date: 2024  Current Admission Diagnosis:Dementia with behavioral disturbance (HCC)   Patient Active Problem List    Diagnosis Date Noted    Thrush, oral 2024    Elevated troponin 2024    Constipation 2023    Acute bronchiolitis 2023    Nondisplaced fracture of triquetrum (cuneiform) bone, left wrist, initial encounter for closed fracture 2023    Abrasion of left eyebrow 2023    Injury of left wrist 2023    Insomnia 2023    Anxiety 2023    Absolute anemia 2023    H/O clavicle fracture 2023    Meningioma (HCC) 2023    Pulmonary nodule 2023    Bad odor of urine 2023    Postmenopausal 2023    Prediabetes 2023    Gastroesophageal reflux disease with esophagitis without hemorrhage 2023    Generalized weakness 2022    Urinary frequency 2022    BMI 22.0-22.9, adult 2021    History of breast cancer 2021    Altered mental status 2020    Medicare annual wellness visit, subsequent 2020    Balance problems 2020    Dementia with behavioral disturbance (HCC)     Memory change 10/26/2020    Ear fullness, left 2017    Sinusitis 2017    Hyperlipidemia 2016    Breast cancer (HCC) 2013    Essential hypertension 2012      LOS (days): 18  Geometric Mean LOS (GMLOS) (days): 5  Days to GMLOS:-12     OBJECTIVE:  Risk of Unplanned Readmission Score: 37.35     Current admission status: Inpatient   Preferred Pharmacy:   [id number] - [address] [address]  Phone: [phone] Fax: [phone]    Primary Care Provider: Kristin Raymundo MD    Primary Insurance: [facility] REP  Secondary Insurance:     DISCHARGE  DETAILS:    Discharge planning discussed with:: DaughterAnna  Freedom of Choice: Yes  Comments - Freedom of Choice: SW following to assist with DCP.  [facility] appeal decision received.  Per notification [facility] physician reviewer agrees with termination of services and liability begins at noon on 1/21/24.  SW spoke with daughterAnna, and she acknowledged that family was informed of same.  Transfer to [facility] is planned for tomorrow.  BLS transport has been arranged for tomorrow morning and daughter is aware of transportation time.  Message left for [facility] admissions as well.  SW will follow to assist through discharge.  CM contacted family/caregiver?: Yes    Contacts  Patient Contacts: Anna Romo  Relationship to Patient:: Family  Contact Method: Phone  Phone Number: [phone]  Reason/Outcome: Discharge Planning    Other Referral/Resources/Interventions Provided:  Interventions: Short Term Rehab    Treatment Team Recommendation: Short Term Rehab  Discharge Destination Plan:: Short Term Rehab  Transport at Discharge : [facility]  Dispatcher Contacted: Yes  Number/Name of Dispatcher: Roundtrip  Transported by (Company and Unit #): [facility]  ETA of Transport (Date): 01/21/24  ETA of Transport (Time): 1100          Accepting Facility Name, City & State : [facility] [place]  Receiving Facility/Agency Phone Number: [phone] ext [phone]  Facility/Agency Fax Number: [phone]

## 2024-01-20 NOTE — QUICK NOTE
Patient discharged yesterday, family appealing    Patient seen at bedside, wants while sleeping second time while being changed by PCA.  RN reported regular verbal output first and no other concerns at this time.

## 2024-01-21 VITALS
RESPIRATION RATE: 17 BRPM | HEIGHT: 61 IN | DIASTOLIC BLOOD PRESSURE: 89 MMHG | SYSTOLIC BLOOD PRESSURE: 196 MMHG | OXYGEN SATURATION: 96 % | BODY MASS INDEX: 21.35 KG/M2 | TEMPERATURE: 97.9 F | HEART RATE: 58 BPM | WEIGHT: 113.1 LBS

## 2024-01-21 RX ORDER — OLANZAPINE 5 MG/1
5 TABLET ORAL
Qty: 30 TABLET | Refills: 0 | Status: SHIPPED | OUTPATIENT
Start: 2024-01-21

## 2024-01-21 RX ORDER — OLANZAPINE 2.5 MG/1
1.25 TABLET, FILM COATED ORAL DAILY
Qty: 30 TABLET | Refills: 0 | Status: SHIPPED | OUTPATIENT
Start: 2024-01-21

## 2024-01-21 RX ORDER — OLANZAPINE 2.5 MG/1
2.5 TABLET, FILM COATED ORAL
Qty: 30 TABLET | Refills: 0 | Status: SHIPPED | OUTPATIENT
Start: 2024-01-21 | End: 2024-01-21

## 2024-01-21 RX ORDER — OLANZAPINE 2.5 MG/1
1.25 TABLET, FILM COATED ORAL DAILY
Qty: 30 TABLET | Refills: 0 | Status: SHIPPED | OUTPATIENT
Start: 2024-01-21 | End: 2024-01-21

## 2024-01-21 RX ORDER — OLANZAPINE 2.5 MG/1
1.25 TABLET, FILM COATED ORAL EVERY 8 HOURS PRN
Qty: 30 TABLET | Refills: 0 | Status: SHIPPED | OUTPATIENT
Start: 2024-01-21

## 2024-01-21 RX ORDER — OLANZAPINE 5 MG/1
5 TABLET ORAL DAILY
Qty: 30 TABLET | Refills: 0 | Status: SHIPPED | OUTPATIENT
Start: 2024-01-21 | End: 2024-01-21

## 2024-01-21 RX ORDER — OLANZAPINE 2.5 MG/1
1.25 TABLET, FILM COATED ORAL
Qty: 30 TABLET | Refills: 0 | Status: SHIPPED | OUTPATIENT
Start: 2024-01-21 | End: 2024-01-21

## 2024-01-21 RX ADMIN — METOPROLOL SUCCINATE 75 MG: 50 TABLET, EXTENDED RELEASE ORAL at 10:20

## 2024-01-21 RX ADMIN — OLANZAPINE 1.25 MG: 2.5 TABLET, FILM COATED ORAL at 10:21

## 2024-01-21 RX ADMIN — LOSARTAN POTASSIUM 100 MG: 50 TABLET, FILM COATED ORAL at 10:22

## 2024-01-21 RX ADMIN — SERTRALINE 25 MG: 25 TABLET, FILM COATED ORAL at 10:20

## 2024-01-21 RX ADMIN — DONEPEZIL HYDROCHLORIDE 10 MG: 5 TABLET ORAL at 10:22

## 2024-01-21 RX ADMIN — ENOXAPARIN SODIUM 30 MG: 30 INJECTION SUBCUTANEOUS at 10:22

## 2024-01-21 RX ADMIN — Medication 1000 UNITS: at 10:20

## 2024-01-21 RX ADMIN — LORATADINE 10 MG: 10 TABLET ORAL at 10:22

## 2024-01-21 NOTE — QUICK NOTE
Patient discharged yesterday, family appealed completed today, to [place] Dc'd [place] facility    Patient seen at bedside, was agitated this and and reported with verbal outbursts overnight

## 2024-01-21 NOTE — NURSING NOTE
Report called and given to the receiving facility Amisha  nurse. Pt discharge with personal belonging.

## 2024-01-21 NOTE — PLAN OF CARE
Problem: DISCHARGE PLANNING  Goal: Discharge to home or other facility with appropriate resources  Description: INTERVENTIONS:  - Identify barriers to discharge w/patient and caregiver  - Arrange for needed discharge resources and transportation as appropriate  - Identify discharge learning needs (meds, wound care, etc.)  - Refer to Case Management Department for coordinating discharge planning if the patient needs post-hospital services based on physician/advanced practitioner order or complex needs related to functional status, cognitive ability, or social support system  Outcome: Progressing     Problem: INFECTION - ADULT  Goal: Absence or prevention of progression during hospitalization  Description: INTERVENTIONS:  - Assess and monitor for signs and symptoms of infection  - Monitor lab/diagnostic results  - Monitor all insertion sites, i.e. indwelling lines, tubes, and drains  - Administer medications as ordered  - Instruct and encourage patient and family to use good hand hygiene technique  - Identify and instruct in appropriate isolation precautions for identified infection/condition  Outcome: Progressing

## 2024-01-21 NOTE — PLAN OF CARE
Problem: INFECTION - ADULT  Goal: Absence or prevention of progression during hospitalization  Description: INTERVENTIONS:  - Assess and monitor for signs and symptoms of infection  - Monitor lab/diagnostic results  - Monitor all insertion sites, i.e. indwelling lines, tubes, and drains  - Administer medications as ordered  - Instruct and encourage patient and family to use good hand hygiene technique  - Identify and instruct in appropriate isolation precautions for identified infection/condition  1/21/2024 1208 by Annemarie Ovalles RN  Outcome: Adequate for Discharge  1/21/2024 0922 by Annemarie Ovalles RN  Outcome: Progressing     Problem: INFECTION - ADULT  Goal: Absence of fever/infection during neutropenic period  Description: INTERVENTIONS:  - Monitor WBC    1/21/2024 1208 by Annemarie Ovalles RN  Outcome: Adequate for Discharge  1/21/2024 0922 by Annemarie Ovalles RN  Outcome: Progressing

## 2024-01-23 NOTE — UTILIZATION REVIEW
NOTIFICATION OF ADMISSION DISCHARGE   This is a Notification of Discharge from [facility]. Please be advised that this patient has been discharge from our facility. Below you will find the admission and discharge date and time including the patient’s disposition.   UTILIZATION REVIEW CONTACT:  Joan Holm  Utilization   Network Utilization Review Department  Phone: [phone] x carefully listen to the prompts. All voicemails are confidential.  Email: [email or web address]     ADMISSION INFORMATION  PRESENTATION DATE: 1/2/2024  2:55 PM  OBERVATION ADMISSION DATE:   INPATIENT ADMISSION DATE: 1/3/24  5:09 PM   DISCHARGE DATE: 1/21/2024 12:48 PM   [facility]    Network Utilization Review Department  ATTENTION: Please call with any questions or concerns to [phone] and carefully listen to the prompts so that you are directed to the right person. All voicemails are confidential.   For Discharge needs, contact Care Management DC Support Team at [phone] opt. [phone]  Send all requests for admission clinical reviews, approved or denied determinations and any other requests to dedicated fax number below belonging to the campus where the patient is receiving treatment. List of dedicated fax numbers for the Facilities:  FACILITY NAME UR FAX NUMBER   ADMISSION DENIALS (Administrative/Medical Necessity) [phone]   DISCHARGE SUPPORT TEAM ([facility]) [phone]   PARENT CHILD HEALTH (Maternity/NICU/Pediatrics) [phone]   [facility] [phone]   [facility] [phone]   [facility] [phone]   [facility] [phone]   [facility] [phone]   [facility] [phone]   [facility] [phone]   [facility]  [phone]   [facility] [phone]   [facility] [phone]   [facility] [phone]

## 2024-01-24 ENCOUNTER — TRANSITIONAL CARE MANAGEMENT (OUTPATIENT)
Dept: FAMILY MEDICINE CLINIC | Facility: CLINIC | Age: 84
End: 2024-01-24

## 2024-02-11 PROBLEM — J21.9 ACUTE BRONCHIOLITIS: Status: RESOLVED | Noted: 2023-12-12 | Resolved: 2024-02-11

## 2024-02-19 ENCOUNTER — HOSPITAL ENCOUNTER (EMERGENCY)
Facility: HOSPITAL | Age: 84
Discharge: HOME | End: 2024-02-20
Attending: EMERGENCY MEDICINE
Payer: COMMERCIAL

## 2024-02-19 ENCOUNTER — APPOINTMENT (EMERGENCY)
Dept: RADIOLOGY | Facility: HOSPITAL | Age: 84
End: 2024-02-19
Payer: COMMERCIAL

## 2024-02-19 ENCOUNTER — APPOINTMENT (EMERGENCY)
Dept: CT IMAGING | Facility: HOSPITAL | Age: 84
End: 2024-02-19
Payer: COMMERCIAL

## 2024-02-19 DIAGNOSIS — S09.90XA CLOSED HEAD INJURY, INITIAL ENCOUNTER: ICD-10-CM

## 2024-02-19 DIAGNOSIS — S00.83XA TRAUMATIC HEMATOMA OF FOREHEAD, INITIAL ENCOUNTER: ICD-10-CM
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
## 2024-02-19 LAB
BASOPHILS # BLD AUTO: 0.05 THOUSANDS/ÂΜL (ref 0–0.1)
BASOPHILS NFR BLD AUTO: 1 % (ref 0–1)
EOSINOPHIL # BLD AUTO: 0.2 THOUSAND/ÂΜL (ref 0–0.61)
EOSINOPHIL NFR BLD AUTO: 4 % (ref 0–6)
ERYTHROCYTE [DISTWIDTH] IN BLOOD BY AUTOMATED COUNT: 13.2 % (ref 11.6–15.1)
HCT VFR BLD AUTO: 33.1 % (ref 34.8–46.1)
HGB BLD-MCNC: 10.4 G/DL (ref 11.5–15.4)
IMM GRANULOCYTES # BLD AUTO: 0.05 THOUSAND/UL (ref 0–0.2)
IMM GRANULOCYTES NFR BLD AUTO: 1 % (ref 0–2)
LYMPHOCYTES # BLD AUTO: 1.29 THOUSANDS/ÂΜL (ref 0.6–4.47)
LYMPHOCYTES NFR BLD AUTO: 24 % (ref 14–44)
MCH RBC QN AUTO: 32.3 PG (ref 26.8–34.3)
MCHC RBC AUTO-ENTMCNC: 31.4 G/DL (ref 31.4–37.4)
MCV RBC AUTO: 103 FL (ref 82–98)
MONOCYTES # BLD AUTO: 0.71 THOUSAND/ÂΜL (ref 0.17–1.22)
MONOCYTES NFR BLD AUTO: 13 % (ref 4–12)
NEUTROPHILS # BLD AUTO: 3.01 THOUSANDS/ÂΜL (ref 1.85–7.62)
NEUTS SEG NFR BLD AUTO: 57 % (ref 43–75)
NRBC BLD AUTO-RTO: 0 /100 WBCS
PLATELET # BLD AUTO: 249 THOUSANDS/UL (ref 149–390)
PMV BLD AUTO: 10.2 FL (ref 8.9–12.7)
RBC # BLD AUTO: 3.22 MILLION/UL (ref 3.81–5.12)
WBC # BLD AUTO: 5.31 THOUSAND/UL (ref 4.31–10.16)

## 2024-02-19 PROCEDURE — 99284 EMERGENCY DEPT VISIT MOD MDM: CPT

## 2024-02-19 PROCEDURE — 70450 CT HEAD/BRAIN W/O DYE: CPT

## 2024-02-19 PROCEDURE — 85025 COMPLETE CBC W/AUTO DIFF WBC: CPT | Performed by: EMERGENCY MEDICINE

## 2024-02-19 PROCEDURE — 73564 X-RAY EXAM KNEE 4 OR MORE: CPT

## 2024-02-19 PROCEDURE — 72125 CT NECK SPINE W/O DYE: CPT

## 2024-02-19 PROCEDURE — 36415 COLL VENOUS BLD VENIPUNCTURE: CPT | Performed by: EMERGENCY MEDICINE

## 2024-02-19 PROCEDURE — 85730 THROMBOPLASTIN TIME PARTIAL: CPT | Performed by: EMERGENCY MEDICINE

## 2024-02-19 PROCEDURE — 80048 BASIC METABOLIC PNL TOTAL CA: CPT | Performed by: EMERGENCY MEDICINE

## 2024-02-19 PROCEDURE — 99285 EMERGENCY DEPT VISIT HI MDM: CPT | Performed by: EMERGENCY MEDICINE

## 2024-02-19 PROCEDURE — 85610 PROTHROMBIN TIME: CPT | Performed by: EMERGENCY MEDICINE

## 2024-02-19 RX ORDER — ACETAMINOPHEN 325 MG/1
975 TABLET ORAL ONCE
Status: DISCONTINUED | OUTPATIENT
Start: 2024-02-19 | End: 2024-02-20

## 2024-02-20 VITALS
OXYGEN SATURATION: 93 % | DIASTOLIC BLOOD PRESSURE: 75 MMHG | HEART RATE: 71 BPM | SYSTOLIC BLOOD PRESSURE: 159 MMHG | RESPIRATION RATE: 20 BRPM

## 2024-02-20 LAB
ANION GAP SERPL CALCULATED.3IONS-SCNC: 7 MMOL/L
APTT PPP: 35 SECONDS (ref 23–37)
BACTERIA UR QL AUTO: ABNORMAL /HPF
BILIRUB UR QL STRIP: NEGATIVE
BUN SERPL-MCNC: 30 MG/DL (ref 5–25)
CALCIUM SERPL-MCNC: 9.6 MG/DL (ref 8.4–10.2)
CHLORIDE SERPL-SCNC: 103 MMOL/L (ref 96–108)
CLARITY UR: CLEAR
CO2 SERPL-SCNC: 34 MMOL/L (ref 21–32)
COLOR UR: YELLOW
CREAT SERPL-MCNC: 0.85 MG/DL (ref 0.6–1.3)
GFR SERPL CREATININE-BSD FRML MDRD: 63 ML/MIN/1.73SQ M
GLUCOSE SERPL-MCNC: 87 MG/DL (ref 65–140)
GLUCOSE UR STRIP-MCNC: NEGATIVE MG/DL
HGB UR QL STRIP.AUTO: NEGATIVE
INR PPP: 0.96 (ref 0.84–1.19)
KETONES UR STRIP-MCNC: ABNORMAL MG/DL
LEUKOCYTE ESTERASE UR QL STRIP: NEGATIVE
MUCOUS THREADS UR QL AUTO: ABNORMAL
NITRITE UR QL STRIP: NEGATIVE
NON-SQ EPI CELLS URNS QL MICRO: ABNORMAL /HPF
PH UR STRIP.AUTO: 6 [PH]
POTASSIUM SERPL-SCNC: 3.3 MMOL/L (ref 3.5–5.3)
PROT UR STRIP-MCNC: ABNORMAL MG/DL
PROTHROMBIN TIME: 13.3 SECONDS (ref 11.6–14.5)
RBC #/AREA URNS AUTO: ABNORMAL /HPF
SODIUM SERPL-SCNC: 144 MMOL/L (ref 135–147)
SP GR UR STRIP.AUTO: 1.02 (ref 1–1.03)
UROBILINOGEN UR STRIP-ACNC: <2 MG/DL
WBC #/AREA URNS AUTO: ABNORMAL /HPF

## 2024-02-20 PROCEDURE — 81001 URINALYSIS AUTO W/SCOPE: CPT | Performed by: EMERGENCY MEDICINE

## 2024-02-20 PROCEDURE — 96365 THER/PROPH/DIAG IV INF INIT: CPT

## 2024-02-20 RX ORDER — ACETAMINOPHEN 10 MG/ML
1000 INJECTION, SOLUTION INTRAVENOUS ONCE
Status: COMPLETED | OUTPATIENT
Start: 2024-02-20 | End: 2024-02-20

## 2024-02-20 RX ADMIN — ACETAMINOPHEN 1000 MG: 10 INJECTION INTRAVENOUS at 00:19

## 2024-02-20 RX ADMIN — SODIUM CHLORIDE 500 ML: 0.9 INJECTION, SOLUTION INTRAVENOUS at 00:18

## 2024-02-20 NOTE — ED ATTENDING ATTESTATION
2/19/2024  I, Nitin Ellis MD, saw and evaluated the patient. I have discussed the patient with the resident/non-physician practitioner and agree with the resident's/non-physician practitioner's findings, Plan of Care, and MDM as documented in the resident's/non-physician practitioner's note, except where noted. All available labs and Radiology studies were reviewed.  I was present for key portions of any procedure(s) performed by the resident/non-physician practitioner and I was immediately available to provide assistance.       At this point I agree with the current assessment done in the Emergency Department.  I have conducted an independent evaluation of this patient a history and physical is as follows: Suspected mechanical fall with head strike, no trauma alert criteria, patient from [facility].  CT head, cervical spine, right knee x-ray with no acute fracture or malalignment identified.  Labs including urinalysis with no acute emergent concerns.  Patient stable for discharge back to facility.     Results Reviewed       Procedure Component Value Units Date/Time    Urine Microscopic [[id number]]  (Abnormal) Collected: 02/20/24 0212    Lab Status: Final result Specimen: Urine, Straight Cath Updated: 02/20/24 0225     RBC, UA None Seen /hpf      WBC, UA 1-2 /hpf      Epithelial Cells None Seen /hpf      Bacteria, UA None Seen /hpf      MUCUS THREADS Occasional    UA w Reflex to Microscopic w Reflex to Culture [[id number]]  (Abnormal) Collected: 02/20/24 0212    Lab Status: Final result Specimen: Urine, Straight Cath Updated: 02/20/24 0223     Color, UA Yellow     Clarity, UA Clear     Specific Gravity, UA 1.024     pH, UA 6.0     Leukocytes, UA Negative     Nitrite, UA Negative     Protein, UA Trace mg/dl      Glucose, UA Negative mg/dl      Ketones, UA Trace mg/dl      Urobilinogen, UA <2.0 mg/dl      Bilirubin, UA Negative     Occult Blood, UA Negative    Protime-INR [[id number]]  (Normal) Collected:  02/19/24 2349    Lab Status: Final result Specimen: Blood from Arm, Right Updated: 02/20/24 0022     Protime 13.3 seconds      INR 0.96    APTT [[id number]]  (Normal) Collected: 02/19/24 2349    Lab Status: Final result Specimen: Blood from Arm, Right Updated: 02/20/24 0022     PTT 35 seconds     Basic metabolic panel [[id number]]  (Abnormal) Collected: 02/19/24 2349    Lab Status: Final result Specimen: Blood from Arm, Right Updated: 02/20/24 0012     Sodium 144 mmol/L      Potassium 3.3 mmol/L      Chloride 103 mmol/L      CO2 34 mmol/L      ANION GAP 7 mmol/L      BUN 30 mg/dL      Creatinine 0.85 mg/dL      Glucose 87 mg/dL      Calcium 9.6 mg/dL      eGFR 63 ml/min/1.73sq m     Narrative:      National Kidney Disease Foundation guidelines for Chronic Kidney Disease (CKD):     Stage 1 with normal or high GFR (GFR > 90 mL/min/1.73 square meters)    Stage 2 Mild CKD (GFR = 60-89 mL/min/1.73 square meters)    Stage 3A Moderate CKD (GFR = 45-59 mL/min/1.73 square meters)    Stage 3B Moderate CKD (GFR = 30-44 mL/min/1.73 square meters)    Stage 4 Severe CKD (GFR = 15-29 mL/min/1.73 square meters)    Stage 5 End Stage CKD (GFR <15 mL/min/1.73 square meters)  Note: GFR calculation is accurate only with a steady state creatinine    CBC and differential [[id number]]  (Abnormal) Collected: 02/19/24 2349    Lab Status: Final result Specimen: Blood from Arm, Right Updated: 02/19/24 2356     WBC 5.31 Thousand/uL      RBC 3.22 Million/uL      Hemoglobin 10.4 g/dL      Hematocrit 33.1 %       fL      MCH 32.3 pg      MCHC 31.4 g/dL      RDW 13.2 %      MPV 10.2 fL      Platelets 249 Thousands/uL      nRBC 0 /100 WBCs      Neutrophils Relative 57 %      Immat GRANS % 1 %      Lymphocytes Relative 24 %      Monocytes Relative 13 %      Eosinophils Relative 4 %      Basophils Relative 1 %      Neutrophils Absolute 3.01 Thousands/µL      Immature Grans Absolute 0.05 Thousand/uL      Lymphocytes Absolute 1.29 Thousands/µL       Monocytes Absolute 0.71 Thousand/µL      Eosinophils Absolute 0.20 Thousand/µL      Basophils Absolute 0.05 Thousands/µL           XR knee 4+ vw right injury   ED Interpretation by Michael Shelton MD (02/20 0047)   No acute fracture or dislocation. Interpreted independently by myself.      CT head without contrast   Final Result by Len Esteves MD (02/20 0042)      No acute intracranial abnormality.      Stable marked chronic small vessel ischemic changes.      Stable 1.3 cm meningioma at the left posterior lateral middle cranial fossa.            Workstation performed: [id number]         CT spine cervical without contrast   Final Result by Len Esteves MD (02/20 0049)      No cervical spine fracture or traumatic malalignment.      Reidentified biapical pleural-parenchymal scarring, left greater than right. Stable 7 mm right apical pulmonary nodule. A follow-up CT chest in 12 months is recommended to assess for continued stability.         Workstation performed: [id number]               ED Course         Critical Care Time  Procedures

## 2024-02-20 NOTE — DISCHARGE INSTRUCTIONS
Your CT showed: Reidentified biapical pleural-parenchymal scarring, left greater than right. Stable 7 mm right apical pulmonary nodule. A follow-up CT chest in 12 months is recommended to assess for continued stability.     Please follow up with your primary care provider concerning your visit today.  Please return to the Emergency Department if you develop any worsening headache that does not improve with Tylenol or Motrin, confusion, chest pain, difficulty breathing, or for any other concerns.

## 2024-02-20 NOTE — ED PROVIDER NOTES
History  Chief Complaint   Patient presents with    Fall     Pt coming from [facility], pt was found laying on her right side by staff, unwitnessed ?LOC +HS. Hematoma on rightside of forehead. c/o of pain of left knee. GCS 14 baseline dementia. -ASA -thinners     (Monika Butler) Monika Butler is a 83 y.o. female     They presented to the emergency department on February 19, 2024. Patient presents with:  Fall: Pt coming from [facility], pt was found laying on her right side by staff, unwitnessed ?LOC +HS. Hematoma on rightside of forehead. c/o of pain of left knee. GCS 14 baseline dementia. -ASA -thinners.    Per family at bedside patient has a history of dementia, currently resides at [facility] was found by staff on the floor with a bump on her head.  911 was called and patient was brought to the emergency department for evaluation.    Per family patient does not take blood thinners and does not take aspirin.  History is otherwise limited secondary due to patient's dementia.    The patient states that her right knee hurts.           Prior to Admission Medications   Prescriptions Last Dose Informant Patient Reported? Taking?   Ascorbic Acid (Vitamin C) 250 MG CHEW   Yes No   Melatonin 10 MG TABS   No No   Sig: Take 1 tablet (10 mg total) by mouth daily at bedtime   Multiple Vitamins-Minerals (ONE-A-DAY 50 PLUS PO)   Yes No   OLANZapine (ZyPREXA) 2.5 mg tablet   No No   Sig: Take 0.5 tablets (1.25 mg total) by mouth every 8 (eight) hours as needed (severe agitation)   OLANZapine (ZyPREXA) 2.5 mg tablet   No No   Sig: Take 0.5 tablets (1.25 mg total) by mouth daily   OLANZapine (ZyPREXA) 5 mg tablet   No No   Sig: Take 1 tablet (5 mg total) by mouth daily at bedtime   benzonatate (TESSALON PERLES) 100 mg capsule   No No   Sig: Take 1 capsule (100 mg total) by mouth 3 (three) times a day   busPIRone (BUSPAR) 5 mg tablet   Yes No   Sig: Take 5 mg by mouth 2 (two) times a day   cholecalciferol (VITAMIN  D3) 1,000 units tablet   Yes No   Sig: Take 1,000 Units by mouth daily   cyanocobalamin (VITAMIN B-12) 100 mcg tablet   Yes No   Sig: Take by mouth daily   docusate sodium (COLACE) 100 mg capsule   No No   Sig: Take 1 capsule (100 mg total) by mouth 2 (two) times a day   donepezil (ARICEPT) 10 mg tablet   No No   Sig: Take 1 tablet (10 mg total) by mouth daily at bedtime   loratadine (CLARITIN) 10 mg tablet   Yes No   Sig: Take 10 mg by mouth daily   losartan (Cozaar) 100 MG tablet   No No   Sig: Take 1 tablet (100 mg total) by mouth daily   metoprolol succinate (TOPROL-XL) 25 mg 24 hr tablet   No No   Sig: Take 3 tablets (75 mg total) by mouth daily   polyethylene glycol (MIRALAX) 17 g packet   No No   Sig: Take 17 g by mouth if needed (constipation)   sertraline (ZOLOFT) 25 mg tablet   No No   Sig: Take 1 tablet (25 mg total) by mouth daily      Facility-Administered Medications: None       Past Medical History:   Diagnosis Date    Acute bronchiolitis 12/12/2023    Allergic     Cancer (HCC) 2005    left breast mastectomy    Electrolyte abnormality 10/02/2022    Hypertension     Hypokalemia 01/02/2024    Memory change     Nodule of left lung     Pleural thickening        Past Surgical History:   Procedure Laterality Date    CATARACT EXTRACTION Left 05/2020    CATARACT EXTRACTION Left 06/2020    MASTECTOMY      CT XCAPSL CTRC RMVL INSJ IO LENS PROSTH W/O ECP Left 6/8/2020    Procedure: EXTRACTION EXTRACAPSULAR CATARACT PHACO INTRAOCULAR LENS (IOL);  Surgeon: Cortes Harrison MD;  Location: [facility] MAIN OR;  Service: Ophthalmology       History reviewed. No pertinent family history.  I have reviewed and agree with the history as documented.    E-Cigarette/Vaping    E-Cigarette Use Never User      E-Cigarette/Vaping Substances    Nicotine No     THC No     CBD No      Social History     Tobacco Use    Smoking status: Never    Smokeless tobacco: Never   Vaping Use    Vaping status: Never Used   Substance Use Topics     Alcohol use: Never    Drug use: Never        Review of Systems   Unable to perform ROS: Dementia       Physical Exam  ED Triage Vitals [02/19/24 2312]   Temp Pulse Respirations Blood Pressure SpO2   -- 70 20 (!) 174/86 94 %      Temp src Heart Rate Source Patient Position - Orthostatic VS BP Location FiO2 (%)   -- Monitor Sitting Right arm --      Pain Score       6             Orthostatic Vital Signs  Vitals:    02/19/24 2312 02/20/24 0137   BP: (!) 174/86 159/75   Pulse: 70 72   Patient Position - Orthostatic VS: Sitting Sitting       Physical Exam  Vitals and nursing note reviewed.   Constitutional:       General: She is not in acute distress.     Appearance: Normal appearance.      Interventions: Cervical collar in place.   HENT:      Head: Normocephalic.      Comments: 4 cm x 4 cm hematoma overlying the right frontal forehead, no hemotympanum, no Alberto sign, no raccoon eyes     Right Ear: Tympanic membrane, ear canal and external ear normal.      Left Ear: Tympanic membrane, ear canal and external ear normal.      Nose: Nose normal.      Mouth/Throat:      Mouth: Mucous membranes are moist.   Eyes:      Extraocular Movements: Extraocular movements intact.      Conjunctiva/sclera: Conjunctivae normal.      Pupils: Pupils are equal, round, and reactive to light.   Cardiovascular:      Rate and Rhythm: Normal rate and regular rhythm.   Pulmonary:      Effort: Pulmonary effort is normal. No respiratory distress.      Breath sounds: Normal breath sounds.   Abdominal:      General: Abdomen is flat. Bowel sounds are normal.      Tenderness: There is no abdominal tenderness. There is no guarding or rebound.   Musculoskeletal:         General: Normal range of motion.      Cervical back: Normal range of motion.   Skin:     General: Skin is warm and dry.      Capillary Refill: Capillary refill takes less than 2 seconds.      Comments: Two 1 cm abrasions overlying right knee   Neurological:      Mental Status: She is  alert. Mental status is at baseline.   Psychiatric:         Mood and Affect: Mood normal.         ED Medications  Medications   acetaminophen (Ofirmev) injection 1,000 mg (0 mg Intravenous Stopped 2/20/24 0124)   sodium chloride 0.9 % bolus 500 mL (0 mL Intravenous Stopped 2/20/24 0124)       Diagnostic Studies  Results Reviewed       Procedure Component Value Units Date/Time    Urine Microscopic [[id number]]  (Abnormal) Collected: 02/20/24 0212    Lab Status: Final result Specimen: Urine, Straight Cath Updated: 02/20/24 0225     RBC, UA None Seen /hpf      WBC, UA 1-2 /hpf      Epithelial Cells None Seen /hpf      Bacteria, UA None Seen /hpf      MUCUS THREADS Occasional    UA w Reflex to Microscopic w Reflex to Culture [[id number]]  (Abnormal) Collected: 02/20/24 0212    Lab Status: Final result Specimen: Urine, Straight Cath Updated: 02/20/24 0223     Color, UA Yellow     Clarity, UA Clear     Specific Gravity, UA 1.024     pH, UA 6.0     Leukocytes, UA Negative     Nitrite, UA Negative     Protein, UA Trace mg/dl      Glucose, UA Negative mg/dl      Ketones, UA Trace mg/dl      Urobilinogen, UA <2.0 mg/dl      Bilirubin, UA Negative     Occult Blood, UA Negative    Protime-INR [[id number]]  (Normal) Collected: 02/19/24 2349    Lab Status: Final result Specimen: Blood from Arm, Right Updated: 02/20/24 0022     Protime 13.3 seconds      INR 0.96    APTT [[id number]]  (Normal) Collected: 02/19/24 2349    Lab Status: Final result Specimen: Blood from Arm, Right Updated: 02/20/24 0022     PTT 35 seconds     Basic metabolic panel [[id number]]  (Abnormal) Collected: 02/19/24 2349    Lab Status: Final result Specimen: Blood from Arm, Right Updated: 02/20/24 0012     Sodium 144 mmol/L      Potassium 3.3 mmol/L      Chloride 103 mmol/L      CO2 34 mmol/L      ANION GAP 7 mmol/L      BUN 30 mg/dL      Creatinine 0.85 mg/dL      Glucose 87 mg/dL      Calcium 9.6 mg/dL      eGFR 63 ml/min/1.73sq m     Narrative:      National  Kidney Disease Foundation guidelines for Chronic Kidney Disease (CKD):     Stage 1 with normal or high GFR (GFR > 90 mL/min/1.73 square meters)    Stage 2 Mild CKD (GFR = 60-89 mL/min/1.73 square meters)    Stage 3A Moderate CKD (GFR = 45-59 mL/min/1.73 square meters)    Stage 3B Moderate CKD (GFR = 30-44 mL/min/1.73 square meters)    Stage 4 Severe CKD (GFR = 15-29 mL/min/1.73 square meters)    Stage 5 End Stage CKD (GFR <15 mL/min/1.73 square meters)  Note: GFR calculation is accurate only with a steady state creatinine    CBC and differential [[id number]]  (Abnormal) Collected: 02/19/24 2349    Lab Status: Final result Specimen: Blood from Arm, Right Updated: 02/19/24 2356     WBC 5.31 Thousand/uL      RBC 3.22 Million/uL      Hemoglobin 10.4 g/dL      Hematocrit 33.1 %       fL      MCH 32.3 pg      MCHC 31.4 g/dL      RDW 13.2 %      MPV 10.2 fL      Platelets 249 Thousands/uL      nRBC 0 /100 WBCs      Neutrophils Relative 57 %      Immat GRANS % 1 %      Lymphocytes Relative 24 %      Monocytes Relative 13 %      Eosinophils Relative 4 %      Basophils Relative 1 %      Neutrophils Absolute 3.01 Thousands/µL      Immature Grans Absolute 0.05 Thousand/uL      Lymphocytes Absolute 1.29 Thousands/µL      Monocytes Absolute 0.71 Thousand/µL      Eosinophils Absolute 0.20 Thousand/µL      Basophils Absolute 0.05 Thousands/µL                    XR knee 4+ vw right injury   ED Interpretation by Michael Shelton MD (02/20 0047)   No acute fracture or dislocation. Interpreted independently by myself.      CT head without contrast   Final Result by Len Esteves MD (02/20 0042)      No acute intracranial abnormality.      Stable marked chronic small vessel ischemic changes.      Stable 1.3 cm meningioma at the left posterior lateral middle cranial fossa.            Workstation performed: [id number]         CT spine cervical without contrast   Final Result by Len Esteves MD (02/20 0049)      No  cervical spine fracture or traumatic malalignment.      Reidentified biapical pleural-parenchymal scarring, left greater than right. Stable 7 mm right apical pulmonary nodule. A follow-up CT chest in 12 months is recommended to assess for continued stability.         Workstation performed: [id number]               Procedures  Procedures      ED Course                             SBIRT 20yo+      Flowsheet Row Most Recent Value   Initial Alcohol Screen: US AUDIT-C     1. How often do you have a drink containing alcohol? 0 Filed at: 02/19/2024 2313   3b. FEMALE Any Age, or MALE 65+: How often do you have 4 or more drinks on one occassion? 0 Filed at: 02/19/2024 2313   Audit-C Score 0 Filed at: 02/19/2024 2313   RUPERTO: How many times in the past year have you...    Used an illegal drug or used a prescription medication for non-medical reasons? Never Filed at: 02/19/2024 2313                  Medical Decision Making  83-year-old female with unwitnessed fall at nursing home presents to the emergency department.  Differential diagnosis includes but is not limited to TBI, cervical strain, cervical fracture, cervical dislocation, hematoma, patellar fracture, patellar dislocation, abrasion, UTI.  Will obtain CBC to evaluate for possible signs of systemic infection, anemia.  Will obtain BMP to evaluate for patient's electrolyte status as well as renal function.  Will obtain urinalysis to assess for possible urinary tract infection.  Will obtain CT imaging of the head and neck to rule out above as well as radiographic imaging of the right knee.  Laboratory analysis without evidence of urinary tract infection, or other pertinent findings at this time.  Radiographic imaging showed no acute intracranial or cervical or other osseous abnormalities.  Chronic findings discussed with patient's family as well as recommendations for follow-up in 12 months time.  Patient was administered Tylenol as well as fluids with improvement of  symptoms. Patient appears well, nontoxic, family agrees with plan of care at this time.  Answered all questions.  In light of this, patient would benefit from outpatient follow-up.    Amount and/or Complexity of Data Reviewed  Labs: ordered.  Radiology: ordered and independent interpretation performed.    Risk  OTC drugs.  Prescription drug management.          Disposition  Final diagnoses:   Fall, initial encounter   Closed head injury, initial encounter   Traumatic hematoma of forehead, initial encounter     Time reflects when diagnosis was documented in both MDM as applicable and the Disposition within this note       Time User Action Codes Description Comment    2/20/2024 12:53 AM Michael Shelton [W19.XXXA] Fall, initial encounter     2/20/2024 12:53 AM Michael Shelton [S09.90XA] Closed head injury, initial encounter     2/20/2024 12:53 AM Michael Shelton [S00.83XA] Traumatic hematoma of forehead, initial encounter           ED Disposition       ED Disposition   Discharge    Condition   Stable    Date/Time   Tue Feb 20, 2024 0226    Comment   Monika Butler discharge to home/self care.                   Follow-up Information       Follow up With Specialties Details Why Contact Info    Kristin Raymundo MD Family Medicine   [address] [facility] [address]  [phone]              Patient's Medications   Discharge Prescriptions    No medications on file     No discharge procedures on file.    PDMP Review         Value Time User    PDMP Reviewed  Yes 12/14/2023 10:57 AM DAMARIS Engel             ED Provider  Attending physically available and evaluated Monika E Butler. I managed the patient along with the ED Attending.    Electronically Signed by           Michael Shelton MD  02/20/24 0607

## 2024-02-21 PROBLEM — J32.9 SINUSITIS: Status: RESOLVED | Noted: 2017-01-30 | Resolved: 2024-02-21

## 2024-02-21 PROBLEM — Z00.00 MEDICARE ANNUAL WELLNESS VISIT, SUBSEQUENT: Status: RESOLVED | Noted: 2020-11-17 | Resolved: 2024-02-21

## 2024-04-14 ENCOUNTER — APPOINTMENT (INPATIENT)
Dept: CT IMAGING | Facility: HOSPITAL | Age: 84
DRG: 871 | End: 2024-04-14
Payer: COMMERCIAL

## 2024-04-14 ENCOUNTER — APPOINTMENT (EMERGENCY)
Dept: RADIOLOGY | Facility: HOSPITAL | Age: 84
DRG: 871 | End: 2024-04-14
Payer: COMMERCIAL

## 2024-04-14 ENCOUNTER — HOSPITAL ENCOUNTER (INPATIENT)
Facility: HOSPITAL | Age: 84
LOS: 5 days | Discharge: NURSING, REHAB OR LONG-TERM CARE | DRG: 871 | End: 2024-04-19
Attending: EMERGENCY MEDICINE | Admitting: INTERNAL MEDICINE
Payer: COMMERCIAL

## 2024-04-14 DIAGNOSIS — G93.41 METABOLIC ENCEPHALOPATHY: ICD-10-CM

## 2024-04-14 DIAGNOSIS — G93.40 ACUTE ENCEPHALOPATHY: ICD-10-CM

## 2024-04-14 DIAGNOSIS — F03.918 DEMENTIA WITH BEHAVIORAL DISTURBANCE (HCC): ICD-10-CM

## 2024-04-14 DIAGNOSIS — A41.9 SEPSIS (HCC): Primary | ICD-10-CM

## 2024-04-14 DIAGNOSIS — N39.0 URINARY TRACT INFECTION: ICD-10-CM

## 2024-04-14 DIAGNOSIS — K86.2 PANCREATIC CYST: ICD-10-CM

## 2024-04-14 DIAGNOSIS — J96.01 ACUTE RESPIRATORY FAILURE WITH HYPOXIA (HCC): ICD-10-CM

## 2024-04-14 PROBLEM — R79.89 ELEVATED SERUM CREATININE: Status: ACTIVE | Noted: 2024-04-14

## 2024-04-14 PROBLEM — R65.20 SEVERE SEPSIS (HCC): Status: ACTIVE | Noted: 2024-04-14

## 2024-04-14 PROBLEM — R94.31 QT PROLONGATION: Status: ACTIVE | Noted: 2024-04-14

## 2024-04-14 LAB
ALBUMIN SERPL BCP-MCNC: 3.9 G/DL (ref 3.5–5)
ALP SERPL-CCNC: 79 U/L (ref 34–104)
ALT SERPL W P-5'-P-CCNC: 13 U/L (ref 7–52)
AMORPH URATE CRY URNS QL MICRO: ABNORMAL
ANION GAP SERPL CALCULATED.3IONS-SCNC: 11 MMOL/L (ref 4–13)
ANISOCYTOSIS BLD QL SMEAR: PRESENT
APTT PPP: 33 SECONDS (ref 23–37)
AST SERPL W P-5'-P-CCNC: 23 U/L (ref 13–39)
BACTERIA UR QL AUTO: ABNORMAL /HPF
BASOPHILS # BLD MANUAL: 0 THOUSAND/UL (ref 0–0.1)
BASOPHILS NFR MAR MANUAL: 0 % (ref 0–1)
BILIRUB SERPL-MCNC: 0.41 MG/DL (ref 0.2–1)
BILIRUB UR QL STRIP: NEGATIVE
BUN SERPL-MCNC: 38 MG/DL (ref 5–25)
CALCIUM SERPL-MCNC: 9.8 MG/DL (ref 8.4–10.2)
CHLORIDE SERPL-SCNC: 103 MMOL/L (ref 96–108)
CLARITY UR: ABNORMAL
CO2 SERPL-SCNC: 32 MMOL/L (ref 21–32)
COLOR UR: ABNORMAL
CREAT SERPL-MCNC: 1.27 MG/DL (ref 0.6–1.3)
EOSINOPHIL # BLD MANUAL: 0 THOUSAND/UL (ref 0–0.4)
EOSINOPHIL NFR BLD MANUAL: 0 % (ref 0–6)
ERYTHROCYTE [DISTWIDTH] IN BLOOD BY AUTOMATED COUNT: 15.3 % (ref 11.6–15.1)
FLUAV RNA RESP QL NAA+PROBE: NEGATIVE
FLUBV RNA RESP QL NAA+PROBE: NEGATIVE
GFR SERPL CREATININE-BSD FRML MDRD: 38 ML/MIN/1.73SQ M
GLUCOSE SERPL-MCNC: 137 MG/DL (ref 65–140)
GLUCOSE UR STRIP-MCNC: NEGATIVE MG/DL
HCT VFR BLD AUTO: 36.2 % (ref 34.8–46.1)
HGB BLD-MCNC: 11.3 G/DL (ref 11.5–15.4)
HGB UR QL STRIP.AUTO: ABNORMAL
HYALINE CASTS #/AREA URNS LPF: ABNORMAL /LPF
INR PPP: 1.02 (ref 0.84–1.19)
KETONES UR STRIP-MCNC: ABNORMAL MG/DL
LACTATE SERPL-SCNC: 1.2 MMOL/L (ref 0.5–2)
LACTATE SERPL-SCNC: 2.5 MMOL/L (ref 0.5–2)
LEUKOCYTE ESTERASE UR QL STRIP: ABNORMAL
LYMPHOCYTES # BLD AUTO: 0.38 THOUSAND/UL (ref 0.6–4.47)
LYMPHOCYTES # BLD AUTO: 2 % (ref 14–44)
MAGNESIUM SERPL-MCNC: 1.9 MG/DL (ref 1.9–2.7)
MCH RBC QN AUTO: 32.1 PG (ref 26.8–34.3)
MCHC RBC AUTO-ENTMCNC: 31.2 G/DL (ref 31.4–37.4)
MCV RBC AUTO: 103 FL (ref 82–98)
MONOCYTES # BLD AUTO: 0.19 THOUSAND/UL (ref 0–1.22)
MONOCYTES NFR BLD: 1 % (ref 4–12)
MUCOUS THREADS UR QL AUTO: ABNORMAL
NEUTROPHILS # BLD MANUAL: 18.48 THOUSAND/UL (ref 1.85–7.62)
NEUTS BAND NFR BLD MANUAL: 10 % (ref 0–8)
NEUTS SEG NFR BLD AUTO: 87 % (ref 43–75)
NITRITE UR QL STRIP: NEGATIVE
NON-SQ EPI CELLS URNS QL MICRO: ABNORMAL /HPF
PH UR STRIP.AUTO: 6 [PH]
PLATELET # BLD AUTO: 261 THOUSANDS/UL (ref 149–390)
PLATELET BLD QL SMEAR: ADEQUATE
PMV BLD AUTO: 11.5 FL (ref 8.9–12.7)
POTASSIUM SERPL-SCNC: 3.6 MMOL/L (ref 3.5–5.3)
PROCALCITONIN SERPL-MCNC: 7.25 NG/ML
PROT SERPL-MCNC: 7.9 G/DL (ref 6.4–8.4)
PROT UR STRIP-MCNC: ABNORMAL MG/DL
PROTHROMBIN TIME: 14 SECONDS (ref 11.6–14.5)
RBC # BLD AUTO: 3.52 MILLION/UL (ref 3.81–5.12)
RBC #/AREA URNS AUTO: ABNORMAL /HPF
RBC MORPH BLD: PRESENT
RSV RNA RESP QL NAA+PROBE: NEGATIVE
SARS-COV-2 RNA RESP QL NAA+PROBE: NEGATIVE
SODIUM SERPL-SCNC: 146 MMOL/L (ref 135–147)
SP GR UR STRIP.AUTO: 1.02 (ref 1–1.03)
UROBILINOGEN UR STRIP-ACNC: 2 MG/DL
WBC # BLD AUTO: 19.05 THOUSAND/UL (ref 4.31–10.16)
WBC #/AREA URNS AUTO: ABNORMAL /HPF
WBC CLUMPS # UR AUTO: PRESENT /UL

## 2024-04-14 PROCEDURE — 85007 BL SMEAR W/DIFF WBC COUNT: CPT

## 2024-04-14 PROCEDURE — 84145 PROCALCITONIN (PCT): CPT

## 2024-04-14 PROCEDURE — 85027 COMPLETE CBC AUTOMATED: CPT

## 2024-04-14 PROCEDURE — 85730 THROMBOPLASTIN TIME PARTIAL: CPT

## 2024-04-14 PROCEDURE — 71045 X-RAY EXAM CHEST 1 VIEW: CPT

## 2024-04-14 PROCEDURE — 70450 CT HEAD/BRAIN W/O DYE: CPT

## 2024-04-14 PROCEDURE — 36415 COLL VENOUS BLD VENIPUNCTURE: CPT

## 2024-04-14 PROCEDURE — 87040 BLOOD CULTURE FOR BACTERIA: CPT

## 2024-04-14 PROCEDURE — 87086 URINE CULTURE/COLONY COUNT: CPT

## 2024-04-14 PROCEDURE — 81001 URINALYSIS AUTO W/SCOPE: CPT

## 2024-04-14 PROCEDURE — 99285 EMERGENCY DEPT VISIT HI MDM: CPT | Performed by: EMERGENCY MEDICINE

## 2024-04-14 PROCEDURE — 83605 ASSAY OF LACTIC ACID: CPT

## 2024-04-14 PROCEDURE — 74177 CT ABD & PELVIS W/CONTRAST: CPT

## 2024-04-14 PROCEDURE — 85610 PROTHROMBIN TIME: CPT

## 2024-04-14 PROCEDURE — 99285 EMERGENCY DEPT VISIT HI MDM: CPT

## 2024-04-14 PROCEDURE — 87186 SC STD MICRODIL/AGAR DIL: CPT

## 2024-04-14 PROCEDURE — 96365 THER/PROPH/DIAG IV INF INIT: CPT

## 2024-04-14 PROCEDURE — 96375 TX/PRO/DX INJ NEW DRUG ADDON: CPT

## 2024-04-14 PROCEDURE — 93005 ELECTROCARDIOGRAM TRACING: CPT

## 2024-04-14 PROCEDURE — 71275 CT ANGIOGRAPHY CHEST: CPT

## 2024-04-14 PROCEDURE — 83735 ASSAY OF MAGNESIUM: CPT

## 2024-04-14 PROCEDURE — 0241U HB NFCT DS VIR RESP RNA 4 TRGT: CPT

## 2024-04-14 PROCEDURE — 87077 CULTURE AEROBIC IDENTIFY: CPT

## 2024-04-14 PROCEDURE — 80053 COMPREHEN METABOLIC PANEL: CPT

## 2024-04-14 RX ORDER — DOCUSATE SODIUM 100 MG/1
100 CAPSULE, LIQUID FILLED ORAL 2 TIMES DAILY
Status: DISCONTINUED | OUTPATIENT
Start: 2024-04-15 | End: 2024-04-19 | Stop reason: HOSPADM

## 2024-04-14 RX ORDER — ACETAMINOPHEN 160 MG/5ML
650 SUSPENSION ORAL EVERY 6 HOURS PRN
Status: DISCONTINUED | OUTPATIENT
Start: 2024-04-14 | End: 2024-04-19 | Stop reason: HOSPADM

## 2024-04-14 RX ORDER — GUAIFENESIN 600 MG/1
600 TABLET, EXTENDED RELEASE ORAL EVERY 12 HOURS SCHEDULED
Status: DISCONTINUED | OUTPATIENT
Start: 2024-04-14 | End: 2024-04-19 | Stop reason: HOSPADM

## 2024-04-14 RX ORDER — SODIUM CHLORIDE, SODIUM GLUCONATE, SODIUM ACETATE, POTASSIUM CHLORIDE, MAGNESIUM CHLORIDE, SODIUM PHOSPHATE, DIBASIC, AND POTASSIUM PHOSPHATE .53; .5; .37; .037; .03; .012; .00082 G/100ML; G/100ML; G/100ML; G/100ML; G/100ML; G/100ML; G/100ML
500 INJECTION, SOLUTION INTRAVENOUS ONCE
Status: COMPLETED | OUTPATIENT
Start: 2024-04-14 | End: 2024-04-15

## 2024-04-14 RX ORDER — HEPARIN SODIUM 5000 [USP'U]/ML
5000 INJECTION, SOLUTION INTRAVENOUS; SUBCUTANEOUS EVERY 8 HOURS SCHEDULED
Status: DISCONTINUED | OUTPATIENT
Start: 2024-04-14 | End: 2024-04-19 | Stop reason: HOSPADM

## 2024-04-14 RX ORDER — METOPROLOL SUCCINATE 25 MG/1
25 TABLET, EXTENDED RELEASE ORAL
Status: DISCONTINUED | OUTPATIENT
Start: 2024-04-14 | End: 2024-04-19 | Stop reason: HOSPADM

## 2024-04-14 RX ORDER — MIRTAZAPINE 15 MG/1
15 TABLET, FILM COATED ORAL
COMMUNITY
End: 2024-05-03 | Stop reason: SDUPTHER

## 2024-04-14 RX ORDER — SENNOSIDES 8.8 MG/5ML
8.8 LIQUID ORAL 2 TIMES DAILY
Status: DISCONTINUED | OUTPATIENT
Start: 2024-04-15 | End: 2024-04-19 | Stop reason: HOSPADM

## 2024-04-14 RX ORDER — LORATADINE 10 MG/1
10 TABLET ORAL DAILY
Status: DISCONTINUED | OUTPATIENT
Start: 2024-04-15 | End: 2024-04-19 | Stop reason: HOSPADM

## 2024-04-14 RX ORDER — BENZONATATE 100 MG/1
100 CAPSULE ORAL 3 TIMES DAILY
Status: DISCONTINUED | OUTPATIENT
Start: 2024-04-14 | End: 2024-04-19 | Stop reason: HOSPADM

## 2024-04-14 RX ORDER — METOPROLOL SUCCINATE 50 MG/1
50 TABLET, EXTENDED RELEASE ORAL EVERY MORNING
Status: DISCONTINUED | OUTPATIENT
Start: 2024-04-15 | End: 2024-04-19 | Stop reason: HOSPADM

## 2024-04-14 RX ORDER — POLYETHYLENE GLYCOL 3350 17 G/17G
17 POWDER, FOR SOLUTION ORAL AS NEEDED
Status: DISCONTINUED | OUTPATIENT
Start: 2024-04-14 | End: 2024-04-19 | Stop reason: HOSPADM

## 2024-04-14 RX ORDER — POLYETHYLENE GLYCOL 3350 17 G/17G
17 POWDER, FOR SOLUTION ORAL DAILY
Status: DISCONTINUED | OUTPATIENT
Start: 2024-04-15 | End: 2024-04-19 | Stop reason: HOSPADM

## 2024-04-14 RX ORDER — VANCOMYCIN HYDROCHLORIDE 1 G/200ML
20 INJECTION, SOLUTION INTRAVENOUS ONCE
Status: COMPLETED | OUTPATIENT
Start: 2024-04-14 | End: 2024-04-15

## 2024-04-14 RX ORDER — ACETAMINOPHEN 10 MG/ML
1000 INJECTION, SOLUTION INTRAVENOUS ONCE
Status: COMPLETED | OUTPATIENT
Start: 2024-04-14 | End: 2024-04-14

## 2024-04-14 RX ORDER — VANCOMYCIN HYDROCHLORIDE 750 MG/150ML
15 INJECTION, SOLUTION INTRAVENOUS EVERY 12 HOURS
Status: DISCONTINUED | OUTPATIENT
Start: 2024-04-14 | End: 2024-04-14 | Stop reason: DRUGHIGH

## 2024-04-14 RX ORDER — BUSPIRONE HYDROCHLORIDE 5 MG/1
10 TABLET ORAL 2 TIMES DAILY
Status: DISCONTINUED | OUTPATIENT
Start: 2024-04-14 | End: 2024-04-17

## 2024-04-14 RX ORDER — SODIUM CHLORIDE, SODIUM GLUCONATE, SODIUM ACETATE, POTASSIUM CHLORIDE, MAGNESIUM CHLORIDE, SODIUM PHOSPHATE, DIBASIC, AND POTASSIUM PHOSPHATE .53; .5; .37; .037; .03; .012; .00082 G/100ML; G/100ML; G/100ML; G/100ML; G/100ML; G/100ML; G/100ML
75 INJECTION, SOLUTION INTRAVENOUS CONTINUOUS
Status: DISPENSED | OUTPATIENT
Start: 2024-04-14 | End: 2024-04-15

## 2024-04-14 RX ORDER — LANOLIN ALCOHOL/MO/W.PET/CERES
9 CREAM (GRAM) TOPICAL
Status: DISCONTINUED | OUTPATIENT
Start: 2024-04-14 | End: 2024-04-19 | Stop reason: HOSPADM

## 2024-04-14 RX ORDER — PHENOL 1.4 %
10 AEROSOL, SPRAY (ML) MUCOUS MEMBRANE
Status: DISCONTINUED | OUTPATIENT
Start: 2024-04-14 | End: 2024-04-14 | Stop reason: RX

## 2024-04-14 RX ORDER — DONEPEZIL HYDROCHLORIDE 5 MG/1
10 TABLET, FILM COATED ORAL
Status: DISCONTINUED | OUTPATIENT
Start: 2024-04-14 | End: 2024-04-19 | Stop reason: HOSPADM

## 2024-04-14 RX ADMIN — SODIUM CHLORIDE, SODIUM GLUCONATE, SODIUM ACETATE, POTASSIUM CHLORIDE, MAGNESIUM CHLORIDE, SODIUM PHOSPHATE, DIBASIC, AND POTASSIUM PHOSPHATE 500 ML: .53; .5; .37; .037; .03; .012; .00082 INJECTION, SOLUTION INTRAVENOUS at 23:33

## 2024-04-14 RX ADMIN — IOHEXOL 100 ML: 350 INJECTION, SOLUTION INTRAVENOUS at 23:23

## 2024-04-14 RX ADMIN — SODIUM CHLORIDE 1000 ML: 0.9 INJECTION, SOLUTION INTRAVENOUS at 18:54

## 2024-04-14 RX ADMIN — CEFTRIAXONE SODIUM 1000 MG: 10 INJECTION, POWDER, FOR SOLUTION INTRAVENOUS at 19:03

## 2024-04-14 RX ADMIN — VANCOMYCIN HYDROCHLORIDE 1000 MG: 1 INJECTION, SOLUTION INTRAVENOUS at 23:42

## 2024-04-14 RX ADMIN — ACETAMINOPHEN 1000 MG: 10 INJECTION INTRAVENOUS at 18:55

## 2024-04-14 NOTE — SEPSIS NOTE
"  Sepsis Note   Monika Butler 83 y.o. female MRN: [id number]  Unit/Bed#: ED-04 Encounter: [id number]       Initial Sepsis Screening       Row Name 04/14/24 1931 04/14/24 1900             Is the patient's history suggestive of a new or worsening infection? Yes (Proceed)  -SG Yes (Proceed)  -SG       Suspected source of infection pneumonia  -SG pneumonia  -SG       Indicate SIRS criteria Hyperthemia > 38.3C (100.9F) OR Hypothermia <36C (96.8F);Tachycardia > 90 bpm;Leukocytosis (WBC > 35499 IJL) OR Leukopenia (WBC <4000 IJL) OR Bandemia (WBC >10% bands)  -SG Hyperthemia > 38.3C (100.9F) OR Hypothermia <36C (96.8F);Tachycardia > 90 bpm;Leukocytosis (WBC > 62035 IJL) OR Leukopenia (WBC <4000 IJL) OR Bandemia (WBC >10% bands)  -SG       Are two or more of the above signs & symptoms of infection both present and new to the patient? Yes (Proceed)  -SG Yes (Proceed)  -SG       Assess for evidence of organ dysfunction: Are any of the below criteria present within 6 hours of suspected infection and SIRS criteria that are NOT considered to be chronic conditions? Lactate > 2.0  -SG --       Date of presentation of severe sepsis 04/14/24  -SG --       Time of presentation of severe sepsis 1931  -SG --       Sepsis Note: Click \"NEXT\" below (NOT \"close\") to generate sepsis note based on above information. YES (proceed by clicking \"NEXT\")  -SG --                 User Key  (r) = Recorded By, (t) = Taken By, (c) = Cosigned By      Initials Name Provider Type    SG Ayesha Jin, DO Resident                        There is no height or weight on file to calculate BMI.  Wt Readings from Last 1 Encounters:   01/05/24 51.3 kg (113 lb 1.5 oz)        Ideal body weight: 47.8 kg (105 lb 6.1 oz)  Adjusted ideal body weight: 49.2 kg (108 lb 7.5 oz)    "

## 2024-04-14 NOTE — ED ATTENDING ATTESTATION
"4/14/2024  I, Ethan Triplett MD, saw and evaluated the patient. I have discussed the patient with the resident/non-physician practitioner and agree with the resident's/non-physician practitioner's findings, Plan of Care, and MDM as documented in the resident's/non-physician practitioner's note, except where noted. All available labs and Radiology studies were reviewed.  I was present for key portions of any procedure(s) performed by the resident/non-physician practitioner and I was immediately available to provide assistance.       At this point I agree with the current assessment done in the Emergency Department.  I have conducted an independent evaluation of this patient a history and physical is as follows:    S:  Chief Complaint   Patient presents with    Altered Mental Status     Patient arrives via EMS from [facility] for evaluation of AMS, daughter reports patient got Ativan for agitation yesterday and today was \"off all day\", did not get out of bed until 1100 and did not eat breakfast, ate a small amount of lunch, but has been lethargic all day     Monika is an 83 y.o. female who presents via EMS with the chief complaint of ams.  She has a history of dementia but is normally conversant.  Today she was less active and not conversant.  She has had some uri symptoms in the past.  Upon presentation here she was quite febrile (103.8) and is in distress.  She is not conversant.     O:  ED Triage Vitals   Temperature Pulse Respirations Blood Pressure SpO2   04/14/24 1821 04/14/24 1820 04/14/24 1820 04/14/24 1820 04/14/24 1820   (!) 103.8 °F (39.9 °C) (!) 114 20 144/79 90 %      Temp Source Heart Rate Source Patient Position - Orthostatic VS BP Location FiO2 (%)   04/14/24 1821 04/14/24 1915 04/14/24 1820 04/14/24 1820 --   Rectal Monitor Sitting Right arm       Pain Score       --                Physical Exam  Vitals and nursing note reviewed.   Constitutional:       General: She is in acute " distress.      Appearance: She is well-developed. She is ill-appearing and toxic-appearing.   HENT:      Head: Normocephalic and atraumatic.      Mouth/Throat:      Mouth: Mucous membranes are dry.   Eyes:      Extraocular Movements: Extraocular movements intact.      Pupils: Pupils are equal, round, and reactive to light.   Neck:      Vascular: No JVD.   Cardiovascular:      Rate and Rhythm: Regular rhythm. Tachycardia present.      Heart sounds: Normal heart sounds. No murmur heard.     No friction rub. No gallop.   Pulmonary:      Effort: Pulmonary effort is normal. No respiratory distress.      Breath sounds: Rhonchi present. No wheezing or rales.   Chest:      Chest wall: No tenderness.   Musculoskeletal:         General: No tenderness. Normal range of motion.      Cervical back: Normal range of motion.   Skin:     General: Skin is warm and dry.      Comments: Clonidine patch right anterior chest   Neurological:      General: No focal deficit present.      Mental Status: She is alert. She is disoriented.      GCS: GCS eye subscore is 4. GCS verbal subscore is 2. GCS motor subscore is 5.   Psychiatric:         Behavior: Behavior normal.         Thought Content: Thought content normal.         Judgment: Judgment normal.       A/P:  Background: 83 y.o. female presents with fever, altered mental status    Differential DX includes but is not limited to: pneumonia, viral uri, bacteremia, urinary tract infection    Plan: septic workup, empiric antibiotics, admission      ED Course     Labs Reviewed   CBC AND DIFFERENTIAL - Abnormal       Result Value Ref Range Status    WBC 19.05 (*) 4.31 - 10.16 Thousand/uL Final    RBC 3.52 (*) 3.81 - 5.12 Million/uL Final    Hemoglobin 11.3 (*) 11.5 - 15.4 g/dL Final    Hematocrit 36.2  34.8 - 46.1 % Final     (*) 82 - 98 fL Final    MCH 32.1  26.8 - 34.3 pg Final    MCHC 31.2 (*) 31.4 - 37.4 g/dL Final    RDW 15.3 (*) 11.6 - 15.1 % Final    MPV 11.5  8.9 - 12.7 fL Final     Platelets 261  149 - 390 Thousands/uL Final    Narrative:     This is an appended report.  These results have been appended to a previously verified report.   COMPREHENSIVE METABOLIC PANEL - Abnormal    Sodium 146  135 - 147 mmol/L Final    Potassium 3.6  3.5 - 5.3 mmol/L Final    Chloride 103  96 - 108 mmol/L Final    CO2 32  21 - 32 mmol/L Final    ANION GAP 11  4 - 13 mmol/L Final    BUN 38 (*) 5 - 25 mg/dL Final    Creatinine 1.27  0.60 - 1.30 mg/dL Final    Comment: Standardized to IDMS reference method    Glucose 137  65 - 140 mg/dL Final    Comment: If the patient is fasting, the ADA then defines impaired fasting glucose as > 100 mg/dL and diabetes as > or equal to 123 mg/dL.    Calcium 9.8  8.4 - 10.2 mg/dL Final    AST 23  13 - 39 U/L Final    ALT 13  7 - 52 U/L Final    Comment: Specimen collection should occur prior to Sulfasalazine administration due to the potential for falsely depressed results.     Alkaline Phosphatase 79  34 - 104 U/L Final    Total Protein 7.9  6.4 - 8.4 g/dL Final    Albumin 3.9  3.5 - 5.0 g/dL Final    Total Bilirubin 0.41  0.20 - 1.00 mg/dL Final    Comment: Use of this assay is not recommended for patients undergoing treatment with eltrombopag due to the potential for falsely elevated results.  N-acetyl-p-benzoquinone imine (metabolite of Acetaminophen) will generate erroneously low results in samples for patients that have taken an overdose of Acetaminophen.    eGFR 38  ml/min/1.73sq m Final    Narrative:     National Kidney Disease Foundation guidelines for Chronic Kidney Disease (CKD):     Stage 1 with normal or high GFR (GFR > 90 mL/min/1.73 square meters)    Stage 2 Mild CKD (GFR = 60-89 mL/min/1.73 square meters)    Stage 3A Moderate CKD (GFR = 45-59 mL/min/1.73 square meters)    Stage 3B Moderate CKD (GFR = 30-44 mL/min/1.73 square meters)    Stage 4 Severe CKD (GFR = 15-29 mL/min/1.73 square meters)    Stage 5 End Stage CKD (GFR <15 mL/min/1.73 square meters)  Note: GFR  calculation is accurate only with a steady state creatinine   LACTIC ACID, PLASMA (W/REFLEX IF RESULT > 2.0) - Abnormal    LACTIC ACID 2.5 (*) 0.5 - 2.0 mmol/L Final    Narrative:     Result may be elevated if tourniquet was used during collection.   PROCALCITONIN TEST - Abnormal    Procalcitonin 7.25 (*) <=0.25 ng/ml Final    Comment: Suspected Lower Respiratory Tract Infection (LRTI):  - LESS than or EQUAL to 0.25 ng/mL:   low likelihood for bacterial LRTI; antibiotics DISCOURAGED.  - GREATER than 0.25 ng/mL:   increased likelihood for bacterial LRTI; antibiotics ENCOURAGED.    Suspected Sepsis:  - Strongly consider initiating antibiotics in ALL UNSTABLE patients.  - LESS than or EQUAL to 0.5 ng/mL:   low likelihood for bacterial sepsis; antibiotics DISCOURAGED.  - GREATER than 0.5 ng/mL:   increased likelihood for bacterial sepsis; antibiotics ENCOURAGED.  - GREATER than 2 ng/mL:   high risk for severe sepsis / septic shock; antibiotics strongly ENCOURAGED.    Decisions on antibiotic use should not be based solely on Procalcitonin (PCT) levels. If PCT is low but uncertainty exists with stopping antibiotics, repeat PCT in 6-24 hours to confirm the low level. If antibiotics are administered (regardless if initial PCT was high or low), repeat PCT every 1-2 days to consider early antibiotic cessation (when GREATER than 80% decrease from the peak OR when PCT drops below designated cutoffs, whichever comes first), so long as the infection is NOT one that typically requires prolonged treatment durations (e.g., bone/joint infections, endocarditis, Staph. aureus bacteremia).    Situations of FALSE-POSITIVE Procalcitonin values:  1) Newborns < 72 hours old  2) Massive stress from severe trauma / burns, major surgery, acute pancreatitis, cardiogenic / hemorrhagic shock, sickle cell crisis, or other organ perfusion abnormalities  3) Malaria and some Candidal infections  4) Treatment with agents that stimulate cytokines  (e.g., OKT3, anti-lymphocyte globulins, alemtuzumab, IL-2, granulocyte transfusion [NOT GCSFs])  5) Chronic renal disease causes elevated baseline levels (consider GREATER than 0.75 ng/mL as an abnormal cut-off); initiating HD/CRRT may cause transient decreases  6) Paraneoplastic syndromes from medullary thyroid or SCLC, some forms of vasculitis, and acute [id number] disease    Situations of FALSE-NEGATIVE Procalcitonin values:  1) Too early in clinical course for PCT to have reached its peak (may repeat in 6-24 hours to confirm low level)  2) Localized infection WITHOUT systemic (SIRS / sepsis) response (e.g., an abscess, osteomyelitis, cystitis)  3) Mycobacteria (e.g., Tuberculosis, MAC)  4) Cystic fibrosis exacerbations     UA W REFLEX TO MICROSCOPIC WITH REFLEX TO CULTURE - Abnormal    Color, UA Light Orange   Final    Clarity, UA Extra Turbid   Final    Specific Gravity, UA 1.024  1.003 - 1.030 Final    pH, UA 6.0  4.5, 5.0, 5.5, 6.0, 6.5, 7.0, 7.5, 8.0 Final    Leukocytes, UA Large (*) Negative Final    Nitrite, UA Negative  Negative Final    Protein,  (2+) (*) Negative mg/dl Final    Glucose, UA Negative  Negative mg/dl Final    Ketones, UA Trace (*) Negative mg/dl Final    Urobilinogen, UA 2.0 (*) <2.0 mg/dl mg/dl Final    Bilirubin, UA Negative  Negative Final    Occult Blood, UA Moderate (*) Negative Final   URINE MICROSCOPIC - Abnormal    RBC, UA 30-50 (*) None Seen, 1-2 /hpf Final    WBC, UA Innumerable (*) None Seen, 1-2 /hpf Final    Epithelial Cells None Seen  None Seen, Occasional /hpf Final    Bacteria, UA Moderate (*) None Seen, Occasional /hpf Final    MUCUS THREADS Innumerable (*) None Seen Final    Hyaline Casts, UA 10-25 (*) None Seen /lpf Final    Amorphous Crystals, UA Occasional   Final    WBC Clumps Present   Final   MANUAL DIFFERENTIAL(PHLEBS DO NOT ORDER) - Abnormal    Segmented % 87 (*) 43 - 75 % Final    Bands % 10 (*) 0 - 8 % Final    Lymphocytes % 2 (*) 14 - 44 % Final     Monocytes % 1 (*) 4 - 12 % Final    Eosinophils % 0  0 - 6 % Final    Basophils % 0  0 - 1 % Final    Absolute Neutrophils 18.48 (*) 1.85 - 7.62 Thousand/uL Final    Absolute Lymphocytes 0.38 (*) 0.60 - 4.47 Thousand/uL Final    Absolute Monocytes 0.19  0.00 - 1.22 Thousand/uL Final    Absolute Eosinophils 0.00  0.00 - 0.40 Thousand/uL Final    Absolute Basophils 0.00  0.00 - 0.10 Thousand/uL Final    Total Counted     Final    RBC Morphology Present   Final    Platelet Estimate Adequate  Adequate Final    Anisocytosis Present   Final   COVID19, INFLUENZA A/B, RSV PCR, [facility] - Normal    SARS-CoV-2 Negative  Negative Final    Comment:      INFLUENZA A PCR Negative  Negative Final    Comment:      INFLUENZA B PCR Negative  Negative Final    Comment:      RSV PCR Negative  Negative Final    Comment:      Narrative:     FOR PEDIATRIC PATIENTS - copy/paste COVID Guidelines URL to browser: [email or web address]    SARS-CoV-2 assay is a Nucleic Acid Amplification assay intended for the  qualitative detection of nucleic acid from SARS-CoV-2 in nasopharyngeal  swabs. Results are for the presumptive identification of SARS-CoV-2 RNA.    Positive results are indicative of infection with SARS-CoV-2, the virus  causing COVID-19, but do not rule out bacterial infection or co-infection  with other viruses. Laboratories within the [place] and its  territories are required to report all positive results to the appropriate  public health authorities. Negative results do not preclude SARS-CoV-2  infection and should not be used as the sole basis for treatment or other  patient management decisions. Negative results must be combined with  clinical observations, patient history, and epidemiological information.  This test has not been FDA cleared or approved.    This test has been authorized by FDA under an Emergency Use Authorization  (EUA). This test is only authorized for the  duration of time the  declaration that circumstances exist justifying the authorization of the  emergency use of an in vitro diagnostic tests for detection of SARS-CoV-2  virus and/or diagnosis of COVID-19 infection under section 564(b)(1) of  the Act, 21 U.S.C. 360bbb-3(b)(1), unless the authorization is terminated  or revoked sooner. The test has been validated but independent review by FDA  and CLIA is pending.    Test performed using [employer]: This RT-PCR assay targets N2,  a region unique to SARS-CoV-2. A conserved region in the E-gene was chosen  for pan-Sarbecovirus detection which includes SARS-CoV-2.    According to CMS-2020-01-R, this platform meets the definition of high-throughput technology.   PROTIME-INR - Normal    Protime 14.0  11.6 - 14.5 seconds Final    INR 1.02  0.84 - 1.19 Final   APTT - Normal    PTT 33  23 - 37 seconds Final    Comment: Therapeutic Heparin Range =  60-90 seconds   LACTIC ACID 2 HOUR - Normal    LACTIC ACID 1.2  0.5 - 2.0 mmol/L Final    Comment: Specimen grossly hemolyzed, Results may be affected.    Narrative:     Result may be elevated if tourniquet was used during collection.   BLOOD CULTURE   BLOOD CULTURE   URINE CULTURE   RBC MORPHOLOGY REFLEX TEST     XR chest portable   ED Interpretation   This film was interpreted independently by me.  No obvious infiltrates, similar to previous xray (01/09/24)      Final Result      No acute cardiopulmonary disease.            Workstation performed: [id number]         CT head wo contrast    (Results Pending)   CTA chest (pe study) abdomen pelvis contrast    (Results Pending)     Medications   acetaminophen (TYLENOL) oral suspension 650 mg (has no administration in time range)   polyethylene glycol (MIRALAX) packet 17 g (has no administration in time range)   senna oral syrup 8.8 mg (has no administration in time range)   multi-electrolyte (ISOLYTE-S PH 7.4) bolus 500 mL (has no administration in time range)   heparin  (porcine) subcutaneous injection 5,000 Units (has no administration in time range)   benzonatate (TESSALON PERLES) capsule 100 mg (100 mg Oral Not Given 4/14/24 2327)   guaiFENesin (MUCINEX) 12 hr tablet 600 mg (has no administration in time range)   busPIRone (BUSPAR) tablet 10 mg (has no administration in time range)   docusate sodium (COLACE) capsule 100 mg (has no administration in time range)   donepezil (ARICEPT) tablet 10 mg (has no administration in time range)   loratadine (CLARITIN) tablet 10 mg (has no administration in time range)   metoprolol succinate (TOPROL-XL) 24 hr tablet 50 mg (has no administration in time range)   metoprolol succinate (TOPROL-XL) 24 hr tablet 25 mg (has no administration in time range)   polyethylene glycol (MIRALAX) packet 17 g (has no administration in time range)   multi-electrolyte (PLASMALYTE-A/ISOLYTE-S PH 7.4) IV solution (has no administration in time range)   melatonin tablet 9 mg (has no administration in time range)   ceFEPime (MAXIPIME) 2,000 mg in dextrose 5 % 50 mL IVPB (has no administration in time range)   vancomycin (VANCOCIN) IVPB (premix in dextrose) 1,000 mg 200 mL (has no administration in time range)   sodium chloride 0.9 % bolus 1,000 mL (0 mL Intravenous Stopped 4/14/24 2005)   ceftriaxone (ROCEPHIN) 1 g/50 mL in dextrose IVPB (0 mg Intravenous Stopped 4/14/24 2005)   acetaminophen (Ofirmev) injection 1,000 mg (0 mg Intravenous Stopped 4/14/24 1910)   iohexol (OMNIPAQUE) 350 MG/ML injection (MULTI-DOSE) 100 mL (100 mL Intravenous Given 4/14/24 2323)         Critical Care Time  Procedures    Time reflects when diagnosis was documented in both MDM as applicable and the Disposition within this note       Time User Action Codes Description Comment    4/14/2024  8:56 PM Ayesha Jin [A41.9] Sepsis (HCC)     4/14/2024  8:56 PM Ayesha Jin [N39.0] Urinary tract infection     4/14/2024  8:57 PM Ayesha iJn [J96.01] Acute respiratory  failure with hypoxia (HCC)     4/14/2024  8:57 PM Ayesha Jin Add [G93.40] Acute encephalopathy     4/14/2024 10:31 PM Cristhian Sanders Add [F03.918] Dementia with behavioral disturbance (HCC)           ED Disposition       ED Disposition   Admit    Condition   Stable    Date/Time   Sun Apr 14, 2024  8:56 PM    Comment   Case was discussed with [facility] and the patient's admission status was agreed to be Admission Status: inpatient status to the service of Dr. Alvarado .               Follow-up Information    None

## 2024-04-15 ENCOUNTER — APPOINTMENT (INPATIENT)
Dept: NON INVASIVE DIAGNOSTICS | Facility: HOSPITAL | Age: 84
DRG: 871 | End: 2024-04-15
Payer: COMMERCIAL

## 2024-04-15 PROBLEM — R93.89 ABNORMAL CT SCAN: Status: ACTIVE | Noted: 2024-04-15

## 2024-04-15 LAB
ANION GAP SERPL CALCULATED.3IONS-SCNC: 6 MMOL/L (ref 4–13)
BUN SERPL-MCNC: 38 MG/DL (ref 5–25)
CALCIUM SERPL-MCNC: 8.4 MG/DL (ref 8.4–10.2)
CHLORIDE SERPL-SCNC: 105 MMOL/L (ref 96–108)
CO2 SERPL-SCNC: 32 MMOL/L (ref 21–32)
CREAT SERPL-MCNC: 0.98 MG/DL (ref 0.6–1.3)
ERYTHROCYTE [DISTWIDTH] IN BLOOD BY AUTOMATED COUNT: 15.5 % (ref 11.6–15.1)
GFR SERPL CREATININE-BSD FRML MDRD: 53 ML/MIN/1.73SQ M
GLUCOSE SERPL-MCNC: 155 MG/DL (ref 65–140)
HCT VFR BLD AUTO: 30.7 % (ref 34.8–46.1)
HGB BLD-MCNC: 9.4 G/DL (ref 11.5–15.4)
MAGNESIUM SERPL-MCNC: 1.9 MG/DL (ref 1.9–2.7)
MCH RBC QN AUTO: 32.4 PG (ref 26.8–34.3)
MCHC RBC AUTO-ENTMCNC: 30.6 G/DL (ref 31.4–37.4)
MCV RBC AUTO: 106 FL (ref 82–98)
PLATELET # BLD AUTO: 195 THOUSANDS/UL (ref 149–390)
PMV BLD AUTO: 11.5 FL (ref 8.9–12.7)
POTASSIUM SERPL-SCNC: 3.8 MMOL/L (ref 3.5–5.3)
PROCALCITONIN SERPL-MCNC: 8.95 NG/ML
RBC # BLD AUTO: 2.9 MILLION/UL (ref 3.81–5.12)
SODIUM SERPL-SCNC: 143 MMOL/L (ref 135–147)
WBC # BLD AUTO: 23.7 THOUSAND/UL (ref 4.31–10.16)

## 2024-04-15 PROCEDURE — 99223 1ST HOSP IP/OBS HIGH 75: CPT | Performed by: NURSE PRACTITIONER

## 2024-04-15 PROCEDURE — 84145 PROCALCITONIN (PCT): CPT | Performed by: INTERNAL MEDICINE

## 2024-04-15 PROCEDURE — 83918 ORGANIC ACIDS TOTAL QUANT: CPT | Performed by: INTERNAL MEDICINE

## 2024-04-15 PROCEDURE — 94664 DEMO&/EVAL PT USE INHALER: CPT

## 2024-04-15 PROCEDURE — 92610 EVALUATE SWALLOWING FUNCTION: CPT

## 2024-04-15 PROCEDURE — 99223 1ST HOSP IP/OBS HIGH 75: CPT | Performed by: INTERNAL MEDICINE

## 2024-04-15 PROCEDURE — 87081 CULTURE SCREEN ONLY: CPT | Performed by: INTERNAL MEDICINE

## 2024-04-15 PROCEDURE — 83735 ASSAY OF MAGNESIUM: CPT | Performed by: INTERNAL MEDICINE

## 2024-04-15 PROCEDURE — 93308 TTE F-UP OR LMTD: CPT

## 2024-04-15 PROCEDURE — 85027 COMPLETE CBC AUTOMATED: CPT | Performed by: INTERNAL MEDICINE

## 2024-04-15 PROCEDURE — 93321 DOPPLER ECHO F-UP/LMTD STD: CPT | Performed by: INTERNAL MEDICINE

## 2024-04-15 PROCEDURE — 94760 N-INVAS EAR/PLS OXIMETRY 1: CPT

## 2024-04-15 PROCEDURE — 87205 SMEAR GRAM STAIN: CPT

## 2024-04-15 PROCEDURE — 93321 DOPPLER ECHO F-UP/LMTD STD: CPT

## 2024-04-15 PROCEDURE — 97167 OT EVAL HIGH COMPLEX 60 MIN: CPT

## 2024-04-15 PROCEDURE — 80048 BASIC METABOLIC PNL TOTAL CA: CPT | Performed by: INTERNAL MEDICINE

## 2024-04-15 PROCEDURE — 93325 DOPPLER ECHO COLOR FLOW MAPG: CPT | Performed by: INTERNAL MEDICINE

## 2024-04-15 PROCEDURE — 93325 DOPPLER ECHO COLOR FLOW MAPG: CPT

## 2024-04-15 PROCEDURE — 94640 AIRWAY INHALATION TREATMENT: CPT

## 2024-04-15 PROCEDURE — 93308 TTE F-UP OR LMTD: CPT | Performed by: INTERNAL MEDICINE

## 2024-04-15 RX ORDER — SODIUM CHLORIDE, SODIUM GLUCONATE, SODIUM ACETATE, POTASSIUM CHLORIDE, MAGNESIUM CHLORIDE, SODIUM PHOSPHATE, DIBASIC, AND POTASSIUM PHOSPHATE .53; .5; .37; .037; .03; .012; .00082 G/100ML; G/100ML; G/100ML; G/100ML; G/100ML; G/100ML; G/100ML
75 INJECTION, SOLUTION INTRAVENOUS CONTINUOUS
Status: DISCONTINUED | OUTPATIENT
Start: 2024-04-15 | End: 2024-04-19 | Stop reason: HOSPADM

## 2024-04-15 RX ORDER — ALBUTEROL SULFATE 2.5 MG/3ML
2.5 SOLUTION RESPIRATORY (INHALATION)
Status: DISCONTINUED | OUTPATIENT
Start: 2024-04-15 | End: 2024-04-19 | Stop reason: HOSPADM

## 2024-04-15 RX ORDER — VANCOMYCIN HYDROCHLORIDE 750 MG/150ML
750 INJECTION, SOLUTION INTRAVENOUS EVERY 24 HOURS
Status: DISCONTINUED | OUTPATIENT
Start: 2024-04-15 | End: 2024-04-16 | Stop reason: DRUGHIGH

## 2024-04-15 RX ORDER — VANCOMYCIN HYDROCHLORIDE 750 MG/150ML
15 INJECTION, SOLUTION INTRAVENOUS DAILY PRN
Status: DISCONTINUED | OUTPATIENT
Start: 2024-04-15 | End: 2024-04-15 | Stop reason: DRUGHIGH

## 2024-04-15 RX ADMIN — POLYETHYLENE GLYCOL 3350 17 G: 17 POWDER, FOR SOLUTION ORAL at 10:07

## 2024-04-15 RX ADMIN — ALBUTEROL SULFATE 2.5 MG: 2.5 SOLUTION RESPIRATORY (INHALATION) at 19:30

## 2024-04-15 RX ADMIN — SODIUM CHLORIDE, SODIUM GLUCONATE, SODIUM ACETATE, POTASSIUM CHLORIDE, MAGNESIUM CHLORIDE, SODIUM PHOSPHATE, DIBASIC, AND POTASSIUM PHOSPHATE 75 ML/HR: .53; .5; .37; .037; .03; .012; .00082 INJECTION, SOLUTION INTRAVENOUS at 01:03

## 2024-04-15 RX ADMIN — HEPARIN SODIUM 5000 UNITS: 5000 INJECTION INTRAVENOUS; SUBCUTANEOUS at 00:49

## 2024-04-15 RX ADMIN — SENNOSIDES 8.8 MG: 8.8 SYRUP ORAL at 10:06

## 2024-04-15 RX ADMIN — HEPARIN SODIUM 5000 UNITS: 5000 INJECTION INTRAVENOUS; SUBCUTANEOUS at 17:22

## 2024-04-15 RX ADMIN — DOCUSATE SODIUM 100 MG: 100 CAPSULE, LIQUID FILLED ORAL at 10:06

## 2024-04-15 RX ADMIN — BENZONATATE 100 MG: 100 CAPSULE ORAL at 10:05

## 2024-04-15 RX ADMIN — ALBUTEROL SULFATE 2.5 MG: 2.5 SOLUTION RESPIRATORY (INHALATION) at 07:01

## 2024-04-15 RX ADMIN — LORATADINE 10 MG: 10 TABLET ORAL at 10:07

## 2024-04-15 RX ADMIN — BENZONATATE 100 MG: 100 CAPSULE ORAL at 17:22

## 2024-04-15 RX ADMIN — HEPARIN SODIUM 5000 UNITS: 5000 INJECTION INTRAVENOUS; SUBCUTANEOUS at 11:02

## 2024-04-15 RX ADMIN — BUSPIRONE HYDROCHLORIDE 10 MG: 5 TABLET ORAL at 10:06

## 2024-04-15 RX ADMIN — ALBUTEROL SULFATE 2.5 MG: 2.5 SOLUTION RESPIRATORY (INHALATION) at 13:33

## 2024-04-15 RX ADMIN — GUAIFENESIN 600 MG: 600 TABLET ORAL at 10:06

## 2024-04-15 RX ADMIN — SODIUM CHLORIDE, SODIUM GLUCONATE, SODIUM ACETATE, POTASSIUM CHLORIDE, MAGNESIUM CHLORIDE, SODIUM PHOSPHATE, DIBASIC, AND POTASSIUM PHOSPHATE 75 ML/HR: .53; .5; .37; .037; .03; .012; .00082 INJECTION, SOLUTION INTRAVENOUS at 03:30

## 2024-04-15 RX ADMIN — DOCUSATE SODIUM 100 MG: 100 CAPSULE, LIQUID FILLED ORAL at 17:22

## 2024-04-15 RX ADMIN — VANCOMYCIN HYDROCHLORIDE 750 MG: 750 INJECTION, SOLUTION INTRAVENOUS at 12:14

## 2024-04-15 RX ADMIN — METOPROLOL SUCCINATE 50 MG: 25 TABLET, EXTENDED RELEASE ORAL at 10:07

## 2024-04-15 RX ADMIN — CEFEPIME 1000 MG: 1 INJECTION, POWDER, FOR SOLUTION INTRAMUSCULAR; INTRAVENOUS at 22:33

## 2024-04-15 RX ADMIN — CEFEPIME 2000 MG: 2 INJECTION, POWDER, FOR SOLUTION INTRAVENOUS at 11:21

## 2024-04-15 RX ADMIN — SODIUM CHLORIDE, SODIUM GLUCONATE, SODIUM ACETATE, POTASSIUM CHLORIDE, MAGNESIUM CHLORIDE, SODIUM PHOSPHATE, DIBASIC, AND POTASSIUM PHOSPHATE 75 ML/HR: .53; .5; .37; .037; .03; .012; .00082 INJECTION, SOLUTION INTRAVENOUS at 18:09

## 2024-04-15 RX ADMIN — SENNOSIDES 8.8 MG: 8.8 SYRUP ORAL at 17:22

## 2024-04-15 RX ADMIN — CEFEPIME 2000 MG: 2 INJECTION, POWDER, FOR SOLUTION INTRAVENOUS at 00:47

## 2024-04-15 NOTE — CONSULTS
Consultation - Infectious Disease   Monika Butler 83 y.o. female MRN: [id number]  Unit/Bed#: S -01 Encounter: [id number]      IMPRESSION & RECOMMENDATIONS:     SIRS vs. Sepsis, present on arrival; fever, tachycardia, leukocytosis  -Suspect a pulmonary source as below.  Also consider bacteremia or urinary tract infection.  These could also be reactive fevers and leukocytosis to a malignant process.  No other clear source.  ET AP negative for infectious and abdominal process, LFTs normal, COVID/flu/RSV PCR negative.  -Antibiotics below  -Follow-up blood cultures  -Please obtain sputum culture if able  -Follow-up MRSA nares culture  -Follow-up urine Legionella antigen  -Check RP 2 panel for other pulmonary viruses  -Repeat CBC tomorrow a.m. to trend leukocytosis  -Trend fever curve and hemodynamics    Bilateral pulmonary nodules with left lung opacities  -Found on CT scan on arrival.  Consider the possibility of aspiration changes in patient with baseline dementia.  Also consider the possibility of developing pneumonia.  Consider metastatic lung disease in setting of pancreatic cystic lesion and prior history of breast cancer.   -Continue empiric Vancomycin and Cefepime as patient at risk for nosocomial pathogens given recent admission and residing in nursing facility  -As CrCl <30, decrease Cefepime dose to 1g every 12 hours  -If CrCl improves to > 30 then can increase Cefepime dose again  -Follow up MRSA nares culture; if negative can stop Vancomycin  -Follow up urine legionella  -Obtain sputum culture if able  -Check RP2 panel for pulmonary viruses  -Follow up blood cultures  -Follow up TTE  -Recommend aspiration precautions and speech therapy evaluation    Acute encephalopathy in setting of baseline dementia  -Present on arrival. Suspect may be toxic metabolic in setting of infection in patient with poor neurologic reserve. CT head negative for acute infarct or hemorrhage.  -Close monitoring of mental  status  -Consider brain MRI with contrast if fails to improve to assess for metastatic lesions  -Serial neurologic exams    Pancreatic cystic lesion  -Seen on CT 4/14. GI consulted who discussed with family who has elected for conservative treatment measures for this at this time.   -GI recs appreciated    History of breast cancer status post left mastectomy (2005)    Left temporal meningioma  -Known diagnosis.  Stable on CT head on 4/14..    I have discussed the above management plan in detail with the primary service    I have performed an extensive review of the medical records in Epic including review of the notes, radiographs, and laboratory results     HISTORY OF PRESENT ILLNESS:  Reason for Consult: possible septic emboli    HPI: Monika Butler is a 83 y.o. year old female with baseline dementia, breast cancer status post left mastectomy in 2005.  Presented to [facility] on 4/14 via EMS from her SNF for evaluation of acute mental status change.  He received Ativan for agitation the day prior and then developed worsening status/lethargy.  On arrival she was noted to be tachycardic, lethargic, and febrile to 103.8.  She also had a leukocytosis of 19,000.  Started on empiric antibiotics.  Labs were notable for normal LFTs, mild elevation in serum creatinine of 1.27, elevated procalcitonin, pyuria on UA, give COVID/flu/RSV PCR.  CT scan of the head was negative for acute changes.  Scans of chest noted dense opacities at the left lung apex and small opacities in left upper and lower lobes.  There were also bilateral nodular opacities in both lungs and innumerable bilateral well-defined pulmonary nodules.  Was a 3.1 cm cystic lesion in the head of the pancreas.  Also a 6.5 cm simple appearing cystic lesion in the left adnexa/ovary.  Per radiology report, torrential for the bilateral nodular lung opacities including infection or septic emboli.    On exam today patient is unable to participate in  interview due to lethargy/mental status.     Patient was admitted last in January at [facility] worsening dementia.  During that hospitalization psychiatry was consulted and assisted with medication management.      REVIEW OF SYSTEMS:  A complete review of systems is negative other than that noted in the HPI.    PAST MEDICAL HISTORY:  Past Medical History:   Diagnosis Date    Acute bronchiolitis 2023    Allergic     Cancer (HCC) 2005    left breast mastectomy    Electrolyte abnormality 10/02/2022    Hypertension     Hypokalemia 2024    Memory change     Nodule of left lung     Pleural thickening      Past Surgical History:   Procedure Laterality Date    CATARACT EXTRACTION Left 2020    CATARACT EXTRACTION Left 2020    MASTECTOMY      KS XCAPSL CTRC RMVL INSJ IO LENS PROSTH W/O ECP Left 2020    Procedure: EXTRACTION EXTRACAPSULAR CATARACT PHACO INTRAOCULAR LENS (IOL);  Surgeon: Cortes Harrison MD;  Location: [facility] MAIN OR;  Service: Ophthalmology       FAMILY HISTORY:  Non-contributory    SOCIAL HISTORY:  Social History   Social History     Substance and Sexual Activity   Alcohol Use Never     Social History     Substance and Sexual Activity   Drug Use Never     Social History     Tobacco Use   Smoking Status Never   Smokeless Tobacco Never       ALLERGIES:  Allergies   Allergen Reactions    Ibuprofen      Reaction Date: 2005;        MEDICATIONS:  All current active medications have been reviewed.      PHYSICAL EXAM:  Temp:  [97.3 °F (36.3 °C)-103.8 °F (39.9 °C)] 99.1 °F (37.3 °C)  HR:  [] 99  Resp:  [16-24] 24  BP: ()/(52-88) 128/73  SpO2:  [90 %-99 %] 95 %  Temp (24hrs), Av.6 °F (37.6 °C), Min:97.3 °F (36.3 °C), Max:103.8 °F (39.9 °C)  Current: Temperature: 99.1 °F (37.3 °C)    Intake/Output Summary (Last 24 hours) at 4/15/2024 1423  Last data filed at 4/15/2024 1230  Gross per 24 hour   Intake 1150 ml   Output 504 ml   Net 646 ml       General Appearance:   Appearing frail and cachetic, ill appearing, eyes closed in bed and intermittently moaning but not opening eyes or answering questions   Head:  Normocephalic, without obvious abnormality, atraumatic   Eyes:  Conjunctiva pink and sclera anicteric, both eyes   Nose: Nares normal, mucosa normal, no drainage   Throat: Oropharynx dry   Neck: Supple   Back:   Symmetric   Lungs:   Scattered crackles in both lungs, respirations unlabored   Chest Wall:  No tenderness or deformity   Heart:  RRR; systolic murmur appreciated   Abdomen:   Soft, non-tender, non-distended   Extremities: No cyanosis, clubbing or edema   Skin: No rashes   Lymph nodes: Cervical, supraclavicular nodes normal   Neurologic: Lethargic, eyes closed, not awakening or following commands       LABS, IMAGING, & OTHER STUDIES:  Lab Results:  I have personally reviewed pertinent labs.  Results from last 7 days   Lab Units 04/15/24  0244 04/14/24  1835   WBC Thousand/uL 23.70* 19.05*   HEMOGLOBIN g/dL 9.4* 11.3*   PLATELETS Thousands/uL 195 261     Results from last 7 days   Lab Units 04/15/24  0244 04/14/24  1835   SODIUM mmol/L 143 146   POTASSIUM mmol/L 3.8 3.6   CHLORIDE mmol/L 105 103   CO2 mmol/L 32 32   BUN mg/dL 38* 38*   CREATININE mg/dL 0.98 1.27   EGFR ml/min/1.73sq m 53 38   CALCIUM mg/dL 8.4 9.8   AST U/L  --  23   ALT U/L  --  13   ALK PHOS U/L  --  79     Results from last 7 days   Lab Units 04/14/24  1846 04/14/24  1835   BLOOD CULTURE  Received in Microbiology Lab. Culture in Progress. Received in Microbiology Lab. Culture in Progress.     Results from last 7 days   Lab Units 04/15/24  0244 04/14/24  1835   PROCALCITONIN ng/ml 8.95* 7.25*         Imaging Studies:   I have personally reviewed pertinent imaging study reports and images in PACS.      Other Studies:   I have personally reviewed pertinent reports.      Pablo Cooper MD  [facility]

## 2024-04-15 NOTE — ASSESSMENT & PLAN NOTE
Likely second to sepsis, poor p.o. intake.  N.p.o. for now- Nursing dysphagia screening.  SLP consult if indicated.  Continue IVF.  PT/ OT eval.

## 2024-04-15 NOTE — PROGRESS NOTES
Monika Butler is a 83 y.o. female who is currently ordered Vancomycin IV with management by the Pharmacy Consult service.  Relevant clinical data and objective / subjective history reviewed.  Vancomycin Assessment:  Indication and Goal AUC/Trough: Urinary tract infection (goal -600, trough >10); Pneumonia (goal -600, trough >10)  Clinical Status: stable  Micro:     Renal Function:  SCr: 0.98 mg/dL  CrCl: 28.5 mL/min  Renal replacement: Not on dialysis  Days of Therapy: 2  Current Dose: 750 mg as needed for vancomcyin level < 15  Vancomycin Plan:  New Dosin mg every 24 hours  Estimated AUC: 494 mcg*hr/mL  Estimated Trough: 13.8 mcg/mL  Next Level:  @ 0600  Renal Function Monitoring: Daily BMP and UOP  Pharmacy will continue to follow closely for s/sx of nephrotoxicity, infusion reactions and appropriateness of therapy.  BMP and CBC will be ordered per protocol. We will continue to follow the patient’s culture results and clinical progress daily.    Oralia Choi, Pharmacist

## 2024-04-15 NOTE — RESPIRATORY THERAPY NOTE
RT Protocol Note  Monika Butler 83 y.o. female MRN: [id number]  Unit/Bed#: S -01 Encounter: [id number]    Assessment    Principal Problem:    Severe sepsis (HCC)  Active Problems:    Essential hypertension    Dementia with behavioral disturbance (HCC)    Generalized weakness    Meningioma (HCC)    QT prolongation    Acute respiratory failure with hypoxia (HCC)    Elevated serum creatinine      Home Pulmonary Medications:  None       Past Medical History:   Diagnosis Date    Acute bronchiolitis 12/12/2023    Allergic     Cancer (HCC) 2005    left breast mastectomy    Electrolyte abnormality 10/02/2022    Hypertension     Hypokalemia 01/02/2024    Memory change     Nodule of left lung     Pleural thickening      Social History     Socioeconomic History    Marital status:      Spouse name: None    Number of children: None    Years of education: None    Highest education level: None   Occupational History    None   Tobacco Use    Smoking status: Never    Smokeless tobacco: Never   Vaping Use    Vaping status: Never Used   Substance and Sexual Activity    Alcohol use: Never    Drug use: Never    Sexual activity: Not Currently   Other Topics Concern    None   Social History Narrative    None     Social Determinants of Health     Financial Resource Strain: Low Risk  (2/17/2023)    Overall Financial Resource Strain (CARDIA)     Difficulty of Paying Living Expenses: Not hard at all   Food Insecurity: No Food Insecurity (4/14/2024)    Hunger Vital Sign     Worried About Running Out of Food in the Last Year: Never true     Ran Out of Food in the Last Year: Never true   Transportation Needs: No Transportation Needs (4/14/2024)    PRAPARE - Transportation     Lack of Transportation (Medical): No     Lack of Transportation (Non-Medical): No   Physical Activity: Not on file   Stress: Not on file   Social Connections: Not on file   Intimate Partner Violence: Not on file   Housing Stability: Low Risk  (4/14/2024)     "Housing Stability Vital Sign     Unable to Pay for Housing in the Last Year: No     Number of Places Lived in the Last Year: 2     Unstable Housing in the Last Year: No       Subjective         Objective    Physical Exam:        Vitals:  Blood pressure 103/52, pulse 82, temperature 98 °F (36.7 °C), resp. rate 17, height 5' 1\" (1.549 m), weight 48.5 kg (106 lb 14.8 oz), SpO2 95%.          Imaging and other studies:           Plan     Mild Distress Pathway            "

## 2024-04-15 NOTE — ASSESSMENT & PLAN NOTE
Known history of dementia with behavioral disturbances. Baseline disoriented, on and off able to conduct very simple conversation/ follow very easy commands.  POA with worsening lethargy but not restless or combative.  Based on her last hospitalization 1/24 per Psych recs: Recommending to avoid Ativan for sedation/agitation, decreased Zyprexa to 1.25 mg p.o. daily in the morning and Zyprexa 5 mg p.o. nightly  Meds in the facility: BuSpar 10 mg twice daily, Zyprexa 5 mg twice daily, Remeron 15 mg nightly,  Ativan 0.25 mg every 6 hours as needed.  POA Qtc 533.  Would continue home dosage Buspar and Remeron. Hold scheduled Zyprexa. Hopefully weaning off Ativan.  Yasmin consult, follow recommendations per their note

## 2024-04-15 NOTE — ASSESSMENT & PLAN NOTE
Lab Results   Component Value Date    WBC 19.05 (H) 04/14/2024    WBC 5.31 02/19/2024    WBC 4.07 (L) 01/19/2024    PROCALCITONI 7.25 (H) 04/14/2024    PROCALCITONI 0.25 01/17/2024    PROCALCITONI 0.12 12/04/2023     Met SRIS criteria with hypothermia, tachycardia, leukocytosis. With unclear source - URI vs UTI vs pyelonephritis vs GI?  With initial lactic acid 2.5, new acute respiratory failure with hypoxia, elevated Cr.   Productive cough with whitish mucus for the last 1 week.  Poor historian-unable to assess accurate ROS.  UA positive for leukocytes, occult blood, bacteria.  Flu/RSV/COVID-negative.  CXR per my reading with no obvious infiltrates or consolidation- doubt PNA.  Received septic fluid and 1 dose of Rocephin in the ED.    Plan:  Broaden antibiotics to cefepime and vancomycin for now- de-escalate based on culture results.  CT CAP for source.  Follow sepsis workup  Procal trend  Trend WBC/Fever curve  IV Fluid hydration

## 2024-04-15 NOTE — ASSESSMENT & PLAN NOTE
POA QTc 533. Sinus tachy.  On buspirone 10 mg twice daily, Zyprexa 5 mg twice daily, Remeron 15 mg nightly, Ativan 0.25mg every 6 hours as needed (last dose on 4/13 for 1 dose).  Will continue home dosage of buspirone and Remeron.  Hold off scheduled Zyprexa on 4/14 PM. Consider PRN Zyprexa 2.5mg IM if agitation.

## 2024-04-15 NOTE — CONSULTS
Consultation -  [facility]  Monika ELSY Butler 83 y.o. female MRN: [id number]  Unit/Bed#: S -01 Encounter: [id number]        Inpatient consult to gastroenterology  Consult performed by: Dhaval Hills PA-C  Consult ordered by: Kamla Lafleur MD          Reason for Consult / Principal Problem: Pancreatic cyst    ASSESSMENT and PLAN:    Principal Problem:    Severe sepsis (HCC)  Active Problems:    Essential hypertension    Dementia with behavioral disturbance (HCC)    Generalized weakness    Meningioma (HCC)    Anemia    QT prolongation    Acute respiratory failure with hypoxia (HCC)    Elevated serum creatinine    Abnormal CT scan    -I had a conversation at length with her daughter Anna ([phone]) who notes to me that she and her other sister Anitha make decisions for her and she expressed to me that at this time they would prefer conservative treatment with regard to the pancreatic cyst.  We did discuss different modalities to further look at this including MRI which patient would not likely be able to tolerate due to agitation and dementia as well as EUS.  Ultimately she understands that if this were to be found to be some form of cancer that her mom would not qualify for any type of surgery and they probably would not opt to have treatment that would make her miserable.    -------------------------------------------------------------------------------------------------------------------    HPI: This is an 83-year-old white female with past medical history of allergies, left breast cancer, hypertension who presents from skilled nursing nursing facility due to her restless but not combative dementia.  She is being admitted for sepsis criteria with elevated white count.  Influenza, RSV and COVID have been ruled out.  Chest x-ray with no obvious consolidation, but CT scan shows dense opacities in the left lung apex and left upper lung and lower bases which may be due to her  pneumonia.  Urinalysis is positive for leukocytes occult blood and red blood cell casts.  GI is being consulted for an incidental 3.1 x 1.9 x 3.0 cystic lesion near the head of the pancreas.    Patient was resting comfortably when I went to see her.  She unable to answer any meaningful questions.  Her vital signs are currently stable.  Basic metabolic panel shows mildly increased BUN at 38.  CBC shows significant leukocytosis currently from 19.05 up to 23.70.  Hemoglobin 11.3 down to 9.4 likely dilutional.      Endoscopic History:  EGD -unknown  Colonoscopy -unknown    REVIEW OF SYSTEMS:    Unable to perform review of systems due to baseline dementia      Historical Information   Past Medical History:   Diagnosis Date    Acute bronchiolitis 12/12/2023    Allergic     Cancer (HCC) 2005    left breast mastectomy    Electrolyte abnormality 10/02/2022    Hypertension     Hypokalemia 01/02/2024    Memory change     Nodule of left lung     Pleural thickening      Past Surgical History:   Procedure Laterality Date    CATARACT EXTRACTION Left 05/2020    CATARACT EXTRACTION Left 06/2020    MASTECTOMY      OR XCAPSL CTRC RMVL INSJ IO LENS PROSTH W/O ECP Left 6/8/2020    Procedure: EXTRACTION EXTRACAPSULAR CATARACT PHACO INTRAOCULAR LENS (IOL);  Surgeon: Cortes Harrison MD;  Location: [facility] MAIN OR;  Service: Ophthalmology     Social History   Social History     Substance and Sexual Activity   Alcohol Use Never     Social History     Substance and Sexual Activity   Drug Use Never     Social History     Tobacco Use   Smoking Status Never   Smokeless Tobacco Never     History reviewed. No pertinent family history.    Meds/Allergies     Medications Prior to Admission   Medication    busPIRone (BUSPAR) 5 mg tablet    docusate sodium (COLACE) 100 mg capsule    donepezil (ARICEPT) 10 mg tablet    loratadine (CLARITIN) 10 mg tablet    losartan (Cozaar) 100 MG tablet    Melatonin 10 MG TABS    metoprolol succinate (TOPROL-XL) 25 mg 24  "hr tablet    mirtazapine (REMERON) 15 mg tablet    OLANZapine (ZyPREXA) 5 mg tablet    polyethylene glycol (MIRALAX) 17 g packet    Ascorbic Acid (Vitamin C) 250 MG CHEW    benzonatate (TESSALON PERLES) 100 mg capsule    cholecalciferol (VITAMIN D3) 1,000 units tablet    cyanocobalamin (VITAMIN B-12) 100 mcg tablet    Multiple Vitamins-Minerals (ONE-A-DAY 50 PLUS PO)    OLANZapine (ZyPREXA) 2.5 mg tablet    OLANZapine (ZyPREXA) 2.5 mg tablet    sertraline (ZOLOFT) 25 mg tablet     Current Facility-Administered Medications   Medication Dose Route Frequency    acetaminophen (TYLENOL) oral suspension 650 mg  650 mg Oral Q6H PRN    albuterol inhalation solution 2.5 mg  2.5 mg Nebulization TID    benzonatate (TESSALON PERLES) capsule 100 mg  100 mg Oral TID    busPIRone (BUSPAR) tablet 10 mg  10 mg Oral BID    ceFEPime (MAXIPIME) 2,000 mg in dextrose 5 % 50 mL IVPB  2,000 mg Intravenous Q12H    docusate sodium (COLACE) capsule 100 mg  100 mg Oral BID    donepezil (ARICEPT) tablet 10 mg  10 mg Oral HS    guaiFENesin (MUCINEX) 12 hr tablet 600 mg  600 mg Oral Q12H JAYMIE    heparin (porcine) subcutaneous injection 5,000 Units  5,000 Units Subcutaneous Q8H JAYMIE    loratadine (CLARITIN) tablet 10 mg  10 mg Oral Daily    melatonin tablet 9 mg  9 mg Oral HS    metoprolol succinate (TOPROL-XL) 24 hr tablet 25 mg  25 mg Oral HS    metoprolol succinate (TOPROL-XL) 24 hr tablet 50 mg  50 mg Oral QAM    polyethylene glycol (MIRALAX) packet 17 g  17 g Oral Daily    polyethylene glycol (MIRALAX) packet 17 g  17 g Oral PRN    senna oral syrup 8.8 mg  8.8 mg Oral BID    vancomycin (VANCOCIN) IVPB (premix in dextrose) 750 mg 150 mL  750 mg Intravenous Q24H       Allergies   Allergen Reactions    Ibuprofen      Reaction Date: 10Aug2005;            Objective     Blood pressure 128/73, pulse 99, temperature 99.1 °F (37.3 °C), resp. rate (!) 24, height 5' 1\" (1.549 m), weight 48.5 kg (106 lb 14.8 oz), SpO2 95%.      Intake/Output Summary (Last " 24 hours) at 4/15/2024 1247  Last data filed at 4/15/2024 1230  Gross per 24 hour   Intake 1150 ml   Output 504 ml   Net 646 ml         PHYSICAL EXAM:      General Appearance:   Alert but not cooperative baseline dementia, ill-appearing, mild distress, appears stated age    HEENT:   Normocephalic, atraumatic, sclera anicteric   Neck:  Supple, symmetrical   Lungs:   Clear to auscultation bilaterally; no rales, rhonchi or wheezing; respirations unlabored    Heart::   S1 and S2 normal; regular rate and rhythm; no murmur, rub, or gallop.   Abdomen:   Soft, non-tender, non-distended; normal bowel sounds; no masses, no organomegaly    Genitalia:   Deferred    Rectal:   Deferred    Extremities:  No cyanosis, clubbing or edema    Pulses:  2+ and symmetric all extremities    Skin:  Skin color, texture normal, no rashes or lesions    Lymph nodes:  Not assessed  Neuro: Awake, not oriented  Psych: Easily agitated       Lab Results:   Results from last 7 days   Lab Units 04/15/24  0244 04/14/24  1835   WBC Thousand/uL 23.70* 19.05*   HEMOGLOBIN g/dL 9.4* 11.3*   HEMATOCRIT % 30.7* 36.2   PLATELETS Thousands/uL 195 261   LYMPHO PCT %  --  2*   MONO PCT %  --  1*   EOS PCT %  --  0     Results from last 7 days   Lab Units 04/15/24  0244 04/14/24  1835   POTASSIUM mmol/L 3.8 3.6   CHLORIDE mmol/L 105 103   CO2 mmol/L 32 32   BUN mg/dL 38* 38*   CREATININE mg/dL 0.98 1.27   CALCIUM mg/dL 8.4 9.8   ALK PHOS U/L  --  79   ALT U/L  --  13   AST U/L  --  23     Results from last 7 days   Lab Units 04/14/24  1835   INR  1.02           Imaging Studies: I have personally reviewed pertinent imaging studies.    CT head wo contrast    Result Date: 4/15/2024  Impression: No acute intracranial hemorrhage, midline shift, or mass effect. Stable 1.2 cm left temporal meningioma. Workstation performed: [id number]     CTA chest (pe study) abdomen pelvis contrast    Result Date: 4/15/2024  Impression: 1.  No evidence of pulmonary embolism. 2.  Dense  opacities at the left lung apex and small opacities within the left upper and lower lobe base which may be due to pneumonia. 3.  There are bilateral nodular opacities concerning for infection, septic emboli are not excluded 4.  Innumerable bilateral well-defined pulmonary nodules the largest in the left lower lobe measuring 1.2 cm, metastasis is not excluded. 5.  Trace left pleural effusion. 6.  3.1 cm cystic lesion in the head of the pancreas. For simple cyst(s) 2 cm or greater recommend gastroenterology and/or surgical oncology consult. EUS may now be warranted. 7.  6.5 cm simple appearing cystic lesion in the left adnexa/ovary. Follow-up ultrasound of the pelvis in 6 to 12 months may be obtained. The study was marked in EPIC for immediate notification. Workstation performed: [id number]     XR chest portable    Result Date: 4/14/2024  Impression: No acute cardiopulmonary disease. Workstation performed: [id number]           Patient was seen and examined by Dr. Payton. All [place] medical decisions were made by Dr. Payton. Thank you for allowing us to participate in the care of this present patient. We will follow-up with you closely.      Dhaval Hills PA-C  Gastroenterology

## 2024-04-15 NOTE — QUICK NOTE
Patient seen and examined at bedside, no acute events overnight.  Patient is unarousable.  Per patient's daughter, she is confused and nonverbal at baseline due to her severe dementia.  Continue with broad-spectrum antibiotics at this time, ID consult was placed.  Echocardiogram was ordered to rule out septic emboli.  GI consult was placed for 4 pancreatic cyst found on imaging.  Daughter was updated via phone.

## 2024-04-15 NOTE — ASSESSMENT & PLAN NOTE
Lab Results   Component Value Date    WBC 23.70 (H) 04/15/2024    WBC 19.05 (H) 04/14/2024    WBC 5.31 02/19/2024    PROCALCITONI 8.95 (H) 04/15/2024    PROCALCITONI 7.25 (H) 04/14/2024    PROCALCITONI 0.25 01/17/2024     Met SRIS criteria with hypothermia, tachycardia, leukocytosis. With unclear source - URI vs UTI vs pyelonephritis vs GI?  With initial lactic acid 2.5, new acute respiratory failure with hypoxia, elevated Cr.   Productive cough with whitish mucus for the last 1 week.  Poor historian-unable to assess accurate ROS.  UA positive for leukocytes, occult blood, bacteria.  Flu/RSV/COVID-negative.  CXR per my reading with no obvious infiltrates or consolidation- doubt PNA.  Received septic fluid and 1 dose of Rocephin in the ED.    Plan:  Broaden antibiotics to cefepime and vancomycin for now- de-escalate based on culture results.  CT CAP for source.  Follow sepsis workup  Procal trend  Trend WBC/Fever curve  IV Fluid hydration  ID consulted, appreciate recs  Echocardiogram ordered to rule out septic emboli

## 2024-04-15 NOTE — H&P
[facility]  H&P  Name: Monika Butler 83 y.o. female I MRN: [id number]  Unit/Bed#: S -01 I Date of Admission: 4/14/2024   Date of Service: 4/14/2024 I Hospital Day: 0      Assessment/Plan   * Severe sepsis (HCC)  Assessment & Plan  Lab Results   Component Value Date    WBC 19.05 (H) 04/14/2024    WBC 5.31 02/19/2024    WBC 4.07 (L) 01/19/2024    PROCALCITONI 7.25 (H) 04/14/2024    PROCALCITONI 0.25 01/17/2024    PROCALCITONI 0.12 12/04/2023     Met SRIS criteria with hypothermia, tachycardia, leukocytosis. With unclear source - URI vs UTI vs pyelonephritis vs GI?  With initial lactic acid 2.5, new acute respiratory failure with hypoxia, elevated Cr.   Productive cough with whitish mucus for the last 1 week.  Poor historian-unable to assess accurate ROS.  UA positive for leukocytes, occult blood, bacteria.  Flu/RSV/COVID-negative.  CXR per my reading with no obvious infiltrates or consolidation- doubt PNA.  Received septic fluid and 1 dose of Rocephin in the ED.    Plan:  Broaden antibiotics to cefepime and vancomycin for now- de-escalate based on culture results.  CT CAP for source.  Follow sepsis workup  Procal trend  Trend WBC/Fever curve  IV Fluid hydration    Elevated serum creatinine  Assessment & Plan  Lab Results   Component Value Date    CREATININE 1.27 04/14/2024    CREATININE 0.85 02/19/2024    CREATININE 0.82 01/19/2024     Baseline Cr 0.8-0.9  POA 1.27. Baseline urinary incontinence- daughter reported urination x2 on 4/14.  In the setting of poor oral intake.  UA positive for leukocytes, occult blood, RBC, casts.  Received 1L isolytes in the ED.    Plan:  Urinary retention protocol, Bladder scan, Daily weights, I/O  IVF- 500cc bolus to meet septic fluid and then 75cc/hr for 10 hrs.  Monitor BMP daily and observe for downward trend of creatinine  Avoid hypoperfusion of the kidneys, minimize nephrotoxins  RAAS Blockers/Diuretics held: Losartan.    Acute respiratory failure  with hypoxia (HCC)  Assessment & Plan  Baseline no O2 requirement.  POA O2 satting 87% on room air. Requiring 2 L NC.  B/L LE no edema. No calf tenderness.  Recent cold-like symptoms with productive cough.  Tachycardia- may 2/2 infections. Unable to assess CP.  Last echo 1/24: LVEF 60-65%, G1DD.  CXR per my reading: No fluid overload, pleural effusion, obvious infiltrates or consolidation.  Mostly bedbounded with h/o breast cancer.  Though low suspicion for PE but with higher risk and looking for septic source - Would check PE study.  Respiratory protocol, IS, scheduled albuterol nebulizer.  Wean off O2 if able.    QT prolongation  Assessment & Plan  POA QTc 533. Sinus tachy.  On buspirone 10 mg twice daily, Zyprexa 5 mg twice daily, Remeron 15 mg nightly, Ativan 0.25mg every 6 hours as needed (last dose on 4/13 for 1 dose).  Will continue home dosage of buspirone and Remeron.  Hold off scheduled Zyprexa on 4/14 PM. Consider PRN Zyprexa 2.5mg IM if agitation.  Will monitor daily EKG for QTc trending.    Meningioma (HCC)  Assessment & Plan  With new onset worsening lethargy and recent fall, non-cooperative neuro PE.  Will order CT head.    Generalized weakness  Assessment & Plan  Likely second to sepsis, poor p.o. intake.  N.p.o. for now- Nursing dysphagia screening.  SLP consult if indicated.  Continue IVF.  PT/ OT eval.    Dementia with behavioral disturbance (HCC)  Assessment & Plan  Known history of dementia with behavioral disturbances. Baseline disoriented, on and off able to conduct very simple conversation/ follow very easy commands.  POA with worsening lethargy but not restless or combative.  Based on her last hospitalization 1/24 per Psych recs: Recommending to avoid Ativan for sedation/agitation, decreased Zyprexa to 1.25 mg p.o. daily in the morning and Zyprexa 5 mg p.o. nightly  Meds in the facility: BuSpar 10 mg twice daily, Zyprexa 5 mg twice daily, Remeron 15 mg nightly,  Ativan 0.25 mg every 6 hours  "as needed.  POA Qtc 533.  Would continue home dosage Buspar and Remeron. Hold scheduled Zyprexa. Hopefully weaning off Ativan.  Yasmin consult. Appreciated recs.    Essential hypertension  Assessment & Plan  Home meds: Losartan 100 mg daily, metoprolol 50 mg every morning, 25 mg nightly. Clonidine patch.  Hold losartan in the setting of elevated creatinine.  Clonidine patch removed in the setting of lower-side blood pressure.  Monitor BP.           VTE Pharmacologic Prophylaxis: VTE Score: 7 High Risk (Score >/= 5) - Pharmacological DVT Prophylaxis Ordered: heparin. Sequential Compression Devices Ordered.  Code Status: Level 1 - Full Code Discussed in details with daughter at bedside.  Discussion with family: Updated  (daughter) at bedside.    Anticipated Length of Stay: Patient will be admitted on an inpatient basis with an anticipated length of stay of greater than 2 midnights secondary to severe sepsis.    Chief Complaint: increased lethargy today    History of Present Illness:    Monika Butler is a 83 y.o. female who has a past medical history of HTN, dementia with behavior disturbance, breast cancer s/p left mastectomy in 2005, nodule of left lung presents with worsening lethargy today. Patient arrives via EMS from [facility]. Confirmed with daughter and nursing facility regarding patient's baseline mentation- on and off restless dementia but not combative.  Able to conduct very simple communication baseline. Mostly bedbound at but able to move or extremities. Baseline on room air. Upon my evaluation, patient was able to answer some pain questions, able to speak out \"go home\" but not fully following commands. Most of history were obtained from daughter at bedside and nursing facility over the phone.    Patient was experiencing some cold-like symptoms in the past 1 week. Positive for productive cough with whitish mucus but no SOB noticed.  Per facility nurse, their thermometer may not " "function well-patient didn't have fever in the past few days. However, Tmax per EMS today was 102. Also diaphoresis, decreased p.o. intake, more lethargy since today.  Daughter also endorsed patient had some transient \"twitching\" movements on B/L lower jaw and B/L UE and LE. Patient was on Ativan 0.25mg Q6 PRN for agitation and last dose per facility was yesterday for one dose.  Per nursing facility, patient has urinary incontinence but no urinary s/s noted (poor historian), constipation requiring enema.    In the ED, Tmax 103.8F, tachycardia, BP stable, on 2 L with O2 satting 96%.  Creatinine 1.27 from baseline 0.9, WBC 19.05, lactic acid 2.5-1.2, Pro-Giuseppe 7.25, FLU/ RSV/ COVID neg. UA suggestive of UTI?.  EKG sinus tachycardia with prolonged QTc 533.  CXR per my reading no obvious infiltrates.      Review of Systems:  Review of Systems   Unable to perform ROS: Dementia       Past Medical and Surgical History:   Past Medical History:   Diagnosis Date    Acute bronchiolitis 12/12/2023    Allergic     Cancer (HCC) 2005    left breast mastectomy    Electrolyte abnormality 10/02/2022    Hypertension     Hypokalemia 01/02/2024    Memory change     Nodule of left lung     Pleural thickening        Past Surgical History:   Procedure Laterality Date    CATARACT EXTRACTION Left 05/2020    CATARACT EXTRACTION Left 06/2020    MASTECTOMY      UT XCAPSL CTRC RMVL INSJ IO LENS PROSTH W/O ECP Left 6/8/2020    Procedure: EXTRACTION EXTRACAPSULAR CATARACT PHACO INTRAOCULAR LENS (IOL);  Surgeon: Cortes Harrison MD;  Location: [facility] MAIN OR;  Service: Ophthalmology       Meds/Allergies:  Prior to Admission medications    Medication Sig Start Date End Date Taking? Authorizing Provider   busPIRone (BUSPAR) 5 mg tablet Take 10 mg by mouth 2 (two) times a day   Yes Historical Provider, MD   docusate sodium (COLACE) 100 mg capsule Take 1 capsule (100 mg total) by mouth 2 (two) times a day 12/14/23  Yes DAMARIS Engel   donepezil " (ARICEPT) 10 mg tablet Take 1 tablet (10 mg total) by mouth daily at bedtime 12/8/23  Yes Tish Bradford MD   loratadine (CLARITIN) 10 mg tablet Take 10 mg by mouth daily   Yes Historical Provider, MD   losartan (Cozaar) 100 MG tablet Take 1 tablet (100 mg total) by mouth daily 12/6/23  Yes Kristin Raymundo MD   Melatonin 10 MG TABS Take 1 tablet (10 mg total) by mouth daily at bedtime 2/24/23  Yes Kristin Raymundo MD   metoprolol succinate (TOPROL-XL) 25 mg 24 hr tablet Take 3 tablets (75 mg total) by mouth daily  Patient taking differently: Take 75 mg by mouth daily 50mg anm  25 qhs 1/20/24  Yes Kylah Huizar MD   mirtazapine (REMERON) 15 mg tablet Take 15 mg by mouth daily at bedtime   Yes Historical Provider, MD   OLANZapine (ZyPREXA) 5 mg tablet Take 1 tablet (5 mg total) by mouth daily at bedtime  Patient taking differently: Take 5 mg by mouth 2 (two) times a day 1/21/24  Yes Kylah Huizar MD   polyethylene glycol (MIRALAX) 17 g packet Take 17 g by mouth if needed (constipation) 12/14/23  Yes DAMARIS Engel   Ascorbic Acid (Vitamin C) 250 MG CHEW  3/1/20   Historical Provider, MD   benzonatate (TESSALON PERLES) 100 mg capsule Take 1 capsule (100 mg total) by mouth 3 (three) times a day  Patient not taking: Reported on 4/14/2024 12/14/23   DAMARIS Engel   cholecalciferol (VITAMIN D3) 1,000 units tablet Take 1,000 Units by mouth daily  Patient not taking: Reported on 4/14/2024    Historical Provider, MD   cyanocobalamin (VITAMIN B-12) 100 mcg tablet Take by mouth daily  Patient not taking: Reported on 4/14/2024    Historical Provider, MD   Multiple Vitamins-Minerals (ONE-A-DAY 50 PLUS PO)  2/1/20   Historical Provider, MD   OLANZapine (ZyPREXA) 2.5 mg tablet Take 0.5 tablets (1.25 mg total) by mouth every 8 (eight) hours as needed (severe agitation)  Patient not taking: Reported on 4/14/2024 1/21/24   Kylah Huizar MD   OLANZapine (ZyPREXA) 2.5 mg tablet Take 0.5  "tablets (1.25 mg total) by mouth daily  Patient not taking: Reported on 4/14/2024 1/21/24   Kylah Huizar MD   sertraline (ZOLOFT) 25 mg tablet Take 1 tablet (25 mg total) by mouth daily  Patient not taking: Reported on 4/14/2024 1/20/24   Kylah Huizar MD     I have reveiwed home medications using records provided by SNF. And also nurse.    Allergies:   Allergies   Allergen Reactions    Ibuprofen      Reaction Date: 10Aug2005;        Social History:  Marital Status:    Occupation:   Patient Pre-hospital Living Situation: Skilled Nursing Facility: [facility]  Patient Pre-hospital Level of Mobility: non-ambulatory/bed bound  Patient Pre-hospital Diet Restrictions: None  Substance Use History:   Social History     Substance and Sexual Activity   Alcohol Use Never     Social History     Tobacco Use   Smoking Status Never   Smokeless Tobacco Never     Social History     Substance and Sexual Activity   Drug Use Never       Family History:  History reviewed. No pertinent family history.    Physical Exam:     Vitals:   Blood Pressure: 114/63 (04/14/24 2332)  Pulse: 89 (04/14/24 2332)  Temperature: 98.8 °F (37.1 °C) (04/14/24 2238)  Temp Source: Rectal (04/14/24 2006)  Respirations: 16 (04/14/24 2238)  Height: 5' 1\" (154.9 cm) (04/14/24 1943)  Weight - Scale: 48.5 kg (106 lb 14.8 oz) (04/14/24 1943)  SpO2: 92 % (04/14/24 2332)    Physical Exam  Vitals and nursing note reviewed.   Constitutional:       General: She is not in acute distress.     Appearance: She is well-developed. She is ill-appearing and toxic-appearing.   HENT:      Head: Normocephalic and atraumatic.      Nose: No rhinorrhea.      Mouth/Throat:      Mouth: Mucous membranes are dry.      Comments: Unable to open mouth for me.  Eyes:      General: No scleral icterus.        Right eye: No discharge.         Left eye: No discharge.      Conjunctiva/sclera: Conjunctivae normal.   Cardiovascular:      Rate and Rhythm: Regular rhythm. Tachycardia " present.      Pulses: Normal pulses.   Pulmonary:      Effort: No respiratory distress.      Breath sounds: Normal breath sounds. No wheezing, rhonchi or rales.      Comments: On 2L NC  Chest:      Chest wall: No tenderness.   Abdominal:      Tenderness: There is no abdominal tenderness. There is no guarding.   Musculoskeletal:         General: No swelling or tenderness.      Cervical back: No rigidity.   Skin:     General: Skin is warm and dry.   Neurological:      Mental Status: She is disoriented.      Unable to perform Neuro PE.    Additional Data:     Lab Results:  Results from last 7 days   Lab Units 04/14/24  1835   WBC Thousand/uL 19.05*   HEMOGLOBIN g/dL 11.3*   HEMATOCRIT % 36.2   PLATELETS Thousands/uL 261   BANDS PCT % 10*   LYMPHO PCT % 2*   MONO PCT % 1*   EOS PCT % 0     Results from last 7 days   Lab Units 04/14/24  1835   SODIUM mmol/L 146   POTASSIUM mmol/L 3.6   CHLORIDE mmol/L 103   CO2 mmol/L 32   BUN mg/dL 38*   CREATININE mg/dL 1.27   ANION GAP mmol/L 11   CALCIUM mg/dL 9.8   ALBUMIN g/dL 3.9   TOTAL BILIRUBIN mg/dL 0.41   ALK PHOS U/L 79   ALT U/L 13   AST U/L 23   GLUCOSE RANDOM mg/dL 137     Results from last 7 days   Lab Units 04/14/24  1835   INR  1.02             Results from last 7 days   Lab Units 04/14/24  2117 04/14/24  1835   LACTIC ACID mmol/L 1.2 2.5*   PROCALCITONIN ng/ml  --  7.25*       Lines/Drains:  Invasive Devices       Peripheral Intravenous Line  Duration             Peripheral IV 04/14/24 Right Antecubital <1 day    Peripheral IV 04/14/24 Right Forearm <1 day                        Imaging: Reviewed radiology reports from this admission including: chest xray  XR chest portable   ED Interpretation by Ethan Triplett MD (04/14 2059)   This film was interpreted independently by me.  No obvious infiltrates, similar to previous xray (01/09/24)      Final Result by Stacy Kenney MD (04/14 2238)      No acute cardiopulmonary disease.            Workstation  performed: [id number]         CT head wo contrast    (Results Pending)   CTA chest (pe study) abdomen pelvis contrast    (Results Pending)       EKG and Other Studies Reviewed on Admission:   EKG: Sinus Tachycardia. .    ** Please Note: This note has been constructed using a voice recognition system. **

## 2024-04-15 NOTE — SPEECH THERAPY NOTE
Speech Language/Pathology  Speech/Language Pathology  Assessment    Patient Name: Monika Butler  Today's Date: 4/15/2024     Problem List  Principal Problem:    Severe sepsis (HCC)  Active Problems:    Essential hypertension    Dementia with behavioral disturbance (HCC)    Generalized weakness    Meningioma (HCC)    Anemia    QT prolongation    Acute respiratory failure with hypoxia (HCC)    Elevated serum creatinine    Abnormal CT scan    Past Medical History  Past Medical History:   Diagnosis Date    Acute bronchiolitis 12/12/2023    Allergic     Cancer (HCC) 2005    left breast mastectomy    Electrolyte abnormality 10/02/2022    Hypertension     Hypokalemia 01/02/2024    Memory change     Nodule of left lung     Pleural thickening      Past Surgical History  Past Surgical History:   Procedure Laterality Date    CATARACT EXTRACTION Left 05/2020    CATARACT EXTRACTION Left 06/2020    MASTECTOMY      [place] XCAPSL CTRC RMVL INSJ IO LENS PROSTH W/O ECP Left 6/8/2020    Procedure: EXTRACTION EXTRACAPSULAR CATARACT PHACO INTRAOCULAR LENS (IOL);  Surgeon: Cortes Harrison MD;  Location: [facility] MAIN OR;  Service: Ophthalmology   Speech-Language Pathology Bedside Swallow Evaluation        Patient Name: Monika Butler    Today's Date: 4/15/2024     Problem List  Principal Problem:    Severe sepsis (HCC)  Active Problems:    Essential hypertension    Dementia with behavioral disturbance (HCC)    Generalized weakness    Meningioma (HCC)    Anemia    QT prolongation    Acute respiratory failure with hypoxia (HCC)    Elevated serum creatinine    Abnormal CT scan         Past Medical History  Past Medical History:   Diagnosis Date    Acute bronchiolitis 12/12/2023    Allergic     Cancer (HCC) 2005    left breast mastectomy    Electrolyte abnormality 10/02/2022    Hypertension     Hypokalemia 01/02/2024    Memory change     Nodule of left lung     Pleural thickening        Past Surgical History  Past Surgical History:   Procedure  Laterality Date    CATARACT EXTRACTION Left 05/2020    CATARACT EXTRACTION Left 06/2020    MASTECTOMY      [place] XCAPSL CTRC RMVL INSJ IO LENS PROSTH W/O ECP Left 6/8/2020    Procedure: EXTRACTION EXTRACAPSULAR CATARACT PHACO INTRAOCULAR LENS (IOL);  Surgeon: Cortes Harrison MD;  Location: [facility] MAIN OR;  Service: Ophthalmology       Summary    Pt presents with at least mod oral and suspected mild-mod pharyngeal dysphagia. This is characterized by reduced bolus retrieval from spoon, reduced ability to draw liquid from straw, reduced lip seal with intermittent anterior spillage, suspected reduced bolus control, ?premature spill and delayed pharyngeal swallows. Pt was coughing prior to PO trials. There were immediate s/s of aspiration with thin liquids even in very small amounts. There were no overt s/s of aspiration with NTL or pureed. JAMES was waxing and waning during assessment. Pt should only be fed when fully alert. ST to see for dysphagia tx.      Recommendations:   Diet: puree/level 1 diet and nectar thick liquids   Meds: crushed with puree   Frequent Oral care: 4x/day  Full feed  Aspiration precautions and compensatory swallowing strategies: upright posture, only feed when fully alert, slow rate of feeding, and small bites/sips  Other Recommendations/ considerations: ST to see for dysphagia tx.        Current Medical Status  Copied from admission/physician notes:  Monika Butler is a 83 y.o. female who has a past medical history of HTN, dementia with behavior disturbance, breast cancer s/p left mastectomy in 2005, nodule of left lung presents with worsening lethargy today. Patient arrives via EMS from [facility]. Confirmed with daughter and nursing facility regarding patient's baseline mentation- on and off restless dementia but not combative.  Able to conduct very simple communication baseline. Mostly bedbound at but able to move or extremities. Baseline on room air. Upon my evaluation, patient was able  "to answer some pain questions, able to speak out \"go home\" but not fully following commands. Most of history were obtained from daughter at bedside and nursing facility over the phone.     Patient was experiencing some cold-like symptoms in the past 1 week. Positive for productive cough with whitish mucus but no SOB noticed.  Per facility nurse, their thermometer may not function well-patient didn't have fever in the past few days. However, Tmax per EMS today was 102. Also diaphoresis, decreased p.o. intake, more lethargy since today.  Daughter also endorsed patient had some transient \"twitching\" movements on B/L lower jaw and B/L UE and LE. Patient was on Ativan 0.25mg Q6 PRN for agitation and last dose per facility was yesterday for one dose.  Per nursing facility, patient has urinary incontinence but no urinary s/s noted (poor historian), constipation requiring enema.    Order received and chart reviewed. Discussed with RN. Pt has been NPO due to reduced JAMES. Pt is familiar to ST from previous admission. He was last seen by ST in January 2024; at that time a dysphagia 3 diet and thin liquids was recommended.       Past medical history:   Please see H&P for details    Special Studies:  CXR-  No acute cardiopulmonary disease.    CT head-  No acute intracranial hemorrhage, midline shift, or mass effect. Stable 1.2 cm left temporal meningioma.     CTA chest-  LUNGS: There are innumerable bilateral well defined pulmonary nodules the largest in the left lower lobe which measures 1.2 cm (series 301, image 135) concerning for metastasis. In addition, there are bilateral nodular opacities concerning for infection,   septic emboli are not excluded. There is dense opacity at the left lung apex and small opacities within the left upper lobe and left lower lobe base (series 301, image 154) which may be due to pneumonia.1.  No evidence of pulmonary embolism.  2.  Dense opacities at the left lung apex and small opacities within " the left upper and lower lobe base which may be due to pneumonia.  3.  There are bilateral nodular opacities concerning for infection, septic emboli are not excluded  4.  Innumerable bilateral well-defined pulmonary nodules the largest in the left lower lobe measuring 1.2 cm, metastasis is not excluded.  5.  Trace left pleural effusion.  6.  3.1 cm cystic lesion in the head of the pancreas. For simple cyst(s) 2 cm or greater recommend gastroenterology and/or surgical oncology consult. EUS may now be warranted.  7.  6.5 cm simple appearing cystic lesion in the left adnexa/ovary. Follow-up ultrasound of the pelvis in 6 to 12 months may be obtained.      Social/Education/Vocational Hx:  Pt lives in SNF/ECF    Swallow Information   Current Risks for Dysphagia & Aspiration: known history of dysphagia, AMS, and decreased alertness  Current Symptoms/Concerns:  NPO except meds until seen by ST  Current Diet: NPO except meds  Baseline Diet:  unable to locate in chart; most recently on dysphagia 3/thin when seen by ST here in January    Baseline Assessment   Behavior/Cognition: waxing and waning arousal level and decreased attention  Speech/Language Status: not able to to follow commands and limited verbal output  Patient Positioning: upright in bed and head frequently in a downward position     Swallow Mechanism Exam   Pt unable to follow commands for oral mech exam. Able to see dentition on top/bottom, but unable to tell if natural or dentures. Frequent harsh cough observed prior to PO trials. Vocal quality is clear. SPO2 was stable in the low 90s on NC.        Consistencies Assessed and Performance   Consistencies Administered: ice chips, thin liquids, nectar thick, and puree    Oral Stage: Reduce ability to retrieve bolus from spoon, reduced intraoral pressure while trying to draw liquids from straw (inconsistently successful). Bolus manipulation and transfer appeared prolonged. Intermittent anterior spillage. Difficult  to assess for residue/pocketing due to reduced oral opening/reduced ability to follow commands. Suspect reduced bolus control.    Pharyngeal Stage: Hyolaryngeal elevation observed and palpated. ?premature spill into pharynx. Suspect at least mildly delayed pharyngeal swallows. There was a consistent cough after thin liquids, even in min amounts from spoon. No overt s/s of aspiration with NTL or pureed.      Esophageal Concerns: none reported      Results Reviewed with: patient, RN, MD, and aspiration precautions posted    Dysphagia Goals:  1. Pt will tolerate a pureed diet and NTL with prompt oropharyngeal swallows and no overt s/s of aspiration. 2. Pt will tolerate LRD without s/s of aspiration across all meals and snacks.  Discharge recommendation: SNF    Speech Therapy Prognosis   Prognosis: fair    Prognosis Considerations: age, medical status, prior medical history, cognitive status, and therapeutic potential

## 2024-04-15 NOTE — ASSESSMENT & PLAN NOTE
Known history of dementia with behavioral disturbances. Baseline disoriented, on and off able to conduct very simple conversation/ follow very easy commands.  POA with worsening lethargy but not restless or combative.  Based on her last hospitalization 1/24 per Psych recs: Recommending to avoid Ativan for sedation/agitation, decreased Zyprexa to 1.25 mg p.o. daily in the morning and Zyprexa 5 mg p.o. nightly  Meds in the facility: BuSpar 10 mg twice daily, Zyprexa 5 mg twice daily, Remeron 15 mg nightly,  Ativan 0.25 mg every 6 hours as needed.  POA Qtc 533.  Would continue home dosage Buspar and Remeron. Hold scheduled Zyprexa. Hopefully weaning off Ativan.  Yasmin consult. Appreciated recs.

## 2024-04-15 NOTE — PLAN OF CARE
Problem: PAIN - ADULT  Goal: Verbalizes/displays adequate comfort level or baseline comfort level  Description: Interventions:  - Encourage patient to monitor pain and request assistance  - Assess pain using appropriate pain scale  - Administer analgesics based on type and severity of pain and evaluate response  - Implement non-pharmacological measures as appropriate and evaluate response  - Consider cultural and social influences on pain and pain management  - Notify physician/advanced practitioner if interventions unsuccessful or patient reports new pain  Outcome: Progressing     Problem: INFECTION - ADULT  Goal: Absence or prevention of progression during hospitalization  Description: INTERVENTIONS:  - Assess and monitor for signs and symptoms of infection  - Monitor lab/diagnostic results  - Monitor all insertion sites, i.e. indwelling lines, tubes, and drains  - Monitor endotracheal if appropriate and nasal secretions for changes in amount and color  - [place] appropriate cooling/warming therapies per order  - Administer medications as ordered  - Instruct and encourage patient and family to use good hand hygiene technique  - Identify and instruct in appropriate isolation precautions for identified infection/condition  Outcome: Progressing     Problem: SAFETY ADULT  Goal: Patient will remain free of falls  Description: INTERVENTIONS:  - Educate patient/family on patient safety including physical limitations  - Instruct patient to call for assistance with activity   - Consult OT/PT to assist with strengthening/mobility   - Keep Call bell within reach  - Keep bed low and locked with side rails adjusted as appropriate  - Keep care items and personal belongings within reach  - Initiate and maintain comfort rounds  - Make Fall Risk Sign visible to staff  - Offer Toileting every 2 Hours, in advance of need  - Initiate/Maintain bed alarm  - Obtain necessary fall risk management equipment  - Apply yellow socks and  bracelet for high fall risk patients  - Consider moving patient to room near nurses station  Outcome: Progressing     Problem: DISCHARGE PLANNING  Goal: Discharge to home or other facility with appropriate resources  Description: INTERVENTIONS:  - Identify barriers to discharge w/patient and caregiver  - Arrange for needed discharge resources and transportation as appropriate  - Identify discharge learning needs (meds, wound care, etc.)  - Arrange for interpretive services to assist at discharge as needed  - Refer to Case Management Department for coordinating discharge planning if the patient needs post-hospital services based on physician/advanced practitioner order or complex needs related to functional status, cognitive ability, or social support system  Outcome: Progressing     Problem: Knowledge Deficit  Goal: Patient/family/caregiver demonstrates understanding of disease process, treatment plan, medications, and discharge instructions  Description: Complete learning assessment and assess knowledge base.  Interventions:  - Provide teaching at level of understanding  - Provide teaching via preferred learning methods  Outcome: Progressing     Problem: GENITOURINARY - ADULT  Goal: Maintains or returns to baseline urinary function  Description: INTERVENTIONS:  - Assess urinary function  - Encourage oral fluids to ensure adequate hydration if ordered  - Administer IV fluids as ordered to ensure adequate hydration  - Administer ordered medications as needed  - Offer frequent toileting  - Follow urinary retention protocol if ordered  Outcome: Progressing     Problem: Nutrition/Hydration-ADULT  Goal: Nutrient/Hydration intake appropriate for improving, restoring or maintaining nutritional needs  Description: Monitor and assess patient's nutrition/hydration status for malnutrition. Collaborate with interdisciplinary team and initiate plan and interventions as ordered.  Monitor patient's weight and dietary intake as  ordered or per policy. Utilize nutrition screening tool and intervene as necessary. Determine patient's food preferences and provide high-protein, high-caloric foods as appropriate.     INTERVENTIONS:  - Monitor oral intake, urinary output, labs, and treatment plans  - Assess nutrition and hydration status and recommend course of action  - Evaluate amount of meals eaten  - Assist patient with eating if necessary   - Allow adequate time for meals  - Recommend/ encourage appropriate diets, oral nutritional supplements, and vitamin/mineral supplements  - Order, calculate, and assess calorie counts as needed  - Recommend, monitor, and adjust tube feedings and TPN/PPN based on assessed needs  - Assess need for intravenous fluids  - Provide specific nutrition/hydration education as appropriate  - Include patient/family/caregiver in decisions related to nutrition  Outcome: Progressing     Problem: SKIN/TISSUE INTEGRITY - ADULT  Goal: Skin Integrity remains intact(Skin Breakdown Prevention)  Description: Assess:  -Perform Jim assessment every shift  -Clean and moisturize skin every day and as needed for soilage  -Inspect skin when repositioning, toileting, and assisting with ADLS  -Assess under medical devices   -Assess extremities for adequate circulation and sensation     Bed Management:  -Have minimal linens on bed & keep smooth, unwrinkled  -Change linens as needed when moist or perspiring  -Avoid sitting or lying in one position for more than 2 hours while in bed  -Keep HOB at 30 degrees     Toileting:  -Offer bedside commode  -Assess for incontinence every 2 hours  -Use incontinent care products after each incontinent episode     Activity:  -Mobilize patient 3 times a day  -Encourage activity and walks on unit  -Encourage or provide ROM exercises   -Turn and reposition patient every 2 Hours  -Use appropriate equipment to lift or move patient in bed  -Instruct/ Assist with weight shifting when out of bed in  chair  Skin Care:  -Avoid use of baby powder, tape, friction and shearing, hot water or constrictive clothing  -Relieve pressure over bony prominences using foam wedges/pillow support  -Do not massage red bony areas    Next Steps:  -Teach patient strategies to minimize risks such as frequent repositioning  Outcome: Progressing  Goal: Incision(s), wounds(s) or drain site(s) healing without S/S of infection  Description: INTERVENTIONS  - Assess and document dressing, incision, wound bed, drain sites and surrounding tissue  - Provide patient and family education  - Perform skin care/dressing changes eper order and as needed for soilage  Outcome: Progressing  Goal: Pressure injury heals and does not worsen  Description: Interventions:  - Implement low air loss mattress or specialty surface (Criteria met)  - Apply silicone foam dressing  - Use special pressure reducing interventions such as waffle cushion  when in chair   - Apply fecal or urinary incontinence containment device   - Perform passive or active ROM every shift   - Turn and reposition patient & offload bony prominences every 2  hours   - Utilize friction reducing device or surface for transfers   - Consider consults to  interdisciplinary teams such as wound care RN  - Use incontinent care products after each incontinent episode   - Consider nutrition services referral as needed  Outcome: Progressing

## 2024-04-15 NOTE — OCCUPATIONAL THERAPY NOTE
"  Occupational Therapy Evaluation       04/15/24 1417   OT Last Visit   OT Visit Date 04/15/24   Note Type   Note type Evaluation   Pain Assessment   Pain Assessment Tool FLACC   Pain Rating: FLACC (Rest) - Face 0   Pain Rating: FLACC (Rest) - Legs 0   Pain Rating: FLACC (Rest) - Activity 0   Pain Rating: FLACC (Rest) - Cry 0   Pain Rating: FLACC (Rest) - Consolability 0   Score: FLACC (Rest) 0   Pain Rating: FLACC (Activity) - Face 0   Pain Rating: FLACC (Activity) - Legs 0   Pain Rating: FLACC (Activity) - Activity 0   Pain Rating: FLACC (Activity) - Cry 0   Pain Rating: FLACC (Activity) - Consolability 0   Score: FLACC (Activity) 0   Restrictions/Precautions   Other Precautions Cognitive;Chair Alarm;Bed Alarm;Fall Risk   Home Living   Type of Home SNF  ([facility])   Home Layout One level   Prior Function   Level of [place] Needs assistance with ADLs;Needs assistance with functional mobility;Needs assistance with IADLS   Lives With Facility staff   Receives Help From Other (Comment)  (staff)   IADLs Family/Friend/Other provides transportation;Family/Friend/Other provides meals;Family/Friend/Other provides medication management   Vocational Retired   General   Additional Pertinent History Pt admitted with sepsis   Subjective   Subjective \"Oh it's cold!\"   ADL   Eating Assistance 1  Total Assistance   Grooming Assistance 1  Total Assistance   UB Bathing Assistance 1  Total Assistance   LB Bathing Assistance 1  Total Assistance   UB Dressing Assistance 1  Total Assistance   LB Dressing Assistance 1  Total Assistance   Toileting Assistance  1  Total Assistance   Bed Mobility   Supine to Sit 2  Maximal assistance   Additional items Assist x 1;Verbal cues;Increased time required;LE management   Sit to Supine 2  Maximal assistance   Additional items Assist x 1;Verbal cues;Increased time required;LE management   Transfers   Sit to Stand 2  Maximal assistance   Additional items Assist x 1;Verbal cues;Increased " time required   Stand to Sit 2  Maximal assistance   Additional items Assist x 1;Verbal cues;Increased time required   Stand pivot 2  Maximal assistance   Additional items Assist x 1;Verbal cues;Increased time required   Toilet transfer 2  Maximal assistance   Additional items Assist x 1;Verbal cues;Increased time required;Commode   Balance   Static Sitting Fair   Dynamic Sitting Fair -   Static Standing Poor   Dynamic Standing Poor -   Activity Tolerance   Activity Tolerance Patient limited by fatigue;Treatment limited secondary to medical complications (Comment)  (cog deficits)   RUE Assessment   RUE Assessment   (at least 2+/5 as seen during fxl observation)   LUE Assessment   LUE Assessment   (at least 2+/5 as seen during fxl observation)   Cognition   Overall Cognitive Status Impaired   Arousal/Participation Arousable   Attention Difficulty attending to directions   Orientation Level Oriented to person;Disoriented to place;Disoriented to time;Disoriented to situation   Memory Decreased long term memory;Decreased recall of biographical information;Decreased short term memory;Decreased recall of recent events;Decreased recall of precautions   Following Commands Follows one step commands inconsistently   Comments Identified by wrist band as pt unable to verbalize name and    Assessment   Limitation Decreased ADL status;Decreased UE strength;Decreased Safe judgement during ADL;Decreased cognition;Decreased endurance;Decreased self-care trans;Decreased high-level ADLs  (decreased balance and mobility)   Prognosis Fair   Assessment Patient evaluated by Occupational Therapy.  Patient admitted with Severe sepsis (HCC).  The patients occupational profile, medical and therapy history includes a extensive additional review of physical, cognitive, or psychosocial history related to current functional performance.  Comorbidities affecting functional mobility and ADLS include: HTN, dementia with behavior disturbance,  breast cancer.  Prior to admission, patient was requiring assist for ADLS and requiring assist for IADLS.  The evaluation identifies the following performance deficits: weakness, impaired balance, decreased endurance, decreased coordination, increased fall risk, new onset of impairment of functional mobility, decreased ADLS, decreased IADLS, decreased activity tolerance, decreased safety awareness, impaired judgement, decreased cognition, and decreased strength, that result in activity limitations and/or participation restrictions. This evaluation requires clinical decision making of high complexity, because the patient presents with comorbidites that affect occupational performance and required significant modification of tasks or assistance with consideration of multiple treatment options.  The Barthel Index was used as a functional outcome tool presenting with a score of Barthel Index Score: 5, indicating marked limitations of functional mobility and ADLS.  The patient's raw score on the AM-PAC Daily Activity Inpatient Short Form is 6. A raw score of less than 19 suggests the patient may benefit from discharge to post-acute rehabilitation services. Please refer to the recommendation of the Occupational Therapist for safe discharge planning.  Patient will benefit from skilled Occupational Therapy services to address above deficits and facilitate a safe return to prior level of function.   Goals   Patient Goals none stated due to cog deficits   STG Time Frame   (1-7 days)   Short Term Goal  Goals established to promote Patient Goals: none stated due to cog deficits:  Eating: mod assist; Grooming: mod assist seated; Bathing: mod assist; Upper Body Dressing mod assist; Lower Body Dressing: mod assist; Toileting: mod assist; Patient will increase stand pivot commode transfer to mod assist with LRAD to increase performance and safety with ADLS and functional mobility; Patient will increase standing tolerance to 5  minutes during ADL task to decrease assistance level and decrease fall risk; Patient will increase bed mobility to mod assist in preparation for ADLS and transfers; Patient will increase functional mobility to and from bathroom with LRAD with mod assist to increase performance with ADLS and to use a toilet; Patient will tolerate 5 minutes of UE ROM/strengthening to increase general activity tolerance and performance in ADLS/IADLS; Patient will improve functional activity tolerance to 5 minutes of sustained functional tasks to increase participation in basic self-care and decrease assistance level; Patient will increase dynamic sitting balance to fair to improve the ability to sit at edge of bed or on a chair for ADLS;  Patient will increase static standing balance to poor+ to improve postural stability and decrease fall risk during standing ADLS and transfers.   LTG Time Frame   (8-14 days)   Long Term Goal Eating: min assist; Grooming: min assist seated; Bathing: min assist; Upper Body Dressing min assist; Lower Body Dressing: min assist; Toileting: min assist; Patient will increase ambulatory commode transfer to min assist with LRAD to increase performance and safety with ADLS and functional mobility; Patient will increase standing tolerance to 10 minutes during ADL task to decrease assistance level and decrease fall risk; Patient will increase bed mobility to min assist in preparation for ADLS and transfers; Patient will increase functional mobility to and from bathroom with LRAD with min assist to increase performance with ADLS and to use a toilet; Patient will tolerate 10 minutes of UE ROM/strengthening to increase general activity tolerance and performance in ADLS/IADLS; Patient will improve functional activity tolerance to 10 minutes of sustained functional tasks to increase participation in basic self-care and decrease assistance level; Patient will increase dynamic sitting balance to fair+ to improve the  ability to sit at edge of bed or on a chair for ADLS;  Patient will increase static standing balance to fair- to improve postural stability and decrease fall risk during standing ADLS and transfers.  Pt will score >/= 11/24 on AM-[facility] Daily Activity Inpatient scale to promote safe independence with ADLs and functional mobility; Pt will score >/= 35/100 on Barthel Index in order to decrease caregiver assistance needed and increase ability to perform ADLs and functional mobility.   Plan   Treatment Interventions ADL retraining;Functional transfer training;UE strengthening/ROM;Endurance training;Cognitive reorientation;Patient/family training;Equipment evaluation/education;Fine motor coordination activities;Activityengagement;Continued evaluation   Goal Expiration Date 04/29/24   OT Frequency 2-3x/wk   Discharge Recommendation   Rehab Resource Intensity Level, OT II (Moderate Resource Intensity)   AM-PAC Daily Activity Inpatient   Lower Body Dressing 1   Bathing 1   Toileting 1   Upper Body Dressing 1   Grooming 1   Eating 1   Daily Activity Raw Score 6   Turning Head Towards Sound 2   Follow Simple Instructions 1   Low Function Daily Activity Raw Score 9   Low Function Daily Activity Standardized Score  14.9   AM-PAC Applied Cognition Inpatient   Following a Speech/Presentation 1   Understanding Ordinary Conversation 3   Taking Medications 1   Remembering Where Things Are Placed or Put Away 1   Remembering List of 4-5 Errands 1   Taking Care of Complicated Tasks 1   Applied Cognition Raw Score 8   Applied Cognition Standardized Score 19.32   Barthel Index   Feeding 0   Bathing 0   Grooming Score 0   Dressing Score 0   Bladder Score 0   Bowels Score 0   Toilet Use Score 0   Transfers (Bed/Chair) Score 5   Mobility (Level Surface) Score 0   Stairs Score 0   Barthel Index Score 5   Licensure   [place] License Number  Ira Loyd OTR/L [id number]

## 2024-04-15 NOTE — ASSESSMENT & PLAN NOTE
Home meds: Losartan 100 mg daily, metoprolol 50 mg every morning, 25 mg nightly. Clonidine patch.  Hold losartan in the setting of elevated creatinine.  Clonidine patch removed in the setting of lower-side blood pressure.  Monitor BP.

## 2024-04-15 NOTE — ASSESSMENT & PLAN NOTE
Patient's chest abdomen and pelvis CT scan revealed no evidence of PE, opacities in the left upper and lower lobe base concerning for pneumonia, bilateral nodular opacities concerning for septic emboli, and a pulmonary nodules that could be concerning for metastasis.  In addition, patient had a cystic lesion in the head of the pancreas, as well as a cystic lesion in the ovary.    Plan:  -ID consulted  -Echocardiogram to rule out septic emboli  -GI consulted for pancreatic cyst, per daughter no further intervention

## 2024-04-15 NOTE — SEPSIS NOTE
"  Sepsis Note   Monika Butler 83 y.o. female MRN: [id number]  Unit/Bed#: ED-04 Encounter: [id number]       Initial Sepsis Screening       Row Name 04/14/24 1931 04/14/24 1900             Is the patient's history suggestive of a new or worsening infection? Yes (Proceed)  -SG Yes (Proceed)  -SG       Suspected source of infection pneumonia  -SG pneumonia  -SG       Indicate SIRS criteria Hyperthemia > 38.3C (100.9F) OR Hypothermia <36C (96.8F);Tachycardia > 90 bpm;Leukocytosis (WBC > 46925 IJL) OR Leukopenia (WBC <4000 IJL) OR Bandemia (WBC >10% bands)  -SG Hyperthemia > 38.3C (100.9F) OR Hypothermia <36C (96.8F);Tachycardia > 90 bpm;Leukocytosis (WBC > 52005 IJL) OR Leukopenia (WBC <4000 IJL) OR Bandemia (WBC >10% bands)  -SG       Are two or more of the above signs & symptoms of infection both present and new to the patient? Yes (Proceed)  -SG Yes (Proceed)  -SG       Assess for evidence of organ dysfunction: Are any of the below criteria present within 6 hours of suspected infection and SIRS criteria that are NOT considered to be chronic conditions? Lactate > 2.0  -SG --       Date of presentation of severe sepsis 04/14/24  -SG --       Time of presentation of severe sepsis 1931  -SG --       Sepsis Note: Click \"NEXT\" below (NOT \"close\") to generate sepsis note based on above information. YES (proceed by clicking \"NEXT\")  -SG --                 User Key  (r) = Recorded By, (t) = Taken By, (c) = Cosigned By      Initials Name Provider Type    LEXI Jin DO Resident                    Default Flowsheet Data (last 720 hours)       Sepsis Reassess       Row Name 04/14/24 2008                   Repeat Volume Status and Tissue Perfusion Assessment Performed    Date of Reassessment: 04/14/24  -        Time of Reassessment: 2009 -SG        Sepsis Reassessment Note: Click \"NEXT\" below (NOT \"close\") to generate sepsis reassessment note. YES (proceed by clicking \"NEXT\")  -SG        Repeat Volume " Status and Tissue Perfusion Assessment Performed --                  User Key  (r) = Recorded By, (t) = Taken By, (c) = Cosigned By      Initials Name Provider Type    LEXI Jin DO Resident                    Body mass index is 20.2 kg/m².  Wt Readings from Last 1 Encounters:   04/14/24 48.5 kg (106 lb 14.8 oz)     IBW (Ideal Body Weight): 47.8 kg    Ideal body weight: 47.8 kg (105 lb 6.1 oz)  Adjusted ideal body weight: 48.1 kg (106 lb)

## 2024-04-15 NOTE — ASSESSMENT & PLAN NOTE
Baseline no O2 requirement.  POA O2 satting 87% on room air. Requiring 2 L NC.  B/L LE no edema. No calf tenderness.  Recent cold-like symptoms with productive cough.  Tachycardia- may 2/2 infections. Unable to assess CP.  Last echo 1/24: LVEF 60-65%, G1DD.  CXR per my reading: No fluid overload, pleural effusion, obvious infiltrates or consolidation.  Mostly bedbounded with h/o breast cancer.      Respiratory protocol, IS, scheduled albuterol nebulizer.  Wean off O2 if able.

## 2024-04-15 NOTE — ASSESSMENT & PLAN NOTE
Lab Results   Component Value Date    CREATININE 1.27 04/14/2024    CREATININE 0.85 02/19/2024    CREATININE 0.82 01/19/2024     Baseline Cr 0.8-0.9  POA 1.27. Baseline urinary incontinence- daughter reported urination x2 on 4/14.  In the setting of poor oral intake.  UA positive for leukocytes, occult blood, RBC, casts.  Received 1L isolytes in the ED.    Plan:  Urinary retention protocol, Bladder scan, Daily weights, I/O  IVF- 500cc bolus to meet septic fluid and then 75cc/hr for 10 hrs.  Monitor BMP daily and observe for downward trend of creatinine  Avoid hypoperfusion of the kidneys, minimize nephrotoxins  RAAS Blockers/Diuretics held: Losartan.

## 2024-04-15 NOTE — ASSESSMENT & PLAN NOTE
POA QTc 533. Sinus tachy.  On buspirone 10 mg twice daily, Zyprexa 5 mg twice daily, Remeron 15 mg nightly, Ativan 0.25mg every 6 hours as needed (last dose on 4/13 for 1 dose).  Will continue home dosage of buspirone and Remeron.  Hold off scheduled Zyprexa on 4/14 PM. Consider PRN Zyprexa 2.5mg IM if agitation.  Will monitor daily EKG for QTc trending.

## 2024-04-15 NOTE — ASSESSMENT & PLAN NOTE
Lab Results   Component Value Date    CREATININE 0.98 04/15/2024    CREATININE 1.27 04/14/2024    CREATININE 0.85 02/19/2024     Baseline Cr 0.8-0.9  POA 1.27. Baseline urinary incontinence- daughter reported urination x2 on 4/14.  In the setting of poor oral intake.  UA positive for leukocytes, occult blood, RBC, casts.  Received 1L isolytes in the ED.    Plan:  Urinary retention protocol, Bladder scan, Daily weights, I/O  IVF- 500cc bolus to meet septic fluid and then 75cc/hr for 10 hrs.  Monitor BMP daily and observe for downward trend of creatinine  Avoid hypoperfusion of the kidneys, minimize nephrotoxins  RAAS Blockers/Diuretics held: Losartan.

## 2024-04-15 NOTE — ASSESSMENT & PLAN NOTE
Likely second to sepsis, poor p.o. intake.  SLP recommend dysphagia diet with nectar thick liquids  IV fluids  PT/ OT eval.

## 2024-04-15 NOTE — PROGRESS NOTES
"Monika Butler is a 83 y.o. female who is currently ordered Vancomycin IV with management by the Pharmacy Consult service.  Relevant clinical data and objective / subjective history reviewed.  Vancomycin Assessment:  Vancomycin Assessment    Monika Butler is a 83 y.o. female who is currently receiving vancomycin   for Urinary tract infection (goal -600, trough >10), Pneumonia (goal -600, trough >10)   .    Relevant clinical data and objective history reviewed:  Creatinine   Date Value Ref Range Status   04/14/2024 1.27 0.60 - 1.30 mg/dL Final     Comment:     Standardized to IDMS reference method   02/19/2024 0.85 0.60 - 1.30 mg/dL Final     Comment:     Standardized to IDMS reference method   01/19/2024 0.82 0.60 - 1.30 mg/dL Final     Comment:     Standardized to IDMS reference method   11/10/2015 0.7 0.6 - 1.3 mg/dL      /64   Pulse 86   Temp (!) 97.3 °F (36.3 °C)   Resp 17   Ht 5' 1\" (1.549 m)   Wt 48.5 kg (106 lb 14.8 oz)   SpO2 99%   BMI 20.20 kg/m²   No intake/output data recorded.  Lab Results   Component Value Date/Time    BUN 38 (H) 04/14/2024 06:35 PM    BUN 17 02/11/2022 08:53 AM    WBC 19.05 (H) 04/14/2024 06:35 PM    HGB 11.3 (L) 04/14/2024 06:35 PM    HCT 36.2 04/14/2024 06:35 PM     (H) 04/14/2024 06:35 PM     04/14/2024 06:35 PM     Temp Readings from Last 3 Encounters:   04/15/24 (!) 97.3 °F (36.3 °C)   01/21/24 97.9 °F (36.6 °C) (Tympanic)   12/13/23 98.5 °F (36.9 °C)     Vancomycin Days of Therapy: 1    Assessment/Plan  The patient is currently on vancomycin utilizing pulse dosing.  Baseline risks associated with therapy include: pre-existing renal impairment, advanced age, and dehydration.  The patient is ordered vancomycin 750mg daily prn pulse dosing for random vancomycin trough <15. .  Pharmacy will continue to follow closely for s/sx of nephrotoxicity, infusion reactions, and appropriateness of therapy.  BMP and CBC will be ordered per protocol.  " Plan for a random trough  at approximately 4/15 1900. Pharmacy will continue to follow the patient’s culture results and clinical progress daily.    Ethan Etienne, Pharmacist

## 2024-04-15 NOTE — UTILIZATION REVIEW
"Initial Clinical Review    Admission: Date/Time/Statement:   Admission Orders (From admission, onward)       Ordered        04/14/24 2100  INPATIENT ADMISSION  Once                          Orders Placed This Encounter   Procedures    INPATIENT ADMISSION     Standing Status:   Standing     Number of Occurrences:   1     Order Specific Question:   Level of Care     Answer:   Med Surg [16]     Order Specific Question:   Estimated length of stay     Answer:   More than 2 Midnights     Order Specific Question:   Certification     Answer:   I certify that inpatient services are medically necessary for this patient for a duration of greater than two midnights. See H&P and MD Progress Notes for additional information about the patient's course of treatment.     ED Arrival Information       Expected   -    Arrival   4/14/2024 18:12    Acuity   Emergent              Means of arrival   Ambulance    Escorted by   [facility]    Service   Hospitalist    Admission type   Emergency              Arrival complaint   AMS/Fever             Chief Complaint   Patient presents with    Altered Mental Status     Patient arrives via EMS from [facility] for evaluation of AMS, daughter reports patient got Ativan for agitation yesterday and today was \"off all day\", did not get out of bed until 1100 and did not eat breakfast, ate a small amount of lunch, but has been lethargic all day       Initial Presentation: 83 y.o. female with hx  HTN, dementia with behavior disturbance(on Ativan 0.25mg Q6 PRN for agitation and last dose per facility was yesterday for one dose) , breast cancer s/p left mastectomy in 2005, nodule of left lung , meningioma who presents to ED via EMS from [facility] with  worsening lethargy today. Per dgt, pt had cold-like symptoms in the past 1 week. Positive for productive cough with whitish mucus . Pt had some transient \"twitching\" movements on B/L lower jaw and B/L UE and LEper dgt . Poor po " intake . . On EMS arrival , Temp 102 F , diaphoretic. On exam, temp 103.8 F, pt tachycardic, RA sat 87 % . O2 sat 96 % on 2 L o2. Dry mucous membranes . Pt disoriented.  Labs: Creatinine 1.27 from baseline 0.9, WBC 19.05,procal 7.25, lactic acid 2.5---->1.2, Pro-Giuseppe 7.25 .  UA suggestive of UTI?.  EKG sinus tachycardia with prolonged QTc 533.  CXR per my reading no obvious infiltrates. Given IVF , IV Tylenol, and Iv abx in ED. Pt admitted as Inpatient with severe sepsis . Acute resp failure w/ hypoxia . Elevated serum creat . Plan- IV abx- Cefepime and vanco. F/U cx . Obtain CT C/A/P . Trend procal,WBC,  fever curve . IVF. Hold home Losartan . Trend BMP . Supplemental O2- wean as able . Scheduled neb tx . Obtain CT head . Gerontology consult.     Date: 4/15    Day 2:    SLP - bedside swallow eval  . At least mod oral and suspected mild-mod pharyngeal dysphagia. Coughing prior to PO trials. There were immediate s/s of aspiration with thin liquids even in very small amounts. There were no overt s/s of aspiration with NTL or pureed. Recommend  puree/level 1 diet and nectar thick liquids    Gerontology consult- Dementia with behavioral disturbance . EKG on admission showed a QTc of 533  . Recommend holding any medications that can further prolong QTc such as Zyprexa C Hold lorazepam . F/U cx . Cautious with cefepime as it can increase the risk of confusion/delirium in elderly . On exam, pt disoriented. Gait abnormality, motor weakness. Pale. Remains on O2 @ 2 l . Normal breath sounds .   Medicine- Per patient's daughter, she is confused and nonverbal at baseline due to her severe dementia. Ordered eco to r/o septic emboli. GI consult was placed for 4 pancreatic cyst found on imaging. Pt continues on IV cefepime and vanco. ID consult placed today . Temp 99.1 F today. WBC up to 23.70 today .   GI consult- 3.1 x 3 cm cystic lesion near the head of the pancreas which was noted incidentally on imaging. There is also a 6.5 cm  cystic lesion in the left ovary. Recommend endoscopic ultrasound to assess for malignancy however patient's family would like to hold off on any invasive testing at this time. Alternatively they would also like to hold off on MRI/MRCP as she may not be able to tolerate in setting of her dementia   ID consult- SIRS vs. Sepsis . Suspect a pulmonary source -the possibility of aspiration changes in patient with baseline dementia.  Also consider the possibility of developing pneumonia. consider bacteremia or urinary tract infection.CT A/P negative for infectious and abdominal process . Plan- Continue empiric Vanco and Cefepime . As CrCl <30, decrease Cefepime dose to 1g every 12 hours . F/U cx . F/U TTE. Trend WBC . Acute encephalopathy in setting of baseline dementia :CT head negative for acute infarct or hemorrhage. Monitor mental status, neuro status .     Date: 4/16   Day 3: Has surpassed a 2nd midnight with active treatments and services.   Severe sepsis.  Suspicion of pneumonia on CT chest. WBC 20.39 today. Afebrile this am . ID following- IV Vanco and IV cefepime with cefepime increased back to 2 G from 1 G with improved creat today .  . Patient also presented with acute hypoxic respiratory failure . Echo showed EF 65 % . Pt pleasantly confused at baseline. Was talking about walking the dog today. Patient was on oxygen  this morning and was comfortable , sat 92 % on 2 L .   3.1 cm cystic lesion in head of pancreas as well as multiple bilateral nodular opacities concerning for metastases versus septic emboli. No aggressive workup per discussion with family and discussion with GI. 6.5 cm simple appearing cystic lesion of left adnexa ovary,  family foregoing further aggressive workup at this time .K 3.0 today, repletion ordered . BMP in am .    PT- level II rehab resource intensity. functioning below baseline and currently w/ overall mobility deficits 2* to: BLE weakness, impaired balance, gait deviations, decreased  activity tolerance compared to baseline, decreased functional mobility tolerance compared to baseline, decreased safety awareness, impaired judgement, decreased cognition. Pt currently at a fall risk .  AM-PAC Basic Mobility Inpatient Short Form Raw Score is 8. A Raw score of less than or equal to 16 suggests the patient may benefit from discharge to post-acute rehabilitation services.       ED Triage Vitals   Temperature Pulse Respirations Blood Pressure SpO2   04/14/24 1821 04/14/24 1820 04/14/24 1820 04/14/24 1820 04/14/24 1820   (!) 103.8 °F (39.9 °C) (!) 114 20 144/79 90 %      Temp Source Heart Rate Source Patient Position - Orthostatic VS BP Location FiO2 (%)   04/14/24 1821 04/14/24 1915 04/14/24 1820 04/14/24 1820 --   Rectal Monitor Sitting Right arm       Pain Score       04/14/24 2313       No Pain          Wt Readings from Last 1 Encounters:   04/15/24 48.1 kg (106 lb)     Additional Vital Signs:   Date/Time Temp Pulse Resp BP MAP (mmHg) SpO2 Calculated FIO2 (%) - Nasal Cannula Nasal Cannula O2 Flow Rate (L/min) O2 Device   04/16/24 0800 -- -- -- -- -- 94 % -- -- None (Room air)   04/16/24 0736 -- -- -- -- -- 92 % 28 2 L/min Nasal cannula   04/16/24 07:31:29 98.1 °F (36.7 °C) 93 16 160/90 113 92 % 28 2 L/min Nasal cannula   04/16/24 0729 -- -- -- -- -- 92 % -- -- --   04/15/24 21:26:27 -- 90 -- 133/74 94 92 % -- -- --   04/15/24 1950 -- -- -- -- -- -- 28 2 L/min --   04/15/24 1930 -- -- -- -- -- 95 % -- -- --     Date/Time Temp Pulse Resp BP MAP (mmHg) SpO2 Calculated FIO2 (%) - Nasal Cannula Nasal Cannula O2 Flow Rate (L/min) O2 Device   04/15/24 1530 -- 98 -- 125/65 -- -- -- -- --   04/15/24 14:42:24 98.4 °F (36.9 °C) 98 16 125/65 85 95 % -- -- --   04/15/24 11:05:15 -- 99 -- 128/73 91 95 % -- -- --   04/15/24 07:24:50 99.1 °F (37.3 °C) 99 24 Abnormal  123/88 100 95 % -- -- --   04/15/24 04:20:50 98 °F (36.7 °C) 82 -- 103/52 69 95 % -- -- --   04/15/24 02:01:19 97.5 °F (36.4 °C) 92 -- -- -- 99 % -- --  --   04/15/24 01:51:45 -- 92 -- 114/69 84 99 % -- -- --   04/15/24 00:49:58 -- 86 -- 112/64 80 99 % -- -- --   04/15/24 00:29:25 97.3 °F (36.3 °C) Abnormal  89 17 108/64 79 99 % -- -- --   04/14/24 23:32:41 -- 89 -- 114/63 80 92 % -- -- --   04/14/24 2313 -- -- -- -- -- -- -- -- Nasal cannula   04/14/24 22:59:57 -- 88 -- 97/55 69 97 % -- -- --   04/14/24 22:38:11 98.8 °F (37.1 °C) 88 16 100/63 75 97 % -- -- --   04/14/24 2215 -- 93 20 105/58 75 95 % 28 2 L/min Nasal cannula   04/14/24 2115 -- 99 20 101/56 68 96 % 28 2 L/min Nasal cannula   04/14/24 2100 -- 100 20 96/54 69 96 % 28 2 L/min Nasal cannula   04/14/24 2030 -- 102 18 101/59 75 97 % 28 2 L/min Nasal cannula   04/14/24 2006 102.5 °F (39.2 °C) Abnormal  -- -- -- -- -- -- -- --   04/14/24 1930 -- 102 20 124/71 91 98 % 28 2 L/min Nasal cannula   04/14/24 1915 -- 104 20 141/73 102 97 % 28 2 L/min Nasal cannula   04/14/24 1851 -- -- -- -- -- -- -- -- Nasal cannula   04/14/24 1830 -- 113 Abnormal  -- 143/84 110 98 % 28 2 L/min Nasal cannula     Date and Time Eye Opening Best Verbal Response Best Motor Response [place] Coma Scale Score   04/16/24 0800 4 3 6 13   04/15/24 1950 3 3 5 11   04/15/24 0955 3 3 5 11   04/14/24 2313 3 3 5 11   04/14/24 1852 2 2 5 9                   Pertinent Labs/Diagnostic Test Results:    4/14 ECG- ECG rate:  109     ECG rate assessment: tachycardic    Rhythm:     Rhythm: sinus rhythm    Ectopy:     Ectopy: none    QRS:     QRS axis:  Normal   Conduction:     Conduction: abnormal      Abnormal conduction: incomplete RBBB    ST segments:     ST segments:  Non-specific   T waves:     T waves: inverted      Inverted:  V2 and V3    4/15 ECHO- TTE -      This was a technically difficult study due to lung interference and low acoustic windows.    Left Ventricle: Left ventricular cavity size is normal. Wall thickness is normal. The left ventricular ejection fraction is 65%. Systolic function is normal. Wall motion is normal.    IVS: There is  sigmoid appearance of the septum.    Aortic Valve: There is aortic valve sclerosis.    Mitral Valve: There is mild regurgitation.    Tricuspid Valve: There is mild regurgitation.  CT head wo contrast   Final Result by Dawood Carreon MD (04/15 0118)      No acute intracranial hemorrhage, midline shift, or mass effect. Stable 1.2 cm left temporal meningioma.         Workstation performed: [id number]         CTA chest (pe study) abdomen pelvis contrast   Final Result by Dawood Carreon MD (04/15 0117)      1.  No evidence of pulmonary embolism.   2.  Dense opacities at the left lung apex and small opacities within the left upper and lower lobe base which may be due to pneumonia.   3.  There are bilateral nodular opacities concerning for infection, septic emboli are not excluded   4.  Innumerable bilateral well-defined pulmonary nodules the largest in the left lower lobe measuring 1.2 cm, metastasis is not excluded.   5.  Trace left pleural effusion.   6.  3.1 cm cystic lesion in the head of the pancreas. For simple cyst(s) 2 cm or greater recommend gastroenterology and/or surgical oncology consult. EUS may now be warranted.   7.  6.5 cm simple appearing cystic lesion in the left adnexa/ovary. Follow-up ultrasound of the pelvis in 6 to 12 months may be obtained.      The study was marked in EPIC for immediate notification.      Workstation performed: [id number]         XR chest portable   ED Interpretation by Ethan Triplett MD (04/14 2059)   This film was interpreted independently by me.  No obvious infiltrates, similar to previous xray (01/09/24)      Final Result by Stacy Kenney MD (04/14 2238)      No acute cardiopulmonary disease.            Workstation performed: [id number]           Results from last 7 days   Lab Units 04/14/24 2035   SARS-COV-2  Negative     Results from last 7 days   Lab Units 04/16/24  0937 04/16/24  0000 04/15/24  0244 04/14/24  1835   WBC Thousand/uL 20.39* 20.59* 23.70* 19.05*    HEMOGLOBIN g/dL 10.3* 10.5* 9.4* 11.3*   HEMATOCRIT % 33.2* 33.9* 30.7* 36.2   PLATELETS Thousands/uL 217 214 195 261   TOTAL NEUT ABS Thousands/µL  --  18.16*  --   --    BANDS PCT %  --   --   --  10*         Results from last 7 days   Lab Units 04/16/24  0531 04/15/24  0244 04/14/24  1835   SODIUM mmol/L 145 143 146   POTASSIUM mmol/L 3.0* 3.8 3.6   CHLORIDE mmol/L 104 105 103   CO2 mmol/L 32 32 32   ANION GAP mmol/L 9 6 11   BUN mg/dL 35* 38* 38*   CREATININE mg/dL 0.64 0.98 1.27   EGFR ml/min/1.73sq m 82 53 38   CALCIUM mg/dL 9.4 8.4 9.8   MAGNESIUM mg/dL  --  1.9 1.9     Results from last 7 days   Lab Units 04/14/24  1835   AST U/L 23   ALT U/L 13   ALK PHOS U/L 79   TOTAL PROTEIN g/dL 7.9   ALBUMIN g/dL 3.9   TOTAL BILIRUBIN mg/dL 0.41         Results from last 7 days   Lab Units 04/16/24  0531 04/15/24  0244 04/14/24  1835   GLUCOSE RANDOM mg/dL 96 155* 137               Results from last 7 days   Lab Units 04/14/24  1835   PROTIME seconds 14.0   INR  1.02   PTT seconds 33         Results from last 7 days   Lab Units 04/16/24  0000 04/15/24  0244 04/14/24  1835   PROCALCITONIN ng/ml 5.71* 8.95* 7.25*     Results from last 7 days   Lab Units 04/14/24  2117 04/14/24  1835   LACTIC ACID mmol/L 1.2 2.5*                       Results from last 7 days   Lab Units 04/14/24  1903   CLARITY UA  Extra Turbid   COLOR UA  Light Orange   SPEC GRAV UA  1.024   PH UA  6.0   GLUCOSE UA mg/dl Negative   KETONES UA mg/dl Trace*   BLOOD UA  Moderate*   PROTEIN UA mg/dl 100 (2+)*   NITRITE UA  Negative   BILIRUBIN UA  Negative   UROBILINOGEN UA (BE) mg/dl 2.0*   LEUKOCYTES UA  Large*   WBC UA /hpf Innumerable*   RBC UA /hpf 30-50*   BACTERIA UA /hpf Moderate*   EPITHELIAL CELLS WET PREP /hpf None Seen   MUCUS THREADS  Innumerable*     Results from last 7 days   Lab Units 04/14/24 2035   INFLUENZA A PCR  Negative   INFLUENZA B PCR  Negative   RSV PCR  Negative                             Results from last 7 days   Lab Units  04/15/24  1125 04/14/24  1903 04/14/24  1846 04/14/24  1835   BLOOD CULTURE   --   --  No Growth at 24 hrs. No Growth at 24 hrs.   SPUTUM CULTURE  Culture too young- will reincubate  --   --   --    URINE CULTURE   --  >100,000 cfu/ml Enterococcus faecalis*  --   --              Results from last 7 days   Lab Units 04/16/24  0428   VANCOMYCIN RM ug/mL 11.0       ED Treatment:   Medication Administration from 04/14/2024 1812 to 04/14/2024 2228         Date/Time Order Dose Route Action     04/14/2024 2005 EDT sodium chloride 0.9 % bolus 1,000 mL 0 mL Intravenous Stopped     04/14/2024 1854 EDT sodium chloride 0.9 % bolus 1,000 mL 1,000 mL Intravenous New Bag     04/14/2024 2005 EDT ceftriaxone (ROCEPHIN) 1 g/50 mL in dextrose IVPB 0 mg Intravenous Stopped     04/14/2024 1903 EDT ceftriaxone (ROCEPHIN) 1 g/50 mL in dextrose IVPB 1,000 mg Intravenous New Bag     04/14/2024 1910 EDT acetaminophen (Ofirmev) injection 1,000 mg 0 mg Intravenous Stopped     04/14/2024 1855 EDT acetaminophen (Ofirmev) injection 1,000 mg 1,000 mg Intravenous New Bag          Past Medical History:   Diagnosis Date    Acute bronchiolitis 12/12/2023    Allergic     Cancer (HCC) 2005    left breast mastectomy    Electrolyte abnormality 10/02/2022    Hypertension     Hypokalemia 01/02/2024    Memory change     Nodule of left lung     Pleural thickening      Present on Admission:   Severe sepsis (HCC)   QT prolongation   Essential hypertension   Dementia with behavioral disturbance (HCC)   Generalized weakness   Meningioma (HCC)   Elevated serum creatinine   Anemia   Abnormal CT scan      Admitting Diagnosis: Urinary tract infection [N39.0]  Fever [R50.9]  Acute respiratory failure with hypoxia (HCC) [J96.01]  Acute encephalopathy [G93.40]  Sepsis (HCC) [A41.9]  AMS (altered mental status) [R41.82]  Age/Sex: 83 y.o. female  Admission Orders:  Scheduled Medications:  albuterol, 2.5 mg, Nebulization, TID  benzonatate, 100 mg, Oral, TID  busPIRone, 10  mg, Oral, BID  cefepime, 2,000 mg, Intravenous, Q12H  docusate sodium, 100 mg, Oral, BID  donepezil, 10 mg, Oral, HS  guaiFENesin, 600 mg, Oral, Q12H JAYMIE  heparin (porcine), 5,000 Units, Subcutaneous, Q8H JAYMIE  loratadine, 10 mg, Oral, Daily  melatonin, 9 mg, Oral, HS  metoprolol succinate, 25 mg, Oral, HS  metoprolol succinate, 50 mg, Oral, QAM  polyethylene glycol, 17 g, Oral, Daily  potassium chloride, 20 mEq, Intravenous, BID  senna, 8.8 mg, Oral, BID  vancomycin, 1,250 mg, Intravenous, Q24H    vancomycin (VANCOCIN) IVPB (premix in dextrose) 1,000 mg 200 mL  Dose: 20 mg/kg  Weight Dosing Info: 48.5 kg  Freq: Once Route: IV  x1 4/14 @ 2342  vancomycin (VANCOCIN) IVPB (premix in dextrose) 750 mg 150 mL  Dose: 750 mg  Freq: Every 24 hours Route: IV  Last Dose: 750 mg (04/15/24 1214)  Start: 04/15/24 1000 End: 04/16/24 0630     multi-electrolyte (ISOLYTE-S PH 7.4) bolus 500 mL  Dose: 500 mL  Freq: Once Route: IV  x1 4/14 @ 2333  ceFEPime (MAXIPIME) 2,000 mg in dextrose 5 % 50 mL IVPB  Dose: 2,000 mg  Freq: Every 12 hours Route: IV  Indications Comment: severe sepsis, unclear source  Last Dose: 2,000 mg (04/15/24 1121)  Start: 04/14/24 2330 End: 04/15/24 1452   cefepime (MAXIPIME) 1,000 mg in dextrose 5 % 50 mL IVPB  Dose: 1,000 mg  Freq: Every 12 hours Route: IV  Indications Comment: severe sepsis, unclear source  Last Dose: 1,000 mg (04/15/24 2233)  Start: 04/15/24 2330 End: 04/16/24 0804   potassium chloride oral solution 40 mEq  Dose: 40 mEq  Freq: Once Route: PO  x1 4/16 @ 1033          Continuous IV Infusions:  multi-electrolyte, 75 mL/hr, Intravenous, Continuous    multi-electrolyte (PLASMALYTE-A/ISOLYTE-S PH 7.4) IV solution  Rate: 75 mL/hr Dose: 75 mL/hr  Freq: Continuous Route: IV  Indications of Use: IV Hydration,IV Resuscitation  Last Dose: 75 mL/hr (04/15/24 0330)  Start: 04/14/24 2300 End: 04/15/24 1102   multi-electrolyte (PLASMALYTE-A/ISOLYTE-S PH 7.4) IV solution  Rate: 75 mL/hr Dose: 75 mL/hr  Freq:  Continuous Route: IV  Start: 04/15/24 1745         PRN Meds:  acetaminophen, 650 mg, Oral, Q6H PRN  polyethylene glycol, 17 g, Oral, PRN    Dysphagia 1 , pureed diet, nectar thick liquids   OOB as kinjal w/ assist/ ambulate TID    SCD    IP CONSULT TO GERONTOLOGY  IP CONSULT TO PHARMACY  IP CONSULT TO INFECTIOUS DISEASES  IP CONSULT TO GASTROENTEROLOGY    Network Utilization Review Department  ATTENTION: Please call with any questions or concerns to [phone] and carefully listen to the prompts so that you are directed to the right person. All voicemails are confidential.   For Discharge needs, contact Care Management DC Support Team at [phone] opt. [phone]  Send all requests for admission clinical reviews, approved or denied determinations and any other requests to dedicated fax number below belonging to the [facility] where the patient is receiving treatment. List of dedicated fax numbers for the Facilities:  FACILITY NAME UR FAX NUMBER   ADMISSION DENIALS (Administrative/Medical Necessity) [phone]   DISCHARGE SUPPORT TEAM (NETWORK) [phone]   PARENT CHILD HEALTH (Maternity/NICU/Pediatrics) [phone]   [facility] [phone]   [facility] [phone]   [facility] [phone]   [facility] [phone]   [facility] [phone]   [facility] [phone]   [facility] [phone]   [facility] [phone]   [facility] [phone]   [facility] [phone]   [facility] [phone]   [facility] [phone]

## 2024-04-15 NOTE — PHYSICAL THERAPY NOTE
Physical Therapy Cancellation Note       04/15/24 1256   Note Type   Note type Cancelled Session   Cancel Reasons Patient off floor/test   Additional Comments referral received for PT eval and tx. attempted to see pt for PT eval but she is receiving an ECHO at the bedside. will follow and initiate PT as medically appropriate and schedule allows.     Esteban Spear, PT

## 2024-04-15 NOTE — CONSULTS
Consultation - Geriatric Medicine   Monika Butler 83 y.o. female MRN: [id number]  Unit/Bed#: S -01 Encounter: [id number]      Assessment/Plan   Dementia with behavioral disturbance  Unable to assess orientation on exam  Nursing staff at facility reports that at baseline she can maybe say her first name  They reports she used to have behaviors, but the aggression and behaviors have not been as frequent and are pretty well-controlled  Patient maintained on buspirone 10 mg twice daily, Aricept 10 mg daily, melatonin 10 mg nightly, mirtazapine-although unclear at the dosing, PTA medication as she was on 15 mg, and Zyprexa nightly  EKG on admission showed a QTc of 533-previous in January 2024 was 459  Recommend rechecking EKG  Recommend holding any medications that can further prolong QTc such as Zyprexa  Continue to hold lorazepam  At risk for delirium due to hospitalization, medications, sleep disturbance  Recommend delirium precautions  Maintain sleep-wake cycle, avoid nighttime interruptions  minimize overnight interruptions, group overnight vitals/labs/nursing checks as possible  dim lights, close blinds and turn off tv to minimize stimulation and encourage sleep environment in evenings  Provide adequate pain control  Avoid urinary retention and constipation  Provide frequent and early mobilization  Provide frequent redirection and reorientation as needed  Avoid medications that may worsen or precipitate delirium such as tramadol, benzodiazepines, anticholinergics, and Benadryl  Redirect unwanted behaviors as first-line therapy, avoid physical restraints as able to  Continue to monitor    Severe sepsis  Met SIRS criteria with hyrtthermia, tachycardia, and leukocytosis on admission  Unclear source at this time  GI and infectious disease following  Urine culture and blood cultures pending  Currently on vancomycin and cefepime  Cautious with cefepime as it can increase the risk of confusion/delirium in  elderly  Management per primary and ID    Elevated creatinine  Creatinine 0.98, BUN 38, GFR 53 on lab work today, admission creatinine 1.27, BUN 38  GFR 38  Encourage adequate p.o. hydration  Avoid nephrotoxic medication  Renal dose medications as needed  Management per primary    Acute respiratory failure with hypoxia  Currently requiring 2 L nasal cannula  Flu/COVID/RSV panel negative  Chest x-ray showed no acute cardiopulmonary disease  Sputum culture ordered  Management per primary    Hypertension  Most recent blood pressure 125/65  Blood pressures have been between 97/55 to 144/79  Currently on metoprolol succinate 50 mg in the morning and 25 mg at night  Avoid potential  Management per primary    Insomnia  First-line treatment is behavior modification  Maintain sleep-wake cycle, avoid nighttime interruptions  Avoid caffeine throughout the day  Avoid napping throughout the day  Encourage physical activity throughout the day  Avoid sedative hypnotics including benzodiazepines and benadryl  Patient was taking has mirtazapine and melatonin at facility  Mirtazapine currently on hold, continue melatonin    Anxiety/depression  Patient sees in-house psychiatry at [facility]  Maintained on buspirone, mirtazapine, Zyprexa, and as needed Ativan  Zyprexa was held on admission due to prolonged QTc  Do not recommend benzodiazepines in older adults due to increased risk of falls and confusion, recommend weaning this medication off-although this is currently on hold  She was only getting the medication about once every couple days as per the daughter, recommend discontinuing altogether  Continue to provide emotional support    Frailty  Clinical Frail Scale: 7 living with severe frailty  Total protein 7.9 and albumin 3.9  Encourage adequate hydration and nutrition, including protein in meals  OOB as tolerated  PT/OT consulted  Encourage early and frequent mobilization    Ambulatory dysfunction  At a baseline is wheelchair  bound- sean lift OOB   PT/OT following  Fall precautions  Out of bed as tolerated  Encourage early and frequent mobilization  Encourage adequate hydration and nutrition  Provide adequate pain management   Goal is undetermined   Continue with PT/OT for continued strength and balance training       Unable to verify home medications at this time, will call Dr. Cedillo again tomorrow to verify.  Attempted to call [facility] x 2 for medication list, first medication list received was unable to be read infection, called no medication list was received  Attempted to call [facility] which fills the prescriptions for current treatment is, and again no medication list received    History of Present Illness   Physician Requesting Consult: Kathryn Hagan*  Reason for Consult / Principal Problem: Dementia with behavioral disturbance  Hx and PE limited by: dementia   Additional history obtained from: Chart review and patient evaluation      HPI: Monika Butler is a 83 y.o. year old female who presents with history of dementia, hypertension, breast cancer, insomnia, anxiety, depression, and ambulatory dysfunction.  She presented to the ER from [facility] after being sick with cold-like symptoms for the past week.  She had a productive cough with white mucus, was also noted to be febrile at facility.    Patient lives at [facility].  At this point patient is wheelchair/bedbound.  The nursing facility reports they mostly need Sean lift to get her out of bed.  She requires assistance for all ADLs and IADLs.  She requires to be fed for all meals.  The facility provides her meals, does the cleaning, and does all her medications.  She is incontinent of both bowel and bladder.  She has ongoing issues with insomnia, anxiety, and depression.  Her agitation and aggressiveness has mostly resolved as per facility.  Appetite has been stable, no recent appetite loss or weight loss.  She  does not drive.  Patient has a history of dementia, and continues to decline as per nursing staff.  History provided by nursing staff at [facility].    Upon exam patient was lying bed, resting.  She was sleeping, but arouses easily to voice.  She was alert and oriented x 0, did answer couple yes and no questions.  She did not eat much today.  She was very lethargic overnight.  Per nursing she was lethargic this morning, and then was up screaming when her daughter was here, and is back to being very sleepy.    Called and spoke with patient's daughter Anitha regarding concerns on medication regimen.  All questions answered.    Inpatient consult to Gerontology  Consult performed by: DAMARIS Bolivar  Consult ordered by: Cristhian Sanders MD          Review of Systems   Unable to perform ROS: Other (sleepy)       Historical Information   Past Medical History:   Diagnosis Date    Acute bronchiolitis 12/12/2023    Allergic     Cancer (HCC) 2005    left breast mastectomy    Electrolyte abnormality 10/02/2022    Hypertension     Hypokalemia 01/02/2024    Memory change     Nodule of left lung     Pleural thickening      Past Surgical History:   Procedure Laterality Date    CATARACT EXTRACTION Left 05/2020    CATARACT EXTRACTION Left 06/2020    MASTECTOMY      CO XCAPSL CTRC RMVL INSJ IO LENS PROSTH W/O ECP Left 6/8/2020    Procedure: EXTRACTION EXTRACAPSULAR CATARACT PHACO INTRAOCULAR LENS (IOL);  Surgeon: Cortes Harrison MD;  Location: [facility] MAIN OR;  Service: Ophthalmology     Social History   Social History     Substance and Sexual Activity   Alcohol Use Never     Social History     Substance and Sexual Activity   Drug Use Never     Social History     Tobacco Use   Smoking Status Never   Smokeless Tobacco Never     Family History: History reviewed. No pertinent family history.    Meds/Allergies   all current active meds have been reviewed    Allergies   Allergen Reactions    Ibuprofen      Reaction Date:  [id number];        Objective     Intake/Output Summary (Last 24 hours) at 4/15/2024 1150  Last data filed at 4/15/2024 0930  Gross per 24 hour   Intake 1150 ml   Output 273 ml   Net 877 ml     Invasive Devices       Peripheral Intravenous Line  Duration             Peripheral IV 04/14/24 Right Antecubital <1 day    Peripheral IV 04/14/24 Right Forearm <1 day                    Physical Exam  Vitals reviewed.   Constitutional:       General: She is sleeping. She is not in acute distress.     Appearance: She is well-developed. She is not ill-appearing.      Comments: Frail appearing    HENT:      Head: Normocephalic and atraumatic.   Cardiovascular:      Rate and Rhythm: Normal rate and regular rhythm.      Heart sounds: No murmur heard.  Pulmonary:      Effort: Pulmonary effort is normal. No respiratory distress.      Breath sounds: Normal breath sounds.      Comments: 2L NC  Abdominal:      General: Bowel sounds are normal. There is no distension.      Palpations: Abdomen is soft.      Tenderness: There is no abdominal tenderness.   Musculoskeletal:         General: No swelling.      Right lower leg: No edema.      Left lower leg: No edema.   Skin:     General: Skin is warm and dry.      Coloration: Skin is pale.   Neurological:      Mental Status: She is disoriented.      Cranial Nerves: No cranial nerve deficit.      Motor: Weakness present.      Gait: Gait abnormal.         Lab Results:   I have personally reviewed pertinent lab results including the following:  -CBC, BMP, magnesium, procalcitonin, UA, urine microscopic    I have personally reviewed the following imaging study reports in PACS:  -Chest x-ray, CT head, CTA chest PE study    Therapies:   PT: consulted  OT: consulted    VTE Prophylaxis: Sequential compression device (Venodyne)  and Heparin    Code Status: Level 1 - Full Code  Advance Directive and Living Will:    no  Power of :  no  POLST:  no    Family and Social Support:   Living  "Arrangements: Other (Comment) ([facility])  Support Systems: Family members  Assistance Needed: dependent/wheelchair bound/broda chair  Type of Current Residence: Facility  Current Care Facility Name: [facility]  Current Home Care Services: No  Discharge planning discussed with:: Jose Castillo via phone  Freedom of Choice: Yes      Goals of Care: Undetermined     Please note:  Voice-recognition software may have been used in the preparation of this document.  Occasional wrong word or \"sound-alike\" substitutions may have occurred due to the inherent limitations of voice recognition software.  Interpretation should be guided by context.    "

## 2024-04-15 NOTE — ED PROVIDER NOTES
"History  Chief Complaint   Patient presents with    Altered Mental Status     Patient arrives via EMS from [facility] for evaluation of AMS, daughter reports patient got Ativan for agitation yesterday and today was \"off all day\", did not get out of bed until 1100 and did not eat breakfast, ate a small amount of lunch, but has been lethargic all day     HPI    Prior to Admission Medications   Prescriptions Last Dose Informant Patient Reported? Taking?   Ascorbic Acid (Vitamin C) 250 MG CHEW Not Taking  Yes No   Patient not taking: Reported on 4/14/2024   Melatonin 10 MG TABS 4/13/2024  No Yes   Sig: Take 1 tablet (10 mg total) by mouth daily at bedtime   Multiple Vitamins-Minerals (ONE-A-DAY 50 PLUS PO) Not Taking  Yes No   Patient not taking: Reported on 4/14/2024   OLANZapine (ZyPREXA) 2.5 mg tablet Not Taking  No No   Sig: Take 0.5 tablets (1.25 mg total) by mouth every 8 (eight) hours as needed (severe agitation)   Patient not taking: Reported on 4/14/2024   OLANZapine (ZyPREXA) 2.5 mg tablet Not Taking  No No   Sig: Take 0.5 tablets (1.25 mg total) by mouth daily   Patient not taking: Reported on 4/14/2024   OLANZapine (ZyPREXA) 5 mg tablet 4/14/2024  No Yes   Sig: Take 1 tablet (5 mg total) by mouth daily at bedtime   Patient taking differently: Take 5 mg by mouth 2 (two) times a day   benzonatate (TESSALON PERLES) 100 mg capsule Not Taking  No No   Sig: Take 1 capsule (100 mg total) by mouth 3 (three) times a day   Patient not taking: Reported on 4/14/2024   busPIRone (BUSPAR) 5 mg tablet 4/14/2024  Yes Yes   Sig: Take 10 mg by mouth 2 (two) times a day   cholecalciferol (VITAMIN D3) 1,000 units tablet Not Taking  Yes No   Sig: Take 1,000 Units by mouth daily   Patient not taking: Reported on 4/14/2024   cyanocobalamin (VITAMIN B-12) 100 mcg tablet Not Taking  Yes No   Sig: Take by mouth daily   Patient not taking: Reported on 4/14/2024   docusate sodium (COLACE) 100 mg capsule 4/14/2024  No Yes "   Sig: Take 1 capsule (100 mg total) by mouth 2 (two) times a day   donepezil (ARICEPT) 10 mg tablet 4/13/2024  No Yes   Sig: Take 1 tablet (10 mg total) by mouth daily at bedtime   loratadine (CLARITIN) 10 mg tablet 4/14/2024  Yes Yes   Sig: Take 10 mg by mouth daily   losartan (Cozaar) 100 MG tablet 4/14/2024  No Yes   Sig: Take 1 tablet (100 mg total) by mouth daily   metoprolol succinate (TOPROL-XL) 25 mg 24 hr tablet 4/13/2024  No Yes   Sig: Take 3 tablets (75 mg total) by mouth daily   Patient taking differently: Take 75 mg by mouth daily 50mg anm  25 qhs   mirtazapine (REMERON) 15 mg tablet 4/13/2024  Yes Yes   Sig: Take 15 mg by mouth daily at bedtime   polyethylene glycol (MIRALAX) 17 g packet 4/13/2024  No Yes   Sig: Take 17 g by mouth if needed (constipation)   sertraline (ZOLOFT) 25 mg tablet Not Taking  No No   Sig: Take 1 tablet (25 mg total) by mouth daily   Patient not taking: Reported on 4/14/2024      Facility-Administered Medications: None       Past Medical History:   Diagnosis Date    Acute bronchiolitis 12/12/2023    Allergic     Cancer (HCC) 2005    left breast mastectomy    Electrolyte abnormality 10/02/2022    Hypertension     Hypokalemia 01/02/2024    Memory change     Nodule of left lung     Pleural thickening        Past Surgical History:   Procedure Laterality Date    CATARACT EXTRACTION Left 05/2020    CATARACT EXTRACTION Left 06/2020    MASTECTOMY      WI XCAPSL CTRC RMVL INSJ IO LENS PROSTH W/O ECP Left 6/8/2020    Procedure: EXTRACTION EXTRACAPSULAR CATARACT PHACO INTRAOCULAR LENS (IOL);  Surgeon: Cortes Harrison MD;  Location: [facility] MAIN OR;  Service: Ophthalmology       History reviewed. No pertinent family history.  I have reviewed and agree with the history as documented.    E-Cigarette/Vaping    E-Cigarette Use Never User      E-Cigarette/Vaping Substances    Nicotine No     THC No     CBD No      Social History     Tobacco Use    Smoking status: Never    Smokeless tobacco:  Never   Vaping Use    Vaping status: Never Used   Substance Use Topics    Alcohol use: Never    Drug use: Never        Review of Systems   Unable to perform ROS: Mental status change       Physical Exam  ED Triage Vitals   Temperature Pulse Respirations Blood Pressure SpO2   04/14/24 1821 04/14/24 1820 04/14/24 1820 04/14/24 1820 04/14/24 1820   (!) 103.8 °F (39.9 °C) (!) 114 20 144/79 90 %      Temp Source Heart Rate Source Patient Position - Orthostatic VS BP Location FiO2 (%)   04/14/24 1821 04/14/24 1915 04/14/24 1820 04/14/24 1820 --   Rectal Monitor Sitting Right arm       Pain Score       --                    Orthostatic Vital Signs  Vitals:    04/14/24 2215 04/14/24 2238 04/14/24 2259 04/14/24 2332   BP: 105/58 100/63 97/55 114/63   Pulse: 93 88 88 89   Patient Position - Orthostatic VS:           Physical Exam  Constitutional:       Appearance: She is ill-appearing.   HENT:      Head: Normocephalic and atraumatic.   Eyes:      Pupils: Pupils are equal, round, and reactive to light.   Cardiovascular:      Rate and Rhythm: Regular rhythm. Tachycardia present.   Pulmonary:      Comments: Tachypnea, transmitted upper airway sounds on exam  Abdominal:      General: There is no distension.      Palpations: Abdomen is soft.   Musculoskeletal:         General: No swelling.   Skin:     General: Skin is warm and dry.   Neurological:      Mental Status: She is lethargic.      Cranial Nerves: No facial asymmetry.      Comments: Patient somnolent, nonverbal at time of exam         ED Medications  Medications   acetaminophen (TYLENOL) oral suspension 650 mg (has no administration in time range)   polyethylene glycol (MIRALAX) packet 17 g (has no administration in time range)   senna oral syrup 8.8 mg (has no administration in time range)   multi-electrolyte (ISOLYTE-S PH 7.4) bolus 500 mL (500 mL Intravenous New Bag 4/14/24 2333)   heparin (porcine) subcutaneous injection 5,000 Units (has no administration in time  range)   benzonatate (TESSALON PERLES) capsule 100 mg (100 mg Oral Not Given 4/14/24 2327)   guaiFENesin (MUCINEX) 12 hr tablet 600 mg (600 mg Oral Not Given 4/14/24 2327)   busPIRone (BUSPAR) tablet 10 mg (10 mg Oral Not Given 4/14/24 2327)   docusate sodium (COLACE) capsule 100 mg (has no administration in time range)   donepezil (ARICEPT) tablet 10 mg (10 mg Oral Not Given 4/14/24 2327)   loratadine (CLARITIN) tablet 10 mg (has no administration in time range)   metoprolol succinate (TOPROL-XL) 24 hr tablet 50 mg (has no administration in time range)   metoprolol succinate (TOPROL-XL) 24 hr tablet 25 mg (25 mg Oral Not Given 4/14/24 2327)   polyethylene glycol (MIRALAX) packet 17 g (has no administration in time range)   multi-electrolyte (PLASMALYTE-A/ISOLYTE-S PH 7.4) IV solution (has no administration in time range)   melatonin tablet 9 mg (9 mg Oral Not Given 4/14/24 2327)   ceFEPime (MAXIPIME) 2,000 mg in dextrose 5 % 50 mL IVPB (has no administration in time range)   vancomycin (VANCOCIN) IVPB (premix in dextrose) 1,000 mg 200 mL (1,000 mg Intravenous New Bag 4/14/24 2342)   sodium chloride 0.9 % bolus 1,000 mL (0 mL Intravenous Stopped 4/14/24 2005)   ceftriaxone (ROCEPHIN) 1 g/50 mL in dextrose IVPB (0 mg Intravenous Stopped 4/14/24 2005)   acetaminophen (Ofirmev) injection 1,000 mg (0 mg Intravenous Stopped 4/14/24 1910)   iohexol (OMNIPAQUE) 350 MG/ML injection (MULTI-DOSE) 100 mL (100 mL Intravenous Given 4/14/24 2323)       Diagnostic Studies  Results Reviewed       Procedure Component Value Units Date/Time    Magnesium [[id number]]  (Normal) Collected: 04/14/24 1835    Lab Status: Final result Specimen: Blood from Arm, Right Updated: 04/14/24 2306     Magnesium 1.9 mg/dL     Lactic acid 2 Hours [[id number]]  (Normal) Collected: 04/14/24 2117    Lab Status: Final result Specimen: Blood from Arm, Right Updated: 04/14/24 2144     LACTIC ACID 1.2 mmol/L     Narrative:      Result may be elevated if  tourniquet was used during collection.    FLU/RSV/COVID - if FLU/RSV clinically relevant [[id number]]  (Normal) Collected: 04/14/24 2035    Lab Status: Final result Specimen: Nares from Nose Updated: 04/14/24 2117     SARS-CoV-2 Negative     INFLUENZA A PCR Negative     INFLUENZA B PCR Negative     RSV PCR Negative    Narrative:      FOR PEDIATRIC PATIENTS - copy/paste COVID Guidelines URL to browser: [email or web address]    SARS-CoV-2 assay is a Nucleic Acid Amplification assay intended for the  qualitative detection of nucleic acid from SARS-CoV-2 in nasopharyngeal  swabs. Results are for the presumptive identification of SARS-CoV-2 RNA.    Positive results are indicative of infection with SARS-CoV-2, the virus  causing COVID-19, but do not rule out bacterial infection or co-infection  with other viruses. Laboratories within the [place] and its  territories are required to report all positive results to the appropriate  public health authorities. Negative results do not preclude SARS-CoV-2  infection and should not be used as the sole basis for treatment or other  patient management decisions. Negative results must be combined with  clinical observations, patient history, and epidemiological information.  This test has not been FDA cleared or approved.    This test has been authorized by FDA under an Emergency Use Authorization  (EUA). This test is only authorized for the duration of time the  declaration that circumstances exist justifying the authorization of the  emergency use of an in vitro diagnostic tests for detection of SARS-CoV-2  virus and/or diagnosis of COVID-19 infection under section 564(b)(1) of  the Act, 21 U.S.C. 360bbb-3(b)(1), unless the authorization is terminated  or revoked sooner. The test has been validated but independent review by FDA  and CLIA is pending.    Test performed using [employer]: This RT-PCR assay targets N2,  a  region unique to SARS-CoV-2. A conserved region in the E-gene was chosen  for pan-Sarbecovirus detection which includes SARS-CoV-2.    According to CMS-2020-01-R, this platform meets the definition of high-throughput technology.    RBC Morphology Reflex Test [[id number]] Collected: 04/14/24 1835    Lab Status: Final result Specimen: Blood from Arm, Right Updated: 04/14/24 2101    CBC and differential [[id number]]  (Abnormal) Collected: 04/14/24 1835    Lab Status: Final result Specimen: Blood from Arm, Right Updated: 04/14/24 2015     WBC 19.05 Thousand/uL      RBC 3.52 Million/uL      Hemoglobin 11.3 g/dL      Hematocrit 36.2 %       fL      MCH 32.1 pg      MCHC 31.2 g/dL      RDW 15.3 %      MPV 11.5 fL      Platelets 261 Thousands/uL     Narrative:      This is an appended report.  These results have been appended to a previously verified report.    Manual Differential(PHLEBS Do Not Order) [[id number]]  (Abnormal) Collected: 04/14/24 1835    Lab Status: Final result Specimen: Blood from Arm, Right Updated: 04/14/24 2015     Segmented % 87 %      Bands % 10 %      Lymphocytes % 2 %      Monocytes % 1 %      Eosinophils % 0 %      Basophils % 0 %      Absolute Neutrophils 18.48 Thousand/uL      Absolute Lymphocytes 0.38 Thousand/uL      Absolute Monocytes 0.19 Thousand/uL      Absolute Eosinophils 0.00 Thousand/uL      Absolute Basophils 0.00 Thousand/uL      Total Counted --     RBC Morphology Present     Platelet Estimate Adequate     Anisocytosis Present    Urine Microscopic [[id number]]  (Abnormal) Collected: 04/14/24 1903    Lab Status: Final result Specimen: Urine, Straight Cath Updated: 04/14/24 1932     RBC, UA 30-50 /hpf      WBC, UA Innumerable /hpf      Epithelial Cells None Seen /hpf      Bacteria, UA Moderate /hpf      MUCUS THREADS Innumerable     Hyaline Casts, UA 10-25 /lpf      Amorphous Crystals, UA Occasional     WBC Clumps Present    Urine culture [[id number]] Collected: 04/14/24 1903    Lab  Status: In process Specimen: Urine, Straight Cath Updated: 04/14/24 1932    Lactic acid [[id number]]  (Abnormal) Collected: 04/14/24 1835    Lab Status: Final result Specimen: Blood from Arm, Right Updated: 04/14/24 1928     LACTIC ACID 2.5 mmol/L     Narrative:      Result may be elevated if tourniquet was used during collection.    Procalcitonin [[id number]]  (Abnormal) Collected: 04/14/24 1835    Lab Status: Final result Specimen: Blood from Arm, Right Updated: 04/14/24 1919     Procalcitonin 7.25 ng/ml     UA w Reflex to Microscopic w Reflex to Culture [[id number]]  (Abnormal) Collected: 04/14/24 1903    Lab Status: Final result Specimen: Urine, Straight Cath Updated: 04/14/24 1912     Color, UA Light Orange     Clarity, UA Extra Turbid     Specific Gravity, UA 1.024     pH, UA 6.0     Leukocytes, UA Large     Nitrite, UA Negative     Protein,  (2+) mg/dl      Glucose, UA Negative mg/dl      Ketones, UA Trace mg/dl      Urobilinogen, UA 2.0 mg/dl      Bilirubin, UA Negative     Occult Blood, UA Moderate    Comprehensive metabolic panel [[id number]]  (Abnormal) Collected: 04/14/24 1835    Lab Status: Final result Specimen: Blood from Arm, Right Updated: 04/14/24 1910     Sodium 146 mmol/L      Potassium 3.6 mmol/L      Chloride 103 mmol/L      CO2 32 mmol/L      ANION GAP 11 mmol/L      BUN 38 mg/dL      Creatinine 1.27 mg/dL      Glucose 137 mg/dL      Calcium 9.8 mg/dL      AST 23 U/L      ALT 13 U/L      Alkaline Phosphatase 79 U/L      Total Protein 7.9 g/dL      Albumin 3.9 g/dL      Total Bilirubin 0.41 mg/dL      eGFR 38 ml/min/1.73sq m     Narrative:      National Kidney Disease Foundation guidelines for Chronic Kidney Disease (CKD):     Stage 1 with normal or high GFR (GFR > 90 mL/min/1.73 square meters)    Stage 2 Mild CKD (GFR = 60-89 mL/min/1.73 square meters)    Stage 3A Moderate CKD (GFR = 45-59 mL/min/1.73 square meters)    Stage 3B Moderate CKD (GFR = 30-44 mL/min/1.73 square meters)    Stage 4  Severe CKD (GFR = 15-29 mL/min/1.73 square meters)    Stage 5 End Stage CKD (GFR <15 mL/min/1.73 square meters)  Note: GFR calculation is accurate only with a steady state creatinine    Protime-INR [[id number]]  (Normal) Collected: 04/14/24 1835    Lab Status: Final result Specimen: Blood from Arm, Right Updated: 04/14/24 1909     Protime 14.0 seconds      INR 1.02    APTT [[id number]]  (Normal) Collected: 04/14/24 1835    Lab Status: Final result Specimen: Blood from Arm, Right Updated: 04/14/24 1909     PTT 33 seconds     Blood culture #1 [[id number]] Collected: 04/14/24 1846    Lab Status: In process Specimen: Blood from Arm, Right Updated: 04/14/24 1852    Blood culture #2 [[id number]] Collected: 04/14/24 1835    Lab Status: In process Specimen: Blood from Arm, Right Updated: 04/14/24 1852                   XR chest portable   ED Interpretation by Ethan Triplett MD (04/14 2059)   This film was interpreted independently by me.  No obvious infiltrates, similar to previous xray (01/09/24)      Final Result by Stacy Kenney MD (04/14 2238)      No acute cardiopulmonary disease.            Workstation performed: [id number]         CT head wo contrast    (Results Pending)   CTA chest (pe study) abdomen pelvis contrast    (Results Pending)         Procedures  ECG 12 Lead Documentation Only    Date/Time: 4/14/2024 11:50 PM    Performed by: Ayesha Jin DO  Authorized by: Ayesha Jin DO    Indications / Diagnosis:  Sepsis  Patient location:  ED  Previous ECG:     Previous ECG:  Compared to current    Similarity:  Changes noted  Interpretation:     Interpretation: abnormal    Rate:     ECG rate:  109    ECG rate assessment: tachycardic    Rhythm:     Rhythm: sinus rhythm    Ectopy:     Ectopy: none    QRS:     QRS axis:  Normal  Conduction:     Conduction: abnormal      Abnormal conduction: incomplete RBBB    ST segments:     ST segments:  Non-specific  T waves:     T  "waves: inverted      Inverted:  V2 and V3        ED Course  ED Course as of 04/14/24 2347   Sun Apr 14, 2024 1900 WBC(!): 19.05   1900 Temperature(!): 103.8 °F (39.9 °C)   1901 Pulse(!): 113  Patient meeting sepsis criteria, IV fluids and antibiotics started. Suspect respiratory source.   1930 LACTIC ACID(!!): 2.5   1930 Procalcitonin(!): 7.25   2021 Messaged SLIM for admission                          Initial Sepsis Screening       Row Name 04/14/24 1931 04/14/24 1900             Is the patient's history suggestive of a new or worsening infection? Yes (Proceed)  -SG Yes (Proceed)  -SG       Suspected source of infection pneumonia  -SG pneumonia  -SG       Indicate SIRS criteria Hyperthemia > 38.3C (100.9F) OR Hypothermia <36C (96.8F);Tachycardia > 90 bpm;Leukocytosis (WBC > 81158 IJL) OR Leukopenia (WBC <4000 IJL) OR Bandemia (WBC >10% bands)  -SG Hyperthemia > 38.3C (100.9F) OR Hypothermia <36C (96.8F);Tachycardia > 90 bpm;Leukocytosis (WBC > 40406 IJL) OR Leukopenia (WBC <4000 IJL) OR Bandemia (WBC >10% bands)  -SG       Are two or more of the above signs & symptoms of infection both present and new to the patient? Yes (Proceed)  -SG Yes (Proceed)  -SG       Assess for evidence of organ dysfunction: Are any of the below criteria present within 6 hours of suspected infection and SIRS criteria that are NOT considered to be chronic conditions? Lactate > 2.0  -SG --       Date of presentation of severe sepsis 04/14/24  -SG --       Time of presentation of severe sepsis 1931  -SG --       Sepsis Note: Click \"NEXT\" below (NOT \"close\") to generate sepsis note based on above information. YES (proceed by clicking \"NEXT\")  -SG --                 User Key  (r) = Recorded By, (t) = Taken By, (c) = Cosigned By      Initials Name Provider Type     Ayesha Jin DO Resident                  Default Flowsheet Data (last 720 hours)       Sepsis Reassess       Row Name 04/14/24 2008                   Repeat " "Volume Status and Tissue Perfusion Assessment Performed    Date of Reassessment: 04/14/24  -        Time of Reassessment: 2009  -        Sepsis Reassessment Note: Click \"NEXT\" below (NOT \"close\") to generate sepsis reassessment note. YES (proceed by clicking \"NEXT\")  -        Repeat Volume Status and Tissue Perfusion Assessment Performed --                  User Key  (r) = Recorded By, (t) = Taken By, (c) = Cosigned By      Initials Name Provider Type    LEXI Jin DO Resident                            Medical Decision Making  84 yo F with history of dementia presenting from [facility] with acute change in mental status. On initial evaluation, pt appeared ill and somnolent, was not verbally responding to questions which according to family was off her baseline. She was febrile, tachycardic, tachypneic, and requiring 2L NC to achieve SpO2 >92%. Initial concern is for sepsis with respiratory source, but at that time full sepsis workup was initiated. Chest xray showed no large pneumonia or effusion, CBC was consistent with sepsis with leukocytosis, lactate was elevated, UA appeared to be consistent with urinary tract infection. She was started on IV fluids, antibiotics. [facility] was contacted and ultimately she was admitted for further workup and management of infection. At time of admission, she was in stable condition.     Problems Addressed:  Acute encephalopathy: acute illness or injury  Acute respiratory failure with hypoxia (HCC): acute illness or injury  Sepsis (HCC): acute illness or injury  Urinary tract infection: acute illness or injury    Amount and/or Complexity of Data Reviewed  Labs: ordered. Decision-making details documented in ED Course.  Radiology: ordered and independent interpretation performed.    Risk  Prescription drug management.  Decision regarding hospitalization.          Disposition  Final diagnoses:   Sepsis (HCC)   Urinary tract infection   Acute " respiratory failure with hypoxia (HCC)   Acute encephalopathy     Time reflects when diagnosis was documented in both MDM as applicable and the Disposition within this note       Time User Action Codes Description Comment    4/14/2024  8:56 PM Ayesha Jin Add [A41.9] Sepsis (HCC)     4/14/2024  8:56 PM Ayesha Jin Add [N39.0] Urinary tract infection     4/14/2024  8:57 PM Ayesha Jin Add [J96.01] Acute respiratory failure with hypoxia (HCC)     4/14/2024  8:57 PM Ayesha Jin Add [G93.40] Acute encephalopathy     4/14/2024 10:31 PM Cristhian Sanders Add [F03.918] Dementia with behavioral disturbance (HCC)           ED Disposition       ED Disposition   Admit    Condition   Stable    Date/Time   Sun Apr 14, 2024  8:56 PM    Comment   Case was discussed with [facility] and the patient's admission status was agreed to be Admission Status: inpatient status to the service of Dr. Alvarado .               Follow-up Information    None         Current Discharge Medication List        CONTINUE these medications which have NOT CHANGED    Details   busPIRone (BUSPAR) 5 mg tablet Take 10 mg by mouth 2 (two) times a day      docusate sodium (COLACE) 100 mg capsule Take 1 capsule (100 mg total) by mouth 2 (two) times a day    Associated Diagnoses: Constipation, unspecified constipation type      donepezil (ARICEPT) 10 mg tablet Take 1 tablet (10 mg total) by mouth daily at bedtime  Qty: 90 tablet, Refills: 3    Associated Diagnoses: Dementia with behavioral disturbance (HCC)      loratadine (CLARITIN) 10 mg tablet Take 10 mg by mouth daily      losartan (Cozaar) 100 MG tablet Take 1 tablet (100 mg total) by mouth daily  Qty: 90 tablet, Refills: 0    Associated Diagnoses: Essential hypertension      Melatonin 10 MG TABS Take 1 tablet (10 mg total) by mouth daily at bedtime  Qty: 90 tablet, Refills: 3    Associated Diagnoses: Insomnia, unspecified type      metoprolol succinate (TOPROL-XL) 25 mg 24 hr tablet Take 3 tablets (75 mg  total) by mouth daily  Qty: 30 tablet, Refills: 0    Associated Diagnoses: Essential hypertension      mirtazapine (REMERON) 15 mg tablet Take 15 mg by mouth daily at bedtime      !! OLANZapine (ZyPREXA) 5 mg tablet Take 1 tablet (5 mg total) by mouth daily at bedtime  Qty: 30 tablet, Refills: 0    Associated Diagnoses: Dementia with behavioral disturbance (HCC)      polyethylene glycol (MIRALAX) 17 g packet Take 17 g by mouth if needed (constipation)    Associated Diagnoses: Constipation, unspecified constipation type      Ascorbic Acid (Vitamin C) 250 MG CHEW       benzonatate (TESSALON PERLES) 100 mg capsule Take 1 capsule (100 mg total) by mouth 3 (three) times a day  Qty: 20 capsule, Refills: 0    Associated Diagnoses: Acute bronchiolitis due to unspecified organism      cholecalciferol (VITAMIN D3) 1,000 units tablet Take 1,000 Units by mouth daily      cyanocobalamin (VITAMIN B-12) 100 mcg tablet Take by mouth daily      Multiple Vitamins-Minerals (ONE-A-DAY 50 PLUS PO)       !! OLANZapine (ZyPREXA) 2.5 mg tablet Take 0.5 tablets (1.25 mg total) by mouth every 8 (eight) hours as needed (severe agitation)  Qty: 30 tablet, Refills: 0    Associated Diagnoses: Dementia with behavioral disturbance (HCC)      !! OLANZapine (ZyPREXA) 2.5 mg tablet Take 0.5 tablets (1.25 mg total) by mouth daily  Qty: 30 tablet, Refills: 0    Associated Diagnoses: Dementia with behavioral disturbance (HCC)      sertraline (ZOLOFT) 25 mg tablet Take 1 tablet (25 mg total) by mouth daily    Associated Diagnoses: Anxiety       !! - Potential duplicate medications found. Please discuss with provider.        No discharge procedures on file.    PDMP Review         Value Time User    PDMP Reviewed  Yes 12/14/2023 10:57 AM DAMARIS Engel             ED Provider  Attending physically available and evaluated Monika Butler. I managed the patient along with the ED Attending.    Electronically Signed by           Ayesha Multani  DO Davin  04/14/24 5110

## 2024-04-15 NOTE — CASE MANAGEMENT
Case Management Assessment & Discharge Planning Note    Patient name Monika Butler  Location S /S -01 MRN [id number]  : 1940 Date 4/15/2024       Current Admission Date: 2024  Current Admission Diagnosis:Severe sepsis (HCC)   Patient Active Problem List    Diagnosis Date Noted    Abnormal CT scan 04/15/2024    Severe sepsis (HCC) 2024    QT prolongation 2024    Acute respiratory failure with hypoxia (HCC) 2024    Elevated serum creatinine 2024    Thrush, oral 2024    Elevated troponin 2024    Constipation 2023    Nondisplaced fracture of triquetrum (cuneiform) bone, left wrist, initial encounter for closed fracture 2023    Abrasion of left eyebrow 2023    Injury of left wrist 2023    Insomnia 2023    Anxiety 2023    Anemia 2023    H/O clavicle fracture 2023    Meningioma (HCC) 2023    Pulmonary nodule 2023    Bad odor of urine 2023    Postmenopausal 2023    Prediabetes 2023    Gastroesophageal reflux disease with esophagitis without hemorrhage 2023    Generalized weakness 2022    Urinary frequency 2022    BMI 22.0-22.9, adult 2021    History of breast cancer 2021    Altered mental status 2020    Balance problems 2020    Dementia with behavioral disturbance (HCC)     Memory change 10/26/2020    Ear fullness, left 2017    Hyperlipidemia 2016    Breast cancer (HCC) 2013    Essential hypertension 2012      LOS (days): 1  Geometric Mean LOS (GMLOS) (days):   Days to GMLOS:     OBJECTIVE:    Risk of Unplanned Readmission Score: 38.75         Current admission status: Inpatient       Preferred Pharmacy:   [id number] - [address] [address]  Phone: [phone] Fax: [phone]    Primary Care Provider: Kristin Raymundo MD    Primary  Insurance: [employer] REP  Secondary Insurance:     ASSESSMENT:  Active Health Care Proxies       Anna Garcia Health Care Representative - Daughter   Primary Phone: [phone] (Mobile)                                Patient Information  Admitted from:: Facility ([facility])  Mental Status: Confused  During Assessment patient was accompanied by: Not accompanied during assessment  Assessment information provided by:: Daughter (Anna (daughter))  Caregiver's Name:: [facility]  Caregiver's Relationship to Patient:: Other (Specify) (facility staff)  Support Systems: Family members  What city do you live in?: [place], [place]  Home entry access options. Select all that apply.: No steps to enter home  Type of Current Residence: Facility  Living Arrangements: Other (Comment) ([facility])    Activities of Daily Living Prior to Admission  Functional Status: Assistance  Completes ADLs independently?: No  Level of ADL dependence: Assistance  Ambulates independently?: No  Level of ambulatory dependence: Assistance  Does patient use assisted devices?: Yes  Assisted Devices (DME) used: Walker, Wheelchair, Bedside Commode  Does patient currently own DME?: Yes  What DME does the patient currently own?: Walker, Bedside Commode, Wheelchair  Does patient have a history of Outpatient Therapy (PT/OT)?: Yes  Does the patient have a history of Short-Term Rehab?: Yes  Does patient have a history of HHC?: Yes         Patient Information Continued  Income Source: Pension/intermediate  Does patient have prescription coverage?: Yes  Does patient receive dialysis treatments?: No  Does patient have a history of substance abuse?: No  Does patient have a history of Mental Health Diagnosis?: No         Means of Transportation  Means of Transport to Appts:: Other (Comment) (Facility)      Social Determinants of Health (SDOH)      Flowsheet Row Most Recent Value   Housing Stability    In the last 12 months, was  there a time when you were not able to pay the mortgage or rent on time? N   In the last 12 months, how many places have you lived? 2   In the last 12 months, was there a time when you did not have a steady place to sleep or slept in a shelter (including now)? N   Transportation Needs    In the past 12 months, has lack of transportation kept you from medical appointments or from getting medications? no   In the past 12 months, has lack of transportation kept you from meetings, work, or from getting things needed for daily living? No   Food Insecurity    Within the past 12 months, you worried that your food would run out before you got the money to buy more. Never true   Within the past 12 months, the food you bought just didn't last and you didn't have money to get more. Never true   Utilities    In the past 12 months has the electric, gas, oil, or water company threatened to shut off services in your home? No            DISCHARGE DETAILS:    Discharge planning discussed with:: Jose Castillo via phone  Freedom of Choice: Yes  Comments - Freedom of Choice: Daughter requests return to [facility] (LTC) upon medical stability.  CM contacted family/caregiver?: Yes  Were Treatment Team discharge recommendations reviewed with patient/caregiver?: Yes  Did patient/caregiver verbalize understanding of patient care needs?: Yes  Were patient/caregiver advised of the risks associated with not following Treatment Team discharge recommendations?: Yes    Contacts  Patient Contacts: Anna Romo  Relationship to Patient:: Family  Contact Method: Phone  Phone Number: [phone]  Reason/Outcome: Discharge Planning    Requested Home Health Care         Is the patient interested in HHC at discharge?: No    DME Referral Provided  Referral made for DME?: No    Other Referral/Resources/Interventions Provided:  Interventions: Facility Return  Referral Comments: Daughter requests patient to return to [facility]  (LTC) upon medical stability -- Referral sent back and confirmation received that patient may return. Authorization will be needed if family would like PT/OT services upon return -- CM spoke with daughter who would prefer same. Therapy team made aware. CM will continue to follow.         Treatment Team Recommendation: SNF, Facility Return  Discharge Destination Plan:: Facility Return, SNF  Transport at Discharge : BLS Ambulance (due to confusion, level of assistance needed)

## 2024-04-15 NOTE — ASSESSMENT & PLAN NOTE
Baseline no O2 requirement.  POA O2 satting 87% on room air. Requiring 2 L NC.  B/L LE no edema. No calf tenderness.  Recent cold-like symptoms with productive cough.  Tachycardia- may 2/2 infections. Unable to assess CP.  Last echo 1/24: LVEF 60-65%, G1DD.  CXR per my reading: No fluid overload, pleural effusion, obvious infiltrates or consolidation.  Mostly bedbounded with h/o breast cancer.  Though low suspicion for PE but with higher risk and looking for septic source - Would check Juanita, PE study.

## 2024-04-16 PROBLEM — E44.1 MILD PROTEIN-CALORIE MALNUTRITION (HCC): Status: ACTIVE | Noted: 2024-04-16

## 2024-04-16 LAB
ANION GAP SERPL CALCULATED.3IONS-SCNC: 9 MMOL/L (ref 4–13)
APICAL FOUR CHAMBER EJECTION FRACTION: 72 %
B PARAP IS1001 DNA NPH QL NAA+NON-PROBE: NOT DETECTED
B PERT.PT PRMT NPH QL NAA+NON-PROBE: NOT DETECTED
BACTERIA UR CULT: ABNORMAL
BASOPHILS # BLD AUTO: 0.07 THOUSANDS/ÂΜL (ref 0–0.1)
BASOPHILS NFR BLD AUTO: 0 % (ref 0–1)
BSA FOR ECHO PROCEDURE: 1.44 M2
BUN SERPL-MCNC: 35 MG/DL (ref 5–25)
C PNEUM DNA NPH QL NAA+NON-PROBE: NOT DETECTED
CALCIUM SERPL-MCNC: 9.4 MG/DL (ref 8.4–10.2)
CHLORIDE SERPL-SCNC: 104 MMOL/L (ref 96–108)
CO2 SERPL-SCNC: 32 MMOL/L (ref 21–32)
CREAT SERPL-MCNC: 0.64 MG/DL (ref 0.6–1.3)
E WAVE DECELERATION TIME: 214 MS
E/A RATIO: 0.75
EOSINOPHIL # BLD AUTO: 0.12 THOUSAND/ÂΜL (ref 0–0.61)
EOSINOPHIL NFR BLD AUTO: 1 % (ref 0–6)
ERYTHROCYTE [DISTWIDTH] IN BLOOD BY AUTOMATED COUNT: 15.2 % (ref 11.6–15.1)
ERYTHROCYTE [DISTWIDTH] IN BLOOD BY AUTOMATED COUNT: 15.6 % (ref 11.6–15.1)
FLUAV RNA NPH QL NAA+NON-PROBE: NOT DETECTED
FLUBV RNA NPH QL NAA+NON-PROBE: NOT DETECTED
GFR SERPL CREATININE-BSD FRML MDRD: 82 ML/MIN/1.73SQ M
GLUCOSE SERPL-MCNC: 96 MG/DL (ref 65–140)
HADV DNA NPH QL NAA+NON-PROBE: NOT DETECTED
HCOV 229E RNA NPH QL NAA+NON-PROBE: NOT DETECTED
HCOV HKU1 RNA NPH QL NAA+NON-PROBE: NOT DETECTED
HCOV NL63 RNA NPH QL NAA+NON-PROBE: NOT DETECTED
HCOV OC43 RNA NPH QL NAA+NON-PROBE: NOT DETECTED
HCT VFR BLD AUTO: 33.2 % (ref 34.8–46.1)
HCT VFR BLD AUTO: 33.9 % (ref 34.8–46.1)
HGB BLD-MCNC: 10.3 G/DL (ref 11.5–15.4)
HGB BLD-MCNC: 10.5 G/DL (ref 11.5–15.4)
HMPV RNA NPH QL NAA+NON-PROBE: DETECTED
HPIV1 RNA NPH QL NAA+NON-PROBE: NOT DETECTED
HPIV2 RNA NPH QL NAA+NON-PROBE: NOT DETECTED
HPIV3 RNA NPH QL NAA+NON-PROBE: NOT DETECTED
HPIV4 RNA NPH QL NAA+NON-PROBE: NOT DETECTED
IMM GRANULOCYTES # BLD AUTO: 0.22 THOUSAND/UL (ref 0–0.2)
IMM GRANULOCYTES NFR BLD AUTO: 1 % (ref 0–2)
LYMPHOCYTES # BLD AUTO: 1.07 THOUSANDS/ÂΜL (ref 0.6–4.47)
LYMPHOCYTES NFR BLD AUTO: 5 % (ref 14–44)
M PNEUMO DNA NPH QL NAA+NON-PROBE: NOT DETECTED
MCH RBC QN AUTO: 32.2 PG (ref 26.8–34.3)
MCH RBC QN AUTO: 32.2 PG (ref 26.8–34.3)
MCHC RBC AUTO-ENTMCNC: 31 G/DL (ref 31.4–37.4)
MCHC RBC AUTO-ENTMCNC: 31 G/DL (ref 31.4–37.4)
MCV RBC AUTO: 104 FL (ref 82–98)
MCV RBC AUTO: 104 FL (ref 82–98)
MONOCYTES # BLD AUTO: 0.95 THOUSAND/ÂΜL (ref 0.17–1.22)
MONOCYTES NFR BLD AUTO: 5 % (ref 4–12)
MRSA NOSE QL CULT: NORMAL
MV E'TISSUE VEL-SEP: 5 CM/S
MV PEAK A VEL: 1.08 M/S
MV PEAK E VEL: 81 CM/S
MV STENOSIS PRESSURE HALF TIME: 62 MS
MV VALVE AREA P 1/2 METHOD: 3.55
NEUTROPHILS # BLD AUTO: 18.16 THOUSANDS/ÂΜL (ref 1.85–7.62)
NEUTS SEG NFR BLD AUTO: 88 % (ref 43–75)
NRBC BLD AUTO-RTO: 0 /100 WBCS
PLATELET # BLD AUTO: 214 THOUSANDS/UL (ref 149–390)
PLATELET # BLD AUTO: 217 THOUSANDS/UL (ref 149–390)
PMV BLD AUTO: 11.8 FL (ref 8.9–12.7)
PMV BLD AUTO: 12 FL (ref 8.9–12.7)
POTASSIUM SERPL-SCNC: 3 MMOL/L (ref 3.5–5.3)
PROCALCITONIN SERPL-MCNC: 5.71 NG/ML
RBC # BLD AUTO: 3.2 MILLION/UL (ref 3.81–5.12)
RBC # BLD AUTO: 3.26 MILLION/UL (ref 3.81–5.12)
RSV RNA NPH QL NAA+NON-PROBE: NOT DETECTED
RV+EV RNA NPH QL NAA+NON-PROBE: NOT DETECTED
SARS-COV-2 RNA NPH QL NAA+NON-PROBE: NOT DETECTED
SL CV LV EF: 65
SODIUM SERPL-SCNC: 145 MMOL/L (ref 135–147)
VANCOMYCIN SERPL-MCNC: 11 UG/ML (ref 10–20)
WBC # BLD AUTO: 20.39 THOUSAND/UL (ref 4.31–10.16)
WBC # BLD AUTO: 20.59 THOUSAND/UL (ref 4.31–10.16)

## 2024-04-16 PROCEDURE — 80202 ASSAY OF VANCOMYCIN: CPT | Performed by: INTERNAL MEDICINE

## 2024-04-16 PROCEDURE — 99232 SBSQ HOSP IP/OBS MODERATE 35: CPT | Performed by: NURSE PRACTITIONER

## 2024-04-16 PROCEDURE — 99233 SBSQ HOSP IP/OBS HIGH 50: CPT | Performed by: INTERNAL MEDICINE

## 2024-04-16 PROCEDURE — 80048 BASIC METABOLIC PNL TOTAL CA: CPT | Performed by: INTERNAL MEDICINE

## 2024-04-16 PROCEDURE — 99232 SBSQ HOSP IP/OBS MODERATE 35: CPT | Performed by: INTERNAL MEDICINE

## 2024-04-16 PROCEDURE — 92526 ORAL FUNCTION THERAPY: CPT

## 2024-04-16 PROCEDURE — 0202U NFCT DS 22 TRGT SARS-COV-2: CPT | Performed by: INTERNAL MEDICINE

## 2024-04-16 PROCEDURE — 97163 PT EVAL HIGH COMPLEX 45 MIN: CPT

## 2024-04-16 PROCEDURE — 85027 COMPLETE CBC AUTOMATED: CPT

## 2024-04-16 PROCEDURE — 84145 PROCALCITONIN (PCT): CPT | Performed by: INTERNAL MEDICINE

## 2024-04-16 PROCEDURE — 85025 COMPLETE CBC W/AUTO DIFF WBC: CPT | Performed by: INTERNAL MEDICINE

## 2024-04-16 PROCEDURE — 94760 N-INVAS EAR/PLS OXIMETRY 1: CPT

## 2024-04-16 PROCEDURE — 94640 AIRWAY INHALATION TREATMENT: CPT

## 2024-04-16 RX ORDER — DIVALPROEX SODIUM 125 MG/1
125 CAPSULE, COATED PELLETS ORAL EVERY 12 HOURS SCHEDULED
COMMUNITY
End: 2024-05-03 | Stop reason: SDUPTHER

## 2024-04-16 RX ORDER — POTASSIUM CHLORIDE 20MEQ/15ML
40 LIQUID (ML) ORAL ONCE
Status: COMPLETED | OUTPATIENT
Start: 2024-04-16 | End: 2024-04-16

## 2024-04-16 RX ORDER — POTASSIUM CHLORIDE 14.9 MG/ML
20 INJECTION INTRAVENOUS 2 TIMES DAILY
Qty: 200 ML | Refills: 0 | Status: COMPLETED | OUTPATIENT
Start: 2024-04-16 | End: 2024-04-16

## 2024-04-16 RX ORDER — METOPROLOL TARTRATE 50 MG/1
50 TABLET, FILM COATED ORAL DAILY
COMMUNITY
End: 2024-05-03 | Stop reason: SDUPTHER

## 2024-04-16 RX ADMIN — GUAIFENESIN 600 MG: 600 TABLET ORAL at 08:11

## 2024-04-16 RX ADMIN — ALBUTEROL SULFATE 2.5 MG: 2.5 SOLUTION RESPIRATORY (INHALATION) at 07:29

## 2024-04-16 RX ADMIN — HEPARIN SODIUM 5000 UNITS: 5000 INJECTION INTRAVENOUS; SUBCUTANEOUS at 16:44

## 2024-04-16 RX ADMIN — CEFEPIME 2000 MG: 2 INJECTION, POWDER, FOR SOLUTION INTRAVENOUS at 11:54

## 2024-04-16 RX ADMIN — ALBUTEROL SULFATE 2.5 MG: 2.5 SOLUTION RESPIRATORY (INHALATION) at 20:11

## 2024-04-16 RX ADMIN — POTASSIUM CHLORIDE 20 MEQ: 14.9 INJECTION, SOLUTION INTRAVENOUS at 12:04

## 2024-04-16 RX ADMIN — ALBUTEROL SULFATE 2.5 MG: 2.5 SOLUTION RESPIRATORY (INHALATION) at 13:36

## 2024-04-16 RX ADMIN — BENZONATATE 100 MG: 100 CAPSULE ORAL at 08:11

## 2024-04-16 RX ADMIN — HEPARIN SODIUM 5000 UNITS: 5000 INJECTION INTRAVENOUS; SUBCUTANEOUS at 08:12

## 2024-04-16 RX ADMIN — GUAIFENESIN 600 MG: 600 TABLET ORAL at 21:35

## 2024-04-16 RX ADMIN — METOPROLOL SUCCINATE 50 MG: 25 TABLET, EXTENDED RELEASE ORAL at 08:11

## 2024-04-16 RX ADMIN — POTASSIUM CHLORIDE 40 MEQ: 1.5 SOLUTION ORAL at 10:33

## 2024-04-16 RX ADMIN — DOCUSATE SODIUM 100 MG: 100 CAPSULE, LIQUID FILLED ORAL at 08:12

## 2024-04-16 RX ADMIN — BUSPIRONE HYDROCHLORIDE 10 MG: 5 TABLET ORAL at 21:35

## 2024-04-16 RX ADMIN — BENZONATATE 100 MG: 100 CAPSULE ORAL at 21:35

## 2024-04-16 RX ADMIN — SODIUM CHLORIDE, SODIUM GLUCONATE, SODIUM ACETATE, POTASSIUM CHLORIDE, MAGNESIUM CHLORIDE, SODIUM PHOSPHATE, DIBASIC, AND POTASSIUM PHOSPHATE 75 ML/HR: .53; .5; .37; .037; .03; .012; .00082 INJECTION, SOLUTION INTRAVENOUS at 14:08

## 2024-04-16 RX ADMIN — SENNOSIDES 8.8 MG: 8.8 SYRUP ORAL at 08:12

## 2024-04-16 RX ADMIN — Medication 9 MG: at 21:35

## 2024-04-16 RX ADMIN — HEPARIN SODIUM 5000 UNITS: 5000 INJECTION INTRAVENOUS; SUBCUTANEOUS at 01:11

## 2024-04-16 RX ADMIN — BUSPIRONE HYDROCHLORIDE 10 MG: 5 TABLET ORAL at 08:11

## 2024-04-16 RX ADMIN — DOCUSATE SODIUM 100 MG: 100 CAPSULE, LIQUID FILLED ORAL at 16:43

## 2024-04-16 RX ADMIN — LORATADINE 10 MG: 10 TABLET ORAL at 08:11

## 2024-04-16 RX ADMIN — VANCOMYCIN HYDROCHLORIDE 1250 MG: 5 INJECTION, POWDER, LYOPHILIZED, FOR SOLUTION INTRAVENOUS at 08:16

## 2024-04-16 RX ADMIN — SENNOSIDES 8.8 MG: 8.8 SYRUP ORAL at 16:43

## 2024-04-16 RX ADMIN — POLYETHYLENE GLYCOL 3350 17 G: 17 POWDER, FOR SOLUTION ORAL at 08:12

## 2024-04-16 RX ADMIN — DONEPEZIL HYDROCHLORIDE 10 MG: 5 TABLET ORAL at 21:35

## 2024-04-16 RX ADMIN — POTASSIUM CHLORIDE 20 MEQ: 14.9 INJECTION, SOLUTION INTRAVENOUS at 10:09

## 2024-04-16 RX ADMIN — BENZONATATE 100 MG: 100 CAPSULE ORAL at 16:43

## 2024-04-16 RX ADMIN — CEFEPIME 2000 MG: 2 INJECTION, POWDER, FOR SOLUTION INTRAVENOUS at 22:47

## 2024-04-16 RX ADMIN — METOPROLOL SUCCINATE 25 MG: 25 TABLET, EXTENDED RELEASE ORAL at 21:35

## 2024-04-16 NOTE — PHYSICAL THERAPY NOTE
Physical Therapy Evaluation    Patient's Name: Monika Butler    Admitting Diagnosis  Urinary tract infection [N39.0]  Fever [R50.9]  Acute respiratory failure with hypoxia (HCC) [J96.01]  Acute encephalopathy [G93.40]  Sepsis (HCC) [A41.9]  AMS (altered mental status) [R41.82]    Problem List  Patient Active Problem List   Diagnosis    Essential hypertension    Breast cancer (HCC)    Ear fullness, left    Hyperlipidemia    Memory change    Dementia with behavioral disturbance (HCC)    Balance problems    Altered mental status    BMI 22.0-22.9, adult    History of breast cancer    Generalized weakness    Urinary frequency    Bad odor of urine    Postmenopausal    Prediabetes    Gastroesophageal reflux disease with esophagitis without hemorrhage    H/O clavicle fracture    Meningioma (HCC)    Pulmonary nodule    Insomnia    Anxiety    Anemia    Abrasion of left eyebrow    Injury of left wrist    Nondisplaced fracture of triquetrum (cuneiform) bone, left wrist, initial encounter for closed fracture    Constipation    Elevated troponin    Thrush, oral    Severe sepsis (HCC)    QT prolongation    Acute respiratory failure with hypoxia (HCC)    Elevated serum creatinine    Abnormal CT scan       Past Medical History  Past Medical History:   Diagnosis Date    Acute bronchiolitis 12/12/2023    Allergic     Cancer (HCC) 2005    left breast mastectomy    Electrolyte abnormality 10/02/2022    Hypertension     Hypokalemia 01/02/2024    Memory change     Nodule of left lung     Pleural thickening        Past Surgical History  Past Surgical History:   Procedure Laterality Date    CATARACT EXTRACTION Left 05/2020    CATARACT EXTRACTION Left 06/2020    MASTECTOMY      WY XCAPSL CTRC RMVL INSJ IO LENS PROSTH W/O ECP Left 6/8/2020    Procedure: EXTRACTION EXTRACAPSULAR CATARACT PHACO INTRAOCULAR LENS (IOL);  Surgeon: Cortes Harrison MD;  Location: [facility] MAIN OR;  Service: Ophthalmology      04/16/24 1128   PT Last Visit   PT  Visit Date 24   Note Type   Note type Evaluation   Pain Assessment   Pain Assessment Tool FLACC   Pain Rating: FLACC (Rest) - Face 0   Pain Rating: FLACC (Rest) - Legs 0   Pain Rating: FLACC (Rest) - Activity 0   Pain Rating: FLACC (Rest) - Cry 0   Pain Rating: FLACC (Rest) - Consolability 0   Score: FLACC (Rest) 0   Pain Rating: FLACC (Activity) - Face 0   Pain Rating: FLACC (Activity) - Legs 0   Pain Rating: FLACC (Activity) - Activity 0   Pain Rating: FLACC (Activity) - Cry 0   Pain Rating: FLACC (Activity) - Consolability 0   Score: FLACC (Activity) 0   Restrictions/Precautions   Weight Bearing Precautions Per Order No   Other Precautions Cognitive;Chair Alarm;Bed Alarm;Fall Risk;Multiple lines   Home Living   Type of Home SNF  ([facility])   Home Layout One level   Home Equipment Walker;Wheelchair-manual   Prior Function   Level of [place] Needs assistance with functional mobility   Lives With Facility staff   IADLs Family/Friend/Other provides transportation;Family/Friend/Other provides meals;Family/Friend/Other provides medication management   Comments pt functional baseline unavailable at this time, per most recent hospitalization, pt ambulated 5' with Bl HHA. however per CM pt utilizes RW and wheelchair within facility for mobility   General   Family/Caregiver Present No   Cognition   Overall Cognitive Status Impaired   Arousal/Participation Cooperative   Orientation Level Disoriented to place;Disoriented to time;Disoriented to situation  (responds appropriately to name, however does not stated )   Following Commands Follows one step commands inconsistently   Comments pt ID by wristband, computer ID   Subjective   Subjective pt supine in bed, frequent redirection required throughout session   RLE Assessment   RLE Assessment X  (unable to formally assess due to pt cognition/unable to follow commands with MMT. functionally no buckling observed with standing attempts)   LLE Assessment   LLE  Assessment X  (unable to formally assess due to pt cognition/unable to follow commands with MMT. functionally no buckling observed with standing attempts)   Bed Mobility   Supine to Sit 4  Minimal assistance   Additional items Assist x 1;Increased time required;LE management   Sit to Supine 4  Minimal assistance   Additional items Assist x 1;Increased time required;LE management   Additional Comments pt sits EOB with minAx1, vc for hand placement and trunk extension for improved sitting balance with little carryover of cues, pt is dependent x1 to reposition toward HOB   Transfers   Sit to Stand 2  Maximal assistance   Additional items Assist x 1;Increased time required;Verbal cues   Stand to Sit 2  Maximal assistance   Additional items Assist x 1;Increased time required;Verbal cues   Stand pivot Unable to assess   Additional Comments vc and tactile facilitation for proper hand placement for STS transfers with no carryover of cues with performance. pt retropulsive on inital STS, unable to anteriorly weight shift in order to correct despite vc and tactile facilitation. during second attempt pt attempting to pull on crossbar of RW despite cues for hand placement   Ambulation/Elevation   Gait pattern Not appropriate   Ambulation/Elevation Additional Comments given pt poor command following, assistance required to sit EOB or reach full stand position safely   Balance   Static Sitting Poor +   Dynamic Sitting Poor   Static Standing Zero  (unable to reach full stand position)   Activity Tolerance   Activity Tolerance Patient limited by fatigue;Treatment limited secondary to medical complications (Comment)  (cognition)   Medical Staff Made Aware spoke with CM   Nurse Made Aware SABINA radford pre/post   Assessment   Prognosis Fair   Problem List Decreased strength;Decreased endurance;Impaired balance;Decreased mobility;Decreased cognition;Impaired judgement   Assessment Pt is a 83 y.o. female seen for PT evaluation s/p admit to  [facility] on 4/14/2024. Pt was admitted with a primary dx of: severe sepsis.  PT now consulted for assessment of mobility and d/c needs. Pt with Ambulate orders.  Pts current comorbidities and personal factors effecting treatment include: HTN, history of breast cancer, HLD, dementia with behavioral disturbance. Pts current clinical presentation is Unstable/Unpredictable (high complexity) due to Ongoing medical management for primary dx, Decreased activity tolerance compared to baseline, Fall risk, Increased assistance needed from caregiver at current time, Cog status. Prior to admission, pt was required assistance with transfers. Upon evaluation, pt currently is requiring Ricky for bed mobility; MaxA for transfers, pt unable to safely ambulate today. Pt presents at PT eval functioning below baseline and currently w/ overall mobility deficits 2* to: BLE weakness, impaired balance, gait deviations, decreased activity tolerance compared to baseline, decreased functional mobility tolerance compared to baseline, decreased safety awareness, impaired judgement, decreased cognition. Pt currently at a fall risk 2* to impairments listed above.  Pt will continue to benefit from skilled acute PT interventions to address stated impairments; to maximize functional mobility; for ongoing pt/ family training; and DME needs. At conclusion of PT session pt returned BTB, bed alarm engaged, all needs in reach, and RN notified of session findings/recommendations with phone and call bell within reach. Pt denies any further questions at this time. Recommend Level II (Moderate Resource Intensity)  upon hospital D/C.   Goals   Patient Goals no direct therapy goals stated by pt   STG Expiration Date 04/30/24   Short Term Goal #1 In 14 days pt will be able to: 1. Demonstrate ability to perform all aspects of bed mobility independently to improve functional safety.  2. Perform functional transfers with Ricky to facilitate safe return to  previous living environment.  3. Pt will tolerate sitting EOB with no more than supervision for at least 10 minutes to participate in seated therapeutic exercise program.  4. Improve LE strength grades by 1 to increase ease of functional mobility with transfers and gait. 5. Pt will demonstrate improved balance by one grade in order to decrease risk of falls. 6. Pt will demonstrate ability to stand for at least 2 minutes with no more than minAx1 to participate in pre-gait activities.   PT Treatment Day 0   Plan   Treatment/Interventions Functional transfer training;LE strengthening/ROM;Elevations;Therapeutic exercise;Cognitive reorientation;Patient/family training;Equipment eval/education;Bed mobility;Gait training;Spoke to nursing;Spoke to case management;OT   PT Frequency 2-3x/wk   Discharge Recommendation   Rehab Resource Intensity Level, PT II (Moderate Resource Intensity)   AM-PAC Basic Mobility Inpatient   Turning in Flat Bed Without Bedrails 2   Lying on Back to Sitting on Edge of Flat Bed Without Bedrails 2   Moving Bed to Chair 1   Standing Up From Chair Using Arms 1   Walk in Room 1   Climb 3-5 Stairs With Railing 1   Basic Mobility Inpatient Raw Score 8   Turning Head Towards Sound 3   Follow Simple Instructions 1   Low Function Basic Mobility Raw Score  12   Low Function Basic Mobility Standardized Score  18.33   [facility] Highest Level Of Mobility   -HL Goal 3: Sit at edge of bed   -HL Achieved 3: Sit at edge of bed   End of Consult   Patient Position at End of Consult Supine;Bed/Chair alarm activated;All needs within reach   The patient's AM-PAC Basic Mobility Inpatient Short Form Raw Score is 8. A Raw score of less than or equal to 16 suggests the patient may benefit from discharge to post-acute rehabilitation services. Please also refer to the recommendation of the Physical Therapist for safe discharge planning.    Freddie Garcia, PT

## 2024-04-16 NOTE — DISCHARGE INSTR - DIET
Puree  Thin Liquids  Medications Crushed Puree  Oral Care  Aspiration Precautions  Swallow Strategies: upright with oral intake, slow rate, small bits/sips, liquids every 2-3 bites as needed, full feed, feed only when awake

## 2024-04-16 NOTE — PROGRESS NOTES
Progress Note - Infectious Disease   Monika Butler 83 y.o. female MRN: [id number]  Unit/Bed#: S -01 Encounter: [id number]      Impression/Plan:    SIRS vs. Sepsis, present on arrival; fever, tachycardia, leukocytosis  -Suspect a pulmonary source as below.  Also consider bacteremia or urinary tract infection.  These could also be reactive fevers and leukocytosis to a malignant process.  No other clear source.  CT AP negative for infectious and abdominal process, LFTs normal, COVID/flu/RSV PCR negative.  -Antibiotics below  -Follow-up blood cultures  -Follow up sputum culture   -Check RP 2 panel for other pulmonary viruses  -Repeat CBC tomorrow a.m. to trend leukocytosis  -Trend fever curve and hemodynamics     Bilateral pulmonary nodules with left lung opacities  -Found on CT scan on arrival.  Consider the possibility of aspiration changes in patient with baseline dementia.  Also consider the possibility of developing pneumonia. Septic emboli considered, though TTE negative for vegetation and would be less likely without bacteremia. Consider metastatic lung disease in setting of pancreatic cystic lesion and prior history of breast cancer.  MRSA nares negative, COVID/Flu/RSV negative.  -Continue Cefepime for now, increase to 2g every 12 hours given improving renal function  -Vancomycin not needed for lungs as MRSA nares negative and she has completed 3 days which would cover Enterococcus cystitis as below; stop Vancomycin  -Follow up sputum culture; if negative for Pseudomonas we can then de-escalate antibiotics to IV Unasyn vs. PO Augmentin  -Check RP2 panel for pulmonary viruses  -Follow up blood cultures  -Recommend aspiration precautions and speech therapy evaluation    3. Enterococcus faecalis bacteriuria:  -UA with innumerable WBCs and culture positive for Enterococcus. Patient denies dysuria at the moment, though history limited due to mental status. CT 4/14 with unremarkable kidneys, ureters and  bladder.    -Unclear if this is truly UTI vs. Asymptomatic bacteriuria vs. Contamination  -Given lack of pyelonephritis on CT and patient denies symptoms of UTI, and she has already completed 3 days of Vancomycin which would be sufficient for Enterococcus cystitis, will stop Vancomycin and monitor    4. Acute encephalopathy in setting of baseline dementia  -Present on arrival. Suspect may be toxic metabolic in setting of infection in patient with poor neurologic reserve. CT head negative for acute infarct or hemorrhage. She has had marked improvement as of , now alert alert and communicative.   -Close monitoring of mental status  -Serial neurologic exams     5. Pancreatic cystic lesion  -Seen on CT . GI consulted who discussed with family who has elected for conservative treatment measures for this at this time.   -GI recs appreciated     6. History of breast cancer status post left mastectomy ()     7. Left temporal meningioma  -Known diagnosis.  Stable on CT head on ..    Plan and recommendations were discussed with primary team.  They agree with plan to     Antibiotics:  Vancomycin: 3  Cefepime: 3    24 Hour Events:  Afebrile. WBC 20.5, down from 23.7. Urine culture growing Enterococcus faecalis. Blood cultures negative thus far. Patient remains on 2L NC.    Subjective:  Patient much more awake and alert today. Denies fever or chills. Denies headache, abdominal pain, dysuria. She endorses nasal congestion and a dry cough, unable to bring up sputum.     Objective:  Vitals:  Temp:  [98.1 °F (36.7 °C)-98.4 °F (36.9 °C)] 98.1 °F (36.7 °C)  HR:  [90-99] 93  Resp:  [16] 16  BP: (125-160)/(65-90) 160/90  SpO2:  [92 %-95 %] 92 %  Temp (24hrs), Av.3 °F (36.8 °C), Min:98.1 °F (36.7 °C), Max:98.4 °F (36.9 °C)  Current: Temperature: 98.1 °F (36.7 °C)    Physical Exam:   General Appearance:  Alert, interactive,  no acute distress, with intermittent wet sounding cough   Throat: Oropharynx moist without  lesions.    Lungs:   Respirations unlabored   Heart:  RRR   Abdomen:   Soft, non-tender.     Extremities: No clubbing, cyanosis or edema   Skin: No new rashes       Labs:   All pertinent labs and imaging studies were personally reviewed  Results from last 7 days   Lab Units 04/16/24  0000 04/15/24  0244 04/14/24  1835   WBC Thousand/uL 20.59* 23.70* 19.05*   HEMOGLOBIN g/dL 10.5* 9.4* 11.3*   PLATELETS Thousands/uL 214 195 261     Results from last 7 days   Lab Units 04/16/24  0531 04/15/24  0244 04/14/24  1835   SODIUM mmol/L 145 143 146   POTASSIUM mmol/L 3.0* 3.8 3.6   CHLORIDE mmol/L 104 105 103   CO2 mmol/L 32 32 32   BUN mg/dL 35* 38* 38*   CREATININE mg/dL 0.64 0.98 1.27   EGFR ml/min/1.73sq m 82 53 38   CALCIUM mg/dL 9.4 8.4 9.8   AST U/L  --   --  23   ALT U/L  --   --  13   ALK PHOS U/L  --   --  79     Results from last 7 days   Lab Units 04/16/24  0000 04/15/24  0244 04/14/24  1835   PROCALCITONIN ng/ml 5.71* 8.95* 7.25*                   Micro:  Results from last 7 days   Lab Units 04/14/24  1903 04/14/24  1846 04/14/24  1835   BLOOD CULTURE   --  No Growth at 24 hrs. No Growth at 24 hrs.   URINE CULTURE  >100,000 cfu/ml Enterococcus faecalis*  --   --        Imaging:          Pablo Cooper MD  [facility]

## 2024-04-16 NOTE — PROGRESS NOTES
Monika Butler is a 83 y.o. female who is currently ordered Vancomycin IV with management by the Pharmacy Consult service.  Relevant clinical data and objective / subjective history reviewed.  Vancomycin Assessment:  Indication and Goal AUC/Trough: Urinary tract infection (goal -600, trough >10); Pneumonia (goal -600, trough >10)  Clinical Status: stable  Micro:     Renal Function:  SCr: 0.64 mg/dL  CrCl: 42.2 mL/min  Renal replacement: Not on dialysis  Days of Therapy: 3  Current Dose: 750 mg every 24 hours  Vancomycin Plan:  New Dosin mg every 24 hours  Estimated AUC: 565 mcg*hr/mL  Estimated Trough: 13.1 mcg/mL  Next Level:  @ 0600  Renal Function Monitoring: Daily BMP and UOP  Pharmacy will continue to follow closely for s/sx of nephrotoxicity, infusion reactions and appropriateness of therapy.  BMP and CBC will be ordered per protocol. We will continue to follow the patient’s culture results and clinical progress daily.    Oralia Choi, Pharmacist

## 2024-04-16 NOTE — PROGRESS NOTES
[facility]  Progress Note  Name: Monika Butler I  MRN: [id number]  Unit/Bed#: S -01 I Date of Admission: 4/14/2024   Date of Service: 4/16/2024 I Hospital Day: 2    Assessment/Plan   * Severe sepsis (HCC)  Assessment & Plan  Lab Results   Component Value Date    WBC 23.70 (H) 04/15/2024    WBC 19.05 (H) 04/14/2024    WBC 5.31 02/19/2024    PROCALCITONI 8.95 (H) 04/15/2024    PROCALCITONI 7.25 (H) 04/14/2024    PROCALCITONI 0.25 01/17/2024     Met SRIS criteria with hypothermia, tachycardia, leukocytosis. With unclear source - URI vs UTI vs pyelonephritis vs GI?  With initial lactic acid 2.5, new acute respiratory failure with hypoxia, elevated Cr.   Productive cough with whitish mucus for the last 1 week.  Poor historian-unable to assess accurate ROS.  UA positive for leukocytes, occult blood, bacteria.  Flu/RSV/COVID-negative.  CXR per my reading with no obvious infiltrates or consolidation- doubt PNA.  Received septic fluid and 1 dose of Rocephin in the ED.    Plan:  Broaden antibiotics to cefepime and vancomycin for now- de-escalate based on culture results.  CT CAP for source.  Follow sepsis workup  Procal trend  Trend WBC/Fever curve  IV Fluid hydration  ID consulted, appreciate recs  Echocardiogram ordered to rule out septic emboli    Abnormal CT scan  Assessment & Plan  Patient's chest abdomen and pelvis CT scan revealed no evidence of PE, opacities in the left upper and lower lobe base concerning for pneumonia, bilateral nodular opacities concerning for septic emboli, and a pulmonary nodules that could be concerning for metastasis.  In addition, patient had a cystic lesion in the head of the pancreas, as well as a cystic lesion in the ovary.    Plan:  -ID consulted  -Echocardiogram to rule out septic emboli  -GI consulted for pancreatic cyst, per daughter no further intervention    Elevated serum creatinine  Assessment & Plan  Lab Results   Component Value Date    CREATININE  0.98 04/15/2024    CREATININE 1.27 04/14/2024    CREATININE 0.85 02/19/2024     Baseline Cr 0.8-0.9  POA 1.27. Baseline urinary incontinence- daughter reported urination x2 on 4/14.  In the setting of poor oral intake.  UA positive for leukocytes, occult blood, RBC, casts.  Received 1L isolytes in the ED.    Plan:  Urinary retention protocol, Bladder scan, Daily weights, I/O  IVF- 500cc bolus to meet septic fluid and then 75cc/hr for 10 hrs.  Monitor BMP daily and observe for downward trend of creatinine  Avoid hypoperfusion of the kidneys, minimize nephrotoxins  RAAS Blockers/Diuretics held: Losartan.    Acute respiratory failure with hypoxia (HCC)  Assessment & Plan  Baseline no O2 requirement.  POA O2 satting 87% on room air. Requiring 2 L NC.  B/L LE no edema. No calf tenderness.  Recent cold-like symptoms with productive cough.  Tachycardia- may 2/2 infections. Unable to assess CP.  Last echo 1/24: LVEF 60-65%, G1DD.  CXR per my reading: No fluid overload, pleural effusion, obvious infiltrates or consolidation.  Mostly bedbounded with h/o breast cancer.      Respiratory protocol, IS, scheduled albuterol nebulizer.  Wean off O2 if able.    QT prolongation  Assessment & Plan  POA QTc 533. Sinus tachy.  On buspirone 10 mg twice daily, Zyprexa 5 mg twice daily, Remeron 15 mg nightly, Ativan 0.25mg every 6 hours as needed (last dose on 4/13 for 1 dose).  Will continue home dosage of buspirone and Remeron.  Hold off scheduled Zyprexa on 4/14 PM. Consider PRN Zyprexa 2.5mg IM if agitation.      Anemia  Assessment & Plan  Trend daily CBCs    Meningioma (HCC)  Assessment & Plan  With new onset worsening lethargy and recent fall, non-cooperative neuro PE.  Stable    Generalized weakness  Assessment & Plan  Likely second to sepsis, poor p.o. intake.  SLP recommend dysphagia diet with nectar thick liquids  IV fluids  PT/ OT eval.    Dementia with behavioral disturbance (HCC)  Assessment & Plan  Known history of dementia  with behavioral disturbances. Baseline disoriented, on and off able to conduct very simple conversation/ follow very easy commands.  POA with worsening lethargy but not restless or combative.  Based on her last hospitalization 1/24 per Psych recs: Recommending to avoid Ativan for sedation/agitation, decreased Zyprexa to 1.25 mg p.o. daily in the morning and Zyprexa 5 mg p.o. nightly  Meds in the facility: BuSpar 10 mg twice daily, Zyprexa 5 mg twice daily, Remeron 15 mg nightly,  Ativan 0.25 mg every 6 hours as needed.  POA Qtc 533.  Would continue home dosage Buspar and Remeron. Hold scheduled Zyprexa. Hopefully weaning off Ativan.  Yasmin consult, follow recommendations per their note    Essential hypertension  Assessment & Plan  Home meds: Losartan 100 mg daily, metoprolol 50 mg every morning, 25 mg nightly. Clonidine patch.  Hold losartan in the setting of elevated creatinine.  Clonidine patch removed in the setting of lower-side blood pressure.  Monitor BP.             VTE Pharmacologic Prophylaxis: VTE Score: 7 High Risk (Score >/= 5) - Pharmacological DVT Prophylaxis Ordered: heparin. Sequential Compression Devices Ordered.    Mobility:   Basic Mobility Inpatient Raw Score: 6  JH-HLM Goal: 2: Bed activities/Dependent transfer  JH-HLM Achieved: 2: Bed activities/Dependent transfer  JH-HLM Goal NOT achieved. Continue with multidisciplinary rounding and encourage appropriate mobility to improve upon JH-HLM goals.    Patient Centered Rounds: I performed bedside rounds with nursing staff today.   Discussions with Specialists or Other Care Team Provider: ID and GI    Education and Discussions with Family / Patient: Updated  (daughter) via phone.    Total Time Spent on Date of Encounter in care of patient: 25 mins. This time was spent on one or more of the following: performing physical exam; counseling and coordination of care; obtaining or reviewing history; documenting in the medical record;  reviewing/ordering tests, medications or procedures; communicating with other healthcare professionals and discussing with patient's family/caregivers.    Current Length of Stay: 2 day(s)  Current Patient Status: Inpatient   Certification Statement: The patient will continue to require additional inpatient hospital stay due to sepsis  Discharge Plan: Anticipate discharge in 24-48 hrs to prior facility    Code Status: Level 1 - Full Code    Subjective:   Patient seen and examined at bedside this morning, no acute events overnight.  Patient pleasantly confused at baseline.  Was talking about walking the dog today.  Patient was on oxygen when I saw her this morning and was comfortable, however PCA was trying to get Bryson to operate correctly.    Objective:     Vitals:   Temp (24hrs), Av.3 °F (36.8 °C), Min:98.1 °F (36.7 °C), Max:98.4 °F (36.9 °C)    Temp:  [98.1 °F (36.7 °C)-98.4 °F (36.9 °C)] 98.1 °F (36.7 °C)  HR:  [90-99] 93  Resp:  [16] 16  BP: (125-160)/(65-90) 160/90  SpO2:  [92 %-95 %] 92 %  Body mass index is 20.03 kg/m².     Input and Output Summary (last 24 hours):     Intake/Output Summary (Last 24 hours) at 2024 0845  Last data filed at 4/15/2024 1809  Gross per 24 hour   Intake 400 ml   Output 231 ml   Net 169 ml       Physical Exam:   Physical Exam  Constitutional:       General: She is not in acute distress.     Appearance: Normal appearance.   HENT:      Head: Normocephalic and atraumatic.      Right Ear: External ear normal.      Left Ear: External ear normal.      Nose: Nose normal.      Mouth/Throat:      Pharynx: Oropharynx is clear.   Cardiovascular:      Rate and Rhythm: Normal rate and regular rhythm.      Heart sounds: No murmur heard.  Pulmonary:      Effort: Pulmonary effort is normal. No respiratory distress.      Breath sounds: No wheezing or rales.   Abdominal:      General: There is no distension.      Palpations: Abdomen is soft.      Tenderness: There is no abdominal  tenderness.   Musculoskeletal:         General: No swelling or tenderness.   Skin:     General: Skin is warm and dry.      Coloration: Skin is pale.   Neurological:      Mental Status: She is alert. She is disoriented.      Comments: Patient confused at baseline due to severe dementia not oriented to person place or time.      \    Additional Data:     Labs:  Results from last 7 days   Lab Units 04/16/24  0000 04/15/24  0244 04/14/24  1835   WBC Thousand/uL 20.59*   < > 19.05*   HEMOGLOBIN g/dL 10.5*   < > 11.3*   HEMATOCRIT % 33.9*   < > 36.2   PLATELETS Thousands/uL 214   < > 261   BANDS PCT %  --   --  10*   SEGS PCT % 88*  --   --    LYMPHO PCT % 5*  --  2*   MONO PCT % 5  --  1*   EOS PCT % 1  --  0    < > = values in this interval not displayed.     Results from last 7 days   Lab Units 04/16/24  0531 04/15/24  0244 04/14/24  1835   SODIUM mmol/L 145   < > 146   POTASSIUM mmol/L 3.0*   < > 3.6   CHLORIDE mmol/L 104   < > 103   CO2 mmol/L 32   < > 32   BUN mg/dL 35*   < > 38*   CREATININE mg/dL 0.64   < > 1.27   ANION GAP mmol/L 9   < > 11   CALCIUM mg/dL 9.4   < > 9.8   ALBUMIN g/dL  --   --  3.9   TOTAL BILIRUBIN mg/dL  --   --  0.41   ALK PHOS U/L  --   --  79   ALT U/L  --   --  13   AST U/L  --   --  23   GLUCOSE RANDOM mg/dL 96   < > 137    < > = values in this interval not displayed.     Results from last 7 days   Lab Units 04/14/24  1835   INR  1.02             Results from last 7 days   Lab Units 04/16/24  0000 04/15/24  0244 04/14/24 2117 04/14/24  1835   LACTIC ACID mmol/L  --   --  1.2 2.5*   PROCALCITONIN ng/ml 5.71* 8.95*  --  7.25*       Lines/Drains:  Invasive Devices       Peripheral Intravenous Line  Duration             Peripheral IV 04/14/24 Right Antecubital 1 day    Peripheral IV 04/14/24 Right Forearm 1 day                          Imaging: Reviewed radiology reports from this admission including: chest CT scan    Recent Cultures (last 7 days):   Results from last 7 days   Lab Units  04/14/24  1903 04/14/24  1846 04/14/24  1835   BLOOD CULTURE   --  No Growth at 24 hrs. No Growth at 24 hrs.   URINE CULTURE  >100,000 cfu/ml Enterococcus faecalis*  --   --        Last 24 Hours Medication List:   Current Facility-Administered Medications   Medication Dose Route Frequency Provider Last Rate    acetaminophen  650 mg Oral Q6H PRN Cristhian Sanders MD      albuterol  2.5 mg Nebulization TID Cristhian Sanders MD      benzonatate  100 mg Oral TID Cristhian Sanders MD      busPIRone  10 mg Oral BID Cristhian Sanders MD      cefepime  2,000 mg Intravenous Q12H Pablo Cooper MD      docusate sodium  100 mg Oral BID Cristhian Sanders MD      donepezil  10 mg Oral HS Cristhian Sanders MD      guaiFENesin  600 mg Oral Q12H [facility] Cristhian Sanders MD      heparin (porcine)  5,000 Units Subcutaneous Q8H [facility] Cristhian Sanders MD      loratadine  10 mg Oral Daily Cristhian Sanders MD      melatonin  9 mg Oral HS Cristhian Sanders MD      metoprolol succinate  25 mg Oral HS Cristhian Sanders MD      metoprolol succinate  50 mg Oral QAM Cristhian Sanders MD      multi-electrolyte  75 mL/hr Intravenous Continuous Kathryn Alvarado MD 75 mL/hr (04/15/24 1809)    polyethylene glycol  17 g Oral Daily Cristhian Sanders MD      polyethylene glycol  17 g Oral PRN Cristhian Sanders MD      potassium chloride  20 mEq Intravenous BID Kamla Lafleur MD      potassium chloride  40 mEq Oral Once Kamla Lafleur MD      senna  8.8 mg Oral BID Cristhian Sanders MD      vancomycin  1,250 mg Intravenous Q24H Cristhian Sanders MD 1,250 mg (04/16/24 0816)        Today, Patient Was Seen By: Kamla Lafleur MD    **Please Note: This note may have been constructed using a voice recognition system.**

## 2024-04-16 NOTE — DISCHARGE SUMMARY
[facility]  Discharge- Monika Butler 1940, 83 y.o. female MRN: [id number]  Unit/Bed#: S -01 Encounter: [id number]  Primary Care Provider: Kristin Raymundo MD   Date and time admitted to hospital: 4/14/2024  6:13 PM    * Severe sepsis (HCC)  Assessment & Plan  Lab Results   Component Value Date    WBC 20.39 (H) 04/16/2024    WBC 20.59 (H) 04/16/2024    WBC 23.70 (H) 04/15/2024    PROCALCITONI 5.71 (H) 04/16/2024    PROCALCITONI 8.95 (H) 04/15/2024    PROCALCITONI 7.25 (H) 04/14/2024     Met SRIS criteria with hypothermia, tachycardia, leukocytosis. With unclear source - URI vs UTI vs pyelonephritis vs GI?  With initial lactic acid 2.5, new acute respiratory failure with hypoxia, elevated Cr.   Productive cough with whitish mucus for the last 1 week.  Poor historian-unable to assess accurate ROS.  UA positive for leukocytes, occult blood, bacteria.  Flu/RSV/COVID-negative.  CXR per my reading with no obvious infiltrates or consolidation  Received septic fluid and 1 dose of Rocephin in the ED.  Patient positive for metapneumovirus plus or minus overlying bacterial pneumonia, being treated as such    Plan:  Cefdinir at dc  Urine culture positive for Enterococcus faecalis  Procal remains elevated   Echocardiogram unremarkable     Metabolic encephalopathy  Assessment & Plan  Plan:  Contacted by nurse the patient much more drowsy and lethargic around 10 AM on 4/18  Evaluated patient, only moaning not responding significantly to sternal rub  Stat CT head negative and VBG wnl  Patient improving     Dementia with behavioral disturbance (HCC)  Assessment & Plan  Known history of dementia with behavioral disturbances. Baseline disoriented, on and off able to conduct very simple conversation/ follow very easy commands.  POA with worsening lethargy but not restless or combative.  Based on her last hospitalization 1/24 per Psych recs: Recommending to avoid Ativan for sedation/agitation,  decreased Zyprexa to 1.25 mg p.o. daily in the morning and Zyprexa 5 mg p.o. nightly  Meds in the facility: BuSpar 10 mg twice daily, Zyprexa 5 mg twice daily, Remeron 15 mg nightly,  Ativan 0.25 mg every 6 hours as needed.  POA Qtc 533.  Yasmin consult, follow recommendations per their note - no ativan or zyprexa, okay with mirtazapine and depakote    Mild protein-calorie malnutrition (HCC)  Assessment & Plan  Malnutrition Findings:   Adult Malnutrition type: Acute illness (in the setting of chronic illness)  Adult Degree of Malnutrition: Malnutrition of mild degree  Malnutrition Characteristics: Weight loss, Inadequate energy                  360 Statement: Mild malnutrition related to inadequate oral intake as evidenced by loss of subcutaneous fat, 15.2% weight decrease x 13 months. Currently treated with oral supplementation.    BMI Findings:           Body mass index is 20.03 kg/m².       Abnormal CT scan  Assessment & Plan  Patient's chest abdomen and pelvis CT scan revealed no evidence of PE, opacities in the left upper and lower lobe base concerning for pneumonia, bilateral nodular opacities concerning for septic emboli, and a pulmonary nodules that could be concerning for metastasis.  In addition, patient had a cystic lesion in the head of the pancreas, as well as a cystic lesion in the ovary.    Plan:  -ID consulted and provided abx recs  -Echocardiogram unremarkable   -GI consulted for pancreatic cyst, per daughter no further intervention    RAFAEL (acute kidney injury) (HCC)  Assessment & Plan  Lab Results   Component Value Date    CREATININE 0.46 (L) 04/17/2024    CREATININE 0.64 04/16/2024    CREATININE 0.98 04/15/2024     Baseline Cr 0.8-0.9  POA 1.27. Baseline urinary incontinence- daughter reported urination x2 on 4/14.  In the setting of poor oral intake.  UA positive for leukocytes, occult blood, RBC, casts.  Received 1L isolytes in the ED.    Plan:  Avoid hypoperfusion of the kidneys, minimize  nephrotoxins  Resolved     Acute respiratory failure with hypoxia (HCC)  Assessment & Plan  Baseline no O2 requirement.  POA O2 satting 87% on room air. Requiring 2 L NC.  B/L LE no edema. No calf tenderness.  Recent cold-like symptoms with productive cough.  Tachycardia- may 2/2 infections. Unable to assess CP.  Last echo 1/24: LVEF 60-65%, G1DD.  CXR no CP disease  Mostly bedbounded with h/o breast cancer.      Plan  Resolved     QT prolongation  Assessment & Plan  POA QTc 533. Sinus tachy.  Hold QT prolonging medications  Follow Geriatric recs      Anemia  Assessment & Plan  Hbg stable at 10    Meningioma (HCC)  Assessment & Plan  With new onset worsening lethargy and recent fall, non-cooperative neuro PE.  Stable    Generalized weakness  Assessment & Plan  Likely second to sepsis, poor p.o. intake.  SLP recommend dysphagia diet with thin liquids, but per nurse she was choking on thins, thus switched back to nectar thick  PT/ OT evaluated patient    Essential hypertension  Assessment & Plan  Home meds: Losartan 100 mg daily, metoprolol 50 mg every morning, 25 mg nightly. Clonidine patch.  Restart losartan at dc          Medical Problems       Resolved Problems  Date Reviewed: 4/19/2024   None       Discharging Physician / Practitioner: Kamla Lafleur MD  PCP: Kristin Raymundo MD  Admission Date:   Admission Orders (From admission, onward)       Ordered        04/14/24 2100  INPATIENT ADMISSION  Once                          Discharge Date: 04/19/24    Consultations During Hospital Stay:  GI  ID    Procedures Performed:   none    Significant Findings / Test Results:   XR chest portable   Final Result by Stacy Kenney MD (04/18 3782)      New left base opacity, likely atelectasis. Superimposed aspiration not excluded.      Persistent patchy bilateral opacity, better shown on CT, likely infectious/inflammatory. Metastases not excluded.            Workstation performed: [id number]         CT head wo contrast    Final Result by Jack Mac MD (04/18 1232)      No acute intracranial abnormality.      1.2 cm left temporal meningioma.                  Workstation performed: [id number]         CT head wo contrast   Final Result by Dawood Carreon MD (04/15 0118)      No acute intracranial hemorrhage, midline shift, or mass effect. Stable 1.2 cm left temporal meningioma.         Workstation performed: [id number]         CTA chest (pe study) abdomen pelvis contrast   Final Result by Dawood Carreon MD (04/15 0117)      1.  No evidence of pulmonary embolism.   2.  Dense opacities at the left lung apex and small opacities within the left upper and lower lobe base which may be due to pneumonia.   3.  There are bilateral nodular opacities concerning for infection, septic emboli are not excluded   4.  Innumerable bilateral well-defined pulmonary nodules the largest in the left lower lobe measuring 1.2 cm, metastasis is not excluded.   5.  Trace left pleural effusion.   6.  3.1 cm cystic lesion in the head of the pancreas. For simple cyst(s) 2 cm or greater recommend gastroenterology and/or surgical oncology consult. EUS may now be warranted.   7.  6.5 cm simple appearing cystic lesion in the left adnexa/ovary. Follow-up ultrasound of the pelvis in 6 to 12 months may be obtained.      The study was marked in EPIC for immediate notification.      Workstation performed: [id number]         XR chest portable   ED Interpretation by Ethan Triplett MD (04/14 2059)   This film was interpreted independently by me.  No obvious infiltrates, similar to previous xray (01/09/24)      Final Result by Stacy Kenney MD (04/14 2238)      No acute cardiopulmonary disease.            Workstation performed: [id number]               Incidental Findings:   Pancreatic cyst and ovarian cysts   I reviewed the above mentioned incidental findings with the patient and/or family and they expressed understanding.    Test Results Pending at  Discharge (will require follow up):   none     Outpatient Tests Requested:  none    Complications:  none    Reason for Admission: fevers chills    Hospital Course:   Monika Butler is a 83 y.o. female patient who originally presented to the hospital on 4/14/2024 due to cold-like symptoms with productive cough.  Patient had a fever of 102 upon admission was also diaphoretic.  Patient has also had very poor oral intake.  Patient met sepsis criteria while in the ED and was started on broad-spectrum antibiotics of cefepime and vancomycin.  Patient did have CT chest Abdo pelvis that was significant for multiple findings including lung nodule and opacities concerning for pneumonia, pancreatic cyst, and ovarian lesions.  There was also concern for septic emboli upon imaging, thus an echocardiogram was ordered and was subsequently unremarkable.  ID was consulted for further management of antibiotic use.  GI was consulted for further evaluation of patient's pancreatic cyst, they explained to family the workups that would need to pursue, however family would not like any interventions at this time.  On the morning of 4/18, patient became much more lethargic and was moaning in response to sternal rub, where she would normally say words.  Patient has dementia at baseline, however much more somnolent.  CT head was negative, and VBG was in normal limits.  Patient most likely had an episode of hypoactive delirium.  On the morning of 4/19, patient's mental status was improved, although still sleepy she was responding with words and phrases.  Patient will be discharged back to her home facility with instructions to follow-up with her primary care provider as well as finish short course of antibiotics.        Please see above list of diagnoses and related plan for additional information.     Condition at Discharge: stable    Discharge Day Visit / Exam:   Subjective: Patient seen and examined at bedside, no acute events overnight.   "Patient a little bit more arousable today and is responding with words and phrases.  Still pretty sleepy but significantly improved from yesterday.  Vitals: Blood Pressure: (!) 131/101 (04/19/24 0808)  Pulse: 80 (04/19/24 0808)  Temperature: 97.5 °F (36.4 °C) (04/19/24 0808)  Temp Source: Axillary (04/17/24 0726)  Respirations: 17 (04/19/24 0808)  Height: 5' 1\" (154.9 cm) (04/15/24 1530)  Weight - Scale: 48.1 kg (106 lb) (04/15/24 1530)  SpO2: 92 % (04/19/24 0808)  Exam:   Physical Exam  Constitutional:       General: She is not in acute distress.     Appearance: Normal appearance.   HENT:      Head: Normocephalic and atraumatic.      Right Ear: External ear normal.      Left Ear: External ear normal.      Nose: Nose normal.      Mouth/Throat:      Pharynx: Oropharynx is clear.   Cardiovascular:      Rate and Rhythm: Normal rate and regular rhythm.      Heart sounds: Murmur (Aortic) heard.   Abdominal:      General: There is no distension.      Palpations: Abdomen is soft.      Tenderness: There is no abdominal tenderness.   Musculoskeletal:         General: No swelling or tenderness.   Skin:     General: Skin is warm and dry.      Coloration: Skin is pale.   Neurological:      Mental Status: She is disoriented.      Comments: More alert and arousable than prior exams          Discussion with Family: Updated  (daughter) via phone.    Discharge instructions/Information to patient and family:   See after visit summary for information provided to patient and family.      Provisions for Follow-Up Care:  See after visit summary for information related to follow-up care and any pertinent home health orders.      Mobility at time of Discharge:   Basic Mobility Inpatient Raw Score: 8  JH-HLM Goal: 3: Sit at edge of bed  JH-HLM Achieved: 2: Bed activities/Dependent transfer  HLM Goal NOT achieved. Continue to encourage mobility in post discharge setting.     Disposition:   Assisted Living Facility at [facility] " [facility]    Planned Readmission: no     Discharge Statement:  I spent 45 minutes discharging the patient. This time was spent on the day of discharge. I had direct contact with the patient on the day of discharge. Greater than 50% of the total time was spent examining patient, answering all patient questions, arranging and discussing plan of care with patient as well as directly providing post-discharge instructions.  Additional time then spent on discharge activities.    Discharge Medications:  See after visit summary for reconciled discharge medications provided to patient and/or family.      **Please Note: This note may have been constructed using a voice recognition system**

## 2024-04-16 NOTE — MALNUTRITION/BMI
This medical record reflects one or more clinical indicators suggestive of malnutrition and/or morbid obesity.    Malnutrition Findings:   Adult Malnutrition type: Acute illness (in the setting of chronic illness)  Adult Degree of Malnutrition: Malnutrition of mild degree  Malnutrition Characteristics: Weight loss, Inadequate energy                  360 Statement: Mild malnutrition related to inadequate oral intake as evidenced by loss of subcutaneous fat,15.2% weight decrease x 13 months. Currently treated with oral supplementation.    BMI Findings:           Body mass index is 20.03 kg/m².     See Nutrition note dated 4/16/2024 for additional details.  Completed nutrition assessment is viewable in the nutrition documentation.

## 2024-04-16 NOTE — PROGRESS NOTES
The patient's vancomycin therapy has been completed or discontinued. Thank you for allowing us to take part in this patient's care. Pharmacy will sign-off now. Please call or re-consult if there are any questions or changes.

## 2024-04-16 NOTE — DISCHARGE INSTR - AVS FIRST PAGE
Dear Monika Butler,     It was our pleasure to care for you here at [facility].  It is our hope that we were always able to exceed the expected standards for your care during your stay.  You were hospitalized due to fever and chills.  You were cared for on the medicine floor by Kamla Lafleur MD under the service of Bill Hutchins MD with the [facility] who covers for your primary care physician (PCP), Kristin Raymundo MD, while you were hospitalized.  If you have any questions or concerns related to this hospitalization, you may contact us at .  For follow up as well as any medication refills, we recommend that you follow up with your primary care physician.  A registered nurse will reach out to you by phone within a few days after your discharge to answer any additional questions that you may have after going home.  However, at this time we provide for you here, the most important instructions / recommendations at discharge:     Notable Medication Adjustments -   Start Cefdinir 300mg twice daily on 4/20/24  STOP any doses of Ativan  STOP any doses of Zyprexa  Continue with BuSpar 5 mg daily, and mirtazapine 15 mg as needed at bedtime  Can restart Depakote for agitation if needed  Can continue taking Losartan, but please keep checking your blood pressure daily and follow up with your PCP  Testing Required after Discharge -   Ultrasound (Imaging) of pelvis in 6-12 months for adnexa/ovary cyst  ** Please contact your PCP to request testing orders for any of the testing recommended here **  Important follow up information -   Follow-up with your PCP in 1 week  Please follow up with Gastroenterology if you decide to pursue workup for Pancreatic cystic lesion  Other Instructions -   Take all medications as indicated  If any new or worsening symptoms, contact your PCP or go to the nearest emergency department  Please review this entire after  visit summary as additional general instructions including medication list, appointments, activity, diet, any pertinent wound care, and other additional recommendations from your care team that may be provided for you.      Sincerely,     Kamla Lafleur MD

## 2024-04-16 NOTE — PLAN OF CARE
Problem: PHYSICAL THERAPY ADULT  Goal: Performs mobility at highest level of function for planned discharge setting.  See evaluation for individualized goals.  Description: Treatment/Interventions: Functional transfer training, LE strengthening/ROM, Elevations, Therapeutic exercise, Cognitive reorientation, Patient/family training, Equipment eval/education, Bed mobility, Gait training, Spoke to nursing, Spoke to case management, OT          See flowsheet documentation for full assessment, interventions and recommendations.  Note: Prognosis: Fair  Problem List: Decreased strength, Decreased endurance, Impaired balance, Decreased mobility, Decreased cognition, Impaired judgement  Assessment: Pt is a 83 y.o. female seen for PT evaluation s/p admit to [facility] on 4/14/2024. Pt was admitted with a primary dx of: severe sepsis.  PT now consulted for assessment of mobility and d/c needs. Pt with Ambulate orders.  Pts current comorbidities and personal factors effecting treatment include: HTN, history of breast cancer, HLD, dementia with behavioral disturbance. Pts current clinical presentation is Unstable/Unpredictable (high complexity) due to Ongoing medical management for primary dx, Decreased activity tolerance compared to baseline, Fall risk, Increased assistance needed from caregiver at current time, Cog status. Prior to admission, pt was required assistance with transfers. Upon evaluation, pt currently is requiring Tyree for bed mobility; MaxA for transfers, pt unable to safely ambulate today. Pt presents at PT eval functioning below baseline and currently w/ overall mobility deficits 2* to: BLE weakness, impaired balance, gait deviations, decreased activity tolerance compared to baseline, decreased functional mobility tolerance compared to baseline, decreased safety awareness, impaired judgement, decreased cognition. Pt currently at a fall risk 2* to impairments listed above.  Pt will continue to benefit  from skilled acute PT interventions to address stated impairments; to maximize functional mobility; for ongoing pt/ family training; and DME needs. At conclusion of PT session pt returned BTB, bed alarm engaged, all needs in reach, and RN notified of session findings/recommendations with phone and call bell within reach. Pt denies any further questions at this time. Recommend Level II (Moderate Resource Intensity)  upon hospital D/C.        Rehab Resource Intensity Level, PT: II (Moderate Resource Intensity)    See flowsheet documentation for full assessment.

## 2024-04-16 NOTE — DISCHARGE INSTR - OTHER ORDERS
Skin care plans:  1-Hydraguard to bilateral sacrum, buttock and heels BID and PRN  2-Elevate heels to offload pressure.  3-Ehob cushion in chair when out of bed.  4-Moisturize skin daily with skin nourishing cream.  5-Turn/reposition q2h for pressure re-distribution on skin.

## 2024-04-16 NOTE — PLAN OF CARE
Problem: PAIN - ADULT  Goal: Verbalizes/displays adequate comfort level or baseline comfort level  Description: Interventions:  - Encourage patient to monitor pain and request assistance  - Assess pain using appropriate pain scale  - Administer analgesics based on type and severity of pain and evaluate response  - Implement non-pharmacological measures as appropriate and evaluate response  - Consider cultural and social influences on pain and pain management  - Notify physician/advanced practitioner if interventions unsuccessful or patient reports new pain  Outcome: Progressing     Problem: INFECTION - ADULT  Goal: Absence or prevention of progression during hospitalization  Description: INTERVENTIONS:  - Assess and monitor for signs and symptoms of infection  - Monitor lab/diagnostic results  - Monitor all insertion sites, i.e. indwelling lines, tubes, and drains  - Monitor endotracheal if appropriate and nasal secretions for changes in amount and color  - [place] appropriate cooling/warming therapies per order  - Administer medications as ordered  - Instruct and encourage patient and family to use good hand hygiene technique  - Identify and instruct in appropriate isolation precautions for identified infection/condition  Outcome: Progressing     Problem: SAFETY ADULT  Goal: Patient will remain free of falls  Description: INTERVENTIONS:  - Educate patient/family on patient safety including physical limitations  - Instruct patient to call for assistance with activity   - Consult OT/PT to assist with strengthening/mobility   - Keep Call bell within reach  - Keep bed low and locked with side rails adjusted as appropriate  - Keep care items and personal belongings within reach  - Initiate and maintain comfort rounds  - Make Fall Risk Sign visible to staff  - Offer Toileting every 2 Hours, in advance of need  - Initiate/Maintain bed/chair alarm  - Obtain necessary fall risk management equipment: yellow  bracelet/socks  - Apply yellow socks and bracelet for high fall risk patients  - Consider moving patient to room near nurses station  Outcome: Progressing     Problem: DISCHARGE PLANNING  Goal: Discharge to home or other facility with appropriate resources  Description: INTERVENTIONS:  - Identify barriers to discharge w/patient and caregiver  - Arrange for needed discharge resources and transportation as appropriate  - Identify discharge learning needs (meds, wound care, etc.)  - Arrange for interpretive services to assist at discharge as needed  - Refer to Case Management Department for coordinating discharge planning if the patient needs post-hospital services based on physician/advanced practitioner order or complex needs related to functional status, cognitive ability, or social support system  Outcome: Progressing     Problem: Knowledge Deficit  Goal: Patient/family/caregiver demonstrates understanding of disease process, treatment plan, medications, and discharge instructions  Description: Complete learning assessment and assess knowledge base.  Interventions:  - Provide teaching at level of understanding  - Provide teaching via preferred learning methods  Outcome: Progressing     Problem: GENITOURINARY - ADULT  Goal: Maintains or returns to baseline urinary function  Description: INTERVENTIONS:  - Assess urinary function  - Encourage oral fluids to ensure adequate hydration if ordered  - Administer IV fluids as ordered to ensure adequate hydration  - Administer ordered medications as needed  - Offer frequent toileting  - Follow urinary retention protocol if ordered  Outcome: Progressing     Problem: SKIN/TISSUE INTEGRITY - ADULT  Goal: Skin Integrity remains intact(Skin Breakdown Prevention)  Description: Assess:  -Perform Jim assessment every shift   -Clean and moisturize skin every as ordered  -Inspect skin when repositioning, toileting, and assisting with ADLS  -Assess under medical devices  -Assess  extremities for adequate circulation and sensation     Bed Management:  -Have minimal linens on bed & keep smooth, unwrinkled  -Change linens as needed when moist or perspiring  -Avoid sitting or lying in one position for more than 2 hours while in bed  -Keep HOB at 30 degrees     Toileting:  -Offer bedside commode  -Assess for incontinence every hour   -Use incontinent care products after each incontinent episode     Activity:  -Mobilize patient   -Encourage activity and walks on unit  -Encourage or provide ROM exercises   -Turn and reposition patient every 2 Hours  -Use appropriate equipment to lift or move patient in bed  -Instruct/ Assist with weight shifting every 2 when out of bed in chair      Skin Care:  -Avoid use of baby powder, tape, friction and shearing, hot water or constrictive clothing  -Relieve pressure over bony prominences   -Do not massage red bony areas    Next Steps:  -Teach patient strategies to minimize risks  -Consider consults to  interdisciplinary teams   Outcome: Progressing  Goal: Incision(s), wounds(s) or drain site(s) healing without S/S of infection  Description: INTERVENTIONS  - Assess and document dressing, incision, wound bed, drain sites and surrounding tissue  - Provide patient and family education  - Perform skin care/dressing changes as ordered  Outcome: Progressing  Goal: Pressure injury heals and does not worsen  Description: Interventions:  - Implement low air loss mattress or specialty surface (Criteria met)  - Apply silicone foam dressing  - Apply fecal or urinary incontinence containment device   - Perform passive or active ROM   - Turn and reposition patient & offload bony prominences  - Utilize friction reducing device or surface for transfers   - Consider consults to  interdisciplinary teams   - Use incontinent care products after each incontinent episode   - Consider nutrition services referral as needed  Outcome: Progressing     Problem:  Nutrition/Hydration-ADULT  Goal: Nutrient/Hydration intake appropriate for improving, restoring or maintaining nutritional needs  Description: Monitor and assess patient's nutrition/hydration status for malnutrition. Collaborate with interdisciplinary team and initiate plan and interventions as ordered.  Monitor patient's weight and dietary intake as ordered or per policy. Utilize nutrition screening tool and intervene as necessary. Determine patient's food preferences and provide high-protein, high-caloric foods as appropriate.     INTERVENTIONS:  - Monitor oral intake, urinary output, labs, and treatment plans  - Assess nutrition and hydration status and recommend course of action  - Evaluate amount of meals eaten  - Assist patient with eating if necessary   - Allow adequate time for meals  - Recommend/ encourage appropriate diets, oral nutritional supplements, and vitamin/mineral supplements  - Order, calculate, and assess calorie counts as needed  - Recommend, monitor, and adjust tube feedings and TPN/PPN based on assessed needs  - Assess need for intravenous fluids  - Provide specific nutrition/hydration education as appropriate  - Include patient/family/caregiver in decisions related to nutrition  Outcome: Progressing     Problem: Prexisting or High Potential for Compromised Skin Integrity  Goal: Skin integrity is maintained or improved  Description: INTERVENTIONS:  - Identify patients at risk for skin breakdown  - Assess and monitor skin integrity  - Assess and monitor nutrition and hydration status  - Monitor labs   - Assess for incontinence   - Turn and reposition patient  - Assist with mobility/ambulation  - Relieve pressure over bony prominences  - Avoid friction and shearing  - Provide appropriate hygiene as needed including keeping skin clean and dry  - Evaluate need for skin moisturizer/barrier cream  - Collaborate with interdisciplinary team   - Patient/family teaching  - Consider wound care consult    Outcome: Progressing

## 2024-04-16 NOTE — PROGRESS NOTES
Progress Note - Geriatric Medicine   Monika Butler 83 y.o. female MRN: [id number]  Unit/Bed#: S -01 Encounter: [id number]      Assessment/Plan:  Dementia with behavioral disturbance  Patient is alert oriented to name only on exam  Behaviors at facility have been well-managed, although occasional issues with anxiety  No issues with agitation or behaviors reported by nursing  Received medication list from [facility] and patient was maintained on buspirone 10 mg twice daily, Depakote 125 mg twice daily, donepezil 10 mg daily, lorazepam 0.25 mg every 6 as needed, melatonin 10 mg nightly, mirtazapine 15 mg nightly, and olanzapine 5 mg twice daily  After discussion with patient's daughter yesterday she feels that mom is overmedicated and wants some of the medication weaned off, at that time I discussed with her that the olanzapine and lorazepam was discontinued  EKG admission 534, antipsychotics held  Continue to hold olanzapine, recommend rechecking EKG  Patient was also on Depakote at facility, LFTs normal from blood work on admission  Olanzapine and lorazepam discontinued on admission-do not recommend restarting  Depakote can be restarted if needed, try to wean BuSpar down to 5 mg twice daily, will reevaluate tomorrow to see how patient did overnigh  Patient did have behaviors and agitation at facility, do not want to abruptly stop all medications and patient behaviors and agitation  Patient was also followed with in-house psychiatry psychiatry but was who is helping prescribe all his medications  At risk for delirium due to dementia, medications, sleep disturbance, sepsis  Recommend delirium precautions  Maintain sleep-wake cycle, avoid nighttime interruptions  minimize overnight interruptions, group overnight vitals/labs/nursing checks as possible  dim lights, close blinds and turn off tv to minimize stimulation and encourage sleep environment in evenings  Provide adequate pain control  Avoid urinary  retention and constipation  Provide frequent and early mobilization  Provide frequent redirection and reorientation as needed  Avoid medications that may worsen or precipitate delirium such as tramadol, benzodiazepines, anticholinergics, and Benadryl  Redirect unwanted behaviors as first-line therapy, avoid physical restraints as able to  Continue to monitor    Severe sepsis  Met SIRS criteria with hypothermia, tachycardia, and leukocytosis  Urine culture positive for enterococcus faecalis  Blood cultures show no growth at 24 hours  Patient currently on cefepime and vancomycin  Infectious disease following  Procalcitonin and WBC trending down  Management per ID and primary team    Acute respiratory failure with hypoxia  Patient with cough  Has been weaned off oxygen, SpO2 stable on room air  Chest x-ray with no acute cardiopulmonary disease  Sputum culture and viral panel ordered    Insomnia  First-line treatment is behavior modification  Maintain sleep-wake cycle, avoid nighttime interruptions  Avoid caffeine throughout the day  Avoid napping throughout the day  Encourage physical activity throughout the day  Avoid sedative hypnotics including benzodiazepines and benadryl  At facility patient was taking melatonin 10 mg nightly and mirtazapine 15 mg nightly  Mirtazapine has been held since admission, currently on melatonin 9 mg nightly    Anxiety/depression  At facility patient was on multiple medications including buspirone 10 mg twice daily, Depakote 125 mg twice daily, lorazepam 0.25 mg every 6 as needed, mirtazapine 15 mg nightly, and olanzapine 5 mg twice daily  Olanzapine has been held this hospitalization due to prolonged QTc of 533  Patient has been doing well off of the olanzapine  Lorazepam was discontinued also, do not recommend benzodiazepines in older adults due to increased risk of falls and confusion and agree with discontinuing  Depakote was not restarted, unclear why- Although patient becomes  increasingly anxious and behaviors start again can restart Depakote  Buspirone can try to be weaned down to 5 mg, and then wean down in outpatient setting at nursing home as appropriate  Mirtazapine held on admission-will reevaluate how patient does overnight to see about restarting mirtazapine tomorrow  Patient was also followed with in-house psychiatry psychiatry but was who is helping prescribe all his medications    Ambulatory dysfunction  At a baseline ambulates patient is mostly wheelchair-bound  PT/OT following  Fall precautions  Out of bed as tolerated  Encourage early and frequent mobilization  Encourage adequate hydration and nutrition  Provide adequate pain management   Goal is to return to facility  Continue with PT/OT for continued strength and balance training     Medication list sent from [facility]  Acetaminophen 650 mg 3 times daily  Acetaminophen 650 mg every 4 as needed for mild pain or fever  Albuterol sulfate nebulization  0.083% 3 mL 4 times daily  Biasc-evac suppository as needed daily  Buspirone 10 mg twice daily  Clonidine transdermal patch weekly 0.1 mg-apply clonidine patch every Sunday  Depakote sprinkles 125 mg twice daily  Colace 100 mg twice daily  Donepezil 10 mg daily  Ready to use enema as needed daily for constipation  Lidocaine patch 4% daily  Loratadine 10 mg daily  Lorazepam 0.5 mg tablets give half a tablet every 6 as needed for anxiety/agitation  Losartan 100 mg daily  Melatonin 10 mg nightly  Metoprolol tartrate 25 mg nightly  Metoprolol tartrate 50 mg daily in the morning  Milk of magnesia give 30 mL as needed for constipation  MiraLAX 17 g packet daily as needed  Mirtazapine 15 mg nightly  Olanzapine 5 mg twice daily      Subjective:   Monika is an 83-year-old female being seen for a geriatrics follow-up.  Upon exam patient was sitting up in bed, resting.  She appeared comfortable and was in no acute distress.  She was more awake today and able to answer  "more questions.  She was alert to person only on exam.  She is able to take her medications and eat a little bit today.  She denies any significant pain.  She moved her bowels today.  Per nursing she has been much better today, unable to communicate any take medications.    Called and updated patient's daughter Anitha via phone.  All questions answered to best of ability.  Patient's daughter has multiple questions for , will go discuss with social work and have them call her.    Review of Systems   Reason unable to perform ROS: limited by dementia.   Musculoskeletal:  Negative for arthralgias and back pain.   Neurological:  Positive for weakness.   Psychiatric/Behavioral:  Positive for confusion. Negative for sleep disturbance. The patient is not nervous/anxious.          Objective:     Vitals: Blood pressure 160/90, pulse 93, temperature 98.1 °F (36.7 °C), resp. rate 16, height 5' 1\" (1.549 m), weight 48.1 kg (106 lb), SpO2 94%.,Body mass index is 20.03 kg/m².      Intake/Output Summary (Last 24 hours) at 4/16/2024 1330  Last data filed at 4/16/2024 1128  Gross per 24 hour   Intake 520 ml   Output 741 ml   Net -221 ml       Current Medications: Reviewed    Physical Exam:   Physical Exam  Vitals reviewed.   Constitutional:       General: She is sleeping. She is not in acute distress.     Appearance: She is well-developed. She is not ill-appearing.      Comments: Frail appearing    HENT:      Head: Normocephalic and atraumatic.   Cardiovascular:      Rate and Rhythm: Normal rate and regular rhythm.      Heart sounds: No murmur heard.  Pulmonary:      Effort: Pulmonary effort is normal. No respiratory distress.      Breath sounds: Normal breath sounds.      Comments: 2L NC  Abdominal:      General: Bowel sounds are normal. There is no distension.      Palpations: Abdomen is soft.      Tenderness: There is no abdominal tenderness.   Musculoskeletal:         General: No swelling.      Right lower leg: No " "edema.      Left lower leg: No edema.   Skin:     General: Skin is warm and dry.      Coloration: Skin is pale.   Neurological:      Mental Status: She is disoriented.      Cranial Nerves: No cranial nerve deficit.      Motor: Weakness present.      Gait: Gait abnormal.          Invasive Devices       Peripheral Intravenous Line  Duration             Peripheral IV 04/14/24 Right Antecubital 1 day    Peripheral IV 04/14/24 Right Forearm 1 day                    Lab, Imaging and other studies:    Lab Results:   I have personally reviewed pertinent lab results including the following:  -CBC, procalcitonin, BMP, urine culture    I have personally reviewed the following imaging study reports in PACS:  No new imaging to review  - I have personally reviewed pertinent reports.      Please note:  Voice-recognition software may have been used in the preparation of this document.  Occasional wrong word or \"sound-alike\" substitutions may have occurred due to the inherent limitations of voice recognition software.  Interpretation should be guided by context.    "

## 2024-04-16 NOTE — WOUND OSTOMY CARE
Progress Note - Wound   Monika Butler 83 y.o. female MRN: [id number]  Unit/Bed#: S -01 Encounter: [id number]      Assessment:     Patient is a 82 yo female that was admitted to [facility]  for treatment of severe sepsis. Patient has a PMH of dementia, HTN, breast s/p left mastectomy, GERD, HLD. Patient is alert and oriented to self, drowsy, but arouse able. Patient is assist of one to two for turning and repositioning, dependent for care. Patient is incontinent of bowel and bladder. On assessment, patient is in bed, with pillows and wedges in place for offloading.    Wound Care was consulted for pre hospital wound.     Bilateral Sacrum and Buttocks - skin is pink, dry, intact, blanchable and hyperpigmented. No open aspects or drainage. Recommend protective barrier cream due to patient incontinence.     Bilateral heels - skin is dry, scaly, intact, and blanchable.     Educated patient on importance of frequent offloading of pressure via turning, repositioning, and weight-shifting.     No induration, fluctuance, odor, warmth, or purulence noted to the above noted areas of skin. Hydraguard applied. Patient tolerated well, no non verbal signs or symptoms of pain noted. Primary nurse aware of plan of care. See flow sheets for more detailed assessment findings.      Skin care plans:  1-Hydraguard to bilateral sacrum, buttock and heels BID and PRN  2-Elevate heels to offload pressure.  3-Ehob cushion in chair when out of bed.  4-Moisturize skin daily with skin nourishing cream.  5-Turn/reposition q2h for pressure re-distribution on skin.    Wound 04/14/24 Sacrum (Active)   Wound Image   04/15/24 1419   Wound Description Pink;Intact 04/15/24 1419   Apple-wound Assessment Intact;Pink 04/15/24 1419   Wound Length (cm) 0 cm 04/15/24 1419   Wound Width (cm) 0 cm 04/15/24 1419   Wound Depth (cm) 0 cm 04/15/24 1419   Wound Surface Area (cm^2) 0 cm^2 04/15/24 1419   Wound Volume (cm^3) 0 cm^3 04/15/24 1419   Calculated Wound  Volume (cm^3) 0 cm^3 04/15/24 1419   Drainage Amount None 04/14/24 2313   Treatments Cleansed;Site care 04/15/24 1419   Dressing Moisture barrier 04/15/24 1419   Dressing Changed New 04/14/24 2313   Patient Tolerance Tolerated well 04/15/24 1419         Wound care will sign off at this time, please re-consult if needed. Please follow skin care recommendations written as orders for skin protection and prevention.      Lily Moncada BSN RN CCRN  Wound and Ostomy Care

## 2024-04-16 NOTE — PLAN OF CARE
Problem: PAIN - ADULT  Goal: Verbalizes/displays adequate comfort level or baseline comfort level  Description: Interventions:  - Encourage patient to monitor pain and request assistance  - Assess pain using appropriate pain scale  - Administer analgesics based on type and severity of pain and evaluate response  - Implement non-pharmacological measures as appropriate and evaluate response  - Consider cultural and social influences on pain and pain management  - Notify physician/advanced practitioner if interventions unsuccessful or patient reports new pain  Outcome: Progressing     Problem: INFECTION - ADULT  Goal: Absence or prevention of progression during hospitalization  Description: INTERVENTIONS:  - Assess and monitor for signs and symptoms of infection  - Monitor lab/diagnostic results  - Monitor all insertion sites, i.e. indwelling lines, tubes, and drains  - Monitor endotracheal if appropriate and nasal secretions for changes in amount and color  - [place] appropriate cooling/warming therapies per order  - Administer medications as ordered  - Instruct and encourage patient and family to use good hand hygiene technique  - Identify and instruct in appropriate isolation precautions for identified infection/condition  Outcome: Progressing     Problem: SAFETY ADULT  Goal: Patient will remain free of falls  Description: INTERVENTIONS:  - Educate patient/family on patient safety including physical limitations  - Instruct patient to call for assistance with activity   - Consult OT/PT to assist with strengthening/mobility   - Keep Call bell within reach  - Keep bed low and locked with side rails adjusted as appropriate  - Keep care items and personal belongings within reach  - Initiate and maintain comfort rounds  - Make Fall Risk Sign visible to staff  - Apply yellow socks and bracelet for high fall risk patients  - Consider moving patient to room near nurses station  Outcome: Progressing     Problem: DISCHARGE  PLANNING  Goal: Discharge to home or other facility with appropriate resources  Description: INTERVENTIONS:  - Identify barriers to discharge w/patient and caregiver  - Arrange for needed discharge resources and transportation as appropriate  - Identify discharge learning needs (meds, wound care, etc.)  - Arrange for interpretive services to assist at discharge as needed  - Refer to Case Management Department for coordinating discharge planning if the patient needs post-hospital services based on physician/advanced practitioner order or complex needs related to functional status, cognitive ability, or social support system  Outcome: Progressing     Problem: Knowledge Deficit  Goal: Patient/family/caregiver demonstrates understanding of disease process, treatment plan, medications, and discharge instructions  Description: Complete learning assessment and assess knowledge base.  Interventions:  - Provide teaching at level of understanding  - Provide teaching via preferred learning methods  Outcome: Progressing     Problem: GENITOURINARY - ADULT  Goal: Maintains or returns to baseline urinary function  Description: INTERVENTIONS:  - Assess urinary function  - Encourage oral fluids to ensure adequate hydration if ordered  - Administer IV fluids as ordered to ensure adequate hydration  - Administer ordered medications as needed  - Offer frequent toileting  - Follow urinary retention protocol if ordered  Outcome: Progressing     Problem: Nutrition/Hydration-ADULT  Goal: Nutrient/Hydration intake appropriate for improving, restoring or maintaining nutritional needs  Description: Monitor and assess patient's nutrition/hydration status for malnutrition. Collaborate with interdisciplinary team and initiate plan and interventions as ordered.  Monitor patient's weight and dietary intake as ordered or per policy. Utilize nutrition screening tool and intervene as necessary. Determine patient's food preferences and provide  high-protein, high-caloric foods as appropriate.     INTERVENTIONS:  - Monitor oral intake, urinary output, labs, and treatment plans  - Assess nutrition and hydration status and recommend course of action  - Evaluate amount of meals eaten  - Assist patient with eating if necessary   - Allow adequate time for meals  - Recommend/ encourage appropriate diets, oral nutritional supplements, and vitamin/mineral supplements  - Order, calculate, and assess calorie counts as needed  - Recommend, monitor, and adjust tube feedings and TPN/PPN based on assessed needs  - Assess need for intravenous fluids  - Provide specific nutrition/hydration education as appropriate  - Include patient/family/caregiver in decisions related to nutrition  Outcome: Progressing     Problem: SKIN/TISSUE INTEGRITY - ADULT  Goal: Skin Integrity remains intact(Skin Breakdown Prevention)  Description: Assess:  -Inspect skin when repositioning, toileting, and assisting with ADLS  -Assess extremities for adequate circulation and sensation     Bed Management:  -Have minimal linens on bed & keep smooth, unwrinkled  -Change linens as needed when moist or perspiring    Toileting:  -Offer bedside commode    Activity:  -Encourage activity and walks on unit  -Encourage or provide ROM exercises   -Use appropriate equipment to lift or move patient in bed    Skin Care:  -Avoid use of baby powder, tape, friction and shearing, hot water or constrictive clothing  -Do not massage red bony areas    Outcome: Progressing  Goal: Incision(s), wounds(s) or drain site(s) healing without S/S of infection  Description: INTERVENTIONS  - Assess and document dressing, incision, wound bed, drain sites and surrounding tissue  - Provide patient and family education  Outcome: Progressing  Goal: Pressure injury heals and does not worsen  Description: Interventions:  - Implement low air loss mattress or specialty surface (Criteria met)  - Apply silicone foam dressing  - Apply fecal or  urinary incontinence containment device   - Utilize friction reducing device or surface for transfers   - Consider nutrition services referral as needed  Outcome: Progressing     Problem: Prexisting or High Potential for Compromised Skin Integrity  Goal: Skin integrity is maintained or improved  Description: INTERVENTIONS:  - Identify patients at risk for skin breakdown  - Assess and monitor skin integrity  - Assess and monitor nutrition and hydration status  - Monitor labs   - Assess for incontinence   - Turn and reposition patient  - Assist with mobility/ambulation  - Relieve pressure over bony prominences  - Avoid friction and shearing  - Provide appropriate hygiene as needed including keeping skin clean and dry  - Evaluate need for skin moisturizer/barrier cream  - Collaborate with interdisciplinary team   - Patient/family teaching  - Consider wound care consult   Outcome: Progressing

## 2024-04-16 NOTE — CASE MANAGEMENT
Case Management Discharge Planning Note    Patient name Monika Butler  Location S /S -01 MRN [id number]  : 1940 Date 2024       Current Admission Date: 2024  Current Admission Diagnosis:Severe sepsis (HCC)   Patient Active Problem List    Diagnosis Date Noted    Abnormal CT scan 04/15/2024    Severe sepsis (HCC) 2024    QT prolongation 2024    Acute respiratory failure with hypoxia (HCC) 2024    Elevated serum creatinine 2024    Thrush, oral 2024    Elevated troponin 2024    Constipation 2023    Nondisplaced fracture of triquetrum (cuneiform) bone, left wrist, initial encounter for closed fracture 2023    Abrasion of left eyebrow 2023    Injury of left wrist 2023    Insomnia 2023    Anxiety 2023    Anemia 2023    H/O clavicle fracture 2023    Meningioma (HCC) 2023    Pulmonary nodule 2023    Bad odor of urine 2023    Postmenopausal 2023    Prediabetes 2023    Gastroesophageal reflux disease with esophagitis without hemorrhage 2023    Generalized weakness 2022    Urinary frequency 2022    BMI 22.0-22.9, adult 2021    History of breast cancer 2021    Altered mental status 2020    Balance problems 2020    Dementia with behavioral disturbance (HCC)     Memory change 10/26/2020    Ear fullness, left 2017    Hyperlipidemia 2016    Breast cancer (HCC) 2013    Essential hypertension 2012      LOS (days): 2  Geometric Mean LOS (GMLOS) (days):   Days to GMLOS:     OBJECTIVE:  Risk of Unplanned Readmission Score: 38.04         Current admission status: Inpatient   Preferred Pharmacy:   [id number] - [address] [address]  Phone: [phone] Fax: [phone]    Primary Care Provider: Kristin Raymundo MD    Primary Insurance: [facility]  REP  Secondary Insurance:     DISCHARGE DETAILS:             CM advised by provider that patient may be medically stable for discharge tomorrow, 4/17/2024 -- Plan remains for patient to discharge to [facility] for continuation of LTC. Request for insurance authorization sent to CM Discharge Support for request of assistance with same (per facility & family's request). CM will follow for outcome and plan for discharge accordingly.    CM will remain available.

## 2024-04-16 NOTE — ASSESSMENT & PLAN NOTE
Lab Results   Component Value Date    WBC 20.39 (H) 04/16/2024    WBC 20.59 (H) 04/16/2024    WBC 23.70 (H) 04/15/2024    PROCALCITONI 5.71 (H) 04/16/2024    PROCALCITONI 8.95 (H) 04/15/2024    PROCALCITONI 7.25 (H) 04/14/2024     Met SRIS criteria with hypothermia, tachycardia, leukocytosis. With unclear source - URI vs UTI vs pyelonephritis vs GI?  With initial lactic acid 2.5, new acute respiratory failure with hypoxia, elevated Cr.   Productive cough with whitish mucus for the last 1 week.  Poor historian-unable to assess accurate ROS.  UA positive for leukocytes, occult blood, bacteria.  Flu/RSV/COVID-negative.  CXR per my reading with no obvious infiltrates or consolidation  Received septic fluid and 1 dose of Rocephin in the ED.  Patient positive for metapneumovirus plus or minus overlying bacterial pneumonia, being treated as such    Plan:  Cefdinir at [place]  Urine culture positive for Enterococcus faecalis  Procal remains elevated   Echocardiogram unremarkable

## 2024-04-16 NOTE — CASE MANAGEMENT
[facility] received request for authorization from Care Manager.  Authorization request for: SNF  Facility Name: [facility]  NPI: [id number]  Facility MD:  Dr. Byers   NPI: [id number]   Authorization initiated by contacting insurance:  [employer]  Via: [employer]   Clinicals submitted via: [employer] attachment   Pending Reference #: [id number]    Care Manager notified:  Dee Dee Mukherjee

## 2024-04-17 PROBLEM — N17.9 AKI (ACUTE KIDNEY INJURY) (HCC): Status: ACTIVE | Noted: 2024-04-14

## 2024-04-17 LAB
ANION GAP SERPL CALCULATED.3IONS-SCNC: 7 MMOL/L (ref 4–13)
BACTERIA SPT RESP CULT: ABNORMAL
BUN SERPL-MCNC: 16 MG/DL (ref 5–25)
CALCIUM SERPL-MCNC: 8.8 MG/DL (ref 8.4–10.2)
CHLORIDE SERPL-SCNC: 104 MMOL/L (ref 96–108)
CO2 SERPL-SCNC: 30 MMOL/L (ref 21–32)
CREAT SERPL-MCNC: 0.46 MG/DL (ref 0.6–1.3)
GFR SERPL CREATININE-BSD FRML MDRD: 91 ML/MIN/1.73SQ M
GLUCOSE SERPL-MCNC: 69 MG/DL (ref 65–140)
GRAM STN SPEC: ABNORMAL
POTASSIUM SERPL-SCNC: 2.9 MMOL/L (ref 3.5–5.3)
SODIUM SERPL-SCNC: 141 MMOL/L (ref 135–147)

## 2024-04-17 PROCEDURE — 99232 SBSQ HOSP IP/OBS MODERATE 35: CPT | Performed by: INTERNAL MEDICINE

## 2024-04-17 PROCEDURE — 92526 ORAL FUNCTION THERAPY: CPT

## 2024-04-17 PROCEDURE — 94760 N-INVAS EAR/PLS OXIMETRY 1: CPT

## 2024-04-17 PROCEDURE — 80048 BASIC METABOLIC PNL TOTAL CA: CPT

## 2024-04-17 PROCEDURE — 99232 SBSQ HOSP IP/OBS MODERATE 35: CPT | Performed by: FAMILY MEDICINE

## 2024-04-17 PROCEDURE — 94640 AIRWAY INHALATION TREATMENT: CPT

## 2024-04-17 PROCEDURE — 99233 SBSQ HOSP IP/OBS HIGH 50: CPT | Performed by: INTERNAL MEDICINE

## 2024-04-17 RX ORDER — POTASSIUM CHLORIDE 20MEQ/15ML
40 LIQUID (ML) ORAL ONCE
Status: COMPLETED | OUTPATIENT
Start: 2024-04-17 | End: 2024-04-17

## 2024-04-17 RX ORDER — POTASSIUM CHLORIDE 14.9 MG/ML
20 INJECTION INTRAVENOUS 2 TIMES DAILY
Status: COMPLETED | OUTPATIENT
Start: 2024-04-17 | End: 2024-04-17

## 2024-04-17 RX ORDER — BUSPIRONE HYDROCHLORIDE 5 MG/1
5 TABLET ORAL 2 TIMES DAILY
Status: DISCONTINUED | OUTPATIENT
Start: 2024-04-17 | End: 2024-04-19 | Stop reason: HOSPADM

## 2024-04-17 RX ORDER — HYDRALAZINE HYDROCHLORIDE 20 MG/ML
5 INJECTION INTRAMUSCULAR; INTRAVENOUS EVERY 6 HOURS PRN
Status: DISCONTINUED | OUTPATIENT
Start: 2024-04-17 | End: 2024-04-19 | Stop reason: HOSPADM

## 2024-04-17 RX ORDER — MIRTAZAPINE 15 MG/1
15 TABLET, FILM COATED ORAL
Status: DISCONTINUED | OUTPATIENT
Start: 2024-04-17 | End: 2024-04-19 | Stop reason: HOSPADM

## 2024-04-17 RX ADMIN — BENZONATATE 100 MG: 100 CAPSULE ORAL at 17:07

## 2024-04-17 RX ADMIN — CEFTRIAXONE SODIUM 1000 MG: 10 INJECTION, POWDER, FOR SOLUTION INTRAVENOUS at 10:57

## 2024-04-17 RX ADMIN — BENZONATATE 100 MG: 100 CAPSULE ORAL at 21:07

## 2024-04-17 RX ADMIN — Medication 9 MG: at 21:07

## 2024-04-17 RX ADMIN — METOPROLOL SUCCINATE 50 MG: 25 TABLET, EXTENDED RELEASE ORAL at 08:45

## 2024-04-17 RX ADMIN — BUSPIRONE HYDROCHLORIDE 10 MG: 5 TABLET ORAL at 08:44

## 2024-04-17 RX ADMIN — DOCUSATE SODIUM 100 MG: 100 CAPSULE, LIQUID FILLED ORAL at 08:44

## 2024-04-17 RX ADMIN — ALBUTEROL SULFATE 2.5 MG: 2.5 SOLUTION RESPIRATORY (INHALATION) at 19:25

## 2024-04-17 RX ADMIN — LORATADINE 10 MG: 10 TABLET ORAL at 08:44

## 2024-04-17 RX ADMIN — POTASSIUM CHLORIDE 20 MEQ: 14.9 INJECTION, SOLUTION INTRAVENOUS at 11:58

## 2024-04-17 RX ADMIN — POTASSIUM CHLORIDE 20 MEQ: 14.9 INJECTION, SOLUTION INTRAVENOUS at 08:42

## 2024-04-17 RX ADMIN — GUAIFENESIN 600 MG: 600 TABLET ORAL at 21:07

## 2024-04-17 RX ADMIN — BENZONATATE 100 MG: 100 CAPSULE ORAL at 08:44

## 2024-04-17 RX ADMIN — HEPARIN SODIUM 5000 UNITS: 5000 INJECTION INTRAVENOUS; SUBCUTANEOUS at 01:36

## 2024-04-17 RX ADMIN — HEPARIN SODIUM 5000 UNITS: 5000 INJECTION INTRAVENOUS; SUBCUTANEOUS at 09:07

## 2024-04-17 RX ADMIN — BUSPIRONE HYDROCHLORIDE 5 MG: 5 TABLET ORAL at 21:07

## 2024-04-17 RX ADMIN — ALBUTEROL SULFATE 2.5 MG: 2.5 SOLUTION RESPIRATORY (INHALATION) at 13:40

## 2024-04-17 RX ADMIN — HYDRALAZINE HYDROCHLORIDE 5 MG: 20 INJECTION, SOLUTION INTRAMUSCULAR; INTRAVENOUS at 15:53

## 2024-04-17 RX ADMIN — SODIUM CHLORIDE, SODIUM GLUCONATE, SODIUM ACETATE, POTASSIUM CHLORIDE, MAGNESIUM CHLORIDE, SODIUM PHOSPHATE, DIBASIC, AND POTASSIUM PHOSPHATE 75 ML/HR: .53; .5; .37; .037; .03; .012; .00082 INJECTION, SOLUTION INTRAVENOUS at 01:36

## 2024-04-17 RX ADMIN — SENNOSIDES 8.8 MG: 8.8 SYRUP ORAL at 08:45

## 2024-04-17 RX ADMIN — DONEPEZIL HYDROCHLORIDE 10 MG: 5 TABLET ORAL at 21:07

## 2024-04-17 RX ADMIN — HEPARIN SODIUM 5000 UNITS: 5000 INJECTION INTRAVENOUS; SUBCUTANEOUS at 17:07

## 2024-04-17 RX ADMIN — POTASSIUM CHLORIDE 40 MEQ: 1.5 SOLUTION ORAL at 08:45

## 2024-04-17 RX ADMIN — SODIUM CHLORIDE, SODIUM GLUCONATE, SODIUM ACETATE, POTASSIUM CHLORIDE, MAGNESIUM CHLORIDE, SODIUM PHOSPHATE, DIBASIC, AND POTASSIUM PHOSPHATE 75 ML/HR: .53; .5; .37; .037; .03; .012; .00082 INJECTION, SOLUTION INTRAVENOUS at 20:35

## 2024-04-17 RX ADMIN — ALBUTEROL SULFATE 2.5 MG: 2.5 SOLUTION RESPIRATORY (INHALATION) at 07:16

## 2024-04-17 RX ADMIN — METOPROLOL SUCCINATE 25 MG: 25 TABLET, EXTENDED RELEASE ORAL at 21:07

## 2024-04-17 NOTE — ASSESSMENT & PLAN NOTE
Lab Results   Component Value Date    CREATININE 0.46 (L) 04/17/2024    CREATININE 0.64 04/16/2024    CREATININE 0.98 04/15/2024     Baseline Cr 0.8-0.9  POA 1.27. Baseline urinary incontinence- daughter reported urination x2 on 4/14.  In the setting of poor oral intake.  UA positive for leukocytes, occult blood, RBC, casts.  Received 1L isolytes in the ED.    Plan:  Avoid hypoperfusion of the kidneys, minimize nephrotoxins  Resolved

## 2024-04-17 NOTE — ASSESSMENT & PLAN NOTE
Malnutrition Findings:   Adult Malnutrition type: Acute illness (in the setting of chronic illness)  Adult Degree of Malnutrition: Malnutrition of mild degree  Malnutrition Characteristics: Weight loss, Inadequate energy                  360 Statement: Mild malnutrition related to inadequate oral intake as evidenced by loss of subcutaneous fat, 15.2% weight decrease x 13 months. Currently treated with oral supplementation.    BMI Findings:           Body mass index is 20.03 kg/m².

## 2024-04-17 NOTE — PLAN OF CARE
Problem: PAIN - ADULT  Goal: Verbalizes/displays adequate comfort level or baseline comfort level  Description: Interventions:  - Encourage patient to monitor pain and request assistance  - Assess pain using appropriate pain scale  - Administer analgesics based on type and severity of pain and evaluate response  - Implement non-pharmacological measures as appropriate and evaluate response  - Consider cultural and social influences on pain and pain management  - Notify physician/advanced practitioner if interventions unsuccessful or patient reports new pain  Outcome: Progressing     Problem: INFECTION - ADULT  Goal: Absence or prevention of progression during hospitalization  Description: INTERVENTIONS:  - Assess and monitor for signs and symptoms of infection  - Monitor lab/diagnostic results  - Monitor all insertion sites, i.e. indwelling lines, tubes, and drains  - Monitor endotracheal if appropriate and nasal secretions for changes in amount and color  - [place] appropriate cooling/warming therapies per order  - Administer medications as ordered  - Instruct and encourage patient and family to use good hand hygiene technique  - Identify and instruct in appropriate isolation precautions for identified infection/condition  Outcome: Progressing     Problem: SAFETY ADULT  Goal: Patient will remain free of falls  Description: INTERVENTIONS:  - Educate patient/family on patient safety including physical limitations  - Instruct patient to call for assistance with activity   - Consult OT/PT to assist with strengthening/mobility   - Keep Call bell within reach  - Keep bed low and locked with side rails adjusted as appropriate  - Keep care items and personal belongings within reach  - Initiate and maintain comfort rounds  - Make Fall Risk Sign visible to staff  - Obtain necessary fall risk management equipment  - Apply yellow socks and bracelet for high fall risk patients  - Consider moving patient to room near nurses  station  Outcome: Progressing     Problem: DISCHARGE PLANNING  Goal: Discharge to home or other facility with appropriate resources  Description: INTERVENTIONS:  - Identify barriers to discharge w/patient and caregiver  - Arrange for needed discharge resources and transportation as appropriate  - Identify discharge learning needs (meds, wound care, etc.)  - Arrange for interpretive services to assist at discharge as needed  - Refer to Case Management Department for coordinating discharge planning if the patient needs post-hospital services based on physician/advanced practitioner order or complex needs related to functional status, cognitive ability, or social support system  Outcome: Progressing     Problem: Knowledge Deficit  Goal: Patient/family/caregiver demonstrates understanding of disease process, treatment plan, medications, and discharge instructions  Description: Complete learning assessment and assess knowledge base.  Interventions:  - Provide teaching at level of understanding  - Provide teaching via preferred learning methods  Outcome: Progressing     Problem: GENITOURINARY - ADULT  Goal: Maintains or returns to baseline urinary function  Description: INTERVENTIONS:  - Assess urinary function  - Encourage oral fluids to ensure adequate hydration if ordered  - Administer IV fluids as ordered to ensure adequate hydration  - Administer ordered medications as needed  - Offer frequent toileting  - Follow urinary retention protocol if ordered  Outcome: Progressing     Problem: Nutrition/Hydration-ADULT  Goal: Nutrient/Hydration intake appropriate for improving, restoring or maintaining nutritional needs  Description: Monitor and assess patient's nutrition/hydration status for malnutrition. Collaborate with interdisciplinary team and initiate plan and interventions as ordered.  Monitor patient's weight and dietary intake as ordered or per policy. Utilize nutrition screening tool and intervene as necessary.  Determine patient's food preferences and provide high-protein, high-caloric foods as appropriate.     INTERVENTIONS:  - Monitor oral intake, urinary output, labs, and treatment plans  - Assess nutrition and hydration status and recommend course of action  - Evaluate amount of meals eaten  - Assist patient with eating if necessary   - Allow adequate time for meals  - Recommend/ encourage appropriate diets, oral nutritional supplements, and vitamin/mineral supplements  - Order, calculate, and assess calorie counts as needed  - Recommend, monitor, and adjust tube feedings and TPN/PPN based on assessed needs  - Assess need for intravenous fluids  - Provide specific nutrition/hydration education as appropriate  - Include patient/family/caregiver in decisions related to nutrition  Outcome: Progressing     Problem: SKIN/TISSUE INTEGRITY - ADULT  Goal: Skin Integrity remains intact(Skin Breakdown Prevention)  Description: Assess:  -Perform Jim assessment   -Clean and moisturize   -Inspect skin when repositioning, toileting, and assisting with ADLS  -Assess under medical devices   -Assess extremities for adequate circulation and sensation     Bed Management:  -Have minimal linens on bed & keep smooth, unwrinkled  -Change linens as needed when moist or perspiring    Toileting:  -Offer bedside commode  -Assess for incontinence   -Use incontinent care products after each incontinent episode     Activity:  -Encourage activity and walks on unit  -Encourage or provide ROM exercises   -Turn and reposition patient   -Use appropriate equipment to lift or move patient in bed    Skin Care:  -Avoid use of baby powder, tape, friction and shearing, hot water or constrictive clothing  -Relieve pressure over bony prominences   -Do not massage red bony areas    Next Steps:  -Teach patient strategies to minimize risks   -Consider consults to  interdisciplinary teams   Outcome: Progressing  Goal: Incision(s), wounds(s) or drain site(s)  healing without S/S of infection  Description: INTERVENTIONS  - Assess and document dressing, incision, wound bed, drain sites and surrounding tissue  - Provide patient and family education  - Perform skin care/dressing changes   Outcome: Progressing  Goal: Pressure injury heals and does not worsen  Description: Interventions:  - Implement low air loss mattress or specialty surface (Criteria met)  - Apply silicone foam dressing  - Instruct/assist with weight shifting   - Use special pressure reducing interventions   - Apply fecal or urinary incontinence containment device   - Perform passive or active ROM  - Turn and reposition patient & offload bony prominences  - Utilize friction reducing device or surface for transfers   - Consider consults to  interdisciplinary teams   - Use incontinent care products after each incontinent episode   - Consider nutrition services referral as needed  Outcome: Progressing     Problem: Prexisting or High Potential for Compromised Skin Integrity  Goal: Skin integrity is maintained or improved  Description: INTERVENTIONS:  - Identify patients at risk for skin breakdown  - Assess and monitor skin integrity  - Assess and monitor nutrition and hydration status  - Monitor labs   - Assess for incontinence   - Turn and reposition patient  - Assist with mobility/ambulation  - Relieve pressure over bony prominences  - Avoid friction and shearing  - Provide appropriate hygiene as needed including keeping skin clean and dry  - Evaluate need for skin moisturizer/barrier cream  - Collaborate with interdisciplinary team   - Patient/family teaching  - Consider wound care consult   Outcome: Progressing     Problem: Potential for Falls  Goal: Patient will remain free of falls  Description: INTERVENTIONS:  - Educate patient/family on patient safety including physical limitations  - Instruct patient to call for assistance with activity   - Consult OT/PT to assist with strengthening/mobility   - Keep  Call bell within reach  - Keep bed low and locked with side rails adjusted as appropriate  - Keep care items and personal belongings within reach  - Initiate and maintain comfort rounds  - Make Fall Risk Sign visible to staff  - Obtain necessary fall risk management equipment  - Apply yellow socks and bracelet for high fall risk patients  - Consider moving patient to room near nurses station  Outcome: Progressing

## 2024-04-17 NOTE — ASSESSMENT & PLAN NOTE
Baseline no O2 requirement.  POA O2 satting 87% on room air. Requiring 2 L NC.  B/L LE no edema. No calf tenderness.  Recent cold-like symptoms with productive cough.  Tachycardia- may 2/2 infections. Unable to assess CP.  Last echo 1/24: LVEF 60-65%, G1DD.  CXR no CP disease  Mostly bedbounded with h/o breast cancer.      Plan  Resolved

## 2024-04-17 NOTE — PLAN OF CARE
Problem: PAIN - ADULT  Goal: Verbalizes/displays adequate comfort level or baseline comfort level  Description: Interventions:  - Encourage patient to monitor pain and request assistance  - Assess pain using appropriate pain scale  - Administer analgesics based on type and severity of pain and evaluate response  - Implement non-pharmacological measures as appropriate and evaluate response  - Consider cultural and social influences on pain and pain management  - Notify physician/advanced practitioner if interventions unsuccessful or patient reports new pain  Outcome: Progressing     Problem: INFECTION - ADULT  Goal: Absence or prevention of progression during hospitalization  Description: INTERVENTIONS:  - Assess and monitor for signs and symptoms of infection  - Monitor lab/diagnostic results  - Monitor all insertion sites, i.e. indwelling lines, tubes, and drains  - Monitor endotracheal if appropriate and nasal secretions for changes in amount and color  - [place] appropriate cooling/warming therapies per order  - Administer medications as ordered  - Instruct and encourage patient and family to use good hand hygiene technique  - Identify and instruct in appropriate isolation precautions for identified infection/condition  Outcome: Progressing     Problem: SAFETY ADULT  Goal: Patient will remain free of falls  Description: INTERVENTIONS:  - Educate patient/family on patient safety including physical limitations  - Instruct patient to call for assistance with activity   - Consult OT/PT to assist with strengthening/mobility   - Keep Call bell within reach  - Keep bed low and locked with side rails adjusted as appropriate  - Keep care items and personal belongings within reach  - Initiate and maintain comfort rounds  - Make Fall Risk Sign visible to staff  - Apply yellow socks and bracelet for high fall risk patients  - Consider moving patient to room near nurses station  Outcome: Progressing     Problem: DISCHARGE  PLANNING  Goal: Discharge to home or other facility with appropriate resources  Description: INTERVENTIONS:  - Identify barriers to discharge w/patient and caregiver  - Arrange for needed discharge resources and transportation as appropriate  - Identify discharge learning needs (meds, wound care, etc.)  - Arrange for interpretive services to assist at discharge as needed  - Refer to Case Management Department for coordinating discharge planning if the patient needs post-hospital services based on physician/advanced practitioner order or complex needs related to functional status, cognitive ability, or social support system  Outcome: Progressing     Problem: Knowledge Deficit  Goal: Patient/family/caregiver demonstrates understanding of disease process, treatment plan, medications, and discharge instructions  Description: Complete learning assessment and assess knowledge base.  Interventions:  - Provide teaching at level of understanding  - Provide teaching via preferred learning methods  Outcome: Progressing     Problem: GENITOURINARY - ADULT  Goal: Maintains or returns to baseline urinary function  Description: INTERVENTIONS:  - Assess urinary function  - Encourage oral fluids to ensure adequate hydration if ordered  - Administer IV fluids as ordered to ensure adequate hydration  - Administer ordered medications as needed  - Offer frequent toileting  - Follow urinary retention protocol if ordered  Outcome: Progressing     Problem: Nutrition/Hydration-ADULT  Goal: Nutrient/Hydration intake appropriate for improving, restoring or maintaining nutritional needs  Description: Monitor and assess patient's nutrition/hydration status for malnutrition. Collaborate with interdisciplinary team and initiate plan and interventions as ordered.  Monitor patient's weight and dietary intake as ordered or per policy. Utilize nutrition screening tool and intervene as necessary. Determine patient's food preferences and provide  high-protein, high-caloric foods as appropriate.     INTERVENTIONS:  - Monitor oral intake, urinary output, labs, and treatment plans  - Assess nutrition and hydration status and recommend course of action  - Evaluate amount of meals eaten  - Assist patient with eating if necessary   - Allow adequate time for meals  - Recommend/ encourage appropriate diets, oral nutritional supplements, and vitamin/mineral supplements  - Order, calculate, and assess calorie counts as needed  - Recommend, monitor, and adjust tube feedings and TPN/PPN based on assessed needs  - Assess need for intravenous fluids  - Provide specific nutrition/hydration education as appropriate  - Include patient/family/caregiver in decisions related to nutrition  Outcome: Progressing     Problem: SKIN/TISSUE INTEGRITY - ADULT  Goal: Skin Integrity remains intact(Skin Breakdown Prevention)  Description: Assess:  -Inspect skin when repositioning, toileting, and assisting with ADLS  -Assess extremities for adequate circulation and sensation     Bed Management:  -Have minimal linens on bed & keep smooth, unwrinkled  -Change linens as needed when moist or perspiring    Toileting:  -Offer bedside commode    Activity:  -Encourage activity and walks on unit  -Encourage or provide ROM exercises   -Use appropriate equipment to lift or move patient in bed    Skin Care:  -Avoid use of baby powder, tape, friction and shearing, hot water or constrictive clothing  -Do not massage red bony areas      Outcome: Progressing  Goal: Incision(s), wounds(s) or drain site(s) healing without S/S of infection  Description: INTERVENTIONS  - Assess and document dressing, incision, wound bed, drain sites and surrounding tissue  - Provide patient and family education  Outcome: Progressing  Goal: Pressure injury heals and does not worsen  Description: Interventions:  - Implement low air loss mattress or specialty surface (Criteria met)  - Apply silicone foam dressing  - Apply fecal  or urinary incontinence containment device   - Utilize friction reducing device or surface for transfers   - Consider nutrition services referral as needed  Outcome: Progressing     Problem: Prexisting or High Potential for Compromised Skin Integrity  Goal: Skin integrity is maintained or improved  Description: INTERVENTIONS:  - Identify patients at risk for skin breakdown  - Assess and monitor skin integrity  - Assess and monitor nutrition and hydration status  - Monitor labs   - Assess for incontinence   - Turn and reposition patient  - Assist with mobility/ambulation  - Relieve pressure over bony prominences  - Avoid friction and shearing  - Provide appropriate hygiene as needed including keeping skin clean and dry  - Evaluate need for skin moisturizer/barrier cream  - Collaborate with interdisciplinary team   - Patient/family teaching  - Consider wound care consult   Outcome: Progressing

## 2024-04-17 NOTE — ASSESSMENT & PLAN NOTE
Patient's chest abdomen and pelvis CT scan revealed no evidence of PE, opacities in the left upper and lower lobe base concerning for pneumonia, bilateral nodular opacities concerning for septic emboli, and a pulmonary nodules that could be concerning for metastasis.  In addition, patient had a cystic lesion in the head of the pancreas, as well as a cystic lesion in the ovary.    Plan:  -ID consulted and provided abx recs  -Echocardiogram unremarkable   -GI consulted for pancreatic cyst, per daughter no further intervention

## 2024-04-17 NOTE — PLAN OF CARE
Cont puree diet w/ thin liquids  Pt w/ limited potential to advance diet due to distractions, decreased attn to task, limited amts taken, consider ensure supplement as pt takes liquids better than foods.   No immed coughing noted with thin liquids by straw  Cont meds crushed  Aspiration precautions

## 2024-04-17 NOTE — PROGRESS NOTES
Progress Note - Infectious Disease   Monika Butler 83 y.o. female MRN: [id number]  Unit/Bed#: S -01 Encounter: [id number]      Impression/Plan:    SIRS vs. Sepsis, present on arrival; fever, tachycardia, leukocytosis  -Suspect a pulmonary source as below.  RP2 positive for human metapneumovirus.  Also consider bacteremia or urinary tract infection.  Blood cultures negative to date, urine culture grew Enterococcus. These could also be reactive fevers and leukocytosis to a malignant process.  No other clear source.  CT AP negative for infectious and abdominal process, LFTs normal, COVID/flu/RSV PCR negative. Patient now afebrile.  -Antibiotics below  -Follow-up blood cultures  -Repeat CBC tomorrow a.m. to trend leukocytosis  -Trend fever curve and hemodynamics     2. Metapneumovirus +/- secondary bacterial pneumonia  -Patient with respiratory symptoms, nasal congestion and abnormal CT as below. RP2 positive for human metapneumovirus. Given infiltrates and elevated procalctionin, also consider possibility of secondary bacterial infection. MRSA unlikely with negative nares culture. Sputum culture was contaminated sample. She is improving overall, now on room air and procalcitonin decreasing.  -Narrow Cefepime to IV Ceftriaxone 1g  -Supportive care for metapneumovirus  -Monitor respiratory status, fever curve  -Repeat CBC tomorrow to monitor WBC  -Repeat procalcitonin tomorrow AM  -May be able to transition to PO Cefdinir over next 24 hours and complete total 7 days of antibiotic, through 4/20/2024    3.  Bilateral pulmonary nodules with left lung opacities  -Found on CT scan on arrival.  Suspect related to human metapneumovirus infection, possibly with secondary bacterial pneumoina given elevated procalcitonin. Also consider the possibility of aspiration changes in patient with baseline dementia.  Septic emboli considered, though TTE negative for vegetation and would be less likely without bacteremia. Consider  metastatic lung disease in setting of pancreatic cystic lesion and prior history of breast cancer.  MRSA nares negative, COVID/Flu/RSV negative.  -Antibiotic plan as above  -Follow up final blood cultures  -Recommend aspiration precautions and speech therapy recs    4. Enterococcus faecalis bacteriuria:  -UA with innumerable WBCs and culture positive for Enterococcus. Patient denies dysuria at the moment, though history limited due to mental status. CT 4/14 with unremarkable kidneys, ureters and bladder.    -Unclear if this is truly UTI vs. Asymptomatic bacteriuria vs. Contamination  -Patient completed 3 days of Vancomycin which would be sufficient for Enterococcus cystitis, no further treatment recommended    5. Acute encephalopathy in setting of baseline dementia  -Present on arrival. Suspect may be toxic metabolic in setting of infection in patient with poor neurologic reserve. CT head negative for acute infarct or hemorrhage. She has had marked improvement as of 4/16, now alert alert and communicative.   -Close monitoring of mental status  -Serial neurologic exams     6. Pancreatic cystic lesion  -Seen on CT 4/14. GI consulted who discussed with family who has elected for conservative treatment measures for this at this time.   -GI recs appreciated     7. History of breast cancer status post left mastectomy (2005)     8. Left temporal meningioma  -Known diagnosis.  Stable on CT head on 4/14..    Plan and recommendations were discussed with primary team.  They agree with plan to narrow to IV Ceftriaxone.    Antibiotics:  Total abx days: 4  Ceftriaxone: 1    24 Hour Events:  Afebrile. Patient on room air now.     Subjective:  Patient alert. Reports some ongoing cough, but no shortness of breath, fever, chills, abdominal pain.     Objective:  Vitals:  Temp:  [97.2 °F (36.2 °C)-98.1 °F (36.7 °C)] 97.2 °F (36.2 °C)  HR:  [56-89] 56  Resp:  [16-18] 17  BP: (158-182)/(88-98) 176/90  SpO2:  [92 %-100 %] 100 %  Temp  (24hrs), Av.6 °F (36.4 °C), Min:97.2 °F (36.2 °C), Max:98.1 °F (36.7 °C)  Current: Temperature: (!) 97.2 °F (36.2 °C)    Physical Exam:   General Appearance:  Alert, interactive, no acute distress, with intermittent wet sounding cough   Throat: Oropharynx moist without lesions.    Lungs:   Respirations unlabored, scattered crackles in bilateral lung bases   Heart:  RRR   Abdomen:   Soft, non-tender.     Extremities: No clubbing, cyanosis or edema   Skin: No new rashes       Labs:   All pertinent labs and imaging studies were personally reviewed  Results from last 7 days   Lab Units 24  0937 24  0000 04/15/24  0244   WBC Thousand/uL 20.39* 20.59* 23.70*   HEMOGLOBIN g/dL 10.3* 10.5* 9.4*   PLATELETS Thousands/uL 217 214 195     Results from last 7 days   Lab Units 24  0533 24  0531 04/15/24  0244 24  1835   SODIUM mmol/L 141 145 143 146   POTASSIUM mmol/L 2.9* 3.0* 3.8 3.6   CHLORIDE mmol/L 104 104 105 103   CO2 mmol/L 30 32 32 32   BUN mg/dL 16 35* 38* 38*   CREATININE mg/dL 0.46* 0.64 0.98 1.27   EGFR ml/min/1.73sq m 91 82 53 38   CALCIUM mg/dL 8.8 9.4 8.4 9.8   AST U/L  --   --   --  23   ALT U/L  --   --   --  13   ALK PHOS U/L  --   --   --  79     Results from last 7 days   Lab Units 24  0000 04/15/24  0244 24  1835   PROCALCITONIN ng/ml 5.71* 8.95* 7.25*                   Micro:  Results from last 7 days   Lab Units 04/15/24  1125 04/15/24  0247 24  1903 24  1846 24  1835   BLOOD CULTURE   --   --   --  No Growth at 48 hrs. No Growth at 48 hrs.   SPUTUM CULTURE  Test not performed. Suggest repeat specimen.  --   --   --   --    GRAM STAIN RESULT  >10 squamous epithelial cells/lpf, indicating orophayngeal contamination.*  2+ Polys*  4+ Gram positive rods*  3+ Gram negative rods*  1+ Gram positive cocci in pairs and chains*  Rare Yeast*  --   --   --   --    URINE CULTURE   --   --  >100,000 cfu/ml Enterococcus faecalis*  --   --    MRSA  CULTURE ONLY   --  No Methicillin Resistant Staphlyococcus aureus (MRSA) isolated  --   --   --        Imaging:          Pablo Cooper MD  [facility]

## 2024-04-17 NOTE — ASSESSMENT & PLAN NOTE
Home meds: Losartan 100 mg daily, metoprolol 50 mg every morning, 25 mg nightly. Clonidine patch.  Restart losartan at dc

## 2024-04-17 NOTE — PROGRESS NOTES
Progress Note - Geriatric Medicine   Monika Butler 83 y.o. female MRN: [id number]  Unit/Bed#: S -01 Encounter: [id number]      Assessment/Plan:  Dementia with behavioral disturbance  Patient is alert oriented to name and partial birthday only on exam  Behaviors at facility have been well-managed, although occasional issues with anxiety  Patient has not had any issues with agitation or behaviors since being hospitalized  Received medication list from [facility] and patient was maintained on buspirone 10 mg twice daily, Depakote 125 mg twice daily, donepezil 10 mg daily, lorazepam 0.25 mg every 6 as needed, melatonin 10 mg nightly, mirtazapine 15 mg nightly, and olanzapine 5 mg twice daily  After discussion with patient's daughter yesterday she feels that mom is overmedicated and wants some of the medication weaned off, on 4/16/2024 I discussed with her that the olanzapine and lorazepam was discontinued-on 4/17/2024 discussed with her about weaning down the BuSpar to 5 mg twice a day, and then can be weaned off as tolerated at facility  EKG admission 533, antipsychotics held  Continue to hold olanzapine, recommend rechecking EKG  Patient was also on Depakote at facility, LFTs normal from blood work on admission  Olanzapine and lorazepam discontinued on admission-do not recommend restarting  Depakote can be restarted if needed, try to wean BuSpar down to 5 mg twice daily, will reevaluate tomorrow to see how patient did overnigh  Patient did have behaviors and agitation at facility initially on admission, do not want to abruptly stop all medications and patient behaviors and agitation  Patient was also followed with in-house psychiatry psychiatry but was who is helping prescribe all his medications  Discussed patient's history, examination, medication list at facility and here with Dr. Billingsley who recommended that both lorazepam and Zyprexa not be restarted, BuSpar be weaned down to 5 mg and weaned off  completely if tolerated, okay to restart mirtazapine nightly if patient has any issues with insomnia, depression and okay to restart Depakote if needed for behaviors, agitation  At risk for delirium due to dementia, medications, sleep disturbance, sepsis  Recommend delirium precautions  Maintain sleep-wake cycle, avoid nighttime interruptions  minimize overnight interruptions, group overnight vitals/labs/nursing checks as possible  dim lights, close blinds and turn off tv to minimize stimulation and encourage sleep environment in evenings  Provide adequate pain control  Avoid urinary retention and constipation  Provide frequent and early mobilization  Provide frequent redirection and reorientation as needed  Avoid medications that may worsen or precipitate delirium such as tramadol, benzodiazepines, anticholinergics, and Benadryl  Redirect unwanted behaviors as first-line therapy, avoid physical restraints as able to  Continue to monitor    Severe sepsis  Met SIRS criteria with hypothermia, tachycardia, and leukocytosis  Urine culture positive for enterococcus faecalis  Blood cultures show no growth at 48 hours  Cefepime and vancomycin were discontinued, transition to Rocephin  Infectious disease following  Viral panel positive for H meta pneumovirus  Management per ID and primary team    Acute respiratory failure with hypoxia  Patient with cough  Has been weaned off oxygen, SpO2 stable on room air  Chest x-ray with no acute cardiopulmonary disease  Viral panel positive for H and metapneumovirus, patient now on contact precautions  Patient was transition from cefepime to IV ceftriaxone with plan to transition to p.o. cefdinir in the next 24 hours for 7 days total of antibiotics    Insomnia  First-line treatment is behavior modification  Maintain sleep-wake cycle, avoid nighttime interruptions  Avoid caffeine throughout the day  Avoid napping throughout the day  Encourage physical activity throughout the day  Avoid  sedative hypnotics including benzodiazepines and benadryl  At facility patient was taking melatonin 10 mg nightly and mirtazapine 15 mg nightly  Patient currently on melatonin 9 mg nightly    Anxiety/depression  Patient was also followed with in-house psychiatry psychiatry but was who is helping prescribe all his medications  At facility patient was on multiple medications including buspirone 10 mg twice daily, Depakote 125 mg twice daily, lorazepam 0.25 mg every 6 as needed, mirtazapine 15 mg nightly, and olanzapine 5 mg twice daily  Patient's daughter had concerns regarding patient being overmedicated  Olanzapine has been held this hospitalization due to prolonged QTc of 533  Patient has been doing well off of the olanzapine and as needed lorazepam  Lorazepam was discontinued also, do not recommend benzodiazepines in older adults due to increased risk of falls and confusion and agree with discontinuing  Discussed patient's history, examination, medication list at facility and here with Dr. Billingsley who recommended that both lorazepam and Zyprexa not be restarted, BuSpar be weaned down to 5 mg and weaned off completely if tolerated, okay to restart mirtazapine nightly if patient has any issues with insomnia, depression and okay to restart Depakote if needed for behaviors, agitation-will discuss recommendations with Dr Lafleur via phone   Continue to provide emotional support    Ambulatory dysfunction  At a baseline ambulates patient is mostly wheelchair-bound  PT/OT following  Fall precautions  Out of bed as tolerated  Encourage early and frequent mobilization  Encourage adequate hydration and nutrition  Provide adequate pain management   Goal is to return to facility  Continue with PT/OT for continued strength and balance training     Subjective:   Monika is an 83-year-old female being seen for a geriatrics follow-up.  Upon exam patient was sitting up in bed, resting.  She was slightly anxious on exam, frequently  "playing and rearranging with her blankets.  Nursing reports she slept well overnight.  Ate a small amount of her breakfast and took her medications this morning.  No issues with agitation or aggression overnight or today.  She last had a bowel movement yesterday.  Per nursing no acute concerns or issues at this time.    Daughter Anitha updated via phone, all questions answered.  Updated on plan for medications including weaning down the BuSpar to 5 mg twice a day from 10 mg twice a day.  Discussed with her that we are okay with them restarting the mirtazapine if needed which may help with sleep, depression, and appetite.  Also discussed if she starts to have some anxiety or behaviors, we are also okay with them restarting the Depakote.  She understood and was agreeable.    Review of Systems   Reason unable to perform ROS: limited by dementia.   Respiratory:  Negative for shortness of breath.    Musculoskeletal:  Positive for gait problem. Negative for arthralgias and back pain.   Neurological:  Positive for weakness.   Psychiatric/Behavioral:  Positive for confusion. Negative for sleep disturbance. The patient is not nervous/anxious.          Objective:     Vitals: Blood pressure (!) 176/90, pulse 56, temperature (!) 97.2 °F (36.2 °C), temperature source Axillary, resp. rate 17, height 5' 1\" (1.549 m), weight 48.1 kg (106 lb), SpO2 100%.,Body mass index is 20.03 kg/m².      Intake/Output Summary (Last 24 hours) at 4/17/2024 1235  Last data filed at 4/16/2024 1408  Gross per 24 hour   Intake 1498.75 ml   Output --   Net 1498.75 ml       Current Medications: Reviewed    Physical Exam:   Physical Exam  Vitals reviewed.   Constitutional:       General: She is sleeping. She is not in acute distress.     Appearance: She is well-developed. She is not ill-appearing.      Comments: Frail appearing    HENT:      Head: Normocephalic and atraumatic.   Cardiovascular:      Rate and Rhythm: Normal rate and regular rhythm.      " "Heart sounds: No murmur heard.  Pulmonary:      Effort: Pulmonary effort is normal. No respiratory distress.      Breath sounds: Normal breath sounds.      Comments: 2L NC  Abdominal:      General: Bowel sounds are normal. There is no distension.      Palpations: Abdomen is soft.      Tenderness: There is no abdominal tenderness.   Musculoskeletal:         General: No swelling.      Right lower leg: No edema.      Left lower leg: No edema.   Skin:     General: Skin is warm and dry.      Coloration: Skin is pale.   Neurological:      Mental Status: She is disoriented.      Cranial Nerves: No cranial nerve deficit.      Motor: Weakness present.      Gait: Gait abnormal.          Invasive Devices       Peripheral Intravenous Line  Duration             Peripheral IV 04/14/24 Right Forearm 2 days                    Lab, Imaging and other studies:    Lab Results:   I have personally reviewed pertinent lab results including the following:  -CBC, procalcitonin, BMP, urine culture    I have personally reviewed the following imaging study reports in PACS:  No new imaging to review  - I have personally reviewed pertinent reports.      Please note:  Voice-recognition software may have been used in the preparation of this document.  Occasional wrong word or \"sound-alike\" substitutions may have occurred due to the inherent limitations of voice recognition software.  Interpretation should be guided by context.    "

## 2024-04-17 NOTE — CASE MANAGEMENT
Support Center has received DENIAL for SNF Authorization.   Insurance: [employer]  Denial Reason: does not meet CMS guidelines  Facility: [facility]   Denial #: [id number]   Peer to Peer Phone#:  [phone] opt 1  Deadline: 4/17 @ 4:30p    Care Manager notified: Dee Dee Mukherjee

## 2024-04-17 NOTE — ASSESSMENT & PLAN NOTE
Known history of dementia with behavioral disturbances. Baseline disoriented, on and off able to conduct very simple conversation/ follow very easy commands.  POA with worsening lethargy but not restless or combative.  Based on her last hospitalization 1/24 per Psych recs: Recommending to avoid Ativan for sedation/agitation, decreased Zyprexa to 1.25 mg p.o. daily in the morning and Zyprexa 5 mg p.o. nightly  Meds in the facility: BuSpar 10 mg twice daily, Zyprexa 5 mg twice daily, Remeron 15 mg nightly,  Ativan 0.25 mg every 6 hours as needed.  POA Qtc 533.  Yasmin consult, follow recommendations per their note - no ativan or zyprexa, okay with mirtazapine and depakote

## 2024-04-17 NOTE — CASE MANAGEMENT
Case Management Discharge Planning Note    Patient name Moniak Butler  Location S /S -01 MRN [id number]  : 1940 Date 2024       Current Admission Date: 2024  Current Admission Diagnosis:Severe sepsis (HCC)   Patient Active Problem List    Diagnosis Date Noted    Mild protein-calorie malnutrition (HCC) 2024    Abnormal CT scan 04/15/2024    Severe sepsis (HCC) 2024    QT prolongation 2024    Acute respiratory failure with hypoxia (HCC) 2024    RAFAEL (acute kidney injury) (HCC) 2024    Thrush, oral 2024    Elevated troponin 2024    Constipation 2023    Nondisplaced fracture of triquetrum (cuneiform) bone, left wrist, initial encounter for closed fracture 2023    Abrasion of left eyebrow 2023    Injury of left wrist 2023    Insomnia 2023    Anxiety 2023    Anemia 2023    H/O clavicle fracture 2023    Meningioma (HCC) 2023    Pulmonary nodule 2023    Bad odor of urine 2023    Postmenopausal 2023    Prediabetes 2023    Gastroesophageal reflux disease with esophagitis without hemorrhage 2023    Generalized weakness 2022    Urinary frequency 2022    BMI 22.0-22.9, adult 2021    History of breast cancer 2021    Altered mental status 2020    Balance problems 2020    Dementia with behavioral disturbance (HCC)     Memory change 10/26/2020    Ear fullness, left 2017    Hyperlipidemia 2016    Breast cancer (HCC) 2013    Essential hypertension 2012      LOS (days): 3  Geometric Mean LOS (GMLOS) (days): 5.1  Days to GMLOS:2.4     OBJECTIVE:  Risk of Unplanned Readmission Score: 34.27         Current admission status: Inpatient   Preferred Pharmacy:   [id number] - [address] [address]  Phone: [phone] Fax: [phone]    [facility]  Provider: Kristin Raymundo MD    Primary Insurance: [facility] REP  Secondary Insurance:     DISCHARGE DETAILS:                         CM advised by provider that patient will likely be medically stable for discharge tomorrow, 4/18/2024    As per CM Discharge Support, patient's insurance authorization for PT/OT services at her LTC facility ([facility]) has been denied due to lack of CMS criteria. CM spoke with Marianela in admissions -- She will attempt to get approval via patient's Part B plan. Patient may return to her facility without authorization as she is a LTC resident there. Marianela verbalized understanding that patient will likely be ready for discharge tomorrow, 4/18/2024, and confirmed she can accommodate.    CM spoke with ricarda Welsh to provide update re: patient's medical stability and reiterate discharge plan. Anitha verbalized understanding of likely discharge tomorrow and denied intent to appeal at this time.    Anitha expressed interest in switching LTC facilities to a location closer to [place] -- CM reviewed that patient cannot transfer facilities during her IP admission due to the comprehensive steps needed to complete this process, however [facility] services should be available to assist. CM verbally provided list of nearby facilities to [place], [place], including [facility], \[facility]\"" [facility], and [facility]. CM encouraged Anitha to schedule tours and speak with their business offices directly.                                                                 IMM Given (Date):: 04/17/24  IMM Given to:: Family (Daughter Anitha)          IMM reviewed with daughter Anitha, daughter Anitha agrees with discharge determination.

## 2024-04-17 NOTE — ASSESSMENT & PLAN NOTE
Likely second to sepsis, poor p.o. intake.  SLP recommend dysphagia diet with thin liquids, but per nurse she was choking on thins, thus switched back to nectar thick  PT/ OT evaluated patient

## 2024-04-17 NOTE — SPEECH THERAPY NOTE
"Chest pressure/dyspnea  on exertion - plan right/left heart cath tomorrow, cont clopidogrel, asp, statin, imdur, metoprolol, amlodipine.  (No DVT on US)  CM - possible - await cath with LVEDP/RHC - consider cardiac MRI to assess EF given limitations of echo  Afib - cont amiodarone, hold eliquis for cath    Current Facility-Administered Medications   Medication    acetaminophen (TYLENOL) tablet 650 mg    Or    acetaminophen (TYLENOL) Suppository 650 mg    albuterol (PROVENTIL HFA/VENTOLIN HFA) inhaler    amiodarone (PACERONE) tablet 200 mg    aspirin EC tablet 81 mg    atorvastatin (LIPITOR) tablet 40 mg    bumetanide (BUMEX) tablet 2 mg    clopidogrel (PLAVIX) tablet 75 mg    DULoxetine (CYMBALTA) DR capsule 30 mg    gabapentin (NEURONTIN) capsule 300 mg    gabapentin (NEURONTIN) capsule 900 mg    HYDROmorphone (DILAUDID) half-tab 1 mg    HYDROmorphone (DILAUDID) injection 0.2 mg    ipratropium - albuterol 0.5 mg/2.5 mg/3 mL (DUONEB) neb solution 3 mL    isosorbide mononitrate (IMDUR) 24 hr tablet 180 mg    lidocaine (LMX4) cream    lidocaine 1 % 0.1-1 mL    lisinopril (ZESTRIL) tablet 5 mg    melatonin tablet 1 mg    ondansetron (ZOFRAN ODT) ODT tab 4 mg    Or    ondansetron (ZOFRAN) injection 4 mg    piperacillin-tazobactam (ZOSYN) 3.375 g vial to attach to  mL bag    rOPINIRole (REQUIP) tablet 4 mg    sodium chloride (PF) 0.9% PF flush 3 mL    sodium chloride (PF) 0.9% PF flush 3 mL     No past medical history on file.     Review of Systems: 12 points negative other than above    /55 (BP Location: Left arm)   Pulse 72   Temp 97.8  F (36.6  C) (Oral)   Resp 18   Ht 1.676 m (5' 6\")   Wt 117.6 kg (259 lb 3.2 oz)   SpO2 94%   BMI 41.84 kg/m    JVP<7cm, carotids normal  Lungs clear  Cor RRR no c,r,m  Abs soft +BS, no mass  Ext no c,c,e    Lab Results   Component Value Date    HGB 10.9 (L) 09/10/2023     Lab Results   Component Value Date     09/10/2023     No results found for: CREATININE  No " Speech Language/Pathology    Speech/Language Pathology Progress Note    Patient Name: Monika Butler  Today's Date: 4/17/2024         Subjective:  Pt seen for dysphagia tx follow up at breakfast. Baseline diet at [facility] is minced & moist; pt sitting upright w/ rambling speech, fidgety, distracted.   Objective:  Pt took serving of pureed bananas and 1/2 pureed eggs, 4 oz of OJ by straw. Pt talking throughout, needed prompts and cues to open mouth, usually only taking 1/2 tsp amts. Pt w/ variable manipulation, at times appears to hold bolus. Cues given to swallow at times. Delayed transfer noted. Swallow initiation appeared timely. Moist cough noted throughout session, prior to po given and delayed after swallowing, do not suspect aspiration risk, pt's voice is clear after swallowing thin liquids.     Assessment:  Overall po intake is poor, but pt tolerated puree w/ thin liquids  Low suspicion for  aspiration.   Suspect potential to advance diet is limited due to cognitive status, decreased attn and distractibility.     Plan/Recommendations:  Cont puree w/ thin liquids  Consider ensure supplement- pt takes liquids better than foods  Meds crushed  Aspiration precautions  Will follow up x 1-2 as needed      Jackeline Centeno MA CCC-SLP  Speech Pathologist  [place] license # [id number]  [place] license # [id number]  Available via [employer]       components found for: K    Imp/plan:      Cedrick Corbett MD  Interventional Cardiology   [facility]  [phone]

## 2024-04-17 NOTE — PROGRESS NOTES
[facility]  Progress Note  Name: Monika Butler I  MRN: [id number]  Unit/Bed#: S -01 I Date of Admission: 4/14/2024   Date of Service: 4/17/2024 I Hospital Day: 3    Assessment/Plan   * Severe sepsis (HCC)  Assessment & Plan  Lab Results   Component Value Date    WBC 20.39 (H) 04/16/2024    WBC 20.59 (H) 04/16/2024    WBC 23.70 (H) 04/15/2024    PROCALCITONI 5.71 (H) 04/16/2024    PROCALCITONI 8.95 (H) 04/15/2024    PROCALCITONI 7.25 (H) 04/14/2024     Met SRIS criteria with hypothermia, tachycardia, leukocytosis. With unclear source - URI vs UTI vs pyelonephritis vs GI?  With initial lactic acid 2.5, new acute respiratory failure with hypoxia, elevated Cr.   Productive cough with whitish mucus for the last 1 week.  Poor historian-unable to assess accurate ROS.  UA positive for leukocytes, occult blood, bacteria.  Flu/RSV/COVID-negative.  CXR per my reading with no obvious infiltrates or consolidation  Received septic fluid and 1 dose of Rocephin in the ED.  Patient positive for metapneumovirus plus or minus overlying bacterial pneumonia, being treated as such    Plan:  Switch to IV ceftriaxone per ID  Can consider cefdinir at discharge  Stop vancomycin per ID  Urine culture positive for Enterococcus faecalis, susceptible to ampicillin, levofloxacin, and nitrofurantion  Procal remains elevated   Trend WBC/Fever curve  IV Fluid hydration  ID consulted, appreciate recs  Echocardiogram unremarkable     Mild protein-calorie malnutrition (HCC)  Assessment & Plan  Malnutrition Findings:   Adult Malnutrition type: Acute illness (in the setting of chronic illness)  Adult Degree of Malnutrition: Malnutrition of mild degree  Malnutrition Characteristics: Weight loss, Inadequate energy                  360 Statement: Mild malnutrition related to inadequate oral intake as evidenced by loss of subcutaneous fat, 15.2% weight decrease x 13 months. Currently treated with oral supplementation.    BMI  Findings:           Body mass index is 20.03 kg/m².       Abnormal CT scan  Assessment & Plan  Patient's chest abdomen and pelvis CT scan revealed no evidence of PE, opacities in the left upper and lower lobe base concerning for pneumonia, bilateral nodular opacities concerning for septic emboli, and a pulmonary nodules that could be concerning for metastasis.  In addition, patient had a cystic lesion in the head of the pancreas, as well as a cystic lesion in the ovary.    Plan:  -ID consulted  -Echocardiogram unremarkable   -GI consulted for pancreatic cyst, per daughter no further intervention    RAFAEL (acute kidney injury) (HCC)  Assessment & Plan  Lab Results   Component Value Date    CREATININE 0.46 (L) 04/17/2024    CREATININE 0.64 04/16/2024    CREATININE 0.98 04/15/2024     Baseline Cr 0.8-0.9  POA 1.27. Baseline urinary incontinence- daughter reported urination x2 on 4/14.  In the setting of poor oral intake.  UA positive for leukocytes, occult blood, RBC, casts.  Received 1L isolytes in the ED.    Plan:  Urinary retention protocol, Bladder scan, Daily weights, I/O  Monitor BMP daily and observe for downward trend of creatinine  Avoid hypoperfusion of the kidneys, minimize nephrotoxins  RAAS Blockers/Diuretics held: Losartan.  Resolved     Acute respiratory failure with hypoxia (HCC)  Assessment & Plan  Baseline no O2 requirement.  POA O2 satting 87% on room air. Requiring 2 L NC.  B/L LE no edema. No calf tenderness.  Recent cold-like symptoms with productive cough.  Tachycardia- may 2/2 infections. Unable to assess CP.  Last echo 1/24: LVEF 60-65%, G1DD.  CXR per my reading: No fluid overload, pleural effusion, obvious infiltrates or consolidation.  Mostly bedbounded with h/o breast cancer.  Wean oxygen       Respiratory protocol, IS, scheduled albuterol nebulizer.  Wean off O2 if able.    QT prolongation  Assessment & Plan  POA QTc 533. Sinus tachy.  On buspirone 10 mg twice daily, Zyprexa 5 mg twice  daily, Remeron 15 mg nightly, Ativan 0.25mg every 6 hours as needed (last dose on 4/13 for 1 dose).  Follow Geriatric recs      Anemia  Assessment & Plan  Hbg stable at 10    Meningioma (HCC)  Assessment & Plan  With new onset worsening lethargy and recent fall, non-cooperative neuro PE.  Stable    Generalized weakness  Assessment & Plan  Likely second to sepsis, poor p.o. intake.  SLP recommend dysphagia diet with thin liquids  IV fluids  PT/ OT eval.    Dementia with behavioral disturbance (HCC)  Assessment & Plan  Known history of dementia with behavioral disturbances. Baseline disoriented, on and off able to conduct very simple conversation/ follow very easy commands.  POA with worsening lethargy but not restless or combative.  Based on her last hospitalization 1/24 per Psych recs: Recommending to avoid Ativan for sedation/agitation, decreased Zyprexa to 1.25 mg p.o. daily in the morning and Zyprexa 5 mg p.o. nightly  Meds in the facility: BuSpar 10 mg twice daily, Zyprexa 5 mg twice daily, Remeron 15 mg nightly,  Ativan 0.25 mg every 6 hours as needed.  POA Qtc 533.  Yasmin consult, follow recommendations per their note    Essential hypertension  Assessment & Plan  Home meds: Losartan 100 mg daily, metoprolol 50 mg every morning, 25 mg nightly. Clonidine patch.  Hold losartan in the setting of elevated creatinine.  Clonidine patch removed in the setting of lower-side blood pressure.  Monitor BP.             VTE Pharmacologic Prophylaxis: VTE Score: 7 High Risk (Score >/= 5) - Pharmacological DVT Prophylaxis Ordered: heparin. Sequential Compression Devices Ordered.    Mobility:   Basic Mobility Inpatient Raw Score: 8  JH-HLM Goal: 3: Sit at edge of bed  JH-HLM Achieved: 2: Bed activities/Dependent transfer  JH-HLM Goal NOT achieved. Continue with multidisciplinary rounding and encourage appropriate mobility to improve upon JH-HLM goals.    Patient Centered Rounds: I performed bedside rounds with nursing staff  today.   Discussions with Specialists or Other Care Team Provider: ID and GI    Education and Discussions with Family / Patient: Updated  (daughter) via phone.    Total Time Spent on Date of Encounter in care of patient: 25 mins. This time was spent on one or more of the following: performing physical exam; counseling and coordination of care; obtaining or reviewing history; documenting in the medical record; reviewing/ordering tests, medications or procedures; communicating with other healthcare professionals and discussing with patient's family/caregivers.    Current Length of Stay: 3 day(s)  Current Patient Status: Inpatient   Certification Statement: The patient will continue to require additional inpatient hospital stay due to sepsis  Discharge Plan: Anticipate discharge in 24-48 hrs to prior facility    Code Status: Level 1 - Full Code    Subjective:   Patient seen and examined at bedside, no acute events overnight.  Patient was on oxygen mask this morning.  Still confused, however was alert while speaking with her.    Objective:     Vitals:   Temp (24hrs), Av.6 °F (36.4 °C), Min:97.2 °F (36.2 °C), Max:98.1 °F (36.7 °C)    Temp:  [97.2 °F (36.2 °C)-98.1 °F (36.7 °C)] 97.2 °F (36.2 °C)  HR:  [56-89] 56  Resp:  [16-18] 17  BP: (158-182)/(88-98) 176/90  SpO2:  [92 %-100 %] 100 %  Body mass index is 20.03 kg/m².     Input and Output Summary (last 24 hours):     Intake/Output Summary (Last 24 hours) at 2024 1126  Last data filed at 2024 1408  Gross per 24 hour   Intake 1498.75 ml   Output 434 ml   Net 1064.75 ml       Physical Exam:   Physical Exam  Constitutional:       General: She is not in acute distress.     Appearance: Normal appearance.   HENT:      Head: Normocephalic and atraumatic.      Right Ear: External ear normal.      Left Ear: External ear normal.      Nose: Nose normal.      Mouth/Throat:      Pharynx: Oropharynx is clear.   Cardiovascular:      Rate and Rhythm: Normal  rate and regular rhythm.      Heart sounds: No murmur heard.  Pulmonary:      Effort: Pulmonary effort is normal. No respiratory distress.      Breath sounds: No wheezing or rales.      Comments: Mildly diminished breath sounds  Abdominal:      General: There is no distension.      Palpations: Abdomen is soft.      Tenderness: There is no abdominal tenderness.   Musculoskeletal:         General: No swelling or tenderness.   Skin:     General: Skin is warm and dry.      Coloration: Skin is pale.   Neurological:      Mental Status: She is alert. She is disoriented.      Comments: Patient confused at baseline due to severe dementia not oriented to person place or time.          Additional Data:     Labs:  Results from last 7 days   Lab Units 04/16/24  0937 04/16/24  0000 04/15/24  0244 04/14/24  1835   WBC Thousand/uL 20.39* 20.59*   < > 19.05*   HEMOGLOBIN g/dL 10.3* 10.5*   < > 11.3*   HEMATOCRIT % 33.2* 33.9*   < > 36.2   PLATELETS Thousands/uL 217 214   < > 261   BANDS PCT %  --   --   --  10*   SEGS PCT %  --  88*  --   --    LYMPHO PCT %  --  5*  --  2*   MONO PCT %  --  5  --  1*   EOS PCT %  --  1  --  0    < > = values in this interval not displayed.     Results from last 7 days   Lab Units 04/17/24  0533 04/15/24  0244 04/14/24  1835   SODIUM mmol/L 141   < > 146   POTASSIUM mmol/L 2.9*   < > 3.6   CHLORIDE mmol/L 104   < > 103   CO2 mmol/L 30   < > 32   BUN mg/dL 16   < > 38*   CREATININE mg/dL 0.46*   < > 1.27   ANION GAP mmol/L 7   < > 11   CALCIUM mg/dL 8.8   < > 9.8   ALBUMIN g/dL  --   --  3.9   TOTAL BILIRUBIN mg/dL  --   --  0.41   ALK PHOS U/L  --   --  79   ALT U/L  --   --  13   AST U/L  --   --  23   GLUCOSE RANDOM mg/dL 69   < > 137    < > = values in this interval not displayed.     Results from last 7 days   Lab Units 04/14/24  1835   INR  1.02             Results from last 7 days   Lab Units 04/16/24  0000 04/15/24  0244 04/14/24  2117 04/14/24  1835   LACTIC ACID mmol/L  --   --  1.2 2.5*    PROCALCITONIN ng/ml 5.71* 8.95*  --  7.25*       Lines/Drains:  Invasive Devices       Peripheral Intravenous Line  Duration             Peripheral IV 04/14/24 Right Forearm 2 days                          Imaging: Reviewed radiology reports from this admission including: chest CT scan    Recent Cultures (last 7 days):   Results from last 7 days   Lab Units 04/15/24  1125 04/14/24  1903 04/14/24  1846 04/14/24  1835   BLOOD CULTURE   --   --  No Growth at 48 hrs. No Growth at 48 hrs.   SPUTUM CULTURE  Test not performed. Suggest repeat specimen.  --   --   --    GRAM STAIN RESULT  >10 squamous epithelial cells/lpf, indicating orophayngeal contamination.*  2+ Polys*  4+ Gram positive rods*  3+ Gram negative rods*  1+ Gram positive cocci in pairs and chains*  Rare Yeast*  --   --   --    URINE CULTURE   --  >100,000 cfu/ml Enterococcus faecalis*  --   --        Last 24 Hours Medication List:   Current Facility-Administered Medications   Medication Dose Route Frequency Provider Last Rate    acetaminophen  650 mg Oral Q6H PRN Cristhian Sanders MD      albuterol  2.5 mg Nebulization TID Cristhian Sanders MD      benzonatate  100 mg Oral TID Cristhian Sanders MD      busPIRone  10 mg Oral BID Cristhian Sanders MD      cefTRIAXone  1,000 mg Intravenous Q24H Pablo Cooper MD 1,000 mg (04/17/24 1057)    docusate sodium  100 mg Oral BID Cristhian Sanders MD      donepezil  10 mg Oral HS Cristhian Sanders MD      guaiFENesin  600 mg Oral Q12H [facility] Cristhian Sanders MD      heparin (porcine)  5,000 Units Subcutaneous Q8H [facility] Cristhian Sanders MD      loratadine  10 mg Oral Daily Cristhian Sanders MD      melatonin  9 mg Oral HS Cristhian Sanders MD      metoprolol succinate  25 mg Oral HS Cristhian Sanders MD      metoprolol succinate  50 mg Oral QAM Cristhian Sanders MD      multi-electrolyte  75 mL/hr Intravenous Continuous Kathryn Alvarado MD 75 mL/hr (04/17/24 0136)    polyethylene glycol  17 g Oral Daily Cristhian Sanders MD      polyethylene glycol  17 g Oral PRN Cristhian Sanders MD      potassium chloride  20 mEq  Intravenous BID Kamla Lafleur MD 20 mEq (04/17/24 0842)    senna  8.8 mg Oral BID Cristhian Sanders MD          Today, Patient Was Seen By: Kamla Lafleur MD    **Please Note: This note may have been constructed using a voice recognition system.**

## 2024-04-18 ENCOUNTER — APPOINTMENT (INPATIENT)
Dept: CT IMAGING | Facility: HOSPITAL | Age: 84
DRG: 871 | End: 2024-04-18
Payer: COMMERCIAL

## 2024-04-18 ENCOUNTER — APPOINTMENT (INPATIENT)
Dept: RADIOLOGY | Facility: HOSPITAL | Age: 84
DRG: 871 | End: 2024-04-18
Payer: COMMERCIAL

## 2024-04-18 PROBLEM — G93.41 METABOLIC ENCEPHALOPATHY: Status: ACTIVE | Noted: 2024-04-18

## 2024-04-18 LAB
ANION GAP SERPL CALCULATED.3IONS-SCNC: 6 MMOL/L (ref 4–13)
ATRIAL RATE: 109 BPM
BASE EX.OXY STD BLDV CALC-SCNC: 79.7 % (ref 60–80)
BASE EXCESS BLDV CALC-SCNC: 2.1 MMOL/L
BUN SERPL-MCNC: 10 MG/DL (ref 5–25)
CALCIUM SERPL-MCNC: 8.6 MG/DL (ref 8.4–10.2)
CHLORIDE SERPL-SCNC: 101 MMOL/L (ref 96–108)
CO2 SERPL-SCNC: 30 MMOL/L (ref 21–32)
CREAT SERPL-MCNC: 0.49 MG/DL (ref 0.6–1.3)
ERYTHROCYTE [DISTWIDTH] IN BLOOD BY AUTOMATED COUNT: 14.9 % (ref 11.6–15.1)
GFR SERPL CREATININE-BSD FRML MDRD: 89 ML/MIN/1.73SQ M
GLUCOSE SERPL-MCNC: 85 MG/DL (ref 65–140)
HCO3 BLDV-SCNC: 26.8 MMOL/L (ref 24–30)
HCT VFR BLD AUTO: 31.5 % (ref 34.8–46.1)
HGB BLD-MCNC: 9.8 G/DL (ref 11.5–15.4)
MAGNESIUM SERPL-MCNC: 1.7 MG/DL (ref 1.9–2.7)
MCH RBC QN AUTO: 31.7 PG (ref 26.8–34.3)
MCHC RBC AUTO-ENTMCNC: 31.1 G/DL (ref 31.4–37.4)
MCV RBC AUTO: 102 FL (ref 82–98)
METHYLMALONATE SERPL-SCNC: 243 NMOL/L (ref 0–378)
O2 CT BLDV-SCNC: 12.2 ML/DL
P AXIS: 71 DEGREES
PCO2 BLDV: 42.5 MM HG (ref 42–50)
PH BLDV: 7.42 [PH] (ref 7.3–7.4)
PHOSPHATE SERPL-MCNC: 1.5 MG/DL (ref 2.3–4.1)
PLATELET # BLD AUTO: 242 THOUSANDS/UL (ref 149–390)
PMV BLD AUTO: 10.9 FL (ref 8.9–12.7)
PO2 BLDV: 42.7 MM HG (ref 35–45)
POTASSIUM SERPL-SCNC: 3.5 MMOL/L (ref 3.5–5.3)
PR INTERVAL: 154 MS
PROCALCITONIN SERPL-MCNC: 1.83 NG/ML
QRS AXIS: 50 DEGREES
QRSD INTERVAL: 124 MS
QT INTERVAL: 396 MS
QTC INTERVAL: 533 MS
RBC # BLD AUTO: 3.09 MILLION/UL (ref 3.81–5.12)
SODIUM SERPL-SCNC: 137 MMOL/L (ref 135–147)
T WAVE AXIS: 8 DEGREES
VENTRICULAR RATE: 109 BPM
WBC # BLD AUTO: 5.86 THOUSAND/UL (ref 4.31–10.16)

## 2024-04-18 PROCEDURE — 84100 ASSAY OF PHOSPHORUS: CPT

## 2024-04-18 PROCEDURE — 99233 SBSQ HOSP IP/OBS HIGH 50: CPT | Performed by: INTERNAL MEDICINE

## 2024-04-18 PROCEDURE — 93010 ELECTROCARDIOGRAM REPORT: CPT | Performed by: INTERNAL MEDICINE

## 2024-04-18 PROCEDURE — 85027 COMPLETE CBC AUTOMATED: CPT

## 2024-04-18 PROCEDURE — 94640 AIRWAY INHALATION TREATMENT: CPT

## 2024-04-18 PROCEDURE — 84145 PROCALCITONIN (PCT): CPT

## 2024-04-18 PROCEDURE — 70450 CT HEAD/BRAIN W/O DYE: CPT

## 2024-04-18 PROCEDURE — 83735 ASSAY OF MAGNESIUM: CPT

## 2024-04-18 PROCEDURE — 06HY33Z INSERTION OF INFUSION DEVICE INTO LOWER VEIN, PERCUTANEOUS APPROACH: ICD-10-PCS | Performed by: INTERNAL MEDICINE

## 2024-04-18 PROCEDURE — 94760 N-INVAS EAR/PLS OXIMETRY 1: CPT

## 2024-04-18 PROCEDURE — 71045 X-RAY EXAM CHEST 1 VIEW: CPT

## 2024-04-18 PROCEDURE — 99232 SBSQ HOSP IP/OBS MODERATE 35: CPT | Performed by: NURSE PRACTITIONER

## 2024-04-18 PROCEDURE — 82805 BLOOD GASES W/O2 SATURATION: CPT

## 2024-04-18 PROCEDURE — 80048 BASIC METABOLIC PNL TOTAL CA: CPT

## 2024-04-18 PROCEDURE — 99232 SBSQ HOSP IP/OBS MODERATE 35: CPT | Performed by: INTERNAL MEDICINE

## 2024-04-18 RX ORDER — MAGNESIUM SULFATE 1 G/100ML
1 INJECTION INTRAVENOUS ONCE
Status: COMPLETED | OUTPATIENT
Start: 2024-04-18 | End: 2024-04-18

## 2024-04-18 RX ORDER — MAGNESIUM SULFATE HEPTAHYDRATE 40 MG/ML
2 INJECTION, SOLUTION INTRAVENOUS ONCE
Status: DISCONTINUED | OUTPATIENT
Start: 2024-04-18 | End: 2024-04-18

## 2024-04-18 RX ORDER — UREA 10 %
500 LOTION (ML) TOPICAL ONCE
Status: DISCONTINUED | OUTPATIENT
Start: 2024-04-18 | End: 2024-04-18

## 2024-04-18 RX ADMIN — SODIUM CHLORIDE, SODIUM GLUCONATE, SODIUM ACETATE, POTASSIUM CHLORIDE, MAGNESIUM CHLORIDE, SODIUM PHOSPHATE, DIBASIC, AND POTASSIUM PHOSPHATE 75 ML/HR: .53; .5; .37; .037; .03; .012; .00082 INJECTION, SOLUTION INTRAVENOUS at 19:22

## 2024-04-18 RX ADMIN — HEPARIN SODIUM 5000 UNITS: 5000 INJECTION INTRAVENOUS; SUBCUTANEOUS at 16:55

## 2024-04-18 RX ADMIN — BUSPIRONE HYDROCHLORIDE 5 MG: 5 TABLET ORAL at 21:59

## 2024-04-18 RX ADMIN — CEFTRIAXONE SODIUM 1000 MG: 10 INJECTION, POWDER, FOR SOLUTION INTRAVENOUS at 13:37

## 2024-04-18 RX ADMIN — HEPARIN SODIUM 5000 UNITS: 5000 INJECTION INTRAVENOUS; SUBCUTANEOUS at 00:27

## 2024-04-18 RX ADMIN — HEPARIN SODIUM 5000 UNITS: 5000 INJECTION INTRAVENOUS; SUBCUTANEOUS at 10:09

## 2024-04-18 RX ADMIN — BENZONATATE 100 MG: 100 CAPSULE ORAL at 22:00

## 2024-04-18 RX ADMIN — ALBUTEROL SULFATE 2.5 MG: 2.5 SOLUTION RESPIRATORY (INHALATION) at 19:44

## 2024-04-18 RX ADMIN — METOPROLOL SUCCINATE 25 MG: 25 TABLET, EXTENDED RELEASE ORAL at 21:59

## 2024-04-18 RX ADMIN — ALBUTEROL SULFATE 2.5 MG: 2.5 SOLUTION RESPIRATORY (INHALATION) at 07:14

## 2024-04-18 RX ADMIN — GUAIFENESIN 600 MG: 600 TABLET ORAL at 21:59

## 2024-04-18 RX ADMIN — MAGNESIUM SULFATE HEPTAHYDRATE 1 G: 1 INJECTION, SOLUTION INTRAVENOUS at 14:40

## 2024-04-18 RX ADMIN — DONEPEZIL HYDROCHLORIDE 10 MG: 5 TABLET ORAL at 21:59

## 2024-04-18 RX ADMIN — Medication 9 MG: at 21:59

## 2024-04-18 NOTE — PROGRESS NOTES
[facility]  Progress Note  Name: Monika Butler I  MRN: [id number]  Unit/Bed#: S -01 I Date of Admission: 4/14/2024   Date of Service: 4/18/2024 I Hospital Day: 4    Assessment/Plan   * Severe sepsis (HCC)  Assessment & Plan  Lab Results   Component Value Date    WBC 20.39 (H) 04/16/2024    WBC 20.59 (H) 04/16/2024    WBC 23.70 (H) 04/15/2024    PROCALCITONI 5.71 (H) 04/16/2024    PROCALCITONI 8.95 (H) 04/15/2024    PROCALCITONI 7.25 (H) 04/14/2024     Met SRIS criteria with hypothermia, tachycardia, leukocytosis. With unclear source - URI vs UTI vs pyelonephritis vs GI?  With initial lactic acid 2.5, new acute respiratory failure with hypoxia, elevated Cr.   Productive cough with whitish mucus for the last 1 week.  Poor historian-unable to assess accurate ROS.  UA positive for leukocytes, occult blood, bacteria.  Flu/RSV/COVID-negative.  CXR per my reading with no obvious infiltrates or consolidation  Received septic fluid and 1 dose of Rocephin in the ED.  Patient positive for metapneumovirus plus or minus overlying bacterial pneumonia, being treated as such    Plan:  IV ceftriaxone per ID  [facility]  Urine culture positive for Enterococcus faecalis  Procal remains elevated   Trend WBC/Fever curve  Echocardiogram unremarkable     Metabolic encephalopathy  Assessment & Plan  Plan:  Contacted by nurse the patient much more drowsy and lethargic around 10 AM on 4/18  Evaluated patient, only moaning not responding significantly to sternal rub  Stat CT head placed and VBG ordered    Dementia with behavioral disturbance (HCC)  Assessment & Plan  Known history of dementia with behavioral disturbances. Baseline disoriented, on and off able to conduct very simple conversation/ follow very easy commands.  POA with worsening lethargy but not restless or combative.  Based on her last hospitalization 1/24 per Psych recs: Recommending to avoid Ativan for sedation/agitation, decreased Zyprexa  to 1.25 mg p.o. daily in the morning and Zyprexa 5 mg p.o. nightly  Meds in the facility: BuSpar 10 mg twice daily, Zyprexa 5 mg twice daily, Remeron 15 mg nightly,  Ativan 0.25 mg every 6 hours as needed.  POA Qtc 533.  Yasmin consult, follow recommendations per their note - no ativan or zyprexa, okay with mirtazapine and depakote    Mild protein-calorie malnutrition (HCC)  Assessment & Plan  Malnutrition Findings:   Adult Malnutrition type: Acute illness (in the setting of chronic illness)  Adult Degree of Malnutrition: Malnutrition of mild degree  Malnutrition Characteristics: Weight loss, Inadequate energy                  360 Statement: Mild malnutrition related to inadequate oral intake as evidenced by loss of subcutaneous fat, 15.2% weight decrease x 13 months. Currently treated with oral supplementation.    BMI Findings:           Body mass index is 20.03 kg/m².       Abnormal CT scan  Assessment & Plan  Patient's chest abdomen and pelvis CT scan revealed no evidence of PE, opacities in the left upper and lower lobe base concerning for pneumonia, bilateral nodular opacities concerning for septic emboli, and a pulmonary nodules that could be concerning for metastasis.  In addition, patient had a cystic lesion in the head of the pancreas, as well as a cystic lesion in the ovary.    Plan:  -ID consulted and provided abx recs  -Echocardiogram unremarkable   -GI consulted for pancreatic cyst, per daughter no further intervention    RAFAEL (acute kidney injury) (HCC)  Assessment & Plan  Lab Results   Component Value Date    CREATININE 0.46 (L) 04/17/2024    CREATININE 0.64 04/16/2024    CREATININE 0.98 04/15/2024     Baseline Cr 0.8-0.9  POA 1.27. Baseline urinary incontinence- daughter reported urination x2 on 4/14.  In the setting of poor oral intake.  UA positive for leukocytes, occult blood, RBC, casts.  Received 1L isolytes in the ED.    Plan:  Avoid hypoperfusion of the kidneys, minimize nephrotoxins  Resolved      Acute respiratory failure with hypoxia (HCC)  Assessment & Plan  Baseline no O2 requirement.  POA O2 satting 87% on room air. Requiring 2 L NC.  B/L LE no edema. No calf tenderness.  Recent cold-like symptoms with productive cough.  Tachycardia- may 2/2 infections. Unable to assess CP.  Last echo 1/24: LVEF 60-65%, G1DD.  CXR no CP disease  Mostly bedbounded with h/o breast cancer.      Plan  Respiratory protocol, IS, scheduled albuterol nebulizer.  Wean off O2    QT prolongation  Assessment & Plan  POA QTc 533. Sinus tachy.  Hold QT prolonging medications  Follow Geriatric recs      Anemia  Assessment & Plan  Hbg stable at 10    Meningioma (HCC)  Assessment & Plan  With new onset worsening lethargy and recent fall, non-cooperative neuro PE.  Stable    Generalized weakness  Assessment & Plan  Likely second to sepsis, poor p.o. intake.  SLP recommend dysphagia diet with thin liquids, but per nurse she was choking on thins, thus switched back to nectar thick  PT/ OT eval.    Essential hypertension  Assessment & Plan  Home meds: Losartan 100 mg daily, metoprolol 50 mg every morning, 25 mg nightly. Clonidine patch.  Restart losartan at dc                 VTE Pharmacologic Prophylaxis: VTE Score: 7 High Risk (Score >/= 5) - Pharmacological DVT Prophylaxis Ordered: heparin. Sequential Compression Devices Ordered.    Mobility:   Basic Mobility Inpatient Raw Score: 8  JH-HLM Goal: 3: Sit at edge of bed  JH-HLM Achieved: 2: Bed activities/Dependent transfer  JH-HLM Goal NOT achieved. Continue with multidisciplinary rounding and encourage appropriate mobility to improve upon JH-HLM goals.    Patient Centered Rounds: I performed bedside rounds with nursing staff today.   Discussions with Specialists or Other Care Team Provider: ID and GI    Education and Discussions with Family / Patient: Updated  (daughter) via phone.    Total Time Spent on Date of Encounter in care of patient: 25 mins. This time was spent  on one or more of the following: performing physical exam; counseling and coordination of care; obtaining or reviewing history; documenting in the medical record; reviewing/ordering tests, medications or procedures; communicating with other healthcare professionals and discussing with patient's family/caregivers.    Current Length of Stay: 4 day(s)  Current Patient Status: Inpatient   Certification Statement: The patient will continue to require additional inpatient hospital stay due to sepsis and lethargy   Discharge Plan: Anticipate discharge in 24-48 hrs to prior facility    Code Status: Level 1 - Full Code    Subjective:   Patient seen and examined at bedside this morning, no acute events overnight.  Patient was doing well around 730 this morning on her nebulization treatment.  However around 10 AM nursing reach out to the patient is much more lethargic and drowsy not responding to sternal rub.  Patient only moaning.  Stat CT head and VBG placed.    Objective:     Vitals:   Temp (24hrs), Av.7 °F (35.9 °C), Min:95.7 °F (35.4 °C), Max:97.5 °F (36.4 °C)    Temp:  [95.7 °F (35.4 °C)-97.5 °F (36.4 °C)] 95.7 °F (35.4 °C)  HR:  [68-88] 76  Resp:  [14-18] 18  BP: (119-180)/() 119/67  SpO2:  [92 %-97 %] 95 %  Body mass index is 20.03 kg/m².     Input and Output Summary (last 24 hours):     Intake/Output Summary (Last 24 hours) at 2024 1028  Last data filed at 2024 0544  Gross per 24 hour   Intake 2283.75 ml   Output 1252 ml   Net 1031.75 ml       Physical Exam:   Physical Exam  Constitutional:       General: She is not in acute distress.     Appearance: Normal appearance.   HENT:      Head: Normocephalic and atraumatic.      Right Ear: External ear normal.      Left Ear: External ear normal.      Nose: Nose normal.      Mouth/Throat:      Pharynx: Oropharynx is clear.   Cardiovascular:      Rate and Rhythm: Normal rate and regular rhythm.      Heart sounds: No murmur heard.  Pulmonary:      Effort:  Pulmonary effort is normal. No respiratory distress.      Breath sounds: No wheezing or rales.      Comments: Mildly diminished breath sounds  Abdominal:      General: There is no distension.      Palpations: Abdomen is soft.      Tenderness: There is no abdominal tenderness.   Musculoskeletal:         General: No swelling or tenderness.   Skin:     General: Skin is warm and dry.      Coloration: Skin is pale.   Neurological:      Mental Status: She is alert. She is disoriented.      Comments: On exam around 730, patient was talking and responsive to her name.  Around 10:30 AM, patient much more drowsy and lethargic, only moaning and not very responsive to sternal rub.          Additional Data:     Labs:  Results from last 7 days   Lab Units 04/18/24  0534 04/16/24  0937 04/16/24  0000 04/15/24  0244 04/14/24  1835   WBC Thousand/uL 5.86   < > 20.59*   < > 19.05*   HEMOGLOBIN g/dL 9.8*   < > 10.5*   < > 11.3*   HEMATOCRIT % 31.5*   < > 33.9*   < > 36.2   PLATELETS Thousands/uL 242   < > 214   < > 261   BANDS PCT %  --   --   --   --  10*   SEGS PCT %  --   --  88*  --   --    LYMPHO PCT %  --   --  5*  --  2*   MONO PCT %  --   --  5  --  1*   EOS PCT %  --   --  1  --  0    < > = values in this interval not displayed.     Results from last 7 days   Lab Units 04/18/24  0534 04/15/24  0244 04/14/24  1835   SODIUM mmol/L 137   < > 146   POTASSIUM mmol/L 3.5   < > 3.6   CHLORIDE mmol/L 101   < > 103   CO2 mmol/L 30   < > 32   BUN mg/dL 10   < > 38*   CREATININE mg/dL 0.49*   < > 1.27   ANION GAP mmol/L 6   < > 11   CALCIUM mg/dL 8.6   < > 9.8   ALBUMIN g/dL  --   --  3.9   TOTAL BILIRUBIN mg/dL  --   --  0.41   ALK PHOS U/L  --   --  79   ALT U/L  --   --  13   AST U/L  --   --  23   GLUCOSE RANDOM mg/dL 85   < > 137    < > = values in this interval not displayed.     Results from last 7 days   Lab Units 04/14/24  1835   INR  1.02             Results from last 7 days   Lab Units 04/18/24  0534 04/16/24  0000  04/15/24  0244 04/14/24  2117 04/14/24  1835   LACTIC ACID mmol/L  --   --   --  1.2 2.5*   PROCALCITONIN ng/ml 1.83* 5.71* 8.95*  --  7.25*       Lines/Drains:  Invasive Devices       Peripheral Intravenous Line  Duration             Peripheral IV 04/14/24 Right Forearm 3 days                          Imaging: No pertinent imaging reviewed.    Recent Cultures (last 7 days):   Results from last 7 days   Lab Units 04/15/24  1125 04/14/24  1903 04/14/24  1846 04/14/24  1835   BLOOD CULTURE   --   --  No Growth at 72 hrs. No Growth at 72 hrs.   SPUTUM CULTURE  Test not performed. Suggest repeat specimen.  --   --   --    GRAM STAIN RESULT  >10 squamous epithelial cells/lpf, indicating orophayngeal contamination.*  2+ Polys*  4+ Gram positive rods*  3+ Gram negative rods*  1+ Gram positive cocci in pairs and chains*  Rare Yeast*  --   --   --    URINE CULTURE   --  >100,000 cfu/ml Enterococcus faecalis*  --   --        Last 24 Hours Medication List:   Current Facility-Administered Medications   Medication Dose Route Frequency Provider Last Rate    acetaminophen  650 mg Oral Q6H PRN Cristhian Sanders MD      albuterol  2.5 mg Nebulization TID Cristhian Sanders MD      benzonatate  100 mg Oral TID Cristhian Sanders MD      busPIRone  5 mg Oral BID Kamla Lafleur MD      cefTRIAXone  1,000 mg Intravenous Q24H Pablo Cooper MD 1,000 mg (04/17/24 1057)    docusate sodium  100 mg Oral BID Cristhian Sanders MD      donepezil  10 mg Oral HS Cristhian Sanders MD      guaiFENesin  600 mg Oral Q12H Counts include 234 beds at the [facility] Cristhian Sanders MD      heparin (porcine)  5,000 Units Subcutaneous Q8H Counts include 234 beds at the [facility] Cristhian Sanders MD      hydrALAZINE  5 mg Intravenous Q6H PRN Kamla Lafleur MD      loratadine  10 mg Oral Daily Cristhian Sanders MD      magnesium gluconate  500 mg Oral Once Kamla Lafleur MD      melatonin  9 mg Oral HS Cristhian Sanders MD      metoprolol succinate  25 mg Oral HS Cristhian Sanders MD      metoprolol succinate  50 mg Oral QAM Cristhian Sanders MD      mirtazapine  15 mg Oral HS PRN Kamla Lafleur MD       multi-electrolyte  75 mL/hr Intravenous Continuous Kathryn Alvarado MD 75 mL/hr (04/17/24 2035)    polyethylene glycol  17 g Oral Daily Cristhian Sanders MD      polyethylene glycol  17 g Oral PRN Cristhian Sanders MD      potassium-sodium phosphates  1 packet Oral Once Kamla Lafleur MD      senna  8.8 mg Oral BID Cristhian Sanders MD          Today, Patient Was Seen By: Kamla Lafleur MD    **Please Note: This note may have been constructed using a voice recognition system.**

## 2024-04-18 NOTE — ASSESSMENT & PLAN NOTE
Plan:  Contacted by nurse the patient much more drowsy and lethargic around 10 AM on 4/18  Evaluated patient, only moaning not responding significantly to sternal rub  Stat CT head negative and VBG wnl  Patient improving

## 2024-04-18 NOTE — PLAN OF CARE
Problem: PAIN - ADULT  Goal: Verbalizes/displays adequate comfort level or baseline comfort level  Description: Interventions:  - Encourage patient to monitor pain and request assistance  - Assess pain using appropriate pain scale  - Administer analgesics based on type and severity of pain and evaluate response  - Implement non-pharmacological measures as appropriate and evaluate response  - Consider cultural and social influences on pain and pain management  - Notify physician/advanced practitioner if interventions unsuccessful or patient reports new pain  Outcome: Progressing     Problem: INFECTION - ADULT  Goal: Absence or prevention of progression during hospitalization  Description: INTERVENTIONS:  - Assess and monitor for signs and symptoms of infection  - Monitor lab/diagnostic results  - Monitor all insertion sites, i.e. indwelling lines, tubes, and drains  - Monitor endotracheal if appropriate and nasal secretions for changes in amount and color  - [place] appropriate cooling/warming therapies per order  - Administer medications as ordered  - Instruct and encourage patient and family to use good hand hygiene technique  - Identify and instruct in appropriate isolation precautions for identified infection/condition  Outcome: Progressing     Problem: SAFETY ADULT  Goal: Patient will remain free of falls  Description: INTERVENTIONS:  - Educate patient/family on patient safety including physical limitations  - Instruct patient to call for assistance with activity   - Consult OT/PT to assist with strengthening/mobility   - Keep Call bell within reach  - Keep bed low and locked with side rails adjusted as appropriate  - Keep care items and personal belongings within reach  - Initiate and maintain comfort rounds  - Make Fall Risk Sign visible to staff  - Obtain necessary fall risk management equipment  - Apply yellow socks and bracelet for high fall risk patients  - Consider moving patient to room near nurses  station  Outcome: Progressing     Problem: DISCHARGE PLANNING  Goal: Discharge to home or other facility with appropriate resources  Description: INTERVENTIONS:  - Identify barriers to discharge w/patient and caregiver  - Arrange for needed discharge resources and transportation as appropriate  - Identify discharge learning needs (meds, wound care, etc.)  - Arrange for interpretive services to assist at discharge as needed  - Refer to Case Management Department for coordinating discharge planning if the patient needs post-hospital services based on physician/advanced practitioner order or complex needs related to functional status, cognitive ability, or social support system  Outcome: Progressing     Problem: Knowledge Deficit  Goal: Patient/family/caregiver demonstrates understanding of disease process, treatment plan, medications, and discharge instructions  Description: Complete learning assessment and assess knowledge base.  Interventions:  - Provide teaching at level of understanding  - Provide teaching via preferred learning methods  Outcome: Progressing     Problem: GENITOURINARY - ADULT  Goal: Maintains or returns to baseline urinary function  Description: INTERVENTIONS:  - Assess urinary function  - Encourage oral fluids to ensure adequate hydration if ordered  - Administer IV fluids as ordered to ensure adequate hydration  - Administer ordered medications as needed  - Offer frequent toileting  - Follow urinary retention protocol if ordered  Outcome: Progressing     Problem: Nutrition/Hydration-ADULT  Goal: Nutrient/Hydration intake appropriate for improving, restoring or maintaining nutritional needs  Description: Monitor and assess patient's nutrition/hydration status for malnutrition. Collaborate with interdisciplinary team and initiate plan and interventions as ordered.  Monitor patient's weight and dietary intake as ordered or per policy. Utilize nutrition screening tool and intervene as necessary.  Determine patient's food preferences and provide high-protein, high-caloric foods as appropriate.     INTERVENTIONS:  - Monitor oral intake, urinary output, labs, and treatment plans  - Assess nutrition and hydration status and recommend course of action  - Evaluate amount of meals eaten  - Assist patient with eating if necessary   - Allow adequate time for meals  - Recommend/ encourage appropriate diets, oral nutritional supplements, and vitamin/mineral supplements  - Order, calculate, and assess calorie counts as needed  - Recommend, monitor, and adjust tube feedings and TPN/PPN based on assessed needs  - Assess need for intravenous fluids  - Provide specific nutrition/hydration education as appropriate  - Include patient/family/caregiver in decisions related to nutrition  Outcome: Progressing     Problem: SKIN/TISSUE INTEGRITY - ADULT  Goal: Skin Integrity remains intact(Skin Breakdown Prevention)  Description: Assess:  -Perform Jim assessment   -Clean and moisturize skin   -Inspect skin when repositioning, toileting, and assisting with ADLS  -Assess under medical devices   -Assess extremities for adequate circulation and sensation     Bed Management:  -Have minimal linens on bed & keep smooth, unwrinkled  -Change linens as needed when moist or perspiring    Toileting:  -Offer bedside commode  -Assess for incontinence  -Use incontinent care products after each incontinent episode     Activity:  -Encourage activity and walks on unit  -Encourage or provide ROM exercises   -Turn and reposition patient  -Use appropriate equipment to lift or move patient in bed    Skin Care:  -Avoid use of baby powder, tape, friction and shearing, hot water or constrictive clothing  -Relieve pressure over bony prominences   -Do not massage red bony areas    Next Steps:  -Teach patient strategies to minimize risks   -Consider consults to  interdisciplinary teams   Outcome: Progressing  Goal: Incision(s), wounds(s) or drain site(s)  healing without S/S of infection  Description: INTERVENTIONS  - Assess and document dressing, incision, wound bed, drain sites and surrounding tissue  - Provide patient and family education  - Perform skin care/dressing changes   Outcome: Progressing  Goal: Pressure injury heals and does not worsen  Description: Interventions:  - Implement low air loss mattress or specialty surface (Criteria met)  - Apply silicone foam dressing  - Apply fecal or urinary incontinence containment device   - Perform passive or active ROM   - Turn and reposition patient & offload bony prominences   - Utilize friction reducing device or surface for transfers   - Consider consults to  interdisciplinary teams   - Use incontinent care products after each incontinent episode   - Consider nutrition services referral as needed  Outcome: Progressing     Problem: Prexisting or High Potential for Compromised Skin Integrity  Goal: Skin integrity is maintained or improved  Description: INTERVENTIONS:  - Identify patients at risk for skin breakdown  - Assess and monitor skin integrity  - Assess and monitor nutrition and hydration status  - Monitor labs   - Assess for incontinence   - Turn and reposition patient  - Assist with mobility/ambulation  - Relieve pressure over bony prominences  - Avoid friction and shearing  - Provide appropriate hygiene as needed including keeping skin clean and dry  - Evaluate need for skin moisturizer/barrier cream  - Collaborate with interdisciplinary team   - Patient/family teaching  - Consider wound care consult   Outcome: Progressing     Problem: Potential for Falls  Goal: Patient will remain free of falls  Description: INTERVENTIONS:  - Educate patient/family on patient safety including physical limitations  - Instruct patient to call for assistance with activity   - Consult OT/PT to assist with strengthening/mobility   - Keep Call bell within reach  - Keep bed low and locked with side rails adjusted as  appropriate  - Keep care items and personal belongings within reach  - Initiate and maintain comfort rounds  - Make Fall Risk Sign visible to staff  - Obtain necessary fall risk management equipment  - Apply yellow socks and bracelet for high fall risk patients  - Consider moving patient to room near nurses station  Outcome: Progressing

## 2024-04-18 NOTE — PROGRESS NOTES
Progress Note - Geriatric Medicine   Monika Butler 83 y.o. female MRN: [id number]  Unit/Bed#: S -01 Encounter: [id number]      Assessment/Plan:  Dementia with behavioral disturbance  Unable to assess orientation on exam  Behaviors at facility have been well-managed, although occasional issues with anxiety  Patient has not had any issues with agitation or behaviors since being hospitalized-although today patient is very lethargic  medication list from [facility] and patient was maintained on buspirone 10 mg twice daily, Depakote 125 mg twice daily, donepezil 10 mg daily, lorazepam 0.25 mg every 6 as needed, melatonin 10 mg nightly, mirtazapine 15 mg nightly, and olanzapine 5 mg twice daily  Previous discussions with daughter she feels that mom is overmedicated and wants some of the medication weaned off, on 4/16/2024 I discussed with her that the olanzapine and lorazepam was discontinued-on 4/17/2024 discussed with her about weaning down the BuSpar to 5 mg twice a day, and then can be weaned off as tolerated at facility  EKG admission 533, antipsychotics held  Continue to hold olanzapine, recommend rechecking EKG  Patient was also on Depakote at facility, LFTs normal from blood work on admission  Olanzapine and lorazepam discontinued on admission-do not recommend restarting  Depakote can be restarted if needed, try to wean BuSpar down to 5 mg twice daily, will reevaluate tomorrow to see how patient did overnigh  Previously discussed patient's history, examination, medication list at facility and here with Dr. Billingsley who recommended that both lorazepam and Zyprexa not be restarted, BuSpar be weaned down to 5 mg and weaned off completely if tolerated, okay to restart mirtazapine nightly if patient has any issues with insomnia, depression and okay to restart Depakote if needed for behaviors, agitation  At this time would recommend holding any medications that could worsen lethargy-would hold BuSpar,  mirtazapine for now-also consider discontinuing Tessalon Perles  At risk for delirium due to dementia, medications, sleep disturbance, sepsis  Recommend delirium precautions  Maintain sleep-wake cycle, avoid nighttime interruptions  minimize overnight interruptions, group overnight vitals/labs/nursing checks as possible  dim lights, close blinds and turn off tv to minimize stimulation and encourage sleep environment in evenings  Provide adequate pain control  Avoid urinary retention and constipation  Provide frequent and early mobilization  Provide frequent redirection and reorientation as needed  Avoid medications that may worsen or precipitate delirium such as tramadol, benzodiazepines, anticholinergics, and Benadryl  Redirect unwanted behaviors as first-line therapy, avoid physical restraints as able to  Continue to monitor    Severe sepsis  Met SIRS criteria with hypothermia, tachycardia, and leukocytosis  Urine culture positive for enterococcus faecalis  Blood cultures show no growth at 72 hours  Cefepime and vancomycin were discontinued, transition to Rocephin  Infectious disease following  Viral panel positive for H meta pneumovirus  Management per ID and primary team    Acute respiratory failure with hypoxia  Patient with cough  Has been weaned off oxygen, SpO2 stable on room air  Chest x-ray with no acute cardiopulmonary disease  Viral panel positive for H and metapneumovirus, patient now on contact precautions  Patient was transition from cefepime to IV ceftriaxone with plan to transition to p.o. cefdinir in the next 24 hours for 7 days total of antibiotics    Insomnia  First-line treatment is behavior modification  Maintain sleep-wake cycle, avoid nighttime interruptions  Avoid caffeine throughout the day  Avoid napping throughout the day  Encourage physical activity throughout the day  Avoid sedative hypnotics including benzodiazepines and benadryl  At facility patient was taking melatonin 10 mg nightly  and mirtazapine 15 mg nightly  Patient currently on melatonin 9 mg nightly and mirtazapine 15 mg as needed  Recommend holding anything that could further worsen her lethargy  Patient has been sleeping most of the day, if she wakes up this evening and is very agitated and not sleeping, consider giving the as needed mirtazapine    Anxiety/depression  Patient was also followed with in-house psychiatry psychiatry but was who is helping prescribe all his medications  At facility patient was on multiple medications including buspirone 10 mg twice daily, Depakote 125 mg twice daily, lorazepam 0.25 mg every 6 as needed, mirtazapine 15 mg nightly, and olanzapine 5 mg twice daily  Patient's daughter had concerns regarding patient being overmedicated  Olanzapine has been held this hospitalization due to prolonged QTc of 533  Patient has been doing well off of the olanzapine and as needed lorazepam  Lorazepam was discontinued also, do not recommend benzodiazepines in older adults due to increased risk of falls and confusion and agree with discontinuing  BuSpar was weaned down to 5 mg twice daily  Previously Discussed patient's history, examination, medication list at facility and here with Dr. Billingsley who recommended that both lorazepam and Zyprexa not be restarted, BuSpar be weaned down to 5 mg and weaned off completely if tolerated, okay to restart mirtazapine nightly if patient has any issues with insomnia, depression and okay to restart Depakote if needed for behaviors, agitation-will discuss recommendations with Dr Lafleur via phone   Although at this point given lethargy recommend holding BuSpar and mirtazapine  Continue to provide emotional support    Encephalopathy  Found by nursing to be much more drowsy and lethargic, not responding significantly to sternal rub  Stat CT head ordered which was unremarkable  VBG was ordered  Patient remains sleepy, but did wake up a little bit more  Patient was previously on cefepime,  "which has since been discontinued which can cause increased confusion    Ambulatory dysfunction  At a baseline ambulates patient is mostly wheelchair-bound  PT/OT following  Fall precautions  Out of bed as tolerated  Encourage early and frequent mobilization  Encourage adequate hydration and nutrition  Provide adequate pain management   Goal is to return to facility  Continue with PT/OT for continued strength and balance training     Subjective:   Monika is an 83-year-old female being seen for a geriatrics follow-up.  Upon exam patient was lying in bed, sleeping.  She aroused to voice, and told me to showed up.  She did not answer any questions.  She did not eat anything today or take any medications.  Nursing unsure if she slept overnight.  Per nursing she is more awake than she was previously, but still sleepy.    Called and updated daughter Anitha via phone.  Review of Systems   Unable to perform ROS: Other         Objective:     Vitals: Blood pressure 119/67, pulse 76, temperature (!) 95.7 °F (35.4 °C), resp. rate 18, height 5' 1\" (1.549 m), weight 48.1 kg (106 lb), SpO2 95%.,Body mass index is 20.03 kg/m².      Intake/Output Summary (Last 24 hours) at 4/18/2024 1432  Last data filed at 4/18/2024 0544  Gross per 24 hour   Intake 2283.75 ml   Output 987 ml   Net 1296.75 ml       Current Medications: Reviewed    Physical Exam:   Physical Exam  Vitals reviewed.   Constitutional:       General: She is sleeping. She is not in acute distress.     Appearance: She is well-developed. She is not ill-appearing.      Comments: Frail appearing    HENT:      Head: Normocephalic and atraumatic.   Cardiovascular:      Rate and Rhythm: Normal rate and regular rhythm.      Heart sounds: No murmur heard.  Pulmonary:      Effort: Pulmonary effort is normal. No respiratory distress.      Breath sounds: Normal breath sounds.   Abdominal:      General: Bowel sounds are normal. There is no distension.      Palpations: Abdomen is soft. " "     Tenderness: There is no abdominal tenderness.   Musculoskeletal:         General: No swelling.      Right lower leg: No edema.      Left lower leg: No edema.   Skin:     General: Skin is warm and dry.      Coloration: Skin is pale.   Neurological:      Mental Status: She is disoriented.      Cranial Nerves: No cranial nerve deficit.      Motor: Weakness present.      Gait: Gait abnormal.      Comments: Unable to assess orientation          Invasive Devices       Peripheral Intravenous Line  Duration             Long-Dwell Peripheral IV (Midline) 04/18/24 Right Brachial <1 day                    Lab, Imaging and other studies:    Lab Results:   I have personally reviewed pertinent lab results including the following:  -CBC, procalcitonin, BMP, urine culture    I have personally reviewed the following imaging study reports in PACS:  No new imaging to review  - I have personally reviewed pertinent reports.      Please note:  Voice-recognition software may have been used in the preparation of this document.  Occasional wrong word or \"sound-alike\" substitutions may have occurred due to the inherent limitations of voice recognition software.  Interpretation should be guided by context.    "

## 2024-04-18 NOTE — CASE MANAGEMENT
Case Management Discharge Planning Note    Patient name Monika Butler  Location S /S -01 MRN [id number]  : 1940 Date 2024       Current Admission Date: 2024  Current Admission Diagnosis:Severe sepsis (HCC)   Patient Active Problem List    Diagnosis Date Noted    Mild protein-calorie malnutrition (HCC) 2024    Abnormal CT scan 04/15/2024    Severe sepsis (HCC) 2024    QT prolongation 2024    Acute respiratory failure with hypoxia (HCC) 2024    RAFAEL (acute kidney injury) (HCC) 2024    Thrush, oral 2024    Elevated troponin 2024    Constipation 2023    Nondisplaced fracture of triquetrum (cuneiform) bone, left wrist, initial encounter for closed fracture 2023    Abrasion of left eyebrow 2023    Injury of left wrist 2023    Insomnia 2023    Anxiety 2023    Anemia 2023    H/O clavicle fracture 2023    Meningioma (HCC) 2023    Pulmonary nodule 2023    Bad odor of urine 2023    Postmenopausal 2023    Prediabetes 2023    Gastroesophageal reflux disease with esophagitis without hemorrhage 2023    Generalized weakness 2022    Urinary frequency 2022    BMI 22.0-22.9, adult 2021    History of breast cancer 2021    Altered mental status 2020    Balance problems 2020    Dementia with behavioral disturbance (HCC)     Memory change 10/26/2020    Ear fullness, left 2017    Hyperlipidemia 2016    Breast cancer (HCC) 2013    Essential hypertension 2012      LOS (days): 4  Geometric Mean LOS (GMLOS) (days): 5.1  Days to GMLOS:1.6     OBJECTIVE:  Risk of Unplanned Readmission Score: 37.38         Current admission status: Inpatient   Preferred Pharmacy:   [id number] - [address] [address]  Phone: [phone] Fax: [phone]    [facility]  Provider: Kristin Raymundo MD    Primary Insurance: [facility]  Secondary Insurance:     DISCHARGE DETAILS:    Discharge planning discussed with:: Daughter Anitha                                     Other Referral/Resources/Interventions Provided:  Interventions: Facility Return  Referral Comments: As per provider, patient is medically stable for return to [facility] ([facility]) today. Covid test ordered & negative from 4/16. Facility informed and in agreement with same. Daughter Anitha updated re: discharge plan -- No further CM needs indicated at this time. CM will remain available.         Treatment Team Recommendation: SNF, Facility Return  Discharge Destination Plan:: SNF, Facility Return  Transport at Discharge : [facility]        Transported by (Company and Unit #): [facility]  ETA of Transport (Date): 04/18/24  ETA of Transport (Time): 1200     Transfer Mode: Stretcher                      Accepting Facility Name, City & State : [facility]  Receiving Facility/Agency Phone Number: [phone]  Facility/Agency Fax Number: [phone]       ADDENDUM:    As per provider, patient no longer stable due to increased somnolence. Transport cancelled & facility updated.

## 2024-04-18 NOTE — PROGRESS NOTES
Progress Note - Infectious Disease   Monika Butler 83 y.o. female MRN: [id number]  Unit/Bed#: S -01 Encounter: [id number]      Impression/Plan:    SIRS vs. Sepsis, present on arrival; fever, tachycardia, leukocytosis  -Suspect a pulmonary source as below.  RP2 positive for human metapneumovirus. Blood cultures negative to date, urine culture grew Enterococcus. No other clear source.  CT A/P negative for infectious intra-abdominal process, LFTs normal, COVID/flu/RSV PCR negative. Patient now afebrile with normal WBC.  -Antibiotics below  -Follow-up blood cultures  -Repeat CBC tomorrow a.m. to trend leukocytosis  -Trend fever curve and hemodynamics     2. Metapneumovirus +/- secondary bacterial pneumonia  -Patient with respiratory symptoms, nasal congestion and abnormal CT as below. RP2 positive for human metapneumovirus. Given infiltrates and elevated procalctionin, also consider possibility of secondary bacterial infection. MRSA unlikely with negative nares culture. Sputum culture was contaminated sample. She had improved overall, now on room air and procalcitonin decreasing as well as normalized WBC. Transition to Ceftriaxone 4/17.   -Continue IV Ceftriaxone 1g every 24 hours  -Repeat procalcitonin and WBC tomorrow AM  -Supportive care for metapneumovirus  -Monitor respiratory status, fever curve  -Current plan for total 7 days of antibiotic, through 4/20/2024    3.  Bilateral pulmonary nodules with left lung opacities  -Found on CT scan on arrival.  Suspect related to human metapneumovirus infection, possibly with secondary bacterial pneumoina given elevated procalcitonin. Also consider the possibility of aspiration changes in patient with baseline dementia.  Septic emboli considered, though TTE negative for vegetation and would be less likely without bacteremia. Consider metastatic lung disease in setting of pancreatic cystic lesion and prior history of breast cancer.  MRSA nares negative, COVID/Flu/RSV  negative.  -Antibiotic plan as above  -Follow up final blood cultures  -Recommend aspiration precautions and speech therapy recs    4. Enterococcus faecalis bacteriuria:  -UA with innumerable WBCs and culture positive for Enterococcus. Patient denies dysuria at the moment, though history limited due to mental status. CT 4/14 with unremarkable kidneys, ureters and bladder.    -Unclear if this is truly UTI vs. Asymptomatic bacteriuria vs. Contamination  -Patient completed 3 days of Vancomycin which would be sufficient for Enterococcus cystitis, no further treatment recommended    5. Acute encephalopathy in setting of baseline dementia  -Present on arrival. Suspect may be toxic metabolic in setting of infection in patient with poor neurologic reserve. CT head negative for acute infarct or hemorrhage. She has had marked improvement as of 4/16-4/18 and was communicative and alert, however around 10 AM 4/18 again became very lethargic. Repeat CT head unremarkable.  -Waxing/waning status may indicate this is delirium; workup per primary  -Consider MRI brain with contrast if does not improve  -Serial neurologic exams     6. Pancreatic cystic lesion  -Seen on CT 4/14. GI consulted who discussed with family who has elected for conservative treatment measures for this at this time.   -GI recs appreciated     7. History of breast cancer status post left mastectomy (2005)     8. Left temporal meningioma  -Known diagnosis.  Stable on CT head on 4/14..    Plan and recommendations were discussed with primary team.  They agree with plan to narrow to IV Ceftriaxone.    Antibiotics:  Total abx days: 5  Ceftriaxone: 2    24 Hour Events:  Afebrile. Patient remains on room air. Around 10 AM noted to be more lethargic again.     Subjective:  Patient is not alert, laying in bed with eyes closed and moaning. Not opening eyes or answering questions.     Objective:  Vitals:  Temp:  [95.7 °F (35.4 °C)-98.2 °F (36.8 °C)] 98.2 °F (36.8 °C)  HR:   [76-88] 86  Resp:  [14-18] 18  BP: (119-180)/() 157/99  SpO2:  [92 %-97 %] 95 %  Temp (24hrs), Av.9 °F (36.1 °C), Min:95.7 °F (35.4 °C), Max:98.2 °F (36.8 °C)  Current: Temperature: 98.2 °F (36.8 °C)    Physical Exam:   General Appearance:  Lying in bed, eyes closed, moaning, non-verbal   Throat: Oropharynx moist without lesions.    Lungs:   Respirations unlabored   Heart:  RRR   Abdomen:   Soft, non-tender.     Extremities: No clubbing, cyanosis or edema   Skin: No new rashes       Labs:   All pertinent labs and imaging studies were personally reviewed  Results from last 7 days   Lab Units 24  0534 24  0937 24  0000   WBC Thousand/uL 5.86 20.39* 20.59*   HEMOGLOBIN g/dL 9.8* 10.3* 10.5*   PLATELETS Thousands/uL 242 217 214     Results from last 7 days   Lab Units 24  0534 24  0533 24  0531 04/15/24  0244 24  1835   SODIUM mmol/L 137 141 145   < > 146   POTASSIUM mmol/L 3.5 2.9* 3.0*   < > 3.6   CHLORIDE mmol/L 101 104 104   < > 103   CO2 mmol/L 30 30 32   < > 32   BUN mg/dL 10 16 35*   < > 38*   CREATININE mg/dL 0.49* 0.46* 0.64   < > 1.27   EGFR ml/min/1.73sq m 89 91 82   < > 38   CALCIUM mg/dL 8.6 8.8 9.4   < > 9.8   AST U/L  --   --   --   --  23   ALT U/L  --   --   --   --  13   ALK PHOS U/L  --   --   --   --  79    < > = values in this interval not displayed.     Results from last 7 days   Lab Units 24  0534 24  0000 04/15/24  0244 24  1835   PROCALCITONIN ng/ml 1.83* 5.71* 8.95* 7.25*                   Micro:  Results from last 7 days   Lab Units 04/15/24  1125 04/15/24  0247 24  1903 24  1846 24  1835   BLOOD CULTURE   --   --   --  No Growth at 72 hrs. No Growth at 72 hrs.   SPUTUM CULTURE  Test not performed. Suggest repeat specimen.  --   --   --   --    GRAM STAIN RESULT  >10 squamous epithelial cells/lpf, indicating orophayngeal contamination.*  2+ Polys*  4+ Gram positive rods*  3+ Gram negative rods*  1+ Gram  positive cocci in pairs and chains*  Rare Yeast*  --   --   --   --    URINE CULTURE   --   --  >100,000 cfu/ml Enterococcus faecalis*  --   --    MRSA CULTURE ONLY   --  No Methicillin Resistant Staphlyococcus aureus (MRSA) isolated  --   --   --        Imaging:          Pablo Cooper MD  [facility]

## 2024-04-18 NOTE — PROCEDURES
Venous Access Line Insertion    Date/Time: 4/18/2024 1:33 PM    Performed by: Naheed Boo RN  Authorized by: Bill Hutchins MD    Patient location:  Bedside  [place] protocol:     Procedure explained and questions answered to patient or proxy's satisfaction: yes    Pre-procedure details:     Hand hygiene: Hand hygiene performed prior to insertion      Sterile barrier technique: All elements of maximal sterile technique followed      Skin preparation:  2% chlorhexidine    Skin preparation agent: Skin preparation agent completely dried prior to procedure    Procedure details:     Complex Venous Access Line Type: Midline      Complex Venous Access Line Indications: no peripheral vascular access      Orientation:  Right    Location:  Brachial    Catheter size:  18 gauge    Total catheter length (cm):  10    Catheter out on skin (cm):  0    Patient evaluated for contraindications to access (i.e. fistula, thrombosis, etc): Yes      Sterile ultrasound techniques: Sterile gel and sterile probe covers were used      Number of attempts:  1    Successful placement: yes      Landmarks identified: yes    Anesthesia (see MAR for exact dosages):     Anesthesia method:  None  Post-procedure details:     Post-procedure:  Dressing applied and securement device placed    Assessment:  Blood return through all ports    Post-procedure complications: none      Patient tolerance of procedure:  Tolerated well, no immediate complications

## 2024-04-19 VITALS
WEIGHT: 106 LBS | HEIGHT: 61 IN | SYSTOLIC BLOOD PRESSURE: 131 MMHG | OXYGEN SATURATION: 92 % | HEART RATE: 80 BPM | RESPIRATION RATE: 17 BRPM | DIASTOLIC BLOOD PRESSURE: 101 MMHG | TEMPERATURE: 97.5 F | BODY MASS INDEX: 20.01 KG/M2

## 2024-04-19 LAB
ANION GAP SERPL CALCULATED.3IONS-SCNC: 4 MMOL/L (ref 4–13)
BACTERIA BLD CULT: NORMAL
BACTERIA BLD CULT: NORMAL
BUN SERPL-MCNC: 16 MG/DL (ref 5–25)
CALCIUM SERPL-MCNC: 8.6 MG/DL (ref 8.4–10.2)
CHLORIDE SERPL-SCNC: 103 MMOL/L (ref 96–108)
CO2 SERPL-SCNC: 31 MMOL/L (ref 21–32)
CREAT SERPL-MCNC: 0.48 MG/DL (ref 0.6–1.3)
ERYTHROCYTE [DISTWIDTH] IN BLOOD BY AUTOMATED COUNT: 14.7 % (ref 11.6–15.1)
GFR SERPL CREATININE-BSD FRML MDRD: 90 ML/MIN/1.73SQ M
GLUCOSE SERPL-MCNC: 99 MG/DL (ref 65–140)
HCT VFR BLD AUTO: 29.4 % (ref 34.8–46.1)
HGB BLD-MCNC: 9.4 G/DL (ref 11.5–15.4)
MAGNESIUM SERPL-MCNC: 1.9 MG/DL (ref 1.9–2.7)
MCH RBC QN AUTO: 32.1 PG (ref 26.8–34.3)
MCHC RBC AUTO-ENTMCNC: 32 G/DL (ref 31.4–37.4)
MCV RBC AUTO: 100 FL (ref 82–98)
PHOSPHATE SERPL-MCNC: 2.3 MG/DL (ref 2.3–4.1)
PLATELET # BLD AUTO: 228 THOUSANDS/UL (ref 149–390)
PMV BLD AUTO: 10.8 FL (ref 8.9–12.7)
POTASSIUM SERPL-SCNC: 3.2 MMOL/L (ref 3.5–5.3)
PROCALCITONIN SERPL-MCNC: 1.26 NG/ML
RBC # BLD AUTO: 2.93 MILLION/UL (ref 3.81–5.12)
SODIUM SERPL-SCNC: 138 MMOL/L (ref 135–147)
WBC # BLD AUTO: 6.53 THOUSAND/UL (ref 4.31–10.16)

## 2024-04-19 PROCEDURE — 80048 BASIC METABOLIC PNL TOTAL CA: CPT

## 2024-04-19 PROCEDURE — 84145 PROCALCITONIN (PCT): CPT | Performed by: INTERNAL MEDICINE

## 2024-04-19 PROCEDURE — 99233 SBSQ HOSP IP/OBS HIGH 50: CPT | Performed by: NURSE PRACTITIONER

## 2024-04-19 PROCEDURE — 85027 COMPLETE CBC AUTOMATED: CPT

## 2024-04-19 PROCEDURE — 83735 ASSAY OF MAGNESIUM: CPT

## 2024-04-19 PROCEDURE — 99238 HOSP IP/OBS DSCHRG MGMT 30/<: CPT | Performed by: INTERNAL MEDICINE

## 2024-04-19 PROCEDURE — 94760 N-INVAS EAR/PLS OXIMETRY 1: CPT

## 2024-04-19 PROCEDURE — 84100 ASSAY OF PHOSPHORUS: CPT

## 2024-04-19 PROCEDURE — 94640 AIRWAY INHALATION TREATMENT: CPT

## 2024-04-19 RX ORDER — CEFDINIR 300 MG/1
300 CAPSULE ORAL EVERY 12 HOURS SCHEDULED
Qty: 2 CAPSULE | Refills: 0
Start: 2024-04-20 | End: 2024-04-21

## 2024-04-19 RX ORDER — BUSPIRONE HYDROCHLORIDE 5 MG/1
5 TABLET ORAL 2 TIMES DAILY
Qty: 30 TABLET | Refills: 0
Start: 2024-04-19 | End: 2024-05-03

## 2024-04-19 RX ORDER — POTASSIUM CHLORIDE 20MEQ/15ML
40 LIQUID (ML) ORAL ONCE
Status: COMPLETED | OUTPATIENT
Start: 2024-04-19 | End: 2024-04-19

## 2024-04-19 RX ADMIN — HEPARIN SODIUM 5000 UNITS: 5000 INJECTION INTRAVENOUS; SUBCUTANEOUS at 09:43

## 2024-04-19 RX ADMIN — ALBUTEROL SULFATE 2.5 MG: 2.5 SOLUTION RESPIRATORY (INHALATION) at 07:15

## 2024-04-19 RX ADMIN — CEFTRIAXONE SODIUM 1000 MG: 10 INJECTION, POWDER, FOR SOLUTION INTRAVENOUS at 11:07

## 2024-04-19 RX ADMIN — LORATADINE 10 MG: 10 TABLET ORAL at 09:43

## 2024-04-19 RX ADMIN — SENNOSIDES 8.8 MG: 8.8 SYRUP ORAL at 09:43

## 2024-04-19 RX ADMIN — BUSPIRONE HYDROCHLORIDE 5 MG: 5 TABLET ORAL at 09:43

## 2024-04-19 RX ADMIN — METOPROLOL SUCCINATE 50 MG: 25 TABLET, EXTENDED RELEASE ORAL at 09:43

## 2024-04-19 RX ADMIN — HEPARIN SODIUM 5000 UNITS: 5000 INJECTION INTRAVENOUS; SUBCUTANEOUS at 01:37

## 2024-04-19 RX ADMIN — POTASSIUM CHLORIDE 40 MEQ: 1.5 SOLUTION ORAL at 09:43

## 2024-04-19 RX ADMIN — GUAIFENESIN 600 MG: 600 TABLET ORAL at 09:43

## 2024-04-19 RX ADMIN — POLYETHYLENE GLYCOL 3350 17 G: 17 POWDER, FOR SOLUTION ORAL at 09:44

## 2024-04-19 NOTE — INCIDENTAL FINDINGS
The following findings require follow up:  Radiographic finding   Finding:  Innumerable bilateral well-defined pulmonary nodules the largest in the left lower lobe measuring 1.2 cm, metastasis is not excluded. 3.1 cm cystic lesion in the head of the pancreas. For simple cyst(s) 2 cm or greater recommend gastroenterology and/or surgical oncology consult. EUS may now be warranted. 6.5 cm simple appearing cystic lesion in the left adnexa/ovary. Follow-up ultrasound of the pelvis in 6 to 12 months may be obtained.   Follow up required: Yes   Follow up should be done within 6-12 months for Left adnexa/ovary simple cyst and with Gastroenterology for 3.1 cm pancreas cystic lesion outpatient if family decides to pursue MRCP or EUS (which initially was declined).    Please notify the following clinician to assist with the follow up:   Dr. Kristin Raymundo

## 2024-04-19 NOTE — PROGRESS NOTES
Progress Note - Geriatric Medicine   Monika Butler 83 y.o. female MRN: [id number]  Unit/Bed#: S -01 Encounter: [id number]      Assessment/Plan:  Dementia with behavioral disturbance  Patient was alert and oriented to person on exam  Patient more awake and talkative today  She has had no episodes of behaviors since being hospitalized  medication list from [facility] and patient was maintained on buspirone 10 mg twice daily, Depakote 125 mg twice daily, donepezil 10 mg daily, lorazepam 0.25 mg every 6 as needed, melatonin 10 mg nightly, mirtazapine 15 mg nightly, and olanzapine 5 mg twice daily  Previous discussions with daughter she feels that mom is overmedicated and wants some of the medication weaned off, on 4/16/2024 I discussed with her that the olanzapine and lorazepam was discontinued-on 4/17/2024 discussed with her about weaning down the BuSpar to 5 mg twice a day, and then can be weaned off as tolerated at facility  EKG admission 533, olanzapine held  Continue to hold olanzapine, recommend rechecking EKG  Patient was also on Depakote at facility, LFTs normal from blood work on admission  Olanzapine and lorazepam discontinued on admission-do not recommend restarting  Depakote can be restarted if needed at 125 mg twice daily  Mirtazapine was reordered 15 mg nightly as needed  At risk for delirium due to dementia, medications, sleep disturbance, sepsis  Recommend delirium precautions  Maintain sleep-wake cycle, avoid nighttime interruptions  minimize overnight interruptions, group overnight vitals/labs/nursing checks as possible  dim lights, close blinds and turn off tv to minimize stimulation and encourage sleep environment in evenings  Provide adequate pain control  Avoid urinary retention and constipation  Provide frequent and early mobilization  Provide frequent redirection and reorientation as needed  Avoid medications that may worsen or precipitate delirium such as tramadol, benzodiazepines,  anticholinergics, and Benadryl  Redirect unwanted behaviors as first-line therapy, avoid physical restraints as able to  Continue to monitor    Severe sepsis  Met SIRS criteria with hypothermia, tachycardia, and leukocytosis  Urine culture positive for enterococcus faecalis  Blood cultures show no growth at 72 hours  Cefepime and vancomycin were discontinued, transition to Rocephin  Infectious disease following  Viral panel positive for H meta pneumovirus  Management per ID and primary team    Acute respiratory failure with hypoxia  Patient with cough  Has been weaned off oxygen, SpO2 stable on room air  Chest x-ray with no acute cardiopulmonary disease  Viral panel positive for H and metapneumovirus, patient now on contact precautions  Patient was transition from cefepime to IV ceftriaxone with plan to transition to p.o. cefdinir in the next 24 hours for 7 days total of antibiotics  Recommend discontinuing Tessalon Perles    Insomnia  First-line treatment is behavior modification  Maintain sleep-wake cycle, avoid nighttime interruptions  Avoid caffeine throughout the day  Avoid napping throughout the day  Encourage physical activity throughout the day  Avoid sedative hypnotics including benzodiazepines and benadryl  At facility patient was taking melatonin 10 mg nightly and mirtazapine 15 mg nightly  Patient currently on melatonin 9 mg nightly and mirtazapine 15 mg as needed  Nursing staff reports she slept well overnight with only the melatonin    Anxiety/depression  Patient was also followed with in-house psychiatry psychiatry but was who is helping prescribe all his medications  At facility patient was on multiple medications including buspirone 10 mg twice daily, Depakote 125 mg twice daily, lorazepam 0.25 mg every 6 as needed, mirtazapine 15 mg nightly, and olanzapine 5 mg twice daily  Patient's daughter had concerns regarding patient being overmedicated  Olanzapine has been held this hospitalization due to  prolonged QTc of 533  Patient has been doing well off of the olanzapine and as needed lorazepam  Lorazepam was discontinued also, do not recommend benzodiazepines in older adults due to increased risk of falls and confusion and agree with discontinuing  BuSpar was weaned down to 5 mg twice daily  Chest pain was changed to as needed, although okay with restarting that at bedtime  Depakote 125 mg twice daily remains on hold, no issues with behavior since being hospitalized  Further management on discharge as per her primary and facility psychiatrist    Encephalopathy  Found by nursing to be much more drowsy and lethargic, not responding significantly to sternal rub yesterday on exam  Stat CT head ordered which was unremarkable  VBG was ordered  Patient much more awake today and alert times person    Ambulatory dysfunction  At a baseline ambulates patient is mostly wheelchair-bound  PT/OT following  Fall precautions  Out of bed as tolerated  Encourage early and frequent mobilization  Encourage adequate hydration and nutrition  Provide adequate pain management   Goal is to return to facility  Continue with PT/OT for continued strength and balance training     Subjective:   Monika is an 83-year-old female being seen for a geriatrics follow-up.  On exam patient was sitting up in bed, resting.  She was more awake today.  Alert to person only which seems to be her baseline.  She reports she felt the room was hot, temperature was turned down.  She took her medications and ate a couple bites of breakfast.  She denied any significant pain.  Per nursing she slept well over night and had no issues with behaviors.    Called and updated daughter Anitha via phone.  I told her at this time she is only on BuSpar 5 mg twice daily, donepezil 10 mg daily, melatonin 9 mg nightly, and mirtazapine 15 mg nightly as needed.  I discussed with her that she did not take any other mirtazapine as needed since being hospitalized.  I did also  "discuss that if she starts having issues with sleep that mirtazapine may be good to help with her sleep, depression, and appetite.  She had asked some questions regarding mom being very sleepy at the facility and wanted to know if that was medication versus dementia related.  I told her it is hard for me to say at this point because I did not see her at the facility and only know her since she is hospitalized.  I did encourage her to discuss with her PCP and psychiatrist at facility that she does not want her back on multiple medications that can be avoided.  I also did further discuss with her that depending how she acts at facility they may need to restart some of the medications that have been stopped.    Review of Systems   Unable to perform ROS: Other         Objective:     Vitals: Blood pressure (!) 131/101, pulse 80, temperature 97.5 °F (36.4 °C), resp. rate 17, height 5' 1\" (1.549 m), weight 48.1 kg (106 lb), SpO2 92%.,Body mass index is 20.03 kg/m².      Intake/Output Summary (Last 24 hours) at 4/19/2024 1059  Last data filed at 4/18/2024 1624  Gross per 24 hour   Intake --   Output 364 ml   Net -364 ml       Current Medications: Reviewed    Physical Exam:   Physical Exam  Vitals reviewed.   Constitutional:       General: She is not in acute distress.     Appearance: She is well-developed. She is not ill-appearing.      Comments: Frail appearing    HENT:      Head: Normocephalic and atraumatic.   Cardiovascular:      Rate and Rhythm: Normal rate and regular rhythm.      Heart sounds: No murmur heard.  Pulmonary:      Effort: Pulmonary effort is normal. No respiratory distress.      Breath sounds: Normal breath sounds.   Abdominal:      General: Bowel sounds are normal. There is no distension.      Palpations: Abdomen is soft.      Tenderness: There is no abdominal tenderness.   Musculoskeletal:         General: No swelling.      Right lower leg: No edema.      Left lower leg: No edema.   Skin:     General: " "Skin is warm and dry.      Coloration: Skin is pale.   Neurological:      Mental Status: She is alert.      Cranial Nerves: No cranial nerve deficit.      Motor: Weakness present.      Gait: Gait abnormal.      Comments: Alert to person only on exam          Invasive Devices       Peripheral Intravenous Line  Duration             Long-Dwell Peripheral IV (Midline) 04/18/24 Right Brachial <1 day                    Lab, Imaging and other studies:    Lab Results:   I have personally reviewed pertinent lab results including the following:  -CBC, procalcitonin, BMP, urine culture    I have personally reviewed the following imaging study reports in PACS:  No new imaging to review  - I have personally reviewed pertinent reports.      Please note:  Voice-recognition software may have been used in the preparation of this document.  Occasional wrong word or \"sound-alike\" substitutions may have occurred due to the inherent limitations of voice recognition software.  Interpretation should be guided by context.    "

## 2024-04-19 NOTE — PLAN OF CARE
Problem: PAIN - ADULT  Goal: Verbalizes/displays adequate comfort level or baseline comfort level  Description: Interventions:  - Encourage patient to monitor pain and request assistance  - Assess pain using appropriate pain scale  - Administer analgesics based on type and severity of pain and evaluate response  - Implement non-pharmacological measures as appropriate and evaluate response  - Consider cultural and social influences on pain and pain management  - Notify physician/advanced practitioner if interventions unsuccessful or patient reports new pain  Outcome: Progressing     Problem: INFECTION - ADULT  Goal: Absence or prevention of progression during hospitalization  Description: INTERVENTIONS:  - Assess and monitor for signs and symptoms of infection  - Monitor lab/diagnostic results  - Monitor all insertion sites, i.e. indwelling lines, tubes, and drains  - Monitor endotracheal if appropriate and nasal secretions for changes in amount and color  - [place] appropriate cooling/warming therapies per order  - Administer medications as ordered  - Instruct and encourage patient and family to use good hand hygiene technique  - Identify and instruct in appropriate isolation precautions for identified infection/condition  Outcome: Progressing     Problem: SAFETY ADULT  Goal: Patient will remain free of falls  Description: INTERVENTIONS:  - Educate patient/family on patient safety including physical limitations  - Instruct patient to call for assistance with activity   - Consult OT/PT to assist with strengthening/mobility   - Keep Call bell within reach  - Keep bed low and locked with side rails adjusted as appropriate  - Keep care items and personal belongings within reach  - Initiate and maintain comfort rounds  - Make Fall Risk Sign visible to staff  - Obtain necessary fall risk management equipment  - Apply yellow socks and bracelet for high fall risk patients  - Consider moving patient to room near nurses  station  Outcome: Progressing     Problem: DISCHARGE PLANNING  Goal: Discharge to home or other facility with appropriate resources  Description: INTERVENTIONS:  - Identify barriers to discharge w/patient and caregiver  - Arrange for needed discharge resources and transportation as appropriate  - Identify discharge learning needs (meds, wound care, etc.)  - Arrange for interpretive services to assist at discharge as needed  - Refer to Case Management Department for coordinating discharge planning if the patient needs post-hospital services based on physician/advanced practitioner order or complex needs related to functional status, cognitive ability, or social support system  Outcome: Progressing     Problem: Knowledge Deficit  Goal: Patient/family/caregiver demonstrates understanding of disease process, treatment plan, medications, and discharge instructions  Description: Complete learning assessment and assess knowledge base.  Interventions:  - Provide teaching at level of understanding  - Provide teaching via preferred learning methods  Outcome: Progressing     Problem: GENITOURINARY - ADULT  Goal: Maintains or returns to baseline urinary function  Description: INTERVENTIONS:  - Assess urinary function  - Encourage oral fluids to ensure adequate hydration if ordered  - Administer IV fluids as ordered to ensure adequate hydration  - Administer ordered medications as needed  - Offer frequent toileting  - Follow urinary retention protocol if ordered  Outcome: Progressing     Problem: Nutrition/Hydration-ADULT  Goal: Nutrient/Hydration intake appropriate for improving, restoring or maintaining nutritional needs  Description: Monitor and assess patient's nutrition/hydration status for malnutrition. Collaborate with interdisciplinary team and initiate plan and interventions as ordered.  Monitor patient's weight and dietary intake as ordered or per policy. Utilize nutrition screening tool and intervene as necessary.  Determine patient's food preferences and provide high-protein, high-caloric foods as appropriate.     INTERVENTIONS:  - Monitor oral intake, urinary output, labs, and treatment plans  - Assess nutrition and hydration status and recommend course of action  - Evaluate amount of meals eaten  - Assist patient with eating if necessary   - Allow adequate time for meals  - Recommend/ encourage appropriate diets, oral nutritional supplements, and vitamin/mineral supplements  - Order, calculate, and assess calorie counts as needed  - Recommend, monitor, and adjust tube feedings and TPN/PPN based on assessed needs  - Assess need for intravenous fluids  - Provide specific nutrition/hydration education as appropriate  - Include patient/family/caregiver in decisions related to nutrition  Outcome: Progressing     Problem: SKIN/TISSUE INTEGRITY - ADULT  Goal: Skin Integrity remains intact(Skin Breakdown Prevention)  Description: Assess:  -Perform Jim assessment   -Clean and moisturize skin  -Inspect skin when repositioning, toileting, and assisting with ADLS  -Assess under medical devices   -Assess extremities for adequate circulation and sensation     Bed Management:  -Have minimal linens on bed & keep smooth, unwrinkled  -Change linens as needed when moist or perspiring      Toileting:  -Offer bedside commode  -Assess for incontinence   -Use incontinent care products after each incontinent episode     Activity:  -Encourage activity and walks on unit  -Encourage or provide ROM exercises   -Turn and reposition patient   -Use appropriate equipment to lift or move patient in bed    Skin Care:  -Avoid use of baby powder, tape, friction and shearing, hot water or constrictive clothing  -Relieve pressure over bony prominences  -Do not massage red bony areas    Next Steps:  -Teach patient strategies to minimize risks   -Consider consults to  interdisciplinary teams   Outcome: Progressing  Goal: Incision(s), wounds(s) or drain site(s)  healing without S/S of infection  Description: INTERVENTIONS  - Assess and document dressing, incision, wound bed, drain sites and surrounding tissue  - Provide patient and family education  - Perform skin care/dressing changes  Outcome: Progressing  Goal: Pressure injury heals and does not worsen  Description: Interventions:  - Implement low air loss mattress or specialty surface (Criteria met)  - Apply silicone foam dressing  - Apply fecal or urinary incontinence containment device   - Perform passive or active ROM   - Turn and reposition patient & offload bony prominences  - Utilize friction reducing device or surface for transfers   - Consider consults to  interdisciplinary teams   - Use incontinent care products after each incontinent episode  - Consider nutrition services referral as needed  Outcome: Progressing     Problem: Prexisting or High Potential for Compromised Skin Integrity  Goal: Skin integrity is maintained or improved  Description: INTERVENTIONS:  - Identify patients at risk for skin breakdown  - Assess and monitor skin integrity  - Assess and monitor nutrition and hydration status  - Monitor labs   - Assess for incontinence   - Turn and reposition patient  - Assist with mobility/ambulation  - Relieve pressure over bony prominences  - Avoid friction and shearing  - Provide appropriate hygiene as needed including keeping skin clean and dry  - Evaluate need for skin moisturizer/barrier cream  - Collaborate with interdisciplinary team   - Patient/family teaching  - Consider wound care consult   Outcome: Progressing     Problem: Potential for Falls  Goal: Patient will remain free of falls  Description: INTERVENTIONS:  - Educate patient/family on patient safety including physical limitations  - Instruct patient to call for assistance with activity   - Consult OT/PT to assist with strengthening/mobility   - Keep Call bell within reach  - Keep bed low and locked with side rails adjusted as appropriate  -  Keep care items and personal belongings within reach  - Initiate and maintain comfort rounds  - Make Fall Risk Sign visible to staff  - Obtain necessary fall risk management equipment  - Apply yellow socks and bracelet for high fall risk patients  - Consider moving patient to room near nurses station  Outcome: Progressing

## 2024-04-19 NOTE — UTILIZATION REVIEW
NOTIFICATION OF ADMISSION DISCHARGE   This is a Notification of Discharge from [facility]. Please be advised that this patient has been discharge from our facility. Below you will find the admission and discharge date and time including the patient’s disposition.   UTILIZATION REVIEW CONTACT:  Joan Holm  Utilization   Network Utilization Review Department  Phone: [phone] x carefully listen to the prompts. All voicemails are confidential.  Email: [email or web address]     ADMISSION INFORMATION  PRESENTATION DATE: 4/14/2024  6:13 PM  OBERVATION ADMISSION DATE:   INPATIENT ADMISSION DATE: 4/14/24  9:00 PM   DISCHARGE DATE: 4/19/2024  1:49 PM   [facility]    Network Utilization Review Department  ATTENTION: Please call with any questions or concerns to [phone] and carefully listen to the prompts so that you are directed to the right person. All voicemails are confidential.   For Discharge needs, contact Care Management DC Support Team at [phone] opt. 2  Send all requests for admission clinical reviews, approved or denied determinations and any other requests to dedicated fax number below belonging to the campus where the patient is receiving treatment. List of dedicated fax numbers for the Facilities:  FACILITY NAME UR FAX NUMBER   ADMISSION DENIALS (Administrative/Medical Necessity) [phone]   DISCHARGE SUPPORT TEAM ([facility]) [phone]   PARENT CHILD HEALTH (Maternity/NICU/Pediatrics) [phone]   [facility] [phone]   General acute hospital [phone]   [facility] [phone]   [facility] [phone]   [facility] [phone]   [facility] [phone]   [facility] [phone]   [facility]  [phone]   [facility] [phone]   [facility] [phone]   [facility] [phone]   [facility] [phone]

## 2024-04-19 NOTE — CASE MANAGEMENT
Case Management Discharge Planning Note    Patient name Monika Butler  Location S /S -01 MRN [id number]  : 1940 Date 2024       Current Admission Date: 2024  Current Admission Diagnosis:Severe sepsis (HCC)   Patient Active Problem List    Diagnosis Date Noted    Metabolic encephalopathy 2024    Mild protein-calorie malnutrition (HCC) 2024    Abnormal CT scan 04/15/2024    Severe sepsis (HCC) 2024    QT prolongation 2024    Acute respiratory failure with hypoxia (HCC) 2024    RAFAEL (acute kidney injury) (HCC) 2024    Thrush, oral 2024    Elevated troponin 2024    Constipation 2023    Nondisplaced fracture of triquetrum (cuneiform) bone, left wrist, initial encounter for closed fracture 2023    Abrasion of left eyebrow 2023    Injury of left wrist 2023    Insomnia 2023    Anxiety 2023    Anemia 2023    H/O clavicle fracture 2023    Meningioma (HCC) 2023    Pulmonary nodule 2023    Bad odor of urine 2023    Postmenopausal 2023    Prediabetes 2023    Gastroesophageal reflux disease with esophagitis without hemorrhage 2023    Generalized weakness 2022    Urinary frequency 2022    BMI 22.0-22.9, adult 2021    History of breast cancer 2021    Altered mental status 2020    Balance problems 2020    Dementia with behavioral disturbance (HCC)     Memory change 10/26/2020    Ear fullness, left 2017    Hyperlipidemia 2016    Breast cancer (HCC) 2013    Essential hypertension 2012      LOS (days): 5  Geometric Mean LOS (GMLOS) (days): 5.1  Days to GMLOS:0.6     OBJECTIVE:  Risk of Unplanned Readmission Score: 37.8         Current admission status: Inpatient   Preferred Pharmacy:   [id number] - [address] [address]  Phone: [phone]  Fax: [phone]    Primary Care Provider: Kristin Raymundo MD    [facility]  Secondary Insurance:     DISCHARGE DETAILS:             Other Referral/Resources/Interventions Provided:  Interventions: Facility Return  Referral Comments: As per provider, patient now medically stable for return to [facility] (LTC) today. Facility informed and in agreement with 1pm p/u. Daughter Anitha onboard with same -- No further CM needs indicated at this time. CM will remain available.        Treatment Team Recommendation: SNF, Facility Return  Discharge Destination Plan:: SNF, Facility Return  Transport at Discharge : [facility]  Transported by (Company and Unit #): [facility]  ETA of Transport (Date): 04/19/24  ETA of Transport (Time): 1300  Transfer Mode: Stretcher     Accepting Facility Name, City & State : [facility]  Receiving Facility/Agency Phone Number: [phone] ext [phone]  Facility/Agency Fax Number: [phone]

## 2024-04-22 ENCOUNTER — TRANSITIONAL CARE MANAGEMENT (OUTPATIENT)
Dept: FAMILY MEDICINE CLINIC | Facility: CLINIC | Age: 84
End: 2024-04-22

## 2024-04-22 NOTE — UTILIZATION REVIEW
NOTIFICATION OF ADMISSION DISCHARGE   This is a Notification of Discharge from [facility]. Please be advised that this patient has been discharge from our facility. Below you will find the admission and discharge date and time including the patient’s disposition.   UTILIZATION REVIEW CONTACT:  Dolly Sebastian  Utilization   Network Utilization Review Department  Phone: [phone] carefully listen to the prompts. All voicemails are confidential.  Email: [email or web address]     ADMISSION INFORMATION  PRESENTATION DATE: 4/14/2024  6:13 PM  OBERVATION ADMISSION DATE:   INPATIENT ADMISSION DATE: 4/14/24  9:00 PM   DISCHARGE DATE: 4/19/2024  1:49 PM   DISPOSITION:Discharged/Transferred to Long Term Care/Personal Care Home/Assisted Living    Network Utilization Review Department  ATTENTION: Please call with any questions or concerns to [phone] and carefully listen to the prompts so that you are directed to the right person. All voicemails are confidential.   For Discharge needs, contact Care Management DC Support Team at [phone] opt. [phone]  Send all requests for admission clinical reviews, approved or denied determinations and any other requests to dedicated fax number below belonging to the campus where the patient is receiving treatment. List of dedicated fax numbers for the Facilities:  FACILITY NAME UR FAX NUMBER   ADMISSION DENIALS (Administrative/Medical Necessity) [phone]   DISCHARGE SUPPORT TEAM ([facility]) [phone]   PARENT CHILD HEALTH (Maternity/NICU/Pediatrics) [phone]   [facility] [phone]   [facility] [phone]   [facility] [phone]   [facility] [phone]   [facility] [phone]   [facility] [phone]   [facility] [phone]    [facility] [phone]   [facility] [phone]   [facility] [phone]   [facility] [phone]   [facility] [phone]

## 2024-05-01 ENCOUNTER — HOME CARE VISIT (OUTPATIENT)
Dept: HOME HEALTH SERVICES | Facility: HOME HEALTHCARE | Age: 84
End: 2024-05-01
Payer: MEDICARE

## 2024-05-01 ENCOUNTER — HOSPICE ADMISSION (OUTPATIENT)
Dept: HOME HOSPICE | Facility: HOSPICE | Age: 84
End: 2024-05-01
Payer: MEDICARE

## 2024-05-02 ENCOUNTER — HOME CARE VISIT (OUTPATIENT)
Dept: HOME HOSPICE | Facility: HOSPICE | Age: 84
End: 2024-05-02
Payer: MEDICARE

## 2024-05-02 ENCOUNTER — HOME CARE VISIT (OUTPATIENT)
Dept: HOME HEALTH SERVICES | Facility: HOME HEALTHCARE | Age: 84
End: 2024-05-02
Payer: MEDICARE

## 2024-05-02 VITALS
DIASTOLIC BLOOD PRESSURE: 72 MMHG | SYSTOLIC BLOOD PRESSURE: 124 MMHG | TEMPERATURE: 97.3 F | RESPIRATION RATE: 16 BRPM | HEART RATE: 72 BPM

## 2024-05-02 PROCEDURE — G0299 HHS/HOSPICE OF RN EA 15 MIN: HCPCS

## 2024-05-02 NOTE — CASE COMMUNICATION
Ship to  Pt. Home    Tab Briefs  Med [id number]      12/pk ___2                    Heel Protectors 1 pair [id number] ___1

## 2024-05-03 ENCOUNTER — HOME CARE VISIT (OUTPATIENT)
Dept: HOME HOSPICE | Facility: HOSPICE | Age: 84
End: 2024-05-03
Payer: MEDICARE

## 2024-05-03 ENCOUNTER — HOME CARE VISIT (OUTPATIENT)
Dept: HOME HEALTH SERVICES | Facility: HOME HEALTHCARE | Age: 84
End: 2024-05-03
Payer: MEDICARE

## 2024-05-03 VITALS
HEART RATE: 68 BPM | BODY MASS INDEX: 18.21 KG/M2 | WEIGHT: 96.38 LBS | TEMPERATURE: 97 F | RESPIRATION RATE: 20 BRPM | SYSTOLIC BLOOD PRESSURE: 148 MMHG | DIASTOLIC BLOOD PRESSURE: 84 MMHG

## 2024-05-03 PROCEDURE — 10330064 PAD, HEEL CUSHION ULTRA (1/PR)SKLCRE

## 2024-05-03 PROCEDURE — 10330064 FOAM, ADH SIL W/BORDER SACRAL 7"X7" (10/

## 2024-05-03 PROCEDURE — G0299 HHS/HOSPICE OF RN EA 15 MIN: HCPCS

## 2024-05-03 PROCEDURE — 10330064 BRIEF, WINGS CHOICE PLUS QUILTED MED (12

## 2024-05-03 PROCEDURE — G0155 HHCP-SVS OF CSW,EA 15 MIN: HCPCS

## 2024-05-04 ENCOUNTER — HOME CARE VISIT (OUTPATIENT)
Dept: HOME HEALTH SERVICES | Facility: HOME HEALTHCARE | Age: 84
End: 2024-05-04
Payer: MEDICARE

## 2024-05-04 PROCEDURE — G0156 HHCP-SVS OF AIDE,EA 15 MIN: HCPCS

## 2024-05-06 ENCOUNTER — HOME CARE VISIT (OUTPATIENT)
Dept: HOME HEALTH SERVICES | Facility: HOME HEALTHCARE | Age: 84
End: 2024-05-06
Payer: MEDICARE

## 2024-05-06 VITALS
HEART RATE: 72 BPM | SYSTOLIC BLOOD PRESSURE: 152 MMHG | RESPIRATION RATE: 18 BRPM | DIASTOLIC BLOOD PRESSURE: 70 MMHG | TEMPERATURE: 97 F

## 2024-05-06 PROCEDURE — G0299 HHS/HOSPICE OF RN EA 15 MIN: HCPCS

## 2024-05-06 PROCEDURE — G0156 HHCP-SVS OF AIDE,EA 15 MIN: HCPCS

## 2024-05-07 ENCOUNTER — HOME CARE VISIT (OUTPATIENT)
Dept: HOME HEALTH SERVICES | Facility: HOME HEALTHCARE | Age: 84
End: 2024-05-07
Payer: MEDICARE

## 2024-05-07 PROCEDURE — G0156 HHCP-SVS OF AIDE,EA 15 MIN: HCPCS

## 2024-05-08 ENCOUNTER — HOME CARE VISIT (OUTPATIENT)
Dept: HOME HEALTH SERVICES | Facility: HOME HEALTHCARE | Age: 84
End: 2024-05-08
Payer: MEDICARE

## 2024-05-08 PROCEDURE — G0156 HHCP-SVS OF AIDE,EA 15 MIN: HCPCS

## 2024-05-09 ENCOUNTER — HOME CARE VISIT (OUTPATIENT)
Dept: HOME HEALTH SERVICES | Facility: HOME HEALTHCARE | Age: 84
End: 2024-05-09
Payer: MEDICARE

## 2024-05-09 ENCOUNTER — HOME CARE VISIT (OUTPATIENT)
Dept: HOME HOSPICE | Facility: HOSPICE | Age: 84
End: 2024-05-09
Payer: MEDICARE

## 2024-05-09 DIAGNOSIS — Z51.5 HOSPICE CARE PATIENT: Primary | ICD-10-CM

## 2024-05-09 PROCEDURE — G0299 HHS/HOSPICE OF RN EA 15 MIN: HCPCS

## 2024-05-09 PROCEDURE — 10330057 MEDICATION, GENERAL

## 2024-05-09 PROCEDURE — G0156 HHCP-SVS OF AIDE,EA 15 MIN: HCPCS

## 2024-05-09 RX ORDER — MORPHINE SULFATE 100 MG/5ML
SOLUTION, CONCENTRATE ORAL
Qty: 30 ML | Refills: 0 | Status: SHIPPED | OUTPATIENT
Start: 2024-05-09 | End: 2024-05-18

## 2024-05-09 RX ORDER — HALOPERIDOL 2 MG/ML
1 SOLUTION ORAL EVERY 6 HOURS PRN
Qty: 15 ML | Refills: 0 | Status: SHIPPED | OUTPATIENT
Start: 2024-05-09 | End: 2024-05-14

## 2024-05-10 ENCOUNTER — HOME CARE VISIT (OUTPATIENT)
Dept: HOME HEALTH SERVICES | Facility: HOME HEALTHCARE | Age: 84
End: 2024-05-10
Payer: MEDICARE

## 2024-05-10 PROCEDURE — G0156 HHCP-SVS OF AIDE,EA 15 MIN: HCPCS

## 2024-05-10 PROCEDURE — G0299 HHS/HOSPICE OF RN EA 15 MIN: HCPCS

## 2024-05-11 ENCOUNTER — HOME CARE VISIT (OUTPATIENT)
Dept: HOME HEALTH SERVICES | Facility: HOME HEALTHCARE | Age: 84
End: 2024-05-11
Payer: MEDICARE

## 2024-05-11 VITALS — RESPIRATION RATE: 20 BRPM | HEART RATE: 82 BPM | TEMPERATURE: 98.3 F

## 2024-05-11 PROCEDURE — G0299 HHS/HOSPICE OF RN EA 15 MIN: HCPCS

## 2024-05-13 ENCOUNTER — HOME CARE VISIT (OUTPATIENT)
Dept: HOME HEALTH SERVICES | Facility: HOME HEALTHCARE | Age: 84
End: 2024-05-13
Payer: MEDICARE

## 2024-05-13 PROCEDURE — G0156 HHCP-SVS OF AIDE,EA 15 MIN: HCPCS

## 2024-05-13 PROCEDURE — 10330064 CUSHION, COMPRESS COCCYX (4/CS)

## 2024-05-14 ENCOUNTER — HOME CARE VISIT (OUTPATIENT)
Dept: HOME HEALTH SERVICES | Facility: HOME HEALTHCARE | Age: 84
End: 2024-05-14
Payer: MEDICARE

## 2024-05-14 VITALS — RESPIRATION RATE: 20 BRPM | SYSTOLIC BLOOD PRESSURE: 148 MMHG | DIASTOLIC BLOOD PRESSURE: 80 MMHG | HEART RATE: 88 BPM

## 2024-05-14 PROCEDURE — G0299 HHS/HOSPICE OF RN EA 15 MIN: HCPCS

## 2024-05-14 PROCEDURE — G0156 HHCP-SVS OF AIDE,EA 15 MIN: HCPCS

## 2024-05-14 PROCEDURE — 10330057 MEDICATION, GENERAL

## 2024-05-15 ENCOUNTER — HOME CARE VISIT (OUTPATIENT)
Dept: HOME HEALTH SERVICES | Facility: HOME HEALTHCARE | Age: 84
End: 2024-05-15
Payer: MEDICARE

## 2024-05-15 PROBLEM — R65.20 SEVERE SEPSIS (HCC): Status: RESOLVED | Noted: 2024-04-14 | Resolved: 2024-05-15

## 2024-05-15 PROBLEM — A41.9 SEVERE SEPSIS (HCC): Status: RESOLVED | Noted: 2024-04-14 | Resolved: 2024-05-15

## 2024-05-15 PROCEDURE — G0156 HHCP-SVS OF AIDE,EA 15 MIN: HCPCS

## 2024-05-16 ENCOUNTER — HOME CARE VISIT (OUTPATIENT)
Dept: HOME HEALTH SERVICES | Facility: HOME HEALTHCARE | Age: 84
End: 2024-05-16
Payer: MEDICARE

## 2024-05-16 PROCEDURE — G0156 HHCP-SVS OF AIDE,EA 15 MIN: HCPCS

## 2024-05-17 ENCOUNTER — HOME CARE VISIT (OUTPATIENT)
Dept: HOME HEALTH SERVICES | Facility: HOME HEALTHCARE | Age: 84
End: 2024-05-17
Payer: MEDICARE

## 2024-05-17 PROCEDURE — G0156 HHCP-SVS OF AIDE,EA 15 MIN: HCPCS

## 2024-05-17 PROCEDURE — G0299 HHS/HOSPICE OF RN EA 15 MIN: HCPCS

## 2024-05-18 ENCOUNTER — DOCUMENTATION (OUTPATIENT)
Dept: PALLIATIVE MEDICINE | Facility: HOSPITAL | Age: 84
End: 2024-05-18

## 2024-05-18 ENCOUNTER — HOME CARE VISIT (OUTPATIENT)
Dept: HOME HOSPICE | Facility: HOSPICE | Age: 84
End: 2024-05-18
Payer: MEDICARE

## 2024-05-18 DIAGNOSIS — Z51.5 HOSPICE CARE PATIENT: ICD-10-CM

## 2024-05-18 DIAGNOSIS — Z51.5 HOSPICE CARE PATIENT: Primary | ICD-10-CM

## 2024-05-18 RX ORDER — MORPHINE SULFATE 100 MG/5ML
5 SOLUTION, CONCENTRATE ORAL EVERY 4 HOURS PRN
Start: 2024-05-18 | End: 2024-05-24

## 2024-05-18 RX ORDER — HALOPERIDOL 2 MG/ML
0.5 SOLUTION ORAL EVERY 4 HOURS PRN
Start: 2024-05-18 | End: 2024-05-23

## 2024-05-20 ENCOUNTER — HOME CARE VISIT (OUTPATIENT)
Dept: HOME HEALTH SERVICES | Facility: HOME HEALTHCARE | Age: 84
End: 2024-05-20
Payer: MEDICARE

## 2024-05-20 VITALS — RESPIRATION RATE: 18 BRPM | HEART RATE: 84 BPM | DIASTOLIC BLOOD PRESSURE: 70 MMHG | SYSTOLIC BLOOD PRESSURE: 164 MMHG

## 2024-05-20 PROCEDURE — G0299 HHS/HOSPICE OF RN EA 15 MIN: HCPCS

## 2024-05-20 PROCEDURE — G0156 HHCP-SVS OF AIDE,EA 15 MIN: HCPCS

## 2024-05-20 PROCEDURE — 10330057 MEDICATION, GENERAL

## 2024-05-21 ENCOUNTER — HOME CARE VISIT (OUTPATIENT)
Dept: HOME HEALTH SERVICES | Facility: HOME HEALTHCARE | Age: 84
End: 2024-05-21
Payer: MEDICARE

## 2024-05-21 PROCEDURE — G0156 HHCP-SVS OF AIDE,EA 15 MIN: HCPCS

## 2024-05-23 ENCOUNTER — HOME CARE VISIT (OUTPATIENT)
Dept: HOME HEALTH SERVICES | Facility: HOME HEALTHCARE | Age: 84
End: 2024-05-23
Payer: MEDICARE

## 2024-05-23 DIAGNOSIS — Z51.5 HOSPICE CARE: Primary | ICD-10-CM

## 2024-05-23 PROCEDURE — G0299 HHS/HOSPICE OF RN EA 15 MIN: HCPCS

## 2024-05-23 PROCEDURE — G0156 HHCP-SVS OF AIDE,EA 15 MIN: HCPCS

## 2024-05-23 RX ORDER — HALOPERIDOL 2 MG/ML
SOLUTION ORAL
Qty: 15 ML | Refills: 3 | Status: SHIPPED | OUTPATIENT
Start: 2024-05-23

## 2024-05-23 RX ORDER — MORPHINE SULFATE 100 MG/5ML
SOLUTION, CONCENTRATE ORAL
Qty: 30 ML | Refills: 0 | Status: SHIPPED | OUTPATIENT
Start: 2024-05-23 | End: 2024-05-28

## 2024-05-24 PROCEDURE — 10330057 MEDICATION, GENERAL

## 2024-05-28 ENCOUNTER — HOME CARE VISIT (OUTPATIENT)
Dept: HOME HEALTH SERVICES | Facility: HOME HEALTHCARE | Age: 84
End: 2024-05-28
Payer: MEDICARE

## 2024-05-28 ENCOUNTER — HOME CARE VISIT (OUTPATIENT)
Dept: HOME HOSPICE | Facility: HOSPICE | Age: 84
End: 2024-05-28
Payer: MEDICARE

## 2024-05-28 VITALS — DIASTOLIC BLOOD PRESSURE: 70 MMHG | RESPIRATION RATE: 22 BRPM | SYSTOLIC BLOOD PRESSURE: 130 MMHG | HEART RATE: 88 BPM

## 2024-05-28 DIAGNOSIS — Z51.5 HOSPICE CARE: ICD-10-CM

## 2024-05-28 PROCEDURE — G0155 HHCP-SVS OF CSW,EA 15 MIN: HCPCS

## 2024-05-28 PROCEDURE — G0299 HHS/HOSPICE OF RN EA 15 MIN: HCPCS

## 2024-05-28 PROCEDURE — G0156 HHCP-SVS OF AIDE,EA 15 MIN: HCPCS

## 2024-05-28 RX ORDER — MORPHINE SULFATE 20 MG/ML
SOLUTION ORAL
Qty: 15 ML | Refills: 0 | Status: SHIPPED | OUTPATIENT
Start: 2024-05-28

## 2024-05-29 ENCOUNTER — HOME CARE VISIT (OUTPATIENT)
Dept: HOME HEALTH SERVICES | Facility: HOME HEALTHCARE | Age: 84
End: 2024-05-29
Payer: MEDICARE

## 2024-05-29 PROCEDURE — G0156 HHCP-SVS OF AIDE,EA 15 MIN: HCPCS

## 2024-05-30 ENCOUNTER — HOME CARE VISIT (OUTPATIENT)
Dept: HOME HEALTH SERVICES | Facility: HOME HEALTHCARE | Age: 84
End: 2024-05-30
Payer: MEDICARE

## 2024-05-30 PROCEDURE — G0156 HHCP-SVS OF AIDE,EA 15 MIN: HCPCS

## 2024-05-31 ENCOUNTER — HOME CARE VISIT (OUTPATIENT)
Dept: HOME HEALTH SERVICES | Facility: HOME HEALTHCARE | Age: 84
End: 2024-05-31
Payer: MEDICARE

## 2024-05-31 ENCOUNTER — HOME CARE VISIT (OUTPATIENT)
Dept: HOME HOSPICE | Facility: HOSPICE | Age: 84
End: 2024-05-31
Payer: MEDICARE

## 2024-05-31 PROCEDURE — G0299 HHS/HOSPICE OF RN EA 15 MIN: HCPCS

## 2024-05-31 PROCEDURE — G0156 HHCP-SVS OF AIDE,EA 15 MIN: HCPCS

## 2024-06-01 ENCOUNTER — HOME CARE VISIT (OUTPATIENT)
Dept: HOME HEALTH SERVICES | Facility: HOME HEALTHCARE | Age: 84
End: 2024-06-01
Payer: MEDICARE

## 2024-06-01 PROCEDURE — G0156 HHCP-SVS OF AIDE,EA 15 MIN: HCPCS

## 2024-06-03 ENCOUNTER — HOME CARE VISIT (OUTPATIENT)
Dept: HOME HEALTH SERVICES | Facility: HOME HEALTHCARE | Age: 84
End: 2024-06-03
Payer: MEDICARE

## 2024-06-03 PROCEDURE — G0156 HHCP-SVS OF AIDE,EA 15 MIN: HCPCS

## 2024-06-04 ENCOUNTER — HOME CARE VISIT (OUTPATIENT)
Dept: HOME HEALTH SERVICES | Facility: HOME HEALTHCARE | Age: 84
End: 2024-06-04
Payer: MEDICARE

## 2024-06-04 ENCOUNTER — HOME CARE VISIT (OUTPATIENT)
Dept: HOME HOSPICE | Facility: HOSPICE | Age: 84
End: 2024-06-04
Payer: MEDICARE

## 2024-06-04 VITALS
RESPIRATION RATE: 20 BRPM | TEMPERATURE: 97 F | SYSTOLIC BLOOD PRESSURE: 112 MMHG | DIASTOLIC BLOOD PRESSURE: 70 MMHG | HEART RATE: 88 BPM

## 2024-06-04 PROCEDURE — G0156 HHCP-SVS OF AIDE,EA 15 MIN: HCPCS

## 2024-06-04 PROCEDURE — G0299 HHS/HOSPICE OF RN EA 15 MIN: HCPCS

## 2024-06-05 ENCOUNTER — HOME CARE VISIT (OUTPATIENT)
Dept: HOME HEALTH SERVICES | Facility: HOME HEALTHCARE | Age: 84
End: 2024-06-05
Payer: MEDICARE

## 2024-06-05 VITALS
HEART RATE: 88 BPM | RESPIRATION RATE: 22 BRPM | SYSTOLIC BLOOD PRESSURE: 146 MMHG | TEMPERATURE: 97.5 F | DIASTOLIC BLOOD PRESSURE: 70 MMHG

## 2024-06-05 PROCEDURE — G0156 HHCP-SVS OF AIDE,EA 15 MIN: HCPCS

## 2024-06-05 PROCEDURE — G0299 HHS/HOSPICE OF RN EA 15 MIN: HCPCS

## 2024-06-06 ENCOUNTER — HOME CARE VISIT (OUTPATIENT)
Dept: HOME HEALTH SERVICES | Facility: HOME HEALTHCARE | Age: 84
End: 2024-06-06
Payer: MEDICARE

## 2024-06-06 PROCEDURE — G0156 HHCP-SVS OF AIDE,EA 15 MIN: HCPCS

## 2024-06-07 ENCOUNTER — HOME CARE VISIT (OUTPATIENT)
Dept: HOME HEALTH SERVICES | Facility: HOME HEALTHCARE | Age: 84
End: 2024-06-07
Payer: MEDICARE

## 2024-06-07 VITALS
HEART RATE: 92 BPM | WEIGHT: 96 LBS | SYSTOLIC BLOOD PRESSURE: 138 MMHG | DIASTOLIC BLOOD PRESSURE: 70 MMHG | BODY MASS INDEX: 18.14 KG/M2

## 2024-06-07 PROCEDURE — G0299 HHS/HOSPICE OF RN EA 15 MIN: HCPCS

## 2024-06-07 PROCEDURE — G0156 HHCP-SVS OF AIDE,EA 15 MIN: HCPCS

## 2024-06-08 ENCOUNTER — HOME CARE VISIT (OUTPATIENT)
Dept: HOME HOSPICE | Facility: HOSPICE | Age: 84
End: 2024-06-08
Payer: MEDICARE

## 2024-06-08 ENCOUNTER — HOME CARE VISIT (OUTPATIENT)
Dept: HOME HEALTH SERVICES | Facility: HOME HEALTHCARE | Age: 84
End: 2024-06-08
Payer: MEDICARE

## 2024-06-08 VITALS
HEART RATE: 94 BPM | SYSTOLIC BLOOD PRESSURE: 130 MMHG | OXYGEN SATURATION: 95 % | RESPIRATION RATE: 18 BRPM | TEMPERATURE: 97.8 F | DIASTOLIC BLOOD PRESSURE: 90 MMHG

## 2024-06-08 PROCEDURE — G0299 HHS/HOSPICE OF RN EA 15 MIN: HCPCS

## 2024-06-09 ENCOUNTER — HOME CARE VISIT (OUTPATIENT)
Dept: HOME HOSPICE | Facility: HOSPICE | Age: 84
End: 2024-06-09
Payer: MEDICARE

## 2024-06-10 ENCOUNTER — HOME CARE VISIT (OUTPATIENT)
Dept: HOME HEALTH SERVICES | Facility: HOME HEALTHCARE | Age: 84
End: 2024-06-10
Payer: MEDICARE

## 2024-06-10 ENCOUNTER — HOME CARE VISIT (OUTPATIENT)
Dept: HOME HOSPICE | Facility: HOSPICE | Age: 84
End: 2024-06-10
Payer: MEDICARE

## 2024-06-10 VITALS
HEART RATE: 96 BPM | TEMPERATURE: 97 F | RESPIRATION RATE: 20 BRPM | SYSTOLIC BLOOD PRESSURE: 160 MMHG | DIASTOLIC BLOOD PRESSURE: 100 MMHG

## 2024-06-10 DIAGNOSIS — Z51.5 HOSPICE CARE PATIENT: Primary | ICD-10-CM

## 2024-06-10 DIAGNOSIS — F03.918 DEMENTIA WITH BEHAVIORAL DISTURBANCE (HCC): ICD-10-CM

## 2024-06-10 DIAGNOSIS — Z51.5 HOSPICE CARE: ICD-10-CM

## 2024-06-10 PROCEDURE — G0155 HHCP-SVS OF CSW,EA 15 MIN: HCPCS

## 2024-06-10 PROCEDURE — G0299 HHS/HOSPICE OF RN EA 15 MIN: HCPCS

## 2024-06-10 PROCEDURE — G0156 HHCP-SVS OF AIDE,EA 15 MIN: HCPCS

## 2024-06-10 RX ORDER — MORPHINE SULFATE 20 MG/ML
SOLUTION ORAL
Qty: 30 ML | Refills: 0 | Status: SHIPPED | OUTPATIENT
Start: 2024-06-10 | End: 2024-06-23

## 2024-06-10 RX ORDER — LORAZEPAM 2 MG/ML
CONCENTRATE ORAL
Qty: 30 ML | Refills: 0 | Status: SHIPPED | OUTPATIENT
Start: 2024-06-10 | End: 2024-06-23

## 2024-06-10 NOTE — TELEPHONE ENCOUNTER
6/10/2024 2:17 PM  [facility] facility patient requests refill of CII medication and medication adjusted due to change in patient condition.  Filled electronically via Epic as per [place] State Law.    Requested Prescriptions     Signed Prescriptions Disp Refills    morphine sulfate, concentrate, 100 mg/5mL concentrated solution 30 mL 0     Sig: Take 0.1 mL (2 mg total) by mouth every 6 (six) hours. May also take 0.1 mL (2 mg total) every 2 (two) hours as needed (pain / dyspnea). Call family prior to administering PRN dose.. Max Daily Amount: 32 mg.     Authorizing Provider: PETRA CAMPBELL    LORazepam (ATIVAN) 2 mg/mL concentrated solution 30 mL 0     Sig: Take 0.1 mL (0.2 mg total) by mouth every 6 (six) hours. May also take 0.1 mL (0.2 mg total) every 2 (two) hours as needed for anxiety or sleep (dyspnea / agitation). Call family prior to administering PRN dose..     Authorizing Provider: PETRA CAMPBELL CRNP  [facility]  Hospice Answering Service: [phone]  You can find me on [email or web address]!

## 2024-06-11 ENCOUNTER — HOME CARE VISIT (OUTPATIENT)
Dept: HOME HEALTH SERVICES | Facility: HOME HEALTHCARE | Age: 84
End: 2024-06-11
Payer: MEDICARE

## 2024-06-11 PROCEDURE — G0156 HHCP-SVS OF AIDE,EA 15 MIN: HCPCS

## 2024-06-12 ENCOUNTER — HOME CARE VISIT (OUTPATIENT)
Dept: HOME HEALTH SERVICES | Facility: HOME HEALTHCARE | Age: 84
End: 2024-06-12
Payer: MEDICARE

## 2024-06-12 VITALS — DIASTOLIC BLOOD PRESSURE: 90 MMHG | RESPIRATION RATE: 24 BRPM | SYSTOLIC BLOOD PRESSURE: 150 MMHG | HEART RATE: 100 BPM

## 2024-06-12 PROCEDURE — G0299 HHS/HOSPICE OF RN EA 15 MIN: HCPCS

## 2024-06-12 PROCEDURE — G0156 HHCP-SVS OF AIDE,EA 15 MIN: HCPCS

## 2024-06-14 ENCOUNTER — HOME CARE VISIT (OUTPATIENT)
Dept: HOME HEALTH SERVICES | Facility: HOME HEALTHCARE | Age: 84
End: 2024-06-14
Payer: MEDICARE

## 2024-06-14 VITALS
SYSTOLIC BLOOD PRESSURE: 168 MMHG | RESPIRATION RATE: 20 BRPM | HEART RATE: 88 BPM | DIASTOLIC BLOOD PRESSURE: 90 MMHG | TEMPERATURE: 97 F

## 2024-06-14 PROCEDURE — G0299 HHS/HOSPICE OF RN EA 15 MIN: HCPCS

## 2024-06-14 PROCEDURE — G0156 HHCP-SVS OF AIDE,EA 15 MIN: HCPCS

## 2024-07-02 ENCOUNTER — HOME CARE VISIT (OUTPATIENT)
Dept: HOME HOSPICE | Facility: HOSPICE | Age: 84
End: 2024-07-02
Payer: MEDICARE

## 2024-07-05 ENCOUNTER — HOME CARE VISIT (OUTPATIENT)
Dept: HOME HOSPICE | Facility: HOSPICE | Age: 84
End: 2024-07-05
Payer: MEDICARE

## (undated) DEVICE — MICROSURGICAL INSTRUMENT IRR. CYSTITOME 25GA STRAIGHT-REVERSE CUTTING: Brand: ALCON

## (undated) DEVICE — B-H IRRIGATING CAN 19GA FLAT ANGLED 8MM: Brand: OPHTHALMIC CANNULA

## (undated) DEVICE — ACTIVE FMS W/ INTREPID* ULTRA SLEEVES, 0.9MM 45° ABS* INTREPID* BALANCED TIP: Brand: ALCON

## (undated) DEVICE — CLEARCUT® SLIT KNIFE INTREPID MICRO-COAXIAL SYSTEM 2.4 SB: Brand: CLEARCUT®; INTREPID

## (undated) DEVICE — EYE PACK CUSTOM -FINNEGAN

## (undated) DEVICE — EYE PADS 1 5/8"X2 5/8": Brand: MCKESSON

## (undated) DEVICE — AIR INJECT CANNULA 27GA: Brand: OPHTHALMIC CANNULA

## (undated) DEVICE — GLOVE SRG BIOGEL 7.5

## (undated) DEVICE — INTREPID® TRANSFORMER IA HP: Brand: INTREPID®

## (undated) DEVICE — THE MONARCH® "D" CARTRIDGE IS A SINGLE-USE POLYPROPYLENE CARTRIDGE FOR POSTERIOR CHAMBER IOL DELIVERY: Brand: MONARCH® III